# Patient Record
Sex: MALE
[De-identification: names, ages, dates, MRNs, and addresses within clinical notes are randomized per-mention and may not be internally consistent; named-entity substitution may affect disease eponyms.]

---

## 2016-09-19 LAB — EXTERNAL HIV SCREEN: NORMAL

## 2017-01-10 PROBLEM — Z00.00 ENCOUNTER FOR PREVENTIVE HEALTH EXAMINATION: Status: ACTIVE | Noted: 2017-01-10

## 2017-02-09 ENCOUNTER — APPOINTMENT (OUTPATIENT)
Dept: OPHTHALMOLOGY | Facility: CLINIC | Age: 52
End: 2017-02-09

## 2017-02-09 DIAGNOSIS — H10.89 OTHER CONJUNCTIVITIS: ICD-10-CM

## 2017-02-09 DIAGNOSIS — H16.9 UNSPECIFIED KERATITIS: ICD-10-CM

## 2017-02-09 RX ORDER — ERYTHROMYCIN 5 MG/G
5 OINTMENT OPHTHALMIC
Qty: 1 | Refills: 3 | Status: ACTIVE | COMMUNITY
Start: 2017-02-09 | End: 1900-01-01

## 2017-02-09 RX ORDER — CYCLOSPORINE 0.5 MG/ML
0.05 EMULSION OPHTHALMIC
Qty: 1 | Refills: 2 | Status: ACTIVE | COMMUNITY
Start: 2017-02-09 | End: 1900-01-01

## 2017-02-09 RX ORDER — FLUOROMETHOLONE 1 MG/ML
0.1 SOLUTION/ DROPS OPHTHALMIC TWICE DAILY
Qty: 1 | Refills: 2 | Status: ACTIVE | COMMUNITY
Start: 2017-02-09 | End: 1900-01-01

## 2017-03-31 ENCOUNTER — APPOINTMENT (OUTPATIENT)
Dept: OPHTHALMOLOGY | Facility: CLINIC | Age: 52
End: 2017-03-31
Payer: COMMERCIAL

## 2017-03-31 DIAGNOSIS — H10.503 UNSPECIFIED BLEPHAROCONJUNCTIVITIS, BILATERAL: ICD-10-CM

## 2017-03-31 PROCEDURE — 92012 INTRM OPH EXAM EST PATIENT: CPT

## 2017-03-31 RX ORDER — CYCLOSPORINE 0.5 MG/ML
0.05 EMULSION OPHTHALMIC
Qty: 1 | Refills: 6 | Status: ACTIVE | COMMUNITY
Start: 2017-03-31 | End: 1900-01-01

## 2017-03-31 RX ORDER — ERYTHROMYCIN 5 MG/G
5 OINTMENT OPHTHALMIC
Qty: 5 | Refills: 4 | Status: ACTIVE | COMMUNITY
Start: 2017-03-31 | End: 1900-01-01

## 2017-03-31 RX ORDER — FLUOROMETHOLONE 1 MG/ML
0.1 SOLUTION/ DROPS OPHTHALMIC
Qty: 10 | Refills: 1 | Status: ACTIVE | COMMUNITY
Start: 2017-03-31 | End: 1900-01-01

## 2017-09-22 ENCOUNTER — APPOINTMENT (OUTPATIENT)
Dept: OPHTHALMOLOGY | Facility: CLINIC | Age: 52
End: 2017-09-22

## 2017-10-12 ENCOUNTER — GENERIC CONVERSION - ENCOUNTER (OUTPATIENT)
Dept: OTHER | Facility: OTHER | Age: 52
End: 2017-10-12

## 2017-10-31 ENCOUNTER — GENERIC CONVERSION - ENCOUNTER (OUTPATIENT)
Dept: OTHER | Facility: OTHER | Age: 52
End: 2017-10-31

## 2017-10-31 ENCOUNTER — ALLSCRIPTS OFFICE VISIT (OUTPATIENT)
Dept: OTHER | Facility: OTHER | Age: 52
End: 2017-10-31

## 2017-10-31 DIAGNOSIS — N39.0 URINARY TRACT INFECTION: ICD-10-CM

## 2017-10-31 DIAGNOSIS — Z13.1 ENCOUNTER FOR SCREENING FOR DIABETES MELLITUS: ICD-10-CM

## 2017-10-31 DIAGNOSIS — Z13.220 ENCOUNTER FOR SCREENING FOR LIPOID DISORDERS: ICD-10-CM

## 2017-10-31 LAB
BILIRUB UR QL STRIP: ABNORMAL
COLOR UR: ABNORMAL
GLUCOSE (HISTORICAL): ABNORMAL
HBA1C MFR BLD HPLC: 4.7 %
HGB UR QL STRIP.AUTO: ABNORMAL
KETONES UR STRIP-MCNC: ABNORMAL MG/DL
LEUKOCYTE ESTERASE UR QL STRIP: ABNORMAL
NITRITE UR QL STRIP: POSITIVE
PH UR STRIP.AUTO: 6 [PH]
PROT UR STRIP-MCNC: ABNORMAL MG/DL
SP GR UR STRIP.AUTO: 1.01
UROBILINOGEN UR QL STRIP.AUTO: 4

## 2017-11-01 ENCOUNTER — APPOINTMENT (OUTPATIENT)
Dept: LAB | Facility: HOSPITAL | Age: 52
End: 2017-11-01
Payer: COMMERCIAL

## 2017-11-01 DIAGNOSIS — N39.0 URINARY TRACT INFECTION: ICD-10-CM

## 2017-11-01 PROCEDURE — 87077 CULTURE AEROBIC IDENTIFY: CPT

## 2017-11-01 PROCEDURE — 87186 SC STD MICRODIL/AGAR DIL: CPT

## 2017-11-01 PROCEDURE — 87086 URINE CULTURE/COLONY COUNT: CPT

## 2017-11-03 LAB — BACTERIA UR CULT: ABNORMAL

## 2017-11-20 ENCOUNTER — ALLSCRIPTS OFFICE VISIT (OUTPATIENT)
Dept: OTHER | Facility: OTHER | Age: 52
End: 2017-11-20

## 2017-12-11 ENCOUNTER — HOSPITAL ENCOUNTER (EMERGENCY)
Facility: HOSPITAL | Age: 52
Discharge: HOME | End: 2017-12-11
Attending: EMERGENCY MEDICINE | Admitting: EMERGENCY MEDICINE
Payer: COMMERCIAL

## 2017-12-11 VITALS
DIASTOLIC BLOOD PRESSURE: 63 MMHG | TEMPERATURE: 98 F | BODY MASS INDEX: 49.98 KG/M2 | RESPIRATION RATE: 16 BRPM | OXYGEN SATURATION: 98 % | HEIGHT: 66 IN | HEART RATE: 80 BPM | SYSTOLIC BLOOD PRESSURE: 133 MMHG | WEIGHT: 311 LBS

## 2017-12-11 DIAGNOSIS — K74.60 CIRRHOSIS OF LIVER DUE TO HEPATITIS B (HCC): ICD-10-CM

## 2017-12-11 DIAGNOSIS — N30.91 HEMORRHAGIC CYSTITIS: ICD-10-CM

## 2017-12-11 DIAGNOSIS — B19.10 CIRRHOSIS OF LIVER DUE TO HEPATITIS B (HCC): ICD-10-CM

## 2017-12-11 DIAGNOSIS — N39.0 URINARY TRACT INFECTION: Primary | ICD-10-CM

## 2017-12-11 LAB
ALBUMIN SERPL BCP-MCNC: 3.1 G/DL (ref 3.5–5)
ALP SERPL-CCNC: 128 U/L (ref 46–116)
ALT SERPL W P-5'-P-CCNC: 42 U/L (ref 12–78)
ANION GAP SERPL CALCULATED.3IONS-SCNC: 8 MMOL/L (ref 4–13)
APTT PPP: 34 SECONDS (ref 23–35)
AST SERPL W P-5'-P-CCNC: 52 U/L (ref 5–45)
BACTERIA UR QL AUTO: ABNORMAL /HPF
BASOPHILS # BLD AUTO: 0.05 THOUSANDS/ΜL (ref 0–0.1)
BASOPHILS NFR BLD AUTO: 1 % (ref 0–1)
BILIRUB DIRECT SERPL-MCNC: 0.8 MG/DL (ref 0–0.2)
BILIRUB SERPL-MCNC: 2.1 MG/DL (ref 0.2–1)
BILIRUB UR QL STRIP: ABNORMAL
BUN SERPL-MCNC: 7 MG/DL (ref 5–25)
CALCIUM SERPL-MCNC: 8.7 MG/DL (ref 8.3–10.1)
CHLORIDE SERPL-SCNC: 107 MMOL/L (ref 100–108)
CLARITY UR: ABNORMAL
CO2 SERPL-SCNC: 28 MMOL/L (ref 21–32)
COLOR UR: ABNORMAL
CREAT SERPL-MCNC: 0.66 MG/DL (ref 0.6–1.3)
EOSINOPHIL # BLD AUTO: 0.21 THOUSAND/ΜL (ref 0–0.61)
EOSINOPHIL NFR BLD AUTO: 3 % (ref 0–6)
ERYTHROCYTE [DISTWIDTH] IN BLOOD BY AUTOMATED COUNT: 14.1 % (ref 11.6–15.1)
GFR SERPL CREATININE-BSD FRML MDRD: 110 ML/MIN/1.73SQ M
GLUCOSE SERPL-MCNC: 107 MG/DL (ref 65–140)
GLUCOSE UR STRIP-MCNC: NEGATIVE MG/DL
HCT VFR BLD AUTO: 38.1 % (ref 36.5–49.3)
HGB BLD-MCNC: 13.3 G/DL (ref 12–17)
HGB UR QL STRIP.AUTO: ABNORMAL
INR PPP: 1.25 (ref 0.86–1.16)
KETONES UR STRIP-MCNC: NEGATIVE MG/DL
LEUKOCYTE ESTERASE UR QL STRIP: ABNORMAL
LYMPHOCYTES # BLD AUTO: 1.02 THOUSANDS/ΜL (ref 0.6–4.47)
LYMPHOCYTES NFR BLD AUTO: 16 % (ref 14–44)
MCH RBC QN AUTO: 33.8 PG (ref 26.8–34.3)
MCHC RBC AUTO-ENTMCNC: 34.9 G/DL (ref 31.4–37.4)
MCV RBC AUTO: 97 FL (ref 82–98)
MONOCYTES # BLD AUTO: 0.58 THOUSAND/ΜL (ref 0.17–1.22)
MONOCYTES NFR BLD AUTO: 9 % (ref 4–12)
NEUTROPHILS # BLD AUTO: 4.32 THOUSANDS/ΜL (ref 1.85–7.62)
NEUTS SEG NFR BLD AUTO: 70 % (ref 43–75)
NITRITE UR QL STRIP: POSITIVE
NON-SQ EPI CELLS URNS QL MICRO: ABNORMAL /HPF
NRBC BLD AUTO-RTO: 0 /100 WBCS
PH UR STRIP.AUTO: 6 [PH] (ref 4.5–8)
PLATELET # BLD AUTO: 88 THOUSANDS/UL (ref 149–390)
PMV BLD AUTO: 11.4 FL (ref 8.9–12.7)
POTASSIUM SERPL-SCNC: 3.6 MMOL/L (ref 3.5–5.3)
PROT SERPL-MCNC: 6 G/DL (ref 6.4–8.2)
PROT UR STRIP-MCNC: ABNORMAL MG/DL
PROTHROMBIN TIME: 16 SECONDS (ref 12.1–14.4)
RBC # BLD AUTO: 3.93 MILLION/UL (ref 3.88–5.62)
RBC #/AREA URNS AUTO: ABNORMAL /HPF
SODIUM SERPL-SCNC: 143 MMOL/L (ref 136–145)
SP GR UR STRIP.AUTO: 1.02 (ref 1–1.03)
UROBILINOGEN UR QL STRIP.AUTO: 1 E.U./DL
WBC # BLD AUTO: 6.21 THOUSAND/UL (ref 4.31–10.16)
WBC #/AREA URNS AUTO: ABNORMAL /HPF

## 2017-12-11 PROCEDURE — 36415 COLL VENOUS BLD VENIPUNCTURE: CPT | Performed by: EMERGENCY MEDICINE

## 2017-12-11 PROCEDURE — 87186 SC STD MICRODIL/AGAR DIL: CPT | Performed by: EMERGENCY MEDICINE

## 2017-12-11 PROCEDURE — 87077 CULTURE AEROBIC IDENTIFY: CPT | Performed by: EMERGENCY MEDICINE

## 2017-12-11 PROCEDURE — 99283 EMERGENCY DEPT VISIT LOW MDM: CPT

## 2017-12-11 PROCEDURE — 81001 URINALYSIS AUTO W/SCOPE: CPT | Performed by: EMERGENCY MEDICINE

## 2017-12-11 PROCEDURE — 51798 US URINE CAPACITY MEASURE: CPT

## 2017-12-11 PROCEDURE — 80076 HEPATIC FUNCTION PANEL: CPT | Performed by: EMERGENCY MEDICINE

## 2017-12-11 PROCEDURE — 85025 COMPLETE CBC W/AUTO DIFF WBC: CPT | Performed by: EMERGENCY MEDICINE

## 2017-12-11 PROCEDURE — 85730 THROMBOPLASTIN TIME PARTIAL: CPT | Performed by: EMERGENCY MEDICINE

## 2017-12-11 PROCEDURE — 80048 BASIC METABOLIC PNL TOTAL CA: CPT | Performed by: EMERGENCY MEDICINE

## 2017-12-11 PROCEDURE — 85610 PROTHROMBIN TIME: CPT | Performed by: EMERGENCY MEDICINE

## 2017-12-11 PROCEDURE — 87086 URINE CULTURE/COLONY COUNT: CPT | Performed by: EMERGENCY MEDICINE

## 2017-12-11 RX ORDER — CIPROFLOXACIN 500 MG/1
500 TABLET, FILM COATED ORAL ONCE
Status: COMPLETED | OUTPATIENT
Start: 2017-12-11 | End: 2017-12-11

## 2017-12-11 RX ORDER — CIPROFLOXACIN 500 MG/1
500 TABLET, FILM COATED ORAL 2 TIMES DAILY
Qty: 27 TABLET | Refills: 0 | Status: SHIPPED | OUTPATIENT
Start: 2017-12-11 | End: 2017-12-25

## 2017-12-11 RX ADMIN — CIPROFLOXACIN HYDROCHLORIDE 500 MG: 500 TABLET, FILM COATED ORAL at 17:08

## 2017-12-11 NOTE — ED PROVIDER NOTES
History  Chief Complaint   Patient presents with    Blood in Urine     Pt with blood in his urine and some pain after urination      HPI    None       Past Medical History:   Diagnosis Date    Cirrhosis ([facility] [address] )     Obesity        History reviewed  No pertinent surgical history  History reviewed  No pertinent family history  I have reviewed and agree with the history as documented  Social History   Substance Use Topics    Smoking status: Current Every Day Smoker     Types: Cigarettes    Smokeless tobacco: Never Used    Alcohol use No        Review of Systems    Physical Exam  ED Triage Vitals [12/11/17 1538]   Temperature Pulse Respirations Blood Pressure SpO2   98 °F (36 7 °C) 80 16 133/63 98 %      Temp Source Heart Rate Source Patient Position - Orthostatic VS BP Location FiO2 (%)   Oral Monitor Sitting Right arm --      Pain Score       8           Orthostatic Vital Signs  Vitals:    12/11/17 1538   BP: 133/63   Pulse: 80   Patient Position - Orthostatic VS: Sitting       Physical Exam   Constitutional: He is oriented to person, place, and time  He appears well-developed and well-nourished  No distress  Morbid obesity   HENT:   Head: Normocephalic and atraumatic  Mouth/Throat: Oropharynx is clear and moist    Eyes: Conjunctivae are normal  Pupils are equal, round, and reactive to light  Neck: Normal range of motion  No tracheal deviation present  Cardiovascular: Normal rate, regular rhythm, normal heart sounds and intact distal pulses  Pulmonary/Chest: Effort normal and breath sounds normal  No respiratory distress  Abdominal: Soft  Bowel sounds are normal  He exhibits no distension  There is no tenderness  Neurological: He is alert and oriented to person, place, and time  GCS eye subscore is 4  GCS verbal subscore is 5  GCS motor subscore is 6  Skin: Skin is warm and dry  Psychiatric: He has a normal mood and affect   His behavior is normal    Nursing note and vitals reviewed  ED Medications  Medications   ciprofloxacin (CIPRO) tablet 500 mg (not administered)       Diagnostic Studies  Results Reviewed     Procedure Component Value Units Date/Time    Urine Microscopic [[id number]]  (Abnormal) Collected:  12/11/17 1608    Lab Status:  Final result Specimen:  Urine from Urine, Clean Catch Updated:  12/11/17 1644     RBC, UA Innumerable (A) /hpf      WBC, UA 20-30 (A) /hpf      Epithelial Cells Occasional /hpf      Bacteria, UA Moderate (A) /hpf     Urine culture [[id number]] Collected:  12/11/17 1608    Lab Status:   In process Specimen:  Urine from Urine, Clean Catch Updated:  12/11/17 1644    UA w Reflex to Microscopic w Reflex to Culture [[id number]]  (Abnormal) Collected:  12/11/17 1608    Lab Status:  Final result Specimen:  Urine from Urine, Clean Catch Updated:  12/11/17 1642     Color, UA Brown     Clarity, UA Cloudy     Specific Gravity, UA 1 025     pH, UA 6 0     Leukocytes, UA Moderate (A)     Nitrite, UA Positive (A)     Protein,  (2+) (A) mg/dl      Glucose, UA Negative mg/dl      Ketones, UA Negative mg/dl      Urobilinogen, UA 1 0 E U /dl      Bilirubin, UA Small (A)     Blood, UA Large (A)    CBC and differential [[id number]]  (Abnormal) Collected:  12/11/17 1605    Lab Status:  Final result Specimen:  Blood from Arm, Right Updated:  12/11/17 1642     WBC 6 21 Thousand/uL      RBC 3 93 Million/uL      Hemoglobin 13 3 g/dL      Hematocrit 38 1 %      MCV 97 fL      MCH 33 8 pg      MCHC 34 9 g/dL      RDW 14 1 %      MPV 11 4 fL      Platelets 88 (L) Thousands/uL      nRBC 0 /100 WBCs      Neutrophils Relative 70 %      Lymphocytes Relative 16 %      Monocytes Relative 9 %      Eosinophils Relative 3 %      Basophils Relative 1 %      Neutrophils Absolute 4 32 Thousands/µL      Lymphocytes Absolute 1 02 Thousands/µL      Monocytes Absolute 0 58 Thousand/µL      Eosinophils Absolute 0 21 Thousand/µL      Basophils Absolute 0 05 Thousands/µL     Protime-INR [[id number]]  (Abnormal) Collected:  12/11/17 1605    Lab Status:  Final result Specimen:  Blood from Arm, Right Updated:  12/11/17 1640     Protime 16 0 (H) seconds      INR 1 25 (H)    APTT [[id number]]  (Normal) Collected:  12/11/17 1605    Lab Status:  Final result Specimen:  Blood from Arm, Right Updated:  12/11/17 1640     PTT 34 seconds     Narrative: Therapeutic Heparin Range = 60-90 seconds    Basic metabolic panel [[id number]] Collected:  12/11/17 1605    Lab Status:  Final result Specimen:  Blood from Arm, Right Updated:  12/11/17 1633     Sodium 143 mmol/L      Potassium 3 6 mmol/L      Chloride 107 mmol/L      CO2 28 mmol/L      Anion Gap 8 mmol/L      BUN 7 mg/dL      Creatinine 0 66 mg/dL      Glucose 107 mg/dL      Calcium 8 7 mg/dL      eGFR 110 ml/min/1 73sq m     Narrative:         National Kidney Disease Education Program recommendations are as follows:  GFR calculation is accurate only with a steady state creatinine  Chronic Kidney disease less than 60 ml/min/1 73 sq  meters  Kidney failure less than 15 ml/min/1 73 sq  meters  Hepatic function panel [[id number]]  (Abnormal) Collected:  12/11/17 1605    Lab Status:  Final result Specimen:  Blood from Arm, Right Updated:  12/11/17 1633     Total Bilirubin 2 10 (H) mg/dL      Bilirubin, Direct 0 80 (H) mg/dL      Alkaline Phosphatase 128 (H) U/L      AST 52 (H) U/L      ALT 42 U/L      Total Protein 6 0 (L) g/dL      Albumin 3 1 (L) g/dL                  No orders to display              Procedures  Procedures       Phone Contacts  ED Phone Contact    ED Course  ED Course                                MDM  Number of Diagnoses or Management Options  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 [facility]): new and requires workup  Hemorrhagic cystitis: new and requires workup  Urinary tract infection: new and requires workup  Diagnosis management comments: This is a 50-year-old male who presents here today with hematuria    He states it started two days ago, he had a mild blood tenderness she at the end of his stream, and a sharp pain in the suprapubic area that lasted for a couple of seconds before resolving  He does not have pain in between urination  He called his primary care doctor today, and was initially scheduled for an appointment tomorrow to follow-up on his symptoms  However, he had several additional episodes of urination, but had a greater amount of blood  He states it is liquid" blood, looking like bloody urine, and denies any blood clots  He occasionally feels like there is something in his bladder when he is done, and that he is not fully emptied it  He does not have any suprapubic pain other than immediately after urination  He has no back or flank pain  He has no difficulties urinating  He has no known problems with his prostate  He has no fevers or other infectious symptoms  He does have a history of cirrhosis from hepatitis B, and denies any problems with easy bleeding or bruising  He states he has had two urinary tract infections over the past couple of months, most recently about one month ago, and denies any prior problems with UTIs before this  He denies any prior blood in his urine  He denies any prior problems with kidney stones  He has no other bleeding  He had previously been on a blood thinner for presumed clot in his abdomen, but states he has been off of this for about 8-9 months  ROS: Otherwise negative, unless stated as above  He is well-appearing, in no acute distress  He has no abdominal tenderness  The remainder of his exam is unremarkable  This is most likely hemorrhagic cystitis  I am not concerned about underlying kidney stone  His bleeding could be due to the infection verses secondary to developing bleeding dyscrasias from his cirrhosis  We will check his urine here to evaluate for signs of infection, as well as lab work to evaluate his current LFTs and for signs of bleeding dyscrasia    We will get a bladder scan to evaluate for signs of retention, due to what would be presumed to be obstructing blood clots, given his complaint of occasionally feeling like he cannot completely empty his bladder  His bladder scan only showed 44 milliliters  His urine does show signs of UTI with blood  His CBC and BMP are normal  His LFTs are elevated, but based on review of Care everywhere records, most recently on 9/19/16, are similar to prior values  He has a minimally elevated INR, but normal PTT  I do not feel that he has any significant bleeding dyscrasias contributing to his current bleeding  I discussed with the patient findings, treatment at home, recommended urology follow-up given his recent recurrent UTIs to evaluate for possible structural abnormalities contributing to this, and indications for return, and he expresses understanding with this plan  Amount and/or Complexity of Data Reviewed  Clinical lab tests: reviewed and ordered  Decide to obtain previous medical records or to obtain history from someone other than the patient: yes  Review and summarize past medical records: yes      CritCare Time    Disposition  Final diagnoses:   Urinary tract infection   Hemorrhagic cystitis   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 ([facility] [address] )     Time reflects when diagnosis was documented in both MDM as applicable and the Disposition within this note     Time User Action Codes Description Comment    12/11/2017  5:01 PM Daniel Paul Add [N39 0] Urinary tract infection     12/11/2017  5:02 PM Daniel Paul Add [N30 91] Hemorrhagic cystitis     12/11/2017  5:02 PM Daniel Paul Add [K74 60,  B19 10] 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 [facility])       ED Disposition     ED Disposition Condition Comment    Discharge  Tirso Foil Provosty discharge to home/self care      Condition at discharge: Good        Follow-up Information     Follow up With Specialties Details Why Contact Info Edvin Garcias MD Family Medicine Schedule an appointment as soon as possible for a visit in 2 days As needed to follow up on your symptoms [address] [place] [address]   Blanquita Faria [place] [phone]      Manoj Miner MD Urology Schedule an appointment as soon as possible for a visit To follow-up on your recurrent urinary tract infections [address]  [address] [facility] [address]  [phone]          Patient's Medications   Discharge Prescriptions    CIPROFLOXACIN (CIPRO) 500 MG TABLET    Take 1 tablet by mouth 2 (two) times a day for 14 days       Start Date: 12/11/2017End Date: 12/25/2017       Order Dose: 500 mg       Quantity: 27 tablet    Refills: 0     No discharge procedures on file      ED Provider  Electronically Signed by           Marychuy Ramesh MD  12/11/17 2561

## 2017-12-11 NOTE — ED NOTES
D/c reviewed with pt prior to discharge  Medications discussed  Ambulatory off unit with steady gait       Jenny Arceo RN  38/36/45 0901

## 2017-12-11 NOTE — DISCHARGE INSTRUCTIONS
Take the antibiotic as prescribed  Drink plenty of fluids  Follow up with your primary care doctor and the urologist for further evaluation of your symptoms, and your recurrent urinary tract infections  Return if you are unable to urinate, or for any other concerns  Urinary Tract Infection in Men, Ambulatory Care   GENERAL INFORMATION:   A urinary tract infection (UTI)  is caused by bacteria that get inside your urinary tract  Most bacteria that enter your urinary tract are expelled when you urinate  If the bacteria stay in your urinary tract, you may get an infection  Your urinary tract includes your kidneys, ureters, bladder, and urethra  Urine is made in your kidneys, and it flows from the ureters to the bladder  Urine leaves the bladder through the urethra  A UTI is more common in your lower urinary tract, which includes your bladder and urethra  Common symptoms include the following:   · Urinating more often or waking from sleep to urinate    · Pain or burning when you urinate    · Pain or pressure in your lower abdomen    · Urine that smells bad    · Leaking urine  Seek immediate care for the following symptoms:   · Urinating very little or not at all    · Vomiting    · Shaking chills with a fever    · Side or back pain that gets worse  Treatment for a UTI  may include medicines to treat a bacterial infection  You may also need medicines to decrease pain and burning, or decrease the urge to urinate often  Prevent a UTI:   · Urinate when you feel the urge  Do not hold your urine  Urinate as soon as you feel you have to  · Drink liquids as directed  Ask how much liquid to drink each day and which liquids are best for you  You may need to drink more fluids than usual to help flush out the bacteria  Do not drink alcohol, caffeine, and citrus juices  These can irritate your bladder and increase your symptoms      · Apply heat  on your abdomen for 20 to 30 minutes every 2 hours for as many days as directed  Heat helps decrease discomfort and pressure in your bladder  Follow up with your healthcare provider as directed:  Write down your questions so you remember to ask them during your visits  CARE AGREEMENT:   You have the right to help plan your care  Learn about your health condition and how it may be treated  Discuss treatment options with your caregivers to decide what care you want to receive  You always have the right to refuse treatment  The above information is an  only  It is not intended as medical advice for individual conditions or treatments  Talk to your doctor, nurse or pharmacist before following any medical regimen to see if it is safe and effective for you  © 2014 [address] is for End User's use only and may not be sold, redistributed or otherwise used for commercial purposes  All illustrations and images included in CareNotes® are the copyrighted property of [employer]  or Mika Hernandez

## 2017-12-13 LAB — BACTERIA UR CULT: ABNORMAL

## 2017-12-22 ENCOUNTER — ALLSCRIPTS OFFICE VISIT (OUTPATIENT)
Dept: OTHER | Facility: OTHER | Age: 52
End: 2017-12-22

## 2017-12-22 DIAGNOSIS — Z13.1 ENCOUNTER FOR SCREENING FOR DIABETES MELLITUS: ICD-10-CM

## 2017-12-22 DIAGNOSIS — R39.9 UNSPECIFIED SYMPTOMS AND SIGNS INVOLVING THE GENITOURINARY SYSTEM: ICD-10-CM

## 2017-12-22 DIAGNOSIS — N39.0 URINARY TRACT INFECTION: ICD-10-CM

## 2017-12-22 DIAGNOSIS — R31.0 GROSS HEMATURIA: ICD-10-CM

## 2017-12-22 DIAGNOSIS — Z13.220 ENCOUNTER FOR SCREENING FOR LIPOID DISORDERS: ICD-10-CM

## 2017-12-23 NOTE — PROGRESS NOTES
Assessment   1  Acute UTI (599 0) (N39 0)   2  Urinary frequency (788 41) (R35 0)   3  Erectile dysfunction (607 84) (N52 9)    Plan   Acute UTI    · Ciprofloxacin HCl - 500 MG Oral Tablet; TAKE 1 TABLET EVERY 12 HOURS DAILY   · (1) URINE CULTURE; Source:Urine, Clean Catch; Status:Active; Requested    for:[id number];   Diabetes mellitus screening    · (1) BASIC METABOLIC PROFILE; Status:Active; Requested for:[id number];   Erectile dysfunction    · Staxyn 10 MG Oral Tablet Disintegrating   · Levitra 20 MG Oral Tablet; TAKE 1 TABLET DAILY 1 HOUR BEFORE NEEDED  Lipid screening    · (1) LIPID PANEL, FASTING; Status:Active; Requested for:[id number];     Discussion/Summary      Acute UTI - he was given an order for urine culture  Also given a script for ciprofloxacin 500 mg PO X 5 days  Advised to follow up with urologist  dysjunction- after discussing risks and benefits with medications along with major and common side effects, given samples of Levitra  up in 3 months or PRN  Possible side effects of new medications were reviewed with the patient/guardian today  The treatment plan was reviewed with the patient/guardian  The patient/guardian understands and agrees with the treatment plan      Chief Complaint   Patient seen in office today for a follow up appointment from recent ER visit - UTI  Patient stated that he is currently taking Cipro for a few more days and also is scheduled with Thuan Chambers 1/5/17  History of Present Illness   Patient is here to follow up due to an ER visit at [facility] due dysuria and blood in the urine  He was given ciprofloxacin 500 mg PO twice daily and he says that he is currently finishing ans has a few pills left  He has an appt with urologist on January 5th  is Here to follow up for erectile syndication, he says that he tried Staxyn however the medication is not helping him  He would like to try something else        Review of Systems        Constitutional: No fever or chills, feels well, no tiredness, no recent weight gain or weight loss  Eyes: No complaints of eye pain, no red eyes, no discharge from eyes, no itchy eyes  Cardiovascular: No complaints of slow heart rate, no fast heart rate, no chest pain, no palpitations, no leg claudication, no lower extremity  Respiratory: No complaints of shortness of breath, no wheezing, no cough, no SOB on exertion, no orthopnea or PND  Gastrointestinal: No complaints of abdominal pain, no constipation, no nausea or vomiting, no diarrhea or bloody stools  Genitourinary: dysuria, but-- No complaints of dysuria, no incontinence, no hesitancy, no nocturia, no genital lesion, no testicular pain-- and-- as noted in HPI  Musculoskeletal: No complaints of arthralgia, no myalgias, no joint swelling or stiffness, no limb pain or swelling  Integumentary: No complaints of skin rash or skin lesions, no itching, no skin wound, no dry skin  Neurological: No compliants of headache, no confusion, no convulsions, no numbness or tingling, no dizziness or fainting, no limb weakness, no difficulty walking  Endocrine: No complaints of proptosis, no hot flashes, no muscle weakness, no erectile dysfunction, no deepening of the voice, no feelings of weakness  ROS reviewed  Active Problems   1  Acute UTI (599 0) (N39 0)   2  Cirrhosis of liver (571 5) (K74 60)   3  Diabetes mellitus screening (V77 1) (Z13 1)   4  Erectile dysfunction (607 84) (N52 9)   5  Glucose found in urine on examination (791 5) (R81)   6  History of hepatitis C (V12 09) (Z86 19)   7  Lipid screening (V77 91) (Z13 220)   8  Urinary frequency (788 41) (R35 0)    Past Medical History      The active problems and past medical history were reviewed and updated today  Family History   Mother    1  Family history of   Father    2   Family history of     Social History    · Current every day smoker (305 1) (F17 200)    Current Meds    1  AMILoride HCl - 5 MG Oral Tablet; Take 1 tablet daily Recorded   2  Constulose 10 GM/15ML SYRP;     Therapy: (Recorded:31Oct2017) to Recorded   3  Furosemide 40 MG Oral Tablet Recorded   4  Nadolol 20 MG Oral Tablet; Take 1 tablet daily Recorded   5  Staxyn 10 MG Oral Tablet Disintegrating; TAKE 10 MG Daily PRN; Therapy: [id number] to (Last Rx:31Oct2017) Ordered   6  Xifaxan 550 MG Oral Tablet; Therapy: (Recorded:31Oct2017) to Recorded    Allergies   1  No Known Drug Allergies    Vitals   Vital Signs    Recorded: [id number] 02:10PM   Temperature 97 6 F   Heart Rate 66   Systolic 489   Diastolic 78   Height 5 ft 5 in   Weight 307 lb    BMI Calculated 51 09   BSA Calculated 2 37   O2 Saturation 98     Physical Exam        Constitutional      General appearance: No acute distress, well appearing and well nourished  Eyes      Conjunctiva and lids: No swelling, erythema, or discharge  -- EOMI  Pulmonary      Respiratory effort: No increased work of breathing or signs of respiratory distress  Auscultation of lungs: Clear to auscultation, equal breath sounds bilaterally, no wheezes, no rales, no rhonci  Cardiovascular      Auscultation of heart: Normal rate and rhythm, normal S1 and S2, without murmurs  Musculoskeletal      Gait and station: Normal        Skin      Skin and subcutaneous tissue: Normal without rashes or lesions  Psychiatric      Orientation to person, place and time: Normal        Mood and affect: Normal           Future Appointments      Date/Time Provider Specialty Site   01/05/2018 08:30 AM EDUARD Wright   Urology [facility]     Signatures    Electronically signed by : Yohannes Clark MD; Dec 22 2017  2:49PM EST                       (Author)

## 2018-01-05 ENCOUNTER — ALLSCRIPTS OFFICE VISIT (OUTPATIENT)
Dept: OTHER | Facility: OTHER | Age: 53
End: 2018-01-05

## 2018-01-06 NOTE — CONSULTS
Assessment   1  Erectile dysfunction (607 84) (N52 9)   2  Urinary frequency (788 41) (R35 0)   3  Acute UTI (599 0) (N39 0)   4  Gross hematuria (599 71) (R31 0)   5  Lower urinary tract symptoms (LUTS) (788 99) (R39 9)    Plan   Gross hematuria, Lower urinary tract symptoms (LUTS)    · CT ABDOMEN PELVIS W WO CONTRAST; Status:Need Information - Financial    Authorization; Requested [id number]; Perform:[facility] Radiology; Order Comments:Please perform CT scan abd/pelvis with and without contrast with delayed images (CT urogram), thank you; PJF:[id number];KIFOGTR; For:Gross hematuria, Lower urinary tract symptoms (LUTS); Ordered By:Ken Bean;   · Urology Follow Up Evaluation and Treatment  Please schedule cystoscopy, Dr Lisbet Colmenares,    [address] office  Status: Hold For - Scheduling  Requested for: [id number]   Ordered; For: Gross hematuria, Lower urinary tract symptoms (LUTS); Ordered By: Oscar Lundberg Performed:  Due: 94HZJ1235  Lower urinary tract symptoms (LUTS)    · Tamsulosin HCl - 0 4 MG Oral Capsule; take 1 capsule daily by mouth at bedtime   Rx By: Oscar Lundberg; Dispense: 90 Days ; #:90 Capsule; Refill: 3;For: Lower urinary tract symptoms (LUTS); ALESHIA = N; Faxed To: [facility]-/213   · (1) BASIC METABOLIC PROFILE; Status:Active; Requested VGY:[id number]; Perform:[facility]; WFK:[id number];LNKMIFM; For:Lower urinary tract symptoms (LUTS); Ordered By:Ken Bean; Morbid or severe obesity due to excess calories    · Marya - Louis JUAN, [facility]  (General Surgery) Co-Management  * thank you for seeing    this morbidly obese man with history of ups and downs in his weight who wishes to    consult with the surgical team with regard to possible gastric bypass surgery  Status:    Active  Requested for: [id number]   Ordered; For: Morbid or severe obesity due to excess calories;  Ordered By: Oscar Jinster Performed:  Due: 24MMC7200  Care Summary provided  : Yes    Discussion/Summary   Discussion Summary:    Marimar Singleton was seen today for a myriad of urological issues including the following:   dysfunction:  erectile dysfunction is likely a combination of his morbid obesity, his history of tobacco abuse, and his history of hepatitis Celsius and cirrhosis  He is unable to afford therapies for erectile dysfunction at this time but the next therapies would be injection therapy of the penis as he is not an operative candidate for a penile prosthesis given his anatomic configuration of a large escucheon and morbid obesity  frequency:  differential diagnosis of this includes being due to urinary tract infection, due to his enlarged prostate, or due to irritative voiding symptoms due to a bladder mass or defect  He does have risk factors for urothelial carcinoma in the form of heavy smoking history  urinary tract infection:  was treated appropriately with Cipro for his Citrobacter urinary tract infection  This is likely contributed to by his benign prostatic enlargement and poor bladder emptying  It could also be due to a bladder stone or bladder mass providing a surface for colonization and infection  hematuria:   Today we discussed gross hematuria and its possible meanings  I explained to the patient that this can be from a nephrologic or from a urologic cause or from a pseudo cause meaning arising from outside of the urinary system, such as in the case of menstruation, inflammation, phimosis, or balanitis or posthitis)  Additionally, gross hematuria may be caused by relatively benign causes such as infection, nephrolithiasis, renal vein thrombosis, menstruation, vigorous exercise or trauma, medical renal disease, viral illness, or recent urologic procedures or surgery involving an organ intimately related to the kidneys ureters or bladder  also discussed that intrinsic renal disease may cause microscopic hematuria and we discussed the patient's creatinine an estimated GFR   We also discussed that in the event of a normal urologic workup a nephrologic workup may also be indicated in certain cases  We discussed as well, risk factors for urothelial carcinoma including current and past tobacco use, occupational exposures, and chemical and dye exposures  workup of gross hematuria including cystoscopy and CT urogram and urinalysis, urine culture, and urine cytology were discussed with the patient  They are made aware that the finding of urologic malignancy occurs around 20-25% in the workup of gross hematuria versus roughly only 5% of microscopic hematuria workups  The benefits of increased diagnostic information were shared with the patient as were the potential risks of infection, bleeding, pain, possible contrast allergy, possible acute kidney injury from intravenous contrast, and risk of diagnosis of urothelial or bladder carcinoma  Alternatives including cystoscopy with retrograde pyelography and renal ultrasound were also mentioned to the patient although these do not represent the gold standard for the workup of microscopic hematuria  I shared the fact that in roughly 10% of patients, gross hematuria workup initially fails to identify any etiology for the gross hematuria  Of these patients with a negative workup, roughly 18% will develop a urologic malignancy or malady in the future  The importance of close follow-up was also stressed to the patient  urinary tract symptoms and prostatic enlargement:   I discussed with Paul Mccann the mechanism of action of alpha blockers in terms of relaxing smooth muscle within the prostate and within the bladder neck  Potential side effects of dizziness, interactions with other medications, and retrograde ejaculation were discussed with him and the benefit and increasing his urinary flow in decreasing his lower urinary tract symptoms was also discussed  He wished to try this and this was sent to his pharmacy     obesity: Paul Mccann has a history of multiple changes in his weight, gaining and losing weight  He previously talk to an outside gastric surgeon who said he was not an operative candidate however he would like to explore this further and has requested referral to our gastric surgery team here at [address]  I have placed this consultation and they will assess whether not he has an appropriate operative candidate for gastric bypass surgery  Basic metabolic panel, start tamsulosin, schedule CT urogram, schedule cystoscopy  Counseling Documentation With Imm: The patient was counseled regarding diagnostic results,-- instructions for management,-- risk factor reductions,-- prognosis,-- impressions,-- risks and benefits of treatment options,-- importance of compliance with treatment  Patient's Capacity to Self-Care: Patient is able to Self-Care  Goals and Barriers: The patient has the current Goals: To urinate better, to have less infections, have better sexual function, and undergo gross hematuria workup  Also wishes to lose weight  The patent has the current Barriers: His multiple medical problems and his size are barriers to the above  Patient Education: Educational resources provided: Information on gross hematuria, erectile dysfunction, lower urinary tract symptoms, gastric bypass surgery, and the workup for gross hematuria was given to the patient today  Medication SE Review and Pt Understands Tx: The treatment plan was reviewed with the patient/guardian  The patient/guardian understands and agrees with the treatment plan      Chief Complaint   Chief Complaint Free Text Note Form: Patient presents for Erectile dysfunction, benign prostatic enlargement with lower urinary tract symptoms, and gross hematuria      History of Present Illness   HPI: Colletta Able is a 74-year-old gentleman who was referred for urinary tract infection consultation by Ford Duggan  Additionally, he complains of erectile dysfunction was given samples of Levitra  He previously tried Staxyn and this did not work for him  He does have a history of tobacco abuse disorder and is a current every day smoker  Additionally, his BMI is 51 09    urine culture from December 11, 2017 showed Citrobacter koseri greater than 100,000 colony-forming units per milliliter susceptible to all antibiotics except for ampicillin and nitrofurantoin  He was treated with ciprofloxacin  This represents adequate and appropriate treatment  also mentions a history of gross hematuria previously which was mostly painless however he did have gross hematuria when he had urinary tract infection as well  He has been a smoker since he was age 15 and he has smoked off and on his entire life since that time smoking around 1/3 of a pack of cigarettes per day  With regard to his gross hematuria he identifies no aggravating or alleviating factors  has difficulty passing his water under urologic review of systems today he endorses dysuria, occasional urge incontinence, no hesitancy of urination, he last had gross hematuria 1 day ago, he does have urinary urgency, he awakens 5-6 times at night to urinate, he feels empty after voiding and stream quality is fair  He has not taken medications for his LUTS and he has not yet seen a urologist for this complaint    regard to his erectile dysfunction he states that he has been told that this is due to his being morbidly obese, due to his cirrhosis, PIN due to his smoking  Unfortunately, he is unable to afford PD 5 inhibitors as these are expensive for him and he does not have appropriate coverage  Review of Systems   Complete-Male Urology:      Constitutional: No fever or chills, feels well, no tiredness, no recent weight gain or weight loss  Respiratory: No complaints of shortness of breath, no wheezing, no cough, no SOB on exertion, no orthopnea or PND  Cardiovascular: No complaints of slow heart rate, no fast heart rate, no chest pain, no palpitations, no leg claudication, no lower extremity  Gastrointestinal: No complaints of abdominal pain, no constipation, no nausea or vomiting, no diarrhea or bloody stools  Genitourinary: Empty sensation,-- incontinence-- (occ),-- feelings of urinary urgency-- (all the time)-- and-- stream quality fair, but-- no urinary hesitancy--       The patient presents with complaints of dysuria (3 days ago)  The patient presents with complaints of hematuria (yes days ago)      The patient presents with complaints of nocturia (5-6 times)      Musculoskeletal: No complaints of arthralgia, no myalgias, no joint swelling or stiffness, no limb pain or swelling  Integumentary: No complaints of skin rash or skin lesions, no itching, no skin wound, no dry skin  Hematologic/Lymphatic: No complaints of swollen glands, no swollen glands in the neck, does not bleed easily, no easy bruising  Neurological: No compliants of headache, no confusion, no convulsions, no numbness or tingling, no dizziness or fainting, no limb weakness, no difficulty walking  ROS Reviewed:    ROS reviewed  Active Problems   1  Acute UTI (599 0) (N39 0)   2  Cirrhosis of liver (571 5) (K74 60)   3  Diabetes mellitus screening (V77 1) (Z13 1)   4  Erectile dysfunction (607 84) (N52 9)   5  Glucose found in urine on examination (791 5) (R81)   6  History of hepatitis C (V12 09) (Z86 19)   7  Lipid screening (V77 91) (Z13 220)   8  Urinary frequency (788 41) (R35 0)    Past Medical History   Active Problems And Past Medical History Reviewed: The active problems and past medical history were reviewed and updated today  Surgical History   Surgical History Reviewed: The surgical history was reviewed and updated today  Family History   Mother    1  Family history of   Father    2  Family history of   Family History Reviewed: The family history was reviewed and updated today         Social History    · Current every day smoker (305 1) (F17 200)  Social History Reviewed: The social history was reviewed and updated today  The social history was reviewed and is unchanged  Current Meds    1  AMILoride HCl - 5 MG Oral Tablet; Take 1 tablet daily Recorded   2  Ciprofloxacin HCl - 500 MG Oral Tablet; TAKE 1 TABLET EVERY 12 HOURS DAILY; Therapy: 22Dec2017 to (Evaluate:66Cst4645)  Requested for: 22Dec2017; Last     Rx:[id number] Ordered   3  Constulose 10 GM/15ML SYRP;     Therapy: (Recorded:31Oct2017) to Recorded   4  Furosemide 40 MG Oral Tablet Recorded   5  Levitra 20 MG Oral Tablet; TAKE 1 TABLET DAILY 1 HOUR BEFORE NEEDED; Therapy: 22Dec2017 to (Evaluate:30Dec2017); Last Rx:[id number] Ordered   6  Nadolol 20 MG Oral Tablet; Take 1 tablet daily Recorded   7  Xifaxan 550 MG Oral Tablet; Therapy: (Recorded:31Oct2017) to Recorded  Medication List Reviewed: The medication list was reviewed and updated today  Allergies   1  No Known Drug Allergies    Vitals   Vital Signs    Recorded: 01NVN4204 09:00AM   Heart Rate 76   Systolic 201   Diastolic 82   Height 5 ft 6 in   Weight 312 lb    BMI Calculated 50 36   BSA Calculated 2 42     Physical Exam        Constitutional      General appearance: Abnormal  -- Morbidly obese, alert and oriented, has a bluish tinge to his skin consistent with tobacco abuse disorder, dressed in sweat pants and sweat shirt  Head and Face      Head and face: Normal        Palpation of the face and sinuses: No sinus tenderness  Eyes      Conjunctiva and lids: No erythema, swelling or discharge  Pupils and irises: Equal, round, reactive to light  Ears, Nose, Mouth, and Throat      External inspection of ears and nose: Normal        Hearing: Normal        Neck      Neck: Supple, symmetric, trachea midline, no masses  Pulmonary      Respiratory effort: No increased work of breathing or signs of respiratory distress         Palpation of chest: Normal        Cardiovascular      Peripheral vascular exam: Normal        Examination of extremities for edema and/or varicosities: Abnormal  -- Patient's extremities do have some edema and his legs are obese  Chest      Chest: Abnormal  -- Large chest due to obesity  Abdomen      Abdomen: Abnormal  -- Abdomen is soft and nontender without masses, there is panniculitis present  Liver and spleen: Abnormal  -- I am unable to feel the patient's liver and spleen due to his large body size  Examination for hernias: Abnormal  -- Umbilical hernia is palpable  Genitourinary      Anus and perineum: Abnormal  -- There is superficial inflammation of the skin with excoriations and signs of inflammation of the subcutaneous tissue and fatty tissue of the buttocks  Scrotum contents: Normal size, no masses  Epididymis: Normal, no masses  Testes: Normal testes, no masses  Urethral meatus: Normal, no lesions  Penis: Normal, no lesions  -- Uncircumcised  Digital rectal exam of prostate: Abnormal  -- Enlarged, 40 grams, smooth, without nodules normal consistency no sign of infection  Digital rectal exam of seminal vesicles: Normal size, no masses  Anus, perineum, and rectum: Normal        Lymphatic      Palpation of lymph nodes in axillae: No lymphadenopathy  Palpation of lymph nodes in groin: No lymphadenopathy  Palpation of lymph nodes in other areas: No lymphadenopathy  Musculoskeletal      Gait and station: Abnormal  -- Patient with altered gait due to morbid obesity  Inspection/palpation of digits and nails: Abnormal  -- Skin changes associated with smoking are noted  Inspection/palpation of joints, bones, and muscles: Abnormal  -- Patient's joints are enlarged due to wear from being overweight  Range of motion: Normal        Stability: Normal        Muscle strength/tone: Normal        Skin      Skin and subcutaneous tissue: Abnormal  -- Multiple excoriations noted        Palpation of skin and subcutaneous tissue: Normal turgor  Neurologic      Cranial nerves: Cranial nerves 2-12 intact  Cortical function: Normal mental status  Sensation: No sensory loss  Coordination: Normal finger to nose and heel to shin  Psychiatric      Judgment and insight: Normal        Orientation to person, place and time: Normal        Recent and remote memory: Intact         Mood and affect: Normal        Results/Data   (1) URINE CULTURE [id number] 04:05PM Maida Thakkar    Order Number: [id number]      Test Name Result Flag Reference   CLINICAL REPORT (Report) A    Test:        Urine culture     Specimen Type:   Urine     Specimen Date:   11/1/2017 4:05 PM     Result Date:    11/3/2017 10:18 AM     Result Status:   Final result     Abnormal:      Yes     Resulting Lab:   [facility] [address] [place], [place] [address]               Tel: [phone]               CULTURE                                          ------------------                                         >100,000 cfu/ml Citrobacter koseri (Abnormal)               SUSCEPTIBILITY                                      ------------------                                                             Citrobacter koseri     METHOD                 KATHY     -------------------------------------  --------------------------     AMOXICILLIN + CLAVULANATE        <=8/4 ug/ml  Susceptible     AMPICILLIN ($$)             >16 00 ug/ml Resistant     AMPICILLIN + SULBACTAM ($)       <=8/4 ug/ml  Susceptible     AZTREONAM ($$$)             <=8 ug/ml   Susceptible     CEFAZOLIN ($)              <=8 00 ug/ml Susceptible     CIPROFLOXACIN ($)            <=1 00 ug/ml Susceptible     ERTAPENEM ($$$)             <=2 0 ug/ml  Susceptible     GENTAMICIN ($$)             <=4 ug/ml   Susceptible     IMIPENEM                <=4 ug/ml   Susceptible     LEVOFLOXACIN ($)            <=2 00 ug/ml Susceptible MEROPENEM ($$)             <=4 00 ug/ml Susceptible     NITROFURANTOIN             64 ug/ml   Intermediate     PIPERACILLIN + TAZOBACTAM ($$$)     <=16 ug/ml  Susceptible     TETRACYCLINE              <=4 ug/ml   Susceptible     TOBRAMYCIN ($)             <=4 ug/ml   Susceptible     TRIMETHOPRIM + SULFAMETHOXAZOLE ($$$)  <=2/38 ug/ml Susceptible      Signatures    Electronically signed by : EDUARD Mcmanus ; Jan 5 2018 12:52PM EST                       (Author)

## 2018-01-09 ENCOUNTER — TRANSCRIBE ORDERS (OUTPATIENT)
Dept: ADMINISTRATIVE | Facility: HOSPITAL | Age: 53
End: 2018-01-09

## 2018-01-09 DIAGNOSIS — R31.0 GROSS HEMATURIA: Primary | ICD-10-CM

## 2018-01-09 DIAGNOSIS — R39.9 LOWER URINARY TRACT SYMPTOMS (LUTS): ICD-10-CM

## 2018-01-11 ENCOUNTER — HOSPITAL ENCOUNTER (OUTPATIENT)
Dept: CT IMAGING | Facility: HOSPITAL | Age: 53
Discharge: HOME | End: 2018-01-14
Attending: UROLOGY
Payer: COMMERCIAL

## 2018-01-11 DIAGNOSIS — R31.0 GROSS HEMATURIA: ICD-10-CM

## 2018-01-11 DIAGNOSIS — R39.9 UNSPECIFIED SYMPTOMS AND SIGNS INVOLVING THE GENITOURINARY SYSTEM: ICD-10-CM

## 2018-01-11 PROCEDURE — 74178 CT ABD&PLV WO CNTR FLWD CNTR: CPT

## 2018-01-11 RX ADMIN — IOHEXOL 100 ML: 350 INJECTION, SOLUTION INTRAVENOUS at 08:09

## 2018-01-12 VITALS
TEMPERATURE: 96.6 F | BODY MASS INDEX: 51.73 KG/M2 | SYSTOLIC BLOOD PRESSURE: 124 MMHG | HEART RATE: 72 BPM | OXYGEN SATURATION: 95 % | HEIGHT: 65 IN | WEIGHT: 310.5 LBS | DIASTOLIC BLOOD PRESSURE: 82 MMHG

## 2018-01-15 NOTE — MISCELLANEOUS
Provider Comments  Provider Comments:   PT NO SHOW      Signatures   Electronically signed by : Mark Ko MD; Nov 20 2017 12:58PM EST                       (Author)

## 2018-01-23 VITALS
WEIGHT: 312 LBS | HEART RATE: 76 BPM | HEIGHT: 66 IN | BODY MASS INDEX: 50.14 KG/M2 | SYSTOLIC BLOOD PRESSURE: 130 MMHG | DIASTOLIC BLOOD PRESSURE: 82 MMHG

## 2018-01-23 VITALS
TEMPERATURE: 97.6 F | HEIGHT: 65 IN | HEART RATE: 66 BPM | BODY MASS INDEX: 51.15 KG/M2 | OXYGEN SATURATION: 98 % | WEIGHT: 307 LBS | SYSTOLIC BLOOD PRESSURE: 116 MMHG | DIASTOLIC BLOOD PRESSURE: 78 MMHG

## 2018-02-06 ENCOUNTER — PREP FOR PROCEDURE (OUTPATIENT)
Dept: BARIATRICS | Facility: CLINIC | Age: 53
End: 2018-02-06

## 2018-02-06 ENCOUNTER — TRANSCRIBE ORDERS (OUTPATIENT)
Dept: BARIATRICS | Facility: CLINIC | Age: 53
End: 2018-02-06

## 2018-02-06 ENCOUNTER — OFFICE VISIT (OUTPATIENT)
Dept: BARIATRICS | Facility: CLINIC | Age: 53
End: 2018-02-06

## 2018-02-06 VITALS
RESPIRATION RATE: 22 BRPM | DIASTOLIC BLOOD PRESSURE: 64 MMHG | WEIGHT: 313 LBS | BODY MASS INDEX: 52.15 KG/M2 | TEMPERATURE: 98.2 F | HEIGHT: 65 IN | HEART RATE: 68 BPM | SYSTOLIC BLOOD PRESSURE: 110 MMHG

## 2018-02-06 DIAGNOSIS — E66.01 MORBID OBESITY (HCC): Primary | ICD-10-CM

## 2018-02-06 DIAGNOSIS — E66.01 MORBID (SEVERE) OBESITY DUE TO EXCESS CALORIES (HCC): Primary | ICD-10-CM

## 2018-02-06 PROCEDURE — 99999 PR OFFICE/OUTPT VISIT,PROCEDURE ONLY: CPT

## 2018-02-06 RX ORDER — CIPROFLOXACIN 500 MG/1
1 TABLET, FILM COATED ORAL EVERY 12 HOURS
COMMUNITY
Start: 2017-12-22 | End: 2018-03-23 | Stop reason: ALTCHOICE

## 2018-02-06 RX ORDER — RIFAXIMIN 550 MG/1
TABLET ORAL
Refills: 1 | Status: ON HOLD | COMMUNITY
Start: 2017-12-20 | End: 2018-04-12

## 2018-02-06 RX ORDER — NADOLOL 20 MG/1
20 TABLET ORAL 2 TIMES DAILY
Refills: 1 | COMMUNITY
Start: 2017-12-16 | End: 2019-02-07 | Stop reason: SDUPTHER

## 2018-02-06 RX ORDER — TAMSULOSIN HYDROCHLORIDE 0.4 MG/1
CAPSULE ORAL
Refills: 0 | Status: ON HOLD | COMMUNITY
Start: 2018-01-05 | End: 2018-04-12

## 2018-02-06 RX ORDER — FUROSEMIDE 40 MG/1
40 TABLET ORAL 2 TIMES DAILY
Status: ON HOLD | COMMUNITY
End: 2018-06-06

## 2018-02-06 RX ORDER — LACTULOSE 10 G/15ML
10 SOLUTION ORAL DAILY PRN
COMMUNITY
End: 2019-03-13 | Stop reason: SDUPTHER

## 2018-02-06 RX ORDER — SULFAMETHOXAZOLE AND TRIMETHOPRIM 800; 160 MG/1; MG/1
1 TABLET ORAL DAILY
Refills: 0 | Status: ON HOLD | COMMUNITY
Start: 2017-11-17 | End: 2018-04-12

## 2018-02-06 RX ORDER — AMILORIDE HYDROCHLORIDE 5 MG/1
10 TABLET ORAL 2 TIMES DAILY
Refills: 0 | COMMUNITY
Start: 2017-12-16 | End: 2019-02-07 | Stop reason: SDUPTHER

## 2018-02-06 RX ORDER — INFLUENZA A VIRUS A/SINGAPORE/GP1908/2015 IVR-180A (H1N1) ANTIGEN (PROPIOLACTONE INACTIVATED), INFLUENZA A VIRUS A/HONG KONG/4801/2014 X-263B (H3N2) ANTIGEN (PROPIOLACTONE INACTIVATED), AND INFLUENZA B VIRUS B/BRISBANE/46/2015 ANTIGEN (PROPIOLACTONE INACTIVATED) 15; 15; 15 UG/.5ML; UG/.5ML; UG/.5ML
INJECTION, SUSPENSION INTRAMUSCULAR
Refills: 0 | Status: ON HOLD | COMMUNITY
Start: 2017-11-04 | End: 2018-04-12

## 2018-02-06 RX ORDER — VARDENAFIL 11.85 MG/1
TABLET ORAL DAILY
Status: ON HOLD | COMMUNITY
Start: 2017-10-31 | End: 2018-04-12

## 2018-02-06 RX ORDER — VARDENAFIL HYDROCHLORIDE 20 MG/1
1 TABLET ORAL
Status: ON HOLD | COMMUNITY
Start: 2017-12-22 | End: 2018-04-12

## 2018-02-06 NOTE — PATIENT INSTRUCTIONS
1  food journal at least 3 days per week  2  Eliminate sugar sweetened beverages and carbonated beverages (seltzer water, sweetened teas, etc)  3   Consume 3 meals per day, measure portions

## 2018-02-06 NOTE — PROGRESS NOTES
Bariatric Behavioral Health Evaluation    Presenting Problem:  Matt A  Micheline    -1965   Patient wants to increase health, increase mobility and reduce chronic pain  Patient also seeking a liver transplant and must lose weight to qualify  Is the patient seeking Bariatric Surgery Eval? Yes  If yes how long have you researched this surgery option  Patient states he has been researching bariatric surgery for 2 years  Realizes Post- Op Requirements? 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 No    Domestic Violence No    Additional comments/stressors related to family/relationships/peer support : patient's health, weight, pat    Physical/Psychological Assessment:     Appearance: appropriate  Sociability: average  Affect: appropriate  Mood: calm  Thought Process: coherent  Speech: normal  Content: no impairment  Orientation: person  Yes   Insight: emotional  good    Risk Assessment:     none    Recommendations: Recommended for surgery  yes    Risk of Harm to Self or Others:     Observation:     Interviews only this interview    Access to weapons no     Based on the previous information, the client presents the following risk of harm to self or others: low    BARIATRIC SURGERY EDUCATION CHECKLIST    I have received education related to my bariatric surgery process and understand:    Patients may be required to complete a psychiatric evaluation and receive clearance for surgery from their psychiatrist     Patients who undergo weight loss surgery are at higher risk of increased mental health concerns and suicide attempts      Patients may be required to complete a full substance abuse evaluation and then complete all treatment recommendations prior to surgery  If diagnosis of abuse/dependence results, patient may be required to remain sober for one (1) year before having bariatric surgery  Patients on psychiatric medications should check with their provider to discuss psychiatric medications and the changes in absorption  Patient should discuss all time release medications with provider and take all medications as prescribed  The recommendation is that there is no use of  any tobacco products, Hookah or  vapes for the bariatric post-operation patient  Bariatric surgery patients should not consume alcohol as a post-operative patient as it may increase risk of numerous health conditions including but not limited to alcohol abuse and ulcers  There is a possibility of weight regain if patient does not follow all program guidelines and recommendations  Bariatric surgery patients should exercise thirty (30) to sixty (60) minutes per day to maintain post-surgical weight loss  Research indicates that bariatric patients are more successful when they see a therapist for up to two (2) years post-op  Patients will follow all medical and dietary recommendations provided  Patient will keep all scheduled appointments and follow up with their physician for a minimum of five (5) years  Patient will take all vitamins as recommended  Post-operative vitamins are life-long  Patient reviewed Bariatric Surgery Education Checklist and agrees they have received education on these issues   Note:  Patient denies Axis I diagnosis or treatment  Patient educated regarding the impact of nicotine and alcohol on the post bariatric surgery patient  Patient meets criteria for surgery at this program and is referred physician          Bariatric Nutrition Assessment Note    Type of surgery    Wants to discuss surgery type with surgeon  Surgeon: Dr Stuart Stewart Sr   47 y o   male     Wt with BMI of 25: 150lbs = IBW  Pre-Op Excess Wt: 163 lbs  BMI 52 1  Vitals:    02/06/18 0845   BP: 110/64   Pulse: 68   Resp: 22   Temp: 98 2 °F (36 8 °C)       Weight History   Onset of Obesity: Adult, 2009  Family history of obesity: not that patient is aware  Wt Loss Attempts: Commercial Programs ([email or web address], [employer], etc )  Self Created Diets (Portion Control, Healthy Food Choices, etc )  Vinegar diet, diet pills  Maximum Wt Lost: 30-40 lbs (current- eliminating CHOs, eating 3 meals per day with smaller portions)    Review of History and Medications   Past Medical History:   Diagnosis Date    [facility] )     [facility]rhosis [facility] )     Hepatitis     Obesity      History reviewed  No pertinent surgical history    Social History     Social History    Marital status: /Civil Union     Spouse name: N/A    Number of children: N/A    Years of education: N/A     Social History Main Topics    Smoking status: Current Every Day Smoker     Types: Cigarettes    Smokeless tobacco: Never Used    Alcohol use No    Drug use: No    Sexual activity: Not Asked     Other Topics Concern    None     Social History Narrative    None       Current Outpatient Prescriptions:     AFLURIA PRESERVATIVE FREE 0 5 ML DEEDEE, inject 0 5 milliliter intramuscularly, Disp: , Rfl: 0    AMILoride 5 mg tablet, , Disp: , Rfl: 0    furosemide (LASIX) 40 mg tablet, Take by mouth, Disp: , Rfl:     lactulose (CONSTULOSE) 10 g/15 mL solution, Take by mouth, Disp: , Rfl:     nadolol (CORGARD) 20 mg tablet, , Disp: , Rfl: 1    rifaximin (XIFAXAN) 550 mg tablet, Take by mouth, Disp: , Rfl:     sulfamethoxazole-trimethoprim (BACTRIM DS) 800-160 mg per tablet, Take 1 tablet by mouth daily, Disp: , Rfl: 0    vardenafil (LEVITRA) 20 MG tablet, Take 1 tablet by mouth, Disp: , Rfl:     Vardenafil HCl (STAXYN) 10 MG TBDP, Take by mouth Daily, Disp: , Rfl:     ciprofloxacin (CIPRO) 500 mg tablet, Take 1 tablet by mouth every 12 (twelve) hours, Disp: , Rfl:     tamsulosin (FLOMAX) 0 4 mg, take 1 capsule by mouth daily at bedtime, Disp: , Rfl: 0    XIFAXAN 550 MG tablet, , Disp: , Rfl: 1  Food Intake and Lifestyle Assessment   Food Intake Assessment completed via 24 hour recall  Breakfast: 3 eggs over easy with 1 italian sausage link, water- more like a brunch (ate a 11am)  Snack: none  Lunch: none  Snack: none  Dinner: 3 fish filets (seasoned and fried- homemade), 3/4 cup brown rice, water   Snack: none  Beverage intake: water, diet soda and 3 days ago had coke zero, josse half and half tea or green tea  Protein supplement: n/a  Estimated protein intake per day: 80-90 grams  Estimated fluid intake per day: 60-72 oz water daily  Meals eaten away from home: once every 2 weeks (red lobster, diners etc)  Typical meal pattern: 2-3 meals per day and 0 snacks per day  Eating Behaviors: Consumption of high calorie beverages and Large portion sizes  Food allergies or intolerances: No Known Allergies  Cultural or Yazidism considerations: [place], Pentecostalism  No vitamin or mineral supplements currently    Physical Assessment  Physical Activity  Types of exercise: None, enjoys water aerobics  Current physical limitations: back pain, weight    Psychosocial Assessment   Support systems: spouse  Socioeconomic factors: per patient none    Nutrition Diagnosis  Diagnosis: Overweight / Obesity (NC-3 3)  Related to: Food and nutrition-related knowledge deficit, Limited adherence to nutrition-related recommendations and Physical inactivity  As Evidenced by: BMI >25     Nutrition Prescription: Recommend the following diet  Low sugar and High protein    Interventions and Teaching   Discussed pre-op and post-op nutrition guidelines  Patient educated and handouts provided    Surgical changes to stomach / GI  Capacity of post-surgery stomach  Diet progression  Adequate hydration  Exercise  Suggestions for pre-op diet  Nutrition considerations after surgery  Meal planning and preparation  Dietary and lifestyle changes  Techniques for self monitoring and keeping daily food journal  Vitamin / Mineral supplementation of Multivitamin with minerals and Vitamin D    Education provided to: patient    Barriers to learning: Large portion sizes and Craves salty foods    Readiness to change: action    Prior research on procedure: internet    Comprehension: verbalizes understanding     Expected Compliance: good  Recommendations  Pt is an appropriate candidate for surgery   Yes    Evaluation / Monitoring  Dietitian to Monitor: Eating pattern as discussed Tolerance of nutrition prescription    Goals  Eliminate sugar sweetened beverages, Food journal, Exercise 30 minutes 5 times per week, Complete lession plans 1-6 and Eat 3 meals per day    Time Spent:   1 Hour

## 2018-02-12 ENCOUNTER — OFFICE VISIT (OUTPATIENT)
Dept: FAMILY MEDICINE CLINIC | Facility: CLINIC | Age: 53
End: 2018-02-12
Payer: COMMERCIAL

## 2018-02-12 VITALS
HEART RATE: 76 BPM | HEIGHT: 65 IN | OXYGEN SATURATION: 97 % | SYSTOLIC BLOOD PRESSURE: 130 MMHG | TEMPERATURE: 98.6 F | RESPIRATION RATE: 16 BRPM | BODY MASS INDEX: 51.58 KG/M2 | DIASTOLIC BLOOD PRESSURE: 86 MMHG | WEIGHT: 309.6 LBS

## 2018-02-12 DIAGNOSIS — N52.9 ERECTILE DYSFUNCTION, UNSPECIFIED ERECTILE DYSFUNCTION TYPE: ICD-10-CM

## 2018-02-12 DIAGNOSIS — F17.200 SMOKER: ICD-10-CM

## 2018-02-12 DIAGNOSIS — IMO0001 CLASS 3 OBESITY DUE TO EXCESS CALORIES WITHOUT SERIOUS COMORBIDITY WITH BODY MASS INDEX (BMI) OF 50.0 TO 59.9 IN ADULT: Primary | ICD-10-CM

## 2018-02-12 PROCEDURE — 99214 OFFICE O/P EST MOD 30 MIN: CPT | Performed by: FAMILY MEDICINE

## 2018-02-12 RX ORDER — SILDENAFIL 50 MG/1
100 TABLET, FILM COATED ORAL DAILY PRN
Qty: 2 TABLET | Refills: 0 | Status: ON HOLD | COMMUNITY
Start: 2018-02-12 | End: 2018-04-12

## 2018-02-12 NOTE — PROGRESS NOTES
Assessment/Plan:    No problem-specific Assessment & Plan notes found for this encounter  Diagnoses and all orders for this visit:    Erectile Dysfunction-  After explaining risks and benefits along with side effects including serious side effects given Viagra samples  Class 3 obesity due to excess calories without serious comorbidity with body mass index (BMI) of 50 0 to 59 9 in adult [facility])  Letter written on patient behalf in regards to bariatric surgery  Smoker  I counseled him in regards to smoking cessation and medications such as Zyban and chianti  He prefers to quit on his own however  Follow up in 1-2 months         Subjective:      Patient ID: Gian Laser Sr  is a 47 y o  male  Obesity  Matt WITT Provosty Sr  is a 47 y o  male who presents for evaluation of obesity  He has noted a weight loss of approximately 4 pounds over the last 1 month  History of eating disorders: none  Previous treatments for obesity include: none  Associated medical conditions: none  Associated medications: none  Cardiovascular risk factors besides obesity: male gender, obesity (BMI >= 30 kg/m2), sedentary lifestyle and smoking/ tobacco exposure  He has an upcoming appt with Bariatric surgeon in regards to having his surgery  Smoking Cessation  He presents for discussion regarding smoking cessation  He began smoking 30 years ago  He currently smokes 1 packs per day  He has attempted to quit smoking in the past, most recently unknown months ago  Best success quitting using willpower  He denies constant cough, cough after eating, difficulty breathing, drainage from nose and dry cough  Erectile Dysfunction  Patient complains of erectile dysfunction  Onset of dysfunction was unknown years ago and was unknown in onset  Patient states the nature of difficulty is maintaining erection  Full erections occur never  Partial erections occur on awakening  Libido is not affected   Previous treatment of ED includes levitra Viagra in the past                   The following portions of the patient's history were reviewed and updated as appropriate:   He  has a past medical history of Cirrhosis ([facility] ); Cirrhosis ([facility] ); Hepatitis; and Obesity  He  does not have any pertinent problems on file  He  has no past surgical history on file  His family history includes Diabetes in his father; Heart disease in his mother; Lupus in his mother; Stroke in his father  He  reports that he has been smoking Cigarettes  He has never used smokeless tobacco  He reports that he does not drink alcohol or use drugs  Current Outpatient Prescriptions   Medication Sig Dispense Refill    AMILoride 5 mg tablet   0    furosemide (LASIX) 40 mg tablet Take by mouth      lactulose (CONSTULOSE) 10 g/15 mL solution Take by mouth      nadolol (CORGARD) 20 mg tablet   1    rifaximin (XIFAXAN) 550 mg tablet Take by mouth      AFLURIA PRESERVATIVE FREE 0 5 ML DEEDEE inject 0 5 milliliter intramuscularly  0    ciprofloxacin (CIPRO) 500 mg tablet Take 1 tablet by mouth every 12 (twelve) hours      sulfamethoxazole-trimethoprim (BACTRIM DS) 800-160 mg per tablet Take 1 tablet by mouth daily  0    tamsulosin (FLOMAX) 0 4 mg take 1 capsule by mouth daily at bedtime  0    vardenafil (LEVITRA) 20 MG tablet Take 1 tablet by mouth      Vardenafil HCl (STAXYN) 10 MG TBDP Take by mouth Daily      XIFAXAN 550 MG tablet   1     No current facility-administered medications for this visit        Current Outpatient Prescriptions on File Prior to Visit   Medication Sig    AMILoride 5 mg tablet     furosemide (LASIX) 40 mg tablet Take by mouth    lactulose (CONSTULOSE) 10 g/15 mL solution Take by mouth    nadolol (CORGARD) 20 mg tablet     rifaximin (XIFAXAN) 550 mg tablet Take by mouth    AFLURIA PRESERVATIVE FREE 0 5 ML DEEDEE inject 0 5 milliliter intramuscularly    ciprofloxacin (CIPRO) 500 mg tablet Take 1 tablet by mouth every 12 (twelve) hours    sulfamethoxazole-trimethoprim (BACTRIM DS) 800-160 mg per tablet Take 1 tablet by mouth daily    tamsulosin (FLOMAX) 0 4 mg take 1 capsule by mouth daily at bedtime    vardenafil (LEVITRA) 20 MG tablet Take 1 tablet by mouth    Vardenafil HCl (STAXYN) 10 MG TBDP Take by mouth Daily    XIFAXAN 550 MG tablet      No current facility-administered medications on file prior to visit  He has No Known Allergies       Review of Systems   Constitutional: Negative for activity change, appetite change, fatigue and fever  HENT: Negative for congestion and ear discharge  Respiratory: Negative for cough and shortness of breath  Cardiovascular: Negative for chest pain and palpitations  Gastrointestinal: Negative for diarrhea and nausea  Musculoskeletal: Negative for arthralgias and back pain  Skin: Negative for color change and rash  Neurological: Negative for dizziness and headaches  Psychiatric/Behavioral: Negative for agitation and behavioral problems  Objective:    Vitals:    02/12/18 1603   BP: 130/86   Pulse: 76   Resp: 16   Temp: 98 6 °F (37 °C)   SpO2: 97%        Physical Exam   Constitutional: He is oriented to person, place, and time  He appears well-developed and well-nourished  No distress  Eyes: Pupils are equal, round, and reactive to light  No scleral icterus  Cardiovascular: Normal rate, regular rhythm and normal heart sounds  No murmur heard  Pulmonary/Chest: Effort normal and breath sounds normal  No respiratory distress  He has no wheezes  Neurological: He is alert and oriented to person, place, and time  Skin: Skin is warm and dry  No rash noted  He is not diaphoretic  Psychiatric: He has a normal mood and affect

## 2018-02-12 NOTE — LETTER
to: Evelina Way:  Bariatric care    Mr Dolores Aguilera is a patient at [facility], he has a history of morbid obesity and has tried unsuccessfully to lose weight  His morbid obesity posses a threat to his overall health and Bariatric surgery is medically appropriate and necessary for him to achieve his weight goals  If you have any questions in regards to his care feel free to contact our office      Sincerely,        Lambert Lynn MD

## 2018-02-21 ENCOUNTER — TELEPHONE (OUTPATIENT)
Dept: BARIATRICS | Facility: CLINIC | Age: 53
End: 2018-02-21

## 2018-02-21 ENCOUNTER — APPOINTMENT (OUTPATIENT)
Dept: LAB | Facility: MEDICAL CENTER | Age: 53
End: 2018-02-21
Payer: COMMERCIAL

## 2018-02-21 ENCOUNTER — OFFICE VISIT (OUTPATIENT)
Dept: SLEEP CENTER | Facility: CLINIC | Age: 53
End: 2018-02-21
Payer: COMMERCIAL

## 2018-02-21 ENCOUNTER — OFFICE VISIT (OUTPATIENT)
Dept: BARIATRICS | Facility: CLINIC | Age: 53
End: 2018-02-21
Payer: COMMERCIAL

## 2018-02-21 VITALS
WEIGHT: 313.2 LBS | HEART RATE: 80 BPM | BODY MASS INDEX: 52.18 KG/M2 | HEIGHT: 65 IN | SYSTOLIC BLOOD PRESSURE: 128 MMHG | DIASTOLIC BLOOD PRESSURE: 76 MMHG

## 2018-02-21 VITALS
RESPIRATION RATE: 18 BRPM | WEIGHT: 313.5 LBS | BODY MASS INDEX: 52.23 KG/M2 | HEIGHT: 65 IN | TEMPERATURE: 97.5 F | DIASTOLIC BLOOD PRESSURE: 66 MMHG | HEART RATE: 82 BPM | SYSTOLIC BLOOD PRESSURE: 128 MMHG

## 2018-02-21 DIAGNOSIS — G47.33 OSA (OBSTRUCTIVE SLEEP APNEA): Primary | ICD-10-CM

## 2018-02-21 DIAGNOSIS — IMO0001 CLASS 3 OBESITY DUE TO EXCESS CALORIES WITHOUT SERIOUS COMORBIDITY WITH BODY MASS INDEX (BMI) OF 50.0 TO 59.9 IN ADULT: ICD-10-CM

## 2018-02-21 DIAGNOSIS — IMO0001 CLASS 3 OBESITY DUE TO EXCESS CALORIES WITHOUT SERIOUS COMORBIDITY WITH BODY MASS INDEX (BMI) OF 50.0 TO 59.9 IN ADULT: Primary | ICD-10-CM

## 2018-02-21 DIAGNOSIS — E66.01 MORBID OBESITY (HCC): ICD-10-CM

## 2018-02-21 PROBLEM — B18.2 HEPATIC CIRRHOSIS DUE TO CHRONIC HEPATITIS C INFECTION (HCC): Status: ACTIVE | Noted: 2018-02-21

## 2018-02-21 PROBLEM — K74.60 HEPATIC CIRRHOSIS DUE TO CHRONIC HEPATITIS C INFECTION (HCC): Status: ACTIVE | Noted: 2018-02-21

## 2018-02-21 PROBLEM — K21.9 GASTROESOPHAGEAL REFLUX DISEASE WITHOUT ESOPHAGITIS: Status: ACTIVE | Noted: 2018-02-21

## 2018-02-21 LAB
ALBUMIN SERPL BCP-MCNC: 3.5 G/DL (ref 3.5–5)
ALP SERPL-CCNC: 134 U/L (ref 46–116)
ALT SERPL W P-5'-P-CCNC: 38 U/L (ref 12–78)
ANION GAP SERPL CALCULATED.3IONS-SCNC: 6 MMOL/L (ref 4–13)
AST SERPL W P-5'-P-CCNC: 43 U/L (ref 5–45)
BILIRUB SERPL-MCNC: 2.64 MG/DL (ref 0.2–1)
BUN SERPL-MCNC: 11 MG/DL (ref 5–25)
CALCIUM SERPL-MCNC: 8.6 MG/DL (ref 8.3–10.1)
CHLORIDE SERPL-SCNC: 108 MMOL/L (ref 100–108)
CO2 SERPL-SCNC: 27 MMOL/L (ref 21–32)
CREAT SERPL-MCNC: 0.75 MG/DL (ref 0.6–1.3)
ERYTHROCYTE [DISTWIDTH] IN BLOOD BY AUTOMATED COUNT: 15.2 % (ref 11.6–15.1)
EST. AVERAGE GLUCOSE BLD GHB EST-MCNC: 82 MG/DL
GFR SERPL CREATININE-BSD FRML MDRD: 105 ML/MIN/1.73SQ M
GLUCOSE SERPL-MCNC: 111 MG/DL (ref 65–140)
HBA1C MFR BLD: 4.5 % (ref 4.2–6.3)
HCT VFR BLD AUTO: 42 % (ref 36.5–49.3)
HGB BLD-MCNC: 14.2 G/DL (ref 12–17)
MCH RBC QN AUTO: 32.8 PG (ref 26.8–34.3)
MCHC RBC AUTO-ENTMCNC: 33.8 G/DL (ref 31.4–37.4)
MCV RBC AUTO: 97 FL (ref 82–98)
PLATELET # BLD AUTO: 106 THOUSANDS/UL (ref 149–390)
PMV BLD AUTO: 11.7 FL (ref 8.9–12.7)
POTASSIUM SERPL-SCNC: 4.2 MMOL/L (ref 3.5–5.3)
PROT SERPL-MCNC: 6.6 G/DL (ref 6.4–8.2)
RBC # BLD AUTO: 4.33 MILLION/UL (ref 3.88–5.62)
SODIUM SERPL-SCNC: 141 MMOL/L (ref 136–145)
TSH SERPL DL<=0.05 MIU/L-ACNC: 3.1 UIU/ML (ref 0.36–3.74)
WBC # BLD AUTO: 5.94 THOUSAND/UL (ref 4.31–10.16)

## 2018-02-21 PROCEDURE — 85027 COMPLETE CBC AUTOMATED: CPT

## 2018-02-21 PROCEDURE — 84443 ASSAY THYROID STIM HORMONE: CPT

## 2018-02-21 PROCEDURE — 83036 HEMOGLOBIN GLYCOSYLATED A1C: CPT

## 2018-02-21 PROCEDURE — 99204 OFFICE O/P NEW MOD 45 MIN: CPT | Performed by: SURGERY

## 2018-02-21 PROCEDURE — 80053 COMPREHEN METABOLIC PANEL: CPT

## 2018-02-21 PROCEDURE — 36415 COLL VENOUS BLD VENIPUNCTURE: CPT

## 2018-02-21 PROCEDURE — 99204 OFFICE O/P NEW MOD 45 MIN: CPT | Performed by: INTERNAL MEDICINE

## 2018-02-21 NOTE — PROGRESS NOTES
Assessment/Plan:      Diagnoses and all orders for this visit:    Class 3 obesity due to excess calories without serious comorbidity with body mass index (BMI) of 50 0 to 59 9 in [facility])             Subjective:     Patient ID: Aria Casey Sr  is a 47 y o  male  Patient presents for   Chief Complaint   Patient presents with   Camila Ramon Consult     Bariatric Surgery Evaluation:  47 y o  old male with a long-standing history morbid obesity  He was found to be a good candidate to undergo a bariatric operation [facility]  He's here today to discuss details of the surgery  He is a high risk patient that is seeking weight loss surgery for placement back onto the liver transplant list  He has cirrhosis secondary to HCV       Patient Goals: to achieve meaningful weight loss and assistance in comorbidity improvement and obtain liver transplant    Medical History  Active Ambulatory Problems     Diagnosis Date Noted    Class 3 obesity due to excess calories without serious comorbidity with body mass index (BMI) of 50 0 to 59 9 in [facility]) 02/12/2018    Smoker 02/12/2018    Erectile dysfunction 02/12/2018     Resolved Ambulatory Problems     Diagnosis Date Noted    No Resolved Ambulatory Problems     Past Medical History:   Diagnosis Date    [facility]     [facility]osis [facility] )     Hepatitis     Obesity      Current Outpatient Prescriptions on File Prior to Visit   Medication Sig Dispense Refill    AFLURIA PRESERVATIVE FREE 0 5 ML DEEDEE inject 0 5 milliliter intramuscularly  0    AMILoride 5 mg tablet   0    ciprofloxacin (CIPRO) 500 mg tablet Take 1 tablet by mouth every 12 (twelve) hours      furosemide (LASIX) 40 mg tablet Take by mouth      lactulose (CONSTULOSE) 10 g/15 mL solution Take by mouth      nadolol (CORGARD) 20 mg tablet   1    rifaximin (XIFAXAN) 550 mg tablet Take by mouth      sildenafil (VIAGRA) 50 MG tablet Take 2 tablets (100 mg total) by mouth daily as needed for erectile dysfunction 2 tablet 0    sulfamethoxazole-trimethoprim (BACTRIM DS) 800-160 mg per tablet Take 1 tablet by mouth daily  0    tamsulosin (FLOMAX) 0 4 mg take 1 capsule by mouth daily at bedtime  0    vardenafil (LEVITRA) 20 MG tablet Take 1 tablet by mouth      Vardenafil HCl (STAXYN) 10 MG TBDP Take by mouth Daily      XIFAXAN 550 MG tablet   1     No current facility-administered medications on file prior to visit  47 y o  yo male with a long standing h/o of obesity and inability to sustain any meaningful weight loss on his own despite several attempts  He is interested in the Laparoscopic sleeve gastrectomy in order to lose weight to return to the liver transplant list at [facility]  As a part of his pre op evaluation, he will be referred to a cardiologist and for a sleep evaluation and consult  He needs an EGD to evaluate the anatomy of his GI tract  He has a history of esophageal varices and previous banding with GI in the past     I have spent over 45 minutes with him face to face in the office today discussing his options and details of the surgery  We have seen an animation of the surgery on the computer that illustrates how the operation is done and how the anatomy will be altered with the procedure  Over 50% of this was coordinating care  I have discussed and educated the patient with regards to the components of our multidisciplinary program and the importance of compliance and follow up in the post operative period  He was given the opportunity to ask questions and I have answered all of them  The patient was also instructed with regards to the importance of behavior modification, nutritional counseling, support meeting attendance and lifestyle changes that are important to ensure success      Although there is a great statistical chance of improvement or even resolution of most of his associated comorbidities, the results vary from patient to patient and they largely depend on his commitment and compliance  He needs to lose 10-15 lbs prior to the operation  He is a high risk patient given the above and will have his surgery at [address] with Dr James Jc  Review of Systems   All other systems reviewed and are negative  The following portions of the patient's history were reviewed and updated as appropriate:   He  has a past medical history of Cirrhosis ([facility] ); Cirrhosis ([facility] ); Hepatitis; and Obesity  He   Patient Active Problem List    Diagnosis Date Noted    Class 3 obesity due to excess calories without serious comorbidity with body mass index (BMI) of 50 0 to 59 9 in adult [facility]) 02/12/2018    Smoker 02/12/2018    Erectile dysfunction 02/12/2018     He  has no past surgical history on file  His family history includes Diabetes in his father; Heart disease in his mother; Lupus in his mother; Stroke in his father  He  reports that he has been smoking Cigarettes  He has never used smokeless tobacco  He reports that he does not drink alcohol or use drugs    Current Outpatient Prescriptions   Medication Sig Dispense Refill    AFLURIA PRESERVATIVE FREE 0 5 ML DEEDEE inject 0 5 milliliter intramuscularly  0    AMILoride 5 mg tablet   0    ciprofloxacin (CIPRO) 500 mg tablet Take 1 tablet by mouth every 12 (twelve) hours      furosemide (LASIX) 40 mg tablet Take by mouth      lactulose (CONSTULOSE) 10 g/15 mL solution Take by mouth      nadolol (CORGARD) 20 mg tablet   1    rifaximin (XIFAXAN) 550 mg tablet Take by mouth      sildenafil (VIAGRA) 50 MG tablet Take 2 tablets (100 mg total) by mouth daily as needed for erectile dysfunction 2 tablet 0    sulfamethoxazole-trimethoprim (BACTRIM DS) 800-160 mg per tablet Take 1 tablet by mouth daily  0    tamsulosin (FLOMAX) 0 4 mg take 1 capsule by mouth daily at bedtime  0    vardenafil (LEVITRA) 20 MG tablet Take 1 tablet by mouth      Vardenafil HCl (STAXYN) 10 MG TBDP Take by mouth Daily      XIFAXAN 550 MG tablet   1     No current facility-administered medications for this visit  He has No Known Allergies       Objective:  /66   Pulse 82   Temp 97 5 °F (36 4 °C)   Resp 18   Ht 5' 5" (1 651 m)   Wt (!) 142 kg (313 lb 8 oz)   BMI 52 17 kg/m²      Physical Exam   Constitutional: He is oriented to person, place, and time  He appears well-developed and well-nourished  HENT:   Head: Normocephalic and atraumatic  Eyes: EOM are normal    Neck: Normal range of motion  Neck supple  Cardiovascular: Normal rate  Pulmonary/Chest: Effort normal and breath sounds normal  No respiratory distress  Abdominal: Soft  He exhibits no distension  There is no tenderness  There is no rebound and no guarding  Musculoskeletal: Normal range of motion  Neurological: He is alert and oriented to person, place, and time  Psychiatric: He has a normal mood and affect  His behavior is normal  Judgment and thought content normal            Discussion and Summary:   47 y o  old male with a long-standing history morbid obesity  He was found to be a good candidate to undergo a bariatric operation [facility]  He's here today to discuss details of the surgery  He is a high risk patient that is seeking weight loss surgery for placement back onto the liver transplant list  He has cirrhosis secondary to HCV       Patient Goals: to achieve meaningful weight loss and assistance in comorbidity improvement    Patient Barriers: smoking    Will obtain EGD, cardiology consult, sleep medicine consult, labs

## 2018-02-21 NOTE — PROGRESS NOTES
Review of Systems      Genitourinary none   Cardiology none   Gastrointestinal none   Neurology numbness/tingling of an extremity   Constitutional weight change of 10 or more pounds in the past year loss of 38 pounds   Integumentary none   Psychiatry none   Musculoskeletal back pain   Pulmonary shortness of breath   ENT none   Endocrine none   Hematological none

## 2018-02-21 NOTE — PROGRESS NOTES
Consultation - [address] [facility] [address] 80 y o male1965      [id number]      2/21/2018      Physician Requesting Consult:  [address]  Reason for Consult / Principal Problem:  OBSTRUCTIVE SLEEP APNEA  Hx and PE limited by:  NONE  HPI: Anderson Nash  is a 47 y o  male who states currently on disability because of his cirrhosis of the liver, has been referred from [facility] for evaluation for obstructive sleep apnea  He states his daily sleep schedule is a goes to bed at around 8 p m  takes less than 20 minutes to fall asleep, he is out of bed at 5 a m  in the morning  He has 1-2 nocturnal awakenings for nocturia  When he wakes up in the morning he states that he is refreshed, but later during the daytime he feels more sleepy, he does take a nap during the daytime for about an hour or so, does feel refreshed after the nap  He does have a history of loud snoring, no witnessed apneic spells, does not complain of any early morning headaches comma has nocturnal nocturia, no symptoms related to restless legs, no lack of concentration all short-term memory loss, no symptoms related to depression or anxiety  Historical Information  Past Medical History:   Diagnosis Date    Cirrhosis ([facility] )     Cirrhosis ([facility] )     Hepatitis     Obesity      History reviewed  No pertinent surgical history  Social History     Social History    Marital status: /Civil Union     Spouse name: N/A    Number of children: N/A    Years of education: N/A     Occupational History    Not on file  Social History Main Topics    Smoking status: Current Every Day Smoker     Types: Cigarettes    Smokeless tobacco: Never Used    Alcohol use No    Drug use: No    Sexual activity: Not on file     Other Topics Concern    Not on file     Social History Narrative    No narrative on file    He does take 6-12 ounces of tea and diet soda every day, he was a former smoker who quit      He does have excessive daytime sleepiness especially while sitting and reading a book, watching television, and is as a passenger in the car without a break and lying down to rest in the afternoon, and sitting inactive in a public place  Today on exam    Blood pressure 128/76  Heart rate 80 beats per minute  Height 5 feet 5 inches  Weight 142 kg is, 313 pounds  BMI 52 12  [place] sleepiness score 6  Today on exam  Eyes anicteric sclera, conjunctivae is clear  ENT nares is patent, no evidence of any discharge  Crowded oropharyngeal airways, Mallampati score of 4  Neck short and wide supple no lymphadenopathy normal thyroid  Lungs clear to auscultation no rales no rhonchi  Heart first and second heart sounds are heard no murmur or gallop is heard  Extremities no pedal edema  Skin no evidence of any rash  CNS awake alert oriented x3  Assessment  1  Obstructive sleep apnea  2  Morbid obesity  Plan  obstructive sleep apnea Etiology pathogenesis discussed with the patient in detail  Patient has signs and symptoms of obstructive sleep apnea  He will undergo an all night polysomnogram and if there is evidence of abnormal nocturnal breathing he will undergo a CPAP titration study for maximal benefit  Consequences of untreated sleep apnea including excessive daytime sleepiness and increased risk for myocardial infarction stroke atrial fibrillation discussed with the patient  Testing procedure was discussed in detail  Cautioned against driving when sleepy  Recommend weight loss, following up with Bariatric Clinic  Follow-up after the above testing    Thank you very much for allowing me to participate in the care of this patient

## 2018-02-23 ENCOUNTER — HOSPITAL ENCOUNTER (OUTPATIENT)
Dept: SLEEP CENTER | Facility: CLINIC | Age: 53
Discharge: HOME | End: 2018-02-23
Payer: COMMERCIAL

## 2018-02-23 DIAGNOSIS — G47.33 OSA (OBSTRUCTIVE SLEEP APNEA): ICD-10-CM

## 2018-02-23 PROCEDURE — 95810 POLYSOM 6/> YRS 4/> PARAM: CPT

## 2018-02-23 PROCEDURE — 95810 POLYSOM 6/> YRS 4/> PARAM: CPT | Performed by: INTERNAL MEDICINE

## 2018-02-24 NOTE — PROGRESS NOTES
Sleep Study Documentation    Pre-Sleep Study       Sleep testing procedure explained to patient:YES    Patient napped prior to study:YES- more than 30 minutes  Napped after 2PM: yes    Caffeine:Dayshift worker after 12PM   Caffeine use:YES- soda  12 ounces    Alcohol:Dayshift workers after 5PM: Alcohol use:NO    Typical day for patient:YES       Study Documentation  Diagnostic   Snore:Severe  Supplemental O2: no    O2 flow rate (L/min) range N/A  O2 flow rate (L/min) final N/A  Minimum SaO2 97%  Baseline SaO2 84%        Mode of Therapy:N/A  EKG abnormalities: no If yes, EPOCH example and comments:     EEG abnormalities: no If yes, EPOCH example and comments    Study Terminated:no      Post-Sleep Study    Medication used at bedtime or during sleep study:NO    Patient reports time it took to fall asleep:20 to 30 minutes    Patient reports waking up during study:1 to 2 times  Patient reports returning to sleep in 10 to 30 minutes  Patient reports sleeping 4 to 6 hours with dreaming  Patient reports sleep during study:typical    Patient rated sleepiness: Somewhat sleepy or tired    PAP treatment:no

## 2018-02-26 ENCOUNTER — ANESTHESIA EVENT (OUTPATIENT)
Dept: PERIOP | Facility: HOSPITAL | Age: 53
End: 2018-02-26

## 2018-02-27 ENCOUNTER — TRANSCRIBE ORDERS (OUTPATIENT)
Dept: ADMINISTRATIVE | Facility: HOSPITAL | Age: 53
End: 2018-02-27

## 2018-02-27 DIAGNOSIS — R06.81 APNEA: Primary | ICD-10-CM

## 2018-02-28 ENCOUNTER — APPOINTMENT (OUTPATIENT)
Dept: LAB | Facility: HOSPITAL | Age: 53
End: 2018-02-28
Attending: SURGERY
Payer: COMMERCIAL

## 2018-02-28 DIAGNOSIS — IMO0001 CLASS 3 OBESITY DUE TO EXCESS CALORIES WITHOUT SERIOUS COMORBIDITY WITH BODY MASS INDEX (BMI) OF 50.0 TO 59.9 IN ADULT: ICD-10-CM

## 2018-02-28 LAB
CHOLEST SERPL-MCNC: 163 MG/DL (ref 50–200)
HDLC SERPL-MCNC: 89 MG/DL (ref 40–60)
LDLC SERPL CALC-MCNC: 65 MG/DL (ref 0–100)
TRIGL SERPL-MCNC: 46 MG/DL

## 2018-02-28 PROCEDURE — 80061 LIPID PANEL: CPT

## 2018-02-28 PROCEDURE — 36415 COLL VENOUS BLD VENIPUNCTURE: CPT

## 2018-03-01 ENCOUNTER — OFFICE VISIT (OUTPATIENT)
Dept: CARDIOLOGY CLINIC | Facility: CLINIC | Age: 53
End: 2018-03-01
Payer: COMMERCIAL

## 2018-03-01 VITALS
DIASTOLIC BLOOD PRESSURE: 76 MMHG | HEIGHT: 65 IN | SYSTOLIC BLOOD PRESSURE: 118 MMHG | WEIGHT: 309.8 LBS | BODY MASS INDEX: 51.61 KG/M2 | OXYGEN SATURATION: 97 % | HEART RATE: 56 BPM

## 2018-03-01 DIAGNOSIS — R06.02 SOB (SHORTNESS OF BREATH): ICD-10-CM

## 2018-03-01 DIAGNOSIS — Z01.818 PRE-OP TESTING: Primary | ICD-10-CM

## 2018-03-01 PROCEDURE — 99203 OFFICE O/P NEW LOW 30 MIN: CPT | Performed by: INTERNAL MEDICINE

## 2018-03-01 PROCEDURE — 93000 ELECTROCARDIOGRAM COMPLETE: CPT | Performed by: INTERNAL MEDICINE

## 2018-03-01 NOTE — PROGRESS NOTES
Cardiology Consultation     Lindy Mcclellan    [id number]  1965  [address] [address]    1  Pre-op testing  POCT ECG   2  SOB (shortness of breath)  NM myocardial perfusion spect (rx stress and/or rest)    Echo complete with contrast if indicated     Chief Complaint   Patient presents with    Follow-up     Cardiac Clearance       Patient with history of cirrhosis, portal hypertension presents for preoperative cardiovascular evaluation pending gastric bypass surgery  He denies prior history of CAD/mi, arrhythmia, valvular disorder, cardiomyopathy, hypertension, hyperlipidemia, diabetes mellitus, PVD, ELLEN  He admits that his functional capacity is somewhat limited due to his weight  He does report dyspnea on exertion with just 2 flights of stairs  He otherwise denies chest pain, palpitations, dizziness/lightheadedness  He does admit that he takes furosemide for lower extremity swelling but denies prior history is CHF  He does admit to being a former smoker, quit just last month  He denies significant family history of CAD  He denies recent cardiac testing  Patient Active Problem List   Diagnosis    Class 3 obesity due to excess calories without serious comorbidity with body mass index (BMI) of 50 0 to 59 9 in adult [facility])    Smoker    Erectile dysfunction    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 ([facility] )    Gastroesophageal reflux disease without esophagitis     Past Medical History:   Diagnosis Date    Cirrhosis ([facility] )     Cirrhosis ([place] [address] )     Hepatitis     Obesity      Social History     Social History    Marital status: /Civil Union     Spouse name: N/A    Number of children: N/A    Years of education: N/A     Occupational History    Not on file       Social History Main Topics    Smoking status: Current Every Day Smoker     Types: Cigarettes    Smokeless tobacco: Never Used    Alcohol use No    Drug use: No    Sexual activity: Not on file     Other Topics Concern    Not on file     Social History Narrative    No narrative on file      Family History   Problem Relation Age of Onset    Heart disease Mother     Lupus Mother     Diabetes Father     Stroke Father      History reviewed  No pertinent surgical history  Current Outpatient Prescriptions:     AMILoride 5 mg tablet, , Disp: , Rfl: 0    furosemide (LASIX) 40 mg tablet, Take by mouth, Disp: , Rfl:     lactulose (CONSTULOSE) 10 g/15 mL solution, Take by mouth, Disp: , Rfl:     nadolol (CORGARD) 20 mg tablet, , Disp: , Rfl: 1    rifaximin (XIFAXAN) 550 mg tablet, Take by mouth, Disp: , Rfl:     AFLURIA PRESERVATIVE FREE 0 5 ML DEEDEE, inject 0 5 milliliter intramuscularly, Disp: , Rfl: 0    ciprofloxacin (CIPRO) 500 mg tablet, Take 1 tablet by mouth every 12 (twelve) hours, Disp: , Rfl:     sildenafil (VIAGRA) 50 MG tablet, Take 2 tablets (100 mg total) by mouth daily as needed for erectile dysfunction, Disp: 2 tablet, Rfl: 0    sulfamethoxazole-trimethoprim (BACTRIM DS) 800-160 mg per tablet, Take 1 tablet by mouth daily, Disp: , Rfl: 0    tamsulosin (FLOMAX) 0 4 mg, take 1 capsule by mouth daily at bedtime, Disp: , Rfl: 0    vardenafil (LEVITRA) 20 MG tablet, Take 1 tablet by mouth, Disp: , Rfl:     Vardenafil HCl (STAXYN) 10 MG TBDP, Take by mouth Daily, Disp: , Rfl:     XIFAXAN 550 MG tablet, , Disp: , Rfl: 1  No Known Allergies  Vitals:    03/01/18 1343   BP: 118/76   Pulse: 56   SpO2: 97%   Weight: (!) 141 kg (309 lb 12 8 oz)   Height: 5' 5" (1 651 m)           Review of Systems:  Review of Systems   Constitutional: Positive for fatigue  Negative for activity change, diaphoresis, fever and unexpected weight change  HENT: Negative for nosebleeds and trouble swallowing  Eyes: Negative for visual disturbance  Respiratory: Positive for shortness of breath  Negative for cough, chest tightness and wheezing  Cardiovascular: Negative for chest pain, palpitations and leg swelling  Gastrointestinal: Negative for abdominal pain, anal bleeding, blood in stool and nausea  Endocrine: Negative for cold intolerance and heat intolerance  Genitourinary: Negative for decreased urine volume, frequency and hematuria  Musculoskeletal: Negative for myalgias  Skin: Negative for color change and wound  Neurological: Negative for dizziness, syncope, weakness, light-headedness and headaches  Psychiatric/Behavioral: Negative for sleep disturbance  The patient is not nervous/anxious  Physical Exam:  Physical Exam   Constitutional: He is oriented to person, place, and time  He appears well-developed and well-nourished  No distress  HENT:   Head: Normocephalic and atraumatic  Eyes: Conjunctivae are normal  No scleral icterus  Neck: Neck supple  No JVD present  Cardiovascular: Normal rate and regular rhythm  No murmur heard  Pulmonary/Chest: Effort normal and breath sounds normal  No respiratory distress  He has no wheezes  He has no rales  Abdominal: He exhibits no distension  There is no tenderness  Musculoskeletal: He exhibits no edema  Neurological: He is alert and oriented to person, place, and time  Skin: Skin is warm and dry  No rash noted  He is not diaphoretic  Psychiatric: He has a normal mood and affect  Discussion/Summary:    EKG performed in our office shows sinus sinus rhythm, low voltage QRS in anterior leads    Preoperative cardiovascular evaluation/ORR-will obtain pharmacological nuclear stress test to evaluate for ischemic etiology dyspnea on exertion especially given his history of tobacco abuse  Concern the patient would not be able to walk on the treadmill due to his weight as he reports exertional symptoms with minimal activity  Will also obtain echocardiogram to evaluate LVEF as well as status of the heart valves  Further recommendations to follow testing regarding patient's cardiovascular risk for surgery

## 2018-03-02 ENCOUNTER — ANESTHESIA (OUTPATIENT)
Dept: PERIOP | Facility: HOSPITAL | Age: 53
End: 2018-03-02

## 2018-03-02 PROBLEM — E66.01 MORBID OBESITY (HCC): Status: ACTIVE | Noted: 2018-03-02

## 2018-03-05 ENCOUNTER — TELEPHONE (OUTPATIENT)
Dept: SLEEP CENTER | Facility: CLINIC | Age: 53
End: 2018-03-05

## 2018-03-05 ENCOUNTER — TELEPHONE (OUTPATIENT)
Dept: BARIATRICS | Facility: CLINIC | Age: 53
End: 2018-03-05

## 2018-03-05 NOTE — TELEPHONE ENCOUNTER
Spoke with pt regarding sleep study, [facility] but will be following up 3/8 in pulmonology for sleep study

## 2018-03-06 ENCOUNTER — HOSPITAL ENCOUNTER (OUTPATIENT)
Dept: NON INVASIVE DIAGNOSTICS | Facility: CLINIC | Age: 53
Discharge: HOME | End: 2018-03-06
Payer: COMMERCIAL

## 2018-03-06 DIAGNOSIS — R06.02 SOB (SHORTNESS OF BREATH): ICD-10-CM

## 2018-03-06 PROCEDURE — 93017 CV STRESS TEST TRACING ONLY: CPT

## 2018-03-06 PROCEDURE — A9502 TC99M TETROFOSMIN: HCPCS

## 2018-03-06 PROCEDURE — 78452 HT MUSCLE IMAGE SPECT MULT: CPT

## 2018-03-06 RX ORDER — AMINOPHYLLINE DIHYDRATE 25 MG/ML
50 INJECTION, SOLUTION INTRAVENOUS ONCE
Status: CANCELLED | OUTPATIENT
Start: 2018-03-06 | End: 2018-03-06

## 2018-03-06 RX ADMIN — REGADENOSON 0.4 MG: 0.08 INJECTION, SOLUTION INTRAVENOUS at 09:39

## 2018-03-08 ENCOUNTER — OFFICE VISIT (OUTPATIENT)
Dept: PULMONOLOGY | Facility: CLINIC | Age: 53
End: 2018-03-08
Payer: COMMERCIAL

## 2018-03-08 ENCOUNTER — OFFICE VISIT (OUTPATIENT)
Dept: BARIATRICS | Facility: CLINIC | Age: 53
End: 2018-03-08
Payer: COMMERCIAL

## 2018-03-08 VITALS
WEIGHT: 315 LBS | HEIGHT: 65 IN | DIASTOLIC BLOOD PRESSURE: 70 MMHG | TEMPERATURE: 98.2 F | RESPIRATION RATE: 20 BRPM | SYSTOLIC BLOOD PRESSURE: 118 MMHG | BODY MASS INDEX: 52.48 KG/M2 | HEART RATE: 64 BPM

## 2018-03-08 VITALS
WEIGHT: 315 LBS | DIASTOLIC BLOOD PRESSURE: 84 MMHG | HEIGHT: 65 IN | HEART RATE: 62 BPM | OXYGEN SATURATION: 93 % | BODY MASS INDEX: 52.48 KG/M2 | SYSTOLIC BLOOD PRESSURE: 120 MMHG

## 2018-03-08 DIAGNOSIS — E66.01 MORBID (SEVERE) OBESITY DUE TO EXCESS CALORIES (HCC): Primary | ICD-10-CM

## 2018-03-08 DIAGNOSIS — IMO0001 CLASS 3 OBESITY DUE TO EXCESS CALORIES WITHOUT SERIOUS COMORBIDITY WITH BODY MASS INDEX (BMI) OF 50.0 TO 59.9 IN ADULT: ICD-10-CM

## 2018-03-08 DIAGNOSIS — I10 ESSENTIAL HYPERTENSION: ICD-10-CM

## 2018-03-08 DIAGNOSIS — G47.33 OBSTRUCTIVE SLEEP APNEA: Primary | ICD-10-CM

## 2018-03-08 LAB
ARRHY DURING EX: NORMAL
CHEST PAIN STATEMENT: NORMAL
MAX DIASTOLIC BP: 76 MMHG
MAX HEART RATE: 87 BPM
MAX PREDICTED HEART RATE: 167 BPM
MAX. SYSTOLIC BP: 136 MMHG
PROTOCOL NAME: NORMAL
REASON FOR TERMINATION: NORMAL
TARGET HR FORMULA: NORMAL
TEST INDICATION: NORMAL
TIME IN EXERCISE PHASE: NORMAL

## 2018-03-08 PROCEDURE — 99203 OFFICE O/P NEW LOW 30 MIN: CPT | Performed by: SURGERY

## 2018-03-08 PROCEDURE — 99213 OFFICE O/P EST LOW 20 MIN: CPT | Performed by: PHYSICIAN ASSISTANT

## 2018-03-08 NOTE — PROGRESS NOTES
Assessment:    1  Obstructive sleep apnea  Cpap DME   2  Class 3 obesity due to excess calories without serious comorbidity with body mass index (BMI) of 50 0 to 59 9 in adult [facility])         Plan: 47 y o  male former smoker with significant PMH of HTN, hepatic cirrhosis due to chronic hep C infection, morbid obesity being evaluated for gastric sleeve who presents for follow-up of sleep study  1  ELLEN - reviewed sleep study with patient with evidence of poor sleep efficiency and mild ELLEN  Patient states poor sleep efficiency 2/2 going to sleep too early during the testing and not being tired  Will set patient up with CPAP machine  Discussed need for 70% compliance  [place] Sleepiness Score of 3  F/u in 2 months or sooner if needed  2  Morbid Obesity - recommend weight loss; patient has plans to undergo gastric sleeve  Discussed diet and exercise with patient  Subjective:     Patient ID: Mohan Henry Sr  is a 47 y o  male  Chief Complaint: F/u sleep study     HPI  47 y o  male former smoker with significant PMH of HTN, hepatic cirrhosis due to chronic hep C infection, morbid obesity being evaluated for gastric sleeve who presents for follow-up of sleep study  Patient admits to daytime sleepiness but states that he has an unusual sleep schedule  His wife works nights so he adjust his sleep schedule to be able to see/help her out in the home  Does admit to daytime naps  Patient is hopeful to get gastric sleeve procedure done in the next couple months  He is being seen by bariatric surgery and is in the process of getting medical clearance  Review of Systems  Review of Systems   Constitution: Negative for chills, decreased appetite, fever, malaise/fatigue and weight gain  HENT: Negative for congestion  Eyes: Negative for visual disturbance  Cardiovascular: Negative for dyspnea on exertion, leg swelling and orthopnea  Respiratory: Positive for sleep disturbances due to breathing   Negative for cough, shortness of breath and wheezing  Hematologic/Lymphatic: Negative for adenopathy  Skin: Negative for rash  Musculoskeletal: Negative for muscle weakness  Gastrointestinal: Negative for abdominal pain, diarrhea, nausea and vomiting  Neurological: Positive for excessive daytime sleepiness  Psychiatric/Behavioral: Negative for altered mental status and hallucinations  Allergic/Immunologic: Negative for environmental allergies  Objective:  /84 (BP Location: Left arm, Patient Position: Sitting)   Pulse 62   Ht 5' 5" (1 651 m)   Wt (!) 146 kg (322 lb)   SpO2 93%   BMI 53 58 kg/m²     Physical Exam   Constitutional: He is oriented to person, place, and time  He appears well-developed  No distress  Morbidly obese   HENT:   Head: Normocephalic and atraumatic  Neck: Normal range of motion  Cardiovascular: Normal rate and regular rhythm  Pulmonary/Chest: Effort normal and breath sounds normal    Abdominal: Soft  There is no tenderness  Musculoskeletal: Normal range of motion  He exhibits no edema or tenderness  Lymphadenopathy:     He has no cervical adenopathy  Neurological: He is alert and oriented to person, place, and time  Skin: Skin is warm and dry  He is not diaphoretic  Psychiatric: He has a normal mood and affect  His behavior is normal    Nursing note and vitals reviewed  Lab/Image Review: Reviewed all pertinent labs/radiology results       Past Medical History:   Diagnosis Date    Cirrhosis ([facility] )     Cirrhosis ([facility] )     Hepatitis     Obesity        Social History   Substance Use Topics    Smoking status: Former Smoker     Types: Cigarettes    Smokeless tobacco: Never Used    Alcohol use No       Family History   Problem Relation Age of Onset    Heart disease Mother     Lupus Mother     Diabetes Father     Stroke Father        Patient Active Problem List   Diagnosis    Class 3 obesity due to excess calories without serious comorbidity with body mass index (BMI) of 50 0 to 59 9 in adult [facility])    Smoker    Erectile dysfunction    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 ([facility] )    Gastroesophageal reflux disease without esophagitis    Morbid obesity (HCC)    Obstructive sleep apnea

## 2018-03-08 NOTE — LETTER
March 8, 2018     Lambert Lynn, [address]    Patient: Isai Akers Sr  YOB: 1965   Date of Visit: 3/8/2018       Dear Dr Tracey Becerra:    Thank you for referring Dolores Grandchild to me for evaluation  Below are my notes for this consultation  If you have questions, please do not hesitate to call me  I look forward to following your patient along with you           Sincerely,        Fidencio Zaragoza MD        CC: Arnold Richard MD

## 2018-03-08 NOTE — PROGRESS NOTES
BARIATRIC CONSULT-INITIAL - BARIATRIC SURGERY  Lindy Mcclellan Sr  47 y o  male MRN: [id number]  Unit/Bed#:  Encounter: [id number]      HPI:  Kisha Olmedo  is a 47 y o  male who presents with morbid obesity to discuss weight loss options  Review of Systems   Constitutional: Negative  HENT: Negative  Eyes: Negative  Respiratory: Negative  Cardiovascular: Negative  Gastrointestinal: Negative  Endocrine: Negative  Genitourinary: Negative  Musculoskeletal: Negative  Skin: Negative  Neurological: Negative  Hematological: Negative  Psychiatric/Behavioral: Negative  Historical Information   Past Medical History:   Diagnosis Date    [email or web address]     [email or web address] )     Hepatitis     Obesity      History reviewed  No pertinent surgical history  Social History   History   Alcohol Use No     History   Drug Use No     History   Smoking Status    Former Smoker    Types: Cigarettes   Smokeless Tobacco    Never Used     Family History: non-contributory    Meds/Allergies   all medications and allergies reviewed  No Known Allergies    Objective       Current Vitals:   Blood Pressure: 118/70 (03/08/18 1318)  Pulse: 64 (03/08/18 1318)  Temperature: 98 2 °F (36 8 °C) (03/08/18 1318)  Respirations: 20 (03/08/18 1318)  Height: 5' 5" (165 1 cm) (03/08/18 1318)  Weight - Scale: (!) 145 kg (320 lb) (03/08/18 1318)  Body mass index is 53 25 kg/m²  Invasive Devices          No matching active lines, drains, or airways          Physical Exam   Constitutional: He appears well-developed and well-nourished  Lab Results: I have personally reviewed pertinent lab results  Imaging: I have personally reviewed pertinent reports  EKG, Pathology, and Other Studies: I have personally reviewed pertinent reports        Code Status: [unfilled]  Advance Directive and Living Will:      Power of :    POLST:      Assessment/PLAN:            Patient has a long history of morbid obesity and is presenting to discuss the surgical weight loss options  Despite the patient best efforts patient was unable to lose any meaningful or sustainable weight using nonsurgical means  We had a long discussion regarding all the surgical weight-loss options at our disposal at this point and reviewed the risks and benefits of each procedure in details as it relates to her age, BMI and medical conditions  Patient elected to undergo a sleeve gastrectomy, history of cirrhosis secondary to Hepatitis C  Patient is currently being followed by gastroenterology at an outside institution  Patient was taken off the liver transplant list because of his weight  History of esophageal variceal banding  No history of ascites or encephalopathy  PATIENT WILL BE REFERRED TO DR KURTZ FOR AN EVALUATION, I WILL ALSO ORDER A CT SCAN TO EVALUATE SIZE OF LIVER AND SPLEEN AND ALSO RULE OUT PRESENCE OF ASCITES  Risks and benefits were explained to the patient  We also discussed the importance and need of a preoperative workup to make sure that the patient can undergo the procedure safely  Preoperative workup includes sleep apnea screening, cardiac evaluation, nutrition/psych and preoperative EGD  Risks and benefits of all the preoperative diagnostic tests were discussed with the patient including but not limited to the upper endoscopy  Alternatives to surgery and alternative forms of surgery were also explained  Postsurgical commitment and aftercare programs were discussed and explained to the patient in details   In terms of comorbidities patient suffers mostly of   Past Medical History:   Diagnosis Date    [facility] )     [facility] )     Hepatitis     Obesity        I informed the patient that the rate of resolution of comorbid conditions following weight loss surgery is between 60 and 90% depending on the severity of the specific medical condition  I discussed and educated the patient regarding the different components of our multidisciplinary program and the importance of compliance and follow-up in the postoperative period  All questions answered  Patient understands risks and benefits  A videotape of the procedure was also shown to the patient  After showing the video we discussed all the technical aspects of the procedure and also the potential complications including but not limited to gastrointestinal perforation, leak, obstruction, stricture and hemorrhage  I spent 30 min with the patient more than 50% of the time was spent educating the patient and coordinating care

## 2018-03-09 ENCOUNTER — TELEPHONE (OUTPATIENT)
Dept: CARDIOLOGY CLINIC | Facility: CLINIC | Age: 53
End: 2018-03-09

## 2018-03-09 NOTE — TELEPHONE ENCOUNTER
Pt returned Pennys calls, she needed to know if medicare was pts primary insurance and stated that he did not know, we asked that he call his insurance company to find out so that testing could get billed correctly and pt started being very arrogant stating that he is not calling and sitting on the phone with the insurance company for hours that we need to call them and find out

## 2018-03-13 ENCOUNTER — TELEPHONE (OUTPATIENT)
Dept: PULMONOLOGY | Facility: CLINIC | Age: 53
End: 2018-03-13

## 2018-03-13 NOTE — TELEPHONE ENCOUNTER
The order is in the chart for the CPAP  Can we check if it was ever sent to [facility]?

## 2018-03-13 NOTE — TELEPHONE ENCOUNTER
Pt left a voicemail regarding his sleep apnea machine, he says nobody has called him about the machine nor has he received his sleep apnea machine      Thank you

## 2018-03-15 ENCOUNTER — TELEPHONE (OUTPATIENT)
Dept: GASTROENTEROLOGY | Facility: CLINIC | Age: 53
End: 2018-03-15

## 2018-03-15 ENCOUNTER — HOSPITAL ENCOUNTER (OUTPATIENT)
Dept: CT IMAGING | Facility: HOSPITAL | Age: 53
Discharge: HOME | End: 2018-03-15
Attending: SURGERY
Payer: COMMERCIAL

## 2018-03-15 ENCOUNTER — HOSPITAL ENCOUNTER (OUTPATIENT)
Dept: NON INVASIVE DIAGNOSTICS | Facility: CLINIC | Age: 53
Discharge: HOME | End: 2018-03-15
Payer: COMMERCIAL

## 2018-03-15 DIAGNOSIS — E66.01 MORBID (SEVERE) OBESITY DUE TO EXCESS CALORIES (HCC): ICD-10-CM

## 2018-03-15 DIAGNOSIS — R06.02 SOB (SHORTNESS OF BREATH): ICD-10-CM

## 2018-03-15 PROCEDURE — 74177 CT ABD & PELVIS W/CONTRAST: CPT

## 2018-03-15 PROCEDURE — 93306 TTE W/DOPPLER COMPLETE: CPT | Performed by: INTERNAL MEDICINE

## 2018-03-15 PROCEDURE — C8929 TTE W OR WO FOL WCON,DOPPLER: HCPCS

## 2018-03-15 RX ADMIN — IOHEXOL 100 ML: 350 INJECTION, SOLUTION INTRAVENOUS at 09:09

## 2018-03-15 RX ADMIN — PERFLUTREN 2 ML/MIN: 6.52 INJECTION, SUSPENSION INTRAVENOUS at 14:07

## 2018-03-15 NOTE — TELEPHONE ENCOUNTER
Dr Kizzy Vasquez future pt    Pt called in stating that his appt is per dr Viri Carpio he feels is suppose to be having a sooner appt  He is currently schedule on 4/25 in [place], is it possible to get a sooner appy for this pt with dr Kizzy Vasquez  Please advise   His call back # is [phone]

## 2018-03-15 NOTE — TELEPHONE ENCOUNTER
Left message for patient to call back to schedule with Dr Amador Hill on 4/9 in one of the same day slots   -oked by me - put my initials in the apt note

## 2018-03-20 ENCOUNTER — ANESTHESIA EVENT (OUTPATIENT)
Dept: GASTROENTEROLOGY | Facility: HOSPITAL | Age: 53
End: 2018-03-20

## 2018-03-20 NOTE — TELEPHONE ENCOUNTER
Pt called back in to r/s his appt for a sooner one  I offered him 4/9 with Dr Escobar Azevedo and he denied that because he cant come in on mondays  Pt was under the assumption this appt was for an EGD - he got upset when I told him is for a consult with Dr Escobar Azevedo  I then offered him 4/19 with Dr Robbie Traylor - pt was ok with that date   He stated he will call Dr Riddle Gut office because he doesn't feel he needs an ov first

## 2018-03-21 ENCOUNTER — ANESTHESIA (OUTPATIENT)
Dept: GASTROENTEROLOGY | Facility: HOSPITAL | Age: 53
End: 2018-03-21

## 2018-03-23 ENCOUNTER — OFFICE VISIT (OUTPATIENT)
Dept: GASTROENTEROLOGY | Facility: MEDICAL CENTER | Age: 53
End: 2018-03-23
Payer: COMMERCIAL

## 2018-03-23 ENCOUNTER — APPOINTMENT (OUTPATIENT)
Dept: LAB | Facility: MEDICAL CENTER | Age: 53
End: 2018-03-23
Attending: INTERNAL MEDICINE
Payer: COMMERCIAL

## 2018-03-23 ENCOUNTER — TRANSCRIBE ORDERS (OUTPATIENT)
Dept: ADMINISTRATIVE | Facility: HOSPITAL | Age: 53
End: 2018-03-23

## 2018-03-23 VITALS
DIASTOLIC BLOOD PRESSURE: 70 MMHG | WEIGHT: 315 LBS | TEMPERATURE: 97.6 F | SYSTOLIC BLOOD PRESSURE: 130 MMHG | HEART RATE: 73 BPM | BODY MASS INDEX: 52.48 KG/M2 | HEIGHT: 65 IN

## 2018-03-23 DIAGNOSIS — K74.60 HEPATIC CIRRHOSIS DUE TO CHRONIC HEPATITIS C INFECTION (HCC): Primary | ICD-10-CM

## 2018-03-23 DIAGNOSIS — I85.10 SECONDARY ESOPHAGEAL VARICES WITHOUT BLEEDING (HCC): ICD-10-CM

## 2018-03-23 DIAGNOSIS — E66.01 MORBID OBESITY (HCC): ICD-10-CM

## 2018-03-23 DIAGNOSIS — B18.2 HEPATIC CIRRHOSIS DUE TO CHRONIC HEPATITIS C INFECTION (HCC): Primary | ICD-10-CM

## 2018-03-23 DIAGNOSIS — B18.2 HEPATIC CIRRHOSIS DUE TO CHRONIC HEPATITIS C INFECTION (HCC): ICD-10-CM

## 2018-03-23 DIAGNOSIS — K74.60 HEPATIC CIRRHOSIS DUE TO CHRONIC HEPATITIS C INFECTION (HCC): ICD-10-CM

## 2018-03-23 DIAGNOSIS — K72.90 HEPATIC ENCEPHALOPATHY (HCC): ICD-10-CM

## 2018-03-23 PROBLEM — K76.82 HEPATIC ENCEPHALOPATHY: Status: ACTIVE | Noted: 2018-03-23

## 2018-03-23 LAB
ALBUMIN SERPL BCP-MCNC: 3.4 G/DL (ref 3.5–5)
ALP SERPL-CCNC: 132 U/L (ref 46–116)
ALT SERPL W P-5'-P-CCNC: 28 U/L (ref 12–78)
ANION GAP SERPL CALCULATED.3IONS-SCNC: 5 MMOL/L (ref 4–13)
AST SERPL W P-5'-P-CCNC: 34 U/L (ref 5–45)
BILIRUB DIRECT SERPL-MCNC: 0.93 MG/DL (ref 0–0.2)
BILIRUB SERPL-MCNC: 4.22 MG/DL (ref 0.2–1)
BUN SERPL-MCNC: 10 MG/DL (ref 5–25)
CALCIUM SERPL-MCNC: 9 MG/DL (ref 8.3–10.1)
CHLORIDE SERPL-SCNC: 107 MMOL/L (ref 100–108)
CO2 SERPL-SCNC: 27 MMOL/L (ref 21–32)
CREAT SERPL-MCNC: 0.81 MG/DL (ref 0.6–1.3)
GFR SERPL CREATININE-BSD FRML MDRD: 101 ML/MIN/1.73SQ M
GLUCOSE P FAST SERPL-MCNC: 116 MG/DL (ref 65–99)
INR PPP: 1.33 (ref 0.86–1.16)
POTASSIUM SERPL-SCNC: 4.1 MMOL/L (ref 3.5–5.3)
PROT SERPL-MCNC: 6.6 G/DL (ref 6.4–8.2)
PROTHROMBIN TIME: 16.6 SECONDS (ref 12.1–14.4)
SODIUM SERPL-SCNC: 139 MMOL/L (ref 136–145)

## 2018-03-23 PROCEDURE — 85610 PROTHROMBIN TIME: CPT

## 2018-03-23 PROCEDURE — 80053 COMPREHEN METABOLIC PANEL: CPT

## 2018-03-23 PROCEDURE — 82248 BILIRUBIN DIRECT: CPT

## 2018-03-23 PROCEDURE — 99204 OFFICE O/P NEW MOD 45 MIN: CPT | Performed by: INTERNAL MEDICINE

## 2018-03-23 PROCEDURE — 36415 COLL VENOUS BLD VENIPUNCTURE: CPT

## 2018-03-23 NOTE — LETTER
March 24, 2018     Mike Mcgraw, [address] [employer]  [address] [place] [address]  [address]    Patient: Latricia May Sr  YOB: 1965   Date of Visit: 3/23/2018       Dear Dr Bucky Strong:    Thank you for referring Helga Iqbal to me for evaluation  Below are my notes for this consultation  If you have questions, please do not hesitate to call me  I look forward to following your patient along with you  Sincerely,        Maria Guadalupe Gar MD        CC: MD Maria Guadalupe Washburn MD  3/24/2018 10:26 PM  Sign at close encounter  [facility] - Outpatient Consultation  Latricia May Sr  47 y o  male MRN: [id number]  Encounter: [id number]          ASSESSMENT AND PLAN:      1  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 ([facility] )- MELD of 15 he has indirect hyperbilirubinemia which may be secondary Gilbert's syndrome so due to elevation in bilirubin his MELD score maybe elevated due to this  His true MELD maybe lower  Postop mortality for major surgeries based on the above MELD would be   7 days- 3 1%  30 days- 12%  90 days- 19%  1 year- 31%    Currently well compensated  -discussed low-sodium diet less than 2 g  -avoiding NSAIDs and Tylenol  -avoiding raw foods  -need follow up with his gastroenterologist every 6 months for evaluation of [facility]    -CT scan ordered for further evaluation ascites by Dr Abilio Foy  -discuss high risk of surgery due to history of cirrhosis      - Bilirubin, direct; Future  - Comprehensive metabolic panel; Future  - Protime-INR; Future  - Case request operating room: ESOPHAGOGASTRODUODENOSCOPY (EGD); Standing  - Case request operating room: ESOPHAGOGASTRODUODENOSCOPY (EGD)    2  Secondary esophageal varices without bleeding (HCC)-EGD plan for further evaluation  Continue Nadolol  3  Morbid obesity (HCC)-will discuss plan with his bariatric surgeon regarding proceeding with surgery as he is having interested        4  Hepatic encephalopathy (HCC)-currently alert oriented x3  Discuss to the continuing lactulose and rifaximin     ______________________________________________________________________    HPI:      He is a 71-year-old male referred to us by Dr Lee Edward due to his history of cirrhosis with previous history esophageal varices  His last EGD was in November of 2017 which did not require any varices banding but he has had varices banding in the past   His cirrhosis is secondary to history of hep C status post treatment and secondary to likely fatty liver disease  Currently significantly overweight and has gained even more weight since he quit smoking  Current BMI is 52  He is being evaluated for weight loss surgery possible sleeve  He is in need of preop evaluation  Currently we do not have an updated MELD score so it is very difficult to assess his risk at this time  He does not have history of jaundice or active jaundice, any decompensation such as ascites, hepatic encephalopathy  He does have history of esophageal varices with last EGD which did not require any banding  He is taking 40 mg of furosemide, 5 mg of amiloride as a diuretic for previous history of lower extremity edema  He is also now well due to history of esophageal varices, currently taking rifaximin and lactulose  He is also taking Bactrim 1 tablet daily unclear what he is taking this but possibly due to previous SBP  He is interested in weight loss surgery as he knows this will significantly improve his quality of life  He is also aware that he is at high risk due to cirrhosis fortunately seems to be compensated at this time  REVIEW OF SYSTEMS:    CONSTITUTIONAL: Denies any fever, chills, rigors, and weight loss  HEENT: No earache or tinnitus  Denies hearing loss or visual disturbances  CARDIOVASCULAR: No chest pain or palpitations  RESPIRATORY: Denies any cough, hemoptysis, shortness of breath or dyspnea on exertion    GASTROINTESTINAL: As noted in the History of Present Illness  GENITOURINARY: No problems with urination  Denies any hematuria or dysuria  NEUROLOGIC: No confusion  MUSCULOSKELETAL: Denies any muscle or joint pain  SKIN: Denies skin rashes or itching  ENDOCRINE: Denies excessive thirst  Denies intolerance to heat or cold  PSYCHOSOCIAL: Denies depression or anxiety  Denies any recent memory loss  Historical Information   Past Medical History:   Diagnosis Date    Cirrhosis ([facility] )     Cirrhosis ([facility] )     Hepatitis     Obesity      No past surgical history on file  Social History   History   Alcohol Use No     History   Drug Use No     History   Smoking Status    Former Smoker    Types: Cigarettes   Smokeless Tobacco    Never Used     Family History   Problem Relation Age of Onset    Heart disease Mother     Lupus Mother     Diabetes Father     Stroke Father        Meds/Allergies       Current Outpatient Prescriptions:     AFLURIA PRESERVATIVE FREE 0 5 ML DEEDEE    AMILoride 5 mg tablet    furosemide (LASIX) 40 mg tablet    lactulose (CONSTULOSE) 10 g/15 mL solution    nadolol (CORGARD) 20 mg tablet    rifaximin (XIFAXAN) 550 mg tablet    sildenafil (VIAGRA) 50 MG tablet    sulfamethoxazole-trimethoprim (BACTRIM DS) 800-160 mg per tablet    tamsulosin (FLOMAX) 0 4 mg    vardenafil (LEVITRA) 20 MG tablet    Vardenafil HCl (STAXYN) 10 MG TBDP    XIFAXAN 550 MG tablet    No Known Allergies        Objective     Blood pressure 130/70, pulse 73, temperature 97 6 °F (36 4 °C), temperature source Tympanic, height 5' 5" (1 651 m), weight (!) 144 kg (317 lb)  PHYSICAL EXAM:      General Appearance:   Alert, cooperative, no distress   HEENT:   Normocephalic, atraumatic, anicteric      Neck:  Supple, symmetrical, trachea midline   Lungs:   Clear to auscultation bilaterally; no rales, rhonchi or wheezing; respirations unlabored    Heart[de-identified]   Regular rate and rhythm; no murmur, rub, or gallop     Abdomen:   Soft, non-tender, non-distended; normal bowel sounds; no masses, no organomegaly    Genitalia:   Deferred    Rectal:   Deferred    Extremities:  No cyanosis, clubbing or edema    Pulses:  2+ and symmetric    Skin:  No jaundice  No confusion, negative for  asterixis          Lab Results:   Appointment on 03/23/2018   Component Date Value    Bilirubin, Direct 03/23/2018 0 93*    Sodium 03/23/2018 139     Potassium 03/23/2018 4 1     Chloride 03/23/2018 107     CO2 03/23/2018 27     Anion Gap 03/23/2018 5     BUN 03/23/2018 10     Creatinine 03/23/2018 0 81     Glucose, Fasting 03/23/2018 116*    Calcium 03/23/2018 9 0     AST 03/23/2018 34     ALT 03/23/2018 28     Alkaline Phosphatase 03/23/2018 132*    Total Protein 03/23/2018 6 6     Albumin 03/23/2018 3 4*    Total Bilirubin 03/23/2018 4 22*    eGFR 03/23/2018 101     Protime 03/23/2018 16 6*    INR 03/23/2018 1 33*         Radiology Results:   Radiology Results    Result Date: 3/15/2018  Narrative: Ordered by an unspecified provider  Ct Abdomen Pelvis W Contrast    Result Date: 3/21/2018  Narrative: CT ABDOMEN AND PELVIS WITH IV CONTRAST INDICATION:   E66 01: Morbid (severe) obesity due to excess calories  Preoperative assessment prior to gastric bypass surgery  Known history of cirrhosis  Evaluate for ascites COMPARISON: 1/11/2018 TECHNIQUE:  CT examination of the abdomen and pelvis was performed  Axial, sagittal, and coronal 2D reformatted images were created from the source data and submitted for interpretation  Radiation dose length product (DLP) for this visit:  1142 mGy-cm   This examination, like all CT scans performed in the [facility], was performed utilizing techniques to minimize radiation dose exposure, including the use of iterative reconstruction and automated exposure control  IV Contrast:  100 mL of iohexol (OMNIPAQUE) Enteric Contrast:  Enteric contrast was administered   FINDINGS: ABDOMEN LOWER CHEST:  No clinically significant abnormality identified in the visualized lower chest  LIVER/BILIARY TREE:  Changes of hepatic cirrhosis with irregular nodular liver margins and preferential enlargement of the left hepatic lobe compared to the right  Recanalization of the paraumbilical vein  Portal vein appears patent  Prominent intra-abdominal varices are identified including perisplenic, intra-abdominal, pararenal dilated veins  GALLBLADDER:  No calcified gallstones  No pericholecystic inflammatory change  SPLEEN:  The spleen is enlarged, measuring 13 7 x 15 6 x 9 5 cm PANCREAS:  Unremarkable  ADRENAL GLANDS:  Unremarkable  KIDNEYS/URETERS:  Unremarkable  No hydronephrosis  STOMACH AND BOWEL:  Unremarkable  APPENDIX:  No findings to suggest appendicitis  ABDOMINOPELVIC CAVITY:  There is no evidence of ascites  No adenopathy or free air  VESSELS:  As above, diffuse varices  No AAA  Portal vein patent PELVIS REPRODUCTIVE ORGANS:  Unremarkable for patient's age  URINARY BLADDER:  Unremarkable  ABDOMINAL WALL/INGUINAL REGIONS:  Note is again made of recanalization of the paraumbilical vein  Note is made of varices which are located directly underneath the umbilicus  OSSEOUS STRUCTURES:  No acute fracture or destructive osseous lesion  Impression: 1  Hepatic cirrhosis with evidence of portal hypertension 2  There is no ascites  Portal vein patent 3  Splenomegaly  Diffuse intra-abdominal varices    Note is made this includes recanalization of the paraumbilical vein and dilated anterior abdominal varices which are located just deep to the umbilicus Workstation performed: [id number]

## 2018-03-25 NOTE — PROGRESS NOTES
[facility] - Outpatient Consultation  Neva Cuevas   47 y o  male MRN: [id number]  Encounter: [id number]          ASSESSMENT AND PLAN:      1  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 ([facility] )- MELD of 15 he has indirect hyperbilirubinemia which may be secondary Gilbert's syndrome so due to elevation in bilirubin his MELD score maybe elevated due to this  His true MELD maybe lower  Postop mortality for major surgeries based on the above MELD would be   7 days- 3 1%  30 days- 12%  90 days- 19%  1 year- 31%    Currently well compensated  -discussed low-sodium diet less than 2 g  -avoiding NSAIDs and Tylenol  -avoiding raw foods  -need follow up with his gastroenterologist every 6 months for evaluation of [facility]    -CT scan ordered for further evaluation ascites by Dr Wilda Barrera  -discuss high risk of surgery due to history of cirrhosis      - Bilirubin, direct; Future  - Comprehensive metabolic panel; Future  - Protime-INR; Future  - Case request operating room: ESOPHAGOGASTRODUODENOSCOPY (EGD); Standing  - Case request operating room: ESOPHAGOGASTRODUODENOSCOPY (EGD)    2  Secondary esophageal varices without bleeding (HCC)-EGD plan for further evaluation  Continue Nadolol  3  Morbid obesity (HCC)-will discuss plan with his bariatric surgeon regarding proceeding with surgery as he is having interested  4  Hepatic encephalopathy (HCC)-currently alert oriented x3  Discuss to the continuing lactulose and rifaximin     ______________________________________________________________________    HPI:      He is a 58-year-old male referred to us by Dr Wilda Barrera due to his history of cirrhosis with previous history esophageal varices  His last EGD was in November of 2017 which did not require any varices banding but he has had varices banding in the past   His cirrhosis is secondary to history of hep C status post treatment and secondary to likely fatty liver disease   Currently significantly overweight and has gained even more weight since he quit smoking  Current BMI is 52  He is being evaluated for weight loss surgery possible sleeve  He is in need of preop evaluation  Currently we do not have an updated MELD score so it is very difficult to assess his risk at this time  He does not have history of jaundice or active jaundice, any decompensation such as ascites, hepatic encephalopathy  He does have history of esophageal varices with last EGD which did not require any banding  He is taking 40 mg of furosemide, 5 mg of amiloride as a diuretic for previous history of lower extremity edema  He is also now well due to history of esophageal varices, currently taking rifaximin and lactulose  He is also taking Bactrim 1 tablet daily unclear what he is taking this but possibly due to previous SBP  He is interested in weight loss surgery as he knows this will significantly improve his quality of life  He is also aware that he is at high risk due to cirrhosis fortunately seems to be compensated at this time  REVIEW OF SYSTEMS:    CONSTITUTIONAL: Denies any fever, chills, rigors, and weight loss  HEENT: No earache or tinnitus  Denies hearing loss or visual disturbances  CARDIOVASCULAR: No chest pain or palpitations  RESPIRATORY: Denies any cough, hemoptysis, shortness of breath or dyspnea on exertion  GASTROINTESTINAL: As noted in the History of Present Illness  GENITOURINARY: No problems with urination  Denies any hematuria or dysuria  NEUROLOGIC: No confusion  MUSCULOSKELETAL: Denies any muscle or joint pain  SKIN: Denies skin rashes or itching  ENDOCRINE: Denies excessive thirst  Denies intolerance to heat or cold  PSYCHOSOCIAL: Denies depression or anxiety  Denies any recent memory loss         Historical Information   Past Medical History:   Diagnosis Date    [facility]osis [facility] )     [facility]osis [facility] )     Hepatitis     Obesity      No past surgical history on file   Social History   History   Alcohol Use No     History   Drug Use No     History   Smoking Status    Former Smoker    Types: Cigarettes   Smokeless Tobacco    Never Used     Family History   Problem Relation Age of Onset    Heart disease Mother     Lupus Mother     Diabetes Father     Stroke Father        Meds/Allergies       Current Outpatient Prescriptions:     AFLURIA PRESERVATIVE FREE 0 5 ML DEEDEE    AMILoride 5 mg tablet    furosemide (LASIX) 40 mg tablet    lactulose (CONSTULOSE) 10 g/15 mL solution    nadolol (CORGARD) 20 mg tablet    rifaximin (XIFAXAN) 550 mg tablet    sildenafil (VIAGRA) 50 MG tablet    sulfamethoxazole-trimethoprim (BACTRIM DS) 800-160 mg per tablet    tamsulosin (FLOMAX) 0 4 mg    vardenafil (LEVITRA) 20 MG tablet    Vardenafil HCl (STAXYN) 10 MG TBDP    XIFAXAN 550 MG tablet    No Known Allergies        Objective     Blood pressure 130/70, pulse 73, temperature 97 6 °F (36 4 °C), temperature source Tympanic, height 5' 5" (1 651 m), weight (!) 144 kg (317 lb)  PHYSICAL EXAM:      General Appearance:   Alert, cooperative, no distress   HEENT:   Normocephalic, atraumatic, anicteric      Neck:  Supple, symmetrical, trachea midline   Lungs:   Clear to auscultation bilaterally; no rales, rhonchi or wheezing; respirations unlabored    Heart[de-identified]   Regular rate and rhythm; no murmur, rub, or gallop     Abdomen:   Soft, non-tender, non-distended; normal bowel sounds; no masses, no organomegaly    Genitalia:   Deferred    Rectal:   Deferred    Extremities:  No cyanosis, clubbing or edema    Pulses:  2+ and symmetric    Skin:  No jaundice  No confusion, negative for  asterixis          Lab Results:   Appointment on 03/23/2018   Component Date Value    Bilirubin, Direct 03/23/2018 0 93*    Sodium 03/23/2018 139     Potassium 03/23/2018 4 1     Chloride 03/23/2018 107     CO2 03/23/2018 27     Anion Gap 03/23/2018 5     BUN 03/23/2018 10     Creatinine 03/23/2018 0 81     Glucose, Fasting 03/23/2018 116*    Calcium 03/23/2018 9 0     AST 03/23/2018 34     ALT 03/23/2018 28     Alkaline Phosphatase 03/23/2018 132*    Total Protein 03/23/2018 6 6     Albumin 03/23/2018 3 4*    Total Bilirubin 03/23/2018 4 22*    eGFR 03/23/2018 101     Protime 03/23/2018 16 6*    INR 03/23/2018 1 33*         Radiology Results:   Radiology Results    Result Date: 3/15/2018  Narrative: Ordered by an unspecified provider  Ct Abdomen Pelvis W Contrast    Result Date: 3/21/2018  Narrative: CT ABDOMEN AND PELVIS WITH IV CONTRAST INDICATION:   E66 01: Morbid (severe) obesity due to excess calories  Preoperative assessment prior to gastric bypass surgery  Known history of cirrhosis  Evaluate for ascites COMPARISON: 1/11/2018 TECHNIQUE:  CT examination of the abdomen and pelvis was performed  Axial, sagittal, and coronal 2D reformatted images were created from the source data and submitted for interpretation  Radiation dose length product (DLP) for this visit:  1142 mGy-cm   This examination, like all CT scans performed in the [facility], was performed utilizing techniques to minimize radiation dose exposure, including the use of iterative reconstruction and automated exposure control  IV Contrast:  100 mL of iohexol (OMNIPAQUE) Enteric Contrast:  Enteric contrast was administered  FINDINGS: ABDOMEN LOWER CHEST:  No clinically significant abnormality identified in the visualized lower chest  LIVER/BILIARY TREE:  Changes of hepatic cirrhosis with irregular nodular liver margins and preferential enlargement of the left hepatic lobe compared to the right  Recanalization of the paraumbilical vein  Portal vein appears patent  Prominent intra-abdominal varices are identified including perisplenic, intra-abdominal, pararenal dilated veins  GALLBLADDER:  No calcified gallstones  No pericholecystic inflammatory change   SPLEEN:  The spleen is enlarged, measuring 13 7 x 15 6 x 9 5 cm PANCREAS:  Unremarkable  ADRENAL GLANDS:  Unremarkable  KIDNEYS/URETERS:  Unremarkable  No hydronephrosis  STOMACH AND BOWEL:  Unremarkable  APPENDIX:  No findings to suggest appendicitis  ABDOMINOPELVIC CAVITY:  There is no evidence of ascites  No adenopathy or free air  VESSELS:  As above, diffuse varices  No AAA  Portal vein patent PELVIS REPRODUCTIVE ORGANS:  Unremarkable for patient's age  URINARY BLADDER:  Unremarkable  ABDOMINAL WALL/INGUINAL REGIONS:  Note is again made of recanalization of the paraumbilical vein  Note is made of varices which are located directly underneath the umbilicus  OSSEOUS STRUCTURES:  No acute fracture or destructive osseous lesion  Impression: 1  Hepatic cirrhosis with evidence of portal hypertension 2  There is no ascites  Portal vein patent 3  Splenomegaly  Diffuse intra-abdominal varices    Note is made this includes recanalization of the paraumbilical vein and dilated anterior abdominal varices which are located just deep to the umbilicus Workstation performed: [id number]

## 2018-03-28 ENCOUNTER — TELEPHONE (OUTPATIENT)
Dept: PULMONOLOGY | Facility: CLINIC | Age: 53
End: 2018-03-28

## 2018-03-28 ENCOUNTER — TELEPHONE (OUTPATIENT)
Dept: PULMONOLOGY | Facility: MEDICAL CENTER | Age: 53
End: 2018-03-28

## 2018-03-28 ENCOUNTER — TELEPHONE (OUTPATIENT)
Dept: CARDIOLOGY CLINIC | Facility: CLINIC | Age: 53
End: 2018-03-28

## 2018-03-29 ENCOUNTER — DOCUMENTATION (OUTPATIENT)
Dept: CARDIOLOGY CLINIC | Facility: CLINIC | Age: 53
End: 2018-03-29

## 2018-03-29 ENCOUNTER — TELEPHONE (OUTPATIENT)
Dept: BARIATRICS | Facility: CLINIC | Age: 53
End: 2018-03-29

## 2018-03-29 ENCOUNTER — TELEPHONE (OUTPATIENT)
Dept: CARDIOLOGY CLINIC | Facility: CLINIC | Age: 53
End: 2018-03-29

## 2018-03-29 NOTE — PROGRESS NOTES
PREOP CARDIOVASCULAR  EVALUATION PRIOR TO GASTRIC BYPASS   PATIENT HAD NORMAL NUCLEAR STRESS TEST AND ECHOCARDIOGRAM   HE SHOULD BE AT LOW RISK FOR CARDIAC EVENTS DURING GASTRIC BYPASS SURGERY  CALL ME IF YOU HAVE ANY QUESTIONS

## 2018-03-29 NOTE — TELEPHONE ENCOUNTER
MANNY DIDN'T FIND ANYTHING IN EPIC STATING THAT PT WAS CLEARED FOR SX & NEEDS A LETTER WRITTEN & SCANNED INTO [facility] UNDER "LETTER"

## 2018-03-30 ENCOUNTER — TELEPHONE (OUTPATIENT)
Dept: PULMONOLOGY | Facility: CLINIC | Age: 53
End: 2018-03-30

## 2018-04-04 ENCOUNTER — TELEPHONE (OUTPATIENT)
Dept: GASTROENTEROLOGY | Facility: MEDICAL CENTER | Age: 53
End: 2018-04-04

## 2018-04-04 NOTE — TELEPHONE ENCOUNTER
----- Message from Sujey Mcelroy MD sent at 4/3/2018  5:40 PM EDT -----  Liver enzymes were about the same  Bilirubin slightly more elevated

## 2018-04-05 ENCOUNTER — TELEPHONE (OUTPATIENT)
Dept: BARIATRICS | Facility: CLINIC | Age: 53
End: 2018-04-05

## 2018-04-12 ENCOUNTER — ANESTHESIA EVENT (OUTPATIENT)
Dept: GASTROENTEROLOGY | Facility: HOSPITAL | Age: 53
End: 2018-04-12
Payer: MEDICARE

## 2018-04-12 ENCOUNTER — HOSPITAL ENCOUNTER (OUTPATIENT)
Facility: HOSPITAL | Age: 53
Setting detail: OUTPATIENT SURGERY
Discharge: HOME | End: 2018-04-12
Attending: INTERNAL MEDICINE | Admitting: INTERNAL MEDICINE
Payer: MEDICARE

## 2018-04-12 ENCOUNTER — ANESTHESIA (OUTPATIENT)
Dept: GASTROENTEROLOGY | Facility: HOSPITAL | Age: 53
End: 2018-04-12
Payer: MEDICARE

## 2018-04-12 VITALS
BODY MASS INDEX: 52.48 KG/M2 | WEIGHT: 315 LBS | HEART RATE: 63 BPM | TEMPERATURE: 98.4 F | HEIGHT: 65 IN | RESPIRATION RATE: 63 BRPM | OXYGEN SATURATION: 99 % | DIASTOLIC BLOOD PRESSURE: 75 MMHG | SYSTOLIC BLOOD PRESSURE: 145 MMHG

## 2018-04-12 PROCEDURE — 43235 EGD DIAGNOSTIC BRUSH WASH: CPT | Performed by: INTERNAL MEDICINE

## 2018-04-12 RX ORDER — GLYCOPYRROLATE 0.2 MG/ML
INJECTION INTRAMUSCULAR; INTRAVENOUS AS NEEDED
Status: DISCONTINUED | OUTPATIENT
Start: 2018-04-12 | End: 2018-04-12 | Stop reason: SURG

## 2018-04-12 RX ORDER — LIDOCAINE HYDROCHLORIDE 10 MG/ML
INJECTION, SOLUTION INFILTRATION; PERINEURAL AS NEEDED
Status: DISCONTINUED | OUTPATIENT
Start: 2018-04-12 | End: 2018-04-12 | Stop reason: SURG

## 2018-04-12 RX ORDER — PROPOFOL 10 MG/ML
INJECTION, EMULSION INTRAVENOUS AS NEEDED
Status: DISCONTINUED | OUTPATIENT
Start: 2018-04-12 | End: 2018-04-12 | Stop reason: SURG

## 2018-04-12 RX ORDER — SODIUM CHLORIDE 9 MG/ML
125 INJECTION, SOLUTION INTRAVENOUS CONTINUOUS
Status: DISCONTINUED | OUTPATIENT
Start: 2018-04-12 | End: 2018-04-12 | Stop reason: HOSPADM

## 2018-04-12 RX ORDER — PROPOFOL 10 MG/ML
INJECTION, EMULSION INTRAVENOUS CONTINUOUS PRN
Status: DISCONTINUED | OUTPATIENT
Start: 2018-04-12 | End: 2018-04-12 | Stop reason: SURG

## 2018-04-12 RX ADMIN — SODIUM CHLORIDE 125 ML/HR: 0.9 INJECTION, SOLUTION INTRAVENOUS at 10:04

## 2018-04-12 RX ADMIN — LIDOCAINE HYDROCHLORIDE 100 MG: 10 INJECTION, SOLUTION INFILTRATION; PERINEURAL at 11:30

## 2018-04-12 RX ADMIN — PROPOFOL 100 MCG/KG/MIN: 10 INJECTION, EMULSION INTRAVENOUS at 11:31

## 2018-04-12 RX ADMIN — TOPICAL ANESTHETIC 1 SPRAY: 200 SPRAY DENTAL; PERIODONTAL at 11:29

## 2018-04-12 RX ADMIN — PROPOFOL 80 MG: 10 INJECTION, EMULSION INTRAVENOUS at 11:31

## 2018-04-12 RX ADMIN — GLYCOPYRROLATE 0.2 MG: 0.2 INJECTION, SOLUTION INTRAMUSCULAR; INTRAVENOUS at 11:28

## 2018-04-12 RX ADMIN — PROPOFOL 20 MG: 10 INJECTION, EMULSION INTRAVENOUS at 11:33

## 2018-04-12 NOTE — ANESTHESIA PREPROCEDURE EVALUATION
Review of Systems/Medical History  Patient summary reviewed  Chart reviewed      Cardiovascular  Hypertension controlled,    Pulmonary       GI/Hepatic    GERD well controlled, Liver disease, cirrhosis, Hepatitis C,             Endo/Other     GYN       Hematology   Musculoskeletal       Neurology   Psychology           Physical Exam    Airway    Mallampati score: II  TM Distance: >3 FB  Neck ROM: full     Dental   No notable dental hx     Cardiovascular  Rhythm: regular, Rate: normal, Cardiovascular exam normal    Pulmonary  Pulmonary exam normal Breath sounds clear to auscultation,     Other Findings        Anesthesia Plan  ASA Score- 3     Anesthesia Type- IV sedation with anesthesia with ASA Monitors  Additional Monitors:   Airway Plan:         Plan Factors-    Induction- intravenous  Postoperative Plan- Plan for postoperative opioid use  Informed Consent- Anesthetic plan and risks discussed with patient  I personally reviewed this patient with the CRNA  Discussed and agreed on the Anesthesia Plan with the CRNA  Etta Cobos

## 2018-04-12 NOTE — ANESTHESIA POSTPROCEDURE EVALUATION
Post-Op Assessment Note      CV Status:  Stable    Mental Status:  Lethargic    Hydration Status:  Euvolemic    PONV Controlled:  Controlled    Airway Patency:  Patent    Post Op Vitals Reviewed: Yes          Staff: CRNA           BP      Temp      Pulse     Resp      SpO2   97%

## 2018-04-12 NOTE — OP NOTE
**** GI/ENDOSCOPY REPORT ****     PATIENT NAME: BETH HYDE - VISIT ID:  Patient ID: [id number]   YOB: 1965     INTRODUCTION: Esophagogastroduodenoscopy - A 47 male patient presents for   an outpatient Esophagogastroduodenoscopy at [address] [place] [address]  INDICATIONS: History of cirrhosis  CONSENT: The benefits, risks, and alternatives to the procedure were   discussed and informed consent was obtained from the patient  PREPARATION:  EKG, pulse, pulse oximetry and blood pressure were monitored   throughout the procedure  MEDICATIONS:     PROCEDURE:  The endoscope was passed without difficulty through the mouth   under direct visualization and advanced to the 2nd portion of the   duodenum  The scope was withdrawn and the mucosa was carefully examined  FINDINGS:   Esophagus: The esophagus appeared to be normal  There was no   evidence of varices in the esophagus  GE junction: The GE junction   appeared to be normal   Stomach: Hypertensive portal gastropathy was found   in the body of the stomach, cardia, and fundus  There was no evidence of   varices in the stomach  Pylorus: The pylorus appeared to be normal,   symmetrical, and patent  Duodenum: The duodenum appeared to be normal      COMPLICATIONS: There were no complications  IMPRESSIONS: Normal esophagus  No evidence of varices in the esophagus  Normal GE junction  Portal gastropathy found in the body of the stomach,   cardia, and fundus  No evidence of varices in the stomach  Normal,   symmetrical, and patent pylorus  Normal duodenum  RECOMMENDATIONS:     ESTIMATED BLOOD LOSS:     PATHOLOGY SPECIMENS:     PROCEDURE CODES: 64850 - EGD flexible; incl brushing or washing     ICD-9 Codes: 572 8 Other sequelae of chronic liver disease     ICD-10 Codes: K31 89 Other diseases of stomach and duodenum     PERFORMED BY: EDUARD Perez  on 04/12/2018    Version 1, electronically signed by EDUARD Azul  on 04/12/2018 at   11:42

## 2018-04-16 ENCOUNTER — TELEPHONE (OUTPATIENT)
Dept: BARIATRICS | Facility: CLINIC | Age: 53
End: 2018-04-16

## 2018-04-16 ENCOUNTER — APPOINTMENT (OUTPATIENT)
Dept: LAB | Facility: CLINIC | Age: 53
End: 2018-04-16
Payer: MEDICARE

## 2018-04-16 DIAGNOSIS — E66.01 MORBID (SEVERE) OBESITY DUE TO EXCESS CALORIES (HCC): ICD-10-CM

## 2018-04-16 PROCEDURE — 80323 ALKALOIDS NOS: CPT

## 2018-04-18 ENCOUNTER — TELEPHONE (OUTPATIENT)
Dept: BARIATRICS | Facility: CLINIC | Age: 53
End: 2018-04-18

## 2018-04-18 LAB
COTININE SERPL-MCNC: 1.9 NG/ML
NICOTINE SERPL-MCNC: NORMAL NG/ML

## 2018-04-20 ENCOUNTER — TELEPHONE (OUTPATIENT)
Dept: BARIATRICS | Facility: CLINIC | Age: 53
End: 2018-04-20

## 2018-04-23 ENCOUNTER — TELEPHONE (OUTPATIENT)
Dept: BARIATRICS | Facility: CLINIC | Age: 53
End: 2018-04-23

## 2018-04-27 ENCOUNTER — OFFICE VISIT (OUTPATIENT)
Dept: BARIATRICS | Facility: CLINIC | Age: 53
End: 2018-04-27

## 2018-04-27 VITALS — HEIGHT: 67 IN | BODY MASS INDEX: 49.44 KG/M2 | WEIGHT: 315 LBS

## 2018-04-27 VITALS — WEIGHT: 315 LBS | HEIGHT: 65 IN | BODY MASS INDEX: 52.48 KG/M2

## 2018-04-27 DIAGNOSIS — E66.01 MORBID (SEVERE) OBESITY DUE TO EXCESS CALORIES (HCC): Primary | ICD-10-CM

## 2018-04-27 DIAGNOSIS — Z98.84 BARIATRIC SURGERY STATUS: ICD-10-CM

## 2018-04-27 PROCEDURE — RECHECK

## 2018-04-27 NOTE — PROGRESS NOTES
Bariatric Follow Up Nutrition Note    Preop  Preop Program    Type of surgery  Preop  Surgeon: Dr Yelitza Kapoor    This appointment was with the RD and the     Nutrition Assessment   Matt Tobias Sr   47 y o   male  Height 5' 5" (1 651 m), weight (!) 145 kg (320 lb 8 oz)  Body mass index is 53 33 kg/m²  Weight in (lb) to have BMI = 25: 150 1 lbs  Pre-Op Excess Wt: 170 4 lbs  Pt's goal pre-op weight is 287 3 lbs, which is a 5% loss from his initial weight of 313 0 lbs  Pt is aware that he must be at his initial weight of 313 0 before he can be scheduled for surgery  Pt arrived 15 minutes late to appointment  Pt brought his manual  Pt claims he was never told that he had to lose weight before surgery  Reviewed surgeon note, which states that he had to lose 10-15 lbs before surgery  Reviewed pt's manual, which stated pt's specific pre-op goal weight that was hand written in by the RD for his initial appointment  Pt states he does not remember any of these happening  Pt states he brought his wife with him to this appointment because he usually doesn't remember anything that he is told during appointments  Pt states he has not completed his lesson plans, he does not log his food, and does not portion control  Pt states he is not exercising at this time  Review of History and Medications   Past Medical History:   Diagnosis Date    [address] [address]     [address] [address]     CPAP (continuous positive airway pressure) dependence     Esophageal varices (HCC)     Hepatitis     c rx'ed 1 year ago    Liver disease     Obesity     Sleep apnea      Past Surgical History:   Procedure Laterality Date    COLONOSCOPY      ESOPHAGOGASTRODUODENOSCOPY      ESOPHAGOGASTRODUODENOSCOPY N/A 4/12/2018    Procedure: ESOPHAGOGASTRODUODENOSCOPY (EGD); Surgeon: Ian Sethi MD;  [facility]: [facility];   Service: Gastroenterology     Social History     Social History    Marital status: /Civil Union     Spouse name: N/A    Number of children: 2    Years of education: N/A     Occupational History    disabled      Social History Main Topics    Smoking status: Former Smoker     Types: Cigarettes    Smokeless tobacco: Never Used    Alcohol use No    Drug use: No    Sexual activity: Not on file     Other Topics Concern    Not on file     Social History Narrative    No narrative on file       Current Outpatient Prescriptions:     AMILoride 5 mg tablet, 10 mg daily  , Disp: , Rfl: 0    furosemide (LASIX) 40 mg tablet, Take 40 mg by mouth 2 (two) times a day  , Disp: , Rfl:     lactulose (CONSTULOSE) 10 g/15 mL solution, Take by mouth daily as needed  , Disp: , Rfl:     nadolol (CORGARD) 20 mg tablet, 20 mg 2 (two) times a day  , Disp: , Rfl: 1    rifaximin (XIFAXAN) 550 mg tablet, Take 550 mg by mouth every 12 (twelve) hours  , Disp: , Rfl:     Food Intake and Lifestyle Assessment   Food Intake Assessment completed via usual diet recall  Breakfast: sometimes skips; 3 eggs with turkey sausage; sometimes a Slim Fast shake  Snack: n/a   Lunch: typically skips  Snack: n/a  Dinner: sometimes a can of soup; sometimes chicken with spinach and at least 1 cup of brown rice  Snack: sometimes 1/2 of a large bag of chips; pt states he drinks at least 40 ounces of sweetened tea per day  Beverage intake: water and sweetened beverages  Diet texture/stage: regular  Protein supplement: none; sometimes a Slim Fast shake  Estimated protein intake per day: 60 grams  Estimated fluid intake per day: at least 64 ounces  Meals eaten away from home: n/a  Typical meal pattern: 1-2 meals per day and 1-2 snacks per day  Eating Behaviors: Consumption of high calorie/ high fat foods, Consumption of high calorie beverages, Large portion sizes and Binge eating    Food allergies or intolerances: n/a  Cultural or Latter-day considerations: n/a    Physical Assessment  Physical Activity  Types of exercise: None  Current physical limitations: n/a    Psychosocial Assessment   Support systems: spouse  Socioeconomic factors: n/a    Nutrition Diagnosis  Diagnosis: Overweight / Obesity (NC-3 3)  Related to: Food and nutrition-related knowledge deficit, Not ready for diet / lifestyle change, Limited adherence to nutrition-related recommendations, Physical inactivity, Excessive energy intake and Inability or lack of desire to manage self-care  As Evidenced by: BMI >25, Expected anthropometric outcomes are not achieved, Excessive energy intake, Excessive fat / cholesterol intake and Unintentional weight gain     Interventions and Teaching   Patient educated on post-op nutrition guidelines  Patient educated and handouts provided    Surgical changes to stomach / GI  Capacity of post-surgery stomach  Diet progression  Adequate hydration  Sugar and fat restriction to decrease "dumping syndrome"  Exercise  Suggestions for pre-op diet  Nutrition considerations after surgery  Protein supplements  Meal planning and preparation  Appropriate carbohydrate, protein, and fat intake, and food/fluid choices to maximize safe weight loss, nutrient intake, and tolerance   Dietary and lifestyle changes  Possible problems with poor eating habits  Intuitive eating  Techniques for self monitoring and keeping daily food journal  Potential for food intolerance after surgery, and ways to deal with them including: lactose intolerance, nausea, reflux, vomiting, diarrhea, food intolerance, appetite changes, gas  Vitamin / Mineral supplementation of Multivitamin with minerals and Vitamin D    Education provided to: patient and family    Barriers to learning: No barriers identified    Readiness to change: preparation    Comprehension: demonstrated understanding     Expected Compliance: good    Goals  Eliminate sugar sweetened beverages, Food journal, Exercise 30 minutes 5 times per week, Complete lession plans 1-6, Eat 3 meals per day and Eliminate mindless snacking     Pt is to have a protein shake for breakfast, with lunch and dinner being 1 serving of protein, 1 serving of carbohydrate, and 1 serving of non-starchy vegetables  All snacks are to be protein shakes  Provided pt with education and information on measuring foods and portion sizing, as well as how to pick a protein shake  Pt is to return in 1 week for a weight check and to meet with the RD and SW      Time Spent:   1 Hour

## 2018-04-27 NOTE — PROGRESS NOTES
met with patient and dietitian to address patient's need for 7lb weight loss to get to surgery  Patient angry and states he believes he was never told to lose weight  after a review of information provided to patient or read from surgeon note, it was determined that patient was provided the information  he admitted that he does experience memory difficulties  Patient did agree to stay with program and will follow the directions of dietitian exactly as stated and return in one week for a weight check   dietitian will schedule appointment

## 2018-05-04 ENCOUNTER — OFFICE VISIT (OUTPATIENT)
Dept: BARIATRICS | Facility: CLINIC | Age: 53
End: 2018-05-04

## 2018-05-04 VITALS — WEIGHT: 314.4 LBS | HEIGHT: 66 IN | BODY MASS INDEX: 50.53 KG/M2

## 2018-05-04 VITALS — HEIGHT: 65 IN | BODY MASS INDEX: 52.38 KG/M2 | WEIGHT: 314.4 LBS

## 2018-05-04 DIAGNOSIS — E66.01 MORBID (SEVERE) OBESITY DUE TO EXCESS CALORIES (HCC): Primary | ICD-10-CM

## 2018-05-04 DIAGNOSIS — Z98.84 BARIATRIC SURGERY STATUS: Primary | ICD-10-CM

## 2018-05-04 DIAGNOSIS — E66.01 MORBID (SEVERE) OBESITY DUE TO EXCESS CALORIES (HCC): ICD-10-CM

## 2018-05-04 PROCEDURE — RECHECK

## 2018-05-04 NOTE — PROGRESS NOTES
Met with patient at request of [facility]  Patient pleased with weight loss  He states that he really tried to follow all the directions he was provided by the dietitian  as there is still a weight loss of 1 5 pounds expected it was decided that we involve [facility] to see if patient can be scheduled for surgery  [facility] was in agreement and patient to see her upon completion of this session to get on surgery schedule

## 2018-05-04 NOTE — PROGRESS NOTES
Bariatric Follow Up Nutrition Note    Preop  Preop Program    Type of surgery  Preop  Surgeon: Dr Yuki Cervantes    This appointment was with the RD and the SW    Nutrition Assessment   Matt Tobias Sr   47 y o   male    Body mass index is 52 32 kg/m²  Weight (last 2 days)     Date/Time   Weight    05/04/18 1141  (!)  143 (314 4)            Weight in (lb) to have BMI = 25: 150 1 lbs    Pt's previous weight = 320 5 lbs  (6 1 lb loss)    Pt states he started walking everyday for exercise  Pt states he has protein shakes for breakfast and snacks  Pt states he had 2 fried eggs with 2 turkey sausage links for breakfast one day  Pt states he is cutting back on portions and is drinking water to help with cravings  Pt states he is drinking Vitamin Water Zero as well for fluids  Review of History and Medications   Past Medical History:   Diagnosis Date    [facility] [address]     [facility]osis ([facility] [address] [facility]     CPAP (continuous positive airway pressure) dependence     Esophageal varices (HCC)     Hepatitis     c rx'ed 1 year ago    Liver disease     Obesity     Sleep apnea      Past Surgical History:   Procedure Laterality Date    COLONOSCOPY      ESOPHAGOGASTRODUODENOSCOPY      ESOPHAGOGASTRODUODENOSCOPY N/A 4/12/2018    Procedure: ESOPHAGOGASTRODUODENOSCOPY (EGD); Surgeon: Ronnie Li MD;  [facility];   Service: Gastroenterology     Social History     Social History    Marital status: /Civil Union     Spouse name: N/A    Number of children: 2    Years of education: N/A     Occupational History    disabled      Social History Main Topics    Smoking status: Former Smoker     Types: Cigarettes    Smokeless tobacco: Never Used    Alcohol use No    Drug use: No    Sexual activity: Not on file     Other Topics Concern    Not on file     Social History Narrative    No narrative on file       Current Outpatient Prescriptions:     AMILoride 5 mg tablet, 10 mg daily  , Disp: , Rfl: 0    furosemide (LASIX) 40 mg tablet, Take 40 mg by mouth 2 (two) times a day  , Disp: , Rfl:     lactulose (CONSTULOSE) 10 g/15 mL solution, Take by mouth daily as needed  , Disp: , Rfl:     nadolol (CORGARD) 20 mg tablet, 20 mg 2 (two) times a day  , Disp: , Rfl: 1    rifaximin (XIFAXAN) 550 mg tablet, Take 550 mg by mouth every 12 (twelve) hours  , Disp: , Rfl:       Physical Assessment  Physical Activity  Types of exercise: walking daily  Current physical limitations: n/a    Psychosocial Assessment   Support systems: spouse  Socioeconomic factors: n/a    Nutrition Diagnosis  Diagnosis: Overweight / Obesity (NC-3 3)  Related to: Food and nutrition-related knowledge deficit, Not ready for diet / lifestyle change, Limited adherence to nutrition-related recommendations, Physical inactivity, Excessive energy intake and Inability or lack of desire to manage self-care  As Evidenced by: BMI >25, Expected anthropometric outcomes are not achieved, Excessive energy intake, Excessive fat / cholesterol intake and Unintentional weight gain     Interventions and Teaching   Patient educated on post-op nutrition guidelines  Patient educated and handouts provided    Surgical changes to stomach / GI  Capacity of post-surgery stomach  Diet progression  Adequate hydration  Sugar and fat restriction to decrease "dumping syndrome"  Exercise  Suggestions for pre-op diet  Nutrition considerations after surgery  Protein supplements  Meal planning and preparation  Appropriate carbohydrate, protein, and fat intake, and food/fluid choices to maximize safe weight loss, nutrient intake, and tolerance   Dietary and lifestyle changes  Possible problems with poor eating habits  Intuitive eating  Techniques for self monitoring and keeping daily food journal  Potential for food intolerance after surgery, and ways to deal with them including: lactose intolerance, nausea, reflux, vomiting, diarrhea, food intolerance, appetite changes, gas  Vitamin / Mineral supplementation of Multivitamin with minerals and Vitamin D    Education provided to: patient and family    Barriers to learning: No barriers identified    Readiness to change: preparation    Comprehension: demonstrated understanding     Expected Compliance: good    Goals  Eliminate sugar sweetened beverages, Food journal, Exercise 30 minutes 5 times per week, Complete lession plans 1-6, Eat 3 meals per day and Eliminate mindless snacking     Pt is to have a protein shake for breakfast, with lunch and dinner being 1 serving of protein, 1 serving of carbohydrate, and 1 serving of non-starchy vegetables  All snacks are to be protein shakes  Provided pt with education and information on measuring foods and portion sizing, as well as how to pick a protein shake  Pt is to return in 1 week for a weight check and to meet with the RD and SW      Time Spent:   1 Hour

## 2018-05-17 ENCOUNTER — OFFICE VISIT (OUTPATIENT)
Dept: BARIATRICS | Facility: CLINIC | Age: 53
End: 2018-05-17
Payer: MEDICARE

## 2018-05-17 ENCOUNTER — ANESTHESIA EVENT (OUTPATIENT)
Dept: PERIOP | Facility: HOSPITAL | Age: 53
DRG: 620 | End: 2018-05-17
Payer: MEDICARE

## 2018-05-17 VITALS
HEIGHT: 65 IN | BODY MASS INDEX: 51.98 KG/M2 | SYSTOLIC BLOOD PRESSURE: 132 MMHG | HEART RATE: 86 BPM | WEIGHT: 312 LBS | DIASTOLIC BLOOD PRESSURE: 78 MMHG | TEMPERATURE: 97.1 F

## 2018-05-17 DIAGNOSIS — E66.01 MORBID (SEVERE) OBESITY DUE TO EXCESS CALORIES (HCC): Primary | ICD-10-CM

## 2018-05-17 PROCEDURE — 99214 OFFICE O/P EST MOD 30 MIN: CPT | Performed by: SURGERY

## 2018-05-17 NOTE — PROGRESS NOTES
BARIATRIC HISTORY AND PHYSICAL - BARIATRIC SURGERY  Lisa Rogers Sr  46 y o  male MRN: [id number]  Unit/Bed#:  Encounter: [id number]      HPI:  Emily Dale  is a 46 y o  male who presents to review her preoperative workup and see if she is a good candidate to undergo a bariatric procedure    Review of Systems   Constitutional: Negative  HENT: Negative  Eyes: Negative  Respiratory: Negative  Cardiovascular: Negative  Gastrointestinal: Negative  Endocrine: Negative  Genitourinary: Negative  Musculoskeletal: Negative  Skin: Negative  Neurological: Negative  Hematological: Negative  Psychiatric/Behavioral: Negative  Historical Information   Past Medical History:   Diagnosis Date    CPAP (continuous positive airway pressure) dependence     Esophageal varices (HCC)     Hepatic cirrhosis (HCC)     treated with harvoni    Hepatic encephalopathy (HCC)     Obesity     Pneumonia     in past    Sleep apnea     Wears glasses      Past Surgical History:   Procedure Laterality Date    COLONOSCOPY      ESOPHAGOGASTRODUODENOSCOPY N/A 4/12/2018    Procedure: ESOPHAGOGASTRODUODENOSCOPY (EGD); Surgeon: Henok Jensen MD;  [facility];   Service: Gastroenterology    FRACTURE SURGERY Left     ankle    OTHER SURGICAL HISTORY      banding esophageal varices     Social History   History   Alcohol Use No     History   Drug Use No     History   Smoking Status    Former Smoker    Types: Cigarettes    Quit date: 2/15/2018   Smokeless Tobacco    Never Used     Family History: non-contributory    Meds/Allergies   all medications and allergies reviewed  No Known Allergies    Objective     Current Vitals:   Blood Pressure: 132/78 (05/17/18 1359)  Pulse: 86 (05/17/18 1359)  Temperature: (!) 97 1 °F (36 2 °C) (05/17/18 1359)  Height: 5' 5" (165 1 cm) (05/17/18 1359)  Weight - Scale: (!) 142 kg (312 lb) (05/17/18 1359)  bowel movement  [unfilled]    Invasive Devices No matching active lines, drains, or airways          Physical Exam   Constitutional: He is oriented to person, place, and time  He appears well-developed  HENT:   Head: Normocephalic and atraumatic  Eyes: Conjunctivae and EOM are normal    Neck: Normal range of motion  Neck supple  Cardiovascular: Normal rate, regular rhythm, normal heart sounds and intact distal pulses  Pulmonary/Chest: Effort normal and breath sounds normal    Abdominal: Soft  Bowel sounds are normal    Musculoskeletal: Normal range of motion  Neurological: He is alert and oriented to person, place, and time  He has normal reflexes  Skin: Skin is warm and dry  Psychiatric: He has a normal mood and affect  Lab Results: I have personally reviewed pertinent lab results  Imaging: I have personally reviewed pertinent reports  EKG, Pathology, and Other Studies: I have personally reviewed pertinent reports  Code Status: [unfilled]  Advance Directive and Living Will:      Power of :    POLST:      Assessment/Plan:      The patient presented to review the preoperative workup and see if bariatric surgery is appropriate and indicated following the extensive preoperative workup and the enrollment in our weight loss program    Preoperative workup was complete  Results were reviewed with the patient including the blood work results and the endoscopy findings and the biopsy results  We also reviewed the cardiology evaluation  I believe that the patient will be a good candidate for laparoscopic sleeve gastrectomy  HOWEVER BECAUSE OF HIS HISTORY IS CONSIDERED HIGH RISK, PATIENT UNDERSTANDS RISKS AND BENEFITS INCLUDING BUT NOT LIMITED TO RISK OF POSTOPERATIVE ENCEPHALOPATHY AND BLEEDING  HISTORY OF HEPATITIS C AND CIRRHOSIS HISTORY OF ESOPHAGEAL VARICEAL BANDING MELD SCORE IS 15, RECENT CT SCAN SHOWED NO EVIDENCE OF ASCITES BUT THERE IS EVIDENCE OF CIRRHOSIS AND SPLENOMEGALY    PATIENT IS TRYING TO GET ENROLLED ON A TRANSPLANT LIST AT [facility]  Patient will need to start the 2 week liquid diet prior to surgery  Risks and benefits explained one more time to the patient  Alternatives to surgery and alternative forms of surgery were also explained  Post-surgical commitment and after care programs were explained  We also discussed that the [employer] robot may be available to us to use on the day of the patient procedure  and that the procedure may be performed robotically  I discussed in details the advantages and disadvantages of this approach including the potential decrease in postoperative pain because of the remote center technology  I also mentioned the lack of strong evidence for  the use of robot in bariatric patients and the potential disadvantage to patients because of the prolonged operative time  Consent was signed  Questions were answered and concerns were addressed  Patient wishes to proceed  As per  [facility] guidelines, I had a discussion with the patient regarding his CODE STATUS in the perioperative period and the patient is level 1 or FULL CODE STATUS

## 2018-05-17 NOTE — ANESTHESIA PREPROCEDURE EVALUATION
Review of Systems/Medical History          Cardiovascular  Exercise tolerance: good,     Pulmonary  Smoker Keisha Andersen 2/2018) ex-smoker  Cumulative Pack Years: 25, Sleep apnea CPAP,        GI/Hepatic    GERD , Liver disease (as per GI- currently well compensated  MELDD score also increased due to Gilbert's syndrome) , cirrhosis, Hepatitis (cleared after Harvoni treatment) C,   Comment: History of esophageal varices- banded  Pt states he is under going bariatric surgery to be placed on liver transplant list     Negative  ROS        Endo/Other    Obesity  super morbid obesity   GYN  Negative gynecology ROS          Hematology  Negative hematology ROS      Musculoskeletal  Negative musculoskeletal ROS        Neurology  Negative neurology ROS     Comment: History of hepatic encephalopathy Psychology   Negative psychology ROS              Physical Exam    Airway    Mallampati score: III  TM Distance: >3 FB  Neck ROM: full     Dental       Cardiovascular  Rhythm: regular, Rate: normal,     Pulmonary  Breath sounds clear to auscultation,     Other Findings        Anesthesia Plan  ASA Score- 4     Anesthesia Type- general with ASA Monitors  Additional Monitors:   Airway Plan: ETT  Comment: T/C      2 units PRBC  2 units FFP    2 nd PIV after induction   Plan Factors-    Induction- intravenous  Postoperative Plan-     Informed Consent- Anesthetic plan and risks discussed with patient

## 2018-05-18 RX ORDER — OMEPRAZOLE 20 MG/1
20 CAPSULE, DELAYED RELEASE ORAL DAILY
Qty: 90 CAPSULE | Refills: 3 | Status: SHIPPED | OUTPATIENT
Start: 2018-05-18 | End: 2019-10-24 | Stop reason: SDUPTHER

## 2018-05-18 RX ORDER — OXYCODONE HYDROCHLORIDE AND ACETAMINOPHEN 5; 325 MG/1; MG/1
1 TABLET ORAL EVERY 4 HOURS PRN
Qty: 30 TABLET | Refills: 0 | Status: SHIPPED | OUTPATIENT
Start: 2018-05-18 | End: 2018-05-23

## 2018-05-31 ENCOUNTER — TELEPHONE (OUTPATIENT)
Dept: BARIATRICS | Facility: CLINIC | Age: 53
End: 2018-05-31

## 2018-06-05 ENCOUNTER — HOSPITAL ENCOUNTER (INPATIENT)
Facility: HOSPITAL | Age: 53
LOS: 1 days | Discharge: HOME | DRG: 620 | End: 2018-06-06
Attending: SURGERY | Admitting: SURGERY
Payer: MEDICARE

## 2018-06-05 ENCOUNTER — ANESTHESIA (OUTPATIENT)
Dept: PERIOP | Facility: HOSPITAL | Age: 53
DRG: 620 | End: 2018-06-05
Payer: MEDICARE

## 2018-06-05 DIAGNOSIS — I85.10 SECONDARY ESOPHAGEAL VARICES WITHOUT BLEEDING (HCC): ICD-10-CM

## 2018-06-05 DIAGNOSIS — E66.01 MORBID OBESITY (HCC): ICD-10-CM

## 2018-06-05 DIAGNOSIS — G47.33 OBSTRUCTIVE SLEEP APNEA: ICD-10-CM

## 2018-06-05 DIAGNOSIS — K74.60 HEPATIC CIRRHOSIS DUE TO CHRONIC HEPATITIS C INFECTION (HCC): Primary | ICD-10-CM

## 2018-06-05 DIAGNOSIS — Z98.84 S/P LAPAROSCOPIC SLEEVE GASTRECTOMY: ICD-10-CM

## 2018-06-05 DIAGNOSIS — B18.2 HEPATIC CIRRHOSIS DUE TO CHRONIC HEPATITIS C INFECTION (HCC): Primary | ICD-10-CM

## 2018-06-05 DIAGNOSIS — K72.90 HEPATIC ENCEPHALOPATHY (HCC): ICD-10-CM

## 2018-06-05 LAB
ABO GROUP BLD: NORMAL
ALBUMIN SERPL BCP-MCNC: 3.1 G/DL (ref 3.5–5)
ALBUMIN SERPL BCP-MCNC: 3.1 G/DL (ref 3.5–5)
ALP SERPL-CCNC: 103 U/L (ref 46–116)
ALP SERPL-CCNC: 96 U/L (ref 46–116)
ALT SERPL W P-5'-P-CCNC: 33 U/L (ref 12–78)
ALT SERPL W P-5'-P-CCNC: 34 U/L (ref 12–78)
ANION GAP SERPL CALCULATED.3IONS-SCNC: 11 MMOL/L (ref 4–13)
ANION GAP SERPL CALCULATED.3IONS-SCNC: 7 MMOL/L (ref 4–13)
APTT PPP: 37 SECONDS (ref 24–36)
AST SERPL W P-5'-P-CCNC: 44 U/L (ref 5–45)
AST SERPL W P-5'-P-CCNC: 44 U/L (ref 5–45)
BASOPHILS # BLD AUTO: 0.01 THOUSANDS/ΜL (ref 0–0.1)
BASOPHILS NFR BLD AUTO: 0 % (ref 0–1)
BILIRUB SERPL-MCNC: 3.58 MG/DL (ref 0.2–1)
BILIRUB SERPL-MCNC: 4.2 MG/DL (ref 0.2–1)
BLD GP AB SCN SERPL QL: NEGATIVE
BUN SERPL-MCNC: 7 MG/DL (ref 5–25)
BUN SERPL-MCNC: 8 MG/DL (ref 5–25)
CALCIUM SERPL-MCNC: 8.2 MG/DL (ref 8.3–10.1)
CALCIUM SERPL-MCNC: 8.9 MG/DL (ref 8.3–10.1)
CHLORIDE SERPL-SCNC: 106 MMOL/L (ref 100–108)
CHLORIDE SERPL-SCNC: 106 MMOL/L (ref 100–108)
CO2 SERPL-SCNC: 24 MMOL/L (ref 21–32)
CO2 SERPL-SCNC: 28 MMOL/L (ref 21–32)
CREAT SERPL-MCNC: 0.71 MG/DL (ref 0.6–1.3)
CREAT SERPL-MCNC: 0.72 MG/DL (ref 0.6–1.3)
EOSINOPHIL # BLD AUTO: 0.05 THOUSAND/ΜL (ref 0–0.61)
EOSINOPHIL NFR BLD AUTO: 1 % (ref 0–6)
ERYTHROCYTE [DISTWIDTH] IN BLOOD BY AUTOMATED COUNT: 15.5 % (ref 11.6–15.1)
ERYTHROCYTE [DISTWIDTH] IN BLOOD BY AUTOMATED COUNT: 15.7 % (ref 11.6–15.1)
GFR SERPL CREATININE-BSD FRML MDRD: 106 ML/MIN/1.73SQ M
GFR SERPL CREATININE-BSD FRML MDRD: 106 ML/MIN/1.73SQ M
GLUCOSE P FAST SERPL-MCNC: 100 MG/DL (ref 65–99)
GLUCOSE SERPL-MCNC: 100 MG/DL (ref 65–140)
GLUCOSE SERPL-MCNC: 122 MG/DL (ref 65–140)
HCT VFR BLD AUTO: 34.5 % (ref 36.5–49.3)
HCT VFR BLD AUTO: 36.5 % (ref 36.5–49.3)
HGB BLD-MCNC: 12 G/DL (ref 12–17)
HGB BLD-MCNC: 12.7 G/DL (ref 12–17)
INR PPP: 1.51 (ref 0.86–1.17)
LYMPHOCYTES # BLD AUTO: 0.61 THOUSANDS/ΜL (ref 0.6–4.47)
LYMPHOCYTES NFR BLD AUTO: 13 % (ref 14–44)
MCH RBC QN AUTO: 33.4 PG (ref 26.8–34.3)
MCH RBC QN AUTO: 33.8 PG (ref 26.8–34.3)
MCHC RBC AUTO-ENTMCNC: 34.8 G/DL (ref 31.4–37.4)
MCHC RBC AUTO-ENTMCNC: 34.8 G/DL (ref 31.4–37.4)
MCV RBC AUTO: 96 FL (ref 82–98)
MCV RBC AUTO: 97 FL (ref 82–98)
MONOCYTES # BLD AUTO: 0.52 THOUSAND/ΜL (ref 0.17–1.22)
MONOCYTES NFR BLD AUTO: 11 % (ref 4–12)
NEUTROPHILS # BLD AUTO: 3.37 THOUSANDS/ΜL (ref 1.85–7.62)
NEUTS SEG NFR BLD AUTO: 74 % (ref 43–75)
PLATELET # BLD AUTO: 84 THOUSANDS/UL (ref 149–390)
PLATELET # BLD AUTO: 90 THOUSANDS/UL (ref 149–390)
PMV BLD AUTO: 10.8 FL (ref 8.9–12.7)
PMV BLD AUTO: 11.3 FL (ref 8.9–12.7)
POTASSIUM SERPL-SCNC: 3.2 MMOL/L (ref 3.5–5.3)
POTASSIUM SERPL-SCNC: 3.6 MMOL/L (ref 3.5–5.3)
PROT SERPL-MCNC: 5.8 G/DL (ref 6.4–8.2)
PROT SERPL-MCNC: 6 G/DL (ref 6.4–8.2)
PROTHROMBIN TIME: 18.3 SECONDS (ref 11.8–14.2)
RBC # BLD AUTO: 3.59 MILLION/UL (ref 3.88–5.62)
RBC # BLD AUTO: 3.76 MILLION/UL (ref 3.88–5.62)
RH BLD: POSITIVE
SODIUM SERPL-SCNC: 141 MMOL/L (ref 136–145)
SODIUM SERPL-SCNC: 141 MMOL/L (ref 136–145)
SPECIMEN EXPIRATION DATE: NORMAL
WBC # BLD AUTO: 4.33 THOUSAND/UL (ref 4.31–10.16)
WBC # BLD AUTO: 4.56 THOUSAND/UL (ref 4.31–10.16)

## 2018-06-05 PROCEDURE — 80053 COMPREHEN METABOLIC PANEL: CPT | Performed by: SURGERY

## 2018-06-05 PROCEDURE — 85025 COMPLETE CBC W/AUTO DIFF WBC: CPT | Performed by: SURGERY

## 2018-06-05 PROCEDURE — 86900 BLOOD TYPING SEROLOGIC ABO: CPT | Performed by: ANESTHESIOLOGY

## 2018-06-05 PROCEDURE — PBAPASSIST PB AP ASSIST PLACEHOLDER: Performed by: PHYSICIAN ASSISTANT

## 2018-06-05 PROCEDURE — 86901 BLOOD TYPING SEROLOGIC RH(D): CPT | Performed by: ANESTHESIOLOGY

## 2018-06-05 PROCEDURE — 85610 PROTHROMBIN TIME: CPT | Performed by: SURGERY

## 2018-06-05 PROCEDURE — 85730 THROMBOPLASTIN TIME PARTIAL: CPT | Performed by: SURGERY

## 2018-06-05 PROCEDURE — 88307 TISSUE EXAM BY PATHOLOGIST: CPT | Performed by: PATHOLOGY

## 2018-06-05 PROCEDURE — 86927 PLASMA FRESH FROZEN: CPT

## 2018-06-05 PROCEDURE — 85027 COMPLETE CBC AUTOMATED: CPT | Performed by: SURGERY

## 2018-06-05 PROCEDURE — 86920 COMPATIBILITY TEST SPIN: CPT

## 2018-06-05 PROCEDURE — C9113 INJ PANTOPRAZOLE SODIUM, VIA: HCPCS | Performed by: SURGERY

## 2018-06-05 PROCEDURE — 0DB64Z3 EXCISION OF STOMACH, PERCUTANEOUS ENDOSCOPIC APPROACH, VERTICAL: ICD-10-PCS | Performed by: SURGERY

## 2018-06-05 PROCEDURE — 99221 1ST HOSP IP/OBS SF/LOW 40: CPT | Performed by: NURSE PRACTITIONER

## 2018-06-05 PROCEDURE — 88342 IMHCHEM/IMCYTCHM 1ST ANTB: CPT | Performed by: PATHOLOGY

## 2018-06-05 PROCEDURE — P9017 PLASMA 1 DONOR FRZ W/IN 8 HR: HCPCS

## 2018-06-05 PROCEDURE — 86850 RBC ANTIBODY SCREEN: CPT | Performed by: ANESTHESIOLOGY

## 2018-06-05 PROCEDURE — 43775 LAP SLEEVE GASTRECTOMY: CPT | Performed by: SURGERY

## 2018-06-05 RX ORDER — OXYCODONE HCL 5 MG/5 ML
10 SOLUTION, ORAL ORAL EVERY 4 HOURS PRN
Status: DISCONTINUED | OUTPATIENT
Start: 2018-06-05 | End: 2018-06-06 | Stop reason: HOSPADM

## 2018-06-05 RX ORDER — ACETAMINOPHEN 160 MG/5ML
325 SUSPENSION, ORAL (FINAL DOSE FORM) ORAL EVERY 4 HOURS PRN
Status: DISCONTINUED | OUTPATIENT
Start: 2018-06-05 | End: 2018-06-06 | Stop reason: HOSPADM

## 2018-06-05 RX ORDER — METOPROLOL TARTRATE 5 MG/5ML
5 INJECTION INTRAVENOUS EVERY 6 HOURS
Status: DISCONTINUED | OUTPATIENT
Start: 2018-06-05 | End: 2018-06-06 | Stop reason: HOSPADM

## 2018-06-05 RX ORDER — ONDANSETRON 2 MG/ML
4 INJECTION INTRAMUSCULAR; INTRAVENOUS EVERY 6 HOURS PRN
Status: DISCONTINUED | OUTPATIENT
Start: 2018-06-05 | End: 2018-06-06 | Stop reason: HOSPADM

## 2018-06-05 RX ORDER — FUROSEMIDE 10 MG/ML
10 INJECTION INTRAMUSCULAR; INTRAVENOUS
Status: DISCONTINUED | OUTPATIENT
Start: 2018-06-05 | End: 2018-06-06 | Stop reason: HOSPADM

## 2018-06-05 RX ORDER — FENTANYL CITRATE/PF 50 MCG/ML
50 SYRINGE (ML) INJECTION
Status: DISCONTINUED | OUTPATIENT
Start: 2018-06-05 | End: 2018-06-05 | Stop reason: HOSPADM

## 2018-06-05 RX ORDER — MORPHINE SULFATE 2 MG/ML
2 INJECTION, SOLUTION INTRAMUSCULAR; INTRAVENOUS EVERY 2 HOUR PRN
Status: DISCONTINUED | OUTPATIENT
Start: 2018-06-05 | End: 2018-06-06 | Stop reason: HOSPADM

## 2018-06-05 RX ORDER — EPHEDRINE SULFATE 50 MG/ML
INJECTION, SOLUTION INTRAVENOUS AS NEEDED
Status: DISCONTINUED | OUTPATIENT
Start: 2018-06-05 | End: 2018-06-05 | Stop reason: SURG

## 2018-06-05 RX ORDER — OXYCODONE HCL 5 MG/5 ML
5 SOLUTION, ORAL ORAL EVERY 4 HOURS PRN
Status: DISCONTINUED | OUTPATIENT
Start: 2018-06-05 | End: 2018-06-06 | Stop reason: HOSPADM

## 2018-06-05 RX ORDER — LACTULOSE 20 G/30ML
10 SOLUTION ORAL DAILY PRN
Status: DISCONTINUED | OUTPATIENT
Start: 2018-06-05 | End: 2018-06-06 | Stop reason: HOSPADM

## 2018-06-05 RX ORDER — BUPIVACAINE HYDROCHLORIDE AND EPINEPHRINE 5; 5 MG/ML; UG/ML
INJECTION, SOLUTION PERINEURAL AS NEEDED
Status: DISCONTINUED | OUTPATIENT
Start: 2018-06-05 | End: 2018-06-05 | Stop reason: HOSPADM

## 2018-06-05 RX ORDER — ONDANSETRON 2 MG/ML
4 INJECTION INTRAMUSCULAR; INTRAVENOUS ONCE AS NEEDED
Status: DISCONTINUED | OUTPATIENT
Start: 2018-06-05 | End: 2018-06-05 | Stop reason: HOSPADM

## 2018-06-05 RX ORDER — GLYCOPYRROLATE 0.2 MG/ML
INJECTION INTRAMUSCULAR; INTRAVENOUS AS NEEDED
Status: DISCONTINUED | OUTPATIENT
Start: 2018-06-05 | End: 2018-06-05 | Stop reason: SURG

## 2018-06-05 RX ORDER — METOCLOPRAMIDE HYDROCHLORIDE 5 MG/ML
5 INJECTION INTRAMUSCULAR; INTRAVENOUS EVERY 6 HOURS SCHEDULED
Status: DISCONTINUED | OUTPATIENT
Start: 2018-06-05 | End: 2018-06-06 | Stop reason: HOSPADM

## 2018-06-05 RX ORDER — SODIUM CHLORIDE 9 MG/ML
INJECTION, SOLUTION INTRAVENOUS CONTINUOUS PRN
Status: DISCONTINUED | OUTPATIENT
Start: 2018-06-05 | End: 2018-06-05 | Stop reason: SURG

## 2018-06-05 RX ORDER — AMILORIDE HYDROCHLORIDE 5 MG/1
10 TABLET ORAL 2 TIMES DAILY
Status: DISCONTINUED | OUTPATIENT
Start: 2018-06-05 | End: 2018-06-06 | Stop reason: HOSPADM

## 2018-06-05 RX ORDER — FENTANYL CITRATE 50 UG/ML
INJECTION, SOLUTION INTRAMUSCULAR; INTRAVENOUS AS NEEDED
Status: DISCONTINUED | OUTPATIENT
Start: 2018-06-05 | End: 2018-06-05 | Stop reason: SURG

## 2018-06-05 RX ORDER — MORPHINE SULFATE 4 MG/ML
4 INJECTION, SOLUTION INTRAMUSCULAR; INTRAVENOUS EVERY 2 HOUR PRN
Status: DISCONTINUED | OUTPATIENT
Start: 2018-06-05 | End: 2018-06-06 | Stop reason: HOSPADM

## 2018-06-05 RX ORDER — PANTOPRAZOLE SODIUM 40 MG/1
40 INJECTION, POWDER, FOR SOLUTION INTRAVENOUS EVERY 12 HOURS SCHEDULED
Status: DISCONTINUED | OUTPATIENT
Start: 2018-06-05 | End: 2018-06-06 | Stop reason: HOSPADM

## 2018-06-05 RX ORDER — ONDANSETRON 2 MG/ML
INJECTION INTRAMUSCULAR; INTRAVENOUS AS NEEDED
Status: DISCONTINUED | OUTPATIENT
Start: 2018-06-05 | End: 2018-06-05 | Stop reason: SURG

## 2018-06-05 RX ORDER — ROCURONIUM BROMIDE 10 MG/ML
INJECTION, SOLUTION INTRAVENOUS AS NEEDED
Status: DISCONTINUED | OUTPATIENT
Start: 2018-06-05 | End: 2018-06-05 | Stop reason: SURG

## 2018-06-05 RX ORDER — SODIUM CHLORIDE, SODIUM LACTATE, POTASSIUM CHLORIDE, CALCIUM CHLORIDE 600; 310; 30; 20 MG/100ML; MG/100ML; MG/100ML; MG/100ML
50 INJECTION, SOLUTION INTRAVENOUS CONTINUOUS
Status: DISCONTINUED | OUTPATIENT
Start: 2018-06-05 | End: 2018-06-06 | Stop reason: HOSPADM

## 2018-06-05 RX ORDER — SODIUM CHLORIDE 9 MG/ML
125 INJECTION, SOLUTION INTRAVENOUS CONTINUOUS
Status: DISCONTINUED | OUTPATIENT
Start: 2018-06-05 | End: 2018-06-05 | Stop reason: HOSPADM

## 2018-06-05 RX ORDER — PROPOFOL 10 MG/ML
INJECTION, EMULSION INTRAVENOUS AS NEEDED
Status: DISCONTINUED | OUTPATIENT
Start: 2018-06-05 | End: 2018-06-05 | Stop reason: SURG

## 2018-06-05 RX ORDER — HYDRALAZINE HYDROCHLORIDE 20 MG/ML
10 INJECTION INTRAMUSCULAR; INTRAVENOUS EVERY 6 HOURS PRN
Status: DISCONTINUED | OUTPATIENT
Start: 2018-06-05 | End: 2018-06-06 | Stop reason: HOSPADM

## 2018-06-05 RX ORDER — MIDAZOLAM HYDROCHLORIDE 1 MG/ML
INJECTION INTRAMUSCULAR; INTRAVENOUS AS NEEDED
Status: DISCONTINUED | OUTPATIENT
Start: 2018-06-05 | End: 2018-06-05 | Stop reason: SURG

## 2018-06-05 RX ADMIN — MORPHINE SULFATE 4 MG: 4 INJECTION INTRAVENOUS at 18:46

## 2018-06-05 RX ADMIN — FENTANYL CITRATE 50 MCG: 50 INJECTION INTRAMUSCULAR; INTRAVENOUS at 16:43

## 2018-06-05 RX ADMIN — PANTOPRAZOLE SODIUM 40 MG: 40 INJECTION, POWDER, FOR SOLUTION INTRAVENOUS at 20:40

## 2018-06-05 RX ADMIN — HYDROMORPHONE HYDROCHLORIDE 0.5 MG: 1 INJECTION, SOLUTION INTRAMUSCULAR; INTRAVENOUS; SUBCUTANEOUS at 15:35

## 2018-06-05 RX ADMIN — METOPROLOL TARTRATE 5 MG: 5 INJECTION, SOLUTION INTRAVENOUS at 19:22

## 2018-06-05 RX ADMIN — FENTANYL CITRATE 50 MCG: 50 INJECTION INTRAMUSCULAR; INTRAVENOUS at 17:05

## 2018-06-05 RX ADMIN — CEFAZOLIN SODIUM 2000 MG: 10 INJECTION, POWDER, FOR SOLUTION INTRAVENOUS at 22:10

## 2018-06-05 RX ADMIN — FENTANYL CITRATE 50 MCG: 50 INJECTION, SOLUTION INTRAMUSCULAR; INTRAVENOUS at 15:32

## 2018-06-05 RX ADMIN — EPHEDRINE SULFATE 10 MG: 50 INJECTION, SOLUTION INTRAMUSCULAR; INTRAVENOUS; SUBCUTANEOUS at 14:29

## 2018-06-05 RX ADMIN — ROCURONIUM BROMIDE 10 MG: 10 INJECTION INTRAVENOUS at 14:41

## 2018-06-05 RX ADMIN — SODIUM CHLORIDE, SODIUM LACTATE, POTASSIUM CHLORIDE, AND CALCIUM CHLORIDE 125 ML/HR: .6; .31; .03; .02 INJECTION, SOLUTION INTRAVENOUS at 17:21

## 2018-06-05 RX ADMIN — SODIUM CHLORIDE: 0.9 INJECTION, SOLUTION INTRAVENOUS at 13:57

## 2018-06-05 RX ADMIN — FENTANYL CITRATE 25 MCG: 50 INJECTION, SOLUTION INTRAMUSCULAR; INTRAVENOUS at 14:31

## 2018-06-05 RX ADMIN — FENTANYL CITRATE 100 MCG: 50 INJECTION, SOLUTION INTRAMUSCULAR; INTRAVENOUS at 13:52

## 2018-06-05 RX ADMIN — FENTANYL CITRATE 50 MCG: 50 INJECTION INTRAMUSCULAR; INTRAVENOUS at 16:57

## 2018-06-05 RX ADMIN — SODIUM CHLORIDE 125 ML/HR: 0.9 INJECTION, SOLUTION INTRAVENOUS at 12:19

## 2018-06-05 RX ADMIN — PROPOFOL 250 MG: 10 INJECTION, EMULSION INTRAVENOUS at 13:52

## 2018-06-05 RX ADMIN — FENTANYL CITRATE 50 MCG: 50 INJECTION, SOLUTION INTRAMUSCULAR; INTRAVENOUS at 14:55

## 2018-06-05 RX ADMIN — ONDANSETRON HYDROCHLORIDE 4 MG: 2 INJECTION, SOLUTION INTRAVENOUS at 14:56

## 2018-06-05 RX ADMIN — FUROSEMIDE 10 MG: 10 INJECTION, SOLUTION INTRAMUSCULAR; INTRAVENOUS at 18:50

## 2018-06-05 RX ADMIN — FENTANYL CITRATE 50 MCG: 50 INJECTION INTRAMUSCULAR; INTRAVENOUS at 16:30

## 2018-06-05 RX ADMIN — CEFAZOLIN SODIUM 2000 MG: 1 SOLUTION INTRAVENOUS at 13:51

## 2018-06-05 RX ADMIN — NEOSTIGMINE METHYLSULFATE 3 MG: 1 INJECTION, SOLUTION INTRAMUSCULAR; INTRAVENOUS; SUBCUTANEOUS at 15:20

## 2018-06-05 RX ADMIN — MIDAZOLAM 2 MG: 1 INJECTION INTRAMUSCULAR; INTRAVENOUS at 13:42

## 2018-06-05 RX ADMIN — FENTANYL CITRATE 50 MCG: 50 INJECTION, SOLUTION INTRAMUSCULAR; INTRAVENOUS at 15:40

## 2018-06-05 RX ADMIN — FENTANYL CITRATE 25 MCG: 50 INJECTION, SOLUTION INTRAMUSCULAR; INTRAVENOUS at 14:34

## 2018-06-05 RX ADMIN — ACETAMINOPHEN 325 MG: 160 SUSPENSION ORAL at 20:34

## 2018-06-05 RX ADMIN — METOCLOPRAMIDE 5 MG: 5 INJECTION, SOLUTION INTRAMUSCULAR; INTRAVENOUS at 18:49

## 2018-06-05 RX ADMIN — ONDANSETRON 4 MG: 2 INJECTION INTRAMUSCULAR; INTRAVENOUS at 19:20

## 2018-06-05 RX ADMIN — OXYCODONE HYDROCHLORIDE 10 MG: 5 SOLUTION ORAL at 20:35

## 2018-06-05 RX ADMIN — EPHEDRINE SULFATE 5 MG: 50 INJECTION, SOLUTION INTRAMUSCULAR; INTRAVENOUS; SUBCUTANEOUS at 14:40

## 2018-06-05 RX ADMIN — GLYCOPYRROLATE 0.6 MG: 0.2 INJECTION, SOLUTION INTRAMUSCULAR; INTRAVENOUS at 15:20

## 2018-06-05 RX ADMIN — ROCURONIUM BROMIDE 50 MG: 10 INJECTION INTRAVENOUS at 13:52

## 2018-06-05 NOTE — H&P (VIEW-ONLY)
BARIATRIC HISTORY AND PHYSICAL - BARIATRIC SURGERY  Radu Dacosta Sr  46 y o  male MRN: [id number]  Unit/Bed#:  Encounter: [id number]      HPI:  Marcelle Ridley  is a 46 y o  male who presents to review her preoperative workup and see if she is a good candidate to undergo a bariatric procedure    Review of Systems   Constitutional: Negative  HENT: Negative  Eyes: Negative  Respiratory: Negative  Cardiovascular: Negative  Gastrointestinal: Negative  Endocrine: Negative  Genitourinary: Negative  Musculoskeletal: Negative  Skin: Negative  Neurological: Negative  Hematological: Negative  Psychiatric/Behavioral: Negative  Historical Information   Past Medical History:   Diagnosis Date    CPAP (continuous positive airway pressure) dependence     Esophageal varices (HCC)     Hepatic cirrhosis (HCC)     treated with harvoni    Hepatic encephalopathy (HCC)     Obesity     Pneumonia     in past    Sleep apnea     Wears glasses      Past Surgical History:   Procedure Laterality Date    COLONOSCOPY      ESOPHAGOGASTRODUODENOSCOPY N/A 4/12/2018    Procedure: ESOPHAGOGASTRODUODENOSCOPY (EGD); Surgeon: Yogi Larose MD;  [facility];   Service: Gastroenterology    FRACTURE SURGERY Left     ankle    OTHER SURGICAL HISTORY      banding esophageal varices     Social History   History   Alcohol Use No     History   Drug Use No     History   Smoking Status    Former Smoker    Types: Cigarettes    Quit date: 2/15/2018   Smokeless Tobacco    Never Used     Family History: non-contributory    Meds/Allergies   all medications and allergies reviewed  No Known Allergies    Objective     Current Vitals:   Blood Pressure: 132/78 (05/17/18 1359)  Pulse: 86 (05/17/18 1359)  Temperature: (!) 97 1 °F (36 2 °C) (05/17/18 1359)  Height: 5' 5" (165 1 cm) (05/17/18 1359)  Weight - Scale: (!) 142 kg (312 lb) (05/17/18 1359)  bowel movement  [unfilled]    Invasive Devices No matching active lines, drains, or airways          Physical Exam   Constitutional: He is oriented to person, place, and time  He appears well-developed  HENT:   Head: Normocephalic and atraumatic  Eyes: Conjunctivae and EOM are normal    Neck: Normal range of motion  Neck supple  Cardiovascular: Normal rate, regular rhythm, normal heart sounds and intact distal pulses  Pulmonary/Chest: Effort normal and breath sounds normal    Abdominal: Soft  Bowel sounds are normal    Musculoskeletal: Normal range of motion  Neurological: He is alert and oriented to person, place, and time  He has normal reflexes  Skin: Skin is warm and dry  Psychiatric: He has a normal mood and affect  Lab Results: I have personally reviewed pertinent lab results  Imaging: I have personally reviewed pertinent reports  EKG, Pathology, and Other Studies: I have personally reviewed pertinent reports  Code Status: [unfilled]  Advance Directive and Living Will:      Power of :    POLST:      Assessment/Plan:      The patient presented to review the preoperative workup and see if bariatric surgery is appropriate and indicated following the extensive preoperative workup and the enrollment in our weight loss program    Preoperative workup was complete  Results were reviewed with the patient including the blood work results and the endoscopy findings and the biopsy results  We also reviewed the cardiology evaluation  I believe that the patient will be a good candidate for laparoscopic sleeve gastrectomy  HOWEVER BECAUSE OF HIS HISTORY IS CONSIDERED HIGH RISK, PATIENT UNDERSTANDS RISKS AND BENEFITS INCLUDING BUT NOT LIMITED TO RISK OF POSTOPERATIVE ENCEPHALOPATHY AND BLEEDING  HISTORY OF HEPATITIS C AND CIRRHOSIS HISTORY OF ESOPHAGEAL VARICEAL BANDING MELD SCORE IS 15, RECENT CT SCAN SHOWED NO EVIDENCE OF ASCITES BUT THERE IS EVIDENCE OF CIRRHOSIS AND SPLENOMEGALY    PATIENT IS TRYING TO GET ENROLLED ON A TRANSPLANT LIST AT [facility]  Patient will need to start the 2 week liquid diet prior to surgery  Risks and benefits explained one more time to the patient  Alternatives to surgery and alternative forms of surgery were also explained  Post-surgical commitment and after care programs were explained  We also discussed that the [employer] robot may be available to us to use on the day of the patient procedure  and that the procedure may be performed robotically  I discussed in details the advantages and disadvantages of this approach including the potential decrease in postoperative pain because of the remote center technology  I also mentioned the lack of strong evidence for  the use of robot in bariatric patients and the potential disadvantage to patients because of the prolonged operative time  Consent was signed  Questions were answered and concerns were addressed  Patient wishes to proceed  As per  [address] [place] [address] guidelines, I had a discussion with the patient regarding his CODE STATUS in the perioperative period and the patient is level 1 or FULL CODE STATUS

## 2018-06-05 NOTE — ANESTHESIA POSTPROCEDURE EVALUATION
Post-Op Assessment Note      CV Status:  Stable    Mental Status:  Alert and awake    Hydration Status:  Euvolemic    PONV Controlled:  Controlled    Airway Patency:  Patent  Airway: intubated    Post Op Vitals Reviewed: Yes          Staff: Anesthesiologist           BP      Temp      Pulse 74 (06/05/18 1643)   Resp 16 (06/05/18 1643)    SpO2 95 % (06/05/18 1643)

## 2018-06-05 NOTE — OP NOTE
OPERATIVE REPORT  PATIENT NAME: Silverio Castillo Sr     :  1965  MRN: [id number]  Pt Location: [facility] OR ROOM 06    SURGERY DATE: 2018    Surgeon(s) and Role:     * Merry Santos MD - Primary     * Adrien Barragan PA-C - Assisting    Preop Diagnosis:  Morbid obesity ([facility] ) [E66 01]Body mass index is 51 24 kg/m²  Post-Op Diagnosis Codes:     * Morbid obesity ([facility] ) [E66 01]    Procedure(s) (LRB):  GASTRECTOMY LAPAROSCOPIC SLEEVE (N/A)    Specimen(s):  ID Type Source Tests Collected by Time Destination   1 : Portion of stomach Tissue Stomach TISSUE EXAM Merry Santos MD 2018 1510        Estimated Blood Loss:   Minimal    Drains:  [REMOVED] NG/OG/Enteral Tube Orogastric 18 Fr (Removed)   Number of days: 0       Anesthesia Type:   General    Operative Indications: Morbid obesity ([facility] ) [E66 01]      Operative Findings:  Liver cirrhosis  Hepatomegaly  Splenomegaly  Portal Hypertension    Complications:   None    Procedure and Technique:  Laparoscopic sleeve gastrectomy   I was present for the entire procedure    Patient Disposition:  hemodynamically stable     No qualified resident available to assist  The presence of an assistant was necessary for camera holding, traction and counter traction and for help with suturing and stapling in addition to performing the intraop-EGD      Indication:   Patient suffers from morbid obesity and associated co-morbidities  and failed to achieve any meaningful or sustainable weight loss and was therefore consented to undergo a laparoscopic sleeve gastrectomy  Risks and benefits were explained to the patient, patient consented for the procedure  Description of the procedure: The patient was brought to the operating room and placed in a supine position  The patient received a dose of IV antibiotics and a dose of subcutaneous Lovenox prior to the procedure  The patient was induced under general endotracheal anesthesia   The abdominal wall was prepped and draped under sterile conditions in the usual fashion  The procedure was started by obtaining access to the abdominal cavity using a Veress needle to the left side of the midline around 6 inches from the xiphoid the abdominal cavity was insufflated with CO2 to a pressure of 15 mmHg  After that, the abdomen was entered with a 12 mm trocar using an Optiview trocar under direct visualization  At that point, a 5-mm trocar and a 15-mm trocar were placed on the right side of the abdominal wall, under direct visualization, and two 12-mm trocars were placed on the left side of the abdominal wall, also under direct visualization  The patient was then placed in a reverse Trendelenburg position  A Arian retractor was placed through a small stab incision below the xiphoid and was used to retract the left lobe of the liver in a medial fashion  An OG tube was placed to decompress the stomach and then removed immediately  The angle of His was then dissected using the harmonic scalpel    At that point, we turned our attention to the pylorus and using the Harmonic scalpel, the gastrocolic ligament was taken down starting 4 cm proximal to the pylorus, all the way up to the level of the short gastric vessels which were taken down with the harmonic scalpel as well  The angle of His was also dissected and all the posterior attachments of the fundus were taken down with the Harmonic scalpel and with sharp dissection, the spleen was kept out of harms way and the left tami was exposed and the stomach was completely mobilized off the left tami and rotated medially  There were some posterior attachments to the posterior wall of the stomach and those were taken down sharply with laparoscopic scissors  At that point, the anesthesiologist was asked to insert a 36-Kazakh Bougie  The Bougie was secured in place by the anesthesiologist      We started transecting the stomach using a black cartridge reinforced with Seamguard   The first 2 firings consisted of black cartridge loads  The third firing was a purple load with Seamguard, and the rest of the firings consisted of purple loads as well reinforced with Seamguard  The 36-Thai Bougie was kept in place throughout the performance of the sleeve gastrectomy  We made particular attention during the transaction of the greater curvature to retract the stomach laterally at the site of the transected vessels  to prevent corkscrewing of the gastric sleeve  We also avoided getting too close to the incisura to prevent  narrowing at the level of the incisura  At the end, the anesthesiologist was asked to remove the [address]  Upper endoscopy was not performed given the history of esophageal varices  The stomach sleeve was then covered with an omental patch which was secured in place using a 2-0 Vicryl suture  We then removed the Vernadine Kuster liver retractor  The midline camera port was extended, and then it was dilated  After that, the stomach specimen was retrieved and sent to Pathology  Sponge count was completed and was correct  The camera port was then closed using #1 Vicryl in a simple fashion  The 15 mm trocar site was not closed because it appeared that it went thru the falciform ligament and was very close to the umbilical vein which was significantly dilated for fear of injury to the vein and causing bleeding  All the trocars were removed under direct visualization, and the skin edges were approximated using 4-0 Monocryl in an interrupted, inverted fashion  Histoacryl was then applied to the skin incisions  The patient was extubated and transferred to the ICU in stable condition           SIGNATURE: Claudia Meyers MD  DATE: June 5, 2018  TIME: 3:15 PM

## 2018-06-05 NOTE — CONSULTS
1 [facility] Tobias Sr  46 y o  male MRN: [id number]  Unit/Bed#: ICU 07 Encounter: [id number]    Physician Requesting Consult: Dr Deniz Wolfe  Reason for Consult: medical managment    Assessment/Plan:  1  Morbid obesity status post laparoscopic gastrectomy sleeve  1  Bariatric surgery managing patient  2  OOB as tolerated  3  Pain meds per surgery  2  Hepatic cirrhosis secondary to chronic hepatitis C   1  Treated with triple therapy including telaprevir  2  MELD score 15 in March per GI  3  Continue lactulose, xifaxan  4  Restart nadolol tomorrow  3  Secondary esophageal varices without bleeding   1  Continue Nadolol  4  History of hepatic encephalopathy  1  Currently awake and alert x3   2  Continue outpatient lactulose and xifaxin  5  Obstructive sleep apnea  1  CPAP at night      Critical Care Time:   Documented critical care time excludes any procedures documented elsewhere  It also excludes any family updates    _____________________________________________________________________      HPI:    Dot Patel  is a 46 y o  male with a past medical history significant for hepatitis C treated and cleared of the virus  He was admitted today for laparoscopic gastric sleeve due to morbid obesity  The patient was transferred to stepdown post operatively for medical management  Review of Systems:  abdominal pain  No chest pain, nausea, vomiting,   Full review of systems was performed  Aside from what was mentioned in the HPI, it is otherwise negative        Historical Information   Past Medical History:   Diagnosis Date    CPAP (continuous positive airway pressure) dependence     Esophageal varices (HCC)     Hepatic cirrhosis (HCC)     treated with harvoni   Hep C- dx age 17    Hepatic encephalopathy ([facility] )     Obesity     Pneumonia     in past    Sleep apnea     Wears glasses      Past Surgical History:   Procedure Laterality Date    COLONOSCOPY      ESOPHAGOGASTRODUODENOSCOPY N/A 4/12/2018    Procedure: ESOPHAGOGASTRODUODENOSCOPY (EGD); Surgeon: Jacey Flaherty MD;  Location: [facility]; Service: Gastroenterology    FRACTURE SURGERY Left     ankle    OTHER SURGICAL HISTORY      banding esophageal varices     Social History   History   Alcohol Use No     History   Drug Use No     History   Smoking Status    Former Smoker    Packs/day: 0 25    Years: 37 00    Types: Cigarettes    Quit date: 2/15/2018   Smokeless Tobacco    Never Used       Family History:   Family History   Problem Relation Age of Onset    Heart disease Mother     Lupus Mother     Diabetes Father     Stroke Father        Medications:  Pertinent medications were reviewed    Current Facility-Administered Medications:  lactated ringers 125 mL/hr Intravenous Continuous Nathaneil MD Marivel Last Rate: 125 mL/hr (06/05/18 1721)   sodium chloride 125 mL/hr Intravenous Continuous Laboni Marleni, DO Last Rate: Stopped (06/05/18 1720)         No Known Allergies      Vitals:   /64   Pulse 76   Temp 98 1 °F (36 7 °C)   Resp 16   Ht 5' 5" (1 651 m)   Wt (!) 140 kg (307 lb 14 4 oz)   SpO2 98%   BMI 51 24 kg/m²   Body mass index is 51 24 kg/m²    SpO2: 98 %,   SpO2 Activity: At Rest,   O2 Device: Nasal cannula      Intake/Output Summary (Last 24 hours) at 06/05/18 1807  Last data filed at 06/05/18 1720   Gross per 24 hour   Intake             2173 ml   Output               30 ml   Net             2143 ml     Invasive Devices     Peripheral Intravenous Line            Peripheral IV 06/05/18 Left Wrist less than 1 day    Peripheral IV 06/05/18 Right Hand less than 1 day          Arterial Line            Arterial Line 06/05/18 Left Radial less than 1 day                Physical Exam:  Gen: no acute distress  HEENT: NC/At PERRLA EOMI oropharynx moist  Neck: supple, no JVD, trachea midline, no adenopathy  Chest: CTA  Cor: Clear S1 and S2  Abd: Soft, mild tenderness, hypoactive bowel sounds  Ext: no edema  Neuro: A&Ox3, non-focal  Skin: W/D      Diagnostic Data:  Lab: I have personally reviewed pertinent lab results  ,   CBC:    Results from last 7 days  Lab Units 06/05/18  1648   WBC Thousand/uL 4 56   HEMOGLOBIN g/dL 12 0   HEMATOCRIT % 34 5*   PLATELETS Thousands/uL 84*      CMP: Lab Results   Component Value Date     06/05/2018    K 3 2 (L) 06/05/2018     06/05/2018    CO2 24 06/05/2018    ANIONGAP 11 06/05/2018    BUN 8 06/05/2018    CREATININE 0 71 06/05/2018    GLUCOSE 122 06/05/2018    CALCIUM 8 2 (L) 06/05/2018    AST 44 06/05/2018    ALT 33 06/05/2018    ALKPHOS 96 06/05/2018    PROT 6 0 (L) 06/05/2018    BILITOT 3 58 (H) 06/05/2018    EGFR 106 06/05/2018   ,   PT/INR:   Lab Results   Component Value Date    INR 1 51 (H) 06/05/2018   ,   Magnesium: No results found for: MAG,  Phosphorous: No results found for: PHOS    ABG: No results found for: PHART, POR3DPX, PO2ART, BYF9FDZ, L4BXWJHE, BEART, SOURCE,     Microbiology:        Imaging: I have personally reviewed the pertinent imaging studies on the PACS system  No images    Cardiac/EKG/telemetry/Echo:       NSR      VTE Prophylaxis: Sequential compression device Kathia Comber)     Code Status: No Order    KAILA Dixon    Portions of the record may have been created with voice recognition software  Occasional wrong word or "sound a like" substitutions may have occurred due to the inherent limitations of voice recognition software  Read the chart carefully and recognize, using context, where substitutions have occurred

## 2018-06-06 VITALS
TEMPERATURE: 98.2 F | BODY MASS INDEX: 52.48 KG/M2 | OXYGEN SATURATION: 93 % | SYSTOLIC BLOOD PRESSURE: 111 MMHG | RESPIRATION RATE: 22 BRPM | HEIGHT: 65 IN | WEIGHT: 315 LBS | DIASTOLIC BLOOD PRESSURE: 58 MMHG | HEART RATE: 64 BPM

## 2018-06-06 LAB
ABO GROUP BLD BPU: NORMAL
ABO GROUP BLD BPU: NORMAL
ALBUMIN SERPL BCP-MCNC: 3.1 G/DL (ref 3.5–5)
ALP SERPL-CCNC: 97 U/L (ref 46–116)
ALT SERPL W P-5'-P-CCNC: 33 U/L (ref 12–78)
ANION GAP SERPL CALCULATED.3IONS-SCNC: 5 MMOL/L (ref 4–13)
AST SERPL W P-5'-P-CCNC: 40 U/L (ref 5–45)
BILIRUB SERPL-MCNC: 3.19 MG/DL (ref 0.2–1)
BPU ID: NORMAL
BPU ID: NORMAL
BUN SERPL-MCNC: 8 MG/DL (ref 5–25)
CALCIUM SERPL-MCNC: 8.3 MG/DL (ref 8.3–10.1)
CHLORIDE SERPL-SCNC: 104 MMOL/L (ref 100–108)
CO2 SERPL-SCNC: 29 MMOL/L (ref 21–32)
CREAT SERPL-MCNC: 0.68 MG/DL (ref 0.6–1.3)
ERYTHROCYTE [DISTWIDTH] IN BLOOD BY AUTOMATED COUNT: 15.5 % (ref 11.6–15.1)
GFR SERPL CREATININE-BSD FRML MDRD: 108 ML/MIN/1.73SQ M
GLUCOSE SERPL-MCNC: 113 MG/DL (ref 65–140)
HCT VFR BLD AUTO: 35.5 % (ref 36.5–49.3)
HGB BLD-MCNC: 12.3 G/DL (ref 12–17)
INR PPP: 1.42 (ref 0.86–1.17)
MCH RBC QN AUTO: 33.7 PG (ref 26.8–34.3)
MCHC RBC AUTO-ENTMCNC: 34.6 G/DL (ref 31.4–37.4)
MCV RBC AUTO: 97 FL (ref 82–98)
PLATELET # BLD AUTO: 80 THOUSANDS/UL (ref 149–390)
PMV BLD AUTO: 10.8 FL (ref 8.9–12.7)
POTASSIUM SERPL-SCNC: 3.9 MMOL/L (ref 3.5–5.3)
PROT SERPL-MCNC: 5.9 G/DL (ref 6.4–8.2)
PROTHROMBIN TIME: 17.5 SECONDS (ref 11.8–14.2)
RBC # BLD AUTO: 3.65 MILLION/UL (ref 3.88–5.62)
SODIUM SERPL-SCNC: 138 MMOL/L (ref 136–145)
UNIT DISPENSE STATUS: NORMAL
UNIT DISPENSE STATUS: NORMAL
UNIT PRODUCT CODE: NORMAL
UNIT PRODUCT CODE: NORMAL
UNIT RH: NORMAL
UNIT RH: NORMAL
WBC # BLD AUTO: 5.93 THOUSAND/UL (ref 4.31–10.16)

## 2018-06-06 PROCEDURE — 99232 SBSQ HOSP IP/OBS MODERATE 35: CPT | Performed by: NURSE PRACTITIONER

## 2018-06-06 PROCEDURE — 80053 COMPREHEN METABOLIC PANEL: CPT | Performed by: SURGERY

## 2018-06-06 PROCEDURE — 85027 COMPLETE CBC AUTOMATED: CPT | Performed by: SURGERY

## 2018-06-06 PROCEDURE — 99024 POSTOP FOLLOW-UP VISIT: CPT | Performed by: SURGERY

## 2018-06-06 PROCEDURE — 85610 PROTHROMBIN TIME: CPT | Performed by: SURGERY

## 2018-06-06 PROCEDURE — C9113 INJ PANTOPRAZOLE SODIUM, VIA: HCPCS | Performed by: SURGERY

## 2018-06-06 RX ORDER — FUROSEMIDE 40 MG/1
20 TABLET ORAL DAILY
Qty: 30 TABLET | Refills: 0
Start: 2018-06-06 | End: 2019-02-07 | Stop reason: SDUPTHER

## 2018-06-06 RX ORDER — OXYCODONE HYDROCHLORIDE AND ACETAMINOPHEN 5; 325 MG/1; MG/1
1 TABLET ORAL EVERY 4 HOURS PRN
Qty: 20 TABLET | Refills: 0 | Status: SHIPPED | OUTPATIENT
Start: 2018-06-06 | End: 2018-06-13 | Stop reason: ALTCHOICE

## 2018-06-06 RX ADMIN — SODIUM CHLORIDE, SODIUM LACTATE, POTASSIUM CHLORIDE, AND CALCIUM CHLORIDE 125 ML/HR: .6; .31; .03; .02 INJECTION, SOLUTION INTRAVENOUS at 09:09

## 2018-06-06 RX ADMIN — FUROSEMIDE 10 MG: 10 INJECTION, SOLUTION INTRAMUSCULAR; INTRAVENOUS at 08:56

## 2018-06-06 RX ADMIN — ACETAMINOPHEN 325 MG: 160 SUSPENSION ORAL at 13:32

## 2018-06-06 RX ADMIN — ACETAMINOPHEN 325 MG: 160 SUSPENSION ORAL at 05:11

## 2018-06-06 RX ADMIN — METOCLOPRAMIDE 5 MG: 5 INJECTION, SOLUTION INTRAMUSCULAR; INTRAVENOUS at 12:38

## 2018-06-06 RX ADMIN — CEFAZOLIN SODIUM 2000 MG: 10 INJECTION, POWDER, FOR SOLUTION INTRAVENOUS at 05:11

## 2018-06-06 RX ADMIN — OXYCODONE HYDROCHLORIDE 5 MG: 5 SOLUTION ORAL at 05:10

## 2018-06-06 RX ADMIN — PANTOPRAZOLE SODIUM 40 MG: 40 INJECTION, POWDER, FOR SOLUTION INTRAVENOUS at 08:56

## 2018-06-06 RX ADMIN — ACETAMINOPHEN 325 MG: 160 SUSPENSION ORAL at 00:35

## 2018-06-06 RX ADMIN — AMILORIDE HYDROCHLORIDE 10 MG: 5 TABLET ORAL at 09:02

## 2018-06-06 RX ADMIN — OXYCODONE HYDROCHLORIDE 5 MG: 5 SOLUTION ORAL at 13:32

## 2018-06-06 RX ADMIN — ACETAMINOPHEN 325 MG: 160 SUSPENSION ORAL at 08:58

## 2018-06-06 RX ADMIN — METOCLOPRAMIDE 5 MG: 5 INJECTION, SOLUTION INTRAMUSCULAR; INTRAVENOUS at 00:24

## 2018-06-06 RX ADMIN — RIFAXIMIN 550 MG: 550 TABLET ORAL at 09:01

## 2018-06-06 RX ADMIN — OXYCODONE HYDROCHLORIDE 10 MG: 5 SOLUTION ORAL at 00:35

## 2018-06-06 RX ADMIN — OXYCODONE HYDROCHLORIDE 5 MG: 5 SOLUTION ORAL at 08:57

## 2018-06-06 RX ADMIN — METOPROLOL TARTRATE 5 MG: 5 INJECTION, SOLUTION INTRAVENOUS at 00:24

## 2018-06-06 RX ADMIN — SODIUM CHLORIDE, SODIUM LACTATE, POTASSIUM CHLORIDE, AND CALCIUM CHLORIDE 125 ML/HR: .6; .31; .03; .02 INJECTION, SOLUTION INTRAVENOUS at 00:38

## 2018-06-06 NOTE — DISCHARGE INSTRUCTIONS
Cirrhosis   AMBULATORY CARE:   Cirrhosis  is long-term scarring of the liver  The liver makes enzymes and bile that help digest food and gives your body energy  It also removes harmful material from your body, such as alcohol and other chemicals  Cirrhosis is caused by repeated damage to your liver over time  Scar tissue starts to replace healthy liver tissue  The scar tissue prevents the liver from working properly  Common signs and symptoms of cirrhosis:  You may not have any signs or symptoms until your liver damage is severe  You may have any of the following:  · Fatigue    · Bleeding and bruising easily    · Swelling of your feet, legs, or abdomen    · Nausea, loss of appetite, and weight loss    · Itching    · Jaundice (yellowing of your skin or eyes)    · Black bowel movements or dark urine  Seek care immediately if:   · You have pain during a bowel movement and it is black or contains blood  · You have a fast heart rate and fast breathing  · You are dizzy or confused  · You have severe pain in your abdomen  · You have trouble breathing  · Your vomit looks like it has coffee grinds or blood in it  Contact your healthcare provider if:   · You have a fever  · You have red or itchy skin  · You are in pain and feel weak  · You have questions or concerns about your condition or care  Treatment  may include any of the following:  · Medicines  may be used to treat high blood pressure in the portal vein (the vein that goes to your liver)  You may also need medicine to decrease extra fluid that collects in an area such as your legs or abdomen  Medicines may be used to decrease itching, or to treat a bacterial or viral infection  · Surgery  may be used to create a channel inside your liver to increase blood flow  This will help decrease swelling in your abdomen and lower blood pressure in the portal vein  Your risk for bleeding in your esophagus and stomach will also be decreased   You may need a liver transplant if your liver fails  Do not drink alcohol:  Alcohol will cause more damage to your liver  Manage cirrhosis:   · Do not smoke  Nicotine and other chemicals in cigarettes and cigars can cause blood vessel and lung damage  Ask your healthcare provider for information if you currently smoke and need help to quit  E-cigarettes or smokeless tobacco still contain nicotine  Talk to your healthcare provider before you use these products  · Eat a variety of healthy foods  Healthy foods include fruits, vegetables, whole-grain breads, low-fat dairy products, beans, lean meat, and fish  Ask if you need to be on a special diet  · Reach or maintain a healthy weight  You may develop fatty liver disease if you are overweight  Ask your healthcare provider for a healthy weight for you  He can help you create a safe weight loss plan if you are overweight  · Limit sodium (salt)  You may need to decrease the amount of sodium you eat if you have swelling caused by fluid buildup  Sodium is found in table salt and salty foods such as canned foods, frozen foods, and potato chips  · Drink liquids as directed  Ask how much liquid to drink each day and which liquids are best for you  For most people, good liquids to drink are water, juice, and milk  Liquids can help your liver work better  · Ask about vaccines  You may have a hard time fighting infection because of cirrhosis  Vaccines help protect you against viruses that can cause diseases such as the flu or hepatitis  Viral hepatitis is caused by a virus that leads to inflammation of the liver  You may need a hepatitis A or B vaccine  You may also need a pneumonia vaccine  Always get a flu vaccine each year as soon as it becomes available  · Ask about medicines  Some medicines can harm your liver  Acetaminophen is an example  Talk to your healthcare provider about all your medicines   Do not take any over-the-counter medicine or herbal supplements until your healthcare provider says it is okay  Follow up with your healthcare provider as directed:  Write down your questions so you remember to ask them during your visits  © 2017 [employer] [employer] Information is for End User's use only and may not be sold, redistributed or otherwise used for commercial purposes  All illustrations and images included in CareNotes® are the copyrighted property of A [employer]  or Mika Hernandez  The above information is an  only  It is not intended as medical advice for individual conditions or treatments  Talk to your doctor, nurse or pharmacist before following any medical regimen to see if it is safe and effective for you  Bariatric/Weight Loss Surgery  Hospital Discharge Instructions  1  ACTIVITY:  a  Progress as feels comfortable - a good rule is:  if you are doing something and it begins to hurt, stop doing the activity  Walk at least 3 times per day at home  b  Mary Schafer may walk stairs if you do so slowly  c  You may shower 48 hours after surgery  d  Use your incentive spirometer 10 times per hour while awake for 1 week  e  No driving if you are still taking certain prescription pain medication  Examples of such medication are Percocet, Darvocet, Oxycodone, Tylenol #3, and Tylenol with Codeine  Follow your pharmacists orders  2  DIET  a  Stay on a liquid diet for 7 days after your surgery date, sipping slowly  Refer to your manual for examples of choices  Remember to keep your liquids sugar free or low calorie  You may have protein drinks  Make sure to drink 48 to 64 ounces per day of fluids  b  On the 8th day after surgery, start a pureed/blenderized diet  (As an example, if you had surgery on a Monday, you can start your pureed/blenderized diet the following Monday)   Start 3 meals per day, breakfast, lunch and dinner, each meal to consist of 30 minutes without drinking, 20 minutes eating food and then another 60 minutes without drinking  Follow the directions in your manual under Diet Progression, section 3  You will be on a pureed/blenderized diet for 3 to 5 days  c  Once you start the pureed/blenderized diet, remember to keep hydrated by drinking water or low calorie liquids between meal times (48 to 64 ounces per day) following the 30/60 minute rule  d  Na Barry may start a soft diet once you get approval from your surgeon at your first follow up visit  3  MEDICATIONS:  a  Start vitamins and minerals when you get home  b  Pain and Anti-acid Medication as per prescription  c  Other medications as indicated on the Physician Patient Discharge Instructions form given to you at the time of discharge  d  Make sure that you are splitting your pill or tablet medications in halves or fourths or even crushing them before you take them  Capsules should be opened and mixed with water or jello  You need to do this for at least 2 to 4 weeks after surgery  Eventually you will be able to take your medications the regular way as they were prescribed  Ask your nurse prior to discharge about your medications  e  Na Barry will need to consult with your Family Doctor in regards to all your prescribed medication, particularly those for blood pressure and diabetes  As you lose weight, medical conditions may change, requiring an alteration or elimination of the drug dose  4  INCISION CARE  a  You may shower and get incisions wet 2 days after surgery  b  If you have a drain, empty the drain as the nurses instructed  5  FOLLOW-UP APPOINTMENT should be made for one week after discharge  Call surgeons office at [phone] to schedule an appointment      CALL YOUR DOCTOR FOR:  pain not controlled by pain medications, a temperature greater than 101 5° F, any increase or change in drainage or redness from any incision, any vomiting or inability to keep liquids down, shortness of breath, shoulder pain, or bleeding  ********Letter and Information for Patient's Primary Health Care Provider************      Dear Wander Marx Provider,       Your patient had bariatric surgery on this admission to [facility]  Due to the restrictive and/or malabsorptive nature of their procedure our surgeons recommend routine lab studies  Your patients surgeon will provide orders initially and ask that they continue to follow-up with us for life even if they see you  As time moves on some patients prefer to follow-up only with their family doctor  We ask that you discuss this regular work-up with them when they make an appointment to see you  *The lab studies recommended to best identify deficiencies are: CBC, CMP, Lipid Profile, Fe, TIBC, %Sat, Vitamin D, Folate, B12, Whole Blood Thiamine, Vitamin A, PTH, Zinc and Ferritin  We recommend HgbA1C for diabetics  *These studies should be done minimally at 6 months and a year post-operatively and then yearly thereafter  *Recommended Daily Supplements: Please see the attached Vitamin Sheet    If your patient has any dietary or psycho-social concerns, they can follow-up with our Team Dietitian and Licensed Clinical   They can reach these team members by calling the [facility]  We also encourage them to follow up at our regularly scheduled support groups or join our [facility] at [phone] Patient Forum  For your convenience we have also included a list of medicines that should be avoided after weight loss surgery and a list of the vitamin and minerals they should be taking for the rest of their lives  The patients are provided this information as well  The [facility]'s Bariatric Team would like to thank you for the opportunity to assist in the care of your patient    Feel free to contact us with any questions by calling the [facility] office at [phone]    Sincerely,         [facility] Team      ****************Additional Information for Providers and Patients******************                    Vitamins After       Jeffrey en Y Gastric Bypass or Sleeve Gastrectomy Surgery    Due to the decreased absorption of nutrients and the decreased amount of food eaten it is difficult to obtain all the nutrients needed consuming food  We recommend a bariatric formulated vitamin for the rest of your life  If you wish to use an over the counter vitamins please understand you may not get all the recommended daily requirements  Use the following guidelines for over the counter vitamins  Multivitamins   We recommend 2 chewable multivitamins with iron (do not take gummy chewable as they do not contain thiamine)    You can continue with the chewable or take any well formulated, high potency multivitamin containing 22 nutrients including zinc and copper  If you decide to take a bariatric vitamin the number of vitamins that you need to take will vary  Refer to the chart provided at team meeting    Calcium - Calcium is absorbed in the part of the small bowel that is bypassed in gastric bypass patients  In addition, as you lose weight, you are more at risk for loss of bone density leading to osteoporosis  The best form of calcium is Calcium Citrate  This form of calcium is better absorbed after your surgery  Recommended daily dose is 1500 mg  Take 500 mg in (3) divided doses  You can only absorb about 500 mg at a time  We recommend 2000 IU of vitamin D3 per day, in addition to what is in your calcium supplement  If you were instructed to take a higher dose based on a deficiency, then continue to take the higher vitamin D dose  Iron - Iron is absorbed in the part of the small bowel that is bypassed     You will need to take extra iron in addition to what is already in the multivitamin if you are a menstruating woman (25-45 mg of additional elemental iron) or have been diagnosed with an iron deficiency  We recommend iron in the form of Ferrous Fumarate with Vitamin C  Follow the instructions on the package or bottle unless your physician has given other dosage amounts  B12 (Cyanocobalamin) may also be decreased  Additional Vitamin B12 is recommended if you are not taking a bariatric vitamin  Take 350 to 500 micrograms (mcg) per day of B12 (Cyanocobalamin) in a sublingual form (for under the tongue)  Note:  Calcium interferes with the absorption of iron, so it is recommended that you take the calcium at least 2 hours apart from iron  The tannins in tea also interfere with the absorption of iron  Note: Anti-ulcer medications interfere with the absorption of calcium iron and B12  Space your anti-ulcer medication 2 hours apart from your vitamins  * Based on the recommendations of the ASMBS and the National Osteoporosis foundation    Non-steroidal anti-inflammatory drugs or medications containing them  You should take caution or avoid these medications as they could harm your pouch or sleeve  **This is a sample list and is not all inclusive  Please read labels carefully  **      Non Steroidal anti-inflammatory drugs  Advil (ibuprofen)  Aleve (naproxen)  Anaprox (naproxen)  Ansaid (flurbiprofen)  Azolid (phenylbutazone)  Bextra (valdecoxib)  Butazolidin (phenylbutazone)  Celebrex (celecoxib)  Clinoril (sulindac)  Dolobid (diflunisal)  Excedrin IB (ibuprofen)  Feldene (piroxicam)  Ibuprin (ibuprofen)  Indocin (indomethacin)  Lodine (etodolac)  Meclomen (meclofenamate)  Midol IB (ibuprofen)  Motrin IB (ibuprofen)  Nalfon (fenoprofen)  Naprosyn (naproxen)  Nuprin (ibuprofen)  Orudis (ketoprofen)  Oruvail (ketoprofen)  Pamprin - IB (ibuprofen)  Ponstel (mefenamic acid)  Rexolate (sodium thiosalicylate)  Tandearil (oxyphenbutazone)  Tolectin (tolmetin)  Voltaren (diclofenac)      Barbiturate  Fiorinal (butalbital/aspirin/caffeine)    Salicylates  Amigesic (salsalate)  Anacin (aspirin)  Arthropan (choline salicylate)  Ascriptin (buffered aspirin)  Aspirin (aspirin)  Aspirtab (aspirin)  Bufferin (buffered aspirin)  Disalcid (salsalate)  Ecotrin (aspirin)  Uracel (sodium salicylate)    Analgesics  Equagesic (meprobamate/aspirin)  Micrainin (meprobamate/aspirin)  Percodan (oxycodone/aspirin)    OTC  Pepto-Bismol®  [place]®  Excedrin®    For Gastric Bypass Patients  Extended Release Medications  Sustained Release Medications  Time Released Medications    6

## 2018-06-06 NOTE — POST OP PROGRESS NOTES
Bariatric Surgery Progress Note    Subjective  No adverse events  Advancing PO hydration, ambulation, spirometry  Pain/nausea control adequate      Objective  Vitals:    06/06/18 0900   BP: 112/58   Pulse: 62   Resp: 22   Temp:    SpO2: 97%     NAD, alert  Normal inspiratory effort  Abdomen soft, non-distended, appropriately tender  Incisions c/d/i    Labs  K 3 9  H/H 12 3/35 5  Platelets 99S  INR 1 4    Assessment  53M POD 1 s/p lap sleeve gastrectomy    Plan  - decrease IVF  - stop telemetry/pulse ox  - appreciate critical care input  - encourage incentive spirometry, ambulation, clear liquids

## 2018-06-06 NOTE — PROGRESS NOTES
Progress Note - Critical Care   Loraine Palomo Sr  46 y o  male MRN: [id number]  Unit/Bed#: ICU 07 Encounter: [id number]    Assessment/Plan:  1  Morbid obesity status post laparoscopic sleeve gastrectomy, postop day 1  · Management per bariatric surgical team's recommendations  · Continue out of bed, ambulation and increasing activity  · Continue to encourage incentive spirometer, cough and deep breathing  2  Hepatitis C with subsequent hepatic cirrhosis status post eradication therapy  · Will continue with lactulose and Xifaxan for now  · Would had his nadolol back today once he is cleared to take p o  Dose  · His cirrhosis increases his risk of bleeding postoperatively  Would continue to monitor him for signs of bleeding  3  Esophageal varices without evidence of active bleeding  · Would continue nadolol as noted above  4  History of hepatic encephalopathy-currently awake and alert  · Continue outpatient lactulose and Xifaxan  5  Obstructive sleep apnea  · Will continue nocturnal CPAP  6  Disposition:  Can likely be transferred to the medical-surgical floor later today  Critical Care Time:   Documented critical care time excludes any procedures documented elsewhere  It also excludes any family updates    _____________________________________________________________________    HPI/24hr events:   No events overnight    The patient has mild incisional pain    Medications:    Current Facility-Administered Medications:  oxyCODONE 10 mg Oral Q4H PRN Janet Pillai MD    And        acetaminophen 325 mg Oral Q4H PRN Janet Pillai MD    oxyCODONE 5 mg Oral Q4H PRN Janet Pillai MD    And        acetaminophen 325 mg Oral Q4H PRN Janet Pillai MD    acetaminophen 325 mg Oral Q4H PRN Janet Pillai MD    AMILoride 10 mg Oral BID Janet Pillai MD    cefazolin 2,000 mg Intravenous Sarah Segura MD Last Rate: 2,000 mg (06/06/18 0511)   furosemide 10 mg Intravenous BID (diuretic) Janet Pillai MD hydrALAZINE 10 mg Intravenous Q6H PRN Adrián Enrique MD    lactated ringers 125 mL/hr Intravenous Continuous Adrián Enrique MD Last Rate: 125 mL/hr (06/06/18 0038)   lactulose 10 g Oral Daily PRN Adrián Enrique MD    metoclopramide 5 mg Intravenous Q6H Maribell March MD    metoprolol 5 mg Intravenous Q6H Adrián Enrique MD    morphine injection 4 mg Intravenous Q2H PRANGEL Enrique MD    morphine injection 2 mg Intravenous Q2H PRANGEL Enrique MD    ondansetron 4 mg Intravenous Q6H PRANGEL Enrique MD    pantoprazole 40 mg Intravenous Q12H [address] Adrián Enrique MD    phenol 2 spray Mouth/Throat Q2H PRN Adrián Enrique MD    rifaximin 550 mg Oral BID Adrián Enrique MD          lactated ringers 125 mL/hr Last Rate: 125 mL/hr (06/06/18 0038)         Physical exam:  Vitals: Body mass index is 52 54 kg/m²  Blood pressure (!) 123/47, pulse 58, temperature 97 8 °F (36 6 °C), temperature source Temporal, resp  rate (!) 8, height 5' 5" (1 651 m), weight (!) 143 kg (315 lb 11 2 oz), SpO2 98 % ,  Temp  Min: 97 °F (36 1 °C)  Max: 98 3 °F (36 8 °C)  IBW: 61 5 kg    SpO2: 98 %  SpO2 Activity: At Rest  O2 Device: Nasal cannula      Intake/Output Summary (Last 24 hours) at 06/06/18 0527  Last data filed at 06/06/18 0255   Gross per 24 hour   Intake          3418 83 ml   Output              730 ml   Net          2688 83 ml       Invasive/non-invasive ventilation settings:   Respiratory    Lab Data (Last 4 hours)    None         O2/Vent Data (Last 4 hours)    None              Invasive Devices     Peripheral Intravenous Line            Peripheral IV 06/05/18 Left Wrist less than 1 day    Peripheral IV 06/05/18 Right Hand less than 1 day          Arterial Line            Arterial Line 06/05/18 Left Radial less than 1 day                  Physical Exam:  Gen:  Awake and alert  HEENT:  Pupils are equal round reactive to light  His sclerae are slightly muddy did but non icteric    His oropharynx is without erythema  Neck:  Supple negative for lymphadenopathy  Chest:  Essentially clear to auscultation bilaterally  Cor:  Regular rate and rhythm  Abd:  Obese but soft  His incisions are clean dry and intact  Ext:  There is no significant edema clubbing or cyanosis  Neuro:  Awake and alert, able to move all extremities without difficulty  Skin:  Warm and dry      Diagnostic Data:  Lab: I have personally reviewed pertinent lab results  CBC:     Results from last 7 days  Lab Units 06/06/18  0427 06/05/18  1648 06/05/18  1207   WBC Thousand/uL 5 93 4 56 4 33   HEMOGLOBIN g/dL 12 3 12 0 12 7   HEMATOCRIT % 35 5* 34 5* 36 5   PLATELETS Thousands/uL 80* 84* 90*       CMP:     Results from last 7 days  Lab Units 06/06/18  0427 06/05/18  1619 06/05/18  1207   SODIUM mmol/L 138 141 141   POTASSIUM mmol/L 3 9 3 2* 3 6   CHLORIDE mmol/L 104 106 106   CO2 mmol/L 29 24 28   BUN mg/dL 8 8 7   CREATININE mg/dL 0 68 0 71 0 72   CALCIUM mg/dL 8 3 8 2* 8 9   TOTAL PROTEIN g/dL 5 9* 6 0* 5 8*   BILIRUBIN TOTAL mg/dL 3 19* 3 58* 4 20*   ALK PHOS U/L 97 96 103   ALT U/L 33 33 34   AST U/L 40 44 44   GLUCOSE RANDOM mg/dL 113 122 100     PT/INR:   Lab Results   Component Value Date    INR 1 42 (H) 06/06/2018    INR 1 51 (H) 06/05/2018   ,   Magnesium:     Phosphorous:       Microbiology:        Imaging:      Cardiac lab/EKG/telemetry/ECHO:       VTE Prophylaxis:  SCDs and ambulation    Code Status: No Order    KAILA Gates    Portions of the record may have been created with voice recognition software  Occasional wrong word or "sound a like" substitutions may have occurred due to the inherent limitations of voice recognition software  Read the chart carefully and recognize, using context, where substitutions have occurred

## 2018-06-06 NOTE — CASE MANAGEMENT
Initial Clinical Review    Age/Sex: 46 y o  male    Surgery Date:    6/5/2018    Procedure: Preop Diagnosis:  Morbid obesity ([facility] [address] ) [E66 01]Body mass index is 51 24 kg/m²      Post-Op Diagnosis Codes:     * Morbid obesity ([facility] [address] ) [E66 01]     Procedure(s) (LRB):  GASTRECTOMY LAPAROSCOPIC SLEEVE (N/A)    Anesthesia:    general    Admission Orders: Date/Time/Statement: 6/5/18 @ 1547     Orders Placed This Encounter   Procedures    Inpatient Admission     Standing Status:   Standing     Number of Occurrences:   1     Order Specific Question:   Admitting Physician     Answer:   GALLO [place] [[id number]]     Order Specific Question:   Level of Care     Answer:   Critical Care [15]     Order Specific Question:   Estimated length of stay     Answer:   More than 2 Midnights     Order Specific Question:   Certification     Answer:   I certify that inpatient services are medically necessary for this patient for a duration of greater than two midnights  See H&P and MD Progress Notes for additional information about the patient's course of treatment  Vital Signs: /58   Pulse 62   Temp 98 2 °F (36 8 °C) (Temporal)   Resp 22   Ht 5' 5" (1 651 m)   Wt (!) 143 kg (315 lb 11 2 oz)   SpO2 97%   BMI 52 54 kg/m²     Diet:        Diet Orders            Start     Ordered    06/05/18 1813  Diet Bariatric; Bariatric Clear Liquid  Diet effective now     Question Answer Comment   Diet Type Bariatric    Bariatric Bariatric Clear Liquid    RD to adjust diet per protocol? No        06/05/18 1812          Mobility:   As  khushbu    DVT Prophylaxis:   SCD'S    Pain Control:      Vaishali Saucedo Sr  is a 46 y o  male with a past medical history significant for hepatitis C treated and cleared of the virus  He was admitted today for laparoscopic gastric sleeve due to morbid obesity  The patient was transferred to stepdown post operatively for medical management    POST OP PROGRESS  NOTE    6/6 1   Morbid obesity status post laparoscopic sleeve gastrectomy, postop day 1  · Management per bariatric surgical team's recommendations  · Continue out of bed, ambulation and increasing activity  · Continue to encourage incentive spirometer, cough and deep breathing  2  Hepatitis C with subsequent hepatic cirrhosis status post eradication therapy  · Will continue with lactulose and Xifaxan for now  · Would had his nadolol back today once he is cleared to take p o  Dose  · His cirrhosis increases his risk of bleeding postoperatively  Would continue to monitor him for signs of bleeding  3  Esophageal varices without evidence of active bleeding  · Would continue nadolol as noted above  4  History of hepatic encephalopathy-currently awake and alert  · Continue outpatient lactulose and Xifaxan  5  Obstructive sleep apnea  ?  Will continue nocturnal CPAP

## 2018-06-06 NOTE — NURSING NOTE
Patient dc in stable condition  IV sites dc'd  Mild c/o of pain offered, which medications was provided  D/C instructions and medications reviewed with patient  Verbalized understanding  Wife at bedside  Education provided  Accompanied off unit by this nurse

## 2018-06-06 NOTE — DISCHARGE SUMMARY
Discharge Summary - Yudith Contreras Sr  46 y o  male MRN: [id number]    Unit/Bed#: ICU 07 Encounter: [id number]    Admission Date: 6/5/2018     Discharge Date: 06/06/18    Admitting Diagnosis: Morbid obesity ([facility] ) [E66 01]    Secondary Diagnosis:   Past Medical History:   Diagnosis Date    CPAP (continuous positive airway pressure) dependence     Esophageal varices (HCC)     Hepatic cirrhosis (HCC)     treated with harvoni   Hep C- dx age 17   Makayla Roles Hepatic encephalopathy ([facility] [address] )     Obesity     Pneumonia     in past    Sleep apnea     Wears glasses        Discharge Diagnosis: Same    Procedures Performed: Procedure(s):  GASTRECTOMY LAPAROSCOPIC SLEEVE    Consults: [address] [facility] Course: Patient with morbid obesity was admitted to the hospital on 6/5/2018 for elective Procedure(s):  [place]  Postoperatively, the patient was stable and transferred to the ICU for further observation given his significant comorbidities  Postop day one, the patient was tolerating a liquid diet and pain was controlled oral pain medications  The patient was ambulating without assistance, vital signs and lab work were stable  The patient was cleared for discharge on 06/06/18  Disposition: The patient should follow up with Max Gamino MD in 2 weeks for a post op check and with Lucina Ford MD as needed for medical management  The patient should refer to the handout "Discharge Instructions" for further information  Call physician for temperature over 101, wound redness or discharge, vomiting, or intolerance to diet  Discharge Medications:  See after visit summary for reconciled discharge medications provided to patient and family

## 2018-06-07 ENCOUNTER — TELEPHONE (OUTPATIENT)
Dept: FAMILY MEDICINE CLINIC | Facility: CLINIC | Age: 53
End: 2018-06-07

## 2018-06-07 ENCOUNTER — TELEPHONE (OUTPATIENT)
Dept: BARIATRICS | Facility: CLINIC | Age: 53
End: 2018-06-07

## 2018-06-07 ENCOUNTER — TRANSITIONAL CARE MANAGEMENT (OUTPATIENT)
Dept: FAMILY MEDICINE CLINIC | Facility: CLINIC | Age: 53
End: 2018-06-07

## 2018-06-07 NOTE — TELEPHONE ENCOUNTER
Post op follow up phone call completed  Pt is sipping liquids, using IS as instructed, reinforced importance of using IS to help prevent pneumonia  Ambulating about home without difficulty  Pain controlled with analgesia  Reaffirmed examples of liquid diet over the next week  Pt stated understanding about discharge instructions and re educated on what medications were adjusted and how to take capsules  Follow up appt with surgeon scheduled for next week  Instructed to call with any additional questions or concerns

## 2018-06-09 LAB
ABO GROUP BLD BPU: NORMAL
ABO GROUP BLD BPU: NORMAL
BPU ID: NORMAL
BPU ID: NORMAL
CROSSMATCH: NORMAL
CROSSMATCH: NORMAL
UNIT DISPENSE STATUS: NORMAL
UNIT DISPENSE STATUS: NORMAL
UNIT PRODUCT CODE: NORMAL
UNIT PRODUCT CODE: NORMAL
UNIT RH: NORMAL
UNIT RH: NORMAL

## 2018-06-13 ENCOUNTER — TRANSITIONAL CARE MANAGEMENT (OUTPATIENT)
Dept: FAMILY MEDICINE CLINIC | Facility: CLINIC | Age: 53
End: 2018-06-13

## 2018-06-13 ENCOUNTER — OFFICE VISIT (OUTPATIENT)
Dept: FAMILY MEDICINE CLINIC | Facility: CLINIC | Age: 53
End: 2018-06-13
Payer: MEDICARE

## 2018-06-13 VITALS
RESPIRATION RATE: 24 BRPM | WEIGHT: 293 LBS | HEART RATE: 64 BPM | BODY MASS INDEX: 48.82 KG/M2 | HEIGHT: 65 IN | SYSTOLIC BLOOD PRESSURE: 130 MMHG | OXYGEN SATURATION: 98 % | TEMPERATURE: 97.7 F | DIASTOLIC BLOOD PRESSURE: 88 MMHG

## 2018-06-13 DIAGNOSIS — R45.86 MOOD SWINGS: ICD-10-CM

## 2018-06-13 DIAGNOSIS — Z98.84 S/P BARIATRIC SURGERY: Primary | ICD-10-CM

## 2018-06-13 PROCEDURE — 99214 OFFICE O/P EST MOD 30 MIN: CPT | Performed by: FAMILY MEDICINE

## 2018-06-13 RX ORDER — ACETAMINOPHEN AND CODEINE PHOSPHATE 300; 30 MG/1; MG/1
1 TABLET ORAL EVERY 4 HOURS PRN
Qty: 30 TABLET | Refills: 0 | Status: SHIPPED | OUTPATIENT
Start: 2018-06-13 | End: 2018-07-03 | Stop reason: SDUPTHER

## 2018-06-13 NOTE — PROGRESS NOTES
Assessment/Plan:    No problem-specific Assessment & Plan notes found for this encounter  Diagnoses and all orders for this visit:    S/P bariatric surgery  after discussing risks and benefits of medication along with side effects he was advised to stop taking Percocet and start Thymeol at this time  advised to follow up with Dr Dave Zepeda as well  -     acetaminophen-codeine (TYLENOL #3) 300-30 mg per tablet; Take 1 tablet by mouth every 4 (four) hours as needed for moderate pain    Mood swings ([address] )  He thinks its related to pain medications  I have recommended if they continue consider mood stabilizer medications  Follow up in 1-2 weeks or as needed        Subjective:      Patient ID: Micki Blank Sr  is a 46 y o  male  Patient is here because he has been having pain on his epigastric area  He had bariatric surgery done June 5th by Dr Jerome Henao and has been having some epigastric pain  He was given percocet for pain however he has been very "paulino" since he has been at home  He think the percocet are affecting his mood and he would like to take something like tylenol with codeine at this time  He denies any nausea, vomiting or any other complaints  The following portions of the patient's history were reviewed and updated as appropriate:   He  has a past medical history of CPAP (continuous positive airway pressure) dependence; Esophageal varices ([address] ); Hepatic cirrhosis ([address] ); Hepatic encephalopathy ([address] ); Obesity; Pneumonia; Sleep apnea; and Wears glasses    He   Patient Active Problem List    Diagnosis Date Noted    S/P bariatric surgery 06/13/2018    Mood swings ([address] ) 06/13/2018    Secondary esophageal varices without bleeding ([address] ) 03/23/2018    Hepatic encephalopathy ([address] ) 03/23/2018    Obstructive sleep apnea 03/08/2018    Morbid obesity ([address] ) 03/02/2018    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 ([address] ) 02/21/2018    Gastroesophageal reflux disease without esophagitis 02/21/2018    Class 3 obesity due to excess calories without serious comorbidity with body mass index (BMI) of 50 0 to 59 9 in adult [facility]) 02/12/2018    Smoker 02/12/2018    Erectile dysfunction 02/12/2018     He  has a past surgical history that includes Colonoscopy; Esophagogastroduodenoscopy (N/A, 4/12/2018); Other surgical history; Fracture surgery (Left); and pr lap, dilip restrict proc, longitudinal gastrectomy (N/A, 6/5/2018)  His family history includes Diabetes in his father; Heart disease in his mother; Lupus in his mother; Stroke in his father  He  reports that he quit smoking about 3 months ago  His smoking use included Cigarettes  He has a 9 25 pack-year smoking history  He has never used smokeless tobacco  He reports that he does not drink alcohol or use drugs  Current Outpatient Prescriptions   Medication Sig Dispense Refill    acetaminophen-codeine (TYLENOL #3) 300-30 mg per tablet Take 1 tablet by mouth every 4 (four) hours as needed for moderate pain 30 tablet 0    AMILoride 5 mg tablet Take 10 mg by mouth 2 (two) times a day    0    furosemide (LASIX) 40 mg tablet Take 0 5 tablets (20 mg total) by mouth daily 30 tablet 0    lactulose (CONSTULOSE) 10 g/15 mL solution Take 10 g by mouth daily as needed        nadolol (CORGARD) 20 mg tablet Take 20 mg by mouth 2 (two) times a day    1    omeprazole (PriLOSEC) 20 mg delayed release capsule Take 1 capsule (20 mg total) by mouth daily for 90 days 90 capsule 3    rifaximin (XIFAXAN) 550 mg tablet Take 550 mg by mouth 2 (two) times a day         No current facility-administered medications for this visit        Current Outpatient Prescriptions on File Prior to Visit   Medication Sig    AMILoride 5 mg tablet Take 10 mg by mouth 2 (two) times a day      furosemide (LASIX) 40 mg tablet Take 0 5 tablets (20 mg total) by mouth daily    lactulose (CONSTULOSE) 10 g/15 mL solution Take 10 g by mouth daily as needed      nadolol (CORGARD) 20 mg tablet Take 20 mg by mouth 2 (two) times a day      omeprazole (PriLOSEC) 20 mg delayed release capsule Take 1 capsule (20 mg total) by mouth daily for 90 days    rifaximin (XIFAXAN) 550 mg tablet Take 550 mg by mouth 2 (two) times a day      [DISCONTINUED] oxyCODONE-acetaminophen (PERCOCET) 5-325 mg per tablet Take 1 tablet by mouth every 4 (four) hours as needed for moderate pain for up to 10 days Earliest Fill Date: 6/6/18 Max Daily Amount: 6 tablets     No current facility-administered medications on file prior to visit  He has No Known Allergies       Review of Systems   Constitutional: Negative for activity change, appetite change, fatigue and fever  HENT: Negative for congestion and ear discharge  Respiratory: Negative for cough and shortness of breath  Cardiovascular: Negative for chest pain and palpitations  Gastrointestinal: Positive for abdominal pain  Negative for diarrhea and nausea  Musculoskeletal: Negative for arthralgias and back pain  Skin: Negative for color change and rash  Neurological: Negative for dizziness and headaches  Psychiatric/Behavioral: Negative for agitation and behavioral problems  Objective:      /88 (BP Location: Left arm)   Pulse 64   Temp 97 7 °F (36 5 °C)   Resp (!) 24   Ht 5' 5" (1 651 m)   Wt 133 kg (293 lb)   SpO2 98%   BMI 48 76 kg/m²          Physical Exam   Constitutional: He is oriented to person, place, and time  He appears well-developed and well-nourished  No distress  Eyes: Pupils are equal, round, and reactive to light  No scleral icterus  Cardiovascular: Normal rate, regular rhythm and normal heart sounds  No murmur heard  Pulmonary/Chest: Effort normal and breath sounds normal  No respiratory distress  He has no wheezes  Abdominal: Soft  Bowel sounds are normal  He exhibits no distension  There is no tenderness  Neurological: He is alert and oriented to person, place, and time  Skin: Skin is warm and dry  No rash noted  He is not diaphoretic    multiple skin incision sites noted, sites are clean dry and intact  Psychiatric: He has a normal mood and affect

## 2018-06-14 ENCOUNTER — OFFICE VISIT (OUTPATIENT)
Dept: PULMONOLOGY | Facility: CLINIC | Age: 53
End: 2018-06-14
Payer: MEDICARE

## 2018-06-14 VITALS
SYSTOLIC BLOOD PRESSURE: 110 MMHG | HEART RATE: 62 BPM | WEIGHT: 294 LBS | HEIGHT: 65 IN | DIASTOLIC BLOOD PRESSURE: 70 MMHG | BODY MASS INDEX: 48.98 KG/M2 | OXYGEN SATURATION: 95 %

## 2018-06-14 DIAGNOSIS — E66.01 MORBID OBESITY (HCC): ICD-10-CM

## 2018-06-14 DIAGNOSIS — G47.33 OBSTRUCTIVE SLEEP APNEA: Primary | ICD-10-CM

## 2018-06-14 PROCEDURE — 99213 OFFICE O/P EST LOW 20 MIN: CPT | Performed by: INTERNAL MEDICINE

## 2018-06-14 NOTE — PROGRESS NOTES
Assessment/Plan:   Diagnoses and all orders for this visit:    Obstructive sleep apnea    Morbid obesity ([facility] [address] )        Obstructive sleep apnea with an AHI of 12 8, not able to tolerate the CPAP, states he wants to give it back and does not want to try it again  Consequences of untreated sleep apnea discussed with the patient  Understands and verbalizes  Recommend weight loss  Cautioned against driving when sleepy  Refusing to do the epworth  sleepiness score  Follow-up when necessary as needed  No Follow-up on file  All questions are answered to the patient's satisfaction and understanding  He verbalizes understanding  He is encouraged to call with any further questions or concerns  Portions of the record may have been created with voice recognition software  Occasional wrong word or "sound a like" substitutions may have occurred due to the inherent limitations of voice recognition software  Read the chart carefully and recognize, using context, where substitutions have occurred  ______________________________________________________________________    Chief Complaint:   Chief Complaint   Patient presents with    Sleep Apnea    Follow-up       Patient ID: Darinel Conteh is a 46 y o  y o  male has a past medical history of CPAP (continuous positive airway pressure) dependence; Esophageal varices ([address] ); Hepatic cirrhosis ([facility] [address] ); Hepatic encephalopathy ([facility] [address] ); Obesity; Pneumonia; Sleep apnea; and Wears glasses  6/14/2018  Patient with history of obstructive sleep apnea, mild obstructive sleep apnea was given CPAP, states he is not able to tolerate it and not using it every night, he is here for 3 months follow-up      Review of Systems   Constitutional: Positive for fatigue and unexpected weight change  Negative for appetite change, chills, diaphoresis and fever  HENT: Positive for congestion and postnasal drip   Negative for ear discharge, ear pain, nosebleeds, rhinorrhea, sinus pain, sore throat and voice change  Eyes: Negative for pain, discharge and visual disturbance  Respiratory: Positive for shortness of breath  Negative for apnea, cough, choking, chest tightness, wheezing and stridor  Cardiovascular: Negative for chest pain, palpitations and leg swelling  Gastrointestinal: Negative for abdominal pain, blood in stool, constipation, diarrhea and vomiting  Endocrine: Negative for cold intolerance, heat intolerance, polydipsia, polyphagia and polyuria  Genitourinary: Negative for difficulty urinating and dysuria  Musculoskeletal: Negative for arthralgias and neck pain  Skin: Negative for pallor and rash  Allergic/Immunologic: Negative for environmental allergies and food allergies  Neurological: Negative for dizziness, speech difficulty, weakness and light-headedness  Hematological: Negative for adenopathy  Does not bruise/bleed easily  Psychiatric/Behavioral: Negative for agitation, confusion and sleep disturbance  The patient is not nervous/anxious  Smoking history: He reports that he quit smoking about 3 months ago  His smoking use included Cigarettes  He has a 9 25 pack-year smoking history   He has never used smokeless tobacco     The following portions of the patient's history were reviewed and updated as appropriate: allergies, current medications, past family history, past medical history, past social history, past surgical history and problem list     Immunization History   Administered Date(s) Administered    Influenza 10/28/2014, 09/15/2016, 11/04/2017    Influenza Split High Dose Preservative Free IM 10/28/2014    Pneumococcal Conjugate 13-Valent 06/17/2015    Tuberculin Skin Test-PPD Intradermal 10/28/2009, 02/07/2011, 07/23/2012, 03/16/2015    Zoster 10/28/2014     Current Outpatient Prescriptions   Medication Sig Dispense Refill    acetaminophen-codeine (TYLENOL #3) 300-30 mg per tablet Take 1 tablet by mouth every 4 (four) hours as needed for moderate pain 30 tablet 0    AMILoride 5 mg tablet Take 10 mg by mouth 2 (two) times a day    0    furosemide (LASIX) 40 mg tablet Take 0 5 tablets (20 mg total) by mouth daily 30 tablet 0    lactulose (CONSTULOSE) 10 g/15 mL solution Take 10 g by mouth daily as needed        nadolol (CORGARD) 20 mg tablet Take 20 mg by mouth 2 (two) times a day    1    omeprazole (PriLOSEC) 20 mg delayed release capsule Take 1 capsule (20 mg total) by mouth daily for 90 days 90 capsule 3    rifaximin (XIFAXAN) 550 mg tablet Take 550 mg by mouth 2 (two) times a day         No current facility-administered medications for this visit  Allergies: Patient has no known allergies  Objective:  Vitals:    06/14/18 1008   BP: 110/70   Pulse: 62   SpO2: 95%   Weight: 133 kg (294 lb)   Height: 5' 5" (1 651 m)   Oxygen Therapy  SpO2: 95 %    Wt Readings from Last 3 Encounters:   06/14/18 133 kg (294 lb)   06/13/18 133 kg (293 lb)   06/06/18 (!) 143 kg (315 lb 11 2 oz)     Body mass index is 48 92 kg/m²  Physical Exam   Constitutional: He is oriented to person, place, and time  He appears well-developed and well-nourished  HENT:   Head: Normocephalic and atraumatic  Crowded oropharyngeal airways, Mallampati score 4   Eyes: EOM are normal  Pupils are equal, round, and reactive to light  Neck: Normal range of motion  Neck supple  Short and wide neck   Cardiovascular: Normal rate, regular rhythm and normal heart sounds  Pulmonary/Chest: Effort normal and breath sounds normal    Abdominal: Soft  Bowel sounds are normal    Musculoskeletal: Normal range of motion  Neurological: He is alert and oriented to person, place, and time  Skin: Skin is warm and dry  Psychiatric: He has a normal mood and affect   His behavior is normal             ESS:

## 2018-06-17 NOTE — PRE-PROCEDURE INSTRUCTIONS
Pre-Surgery Instructions:   Medication Instructions    AMILoride 5 mg tablet Instructed patient per Anesthesia Guidelines   furosemide (LASIX) 40 mg tablet Instructed patient per Anesthesia Guidelines   lactulose (CONSTULOSE) 10 g/15 mL solution Instructed patient per Anesthesia Guidelines   nadolol (CORGARD) 20 mg tablet Instructed patient per Anesthesia Guidelines   rifaximin (XIFAXAN) 550 mg tablet Instructed patient per Anesthesia Guidelines  Instructed ton take nadolol and xifaxan am ofs urgery with sip ofw ater per anesthesia DR Samanta No 
weight-bearing as tolerated

## 2018-06-18 ENCOUNTER — TELEPHONE (OUTPATIENT)
Dept: BARIATRICS | Facility: CLINIC | Age: 53
End: 2018-06-18

## 2018-06-18 NOTE — TELEPHONE ENCOUNTER
Pt called and left messge that he is in pain and wants to follow up in [place]  He said he is scared and things something is wrong  Called pt back to discuss the pain and follow up  Left message on voice mail for pt to call back and if he had severe pain to go ER in [facility] to be evaluated

## 2018-06-22 ENCOUNTER — OFFICE VISIT (OUTPATIENT)
Dept: BARIATRICS | Facility: CLINIC | Age: 53
End: 2018-06-22

## 2018-06-22 ENCOUNTER — TRANSCRIBE ORDERS (OUTPATIENT)
Dept: ADMINISTRATIVE | Facility: HOSPITAL | Age: 53
End: 2018-06-22

## 2018-06-22 VITALS
HEIGHT: 65 IN | SYSTOLIC BLOOD PRESSURE: 110 MMHG | TEMPERATURE: 98.1 F | HEART RATE: 62 BPM | BODY MASS INDEX: 48.15 KG/M2 | DIASTOLIC BLOOD PRESSURE: 80 MMHG | WEIGHT: 289 LBS

## 2018-06-22 VITALS — WEIGHT: 289 LBS | HEIGHT: 65 IN | BODY MASS INDEX: 48.15 KG/M2

## 2018-06-22 DIAGNOSIS — E66.01 MORBID (SEVERE) OBESITY DUE TO EXCESS CALORIES (HCC): Primary | ICD-10-CM

## 2018-06-22 DIAGNOSIS — I82.401 ACUTE DEEP VEIN THROMBOSIS (DVT) OF RIGHT LOWER EXTREMITY, UNSPECIFIED VEIN (HCC): Primary | ICD-10-CM

## 2018-06-22 PROCEDURE — RECHECK

## 2018-06-22 PROCEDURE — 99024 POSTOP FOLLOW-UP VISIT: CPT | Performed by: SURGERY

## 2018-06-22 RX ORDER — SUCRALFATE 1 G/1
1 TABLET ORAL 4 TIMES DAILY
Qty: 30 TABLET | Refills: 3 | Status: SHIPPED | OUTPATIENT
Start: 2018-06-22 | End: 2019-03-13 | Stop reason: ALTCHOICE

## 2018-06-22 NOTE — PROGRESS NOTES
Weight Management Nutrition Class     Diagnosis: Morbid Obesity    Bariatric Surgeon: Dr Max Gamino    Surgery: Vertical Sleeve Gastrectomy    Class: first post op note    Topics discussed today include:     fluid goals post op, protein goals post op, constipation, chew food well, exercise, diet progression, protein supplems, vitamin/mineral supplements and calcium supplements    Patient was able to verbalize basic diet (protein, fluid, vitamin and mineral) recommendations and possible nutrition-related complications  Yes     Pt is tolerating pureed foods such as baby food, eggs, broth, but he has also tried a little rice mixed with his eggs and some cheese  He is not measuring his foods, but eating until full  Advised to limit to 1/4 cup, eat slow, chew well and adhere to the diet progression  He is meeting fluid and protein needs  Drinking one protein shake per day, thinks it is Isopure where 2 scoops is 50gm of protein  Mixes with 8oz milk for a total of 58gm of protein  He reports he is taking Opurity or Veoh health mutli once a day and 3 calcium per day  Discussed they need to be spaced from each other by 2 hrs

## 2018-06-22 NOTE — PROGRESS NOTES
FIRST POST-OPERATIVE VISIT - BARIATRIC SURGERY  Lenin Meehan Sr  46 y o  male MRN: [id number]  Unit/Bed#:  Encounter: [id number]      HPI:  Cleve Man  is a 46 y o  male who presents for follow up s/p laparoscopic sleeve gastrectomy    Review of Systems   Constitutional: Negative  HENT: Negative  Eyes: Negative  Respiratory: Negative  Cardiovascular: Negative  Gastrointestinal: Negative  Endocrine: Negative  Genitourinary: Negative  Musculoskeletal: Negative  Skin: Negative  Neurological: Negative  Hematological: Negative  Psychiatric/Behavioral: Negative  Historical Information   Past Medical History:   Diagnosis Date    CPAP (continuous positive airway pressure) dependence     Esophageal varices (HCC)     Hepatic cirrhosis (HCC)     treated with harvoni   Hep C- dx age 17    Hepatic encephalopathy ([address] )     Obesity     Pneumonia     in past    Sleep apnea     Wears glasses      Past Surgical History:   Procedure Laterality Date    COLONOSCOPY      ESOPHAGOGASTRODUODENOSCOPY N/A 4/12/2018    Procedure: ESOPHAGOGASTRODUODENOSCOPY (EGD); Surgeon: Kyaw Jeong MD;  Location: [facility];   Service: Gastroenterology    FRACTURE SURGERY Left     ankle    OTHER SURGICAL HISTORY      banding esophageal varices    CA LAP, TERESA RESTRICT PROC, LONGITUDINAL GASTRECTOMY N/A 6/5/2018    Procedure: GASTRECTOMY LAPAROSCOPIC SLEEVE;  Surgeon: Keon Argueta MD;  Location: [facility] OR;  Service: Bariatrics     Social History   History   Alcohol Use No     History   Drug Use No     History   Smoking Status    Former Smoker    Packs/day: 0 25    Years: 37 00    Types: Cigarettes    Quit date: 2/15/2018   Smokeless Tobacco    Never Used     Comment: current every day smoker ( as per allscripts)     Family History: non-contributory    Meds/Allergies   all medications and allergies reviewed  No Known Allergies    Objective   First Vitals: @VSFIRST2(5,8,6,7,9,11,14,10:FIRST)@    Current Vitals:   Blood Pressure: 110/80 (06/22/18 0844)  Pulse: 62 (06/22/18 0844)  Temperature: 98 1 °F (36 7 °C) (06/22/18 0844)  Height: 5' 5" (165 1 cm) (06/22/18 0844)  Weight - Scale: 131 kg (289 lb) (06/22/18 0844)    [unfilled]    Invasive Devices          No matching active lines, drains, or airways          Physical Exam   Constitutional: He appears well-developed and well-nourished  Abdominal: Soft  Bowel sounds are normal        Lab Results: I have personally reviewed pertinent lab results  Imaging: I have personally reviewed pertinent reports  EKG, Pathology, and Other Studies: I have personally reviewed pertinent reports  Code Status: [unfilled]  Advance Directive and Living Will:      Power of :    POLST:      Assessment/Plan:      Patient is presenting for the first postoperative visit, patient hospital stay was uneventful without any complications, patient is doing well, has no complaints except for some occasional foaming and right-sided thigh pain, he is taking vitamins as instructed, currently tolerating the blenderized diet, will advance to soft diet  Patient will also be meeting with our dietician today to review her vitamin and mineral supplements and also go over her diet and emphasize postoperative commitment and compliance  The patient was also instructed to start exercising on a regular basis  However, I recommended no heavy lifting, or weight exercises for another 2 weeks  F/U in 4 weeks  Patient was instructed to call if develops nausea, vomiting, fever or chills  Pathology was also reviewed and was negative  Because of the foaming I will start the patient on Carafate by mouth and will also order an ultrasound to rule out DVT of his right lower extremity

## 2018-06-22 NOTE — LETTER
June 22, 2018     Jewell Najjar, [address] [address] [address]    Patient: Nathan Conway Sr  YOB: 1965   Date of Visit: 6/22/2018       Dear Dr Mercy Mccoy:    Thank you for referring Ericka Zavaleta to me for evaluation  Below are my notes for this consultation  If you have questions, please do not hesitate to call me  I look forward to following your patient along with you           Sincerely,        Jerald Walker MD        CC: No Recipients

## 2018-06-26 ENCOUNTER — HOSPITAL ENCOUNTER (OUTPATIENT)
Dept: ULTRASOUND IMAGING | Facility: HOSPITAL | Age: 53
Discharge: HOME | End: 2018-06-26
Payer: MEDICARE

## 2018-06-26 DIAGNOSIS — Z98.84 BARIATRIC SURGERY STATUS: Primary | ICD-10-CM

## 2018-06-26 DIAGNOSIS — I82.401 ACUTE DEEP VEIN THROMBOSIS (DVT) OF RIGHT LOWER EXTREMITY, UNSPECIFIED VEIN (HCC): ICD-10-CM

## 2018-06-26 PROCEDURE — 93971 EXTREMITY STUDY: CPT | Performed by: SURGERY

## 2018-06-26 PROCEDURE — 93971 EXTREMITY STUDY: CPT

## 2018-07-03 ENCOUNTER — APPOINTMENT (OUTPATIENT)
Dept: RADIOLOGY | Facility: CLINIC | Age: 53
End: 2018-07-03
Payer: MEDICARE

## 2018-07-03 ENCOUNTER — OFFICE VISIT (OUTPATIENT)
Dept: FAMILY MEDICINE CLINIC | Facility: CLINIC | Age: 53
End: 2018-07-03
Payer: MEDICARE

## 2018-07-03 VITALS
DIASTOLIC BLOOD PRESSURE: 68 MMHG | SYSTOLIC BLOOD PRESSURE: 112 MMHG | WEIGHT: 284 LBS | OXYGEN SATURATION: 97 % | BODY MASS INDEX: 47.32 KG/M2 | RESPIRATION RATE: 18 BRPM | HEART RATE: 64 BPM | HEIGHT: 65 IN

## 2018-07-03 DIAGNOSIS — M25.461 EFFUSION OF RIGHT KNEE: ICD-10-CM

## 2018-07-03 DIAGNOSIS — Z98.84 S/P BARIATRIC SURGERY: ICD-10-CM

## 2018-07-03 DIAGNOSIS — M25.561 ACUTE PAIN OF RIGHT KNEE: Primary | ICD-10-CM

## 2018-07-03 DIAGNOSIS — M25.561 ACUTE PAIN OF RIGHT KNEE: ICD-10-CM

## 2018-07-03 PROCEDURE — 73564 X-RAY EXAM KNEE 4 OR MORE: CPT

## 2018-07-03 PROCEDURE — 99214 OFFICE O/P EST MOD 30 MIN: CPT | Performed by: NURSE PRACTITIONER

## 2018-07-03 RX ORDER — ACETAMINOPHEN AND CODEINE PHOSPHATE 300; 30 MG/1; MG/1
1 TABLET ORAL EVERY 6 HOURS PRN
Qty: 30 TABLET | Refills: 0 | Status: SHIPPED | OUTPATIENT
Start: 2018-07-03 | End: 2018-07-13

## 2018-07-03 NOTE — PROGRESS NOTES
Assessment/Plan:    No problem-specific Assessment & Plan notes found for this encounter  Diagnoses and all orders for this visit:    Acute pain of right knee  -     XR knee 4+ vw right injury; Future  -     Ambulatory referral to Orthopedic Surgery; Future    Effusion of right knee  -     XR knee 4+ vw right injury; Future  -     Ambulatory referral to Orthopedic Surgery; Future  -     Compression sleeve    S/P bariatric surgery  -     acetaminophen-codeine (TYLENOL #3) 300-30 mg per tablet; Take 1 tablet by mouth every 6 (six) hours as needed for moderate pain for up to 10 days          Subjective:      Patient ID: Vaishali Saucedo Sr  is a 46 y o  male  Pt is here with acute right knee pain  Pt tells me that 5 days ago, while walking, he may have turned the wrong way  He felt a "give" in his right knee and heard a "pop"  He noticed pain when turning either direction in a sideways motion  He feels like the knee is unstable  Most of the pain is located on each side of the patella  No swelling  This am , pt took a tylenol #3, which he had leftover from his recent gastric bypass surgery  This provided mild to mod pain relief  About one week ago, he had an US of his right upper thigh to eval for poss blood clot due to sharp pains in the thigh  I have reviewed US and it is wnl  The following portions of the patient's history were reviewed and updated as appropriate:   He  has a past medical history of CPAP (continuous positive airway pressure) dependence; Esophageal varices ([address] ); Hepatic cirrhosis ([address] ); Hepatic encephalopathy ([address] ); Obesity; Pneumonia; Sleep apnea; and Wears glasses    He   Patient Active Problem List    Diagnosis Date Noted    Acute pain of right knee 07/03/2018    Effusion of right knee 07/03/2018    S/P bariatric surgery 06/13/2018    Mood swings ([address] ) 06/13/2018    Secondary esophageal varices without bleeding ([address] ) 03/23/2018    Hepatic encephalopathy ([facility] ) 03/23/2018    Obstructive sleep apnea 03/08/2018    Morbid obesity ([facility] [address] ) 03/02/2018    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 ([facility] ) 02/21/2018    Gastroesophageal reflux disease without esophagitis 02/21/2018    Class 3 obesity due to excess calories without serious comorbidity with body mass index (BMI) of 50 0 to 59 9 in adult [facility]) 02/12/2018    Smoker 02/12/2018    Erectile dysfunction 02/12/2018     He  has a past surgical history that includes Colonoscopy; Esophagogastroduodenoscopy (N/A, 4/12/2018); Other surgical history; Fracture surgery (Left); and pr lap, dilip restrict proc, longitudinal gastrectomy (N/A, 6/5/2018)  His family history includes Diabetes in his father; Heart disease in his mother; Lupus in his mother; Stroke in his father  He  reports that he quit smoking about 4 months ago  His smoking use included Cigarettes  He has a 9 25 pack-year smoking history  He has never used smokeless tobacco  He reports that he does not drink alcohol or use drugs  Current Outpatient Prescriptions   Medication Sig Dispense Refill    acetaminophen-codeine (TYLENOL #3) 300-30 mg per tablet Take 1 tablet by mouth every 6 (six) hours as needed for moderate pain for up to 10 days 30 tablet 0    AMILoride 5 mg tablet Take 10 mg by mouth 2 (two) times a day    0    furosemide (LASIX) 40 mg tablet Take 0 5 tablets (20 mg total) by mouth daily 30 tablet 0    lactulose (CONSTULOSE) 10 g/15 mL solution Take 10 g by mouth daily as needed        nadolol (CORGARD) 20 mg tablet Take 20 mg by mouth 2 (two) times a day    1    omeprazole (PriLOSEC) 20 mg delayed release capsule Take 1 capsule (20 mg total) by mouth daily for 90 days 90 capsule 3    rifaximin (XIFAXAN) 550 mg tablet Take 550 mg by mouth 2 (two) times a day        sucralfate (CARAFATE) 1 g tablet Take 1 tablet (1 g total) by mouth 4 (four) times a day 30 tablet 3     No current facility-administered medications for this visit        Current Outpatient Prescriptions on File Prior to Visit   Medication Sig    AMILoride 5 mg tablet Take 10 mg by mouth 2 (two) times a day      furosemide (LASIX) 40 mg tablet Take 0 5 tablets (20 mg total) by mouth daily    lactulose (CONSTULOSE) 10 g/15 mL solution Take 10 g by mouth daily as needed      nadolol (CORGARD) 20 mg tablet Take 20 mg by mouth 2 (two) times a day      omeprazole (PriLOSEC) 20 mg delayed release capsule Take 1 capsule (20 mg total) by mouth daily for 90 days    rifaximin (XIFAXAN) 550 mg tablet Take 550 mg by mouth 2 (two) times a day      sucralfate (CARAFATE) 1 g tablet Take 1 tablet (1 g total) by mouth 4 (four) times a day    [DISCONTINUED] acetaminophen-codeine (TYLENOL #3) 300-30 mg per tablet Take 1 tablet by mouth every 4 (four) hours as needed for moderate pain     No current facility-administered medications on file prior to visit  He has No Known Allergies       Review of Systems   Constitutional: Negative for fever  Cardiovascular: Negative for leg swelling  Musculoskeletal: Positive for gait problem  Knee pain   Allergic/Immunologic: Negative for immunocompromised state  Neurological: Negative for tremors, weakness and numbness  Hematological: Negative for adenopathy  All other systems reviewed and are negative  Objective:      /68 (BP Location: Left arm, Patient Position: Sitting)   Pulse 64   Resp 18   Ht 5' 5" (1 651 m)   Wt 129 kg (284 lb)   SpO2 97%   BMI 47 26 kg/m²          Physical Exam   Constitutional: He is oriented to person, place, and time  He appears well-developed and well-nourished  No distress  HENT:   Head: Normocephalic and atraumatic  Mouth/Throat: No oropharyngeal exudate  Eyes: Conjunctivae and EOM are normal  Pupils are equal, round, and reactive to light  Right eye exhibits no discharge  Left eye exhibits no discharge  Neck: Normal range of motion     Cardiovascular: Normal rate, regular rhythm and normal heart sounds  Exam reveals no gallop and no friction rub  No murmur heard  Pulmonary/Chest: Effort normal and breath sounds normal  No respiratory distress  He has no wheezes  Abdominal: Soft  obese   Musculoskeletal: Normal range of motion  He exhibits edema and tenderness  Obese body habitus limits exam  Right knee effusion is palpable  Tenderness upon palpation all around the joint  No erythema, distal pulse is normal   Neurological: He is alert and oriented to person, place, and time  Skin: Skin is warm and dry  No rash noted  He is not diaphoretic  No erythema  Psychiatric: He has a normal mood and affect  His behavior is normal  Judgment and thought content normal    Nursing note and vitals reviewed

## 2018-07-03 NOTE — PATIENT INSTRUCTIONS
Acute right knee pain and effusion- Refer to Sports Medicine  Obtain xray of knee  Compression--ordered by script , please obtain from Medical supply store such as [employer]--for appropriate sizing   Refilled Tyl #3

## 2018-07-06 ENCOUNTER — OFFICE VISIT (OUTPATIENT)
Dept: OBGYN CLINIC | Facility: CLINIC | Age: 53
End: 2018-07-06
Payer: MEDICARE

## 2018-07-06 VITALS
HEART RATE: 86 BPM | BODY MASS INDEX: 47.07 KG/M2 | SYSTOLIC BLOOD PRESSURE: 118 MMHG | WEIGHT: 282.5 LBS | DIASTOLIC BLOOD PRESSURE: 60 MMHG | RESPIRATION RATE: 18 BRPM | HEIGHT: 65 IN

## 2018-07-06 DIAGNOSIS — M25.361 KNEE INSTABILITY, RIGHT: ICD-10-CM

## 2018-07-06 DIAGNOSIS — M22.2X1 PATELLOFEMORAL PAIN SYNDROME OF RIGHT KNEE: ICD-10-CM

## 2018-07-06 DIAGNOSIS — M25.561 ACUTE PAIN OF RIGHT KNEE: Primary | ICD-10-CM

## 2018-07-06 PROCEDURE — 99203 OFFICE O/P NEW LOW 30 MIN: CPT | Performed by: FAMILY MEDICINE

## 2018-07-06 PROCEDURE — 20610 DRAIN/INJ JOINT/BURSA W/O US: CPT | Performed by: FAMILY MEDICINE

## 2018-07-06 RX ORDER — LIDOCAINE HYDROCHLORIDE 10 MG/ML
4 INJECTION, SOLUTION INFILTRATION; PERINEURAL
Status: COMPLETED | OUTPATIENT
Start: 2018-07-06 | End: 2018-07-06

## 2018-07-06 RX ORDER — TRIAMCINOLONE ACETONIDE 40 MG/ML
40 INJECTION, SUSPENSION INTRA-ARTICULAR; INTRAMUSCULAR
Status: COMPLETED | OUTPATIENT
Start: 2018-07-06 | End: 2018-07-06

## 2018-07-06 RX ADMIN — LIDOCAINE HYDROCHLORIDE 4 ML: 10 INJECTION, SOLUTION INFILTRATION; PERINEURAL at 14:38

## 2018-07-06 RX ADMIN — TRIAMCINOLONE ACETONIDE 40 MG: 40 INJECTION, SUSPENSION INTRA-ARTICULAR; INTRAMUSCULAR at 14:38

## 2018-07-06 NOTE — PROGRESS NOTES
Assessment/Plan:  Assessment/Plan   Diagnoses and all orders for this visit:    Acute pain of right knee  -     Ambulatory referral to Orthopedic Surgery  -     Large joint arthrocentesis    Patellofemoral pain syndrome of right knee  -     Large joint arthrocentesis  -     Ambulatory referral to Physical Therapy; Future    Knee instability, right  -     Ambulatory referral to Physical Therapy; Future       27-year-old male with right knee pain  Discussed with patient physical exam, radiographs, impression, and plan  X-rays are unremarkable for acute osseous abnormality of the right knee  Physical exam is noted for medial and lateral joint line tenderness and significant pain with patellar inhibition and grind  Clinical impression is patellofemoral syndrome he may also have meniscal injury  I discussed treatment regimen of corticosteroid injection and physical therapy to which he agreed  I administered mixture of 4 cc 1% lidocaine and 1 cc Kenalog to the right knee without complication  He is to start physical therapy as soon as possible and do home exercises directed  He may take over-the-counter Aleve as needed for pain  He will follow up with me in 6 weeks at which point he will be re-evaluated  Subjective:   Patient ID: Mariah Montero Sr  is a 46 y o  male  Chief Complaint   Patient presents with    Right Knee - Pain, Swelling, Clicking         [id number] male presents for evaluation of right knee pain  Of 3 weeks duration  He reports that while stepping out of his car he twisted his knee and felt a pop  He had sudden onset of pain that is described as localized to the anterior medial aspect of the knee, sharp, nonradiating, worse with twisting and bearing weight, and improved at rest   He denies any associated swelling  He does report feeling of instability    He presented to his primary care provider and was sent for x-ray and x-rays noted for mild effusion of the knee, and was advised to follow up with orthopedic care  Prior to this episode he denies any previous issue with the knees  Knee Pain   This is a new problem  The current episode started 1 to 4 weeks ago  The problem occurs constantly  The problem has been unchanged  Associated symptoms include arthralgias  Pertinent negatives include no abdominal pain, chest pain, chills, fever, joint swelling, numbness, rash, sore throat or weakness  The symptoms are aggravated by walking and twisting  He has tried rest and oral narcotics for the symptoms  The treatment provided mild relief  The following portions of the patient's history were reviewed and updated as appropriate: He  has a past medical history of CPAP (continuous positive airway pressure) dependence; Esophageal varices ([facility] [address] ); Hepatic cirrhosis ([facility] [address] ); Hepatic encephalopathy ([facility] [address] ); Obesity; Pneumonia; Sleep apnea; and Wears glasses  He  has a past surgical history that includes Colonoscopy; Esophagogastroduodenoscopy (N/A, 4/12/2018); Other surgical history; Fracture surgery (Left); and pr lap, dilip restrict proc, longitudinal gastrectomy (N/A, 6/5/2018)  His family history includes Diabetes in his father; Heart disease in his mother; Lupus in his mother; Stroke in his father  He  reports that he quit smoking about 4 months ago  His smoking use included Cigarettes  He has a 9 25 pack-year smoking history  He has never used smokeless tobacco  He reports that he does not drink alcohol or use drugs  He has No Known Allergies       Review of Systems   Constitutional: Negative for chills and fever  HENT: Negative for sore throat  Eyes: Negative for visual disturbance  Respiratory: Negative for shortness of breath  Cardiovascular: Negative for chest pain  Gastrointestinal: Negative for abdominal pain  Genitourinary: Negative for flank pain  Musculoskeletal: Positive for arthralgias  Negative for joint swelling  Skin: Negative for rash and wound  Neurological: Negative for weakness and numbness  Hematological: Does not bruise/bleed easily  Psychiatric/Behavioral: Negative for self-injury  Objective:  Vitals:    07/06/18 1402   BP: 118/60   BP Location: Right arm   Patient Position: Sitting   Cuff Size: Large   Pulse: 86   Resp: 18   Weight: 128 kg (282 lb 8 oz)   Height: 5' 4 5" (1 638 m)     Right Knee Exam     Tenderness   The patient is experiencing tenderness in the lateral joint line and medial joint line  Range of Motion   Extension: normal   Flexion: 130     Muscle Strength     The patient has normal right knee strength  Tests   Timothy:  Right knee positive medial Timothy test:  equivocal    Varus: negative  Valgus: negative    Other   Swelling: none    Comments:     significant pain with patellar inhibition and grind            Physical Exam   Constitutional: He is oriented to person, place, and time  He appears well-developed  No distress  HENT:   Head: Normocephalic and atraumatic  Eyes: Conjunctivae are normal    Neck: No tracheal deviation present  Cardiovascular: Normal rate  Pulmonary/Chest: Effort normal  No respiratory distress  Abdominal: He exhibits no distension  Neurological: He is alert and oriented to person, place, and time  Skin: Skin is warm and dry  Psychiatric: He has a normal mood and affect  His behavior is normal    Nursing note and vitals reviewed  I have personally reviewed pertinent films in PACS and my interpretation is No acute osseous abnormality of the right knee         Large joint arthrocentesis  Date/Time: 7/6/2018 2:38 PM  Consent given by: patient  Site marked: site marked  Timeout: Immediately prior to procedure a time out was called to verify the correct patient, procedure, equipment, support staff and site/side marked as required   Supporting Documentation  Indications: pain   Procedure Details  Location: knee - R knee  Preparation: Patient was prepped and draped in the usual sterile fashion  Needle size: 22 G  Approach: anterolateral  Medications administered: 4 mL lidocaine 1 %; 40 mg triamcinolone acetonide 40 mg/mL    Patient tolerance: patient tolerated the procedure well with no immediate complications  Dressing:  Sterile dressing applied

## 2018-07-18 ENCOUNTER — TELEPHONE (OUTPATIENT)
Dept: NEUROLOGY | Facility: CLINIC | Age: 53
End: 2018-07-18

## 2018-08-01 ENCOUNTER — CLINICAL SUPPORT (OUTPATIENT)
Dept: BARIATRICS | Facility: CLINIC | Age: 53
End: 2018-08-01

## 2018-08-01 VITALS — WEIGHT: 266.6 LBS | BODY MASS INDEX: 44.42 KG/M2 | HEIGHT: 65 IN

## 2018-08-01 DIAGNOSIS — E66.01 MORBID OBESITY (HCC): Primary | ICD-10-CM

## 2018-08-01 DIAGNOSIS — Z98.84 S/P BARIATRIC SURGERY: ICD-10-CM

## 2018-08-01 PROCEDURE — RECHECK: Performed by: DIETITIAN, REGISTERED

## 2018-08-01 NOTE — PROGRESS NOTES
Weight Management Nutrition Class     Diagnosis: Morbid Obesity    Bariatric Surgeon: Dr Filomena Sheriff    Surgery: Vertical Sleeve Gastrectomy    Class: 5 week post op     Topics discussed today include:     fluid goals post op, protein goals post op, constipation, chew food well, exercise, avoidance of alcohol, PPI use, diet progression, hypoglycemia, dumping syndrome, protein supplems, vitamin/mineral supplements, calcium supplements, additional vitamin B12 and iron supplements    Patient was able to verbalize basic diet (protein, fluid, vitamin and mineral) recommendations and possible nutrition-related complications  Yes     Patient attended 5 week post op class  Reviewed diet progression, vitamin and mineral recommendations, 30/60 rules, volume of foods, protein supplements, hydration needs, antacid  guidelines, recommendation to f/u with pcp concerning med adjustments, exercise guidelines, hair shedding, contraception guidelines, constipation prevention and treatment, alcohol avoidance and recommendations of when to call the office  Patient was also weighed and filled out form for program   Time was left for discussion and to answer questions

## 2018-08-06 ENCOUNTER — TELEPHONE (OUTPATIENT)
Dept: GASTROENTEROLOGY | Facility: AMBULARY SURGERY CENTER | Age: 53
End: 2018-08-06

## 2018-08-06 NOTE — TELEPHONE ENCOUNTER
Dr Traylor's pt called wanting to know if there are any doctors that the pt can be referred to for a liver transplant   Please call pt to advise [phone]

## 2018-08-07 ENCOUNTER — APPOINTMENT (OUTPATIENT)
Dept: LAB | Facility: CLINIC | Age: 53
End: 2018-08-07
Payer: MEDICARE

## 2018-08-07 ENCOUNTER — OFFICE VISIT (OUTPATIENT)
Dept: FAMILY MEDICINE CLINIC | Facility: CLINIC | Age: 53
End: 2018-08-07
Payer: MEDICARE

## 2018-08-07 VITALS
OXYGEN SATURATION: 97 % | HEART RATE: 66 BPM | HEIGHT: 65 IN | BODY MASS INDEX: 43.45 KG/M2 | SYSTOLIC BLOOD PRESSURE: 128 MMHG | RESPIRATION RATE: 16 BRPM | WEIGHT: 260.8 LBS | DIASTOLIC BLOOD PRESSURE: 82 MMHG | TEMPERATURE: 97.9 F

## 2018-08-07 DIAGNOSIS — Z13.1 DIABETES MELLITUS SCREENING: ICD-10-CM

## 2018-08-07 DIAGNOSIS — B18.2 HEPATIC CIRRHOSIS DUE TO CHRONIC HEPATITIS C INFECTION (HCC): ICD-10-CM

## 2018-08-07 DIAGNOSIS — K74.60 HEPATIC CIRRHOSIS DUE TO CHRONIC HEPATITIS C INFECTION (HCC): ICD-10-CM

## 2018-08-07 DIAGNOSIS — D64.9 ANEMIA, UNSPECIFIED TYPE: ICD-10-CM

## 2018-08-07 DIAGNOSIS — K72.90 HEPATIC ENCEPHALOPATHY (HCC): ICD-10-CM

## 2018-08-07 DIAGNOSIS — K74.60 HEPATIC CIRRHOSIS DUE TO CHRONIC HEPATITIS C INFECTION (HCC): Primary | ICD-10-CM

## 2018-08-07 DIAGNOSIS — R53.83 FATIGUE, UNSPECIFIED TYPE: ICD-10-CM

## 2018-08-07 DIAGNOSIS — R42 DIZZY: ICD-10-CM

## 2018-08-07 DIAGNOSIS — B18.2 HEPATIC CIRRHOSIS DUE TO CHRONIC HEPATITIS C INFECTION (HCC): Primary | ICD-10-CM

## 2018-08-07 LAB
ALBUMIN SERPL BCP-MCNC: 3.6 G/DL (ref 3.5–5)
ALP SERPL-CCNC: 102 U/L (ref 46–116)
ALT SERPL W P-5'-P-CCNC: 39 U/L (ref 12–78)
ANION GAP SERPL CALCULATED.3IONS-SCNC: 8 MMOL/L (ref 4–13)
AST SERPL W P-5'-P-CCNC: 42 U/L (ref 5–45)
BASOPHILS # BLD AUTO: 0.04 THOUSANDS/ΜL (ref 0–0.1)
BASOPHILS NFR BLD AUTO: 1 % (ref 0–1)
BILIRUB SERPL-MCNC: 3.63 MG/DL (ref 0.2–1)
BUN SERPL-MCNC: 16 MG/DL (ref 5–25)
CALCIUM SERPL-MCNC: 9.7 MG/DL (ref 8.3–10.1)
CHLORIDE SERPL-SCNC: 103 MMOL/L (ref 100–108)
CO2 SERPL-SCNC: 27 MMOL/L (ref 21–32)
CREAT SERPL-MCNC: 0.9 MG/DL (ref 0.6–1.3)
EOSINOPHIL # BLD AUTO: 0.21 THOUSAND/ΜL (ref 0–0.61)
EOSINOPHIL NFR BLD AUTO: 3 % (ref 0–6)
ERYTHROCYTE [DISTWIDTH] IN BLOOD BY AUTOMATED COUNT: 14.9 % (ref 11.6–15.1)
FOLATE SERPL-MCNC: 14.7 NG/ML (ref 3.1–17.5)
GFR SERPL CREATININE-BSD FRML MDRD: 96 ML/MIN/1.73SQ M
GLUCOSE P FAST SERPL-MCNC: 101 MG/DL (ref 65–99)
HCT VFR BLD AUTO: 47 % (ref 36.5–49.3)
HGB BLD-MCNC: 15.8 G/DL (ref 12–17)
IMM GRANULOCYTES # BLD AUTO: 0.02 THOUSAND/UL (ref 0–0.2)
IMM GRANULOCYTES NFR BLD AUTO: 0 % (ref 0–2)
LYMPHOCYTES # BLD AUTO: 1.87 THOUSANDS/ΜL (ref 0.6–4.47)
LYMPHOCYTES NFR BLD AUTO: 24 % (ref 14–44)
MCH RBC QN AUTO: 32.9 PG (ref 26.8–34.3)
MCHC RBC AUTO-ENTMCNC: 33.6 G/DL (ref 31.4–37.4)
MCV RBC AUTO: 98 FL (ref 82–98)
MONOCYTES # BLD AUTO: 0.7 THOUSAND/ΜL (ref 0.17–1.22)
MONOCYTES NFR BLD AUTO: 9 % (ref 4–12)
NEUTROPHILS # BLD AUTO: 4.87 THOUSANDS/ΜL (ref 1.85–7.62)
NEUTS SEG NFR BLD AUTO: 63 % (ref 43–75)
NRBC BLD AUTO-RTO: 0 /100 WBCS
PLATELET # BLD AUTO: 156 THOUSANDS/UL (ref 149–390)
PMV BLD AUTO: 13.1 FL (ref 8.9–12.7)
POTASSIUM SERPL-SCNC: 3.9 MMOL/L (ref 3.5–5.3)
PROT SERPL-MCNC: 6.9 G/DL (ref 6.4–8.2)
RBC # BLD AUTO: 4.8 MILLION/UL (ref 3.88–5.62)
SODIUM SERPL-SCNC: 138 MMOL/L (ref 136–145)
TSH SERPL DL<=0.05 MIU/L-ACNC: 1.34 UIU/ML
VIT B12 SERPL-MCNC: 941 PG/ML (ref 100–900)
WBC # BLD AUTO: 7.71 THOUSAND/UL (ref 4.31–10.16)

## 2018-08-07 PROCEDURE — 84443 ASSAY THYROID STIM HORMONE: CPT

## 2018-08-07 PROCEDURE — 99214 OFFICE O/P EST MOD 30 MIN: CPT | Performed by: FAMILY MEDICINE

## 2018-08-07 PROCEDURE — 82746 ASSAY OF FOLIC ACID SERUM: CPT

## 2018-08-07 PROCEDURE — 36415 COLL VENOUS BLD VENIPUNCTURE: CPT

## 2018-08-07 PROCEDURE — 85025 COMPLETE CBC W/AUTO DIFF WBC: CPT

## 2018-08-07 PROCEDURE — 82607 VITAMIN B-12: CPT

## 2018-08-07 PROCEDURE — 80053 COMPREHEN METABOLIC PANEL: CPT

## 2018-08-07 NOTE — PROGRESS NOTES
Assessment/Plan:    No problem-specific Assessment & Plan notes found for this encounter  Diagnoses and all orders for this visit:    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 ([address] )  -     Ammonia; Future  -     Vitamin B12; Future  -     Folate; Future  -     TSH baseline; Future    Hepatic encephalopathy (HCC)  -     Ammonia; Future  -     Vitamin B12; Future  -     Folate; Future  -     TSH baseline; Future    Diabetes mellitus screening  -     Comprehensive metabolic panel; Future    Fatigue, unspecified type  Unclear etiology, dehydration? Will order blood work to further investigate at this time  -     CBC and differential; Future  -     Vitamin B12; Future  -     Folate; Future  -     TSH baseline; Future    Follow up in 1-2 weeks or as needed        Subjective:      Patient ID: Silverio Castillo Sr  is a 46 y o  male  Patient is here due to an episode he had 2-3 days ago when he went to a football game  He says that he had to walk to the stadium and felt ok however going back to his car he felt dizzy  He has been feeling fatigued as well associated with his dizzy episode  He had bariatric surgery done 2 months ago by Dr Kortney Hoffman, he says that eating meats is difficult as they are heavy foods for him  He is back to "baby food"  He was able to tolerate fish  There was no relationship with food in regards to his dizzy episode  He also has history of hepatic encephalatrophy secondary to liver cirrhosis and he is looking to be on a transplant list         The following portions of the patient's history were reviewed and updated as appropriate:   He  has a past medical history of CPAP (continuous positive airway pressure) dependence; Esophageal varices ([address] ); Hepatic cirrhosis ([address] ); Hepatic encephalopathy ([address] ); Obesity; Pneumonia; Sleep apnea; and Wears glasses    He   Patient Active Problem List    Diagnosis Date Noted    Diabetes mellitus screening 08/07/2018    Fatigue 08/07/2018    Anemia 08/07/2018    Acute pain of right knee 07/03/2018    Effusion of right knee 07/03/2018    S/P bariatric surgery 06/13/2018    Mood swings ([place] [address] ) 06/13/2018    Secondary esophageal varices without bleeding ([place] [address] ) 03/23/2018    Hepatic encephalopathy ([place] [address] ) 03/23/2018    Obstructive sleep apnea 03/08/2018    Morbid obesity ([place] [address] ) 03/02/2018    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 ([place] [address] ) 02/21/2018    Gastroesophageal reflux disease without esophagitis 02/21/2018    Class 3 obesity due to excess calories without serious comorbidity with body mass index (BMI) of 50 0 to 59 9 in adult [facility]) 02/12/2018    Smoker 02/12/2018    Erectile dysfunction 02/12/2018     He  has a past surgical history that includes Colonoscopy; Esophagogastroduodenoscopy (N/A, 4/12/2018); Other surgical history; Fracture surgery (Left); and pr lap, dilip restrict proc, longitudinal gastrectomy (N/A, 6/5/2018)  His family history includes Diabetes in his father; Heart disease in his mother; Lupus in his mother; Stroke in his father  He  reports that he quit smoking about 5 months ago  His smoking use included Cigarettes  He has a 9 25 pack-year smoking history  He has never used smokeless tobacco  He reports that he does not drink alcohol or use drugs    Current Outpatient Prescriptions   Medication Sig Dispense Refill    AMILoride 5 mg tablet Take 10 mg by mouth 2 (two) times a day    0    furosemide (LASIX) 40 mg tablet Take 0 5 tablets (20 mg total) by mouth daily 30 tablet 0    lactulose (CONSTULOSE) 10 g/15 mL solution Take 10 g by mouth daily as needed        nadolol (CORGARD) 20 mg tablet Take 20 mg by mouth 2 (two) times a day    1    omeprazole (PriLOSEC) 20 mg delayed release capsule Take 1 capsule (20 mg total) by mouth daily for 90 days 90 capsule 3    rifaximin (XIFAXAN) 550 mg tablet Take 550 mg by mouth 2 (two) times a day        sucralfate (CARAFATE) 1 g tablet Take 1 tablet (1 g total) by mouth 4 (four) times a day 30 tablet 3     No current facility-administered medications for this visit  Current Outpatient Prescriptions on File Prior to Visit   Medication Sig    AMILoride 5 mg tablet Take 10 mg by mouth 2 (two) times a day      furosemide (LASIX) 40 mg tablet Take 0 5 tablets (20 mg total) by mouth daily    lactulose (CONSTULOSE) 10 g/15 mL solution Take 10 g by mouth daily as needed      nadolol (CORGARD) 20 mg tablet Take 20 mg by mouth 2 (two) times a day      omeprazole (PriLOSEC) 20 mg delayed release capsule Take 1 capsule (20 mg total) by mouth daily for 90 days    rifaximin (XIFAXAN) 550 mg tablet Take 550 mg by mouth 2 (two) times a day      sucralfate (CARAFATE) 1 g tablet Take 1 tablet (1 g total) by mouth 4 (four) times a day     No current facility-administered medications on file prior to visit  He has No Known Allergies       Review of Systems   Constitutional: Positive for fatigue  Negative for activity change, appetite change and fever  HENT: Negative for congestion and ear discharge  Respiratory: Negative for cough and shortness of breath  Cardiovascular: Negative for chest pain and palpitations  Gastrointestinal: Negative for diarrhea and nausea  Musculoskeletal: Negative for arthralgias and back pain  Skin: Negative for color change and rash  Neurological: Positive for dizziness and light-headedness  Negative for headaches  Psychiatric/Behavioral: Negative for agitation and behavioral problems  Objective:      /82 (BP Location: Left arm, Patient Position: Sitting, Cuff Size: Adult)   Pulse 66   Temp 97 9 °F (36 6 °C) (Tympanic)   Resp 16   Ht 5' 5" (1 651 m)   Wt 118 kg (260 lb 12 8 oz)   SpO2 97%   BMI 43 40 kg/m²          Physical Exam   Constitutional: He is oriented to person, place, and time  He appears well-developed and well-nourished  No distress  Eyes: Pupils are equal, round, and reactive to light   No scleral icterus  Cardiovascular: Normal rate, regular rhythm and normal heart sounds  No murmur heard  Pulmonary/Chest: Effort normal and breath sounds normal  No respiratory distress  He has no wheezes  Abdominal: Soft  Bowel sounds are normal  He exhibits no distension  There is no tenderness  Neurological: He is alert and oriented to person, place, and time  Skin: Skin is warm and dry  No rash noted  He is not diaphoretic  Psychiatric: He has a normal mood and affect

## 2018-08-07 NOTE — TELEPHONE ENCOUNTER
I will make the [facility] referral today  Nirav Ramon can you please call the patient and tell him that the referral is being made and they will call him to arrange an appointment once their financial department reviews his insurance coverage  Thank You!

## 2018-08-08 ENCOUNTER — APPOINTMENT (OUTPATIENT)
Dept: LAB | Facility: HOSPITAL | Age: 53
End: 2018-08-08
Attending: FAMILY MEDICINE
Payer: MEDICARE

## 2018-08-08 DIAGNOSIS — E72.20 HYPERAMMONEMIA (HCC): Primary | ICD-10-CM

## 2018-08-08 DIAGNOSIS — K74.60 HEPATIC CIRRHOSIS DUE TO CHRONIC HEPATITIS C INFECTION (HCC): ICD-10-CM

## 2018-08-08 DIAGNOSIS — B18.2 HEPATIC CIRRHOSIS DUE TO CHRONIC HEPATITIS C INFECTION (HCC): ICD-10-CM

## 2018-08-08 DIAGNOSIS — K72.90 HEPATIC ENCEPHALOPATHY (HCC): ICD-10-CM

## 2018-08-08 LAB — AMMONIA PLAS-SCNC: 70 UMOL/L (ref 11–35)

## 2018-08-08 PROCEDURE — 36415 COLL VENOUS BLD VENIPUNCTURE: CPT

## 2018-08-08 PROCEDURE — 82140 ASSAY OF AMMONIA: CPT

## 2018-09-06 ENCOUNTER — OFFICE VISIT (OUTPATIENT)
Dept: NEUROLOGY | Facility: CLINIC | Age: 53
End: 2018-09-06
Payer: MEDICARE

## 2018-09-06 VITALS
BODY MASS INDEX: 43.15 KG/M2 | HEART RATE: 64 BPM | WEIGHT: 259 LBS | HEIGHT: 65 IN | SYSTOLIC BLOOD PRESSURE: 114 MMHG | DIASTOLIC BLOOD PRESSURE: 71 MMHG

## 2018-09-06 DIAGNOSIS — G47.33 OBSTRUCTIVE SLEEP APNEA: Primary | ICD-10-CM

## 2018-09-06 PROCEDURE — 99203 OFFICE O/P NEW LOW 30 MIN: CPT | Performed by: PSYCHIATRY & NEUROLOGY

## 2018-09-06 NOTE — PROGRESS NOTES
Charmayne Neighbors  is a 46 y o  male  Chief Complaint   Patient presents with    Neurologic Problem     Evaluation of sleep apnea - hx of is now s/p bariatric surgery 6/6/18       Assessment:  1  Obstructive sleep apnea        Plan:    Discussion:  Reviewed and discussed with patient his sleep study results, his RDI is 12 8 and he was initially put on auto CPAP and he is not able to tolerate the CPAP and has returned his machine and is not interested to try it again, I discussed with patient consequences of untreated sleep apnea, he understands and verbalizes, he was advised to lose weight and maintain ideal body weight, avoid alcohol muscle relaxers pain killers and sleeping aids, preferably sleep in lateral recumbent position, avoid driving when feeling drowsy or sleep, follow up with his other physicians and to see me on as-needed basis  Subjective:    HPI   Patient is here for his history of obstructive sleep apnea, he is status post bariatric surgery in June, he usually goes to sleep between 10 and 11 p m  and gets up around 6 a m  he denies any difficulty in going to sleep or staying asleep, he does wake up once a night to go to the bathroom, he had a sleep study that shows he has mild obstructive sleep apnea with an RDI of 12 8 and was given auto CPAP, he tried it for a couple of weeks and did not tolerated and hence he returned the machine, his ESS is 9, he denies snoring or witnessed apnea, no drowsiness while driving, he usually sleeps on his side, no difficulty in breathing, denies any other complaints      Vitals:    09/06/18 1022   BP: 114/71   BP Location: Left arm   Patient Position: Sitting   Cuff Size: Large   Pulse: 64   Weight: 117 kg (259 lb)   Height: 5' 5" (1 651 m)       Current Medications    Current Outpatient Prescriptions:     AMILoride 5 mg tablet, Take 10 mg by mouth 2 (two) times a day  , Disp: , Rfl: 0    furosemide (LASIX) 40 mg tablet, Take 0 5 tablets (20 mg total) by mouth daily, Disp: 30 tablet, Rfl: 0    lactulose (CONSTULOSE) 10 g/15 mL solution, Take 10 g by mouth daily as needed  , Disp: , Rfl:     nadolol (CORGARD) 20 mg tablet, Take 20 mg by mouth 2 (two) times a day  , Disp: , Rfl: 1    rifaximin (XIFAXAN) 550 mg tablet, Take 550 mg by mouth 2 (two) times a day  , Disp: , Rfl:     sucralfate (CARAFATE) 1 g tablet, Take 1 tablet (1 g total) by mouth 4 (four) times a day, Disp: 30 tablet, Rfl: 3    omeprazole (PriLOSEC) 20 mg delayed release capsule, Take 1 capsule (20 mg total) by mouth daily for 90 days, Disp: 90 capsule, Rfl: 3      Allergies  Patient has no known allergies  Past Medical History  Past Medical History:   Diagnosis Date    CPAP (continuous positive airway pressure) dependence     Esophageal varices (HCC)     Hepatic cirrhosis (HCC)     treated with harvoni   Hep C- dx age 17   Syeda Day Hepatic encephalopathy ([address] )     Obesity     Pneumonia     in past    Sleep apnea     Wears glasses          Past Surgical History:  Past Surgical History:   Procedure Laterality Date    COLONOSCOPY      ESOPHAGOGASTRODUODENOSCOPY N/A 4/12/2018    Procedure: ESOPHAGOGASTRODUODENOSCOPY (EGD); Surgeon: Trell Wiseman MD;  Location: [facility]; Service: Gastroenterology    FRACTURE SURGERY Left     ankle    OTHER SURGICAL HISTORY      banding esophageal varices    TN LAP, TERESA RESTRICT PROC, LONGITUDINAL GASTRECTOMY N/A 6/5/2018    Procedure: GASTRECTOMY LAPAROSCOPIC SLEEVE;  Surgeon: Crys Joe MD;  Location: [facility] OR;  Service: Bariatrics         Family History:  Family History   Problem Relation Age of Onset    Heart disease Mother     Lupus Mother     Diabetes Father     Stroke Father        Social History:   reports that he quit smoking about 6 months ago  His smoking use included Cigarettes  He has a 9 25 pack-year smoking history  He has never used smokeless tobacco  He reports that he does not drink alcohol or use drugs      I have reviewed the past medical history, surgical history, social and family history, current medications, allergies vitals, review of systems, and updated this information as appropriate today  Objective:    Physical Exam    Neurological Exam    GENERAL:  Cooperative in no acute distress  Well-developed and well-nourished    HEAD and NECK   Head is atraumatic normocephalic with no lesions or masses  Neck is supple with full range of motion    CARDIOVASCULAR  Carotid Arteries-no carotid bruits  NEUROLOGIC:  Mental Status-the patient is awake alert and oriented without aphasia or apraxia  Cranial Nerves: Visual fields are full to confrontation  Extraocular movements are full without nystagmus  Pupils are 2-1/2 mm and reactive  Face is symmetrical to light touch  Movements of facial expression move symmetrically  Hearing is normal to finger rub bilaterally  Soft palate lifts symmetrically  Shoulder shrug is symmetrical  Tongue is midline without atrophy  Motor: No drift is noted on arm extension  Strength is full in the upper and lower extremities with normal bulk and tone  Gait is unremarkable  Oropharynx classification is 4/4  Short and wide neck  Cardiovascular examination is normal rate regular rhythm  Chest is clear to auscultate bilaterally  Skin is warm and dry  Normal mood and affect and behavior is normal           ROS:  Review of Systems   Constitutional: Negative for appetite change and fever  HENT: Negative  Negative for hearing loss, tinnitus, trouble swallowing and voice change  Eyes: Negative  Negative for photophobia and pain  Respiratory: Negative  Negative for shortness of breath  Cardiovascular: Negative  Negative for palpitations  Gastrointestinal: Negative  Negative for nausea and vomiting  Endocrine: Negative  Negative for cold intolerance and heat intolerance  Genitourinary: Negative  Negative for dysuria, frequency and urgency  Musculoskeletal: Negative    Negative for myalgias and neck pain  Skin: Negative  Negative for rash  Neurological: Negative  Negative for dizziness, tremors, seizures, syncope, facial asymmetry, speech difficulty, weakness, light-headedness, numbness and headaches  Hematological: Negative  Does not bruise/bleed easily  Psychiatric/Behavioral: Negative  Negative for confusion, hallucinations and sleep disturbance       ESS = 9

## 2018-09-26 PROBLEM — K91.2 POSTSURGICAL MALABSORPTION: Status: ACTIVE | Noted: 2018-09-26

## 2018-10-04 ENCOUNTER — OFFICE VISIT (OUTPATIENT)
Dept: BARIATRICS | Facility: CLINIC | Age: 53
End: 2018-10-04

## 2018-10-04 ENCOUNTER — APPOINTMENT (OUTPATIENT)
Dept: LAB | Facility: HOSPITAL | Age: 53
End: 2018-10-04
Payer: MEDICARE

## 2018-10-04 VITALS
HEART RATE: 74 BPM | TEMPERATURE: 96.5 F | DIASTOLIC BLOOD PRESSURE: 68 MMHG | HEIGHT: 65 IN | SYSTOLIC BLOOD PRESSURE: 110 MMHG | WEIGHT: 265 LBS | BODY MASS INDEX: 44.15 KG/M2

## 2018-10-04 DIAGNOSIS — G47.33 OBSTRUCTIVE SLEEP APNEA: ICD-10-CM

## 2018-10-04 DIAGNOSIS — K21.9 GASTROESOPHAGEAL REFLUX DISEASE WITHOUT ESOPHAGITIS: ICD-10-CM

## 2018-10-04 DIAGNOSIS — Z98.84 S/P LAPAROSCOPIC SLEEVE GASTRECTOMY: ICD-10-CM

## 2018-10-04 DIAGNOSIS — K91.2 POSTSURGICAL MALABSORPTION: Primary | ICD-10-CM

## 2018-10-04 DIAGNOSIS — Z87.891 FORMER SMOKER: ICD-10-CM

## 2018-10-04 DIAGNOSIS — E66.01 MORBID OBESITY WITH BMI OF 40.0-44.9, ADULT (HCC): ICD-10-CM

## 2018-10-04 DIAGNOSIS — B18.2 HEPATIC CIRRHOSIS DUE TO CHRONIC HEPATITIS C INFECTION (HCC): ICD-10-CM

## 2018-10-04 DIAGNOSIS — K91.2 POSTSURGICAL MALABSORPTION: ICD-10-CM

## 2018-10-04 DIAGNOSIS — K74.60 HEPATIC CIRRHOSIS DUE TO CHRONIC HEPATITIS C INFECTION (HCC): ICD-10-CM

## 2018-10-04 PROBLEM — IMO0001 CLASS 3 OBESITY DUE TO EXCESS CALORIES WITHOUT SERIOUS COMORBIDITY WITH BODY MASS INDEX (BMI) OF 50.0 TO 59.9 IN ADULT: Status: RESOLVED | Noted: 2018-02-12 | Resolved: 2018-10-04

## 2018-10-04 LAB
25(OH)D3 SERPL-MCNC: 24.1 NG/ML (ref 30–100)
BASOPHILS # BLD AUTO: 0.04 THOUSANDS/ΜL (ref 0–0.1)
BASOPHILS NFR BLD AUTO: 1 % (ref 0–1)
EOSINOPHIL # BLD AUTO: 0.28 THOUSAND/ΜL (ref 0–0.61)
EOSINOPHIL NFR BLD AUTO: 4 % (ref 0–6)
ERYTHROCYTE [DISTWIDTH] IN BLOOD BY AUTOMATED COUNT: 15.2 % (ref 11.6–15.1)
FERRITIN SERPL-MCNC: 150 NG/ML (ref 8–388)
FOLATE SERPL-MCNC: 6.7 NG/ML (ref 3.1–17.5)
HCT VFR BLD AUTO: 42 % (ref 36.5–49.3)
HGB BLD-MCNC: 14.7 G/DL (ref 12–17)
IMM GRANULOCYTES # BLD AUTO: 0.03 THOUSAND/UL (ref 0–0.2)
IMM GRANULOCYTES NFR BLD AUTO: 0 % (ref 0–2)
IRON SATN MFR SERPL: 53 %
IRON SERPL-MCNC: 150 UG/DL (ref 65–175)
LYMPHOCYTES # BLD AUTO: 1.37 THOUSANDS/ΜL (ref 0.6–4.47)
LYMPHOCYTES NFR BLD AUTO: 21 % (ref 14–44)
MCH RBC QN AUTO: 35.3 PG (ref 26.8–34.3)
MCHC RBC AUTO-ENTMCNC: 35 G/DL (ref 31.4–37.4)
MCV RBC AUTO: 101 FL (ref 82–98)
MONOCYTES # BLD AUTO: 0.56 THOUSAND/ΜL (ref 0.17–1.22)
MONOCYTES NFR BLD AUTO: 8 % (ref 4–12)
NEUTROPHILS # BLD AUTO: 4.39 THOUSANDS/ΜL (ref 1.85–7.62)
NEUTS SEG NFR BLD AUTO: 66 % (ref 43–75)
NRBC BLD AUTO-RTO: 0 /100 WBCS
PLATELET # BLD AUTO: 127 THOUSANDS/UL (ref 149–390)
PMV BLD AUTO: 10.9 FL (ref 8.9–12.7)
PTH-INTACT SERPL-MCNC: 33.8 PG/ML (ref 18.4–80.1)
RBC # BLD AUTO: 4.17 MILLION/UL (ref 3.88–5.62)
TIBC SERPL-MCNC: 284 UG/DL (ref 250–450)
VIT B12 SERPL-MCNC: 317 PG/ML (ref 100–900)
WBC # BLD AUTO: 6.67 THOUSAND/UL (ref 4.31–10.16)

## 2018-10-04 PROCEDURE — 84425 ASSAY OF VITAMIN B-1: CPT

## 2018-10-04 PROCEDURE — 99214 OFFICE O/P EST MOD 30 MIN: CPT | Performed by: PHYSICIAN ASSISTANT

## 2018-10-04 PROCEDURE — 83970 ASSAY OF PARATHORMONE: CPT

## 2018-10-04 PROCEDURE — 84630 ASSAY OF ZINC: CPT

## 2018-10-04 PROCEDURE — 85025 COMPLETE CBC W/AUTO DIFF WBC: CPT

## 2018-10-04 PROCEDURE — 82306 VITAMIN D 25 HYDROXY: CPT

## 2018-10-04 PROCEDURE — 82746 ASSAY OF FOLIC ACID SERUM: CPT

## 2018-10-04 PROCEDURE — 82607 VITAMIN B-12: CPT

## 2018-10-04 PROCEDURE — 83540 ASSAY OF IRON: CPT

## 2018-10-04 PROCEDURE — 82728 ASSAY OF FERRITIN: CPT

## 2018-10-04 PROCEDURE — 36415 COLL VENOUS BLD VENIPUNCTURE: CPT

## 2018-10-04 PROCEDURE — 83550 IRON BINDING TEST: CPT

## 2018-10-04 PROCEDURE — 84590 ASSAY OF VITAMIN A: CPT

## 2018-10-04 NOTE — PATIENT INSTRUCTIONS
GOALS:   · Continued/Maintain healthy weight loss with good nutrition intakes  · Adequate hydration with at least 64oz  fluid intake  · Normal vitamin and mineral levels  · Exercise as tolerated  · Avoid sweet tea, get unsweetened and add jyoti Hernandez)  · Slowly wean off the omeprazole and carafate  · Follow up with RD for additional support (may qualify for assistance for supplements)    · Follow-up in 3 months  We kindly ask that your arrive 15 minutes before your scheduled appointment time with your provider to allow our staff to room you, get your vital signs and update your chart  · Follow diet as discussed  · Get lab work done in the next 2 weeks  You have been given a lab slip today  Please call the office if you need a replacement  It is recommended to check with your insurance BEFORE getting labs done to make sure they are covered by your policy  Also, please check with your PCP and other providers before getting labs to avoid duplicate labs  Make sure to HOLD any multivitamins that may contain biotin and any biotin supplements FOR 5 DAYS before any labs since it can affect the results  · Follow vitamin and mineral recommendations as reviewed with you  · Call our office if you have any problems with abdominal pain especially associated with fever, chills, nausea, vomiting or any other concerns  · All  Post-bariatric surgery patients should be aware that very small quantities of any alcohol can cause impairment and it is very possible not to feel the effect  The effect can be in the system for several hours  It is also a stomach irritant  · It is advised to AVOID alcohol, Nonsteroidal antiinflammatory drugs (NSAIDS) and nicotine of all forms   Any of these can cause stomach irritation/pain

## 2018-10-04 NOTE — ASSESSMENT & PLAN NOTE
-At risk for malabsorption of vitamins/minerals secondary to malabsorption and restriction of intake from bariatric surgery  -NOT Currently taking adequate postop bariatric surgery vitamin supplementation: not taking any supplements (has procare chewables at home but doesn't like the taste and unable to afford others at this time)  -Advised him that he can start capsules and gave samples and coupons for procare MVI   F/U with RD for possible financial assistance for bariatric supplements  -He is willing to f/u with RD about 2 weeks after he gets his lab work done   -Limited labs done 08/07/18 showed folate and CBC WNL and mildly elevated B12 (limited set of labs completed - needs full bariatric panel)  -Next set of bariatric labs ordered for approximately 2 weeks  -Patient received education about the importance of adhering to a lifelong supplementation regimen to avoid vitamin/mineral deficiencies

## 2018-10-04 NOTE — ASSESSMENT & PLAN NOTE
-Was seen by neurology for ELLEN on 09/06/18 and told inconclusive study and given CPAP  -Not currently using CPAP (advised patient to f/u with sleep medicine/neurology)

## 2018-10-04 NOTE — ASSESSMENT & PLAN NOTE
-s/p Sleeve Gastrectomy with Dr Krystal Pappas on 06/05/18  · EWL is 48 1%, which places the patient ahead of schedule for expected post surgical weight loss at this time  · They are tolerating a regular diet without issues  · The patient attempts to eat protein first, but is not always getting 60g of protein daily  Advised him to increase protein to at least 80g daily  · Per diet recall he is snacking on chips, nuts, and often eats fried foods, pasta, limited vegetables that are cooked in gravy or heavy sauces  Lengthy dietary education today and patient is agreeable to f/u with RD  · Reports usually following the 30/60 minute rule with liquids  Limited fluid intake - drinking about 32 ounces of fluid per day, drinks sweet tea and water  Avoids carbonated beverages  Advised increasing fluid intake to at least 64oz  Daily and avoid sweet tea  · Ulcer Risk assessment:  · The patient is avoiding NSAIDS, denies smoking or alcohol  Discussion about strict AVOIDANCE of alcohol, NSAIDS, and nicotine of all forms   · Limited structured exercise  Discussion about increasing exercise and physical activity as tolerated

## 2018-10-04 NOTE — PROGRESS NOTES
Assessment/Plan:    S/P laparoscopic sleeve gastrectomy  -s/p Sleeve Gastrectomy with Dr Alanna Rush on 06/05/18  · EWL is 48 1%, which places the patient ahead of schedule for expected post surgical weight loss at this time  · They are tolerating a regular diet without issues  · The patient attempts to eat protein first, but is not always getting 60g of protein daily  Advised him to increase protein to at least 80g daily  · Per diet recall he is snacking on chips, nuts, and often eats fried foods, pasta, limited vegetables that are cooked in gravy or heavy sauces  Lengthy dietary education today and patient is agreeable to f/u with RD  · Reports usually following the 30/60 minute rule with liquids  Limited fluid intake - drinking about 32 ounces of fluid per day, drinks sweet tea and water  Avoids carbonated beverages  Advised increasing fluid intake to at least 64oz  Daily and avoid sweet tea  · Ulcer Risk assessment:  · The patient is avoiding NSAIDS, denies smoking or alcohol  Discussion about strict AVOIDANCE of alcohol, NSAIDS, and nicotine of all forms   · Limited structured exercise  Discussion about increasing exercise and physical activity as tolerated  Postsurgical malabsorption  -At risk for malabsorption of vitamins/minerals secondary to malabsorption and restriction of intake from bariatric surgery  -NOT Currently taking adequate postop bariatric surgery vitamin supplementation: not taking any supplements (has procare chewables at home but doesn't like the taste and unable to afford others at this time)  -Advised him that he can start capsules and gave samples and coupons for procare MVI   F/U with RD for possible financial assistance for bariatric supplements  -He is willing to f/u with RD about 2 weeks after he gets his lab work done   -Limited labs done 08/07/18 showed folate and CBC WNL and mildly elevated B12 (limited set of labs completed - needs full bariatric panel)  -Next set of bariatric labs ordered for approximately 2 weeks  -Patient received education about the importance of adhering to a lifelong supplementation regimen to avoid vitamin/mineral deficiencies     Gastroesophageal reflux disease without esophagitis  -Takes carafate usually BID and takes PPI once daily  -Denies reflux or abdominal pain  -May slowly taper off the PPI and stop taking carafate  -Contact the office if any issues, pain, N/V, reflux    Obstructive sleep apnea  -Was seen by neurology for ELLEN on 09/06/18 and told inconclusive study and given CPAP  -Not currently using CPAP (advised patient to f/u with sleep medicine/neurology)    Former smoker  -No longer smoking  -Hx  Of quitting many times in the past  -Encouraged patient to continue to avoid cigarettes and seek support if he feels like smoking again  -Lengthy discussion about risks of smoking    Morbid obesity with BMI of 40 0-44 9, adult ([facility] )  -BMI was 53; has lost 9 BMI points  -Should continue to improve     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 ([facility] )  -On lactulose, lasix, and now xifaxan per GI  -On 08/08/18 ammonia levels were 70 and on 08/07/18 total bilirubin 3 63  -Continue to f/u for monitoring and management with GI       Diagnoses and all orders for this visit:    Postsurgical malabsorption  -     CBC and differential; Future  -     Ferritin; Future  -     Folate; Future  -     Iron; Future  -     Iron Saturation %; Future  -     PTH, intact; Future  -     Vitamin A; Future  -     Vitamin B1, whole blood; Future  -     Vitamin B12; Future  -     Vitamin D 25 hydroxy; Future  -     Zinc; Future    S/P laparoscopic sleeve gastrectomy    Morbid obesity with BMI of 40 0-44 9, adult (HCC)    Obstructive sleep apnea    Former smoker    Gastroesophageal reflux disease without esophagitis    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 ([facility] )          Subjective:      Patient ID: Miguel Lin Sr  is a 46 y  o  male     -s/p Vertical Sleeve Gastrectomy with Dr Jackie Lewis on 06/05/18  Presents to the office today for routine follow up  Tolerating diet without issues; denies N/V, dysphagia, reflux  Initial: 313lbs  Current: 265lbs  EWL: 48 1% (Weight loss is slightly ahead of schedule at this post surgical period )  Can: current  Current BMI is Body mass index is 44 1 kg/m²  B - protein water or glass of water or iced tea (sweet tea)  L - Fried chicken or seafood salad or sandwich  D - chicken, pork chops, steak, ribs, broccoli or green beans fried with teriyaki sauce  Snacks - chips, nuts     Fluids: sweet tea, 32oz  Water    The following portions of the patient's history were reviewed and updated as appropriate: allergies, current medications, past family history, past medical history, past social history, past surgical history and problem list     Review of Systems   Constitutional: Negative for chills and fever  Unexpected weight change: planned weight loss  HENT: Negative for trouble swallowing  Respiratory: Negative for cough and shortness of breath  Cardiovascular: Negative for chest pain and palpitations  Gastrointestinal: Negative for abdominal pain, constipation, diarrhea, nausea and vomiting  Neurological: Negative for dizziness  Psychiatric/Behavioral:        Denies anxiety and depression         Objective:      /68 (BP Location: Right arm, Patient Position: Sitting, Cuff Size: Large)   Pulse 74   Temp (!) 96 5 °F (35 8 °C) (Tympanic)   Ht 5' 5" (1 651 m)   Wt 120 kg (265 lb)   BMI 44 10 kg/m²          Physical Exam   Constitutional: He is oriented to person, place, and time  He appears well-developed and well-nourished  No distress  HENT:   Head: Normocephalic and atraumatic  Eyes: Pupils are equal, round, and reactive to light  No scleral icterus  Cardiovascular: Normal rate, regular rhythm and normal heart sounds      Pulmonary/Chest: Effort normal and breath sounds normal  No respiratory distress  Abdominal: Soft  Bowel sounds are normal  He exhibits no distension  There is no tenderness  No incisional hernias appreciated   Musculoskeletal: He exhibits no edema  Neurological: He is alert and oriented to person, place, and time  Skin: Skin is warm and dry  Appears mildly jaundiced/greyish    Psychiatric: He has a normal mood and affect  Nursing note and vitals reviewed  BARRIERS: none identified    GOALS:   · Continued/Maintain healthy weight loss with good nutrition intakes  · Adequate hydration with at least 64oz  fluid intake  · Normal vitamin and mineral levels  · Exercise as tolerated  · Avoid sweet tea, get unsweetened and add stevia Yohan Meo)  · Slowly wean off the omeprazole and carafate  · Follow up with RD for additional support (may qualify for assistance for supplements)    · Follow-up in 3 months  We kindly ask that your arrive 15 minutes before your scheduled appointment time with your provider to allow our staff to room you, get your vital signs and update your chart  · Follow diet as discussed  · Get lab work done in the next 2 weeks  You have been given a lab slip today  Please call the office if you need a replacement  It is recommended to check with your insurance BEFORE getting labs done to make sure they are covered by your policy  Also, please check with your PCP and other providers before getting labs to avoid duplicate labs  Make sure to HOLD any multivitamins that may contain biotin and any biotin supplements FOR 5 DAYS before any labs since it can affect the results  · Follow vitamin and mineral recommendations as reviewed with you  · Call our office if you have any problems with abdominal pain especially associated with fever, chills, nausea, vomiting or any other concerns      · All  Post-bariatric surgery patients should be aware that very small quantities of any alcohol can cause impairment and it is very possible not to feel the effect  The effect can be in the system for several hours  It is also a stomach irritant  · It is advised to AVOID alcohol, Nonsteroidal antiinflammatory drugs (NSAIDS) and nicotine of all forms   Any of these can cause stomach irritation/pain

## 2018-10-04 NOTE — ASSESSMENT & PLAN NOTE
-On lactulose, lasix, and now xifaxan per GI  -On 08/08/18 ammonia levels were 70 and on 08/07/18 total bilirubin 3 63  -Continue to f/u for monitoring and management with GI

## 2018-10-04 NOTE — ASSESSMENT & PLAN NOTE
-Takes carafate usually BID and takes PPI once daily  -Denies reflux or abdominal pain  -May slowly taper off the PPI and stop taking carafate  -Contact the office if any issues, pain, N/V, reflux

## 2018-10-04 NOTE — ASSESSMENT & PLAN NOTE
-No longer smoking  -Hx   Of quitting many times in the past  -Encouraged patient to continue to avoid cigarettes and seek support if he feels like smoking again  -Lengthy discussion about risks of smoking

## 2018-10-06 LAB — ZINC SERPL-MCNC: 59 UG/DL (ref 56–134)

## 2018-10-07 LAB
VIT A SERPL-MCNC: 8.2 UG/DL (ref 33.1–100)
VIT B1 BLD-SCNC: 134.5 NMOL/L (ref 66.5–200)

## 2018-10-10 ENCOUNTER — TELEPHONE (OUTPATIENT)
Dept: BARIATRICS | Facility: CLINIC | Age: 53
End: 2018-10-10

## 2018-10-10 NOTE — TELEPHONE ENCOUNTER
Left message notifying patient of recent lab results  Recommend he f/u with liver specialist/GI to ensure he is cleared to take higher levels of vitamin A given his deficiency is likely r/t cirrhosis  See letter for additional details  F/U as scheduled in 3 months and will retest labs at that time

## 2018-10-11 ENCOUNTER — TELEPHONE (OUTPATIENT)
Dept: BARIATRICS | Facility: CLINIC | Age: 53
End: 2018-10-11

## 2018-10-23 ENCOUNTER — TELEPHONE (OUTPATIENT)
Dept: BARIATRICS | Facility: CLINIC | Age: 53
End: 2018-10-23

## 2018-10-23 NOTE — TELEPHONE ENCOUNTER
Patient reports taking Procare MVI daily and calcium citrate 1500mg daily in divided doses  Reminded him to f/u with GI doctor regarding low vitamin A and safety of repletion  He would like the phone number of Dr Santi Alejandre - will have front office staff provide him with this number  Also recommend he f/u with KURT Varner for additional support - he is looking forward to this

## 2018-11-08 ENCOUNTER — TELEPHONE (OUTPATIENT)
Dept: BARIATRICS | Facility: CLINIC | Age: 53
End: 2018-11-08

## 2018-11-08 ENCOUNTER — HOSPITAL ENCOUNTER (EMERGENCY)
Facility: HOSPITAL | Age: 53
Discharge: HOME | End: 2018-11-08
Attending: EMERGENCY MEDICINE | Admitting: EMERGENCY MEDICINE
Payer: MEDICARE

## 2018-11-08 ENCOUNTER — APPOINTMENT (EMERGENCY)
Dept: RADIOLOGY | Facility: HOSPITAL | Age: 53
End: 2018-11-08
Payer: MEDICARE

## 2018-11-08 VITALS
OXYGEN SATURATION: 99 % | DIASTOLIC BLOOD PRESSURE: 53 MMHG | RESPIRATION RATE: 16 BRPM | BODY MASS INDEX: 44.02 KG/M2 | TEMPERATURE: 97.8 F | HEART RATE: 51 BPM | SYSTOLIC BLOOD PRESSURE: 107 MMHG | WEIGHT: 264.55 LBS

## 2018-11-08 DIAGNOSIS — R07.9 CHEST PAIN: Primary | ICD-10-CM

## 2018-11-08 LAB
ALBUMIN SERPL BCP-MCNC: 3.1 G/DL (ref 3.5–5)
ALP SERPL-CCNC: 143 U/L (ref 46–116)
ALT SERPL W P-5'-P-CCNC: 49 U/L (ref 12–78)
AMPHETAMINES SERPL QL SCN: NEGATIVE
ANION GAP SERPL CALCULATED.3IONS-SCNC: 8 MMOL/L (ref 4–13)
AST SERPL W P-5'-P-CCNC: 72 U/L (ref 5–45)
ATRIAL RATE: 52 BPM
ATRIAL RATE: 54 BPM
BARBITURATES UR QL: NEGATIVE
BASOPHILS # BLD AUTO: 0.04 THOUSANDS/ΜL (ref 0–0.1)
BASOPHILS NFR BLD AUTO: 1 % (ref 0–1)
BENZODIAZ UR QL: NEGATIVE
BILIRUB SERPL-MCNC: 4.1 MG/DL (ref 0.2–1)
BUN SERPL-MCNC: 11 MG/DL (ref 5–25)
CALCIUM SERPL-MCNC: 9.3 MG/DL (ref 8.3–10.1)
CHLORIDE SERPL-SCNC: 108 MMOL/L (ref 100–108)
CO2 SERPL-SCNC: 26 MMOL/L (ref 21–32)
COCAINE UR QL: NEGATIVE
CREAT SERPL-MCNC: 0.74 MG/DL (ref 0.6–1.3)
EOSINOPHIL # BLD AUTO: 0.29 THOUSAND/ΜL (ref 0–0.61)
EOSINOPHIL NFR BLD AUTO: 4 % (ref 0–6)
ERYTHROCYTE [DISTWIDTH] IN BLOOD BY AUTOMATED COUNT: 14.6 % (ref 11.6–15.1)
GFR SERPL CREATININE-BSD FRML MDRD: 105 ML/MIN/1.73SQ M
GLUCOSE SERPL-MCNC: 101 MG/DL (ref 65–140)
HCT VFR BLD AUTO: 40.6 % (ref 36.5–49.3)
HGB BLD-MCNC: 14.3 G/DL (ref 12–17)
IMM GRANULOCYTES # BLD AUTO: 0.02 THOUSAND/UL (ref 0–0.2)
IMM GRANULOCYTES NFR BLD AUTO: 0 % (ref 0–2)
LYMPHOCYTES # BLD AUTO: 1.32 THOUSANDS/ΜL (ref 0.6–4.47)
LYMPHOCYTES NFR BLD AUTO: 16 % (ref 14–44)
MCH RBC QN AUTO: 35.1 PG (ref 26.8–34.3)
MCHC RBC AUTO-ENTMCNC: 35.2 G/DL (ref 31.4–37.4)
MCV RBC AUTO: 100 FL (ref 82–98)
METHADONE UR QL: NEGATIVE
MONOCYTES # BLD AUTO: 0.65 THOUSAND/ΜL (ref 0.17–1.22)
MONOCYTES NFR BLD AUTO: 8 % (ref 4–12)
NEUTROPHILS # BLD AUTO: 6.07 THOUSANDS/ΜL (ref 1.85–7.62)
NEUTS SEG NFR BLD AUTO: 71 % (ref 43–75)
NRBC BLD AUTO-RTO: 0 /100 WBCS
OPIATES UR QL SCN: NEGATIVE
P AXIS: 57 DEGREES
P AXIS: 58 DEGREES
PCP UR QL: NEGATIVE
PLATELET # BLD AUTO: 112 THOUSANDS/UL (ref 149–390)
PMV BLD AUTO: 11.3 FL (ref 8.9–12.7)
POTASSIUM SERPL-SCNC: 4.1 MMOL/L (ref 3.5–5.3)
PR INTERVAL: 156 MS
PR INTERVAL: 156 MS
PROT SERPL-MCNC: 6 G/DL (ref 6.4–8.2)
QRS AXIS: 52 DEGREES
QRS AXIS: 62 DEGREES
QRSD INTERVAL: 106 MS
QRSD INTERVAL: 110 MS
QT INTERVAL: 472 MS
QT INTERVAL: 490 MS
QTC INTERVAL: 438 MS
QTC INTERVAL: 464 MS
RBC # BLD AUTO: 4.07 MILLION/UL (ref 3.88–5.62)
SODIUM SERPL-SCNC: 142 MMOL/L (ref 136–145)
T WAVE AXIS: 46 DEGREES
T WAVE AXIS: 49 DEGREES
THC UR QL: NEGATIVE
TROPONIN I SERPL-MCNC: <0.02 NG/ML
TROPONIN I SERPL-MCNC: <0.02 NG/ML
VENTRICULAR RATE: 52 BPM
VENTRICULAR RATE: 54 BPM
WBC # BLD AUTO: 8.39 THOUSAND/UL (ref 4.31–10.16)

## 2018-11-08 PROCEDURE — 99285 EMERGENCY DEPT VISIT HI MDM: CPT

## 2018-11-08 PROCEDURE — 93010 ELECTROCARDIOGRAM REPORT: CPT | Performed by: INTERNAL MEDICINE

## 2018-11-08 PROCEDURE — 84484 ASSAY OF TROPONIN QUANT: CPT | Performed by: EMERGENCY MEDICINE

## 2018-11-08 PROCEDURE — 36415 COLL VENOUS BLD VENIPUNCTURE: CPT

## 2018-11-08 PROCEDURE — 84484 ASSAY OF TROPONIN QUANT: CPT

## 2018-11-08 PROCEDURE — 85025 COMPLETE CBC W/AUTO DIFF WBC: CPT

## 2018-11-08 PROCEDURE — 71046 X-RAY EXAM CHEST 2 VIEWS: CPT

## 2018-11-08 PROCEDURE — 93005 ELECTROCARDIOGRAM TRACING: CPT

## 2018-11-08 PROCEDURE — 80307 DRUG TEST PRSMV CHEM ANLYZR: CPT | Performed by: EMERGENCY MEDICINE

## 2018-11-08 PROCEDURE — 80053 COMPREHEN METABOLIC PANEL: CPT

## 2018-11-08 NOTE — DISCHARGE INSTRUCTIONS

## 2018-11-08 NOTE — ED NOTES
Pt reports he is upset about care, stating "whats the matter with me then? The doctor was never in the room" This RN reviewed discharge instructions and reviewed possible causes of chest/epigastric discomfort  Pt would not look at me, pt playing game on his phone  I told the patient I would have the doctor come in and speak with him but he declined and wanted to leave         Marciano Cloud RN  11/08/18 [id number]

## 2018-11-08 NOTE — TELEPHONE ENCOUNTER
Patient called and reports severe abdominal pain, nausea, SOB, CP that began this morning after taking his vitamins with Ensure  He has been taking this vitamins and Ensure combination for the few weeks  He denies vomiting, fevers, chills, diarrhea, bloody stools, black tarry stools  Advised him to go to the ED immediately, especially given his hx  of severe cirrhosis

## 2018-11-10 NOTE — ED PROVIDER NOTES
History  Chief Complaint   Patient presents with    Chest Pain     mid chest pain since 1000 pt describes as burning, pressure  50-year-old gentleman presents to the emergency department for evaluation of chest pains  Patient states the pain started approximately 30 minutes ago  Patient states pain is mid substernal, nonradiating, is more of a pressure, and has no exertional component  Nothing makes pain better, nothing makes pain worse  Patient is not nauseated, is not diaphoretic, he has no dyspnea  Patient is not working to breathe, is speaking in full and interrupted sentences without pausing very comfortable resting on the bed  The patient will be evaluated with a differential diagnosis to include but not be limited to acute coronary syndrome, pneumonia, pneumothorax  History provided by:  Patient   used: No    Chest Pain   Pain location:  Substernal area  Pain quality: aching    Pain radiates to the back: no    Pain severity:  Mild  Onset quality:  Gradual  Timing:  Constant  Progression:  Worsening  Context: no drug use, no intercourse, no movement, not raising an arm and not at rest    Associated symptoms: no AICD problem, no anxiety, no claudication and no fever        Prior to Admission Medications   Prescriptions Last Dose Informant Patient Reported? Taking?    AMILoride 5 mg tablet  Self Yes No   Sig: Take 10 mg by mouth 2 (two) times a day     furosemide (LASIX) 40 mg tablet   No No   Sig: Take 0 5 tablets (20 mg total) by mouth daily   lactulose (CONSTULOSE) 10 g/15 mL solution  Self Yes No   Sig: Take 10 g by mouth daily as needed     nadolol (CORGARD) 20 mg tablet  Self Yes No   Sig: Take 20 mg by mouth 2 (two) times a day     omeprazole (PriLOSEC) 20 mg delayed release capsule   No No   Sig: Take 1 capsule (20 mg total) by mouth daily for 90 days   rifaximin (XIFAXAN) 550 mg tablet  Self Yes No   Sig: Take 550 mg by mouth 2 (two) times a day     sucralfate (CARAFATE) 1 g tablet   No No   Sig: Take 1 tablet (1 g total) by mouth 4 (four) times a day      Facility-Administered Medications: None       Past Medical History:   Diagnosis Date    CPAP (continuous positive airway pressure) dependence     Esophageal varices (HCC)     Hepatic cirrhosis (HCC)     treated with harvoni   Hep C- dx age 17   Aaron Alejo Hepatic encephalopathy ([address] )     Obesity     Pneumonia     in past    Sleep apnea     Wears glasses        Past Surgical History:   Procedure Laterality Date    COLONOSCOPY      ESOPHAGOGASTRODUODENOSCOPY N/A 4/12/2018    Procedure: ESOPHAGOGASTRODUODENOSCOPY (EGD); Surgeon: Haris Solis MD;  Location: [facility]; Service: Gastroenterology    FRACTURE SURGERY Left     ankle    OTHER SURGICAL HISTORY      banding esophageal varices    SD LAP, TERESA RESTRICT PROC, LONGITUDINAL GASTRECTOMY N/A 6/5/2018    Procedure: GASTRECTOMY LAPAROSCOPIC SLEEVE;  Surgeon: Eddy Blevins MD;  Location: [facility];  Service: Bariatrics       Family History   Problem Relation Age of Onset    Heart disease Mother     Lupus Mother     Diabetes Father     Stroke Father      I have reviewed and agree with the history as documented  Social History   Substance Use Topics    Smoking status: Former Smoker     Packs/day: 0 25     Years: 37 00     Types: Cigarettes     Quit date: 2/15/2018    Smokeless tobacco: Never Used      Comment: current every day smoker ( as per allscripts)    Alcohol use No        Review of Systems   Constitutional: Negative for fever  Cardiovascular: Positive for chest pain  Negative for claudication  All other systems reviewed and are negative  Physical Exam  Physical Exam   Constitutional: He is oriented to person, place, and time  He appears well-developed and well-nourished  No distress  HENT:   Head: Normocephalic and atraumatic     Right Ear: External ear normal    Left Ear: External ear normal    Eyes: Conjunctivae and EOM are normal  Right eye exhibits no discharge  Left eye exhibits no discharge  No scleral icterus  Neck: Normal range of motion  Neck supple  No JVD present  No tracheal deviation present  No thyromegaly present  Cardiovascular: Normal rate and regular rhythm  Pulmonary/Chest: Effort normal and breath sounds normal  No stridor  No respiratory distress  He has no wheezes  He has no rales  Abdominal: Soft  Bowel sounds are normal  He exhibits no distension  There is no tenderness  Musculoskeletal: Normal range of motion  He exhibits no edema, tenderness or deformity  Neurological: He is alert and oriented to person, place, and time  No cranial nerve deficit  Coordination normal    Skin: Skin is warm and dry  He is not diaphoretic  Psychiatric: He has a normal mood and affect  His behavior is normal    Nursing note and vitals reviewed        Vital Signs  ED Triage Vitals   Temperature Pulse Respirations Blood Pressure SpO2   11/08/18 1138 11/08/18 1138 11/08/18 1138 11/08/18 1138 11/08/18 1138   97 8 °F (36 6 °C) 57 16 112/56 98 %      Temp Source Heart Rate Source Patient Position - Orthostatic VS BP Location FiO2 (%)   11/08/18 1138 11/08/18 1138 11/08/18 1138 11/08/18 1138 --   Oral Monitor Lying Right arm       Pain Score       11/08/18 1325       No Pain           Vitals:    11/08/18 1138 11/08/18 1325 11/08/18 1529 11/08/18 1638   BP: 112/56 93/50 111/56 107/53   Pulse: 57 (!) 50 (!) 52 (!) 51   Patient Position - Orthostatic VS: Lying Lying Lying Lying       Visual Acuity      ED Medications  Medications - No data to display    Diagnostic Studies  Results Reviewed     Procedure Component Value Units Date/Time    Troponin I [[id number]]  (Normal) Collected:  11/08/18 1529    Lab Status:  Final result Specimen:  Blood from Arm, Right Updated:  11/08/18 1555     Troponin I <0 02 ng/mL     Rapid drug screen, urine [[id number]]  (Normal) Collected:  11/08/18 1536    Lab Status:  Final result Specimen:  Urine from Urine, Other Updated:  11/08/18 1548     Amph/Meth UR Negative     Barbiturate Ur Negative     Benzodiazepine Urine Negative     Cocaine Urine Negative     Methadone Urine Negative     Opiate Urine Negative     PCP Ur Negative     THC Urine Negative    Narrative:         FOR MEDICAL PURPOSES ONLY  IF CONFIRMATION NEEDED PLEASE CONTACT THE LAB WITHIN 5 DAYS  Drug Screen Cutoff Levels:  AMPHETAMINE/METHAMPHETAMINES  1000 ng/mL  BARBITURATES     200 ng/mL  BENZODIAZEPINES     200 ng/mL  COCAINE      300 ng/mL  METHADONE      300 ng/mL  OPIATES      300 ng/mL  PHENCYCLIDINE     25 ng/mL  THC       50 ng/mL    Troponin I [[id number]]  (Normal) Collected:  11/08/18 1146    Lab Status:  Final result Specimen:  Blood from Arm, Left Updated:  11/08/18 1210     Troponin I <0 02 ng/mL     Comprehensive metabolic panel [[id number]]  (Abnormal) Collected:  11/08/18 1146    Lab Status:  Final result Specimen:  Blood from Arm, Left Updated:  11/08/18 1208     Sodium 142 mmol/L      Potassium 4 1 mmol/L      Chloride 108 mmol/L      CO2 26 mmol/L      ANION GAP 8 mmol/L      BUN 11 mg/dL      Creatinine 0 74 mg/dL      Glucose 101 mg/dL      Calcium 9 3 mg/dL      AST 72 (H) U/L      ALT 49 U/L      Alkaline Phosphatase 143 (H) U/L      Total Protein 6 0 (L) g/dL      Albumin 3 1 (L) g/dL      Total Bilirubin 4 10 (H) mg/dL      eGFR 105 ml/min/1 73sq m     Narrative:         National Kidney Disease Education Program recommendations are as follows:  GFR calculation is accurate only with a steady state creatinine  Chronic Kidney disease less than 60 ml/min/1 73 sq  meters  Kidney failure less than 15 ml/min/1 73 sq  meters      CBC and differential [[id number]]  (Abnormal) Collected:  11/08/18 1146    Lab Status:  Final result Specimen:  Blood from Arm, Left Updated:  11/08/18 1153     WBC 8 39 Thousand/uL      RBC 4 07 Million/uL      Hemoglobin 14 3 g/dL      Hematocrit 40 6 %       (H) fL      MCH 35 1 (H) pg      MCHC 35 2 g/dL      RDW 14 6 %      MPV 11 3 fL      Platelets 857 (L) Thousands/uL      nRBC 0 /100 WBCs      Neutrophils Relative 71 %      Immat GRANS % 0 %      Lymphocytes Relative 16 %      Monocytes Relative 8 %      Eosinophils Relative 4 %      Basophils Relative 1 %      Neutrophils Absolute 6 07 Thousands/µL      Immature Grans Absolute 0 02 Thousand/uL      Lymphocytes Absolute 1 32 Thousands/µL      Monocytes Absolute 0 65 Thousand/µL      Eosinophils Absolute 0 29 Thousand/µL      Basophils Absolute 0 04 Thousands/µL                  X-ray chest 2 views   Final Result by Shayna Schwarz MD (11/08 1339)      No acute cardiopulmonary disease  Workstation performed: [id number]                    Procedures  Procedures       Phone Contacts  ED Phone Contact    ED Course                               MDM  Number of Diagnoses or Management Options  Chest pain: new and requires workup     Amount and/or Complexity of Data Reviewed  Clinical lab tests: ordered and reviewed  Tests in the radiology section of CPT®: ordered and reviewed  Decide to obtain previous medical records or to obtain history from someone other than the patient: yes  Review and summarize past medical records: yes    Patient Progress  Patient progress: stable    CritCare Time    Disposition  Final diagnoses:   Chest pain     Time reflects when diagnosis was documented in both MDM as applicable and the Disposition within this note     Time User Action Codes Description Comment    11/8/2018  4:15 PM Manish Johnson Add [R07 9] Chest pain       ED Disposition     ED Disposition Condition Comment    Discharge  Matt Tobias Sr  discharge to home/self care       Condition at discharge: Good        Follow-up Information     Follow up With Specialties Details Why [address], [address] [place] [address]  [address] [facility]  [phone]            Discharge Medication List as of 11/8/2018  4:16 PM CONTINUE these medications which have NOT CHANGED    Details   AMILoride 5 mg tablet Take 10 mg by mouth 2 (two) times a day  , Starting Sat 12/16/2017, Historical Med      furosemide (LASIX) 40 mg tablet Take 0 5 tablets (20 mg total) by mouth daily, Starting Wed 6/6/2018, No Print      lactulose (CONSTULOSE) 10 g/15 mL solution Take 10 g by mouth daily as needed  , Historical Med      nadolol (CORGARD) 20 mg tablet Take 20 mg by mouth 2 (two) times a day  , Starting Sat 12/16/2017, Historical Med      omeprazole (PriLOSEC) 20 mg delayed release capsule Take 1 capsule (20 mg total) by mouth daily for 90 days, Starting Fri 5/18/2018, Until Thu 8/16/2018, Normal      rifaximin (XIFAXAN) 550 mg tablet Take 550 mg by mouth 2 (two) times a day  , Historical Med      sucralfate (CARAFATE) 1 g tablet Take 1 tablet (1 g total) by mouth 4 (four) times a day, Starting Fri 6/22/2018, Normal           No discharge procedures on file      ED Provider  Electronically Signed by           Ryan Fernandes DO  11/09/18 2004

## 2018-11-15 ENCOUNTER — TELEPHONE (OUTPATIENT)
Dept: GASTROENTEROLOGY | Facility: CLINIC | Age: 53
End: 2018-11-15

## 2018-11-16 ENCOUNTER — TELEPHONE (OUTPATIENT)
Dept: BARIATRICS | Facility: CLINIC | Age: 53
End: 2018-11-16

## 2019-01-03 ENCOUNTER — APPOINTMENT (EMERGENCY)
Dept: CT IMAGING | Facility: HOSPITAL | Age: 54
End: 2019-01-03
Payer: MEDICARE

## 2019-01-03 ENCOUNTER — HOSPITAL ENCOUNTER (EMERGENCY)
Facility: HOSPITAL | Age: 54
Discharge: HOME | End: 2019-01-03
Attending: EMERGENCY MEDICINE
Payer: MEDICARE

## 2019-01-03 VITALS
HEART RATE: 80 BPM | OXYGEN SATURATION: 96 % | WEIGHT: 264.55 LBS | BODY MASS INDEX: 44.02 KG/M2 | TEMPERATURE: 98.9 F | DIASTOLIC BLOOD PRESSURE: 67 MMHG | SYSTOLIC BLOOD PRESSURE: 113 MMHG | RESPIRATION RATE: 16 BRPM

## 2019-01-03 DIAGNOSIS — S01.91XA LACERATION OF HEAD: ICD-10-CM

## 2019-01-03 DIAGNOSIS — T50.905A MEDICATION SIDE EFFECT, INITIAL ENCOUNTER: ICD-10-CM

## 2019-01-03 DIAGNOSIS — R55 NEAR SYNCOPE: Primary | ICD-10-CM

## 2019-01-03 LAB
ANION GAP SERPL CALCULATED.3IONS-SCNC: 9 MMOL/L (ref 4–13)
APTT PPP: 29 SECONDS (ref 26–38)
ATRIAL RATE: 69 BPM
BASOPHILS # BLD AUTO: 0.03 THOUSANDS/ΜL (ref 0–0.1)
BASOPHILS NFR BLD AUTO: 0 % (ref 0–1)
BUN SERPL-MCNC: 11 MG/DL (ref 5–25)
CALCIUM SERPL-MCNC: 9.1 MG/DL (ref 8.3–10.1)
CHLORIDE SERPL-SCNC: 105 MMOL/L (ref 100–108)
CK SERPL-CCNC: 67 U/L (ref 39–308)
CO2 SERPL-SCNC: 28 MMOL/L (ref 21–32)
CREAT SERPL-MCNC: 0.82 MG/DL (ref 0.6–1.3)
EOSINOPHIL # BLD AUTO: 0.05 THOUSAND/ΜL (ref 0–0.61)
EOSINOPHIL NFR BLD AUTO: 1 % (ref 0–6)
ERYTHROCYTE [DISTWIDTH] IN BLOOD BY AUTOMATED COUNT: 14.4 % (ref 11.6–15.1)
GFR SERPL CREATININE-BSD FRML MDRD: 100 ML/MIN/1.73SQ M
GLUCOSE SERPL-MCNC: 127 MG/DL (ref 65–140)
HCT VFR BLD AUTO: 42 % (ref 36.5–49.3)
HGB BLD-MCNC: 14.6 G/DL (ref 12–17)
IMM GRANULOCYTES # BLD AUTO: 0.06 THOUSAND/UL (ref 0–0.2)
IMM GRANULOCYTES NFR BLD AUTO: 1 % (ref 0–2)
INR PPP: 1.37 (ref 0.86–1.17)
LYMPHOCYTES # BLD AUTO: 0.92 THOUSANDS/ΜL (ref 0.6–4.47)
LYMPHOCYTES NFR BLD AUTO: 12 % (ref 14–44)
MAGNESIUM SERPL-MCNC: 1.7 MG/DL (ref 1.6–2.6)
MCH RBC QN AUTO: 34.7 PG (ref 26.8–34.3)
MCHC RBC AUTO-ENTMCNC: 34.8 G/DL (ref 31.4–37.4)
MCV RBC AUTO: 100 FL (ref 82–98)
MONOCYTES # BLD AUTO: 0.79 THOUSAND/ΜL (ref 0.17–1.22)
MONOCYTES NFR BLD AUTO: 10 % (ref 4–12)
NEUTROPHILS # BLD AUTO: 5.97 THOUSANDS/ΜL (ref 1.85–7.62)
NEUTS SEG NFR BLD AUTO: 76 % (ref 43–75)
NRBC BLD AUTO-RTO: 0 /100 WBCS
P AXIS: 48 DEGREES
PLATELET # BLD AUTO: 99 THOUSANDS/UL (ref 149–390)
PMV BLD AUTO: 11.6 FL (ref 8.9–12.7)
POTASSIUM SERPL-SCNC: 3.7 MMOL/L (ref 3.5–5.3)
PR INTERVAL: 142 MS
PROTHROMBIN TIME: 16.8 SECONDS (ref 11.8–14.2)
QRS AXIS: 25 DEGREES
QRSD INTERVAL: 102 MS
QT INTERVAL: 452 MS
QTC INTERVAL: 484 MS
RBC # BLD AUTO: 4.21 MILLION/UL (ref 3.88–5.62)
SODIUM SERPL-SCNC: 142 MMOL/L (ref 136–145)
T WAVE AXIS: 44 DEGREES
TROPONIN I SERPL-MCNC: <0.02 NG/ML
TSH SERPL DL<=0.05 MIU/L-ACNC: 2.66 UIU/ML (ref 0.36–3.74)
VENTRICULAR RATE: 69 BPM
WBC # BLD AUTO: 7.82 THOUSAND/UL (ref 4.31–10.16)

## 2019-01-03 PROCEDURE — 80048 BASIC METABOLIC PNL TOTAL CA: CPT | Performed by: EMERGENCY MEDICINE

## 2019-01-03 PROCEDURE — 93010 ELECTROCARDIOGRAM REPORT: CPT | Performed by: INTERNAL MEDICINE

## 2019-01-03 PROCEDURE — 85610 PROTHROMBIN TIME: CPT | Performed by: EMERGENCY MEDICINE

## 2019-01-03 PROCEDURE — 36415 COLL VENOUS BLD VENIPUNCTURE: CPT | Performed by: EMERGENCY MEDICINE

## 2019-01-03 PROCEDURE — 83735 ASSAY OF MAGNESIUM: CPT | Performed by: EMERGENCY MEDICINE

## 2019-01-03 PROCEDURE — 85025 COMPLETE CBC W/AUTO DIFF WBC: CPT | Performed by: EMERGENCY MEDICINE

## 2019-01-03 PROCEDURE — 84484 ASSAY OF TROPONIN QUANT: CPT | Performed by: EMERGENCY MEDICINE

## 2019-01-03 PROCEDURE — 85730 THROMBOPLASTIN TIME PARTIAL: CPT | Performed by: EMERGENCY MEDICINE

## 2019-01-03 PROCEDURE — 93005 ELECTROCARDIOGRAM TRACING: CPT

## 2019-01-03 PROCEDURE — 84443 ASSAY THYROID STIM HORMONE: CPT | Performed by: EMERGENCY MEDICINE

## 2019-01-03 PROCEDURE — 70450 CT HEAD/BRAIN W/O DYE: CPT

## 2019-01-03 PROCEDURE — 99284 EMERGENCY DEPT VISIT MOD MDM: CPT

## 2019-01-03 PROCEDURE — 82550 ASSAY OF CK (CPK): CPT | Performed by: EMERGENCY MEDICINE

## 2019-01-03 RX ORDER — LIDOCAINE HYDROCHLORIDE AND EPINEPHRINE 10; 10 MG/ML; UG/ML
10 INJECTION, SOLUTION INFILTRATION; PERINEURAL ONCE
Status: COMPLETED | OUTPATIENT
Start: 2019-01-03 | End: 2019-01-03

## 2019-01-03 RX ADMIN — Medication 1 APPLICATION: at 05:38

## 2019-01-03 RX ADMIN — LIDOCAINE HYDROCHLORIDE,EPINEPHRINE BITARTRATE 10 ML: 10; .01 INJECTION, SOLUTION INFILTRATION; PERINEURAL at 06:40

## 2019-01-03 NOTE — DISCHARGE INSTRUCTIONS
Stop taking medication  Follow up with the primary care doctor to make sure that you are doing better  Your staples need to be removed in five days  If your primary care doctor or urgent care will not do so, you can return to the emergency department to have this done  Keep the wound clean with soap and water  Do not rub at it, so that you not need staples out before they should be removed  Laceration   WHAT YOU NEED TO KNOW:   A laceration is an injury to the skin and the soft tissue underneath it  Lacerations happen when you are cut or hit by something  They can happen anywhere on the body  DISCHARGE INSTRUCTIONS:   Return to the emergency department if:   · You have heavy bleeding or bleeding that does not stop after 10 minutes of holding firm, direct pressure over the wound  · Your wound opens up  Contact your healthcare provider if:   · You have a fever or chills  · Your laceration is red, warm, or swollen  · You have red streaks on your skin coming from your wound  · You have white or yellow drainage from the wound that smells bad  · You have pain that gets worse, even after treatment  · You have questions or concerns about your condition or care  Medicines:   · Prescription pain medicine  may be given  Ask how to take this medicine safely  · Antibiotics  help treat or prevent a bacterial infection  · Take your medicine as directed  Contact your healthcare provider if you think your medicine is not helping or if you have side effects  Tell him or her if you are allergic to any medicine  Keep a list of the medicines, vitamins, and herbs you take  Include the amounts, and when and why you take them  Bring the list or the pill bottles to follow-up visits  Carry your medicine list with you in case of an emergency  Care for your wound as directed:   · Do not get your wound wet  until your healthcare provider says it is okay  Do not soak your wound in water   Do not go swimming until your healthcare provider says it is okay  Carefully wash the wound with soap and water  Gently pat the area dry or allow it to air dry  · Change your bandages  when they get wet, dirty, or after washing  Apply new, clean bandages as directed  Do not apply elastic bandages or tape too tight  Do not put powders or lotions over your incision  · Apply antibiotic ointment as directed  Your healthcare provider may give you antibiotic ointment to put over your wound if you have stitches  If you have strips of tape over your incision, let them dry up and fall off on their own  If they do not fall off within 14 days, gently remove them  If you have glue over your wound, do not remove or pick at it  If your glue comes off, do not replace it with glue that you have at home  · Check your wound every day for signs of infection such as swelling, redness, or pus  Self-care:   · Apply ice  on your wound for 15 to 20 minutes every hour or as directed  Use an ice pack, or put crushed ice in a plastic bag  Cover it with a towel  Ice helps prevent tissue damage and decreases swelling and pain  · Use a splint as directed  A splint will decrease movement and stress on your wound  It may help it heal faster  A splint may be used for lacerations over joints or areas of your body that bend  Ask your healthcare provider how to apply and remove a splint  · Decrease scarring of your wound  by applying ointments as directed  Do not apply ointments until your healthcare provider says it is okay  You may need to wait until your wound is healed  Ask which ointment to buy and how often to use it  After your wound is healed, use sunscreen over the area when you are out in the sun  You should do this for at least 6 months to 1 year after your injury  Follow up with your healthcare provider as directed: You may need to follow up in 24 to 48 hours to have your wound checked for infection   You will need to return in 3 to 14 days if you have stitches or staples so they can be removed  Care for your wound as directed to prevent infection and help it heal  Write down your questions so you remember to ask them during your visits  © 2017 [employer] Information is for End User's use only and may not be sold, redistributed or otherwise used for commercial purposes  All illustrations and images included in CareNotes® are the copyrighted property of [employer]  or Mika [employer]  The above information is an  only  It is not intended as medical advice for individual conditions or treatments  Talk to your doctor, nurse or pharmacist before following any medical regimen to see if it is safe and effective for you  Syncope   WHAT YOU NEED TO KNOW:   Syncope is also called fainting or passing out  Syncope is a sudden, temporary loss of consciousness, followed by a fall from a standing or sitting position  Syncope ranges from not serious to a sign of a more serious condition that needs to be treated  You can control some health conditions that cause syncope  Your healthcare providers can help you create a plan to manage syncope and prevent episodes  DISCHARGE INSTRUCTIONS:   Seek care immediately if:   · You are bleeding because you hit your head when you fainted  · You suddenly have double vision, difficulty speaking, numbness, and cannot move your arms or legs  · You have chest pain and trouble breathing  · You vomit blood or material that looks like coffee grounds  · You see blood in your bowel movement  Contact your healthcare provider if:   · You have new or worsening symptoms  · You have another syncope episode  · You have a headache, fast heartbeat, or feel too dizzy to stand up  · You have questions or concerns about your condition or care    Follow up with your healthcare provider as directed:  Write down your questions so you remember to ask them during your visits  Manage syncope:   · Keep a record of your syncope episodes  Include your symptoms and your activity before and after the episode  The record can help your healthcare provider find the cause of your syncope and help you manage episodes  · Sit or lie down when needed  This includes when you feel dizzy, your throat is getting tight, and your vision changes  Raise your legs above the level of your heart  · Take slow, deep breaths if you start to breathe faster with anxiety or fear  This can help decrease dizziness and the feeling that you might faint  · Check your blood pressure often  This is important if you take medicine to lower your blood pressure  Check your blood pressure when you are lying down and when you are standing  Ask how often to check during the day  Keep a record of your blood pressure numbers  Your healthcare provider may use the record to help plan your treatment  Prevent a syncope episode:   · Move slowly and let yourself get used to one position before you move to another position  This is very important when you change from a lying or sitting position to a standing position  Take some deep breaths before you stand up from a lying position  Stand up slowly  Sudden movements may cause a fainting spell  Sit on the side of the bed or couch for a few minutes before you stand up  If you are on bedrest, try to be upright for about 2 hours each day, or as directed  Do not lock your legs if you are standing for a long period of time  Move your legs and bend your knees to keep blood flowing  · Follow your healthcare provider's recommendations  Your provider may  recommend that you drink more liquids to prevent dehydration  You may also need to have more salt to keep your blood pressure from dropping too low and causing syncope  Your provider will tell you how much liquid and sodium to have each day  · Watch for signs of low blood sugar    These include hunger, nervousness, sweating, and fast or fluttery heartbeats  Talk with your healthcare provider about ways to keep your blood sugar level steady  · Do not strain if you are constipated  You may faint if you strain to have a bowel movement  Walking is the best way to get your bowels moving  Eat foods high in fiber to make it easier to have a bowel movement  Good examples are high-fiber cereals, beans, vegetables, and whole-grain breads  Prune juice may help make bowel movements softer  · Be careful in hot weather  Heat can cause a syncope episode  Limit activity done outside on hot days  Physical activity in hot weather can lead to dehydration  This can cause an episode  © 2017 [employer]  Information is for End User's use only and may not be sold, redistributed or otherwise used for commercial purposes  All illustrations and images included in CareNotes® are the copyrighted property of [employer]  or Mika Hernandez  The above information is an  only  It is not intended as medical advice for individual conditions or treatments  Talk to your doctor, nurse or pharmacist before following any medical regimen to see if it is safe and effective for you

## 2019-01-03 NOTE — ED PROVIDER NOTES
History  Chief Complaint   Patient presents with    Head Laceration     states "took a pill to work out and felt very naueas and heart was racing   bend down to vomit and hit head on tub"     HPI    Prior to Admission Medications   Prescriptions Last Dose Informant Patient Reported? Taking? AMILoride 5 mg tablet  Self Yes No   Sig: Take 10 mg by mouth 2 (two) times a day     furosemide (LASIX) 40 mg tablet   No No   Sig: Take 0 5 tablets (20 mg total) by mouth daily   lactulose (CONSTULOSE) 10 g/15 mL solution  Self Yes No   Sig: Take 10 g by mouth daily as needed     nadolol (CORGARD) 20 mg tablet  Self Yes No   Sig: Take 20 mg by mouth 2 (two) times a day     omeprazole (PriLOSEC) 20 mg delayed release capsule   No No   Sig: Take 1 capsule (20 mg total) by mouth daily for 90 days   rifaximin (XIFAXAN) 550 mg tablet  Self Yes No   Sig: Take 550 mg by mouth 2 (two) times a day     sucralfate (CARAFATE) 1 g tablet   No No   Sig: Take 1 tablet (1 g total) by mouth 4 (four) times a day      Facility-Administered Medications: None       Past Medical History:   Diagnosis Date    CPAP (continuous positive airway pressure) dependence     Esophageal varices (HCC)     Hepatic cirrhosis (HCC)     treated with harvoni   Hep C- dx age 17   [facility] Hepatic encephalopathy ([address] )     Obesity     Pneumonia     in past    Sleep apnea     Wears glasses        Past Surgical History:   Procedure Laterality Date    COLONOSCOPY      ESOPHAGOGASTRODUODENOSCOPY N/A 4/12/2018    Procedure: ESOPHAGOGASTRODUODENOSCOPY (EGD); Surgeon: Cedric Bolaños MD;  [facility];   Service: Gastroenterology    FRACTURE SURGERY Left     ankle    OTHER SURGICAL HISTORY      banding esophageal varices    [place] LAP, TERESA RESTRICT PROC, LONGITUDINAL GASTRECTOMY N/A 6/5/2018    Procedure: GASTRECTOMY LAPAROSCOPIC SLEEVE;  Surgeon: Ibeth Elliott MD;  [facility];  Service: Bariatrics       Family History   Problem Relation Age of Onset  Heart disease Mother     Lupus Mother     Diabetes Father     Stroke Father      I have reviewed and agree with the history as documented  Social History   Substance Use Topics    Smoking status: Former Smoker     Packs/day: 0 25     Years: 37 00     Types: Cigarettes     Quit date: 2/15/2018    Smokeless tobacco: Never Used      Comment: current every day smoker ( as per allscripts)    Alcohol use No        Review of Systems    Physical Exam  Physical Exam    Vital Signs  ED Triage Vitals [01/03/19 0443]   Temperature Pulse Respirations Blood Pressure SpO2   98 9 °F (37 2 °C) 74 18 117/56 100 %      Temp Source Heart Rate Source Patient Position - Orthostatic VS BP Location FiO2 (%)   Oral Monitor Lying Right arm --      Pain Score       5           Vitals:    01/03/19 0443 01/03/19 0625 01/03/19 0715   BP: 117/56 113/57 113/67   Pulse: 74 74 80   Patient Position - Orthostatic VS: Lying Lying Sitting       Visual Acuity  Visual Acuity      Most Recent Value   L Pupil Size (mm)  2   R Pupil Size (mm)  2          ED Medications  Medications   LET gel 1 application (1 application Topical Given 1/3/19 0538)   lidocaine-epinephrine (XYLOCAINE/EPINEPHRINE) 1 %-1:100,000 injection 10 mL (10 mL Infiltration Given 1/3/19 0640)       Diagnostic Studies  Results Reviewed     Procedure Component Value Units Date/Time    Basic metabolic panel [[id number]] Collected:  01/03/19 0554    Lab Status:  Final result Specimen:  Blood from Arm, Left Updated:  01/03/19 3907     Sodium 142 mmol/L      Potassium 3 7 mmol/L      Chloride 105 mmol/L      CO2 28 mmol/L      ANION GAP 9 mmol/L      BUN 11 mg/dL      Creatinine 0 82 mg/dL      Glucose 127 mg/dL      Calcium 9 1 mg/dL      eGFR 100 ml/min/1 73sq m     Narrative:         National Kidney Disease Education Program recommendations are as follows:  GFR calculation is accurate only with a steady state creatinine  Chronic Kidney disease less than 60 ml/min/1 73 sq  meters  Kidney failure less than 15 ml/min/1 73 sq  meters  TSH [[id number]]  (Normal) Collected:  01/03/19 0554    Lab Status:  Final result Specimen:  Blood from Arm, Left Updated:  01/03/19 0633     TSH 3RD GENERATON 2 656 uIU/mL     Narrative:         Patients undergoing fluorescein dye angiography may retain small amounts of fluorescein in the body for 48-72 hours post procedure  Samples containing fluorescein can produce falsely depressed TSH values  If the patient had this procedure,a specimen should be resubmitted post fluorescein clearance      Magnesium [[id number]]  (Normal) Collected:  01/03/19 0554    Lab Status:  Final result Specimen:  Blood from Arm, Left Updated:  01/03/19 3305     Magnesium 1 7 mg/dL     CK Total with Reflex CKMB [[id number]]  (Normal) Collected:  01/03/19 0554    Lab Status:  Final result Specimen:  Blood from Arm, Left Updated:  01/03/19 0630     Total CK 67 U/L     Troponin I [[id number]]  (Normal) Collected:  01/03/19 0554    Lab Status:  Final result Specimen:  Blood from Arm, Left Updated:  01/03/19 0626     Troponin I <0 02 ng/mL     CBC and differential [[id number]]  (Abnormal) Collected:  01/03/19 0554    Lab Status:  Final result Specimen:  Blood from Arm, Left Updated:  01/03/19 0625     WBC 7 82 Thousand/uL      RBC 4 21 Million/uL      Hemoglobin 14 6 g/dL      Hematocrit 42 0 %       (H) fL      MCH 34 7 (H) pg      MCHC 34 8 g/dL      RDW 14 4 %      MPV 11 6 fL      Platelets 99 (L) Thousands/uL      nRBC 0 /100 WBCs      Neutrophils Relative 76 (H) %      Immat GRANS % 1 %      Lymphocytes Relative 12 (L) %      Monocytes Relative 10 %      Eosinophils Relative 1 %      Basophils Relative 0 %      Neutrophils Absolute 5 97 Thousands/µL      Immature Grans Absolute 0 06 Thousand/uL      Lymphocytes Absolute 0 92 Thousands/µL      Monocytes Absolute 0 79 Thousand/µL      Eosinophils Absolute 0 05 Thousand/µL      Basophils Absolute 0 03 Thousands/µL Protime-INR [[id number]]  (Abnormal) Collected:  01/03/19 0554    Lab Status:  Final result Specimen:  Blood from Arm, Left Updated:  01/03/19 0620     Protime 16 8 (H) seconds      INR 1 37 (H)    APTT [[id number]]  (Normal) Collected:  01/03/19 0554    Lab Status:  Final result Specimen:  Blood from Arm, Left Updated:  01/03/19 0620     PTT 29 seconds                  CT head without contrast   Final Result by Angelica Pinzon DO (01/03 1422)   1  Cerebral atrophy with chronic small vessel ischemic white matter disease  No acute intracranial abnormality  2   Laceration in the soft tissues overlying the right parietal convexity  Underlying bony calvarium intact  Workstation performed: [id number]                    Procedures  ECG 12 Lead Documentation  Date/Time: 1/3/2019 6:33 AM  Performed by: Javier Baez  Authorized by: Javier Baez     Indications / Diagnosis:  Near syncope  ECG reviewed by me, the ED Provider: yes    Patient location:  ED  Previous ECG:     Previous ECG:  Compared to current    Comparison ECG info:  11/08/18    Similarity:  No change  Interpretation:     Interpretation: non-specific    Rate:     ECG rate:  69    ECG rate assessment: normal    Rhythm:     Rhythm: sinus rhythm    Ectopy:     Ectopy: none    QRS:     QRS axis:  Normal    QRS intervals:  Normal  Conduction:     Conduction: normal    ST segments:     ST segments:  Normal  T waves:     T waves: normal      Lac Repair  Date/Time: 1/3/2019 6:40 AM  Performed by: Javier Baez  Authorized by: Javier WITT   Consent: Verbal consent obtained    Risks and benefits: risks, benefits and alternatives were discussed  Consent given by: patient  Body area: head/neck  Location details: scalp  Laceration length: 6 cm  Foreign bodies: no foreign bodies  Tendon involvement: none  Nerve involvement: none  Anesthesia: local infiltration    Anesthesia:  Local Anesthetic: lidocaine 1% with epinephrine    Sedation:  Patient sedated: no      Procedure Details:  Preparation: Patient was prepped and draped in the usual sterile fashion  Irrigation solution: saline  Irrigation method: syringe  Amount of cleaning: standard  Debridement: none  Degree of undermining: none  Skin closure: staples  Number of sutures: 10  Approximation: close  Approximation difficulty: simple  Patient tolerance: Patient tolerated the procedure well with no immediate complications             Phone Contacts  ED Phone Contact    ED Course                               MDM  Number of Diagnoses or Management Options  Laceration of head: new and requires workup  Medication side effect, initial encounter: new and requires workup  Near syncope: new and requires workup  Diagnosis management comments: This is a 74-year-old male who presents here today with a head laceration  At around 0200 hours this morning he took a medication that is used for people who work out and to lose weight, because he is trying to lose weight  Shortly after taking it he began feeling like his heart was racing, shaky, jittery, anxious, and nauseous  He says he went to the bathroom to vomit, and while bending over started feeling particularly lightheaded, and missed his target and hit his head on the side of the tub  He denies any actual loss of consciousness  He denies any actual vomiting  He denies preceding infectious symptoms, fevers, vomiting, diarrhea  He denies any chest pain, trouble breathing  He denies any blood thinning medications  He says he has taken this previously and did have similar side effects from the medication, and thought he got rid of it that this was something else  He has been taking his other medications as prescribed  He has no other complaints  ROS: Otherwise negative, unless stated as above  He is well-appearing, in no acute distress    He has a large laceration to the right side of his head which is currently hemostatic  This is most likely side effects of the weight loss, stimulant medication  Concern for side effects from this include cardiac ischemia, dysrhythmia, electrolyte abnormality, rhabdomyolysis, and anemia, electrolyte abnormality, underlying dysrhythmia could have contributed to his episode of near syncope  We will get a CT scan of his head to evaluate for underlying injuries and to evaluate for contributing factors and possible complications  His CT scan shows no acute abnormalities  He has a a thrombocytopenia and mildly elevated INR similar to prior  The remainder of his labs are unremarkable  He has not had recurrence of symptoms  His laceration was repaired as above without complications  I discussed the patient findings, treatment at home, follow-up, and indications for return, and he expresses understanding with this plan         Amount and/or Complexity of Data Reviewed  Clinical lab tests: reviewed and ordered  Tests in the radiology section of CPT®: reviewed and ordered  Independent visualization of images, tracings, or specimens: yes      CritCare Time    Disposition  Final diagnoses:   Near syncope   Medication side effect, initial encounter - weight loss/work out medication   Laceration of head     Time reflects when diagnosis was documented in both MDM as applicable and the Disposition within this note     Time User Action Codes Description Comment    1/3/2019  7:12 AM Roxana Paul Add [R55] Near syncope     1/3/2019  7:12 AM Roxana Paul Add [T50 905A] Medication side effect, initial encounter     1/3/2019  7:12 AM Roxana Paul Modify [T50 905A] Medication side effect, initial encounter weight loss/work out medication    1/3/2019  7:13 AM Roxana Paul Add [S01 91XA] Laceration of head       ED Disposition     ED Disposition Condition Comment    Discharge  Matt Tobias Sr  discharge to home/self care     Condition at discharge: Good        Follow-up Information     Follow up With Specialties Details Why [address] MD Family Medicine Schedule an appointment as soon as possible for a visit in 5 days For suture removal [address] [facility]  [address]  [address] Now [facility] Urgent Care Schedule an appointment as soon as possible for a visit in 5 days For suture removal [address]  [address] [facility] [address]          Discharge Medication List as of 1/3/2019  7:15 AM      CONTINUE these medications which have NOT CHANGED    Details   AMILoride 5 mg tablet Take 10 mg by mouth 2 (two) times a day  , Starting Sat 12/16/2017, Historical Med      furosemide (LASIX) 40 mg tablet Take 0 5 tablets (20 mg total) by mouth daily, Starting Wed 6/6/2018, No Print      lactulose (CONSTULOSE) 10 g/15 mL solution Take 10 g by mouth daily as needed  , Historical Med      nadolol (CORGARD) 20 mg tablet Take 20 mg by mouth 2 (two) times a day  , Starting Sat 12/16/2017, Historical Med      omeprazole (PriLOSEC) 20 mg delayed release capsule Take 1 capsule (20 mg total) by mouth daily for 90 days, Starting Fri 5/18/2018, Until Thu 8/16/2018, Normal      rifaximin (XIFAXAN) 550 mg tablet Take 550 mg by mouth 2 (two) times a day  , Historical Med      sucralfate (CARAFATE) 1 g tablet Take 1 tablet (1 g total) by mouth 4 (four) times a day, Starting Fri 6/22/2018, Normal           No discharge procedures on file      ED Provider  Electronically Signed by           Otilio Garcia MD  01/03/19 7792

## 2019-01-10 ENCOUNTER — TELEPHONE (OUTPATIENT)
Dept: BARIATRICS | Facility: CLINIC | Age: 54
End: 2019-01-10

## 2019-01-10 NOTE — TELEPHONE ENCOUNTER
Called to check on the patient, he missed his appointment today  He can come tomorrow morning at 9am  Reports he is no longer taking OTC weight loss medication (unsure of type) that was making him dizzy  Advised him to avoid weight loss medications  He reports no f/u with GI Dr Misty Maharaj yet - missed his appointment on 12/20/18  Will f/u with me tomorrow

## 2019-01-11 ENCOUNTER — OFFICE VISIT (OUTPATIENT)
Dept: URGENT CARE | Facility: CLINIC | Age: 54
End: 2019-01-11
Payer: MEDICARE

## 2019-01-11 ENCOUNTER — OFFICE VISIT (OUTPATIENT)
Dept: BARIATRICS | Facility: CLINIC | Age: 54
End: 2019-01-11
Payer: MEDICARE

## 2019-01-11 VITALS
SYSTOLIC BLOOD PRESSURE: 132 MMHG | RESPIRATION RATE: 18 BRPM | BODY MASS INDEX: 41.99 KG/M2 | OXYGEN SATURATION: 97 % | HEIGHT: 65 IN | TEMPERATURE: 98.7 F | WEIGHT: 252 LBS | HEART RATE: 58 BPM | DIASTOLIC BLOOD PRESSURE: 58 MMHG

## 2019-01-11 VITALS
WEIGHT: 252.5 LBS | DIASTOLIC BLOOD PRESSURE: 80 MMHG | SYSTOLIC BLOOD PRESSURE: 110 MMHG | HEART RATE: 60 BPM | HEIGHT: 65 IN | TEMPERATURE: 98 F | BODY MASS INDEX: 42.07 KG/M2

## 2019-01-11 DIAGNOSIS — G47.33 OBSTRUCTIVE SLEEP APNEA: ICD-10-CM

## 2019-01-11 DIAGNOSIS — Z98.84 S/P LAPAROSCOPIC SLEEVE GASTRECTOMY: Primary | ICD-10-CM

## 2019-01-11 DIAGNOSIS — K91.2 POSTSURGICAL MALABSORPTION: ICD-10-CM

## 2019-01-11 DIAGNOSIS — E66.01 MORBID OBESITY WITH BMI OF 40.0-44.9, ADULT (HCC): ICD-10-CM

## 2019-01-11 DIAGNOSIS — B18.2 HEPATIC CIRRHOSIS DUE TO CHRONIC HEPATITIS C INFECTION (HCC): ICD-10-CM

## 2019-01-11 DIAGNOSIS — Z48.02 ENCOUNTER FOR STAPLE REMOVAL: Primary | ICD-10-CM

## 2019-01-11 DIAGNOSIS — K74.60 HEPATIC CIRRHOSIS DUE TO CHRONIC HEPATITIS C INFECTION (HCC): ICD-10-CM

## 2019-01-11 PROCEDURE — 99213 OFFICE O/P EST LOW 20 MIN: CPT | Performed by: FAMILY MEDICINE

## 2019-01-11 PROCEDURE — G0463 HOSPITAL OUTPT CLINIC VISIT: HCPCS | Performed by: FAMILY MEDICINE

## 2019-01-11 PROCEDURE — 99214 OFFICE O/P EST MOD 30 MIN: CPT | Performed by: PHYSICIAN ASSISTANT

## 2019-01-11 RX ORDER — LANOLIN ALCOHOL/MO/W.PET/CERES
1 CREAM (GRAM) TOPICAL 2 TIMES DAILY
COMMUNITY
End: 2020-03-16 | Stop reason: ALTCHOICE

## 2019-01-11 RX ORDER — MULTIVIT-MIN/IRON FUM/FOLIC AC 7.5 MG-4
1 TABLET ORAL DAILY
COMMUNITY
End: 2021-08-06

## 2019-01-11 NOTE — PATIENT INSTRUCTIONS
GOALS:   · Continued/Maintain healthy weight loss with good nutrition intakes  · Adequate hydration with at least 64oz  fluid intake  · Normal vitamin and mineral levels  · Exercise as tolerated  · Take 1,000mcg Vitamin B12 daily  · Take 3,000IU of vitamin D with food daily  · Keep food records and follow up with RD  · Stop smoking! · Avoid juice, soda      · Follow-up in 3 months  We kindly ask that your arrive 15 minutes before your scheduled appointment time with your provider to allow our staff to room you, get your vital signs and update your chart  · Follow diet as discussed  · Get lab work done in the next 2 weeks  You have been given a lab slip today  Please call the office if you need a replacement  It is recommended to check with your insurance BEFORE getting labs done to make sure they are covered by your policy  Also, please check with your PCP and other providers before getting labs to avoid duplicate labs  Make sure to HOLD any multivitamins that may contain biotin and any biotin supplements FOR 5 DAYS before any labs since it can affect the results  · Follow vitamin and mineral recommendations as reviewed with you  · Call our office if you have any problems with abdominal pain especially associated with fever, chills, nausea, vomiting or any other concerns  · All  Post-bariatric surgery patients should be aware that very small quantities of any alcohol can cause impairment and it is very possible not to feel the effect  The effect can be in the system for several hours  It is also a stomach irritant  · It is advised to AVOID alcohol, Nonsteroidal antiinflammatory drugs (NSAIDS) and nicotine of all forms   Any of these can cause stomach irritation/pain

## 2019-01-11 NOTE — PROGRESS NOTES
[facility]        NAME: Johnnie Juaerz Sr  is a 46 y o  male  : 1965    MRN: [id number]  DATE: 2019  TIME: 11:06 AM    Assessment and Plan   Encounter for staple removal [Z48 02]  1  Encounter for staple removal           Patient Instructions     Cleaned gently with shampoo and water until fully healed     Do not pick at the scab  Follow up with PCP in 3-5 days  Proceed to  ER if symptoms worsen  Chief Complaint     Chief Complaint   Patient presents with    Suture / Staple Removal     PT had laceration of the head on 2019 sutures were put in at the [facility] ER         History of Present Illness       Suture / Staple Removal (PT had laceration of the head on 2019 sutures were put in at the [facility] ER)      Suture / Staple Removal   The sutures were placed 11 to 14 days ago  He tried antibiotic ointment use since the wound repair  The treatment provided significant relief  His temperature was unmeasured prior to arrival  There has been no drainage from the wound  There is no redness present  There is no swelling present  There is no pain present  Review of Systems   Review of Systems   Constitutional: Negative  Respiratory: Negative  Cardiovascular: Negative  Skin: Positive for wound           Current Medications       Current Outpatient Prescriptions:     AMILoride 5 mg tablet, Take 10 mg by mouth 2 (two) times a day  , Disp: , Rfl: 0    calcium citrate-vitamin D (CITRACAL+D) 315-200 MG-UNIT per tablet, Take 1 tablet by mouth 2 (two) times a day, Disp: , Rfl:     furosemide (LASIX) 40 mg tablet, Take 0 5 tablets (20 mg total) by mouth daily, Disp: 30 tablet, Rfl: 0    lactulose (CONSTULOSE) 10 g/15 mL solution, Take 10 g by mouth daily as needed  , Disp: , Rfl:     Multiple Vitamins-Minerals (MULTIVITAMIN WITH MINERALS) tablet, Take 1 tablet by mouth daily, Disp: , Rfl:     nadolol (CORGARD) 20 mg tablet, Take 20 mg by mouth 2 (two) times a day  , Disp: , Rfl: 1    rifaximin (XIFAXAN) 550 mg tablet, Take 550 mg by mouth 2 (two) times a day  , Disp: , Rfl:     sucralfate (CARAFATE) 1 g tablet, Take 1 tablet (1 g total) by mouth 4 (four) times a day, Disp: 30 tablet, Rfl: 3    omeprazole (PriLOSEC) 20 mg delayed release capsule, Take 1 capsule (20 mg total) by mouth daily for 90 days, Disp: 90 capsule, Rfl: 3    Current Allergies     Allergies as of 01/11/2019    (No Known Allergies)            The following portions of the patient's history were reviewed and updated as appropriate: allergies, current medications, past family history, past medical history, past social history, past surgical history and problem list      Past Medical History:   Diagnosis Date    CPAP (continuous positive airway pressure) dependence     Esophageal varices (HCC)     Hepatic [address] ([facility] [address] )     treated with harvoni   Hep C- dx age 17   [facility] Hepatic encephalopathy ([facility] [address] )     Obesity     Pneumonia     in past    Sleep apnea     Wears glasses        Past Surgical History:   Procedure Laterality Date    COLONOSCOPY      ESOPHAGOGASTRODUODENOSCOPY N/A 4/12/2018    Procedure: ESOPHAGOGASTRODUODENOSCOPY (EGD); Surgeon: Randi Kang MD;  [facility]; Service: Gastroenterology    FRACTURE SURGERY Left     ankle    OTHER SURGICAL HISTORY      banding esophageal varices    [place] LAP, TERESA RESTRICT PROC, LONGITUDINAL GASTRECTOMY N/A 6/5/2018    Procedure: GASTRECTOMY LAPAROSCOPIC SLEEVE;  Surgeon: Norm Hawkins MD;  [facility];  Service: Bariatrics       Family History   Problem Relation Age of Onset    Heart disease Mother     Lupus Mother     Diabetes Father     Stroke Father          Medications have been verified          Objective   /58 (BP Location: Left arm, Patient Position: Sitting, Cuff Size: Standard)   Pulse 58   Temp 98 7 °F (37 1 °C) (Tympanic)   Resp 18   Ht 5' 5" (1 651 m)   Wt 114 kg (252 lb)   SpO2 97%   BMI 41 93 kg/m²          Physical Exam     Physical Exam   Constitutional: He appears well-developed and well-nourished  Cardiovascular: Normal rate and regular rhythm  Pulmonary/Chest: Effort normal and breath sounds normal      Suture removal  Date/Time: 1/11/2019 11:08 AM  Performed by: Michael Chavez by: Vanita Mattson     Patient location:  Clinic  Other Assisting Provider: No    Consent:     Consent obtained:  Verbal    Consent given by:  Patient    Risks discussed:  Bleeding, wound separation and pain    Alternatives discussed:  Delayed treatment  Universal protocol:     Procedure explained and questions answered to patient or proxy's satisfaction: yes      Patient identity confirmed:  Verbally with patient  Location:     Laterality:  Right    Location:  [address] location:  Scalp  Procedure details: Tools used: Other (comment) (Staple removal tool)    Wound appearance:  No sign(s) of infection    Number of staples removed:  10  Post-procedure details:     Post-removal:  No dressing applied    Patient tolerance of procedure:   Tolerated well, no immediate complications

## 2019-01-11 NOTE — PROGRESS NOTES
Assessment/Plan:    S/P laparoscopic sleeve gastrectomy  -s/p Vertical Sleeve Gastrectomy with Dr Ellen Geiger on 06/05/18  · EWL is 37%, which places the patient behind schedule for expected post surgical weight loss at this time  Lengthy dietary education today and patient agrees to eliminate sugary beverages (juice, soda, iced tea) and unhealthy snacking on potato chips  He agrees to keep food records and f/u with RD  · He fell and cut his head requiring staples last week r/t taking unknown OTC weight loss medication - patient believes maybe X2  We discussed the risks of weight loss medications and supplements and patient agrees to avoid any weight loss medications  · He is tolerating a regular diet without issues  · The patient attempts to eat protein first, but is not always getting 60g of protein daily  Advised him to increase protein to at least 80g daily  · Reports usually following the 30/60 minute rule with liquids  Limited fluid intake - drinking about 32 ounces or less of hydrating fluids  Advised him to increase hydrating fluids to 64oz  Daily, unsweetened  · Ulcer Risk assessment:  · The patient is avoiding NSAIDS or alcohol  Has been using vaporizer - unsure if it contains nicotine  Discussion about strict AVOIDANCE of alcohol, NSAIDS, and nicotine of all forms - agrees to quit vaporizer! · Limited structured exercise  Discussion about increasing exercise and physical activity as tolerated  Postsurgical malabsorption  -At risk for malabsorption of vitamins/minerals secondary to malabsorption and restriction of intake from bariatric surgery  -NOT Currently taking adequate postop bariatric surgery vitamin supplementation: Bariatric Advantage MVI, Vitamin D 5,000IU daily   -Last set of bariatric labs completed on 10/04/18 and showed very low vitamin A, low B12 (317), and low vitamin D (24 1)  -Awaiting response from Dr Vinny Jacinto regarding vitamin A repletion given hx   Of severe cirrhosis  -Was advised to take 1,000mcg B12 and 3,000IU vitamin D  -Next set of bariatric labs ordered for approximately 2 weeks  -Patient received education about the importance of adhering to a lifelong supplementation regimen to avoid vitamin/mineral deficiencies     Obstructive sleep apnea  -Was seen by neurology for ELLEN on 09/06/18 and told inconclusive study and given CPAP  -Not currently using CPAP (advised patient to f/u with sleep medicine/neurology)    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 ([facility] )  -On lactulose, lasix, and xifaxan per GI  -On 08/08/18 ammonia levels were 70 and on 08/07/18 total bilirubin 3 63  -Continue to f/u for monitoring and management with GI  -He missed his appointment with Dr Santi Alejandre on 12/20/18  -Placed call to Dr Santi Alejandre regarding Vitamin A repletion and awaiting response  Morbid obesity with BMI of 40 0-44 9, adult ([place] [address] )  -Improved, down 11 BMI points       Diagnoses and all orders for this visit:    S/P laparoscopic sleeve gastrectomy  -     CBC and differential; Future  -     Comprehensive metabolic panel; Future  -     Copper Level; Future  -     Ferritin; Future  -     Folate; Future  -     Iron; Future  -     Iron Saturation %; Future  -     PTH, intact; Future  -     Vitamin A; Future  -     Vitamin B1, whole blood; Future  -     Vitamin B12; Future  -     Vitamin D 25 hydroxy; Future  -     Zinc; Future    Postsurgical malabsorption  -     CBC and differential; Future  -     Comprehensive metabolic panel; Future  -     Copper Level; Future  -     Ferritin; Future  -     Folate; Future  -     Iron; Future  -     Iron Saturation %; Future  -     PTH, intact; Future  -     Vitamin A; Future  -     Vitamin B1, whole blood; Future  -     Vitamin B12; Future  -     Vitamin D 25 hydroxy;  Future  -     Zinc; Future    Obstructive sleep apnea    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 ([facility] )    Morbid obesity with BMI of 40 0-44 9, adult ([facility] )    Other orders  -     calcium citrate-vitamin D (CITRACAL+D) 315-200 MG-UNIT per tablet; Take 1 tablet by mouth 2 (two) times a day  -     Multiple Vitamins-Minerals (MULTIVITAMIN WITH MINERALS) tablet; Take 1 tablet by mouth daily          Subjective:      Patient ID: Debbie Basurto Sr  is a 46 y o  male  -s/p Vertical Sleeve Gastrectomy with Dr Jacki Hoffman on 06/05/18  Presents to the office today for routine follow up  Tolerating diet without issues; denies N/V, dysphagia, reflux  Weight loss is behind schedule likely r/t unhealthy snacking, food choices, and sugary drinks  Initial: 313lbs  Current: 252 5lbs  EWL: 37% (Weight loss is behind schedule at this post surgical period )  Can: current  Current BMI is Body mass index is 42 02 kg/m²  Diet Recall :  6am - usually skips   L - 3 hard boiled eggs  D - chicken cutlets, green beans, rice  Snacks - potato chips    Fluids: water - limited, cranberry juice, chocolate milk, soda, no ETOH    No exercise  The following portions of the patient's history were reviewed and updated as appropriate: allergies, current medications, past family history, past medical history, past social history, past surgical history and problem list     Review of Systems   Constitutional: Negative for chills and fever  Unexpected weight change: planned weight loss  HENT: Negative for trouble swallowing  Respiratory: Negative for cough and shortness of breath  Cardiovascular: Negative for chest pain and palpitations  Gastrointestinal: Negative for abdominal pain, constipation, diarrhea, nausea and vomiting  Neurological: Negative for dizziness     Psychiatric/Behavioral:        Denies anxiety and depression         Objective:      /80 (BP Location: Right arm, Patient Position: Sitting, Cuff Size: Large)   Pulse 60   Temp 98 °F (36 7 °C) (Tympanic)   Ht 5' 5" (1 651 m)   Wt 115 kg (252 lb 8 oz)   BMI 42 02 kg/m²          Physical Exam   Constitutional: He is oriented to person, place, and time  He appears well-developed and well-nourished  No distress  HENT:   Head: Normocephalic and atraumatic  Eyes: Pupils are equal, round, and reactive to light  No scleral icterus  Cardiovascular: Normal rate, regular rhythm and normal heart sounds  Pulmonary/Chest: Effort normal and breath sounds normal  No respiratory distress  Abdominal: Soft  Bowel sounds are normal  He exhibits no distension  There is no tenderness  No incisional hernias appreciated  Overhanging abdominal pannus   Musculoskeletal: He exhibits no edema  Neurological: He is alert and oriented to person, place, and time  Skin: Skin is warm and dry  Greyish skin tone  Well healed skin laceration with intact staples on R-side top of scalp  No signs of infection  Psychiatric: He has a normal mood and affect  Nursing note and vitals reviewed  BARRIERS: none identified    GOALS:   · Continued/Maintain healthy weight loss with good nutrition intakes  · Adequate hydration with at least 64oz  fluid intake  · Normal vitamin and mineral levels  · Exercise as tolerated  · Take 1,000mcg Vitamin B12 daily  · Take 3,000IU of vitamin D with food daily  · Keep food records and follow up with RD  · Stop smoking! · Follow-up in 3 months  We kindly ask that your arrive 15 minutes before your scheduled appointment time with your provider to allow our staff to room you, get your vital signs and update your chart  · Follow diet as discussed  · Get lab work done in the next 2 weeks  You have been given a lab slip today  Please call the office if you need a replacement  It is recommended to check with your insurance BEFORE getting labs done to make sure they are covered by your policy  Also, please check with your PCP and other providers before getting labs to avoid duplicate labs   Make sure to HOLD any multivitamins that may contain biotin and any biotin supplements FOR 5 DAYS before any labs since it can affect the results  · Follow vitamin and mineral recommendations as reviewed with you  · Call our office if you have any problems with abdominal pain especially associated with fever, chills, nausea, vomiting or any other concerns  · All  Post-bariatric surgery patients should be aware that very small quantities of any alcohol can cause impairment and it is very possible not to feel the effect  The effect can be in the system for several hours  It is also a stomach irritant  · It is advised to AVOID alcohol, Nonsteroidal antiinflammatory drugs (NSAIDS) and nicotine of all forms   Any of these can cause stomach irritation/pain

## 2019-01-11 NOTE — ASSESSMENT & PLAN NOTE
-On lactulose, lasix, and xifaxan per GI  -On 08/08/18 ammonia levels were 70 and on 08/07/18 total bilirubin 3 63  -Continue to f/u for monitoring and management with GI  -He missed his appointment with Dr Jone Lawrence on 12/20/18  -Placed call to Dr Jone Lawrence regarding Vitamin A repletion and awaiting response

## 2019-01-11 NOTE — PATIENT INSTRUCTIONS
Cleaned gently with shampoo and water until fully healed     Do not pick at the scab  Follow up with PCP in 3-5 days  Proceed to  ER if symptoms worsen

## 2019-01-11 NOTE — ASSESSMENT & PLAN NOTE
-s/p Vertical Sleeve Gastrectomy with Dr Aki Millard on 06/05/18  · EWL is 37%, which places the patient behind schedule for expected post surgical weight loss at this time  Lengthy dietary education today and patient agrees to eliminate sugary beverages (juice, soda, iced tea) and unhealthy snacking on potato chips  He agrees to keep food records and f/u with RD  · He fell and cut his head requiring staples last week r/t taking unknown OTC weight loss medication - patient believes maybe X2  We discussed the risks of weight loss medications and supplements and patient agrees to avoid any weight loss medications  · He is tolerating a regular diet without issues  · The patient attempts to eat protein first, but is not always getting 60g of protein daily  Advised him to increase protein to at least 80g daily  · Reports usually following the 30/60 minute rule with liquids  Limited fluid intake - drinking about 32 ounces or less of hydrating fluids  Advised him to increase hydrating fluids to 64oz  Daily, unsweetened  · Ulcer Risk assessment:  · The patient is avoiding NSAIDS or alcohol  Has been using vaporizer - unsure if it contains nicotine  Discussion about strict AVOIDANCE of alcohol, NSAIDS, and nicotine of all forms - agrees to quit vaporizer! · Limited structured exercise  Discussion about increasing exercise and physical activity as tolerated

## 2019-01-14 ENCOUNTER — APPOINTMENT (OUTPATIENT)
Dept: LAB | Facility: CLINIC | Age: 54
End: 2019-01-14
Payer: MEDICARE

## 2019-01-14 DIAGNOSIS — K91.2 POSTSURGICAL MALABSORPTION: ICD-10-CM

## 2019-01-14 DIAGNOSIS — Z98.84 S/P LAPAROSCOPIC SLEEVE GASTRECTOMY: ICD-10-CM

## 2019-01-14 LAB
25(OH)D3 SERPL-MCNC: 32.8 NG/ML (ref 30–100)
ALBUMIN SERPL BCP-MCNC: 3.6 G/DL (ref 3.5–5)
ALP SERPL-CCNC: 127 U/L (ref 46–116)
ALT SERPL W P-5'-P-CCNC: 34 U/L (ref 12–78)
ANION GAP SERPL CALCULATED.3IONS-SCNC: 8 MMOL/L (ref 4–13)
AST SERPL W P-5'-P-CCNC: 51 U/L (ref 5–45)
BASOPHILS # BLD AUTO: 0.05 THOUSANDS/ΜL (ref 0–0.1)
BASOPHILS NFR BLD AUTO: 1 % (ref 0–1)
BILIRUB SERPL-MCNC: 3.64 MG/DL (ref 0.2–1)
BUN SERPL-MCNC: 10 MG/DL (ref 5–25)
CALCIUM SERPL-MCNC: 9.1 MG/DL (ref 8.3–10.1)
CHLORIDE SERPL-SCNC: 108 MMOL/L (ref 100–108)
CO2 SERPL-SCNC: 24 MMOL/L (ref 21–32)
CREAT SERPL-MCNC: 0.69 MG/DL (ref 0.6–1.3)
EOSINOPHIL # BLD AUTO: 0.27 THOUSAND/ΜL (ref 0–0.61)
EOSINOPHIL NFR BLD AUTO: 5 % (ref 0–6)
ERYTHROCYTE [DISTWIDTH] IN BLOOD BY AUTOMATED COUNT: 14.6 % (ref 11.6–15.1)
FERRITIN SERPL-MCNC: 223 NG/ML (ref 8–388)
FOLATE SERPL-MCNC: 15.2 NG/ML (ref 3.1–17.5)
GFR SERPL CREATININE-BSD FRML MDRD: 108 ML/MIN/1.73SQ M
GLUCOSE P FAST SERPL-MCNC: 101 MG/DL (ref 65–99)
HCT VFR BLD AUTO: 42.3 % (ref 36.5–49.3)
HGB BLD-MCNC: 14.3 G/DL (ref 12–17)
IMM GRANULOCYTES # BLD AUTO: 0 THOUSAND/UL (ref 0–0.2)
IMM GRANULOCYTES NFR BLD AUTO: 0 % (ref 0–2)
IRON SATN MFR SERPL: 67 %
IRON SERPL-MCNC: 190 UG/DL (ref 65–175)
LYMPHOCYTES # BLD AUTO: 1.4 THOUSANDS/ΜL (ref 0.6–4.47)
LYMPHOCYTES NFR BLD AUTO: 27 % (ref 14–44)
MCH RBC QN AUTO: 34.3 PG (ref 26.8–34.3)
MCHC RBC AUTO-ENTMCNC: 33.8 G/DL (ref 31.4–37.4)
MCV RBC AUTO: 101 FL (ref 82–98)
MONOCYTES # BLD AUTO: 0.45 THOUSAND/ΜL (ref 0.17–1.22)
MONOCYTES NFR BLD AUTO: 9 % (ref 4–12)
NEUTROPHILS # BLD AUTO: 3.02 THOUSANDS/ΜL (ref 1.85–7.62)
NEUTS SEG NFR BLD AUTO: 58 % (ref 43–75)
NRBC BLD AUTO-RTO: 0 /100 WBCS
PLATELET # BLD AUTO: 129 THOUSANDS/UL (ref 149–390)
PMV BLD AUTO: 12.3 FL (ref 8.9–12.7)
POTASSIUM SERPL-SCNC: 3.8 MMOL/L (ref 3.5–5.3)
PROT SERPL-MCNC: 6.5 G/DL (ref 6.4–8.2)
PTH-INTACT SERPL-MCNC: 30.4 PG/ML (ref 18.4–80.1)
RBC # BLD AUTO: 4.17 MILLION/UL (ref 3.88–5.62)
SODIUM SERPL-SCNC: 140 MMOL/L (ref 136–145)
TIBC SERPL-MCNC: 282 UG/DL (ref 250–450)
VIT B12 SERPL-MCNC: 1421 PG/ML (ref 100–900)
WBC # BLD AUTO: 5.19 THOUSAND/UL (ref 4.31–10.16)

## 2019-01-14 PROCEDURE — 36415 COLL VENOUS BLD VENIPUNCTURE: CPT

## 2019-01-14 PROCEDURE — 84425 ASSAY OF VITAMIN B-1: CPT

## 2019-01-14 PROCEDURE — 83540 ASSAY OF IRON: CPT

## 2019-01-14 PROCEDURE — 84590 ASSAY OF VITAMIN A: CPT

## 2019-01-14 PROCEDURE — 85025 COMPLETE CBC W/AUTO DIFF WBC: CPT

## 2019-01-14 PROCEDURE — 82306 VITAMIN D 25 HYDROXY: CPT

## 2019-01-14 PROCEDURE — 82525 ASSAY OF COPPER: CPT

## 2019-01-14 PROCEDURE — 80053 COMPREHEN METABOLIC PANEL: CPT

## 2019-01-14 PROCEDURE — 83970 ASSAY OF PARATHORMONE: CPT

## 2019-01-14 PROCEDURE — 84630 ASSAY OF ZINC: CPT

## 2019-01-14 PROCEDURE — 82607 VITAMIN B-12: CPT

## 2019-01-14 PROCEDURE — 83550 IRON BINDING TEST: CPT

## 2019-01-14 PROCEDURE — 82728 ASSAY OF FERRITIN: CPT

## 2019-01-14 PROCEDURE — 82746 ASSAY OF FOLIC ACID SERUM: CPT

## 2019-01-16 LAB
COPPER SERPL-MCNC: 104 UG/DL (ref 72–166)
ZINC SERPL-MCNC: 53 UG/DL (ref 56–134)

## 2019-01-17 LAB — VIT B1 BLD-SCNC: 150.3 NMOL/L (ref 66.5–200)

## 2019-01-18 LAB — VIT A SERPL-MCNC: 14 UG/DL (ref 20.1–62)

## 2019-01-24 ENCOUNTER — TELEPHONE (OUTPATIENT)
Dept: BARIATRICS | Facility: CLINIC | Age: 54
End: 2019-01-24

## 2019-01-25 ENCOUNTER — TELEPHONE (OUTPATIENT)
Dept: BARIATRICS | Facility: CLINIC | Age: 54
End: 2019-01-25

## 2019-01-25 NOTE — TELEPHONE ENCOUNTER
Spoke to the patient to review most recent lab results  See lab letter for details  He verbalizes understanding

## 2019-01-29 ENCOUNTER — TELEPHONE (OUTPATIENT)
Dept: BARIATRICS | Facility: CLINIC | Age: 54
End: 2019-01-29

## 2019-01-29 DIAGNOSIS — E50.9 VITAMIN A DEFICIENCY: Primary | ICD-10-CM

## 2019-01-29 NOTE — TELEPHONE ENCOUNTER
Pt called and would like to speak to Maday Apodaca  He states that he cannot find what she told him to get  Please Advise Pt

## 2019-01-30 ENCOUNTER — TELEPHONE (OUTPATIENT)
Dept: BARIATRICS | Facility: CLINIC | Age: 54
End: 2019-01-30

## 2019-01-30 NOTE — TELEPHONE ENCOUNTER
Left patient message regarding vitamin A supplements - reminded him to take 10,000 IU of retinyl acetate or retinyl palmitate (Vitamin A) per day  He should go to his pharmacy and ask the pharmacist for help or the vitamin shop  He can also order this supplement on [address] [address]

## 2019-02-04 ENCOUNTER — TELEPHONE (OUTPATIENT)
Dept: GASTROENTEROLOGY | Facility: CLINIC | Age: 54
End: 2019-02-04

## 2019-02-05 ENCOUNTER — TELEPHONE (OUTPATIENT)
Dept: GASTROENTEROLOGY | Facility: CLINIC | Age: 54
End: 2019-02-05

## 2019-02-06 ENCOUNTER — TELEPHONE (OUTPATIENT)
Dept: BARIATRICS | Facility: CLINIC | Age: 54
End: 2019-02-06

## 2019-02-06 NOTE — TELEPHONE ENCOUNTER
Returned patient's call and left a message  I assured him that I have returned all of his calls and I am happy to prescribe him Vitamin A to a different pharmacy if he would like  I also recommended he try and call Deidre Mart to deliver his medication to [facility] for no charge  Call office back with any concerns

## 2019-02-06 NOTE — TELEPHONE ENCOUNTER
Patient telephones looking to speak with TRAN Neumann  He says he left a message previously asking Lily Neumann to call but she never did  (I reviewed his previous call and Caroline's response in his chart, but he said that he got that message, but has called since that time )  He says that he did go to the local pharmacy and was told by pharmacist that they did not have the Vitamin A containing retinyl acetate or retinyl palmitate  He got Dr Nancy Cruz to give him a prescription for the same, but when he went to pick it up, it was not the Retinyl Acetate or Retinyl Palmitate, so he did not purchase the Rx  He called the [facility] but they would charge him $10 00 to deliver it to his house  He can't afford this  He would like Lily Neumann to help him straighten this out

## 2019-02-06 NOTE — TELEPHONE ENCOUNTER
Returned patient's call and left a message  I assured him that I have returned all of his calls and I am happy to prescribe him Vitamin A to a different pharmacy if he would like  I also recommended he try and call Fina Jefferson to deliver his medication to [facility] location for no charge  Call office back with any concerns

## 2019-02-07 DIAGNOSIS — B18.2 HEPATIC CIRRHOSIS DUE TO CHRONIC HEPATITIS C INFECTION (HCC): ICD-10-CM

## 2019-02-07 DIAGNOSIS — K74.60 HEPATIC CIRRHOSIS DUE TO CHRONIC HEPATITIS C INFECTION (HCC): ICD-10-CM

## 2019-02-07 NOTE — TELEPHONE ENCOUNTER
We can send nadolol 20 mg daily, amiloride 5 mg daily, and lasix 20 mg daily  Lasix does not come in 10 mg tablets so if he was taking this low of a dose he can cut the tablets in half  We have not seen him recently so he needs an office visit in the next few months to follow up  Give him at least a 3 month supply of the medications and just confirm the lasix dose

## 2019-02-07 NOTE — TELEPHONE ENCOUNTER
This pt called and was not pleasant  He is requesting refills on Nadolo 20 MG, Amilotride 5 MG, Lasix 10 MG sent to [facility]    Please advise if this is ok, I do not see that we ever saw this pt but he is claiming he saw Mariluz Avila at his old office      Pt is also having sent over a Xifaxan form because the states he has been on it daily and gets it for free from a program

## 2019-02-08 ENCOUNTER — TELEPHONE (OUTPATIENT)
Dept: GASTROENTEROLOGY | Facility: CLINIC | Age: 54
End: 2019-02-08

## 2019-02-08 RX ORDER — NADOLOL 20 MG/1
20 TABLET ORAL DAILY
Qty: 90 TABLET | Refills: 0 | Status: SHIPPED | OUTPATIENT
Start: 2019-02-08 | End: 2019-03-20 | Stop reason: SDUPTHER

## 2019-02-08 RX ORDER — AMILORIDE HYDROCHLORIDE 5 MG/1
5 TABLET ORAL DAILY
Qty: 90 TABLET | Refills: 0 | Status: SHIPPED | OUTPATIENT
Start: 2019-02-08 | End: 2019-03-20 | Stop reason: SDUPTHER

## 2019-02-08 RX ORDER — FUROSEMIDE 20 MG/1
20 TABLET ORAL DAILY
Qty: 90 TABLET | Refills: 0 | Status: SHIPPED | OUTPATIENT
Start: 2019-02-08 | End: 2019-03-20 | Stop reason: SDUPTHER

## 2019-02-08 NOTE — TELEPHONE ENCOUNTER
Alban pt-L/M  Ganga Chandra He would like to know the status of his medications that were to be sent to the pharmacy     The pharmacy has not received any orders     [facility] [phone]  Please advise [phone]

## 2019-02-13 DIAGNOSIS — E50.9 VITAMIN A DEFICIENCY: ICD-10-CM

## 2019-03-13 ENCOUNTER — TELEPHONE (OUTPATIENT)
Dept: GASTROENTEROLOGY | Facility: CLINIC | Age: 54
End: 2019-03-13

## 2019-03-13 ENCOUNTER — OFFICE VISIT (OUTPATIENT)
Dept: GASTROENTEROLOGY | Facility: CLINIC | Age: 54
End: 2019-03-13
Payer: MEDICARE

## 2019-03-13 VITALS
BODY MASS INDEX: 40.38 KG/M2 | DIASTOLIC BLOOD PRESSURE: 56 MMHG | SYSTOLIC BLOOD PRESSURE: 112 MMHG | WEIGHT: 242.38 LBS | HEART RATE: 75 BPM | HEIGHT: 65 IN

## 2019-03-13 DIAGNOSIS — B18.2 CHRONIC HEPATITIS C WITH CIRRHOSIS (HCC): Primary | ICD-10-CM

## 2019-03-13 DIAGNOSIS — K72.90 HEPATIC ENCEPHALOPATHY (HCC): ICD-10-CM

## 2019-03-13 DIAGNOSIS — K74.60 CHRONIC HEPATITIS C WITH CIRRHOSIS (HCC): Primary | ICD-10-CM

## 2019-03-13 PROBLEM — I85.00 ESOPHAGEAL VARICES WITHOUT BLEEDING (HCC): Status: ACTIVE | Noted: 2019-03-13

## 2019-03-13 PROCEDURE — 99213 OFFICE O/P EST LOW 20 MIN: CPT | Performed by: PHYSICIAN ASSISTANT

## 2019-03-13 RX ORDER — LACTULOSE 10 G/15ML
10 SOLUTION ORAL DAILY PRN
Qty: 946 ML | Refills: 1 | Status: SHIPPED | OUTPATIENT
Start: 2019-03-13 | End: 2022-01-07 | Stop reason: SDUPTHER

## 2019-03-13 NOTE — TELEPHONE ENCOUNTER
Called ptTOPHER advising pt that we filled out our part of the pt assistance program for his Xifaxan, but there are 2 pages he needs to fill out  Asked him if hes going to print it at home, fill it out and bring it in or if he is going to come by the office to fill out the 2 pages  Told him to cb and let us know

## 2019-03-13 NOTE — PROGRESS NOTES
[facility] - Outpatient Follow-up Note  Sandie Beach Sr  46 y o  male MRN: [id number]  Encounter: [id number]          ASSESSMENT AND PLAN:      1  Compensated Cirrhosis 2/2 HCV  - SVR s/p Harvoni treatment  - MELD 15 which is stable from last year and likely skewed due to elevated bilirubin which is largely indirect, Child Class B again which is due to his indirect hyperbilirubinemia  - He is not drinking any alcohol  - Grade 0 hepatic encephalopathy, will fill out paperwork for him to get the rifaximin and continue lactulose PRN  - No ascites or LE edema, continue low Na diet and lasix 20 mg daily  - Last EGD in April 2018 without varices, PHG was noted, will scheduled for repeat EGD in May 2018 and continue nadolol  - [address]  screen is not up to date, will have him get a RUQ US, his last imaging was a contrasted CT in March 2018 without any suspicious findings    ______________________________________________________________________    SUBJECTIVE:  Darryl Castillo is a 47 yo M with a PMH of Jeffrey-en-Y gastric bypass in June 2018 with Dr Florentino Favre, compensated cirrhosis 2/2 chronic HCV s/p SVR with [facility],  Presenting for routine follow-up of his cirrhosis and for medication review  He denies any abdominal pain, nausea, vomiting  He is having 2-3 bowel movements a day without lactulose and denies any melena or hematochezia  He will occasionally take lactulose if he has any possible confusion this may be noted by his wife but this happens very rarely  He denies any lower extremity swelling or ascites  He is on Lasix 20 mg daily  His last endoscopy was in April 2018 with Dr Zoltan Roberts  And there were no varices in the esophagus or stomach but there was portal hypertensive gastropathy  He is currently on nadolol  He does have a history of varices that required banding years ago  He has not had imaging of his liver since about a year ago when he had a CT scan     He is having trouble affording the rifaximin and he needs a paper filled out  REVIEW OF SYSTEMS IS OTHERWISE NEGATIVE  Historical Information   Past Medical History:   Diagnosis Date    CPAP (continuous positive airway pressure) dependence     Esophageal varices (HCC)     Hepatic cirrhosis (HCC)     treated with harvoni   Hep C- dx age 17    [facility]atic encephalopathy ([facility] )     Obesity     Pneumonia     in past    Sleep apnea     Wears glasses      Past Surgical History:   Procedure Laterality Date    COLONOSCOPY      ESOPHAGOGASTRODUODENOSCOPY N/A 2018    Procedure: ESOPHAGOGASTRODUODENOSCOPY (EGD); Surgeon: Tommie Holstein, MD;  Location: [facility];   Service: Gastroenterology    FRACTURE SURGERY Left     ankle    OTHER SURGICAL HISTORY      banding esophageal varices    ME LAP, TERESA RESTRICT PROC, LONGITUDINAL GASTRECTOMY N/A 2018    Procedure: GASTRECTOMY LAPAROSCOPIC SLEEVE;  Surgeon: ePter Ram MD;  Location: [facility];  Service: Bariatrics     Social History   Social History     Substance and Sexual Activity   Alcohol Use No    Alcohol/week: 0 0 oz     Social History     Substance and Sexual Activity   Drug Use No     Social History     Tobacco Use   Smoking Status Current Some Day Smoker    Packs/day: 0 25    Years: 37 00    Pack years: 9 25    Last attempt to quit: 2/15/2018    Years since quittin 0   Smokeless Tobacco Never Used   Tobacco Comment    currently vapes occasionally     Family History   Problem Relation Age of Onset    Heart disease Mother     Lupus Mother     Diabetes Father     Stroke Father        Meds/Allergies       Current Outpatient Medications:     AMILoride 5 mg tablet    calcium citrate-vitamin D (CITRACAL+D) 315-200 MG-UNIT per tablet    furosemide (LASIX) 20 mg tablet    lactulose (CONSTULOSE) 10 g/15 mL solution    Multiple Vitamins-Minerals (MULTIVITAMIN WITH MINERALS) tablet    nadolol (CORGARD) 20 mg tablet    rifaximin (XIFAXAN) 550 mg tablet    vitamin A 10,000 units capsule    omeprazole (PriLOSEC) 20 mg delayed release capsule    No Known Allergies        Objective     Blood pressure 112/56, pulse 75, height 5' 5" (1 651 m), weight 110 kg (242 lb 6 oz)  Body mass index is 40 33 kg/m²  PHYSICAL EXAM:      General Appearance:   Alert, cooperative, no distress   HEENT:   Normocephalic, atraumatic, anicteric      Neck:  Supple, symmetrical, trachea midline   Lungs:   Clear to auscultation bilaterally; no rales, rhonchi or wheezing; respirations unlabored    Heart[de-identified]   Regular rate and rhythm; no murmur, rub, or gallop  Abdomen:   Soft, non-tender, non-distended; normal bowel sounds; no masses, no organomegaly    Genitalia:   Deferred    Rectal:   Deferred    Extremities:  No cyanosis, clubbing or edema    Pulses:  2+ and symmetric    Skin:  No jaundice, rashes, or lesions    Lymph nodes:  No palpable cervical lymphadenopathy        Lab Results:   No visits with results within 1 Day(s) from this visit     Latest known visit with results is:   Appointment on 01/14/2019   Component Date Value    WBC 01/14/2019 5 19     RBC 01/14/2019 4 17     Hemoglobin 01/14/2019 14 3     Hematocrit 01/14/2019 42 3     MCV 01/14/2019 101*    MCH 01/14/2019 34 3     MCHC 01/14/2019 33 8     RDW 01/14/2019 14 6     MPV 01/14/2019 12 3     Platelets 28/26/4430 129*    nRBC 01/14/2019 0     Neutrophils Relative 01/14/2019 58     Immat GRANS % 01/14/2019 0     Lymphocytes Relative 01/14/2019 27     Monocytes Relative 01/14/2019 9     Eosinophils Relative 01/14/2019 5     Basophils Relative 01/14/2019 1     Neutrophils Absolute 01/14/2019 3 02     Immature Grans Absolute 01/14/2019 0 00     Lymphocytes Absolute 01/14/2019 1 40     Monocytes Absolute 01/14/2019 0 45     Eosinophils Absolute 01/14/2019 0 27     Basophils Absolute 01/14/2019 0 05     Sodium 01/14/2019 140     Potassium 01/14/2019 3 8     Chloride 01/14/2019 108     CO2 01/14/2019 24     ANION GAP 01/14/2019 8     BUN 01/14/2019 10     Creatinine 01/14/2019 0 69     Glucose, Fasting 01/14/2019 101*    Calcium 01/14/2019 9 1     AST 01/14/2019 51*    ALT 01/14/2019 34     Alkaline Phosphatase 01/14/2019 127*    Total Protein 01/14/2019 6 5     Albumin 01/14/2019 3 6     Total Bilirubin 01/14/2019 3 64*    eGFR 01/14/2019 108     Copper 01/14/2019 104     Ferritin 01/14/2019 223     Folate 01/14/2019 15 2     Iron Saturation 01/14/2019 67     TIBC 01/14/2019 282     Iron 01/14/2019 190*    Vitamin A 01/14/2019 14 0*    Vitamin B1, Whole Blood 01/14/2019 150 3     Vitamin B-12 01/14/2019 1,421*    Vit D, 25-Hydroxy 01/14/2019 32 8     Zinc 01/14/2019 53*    PTH 01/14/2019 30 4          Radiology Results:   No results found

## 2019-03-19 DIAGNOSIS — E66.01 MORBID (SEVERE) OBESITY DUE TO EXCESS CALORIES (HCC): ICD-10-CM

## 2019-03-19 RX ORDER — OMEPRAZOLE 20 MG/1
20 CAPSULE, DELAYED RELEASE ORAL DAILY
Qty: 90 CAPSULE | Refills: 3 | OUTPATIENT
Start: 2019-03-19 | End: 2019-06-17

## 2019-03-20 DIAGNOSIS — B18.2 HEPATIC CIRRHOSIS DUE TO CHRONIC HEPATITIS C INFECTION (HCC): ICD-10-CM

## 2019-03-20 DIAGNOSIS — K74.60 HEPATIC CIRRHOSIS DUE TO CHRONIC HEPATITIS C INFECTION (HCC): ICD-10-CM

## 2019-03-20 RX ORDER — NADOLOL 20 MG/1
20 TABLET ORAL DAILY
Qty: 90 TABLET | Refills: 2 | Status: SHIPPED | OUTPATIENT
Start: 2019-03-20 | End: 2019-11-20 | Stop reason: SDUPTHER

## 2019-03-20 RX ORDER — FUROSEMIDE 20 MG/1
20 TABLET ORAL DAILY
Qty: 90 TABLET | Refills: 2 | Status: SHIPPED | OUTPATIENT
Start: 2019-03-20 | End: 2019-11-20 | Stop reason: ALTCHOICE

## 2019-03-20 RX ORDER — AMILORIDE HYDROCHLORIDE 5 MG/1
5 TABLET ORAL DAILY
Qty: 90 TABLET | Refills: 2 | Status: SHIPPED | OUTPATIENT
Start: 2019-03-20 | End: 2019-10-24 | Stop reason: SDUPTHER

## 2019-03-20 NOTE — TELEPHONE ENCOUNTER
Received paper from [address] [place] [address] that pt is requesting his meds be sent to [address] [place] [address]  RX sent for Nadolol, Amiloride, Furosemide to [facility], with refills

## 2019-03-22 ENCOUNTER — HOSPITAL ENCOUNTER (OUTPATIENT)
Dept: ULTRASOUND IMAGING | Facility: HOSPITAL | Age: 54
Discharge: HOME | End: 2019-03-22
Payer: MEDICARE

## 2019-03-22 DIAGNOSIS — K74.60 CHRONIC HEPATITIS C WITH CIRRHOSIS (HCC): ICD-10-CM

## 2019-03-22 DIAGNOSIS — B18.2 CHRONIC HEPATITIS C WITH CIRRHOSIS (HCC): ICD-10-CM

## 2019-03-22 PROCEDURE — 76705 ECHO EXAM OF ABDOMEN: CPT

## 2019-03-27 ENCOUNTER — TELEPHONE (OUTPATIENT)
Dept: GASTROENTEROLOGY | Facility: CLINIC | Age: 54
End: 2019-03-27

## 2019-03-27 NOTE — TELEPHONE ENCOUNTER
----- Message from Padilla Craft PA-C sent at 3/27/2019 12:06 PM EDT -----  Please let the patient know that his US didn't show any liver masses  He will need another ultrasound in 6 months from now (September 2019) to continue screening

## 2019-04-12 ENCOUNTER — TELEPHONE (OUTPATIENT)
Dept: BARIATRICS | Facility: CLINIC | Age: 54
End: 2019-04-12

## 2019-05-01 ENCOUNTER — OFFICE VISIT (OUTPATIENT)
Dept: FAMILY MEDICINE CLINIC | Facility: CLINIC | Age: 54
End: 2019-05-01
Payer: MEDICARE

## 2019-05-01 VITALS
DIASTOLIC BLOOD PRESSURE: 72 MMHG | RESPIRATION RATE: 16 BRPM | HEART RATE: 86 BPM | BODY MASS INDEX: 41.92 KG/M2 | OXYGEN SATURATION: 94 % | WEIGHT: 251.6 LBS | TEMPERATURE: 99.1 F | SYSTOLIC BLOOD PRESSURE: 120 MMHG | HEIGHT: 65 IN

## 2019-05-01 DIAGNOSIS — N52.31 ERECTILE DYSFUNCTION AFTER RADICAL PROSTATECTOMY: ICD-10-CM

## 2019-05-01 DIAGNOSIS — N52.9 ERECTILE DYSFUNCTION, UNSPECIFIED ERECTILE DYSFUNCTION TYPE: ICD-10-CM

## 2019-05-01 DIAGNOSIS — B18.2 HEPATIC CIRRHOSIS DUE TO CHRONIC HEPATITIS C INFECTION (HCC): ICD-10-CM

## 2019-05-01 DIAGNOSIS — Z13.220 NEED FOR LIPID SCREENING: ICD-10-CM

## 2019-05-01 DIAGNOSIS — K74.60 HEPATIC CIRRHOSIS DUE TO CHRONIC HEPATITIS C INFECTION (HCC): ICD-10-CM

## 2019-05-01 DIAGNOSIS — Z23 NEED FOR TDAP VACCINATION: ICD-10-CM

## 2019-05-01 DIAGNOSIS — S01.01XA LACERATION OF SCALP WITHOUT FOREIGN BODY, INITIAL ENCOUNTER: Primary | ICD-10-CM

## 2019-05-01 DIAGNOSIS — Z00.00 MEDICARE ANNUAL WELLNESS VISIT, INITIAL: ICD-10-CM

## 2019-05-01 PROCEDURE — G0438 PPPS, INITIAL VISIT: HCPCS | Performed by: FAMILY MEDICINE

## 2019-05-01 PROCEDURE — 99213 OFFICE O/P EST LOW 20 MIN: CPT | Performed by: FAMILY MEDICINE

## 2019-05-01 PROCEDURE — 90471 IMMUNIZATION ADMIN: CPT | Performed by: FAMILY MEDICINE

## 2019-05-01 PROCEDURE — 90715 TDAP VACCINE 7 YRS/> IM: CPT | Performed by: FAMILY MEDICINE

## 2019-05-01 RX ORDER — SILDENAFIL 100 MG/1
100 TABLET, FILM COATED ORAL DAILY PRN
Qty: 4 TABLET | Refills: 3 | Status: SHIPPED | OUTPATIENT
Start: 2019-05-01 | End: 2019-10-24 | Stop reason: SDUPTHER

## 2019-05-02 ENCOUNTER — TELEPHONE (OUTPATIENT)
Dept: GASTROENTEROLOGY | Facility: CLINIC | Age: 54
End: 2019-05-02

## 2019-05-16 ENCOUNTER — TELEPHONE (OUTPATIENT)
Dept: GASTROENTEROLOGY | Facility: CLINIC | Age: 54
End: 2019-05-16

## 2019-05-24 ENCOUNTER — TELEPHONE (OUTPATIENT)
Dept: GASTROENTEROLOGY | Facility: CLINIC | Age: 54
End: 2019-05-24

## 2019-05-28 ENCOUNTER — TELEPHONE (OUTPATIENT)
Dept: GASTROENTEROLOGY | Facility: CLINIC | Age: 54
End: 2019-05-28

## 2019-06-06 ENCOUNTER — TELEPHONE (OUTPATIENT)
Dept: UROLOGY | Facility: CLINIC | Age: 54
End: 2019-06-06

## 2019-06-06 ENCOUNTER — OFFICE VISIT (OUTPATIENT)
Dept: UROLOGY | Facility: CLINIC | Age: 54
End: 2019-06-06
Payer: MEDICARE

## 2019-06-06 VITALS
BODY MASS INDEX: 40.98 KG/M2 | HEART RATE: 56 BPM | SYSTOLIC BLOOD PRESSURE: 122 MMHG | WEIGHT: 246 LBS | HEIGHT: 65 IN | DIASTOLIC BLOOD PRESSURE: 72 MMHG

## 2019-06-06 DIAGNOSIS — N40.0 BENIGN PROSTATIC HYPERPLASIA WITHOUT LOWER URINARY TRACT SYMPTOMS: Primary | ICD-10-CM

## 2019-06-06 DIAGNOSIS — N52.9 ERECTILE DYSFUNCTION, UNSPECIFIED ERECTILE DYSFUNCTION TYPE: ICD-10-CM

## 2019-06-06 PROCEDURE — 99213 OFFICE O/P EST LOW 20 MIN: CPT | Performed by: PHYSICIAN ASSISTANT

## 2019-06-07 ENCOUNTER — APPOINTMENT (OUTPATIENT)
Dept: LAB | Facility: CLINIC | Age: 54
End: 2019-06-07
Payer: MEDICARE

## 2019-06-07 DIAGNOSIS — N52.9 ERECTILE DYSFUNCTION, UNSPECIFIED ERECTILE DYSFUNCTION TYPE: ICD-10-CM

## 2019-06-07 DIAGNOSIS — N40.0 BENIGN PROSTATIC HYPERPLASIA WITHOUT LOWER URINARY TRACT SYMPTOMS: ICD-10-CM

## 2019-06-07 LAB
FSH SERPL-ACNC: 1.9 MIU/ML (ref 0.7–10.8)
LH SERPL-ACNC: 1.3 MIU/ML (ref 1.2–10.6)
PROLACTIN SERPL-MCNC: 7.9 NG/ML (ref 2.5–17.4)
PSA SERPL-MCNC: 0.2 NG/ML (ref 0–4)

## 2019-06-07 PROCEDURE — 84402 ASSAY OF FREE TESTOSTERONE: CPT

## 2019-06-07 PROCEDURE — 84403 ASSAY OF TOTAL TESTOSTERONE: CPT

## 2019-06-07 PROCEDURE — 36415 COLL VENOUS BLD VENIPUNCTURE: CPT

## 2019-06-07 PROCEDURE — 84146 ASSAY OF PROLACTIN: CPT

## 2019-06-07 PROCEDURE — 83002 ASSAY OF GONADOTROPIN (LH): CPT

## 2019-06-07 PROCEDURE — 84153 ASSAY OF PSA TOTAL: CPT

## 2019-06-07 PROCEDURE — 83001 ASSAY OF GONADOTROPIN (FSH): CPT

## 2019-06-08 LAB
TESTOST FREE SERPL-MCNC: 8.8 PG/ML (ref 7.2–24)
TESTOST SERPL-MCNC: 855 NG/DL (ref 264–916)

## 2019-06-14 ENCOUNTER — OFFICE VISIT (OUTPATIENT)
Dept: FAMILY MEDICINE CLINIC | Facility: CLINIC | Age: 54
End: 2019-06-14
Payer: MEDICARE

## 2019-06-14 VITALS
HEART RATE: 69 BPM | DIASTOLIC BLOOD PRESSURE: 70 MMHG | WEIGHT: 243 LBS | SYSTOLIC BLOOD PRESSURE: 114 MMHG | OXYGEN SATURATION: 96 % | BODY MASS INDEX: 40.48 KG/M2 | HEIGHT: 65 IN | TEMPERATURE: 98.3 F

## 2019-06-14 DIAGNOSIS — R73.01 IMPAIRED FASTING GLUCOSE: ICD-10-CM

## 2019-06-14 DIAGNOSIS — N52.9 ERECTILE DYSFUNCTION, UNSPECIFIED ERECTILE DYSFUNCTION TYPE: Primary | ICD-10-CM

## 2019-06-14 PROBLEM — S01.01XA LACERATION OF SCALP WITHOUT FOREIGN BODY: Status: ACTIVE | Noted: 2019-06-14

## 2019-06-14 LAB — SL AMB POCT HEMOGLOBIN AIC: 4.7 (ref ?–6.5)

## 2019-06-14 PROCEDURE — 83036 HEMOGLOBIN GLYCOSYLATED A1C: CPT | Performed by: FAMILY MEDICINE

## 2019-06-14 PROCEDURE — 99213 OFFICE O/P EST LOW 20 MIN: CPT | Performed by: FAMILY MEDICINE

## 2019-06-20 ENCOUNTER — OFFICE VISIT (OUTPATIENT)
Dept: UROLOGY | Facility: CLINIC | Age: 54
End: 2019-06-20
Payer: MEDICARE

## 2019-06-20 VITALS
DIASTOLIC BLOOD PRESSURE: 70 MMHG | HEART RATE: 68 BPM | HEIGHT: 65 IN | BODY MASS INDEX: 40.48 KG/M2 | WEIGHT: 243 LBS | SYSTOLIC BLOOD PRESSURE: 120 MMHG

## 2019-06-20 DIAGNOSIS — N52.9 ERECTILE DYSFUNCTION, UNSPECIFIED ERECTILE DYSFUNCTION TYPE: Primary | ICD-10-CM

## 2019-06-20 PROCEDURE — 54235 NJX CORPORA CAVERNOSA RX AGT: CPT | Performed by: PHYSICIAN ASSISTANT

## 2019-06-27 ENCOUNTER — TELEPHONE (OUTPATIENT)
Dept: FAMILY MEDICINE CLINIC | Facility: CLINIC | Age: 54
End: 2019-06-27

## 2019-07-30 ENCOUNTER — TELEPHONE (OUTPATIENT)
Dept: GASTROENTEROLOGY | Facility: CLINIC | Age: 54
End: 2019-07-30

## 2019-07-30 NOTE — TELEPHONE ENCOUNTER
WEI: Spoke with [facility] Nurse Mary Cortes  She states patients account and been closed for transplant  Patient has had multiple cancellations,no shows, and not return phone calls

## 2019-10-04 ENCOUNTER — APPOINTMENT (OUTPATIENT)
Dept: RADIOLOGY | Facility: CLINIC | Age: 54
End: 2019-10-04
Payer: MEDICARE

## 2019-10-04 ENCOUNTER — OFFICE VISIT (OUTPATIENT)
Dept: FAMILY MEDICINE CLINIC | Facility: CLINIC | Age: 54
End: 2019-10-04
Payer: MEDICARE

## 2019-10-04 ENCOUNTER — TELEPHONE (OUTPATIENT)
Dept: UROLOGY | Facility: MEDICAL CENTER | Age: 54
End: 2019-10-04

## 2019-10-04 VITALS
DIASTOLIC BLOOD PRESSURE: 64 MMHG | TEMPERATURE: 98.4 F | BODY MASS INDEX: 39.79 KG/M2 | WEIGHT: 238.8 LBS | HEART RATE: 84 BPM | OXYGEN SATURATION: 98 % | SYSTOLIC BLOOD PRESSURE: 118 MMHG | HEIGHT: 65 IN | RESPIRATION RATE: 18 BRPM

## 2019-10-04 DIAGNOSIS — M54.50 MIDLINE LOW BACK PAIN WITHOUT SCIATICA, UNSPECIFIED CHRONICITY: ICD-10-CM

## 2019-10-04 DIAGNOSIS — L98.7 EXCESS SKIN OF ABDOMINAL WALL: ICD-10-CM

## 2019-10-04 DIAGNOSIS — M54.50 MIDLINE LOW BACK PAIN WITHOUT SCIATICA, UNSPECIFIED CHRONICITY: Primary | ICD-10-CM

## 2019-10-04 PROCEDURE — 72110 X-RAY EXAM L-2 SPINE 4/>VWS: CPT

## 2019-10-04 PROCEDURE — 96372 THER/PROPH/DIAG INJ SC/IM: CPT | Performed by: FAMILY MEDICINE

## 2019-10-04 PROCEDURE — 99214 OFFICE O/P EST MOD 30 MIN: CPT | Performed by: FAMILY MEDICINE

## 2019-10-04 RX ORDER — KETOROLAC TROMETHAMINE 30 MG/ML
60 INJECTION, SOLUTION INTRAMUSCULAR; INTRAVENOUS ONCE
Status: COMPLETED | OUTPATIENT
Start: 2019-10-04 | End: 2019-10-04

## 2019-10-04 RX ADMIN — KETOROLAC TROMETHAMINE 60 MG: 30 INJECTION, SOLUTION INTRAMUSCULAR; INTRAVENOUS at 10:43

## 2019-10-04 NOTE — TELEPHONE ENCOUNTER
Patient of Valarie Askew seen in the [place] office  Patient called in regarding his Trimax is not working for him  Patient would like to know if he can get a higher dose  Please advise

## 2019-10-04 NOTE — PROGRESS NOTES
Assessment/Plan:    No problem-specific Assessment & Plan notes found for this encounter  Diagnoses and all orders for this visit:    Midline low back pain without sciatica, unspecified chronicity  Advised to take tylenol or ibuprofen occasionally only  Also recommended topical lidocaine patches  He is not interested in PT  -     XR spine lumbar minimum 4 views non injury; Future  -     ketorolac (TORADOL) 60 mg/2 mL IM injection 60 mg    Excess skin of abdominal wall  -     Ambulatory referral to Plastic Surgery; Future      Follow up in 1 month or as needed    Subjective:      Patient ID: Anibal Felix Sr  is a 53 y o  male  Patient is here because he has been developing low back pain for the past several weeks  He said that the pain is very sharp in nature wakes him up for sleep  He just recently started working for [employer] and says that his job is not very physically demanding however when he gets home at night he does have low back pain  He has been losing 30 lb over the past 6 months and he says that the extra skin in his abdomen from all this weight loss is given him of more weight on the front and putting more strain on his lower back  He attributes this as a cause of the low back pain  He denies any recent injuries to his low back pain at this time  He tried a muscle relaxer recently which helped his symptoms some  He does have a history of a bariatric surgery done several years ago and due to this he is unable to take NSAIDs on a regular basis  Also has a history of liver cirrhosis  The following portions of the patient's history were reviewed and updated as appropriate: He  has a past medical history of CPAP (continuous positive airway pressure) dependence, Esophageal varices ([address] ), Hepatic cirrhosis ([address] ), Hepatic encephalopathy ([address] ), Obesity, Pneumonia, Sleep apnea, and Wears glasses    He   Patient Active Problem List    Diagnosis Date Noted    Midline low back pain without sciatica 10/04/2019    Excess skin of abdominal wall 10/04/2019    Laceration of scalp without foreign body 06/14/2019    Need for Tdap vaccination 05/01/2019    Medicare annual wellness visit, initial 05/01/2019    Need for lipid screening 05/01/2019    Esophageal varices without bleeding ([facility] ) 03/13/2019    Postsurgical malabsorption 09/26/2018    Diabetes mellitus screening 08/07/2018    Fatigue 08/07/2018    Anemia 08/07/2018    Dizzy 08/07/2018    Acute pain of right knee 07/03/2018    Effusion of right knee 07/03/2018    S/P laparoscopic sleeve gastrectomy 06/13/2018    Mood swings 06/13/2018    Secondary esophageal varices without bleeding ([place] [address] ) 03/23/2018    Hepatic encephalopathy ([place] [address] ) 03/23/2018    Obstructive sleep apnea 03/08/2018    Morbid obesity with BMI of 40 0-44 9, adult ([place] [address] ) 03/02/2018    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 ([place] [address] ) 02/21/2018    Gastroesophageal reflux disease without esophagitis 02/21/2018    Former smoker 02/12/2018    Erectile dysfunction 02/12/2018     He  has a past surgical history that includes Colonoscopy; Esophagogastroduodenoscopy (N/A, 4/12/2018); Other surgical history; Fracture surgery (Left); and pr lap, dilip restrict proc, longitudinal gastrectomy (N/A, 6/5/2018)  His family history includes Diabetes in his father; Heart disease in his mother; Lupus in his mother; Stroke in his father  He  reports that he has been smoking  He has a 9 25 pack-year smoking history  He has never used smokeless tobacco  He reports that he does not drink alcohol or use drugs    Current Outpatient Medications   Medication Sig Dispense Refill    AMILoride 5 mg tablet Take 1 tablet (5 mg total) by mouth daily 90 tablet 2    calcium citrate-vitamin D (CITRACAL+D) 315-200 MG-UNIT per tablet Take 1 tablet by mouth 2 (two) times a day      furosemide (LASIX) 20 mg tablet Take 1 tablet (20 mg total) by mouth daily 90 tablet 2    lactulose (CONSTULOSE) 10 g/15 mL solution Take 15 mL (10 g total) by mouth daily as needed (constipation or confusion) 946 mL 1    Multiple Vitamins-Minerals (MULTIVITAMIN WITH MINERALS) tablet Take 1 tablet by mouth daily      nadolol (CORGARD) 20 mg tablet Take 1 tablet (20 mg total) by mouth daily 90 tablet 2    omeprazole (PriLOSEC) 20 mg delayed release capsule Take 1 capsule (20 mg total) by mouth daily for 90 days 90 capsule 3    rifaximin (XIFAXAN) 550 mg tablet Take 550 mg by mouth 2 (two) times a day        vitamin A 10,000 units capsule Take 1 capsule (10,000 Units total) by mouth daily 90 capsule 0    sildenafil (VIAGRA) 100 mg tablet Take 1 tablet (100 mg total) by mouth daily as needed for erectile dysfunction (Patient not taking: Reported on 6/6/2019) 4 tablet 3     Current Facility-Administered Medications   Medication Dose Route Frequency Provider Last Rate Last Dose    ketorolac (TORADOL) 60 mg/2 mL IM injection 60 mg  60 mg Intramuscular Once Kenny Deluca MD         Current Outpatient Medications on File Prior to Visit   Medication Sig    AMILoride 5 mg tablet Take 1 tablet (5 mg total) by mouth daily    calcium citrate-vitamin D (CITRACAL+D) 315-200 MG-UNIT per tablet Take 1 tablet by mouth 2 (two) times a day    furosemide (LASIX) 20 mg tablet Take 1 tablet (20 mg total) by mouth daily    lactulose (CONSTULOSE) 10 g/15 mL solution Take 15 mL (10 g total) by mouth daily as needed (constipation or confusion)    Multiple Vitamins-Minerals (MULTIVITAMIN WITH MINERALS) tablet Take 1 tablet by mouth daily    nadolol (CORGARD) 20 mg tablet Take 1 tablet (20 mg total) by mouth daily    omeprazole (PriLOSEC) 20 mg delayed release capsule Take 1 capsule (20 mg total) by mouth daily for 90 days    rifaximin (XIFAXAN) 550 mg tablet Take 550 mg by mouth 2 (two) times a day      vitamin A 10,000 units capsule Take 1 capsule (10,000 Units total) by mouth daily    sildenafil (VIAGRA) 100 mg tablet Take 1 tablet (100 mg total) by mouth daily as needed for erectile dysfunction (Patient not taking: Reported on 6/6/2019)     No current facility-administered medications on file prior to visit  He has No Known Allergies       Review of Systems   Constitutional: Positive for unexpected weight change  Negative for activity change, appetite change, fatigue and fever  HENT: Negative for congestion and ear discharge  Respiratory: Negative for cough and shortness of breath  Cardiovascular: Negative for chest pain and palpitations  Gastrointestinal: Negative for diarrhea and nausea  Musculoskeletal: Positive for back pain  Negative for arthralgias  Skin: Negative for color change and rash  Neurological: Negative for dizziness and headaches  Psychiatric/Behavioral: Negative for agitation and behavioral problems  Objective:      /64 (BP Location: Left arm, Patient Position: Sitting, Cuff Size: Adult)   Pulse 84   Temp 98 4 °F (36 9 °C) (Tympanic)   Resp 18   Ht 5' 5" (1 651 m)   Wt 108 kg (238 lb 12 8 oz)   SpO2 98%   BMI 39 74 kg/m²          Physical Exam   Constitutional: He is oriented to person, place, and time  He appears well-developed and well-nourished  No distress  Eyes: Pupils are equal, round, and reactive to light  No scleral icterus  Cardiovascular: Normal rate, regular rhythm and normal heart sounds  No murmur heard  Pulmonary/Chest: Effort normal and breath sounds normal  No respiratory distress  He has no wheezes  Abdominal: Soft  Bowel sounds are normal  He exhibits no distension  There is no tenderness  Musculoskeletal: Normal range of motion  He exhibits tenderness  He exhibits no edema or deformity  Neurological: He is alert and oriented to person, place, and time  Skin: Skin is warm and dry  No rash noted  He is not diaphoretic  Psychiatric: He has a normal mood and affect

## 2019-10-10 DIAGNOSIS — L98.7 EXCESS SKIN OF ABDOMINAL WALL: Primary | ICD-10-CM

## 2019-10-10 DIAGNOSIS — M54.50 MIDLINE LOW BACK PAIN WITHOUT SCIATICA, UNSPECIFIED CHRONICITY: ICD-10-CM

## 2019-10-10 RX ORDER — METHOCARBAMOL 750 MG/1
750 TABLET, FILM COATED ORAL EVERY 8 HOURS SCHEDULED
Qty: 30 TABLET | Refills: 2 | Status: SHIPPED | OUTPATIENT
Start: 2019-10-10 | End: 2019-10-24 | Stop reason: SDUPTHER

## 2019-10-18 ENCOUNTER — OFFICE VISIT (OUTPATIENT)
Dept: FAMILY MEDICINE CLINIC | Facility: CLINIC | Age: 54
End: 2019-10-18
Payer: MEDICARE

## 2019-10-18 VITALS
HEART RATE: 76 BPM | HEIGHT: 65 IN | BODY MASS INDEX: 40.25 KG/M2 | DIASTOLIC BLOOD PRESSURE: 62 MMHG | SYSTOLIC BLOOD PRESSURE: 116 MMHG | OXYGEN SATURATION: 93 % | TEMPERATURE: 97.9 F | WEIGHT: 241.6 LBS

## 2019-10-18 DIAGNOSIS — L98.7 EXCESS SKIN OF ABDOMINAL WALL: Primary | ICD-10-CM

## 2019-10-18 DIAGNOSIS — M79.18 MYOFASCIAL MUSCLE PAIN: ICD-10-CM

## 2019-10-18 DIAGNOSIS — M54.50 MIDLINE LOW BACK PAIN WITHOUT SCIATICA, UNSPECIFIED CHRONICITY: ICD-10-CM

## 2019-10-18 PROCEDURE — 99213 OFFICE O/P EST LOW 20 MIN: CPT | Performed by: FAMILY MEDICINE

## 2019-10-18 RX ORDER — IBUPROFEN 600 MG/1
600 TABLET ORAL EVERY 6 HOURS PRN
Qty: 30 TABLET | Refills: 1 | Status: SHIPPED | OUTPATIENT
Start: 2019-10-18 | End: 2019-10-24 | Stop reason: SDUPTHER

## 2019-10-18 NOTE — PROGRESS NOTES
Assessment/Plan:    No problem-specific Assessment & Plan notes found for this encounter  Diagnoses and all orders for this visit:    Excess skin of abdominal wall  -     Ambulatory referral to Plastic Surgery; Future    Midline low back pain without sciatica, unspecified chronicity  Myofascial muscle pain  After discussing risks and benefits of medication along with side effects will start ibuprofen 600 mg p o  As needed  Continue muscle relaxer as well  He is not interested in physical therapy or referral to pain management  Follow up in 1-2 months or as needed        Subjective:      Patient ID: Lonny Hendricks Sr  is a 53 y o  male  Patient is here to follow-up for low back pain  He has been taking muscle relaxer Robaxin which has been helping his symptoms some  He is also complaining of left upper scapular myofascial pain as well  He does work lifting boxes and he says that the motion makes his pain worse  He is also here follow-up excessive skin in his abdomen  He has lost significant weight over the years and now has excessive skin  The following portions of the patient's history were reviewed and updated as appropriate: He  has a past medical history of CPAP (continuous positive airway pressure) dependence, Esophageal varices ([address] ), Hepatic cirrhosis ([address] ), Hepatic encephalopathy ([address] ), Obesity, Pneumonia, Sleep apnea, and Wears glasses    He   Patient Active Problem List    Diagnosis Date Noted    Myofascial muscle pain 10/18/2019    Midline low back pain without sciatica 10/04/2019    Excess skin of abdominal wall 10/04/2019    Laceration of scalp without foreign body 06/14/2019    Need for Tdap vaccination 05/01/2019    Medicare annual wellness visit, initial 05/01/2019    Need for lipid screening 05/01/2019    Esophageal varices without bleeding ([address] ) 03/13/2019    Postsurgical malabsorption 09/26/2018    Diabetes mellitus screening 08/07/2018    Fatigue 08/07/2018    Anemia 08/07/2018    Dizzy 08/07/2018    Acute pain of right knee 07/03/2018    Effusion of right knee 07/03/2018    S/P laparoscopic sleeve gastrectomy 06/13/2018    Mood swings 06/13/2018    Secondary esophageal varices without bleeding ([facility] ) 03/23/2018    Hepatic encephalopathy ([place] [address] ) 03/23/2018    Obstructive sleep apnea 03/08/2018    Morbid obesity with BMI of 40 0-44 9, adult ([place] [address] ) 03/02/2018    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 ([place] [address] ) 02/21/2018    Gastroesophageal reflux disease without esophagitis 02/21/2018    Former smoker 02/12/2018    Erectile dysfunction 02/12/2018     He  has a past surgical history that includes Colonoscopy; Esophagogastroduodenoscopy (N/A, 4/12/2018); Other surgical history; Fracture surgery (Left); and pr lap, dilip restrict proc, longitudinal gastrectomy (N/A, 6/5/2018)  His family history includes Diabetes in his father; Heart disease in his mother; Lupus in his mother; Stroke in his father  He  reports that he has been smoking  He has a 9 25 pack-year smoking history  He has never used smokeless tobacco  He reports that he does not drink alcohol or use drugs    Current Outpatient Medications   Medication Sig Dispense Refill    AMILoride 5 mg tablet Take 1 tablet (5 mg total) by mouth daily 90 tablet 2    calcium citrate-vitamin D (CITRACAL+D) 315-200 MG-UNIT per tablet Take 1 tablet by mouth 2 (two) times a day      furosemide (LASIX) 20 mg tablet Take 1 tablet (20 mg total) by mouth daily 90 tablet 2    lactulose (CONSTULOSE) 10 g/15 mL solution Take 15 mL (10 g total) by mouth daily as needed (constipation or confusion) 946 mL 1    methocarbamol (ROBAXIN) 750 mg tablet Take 1 tablet (750 mg total) by mouth every 8 (eight) hours 30 tablet 2    Multiple Vitamins-Minerals (MULTIVITAMIN WITH MINERALS) tablet Take 1 tablet by mouth daily      nadolol (CORGARD) 20 mg tablet Take 1 tablet (20 mg total) by mouth daily 90 tablet 2    omeprazole (PriLOSEC) 20 mg delayed release capsule Take 1 capsule (20 mg total) by mouth daily for 90 days 90 capsule 3    rifaximin (XIFAXAN) 550 mg tablet Take 550 mg by mouth 2 (two) times a day        sildenafil (VIAGRA) 100 mg tablet Take 1 tablet (100 mg total) by mouth daily as needed for erectile dysfunction (Patient not taking: Reported on 6/6/2019) 4 tablet 3    vitamin A 10,000 units capsule Take 1 capsule (10,000 Units total) by mouth daily 90 capsule 0     No current facility-administered medications for this visit  Current Outpatient Medications on File Prior to Visit   Medication Sig    AMILoride 5 mg tablet Take 1 tablet (5 mg total) by mouth daily    calcium citrate-vitamin D (CITRACAL+D) 315-200 MG-UNIT per tablet Take 1 tablet by mouth 2 (two) times a day    furosemide (LASIX) 20 mg tablet Take 1 tablet (20 mg total) by mouth daily    lactulose (CONSTULOSE) 10 g/15 mL solution Take 15 mL (10 g total) by mouth daily as needed (constipation or confusion)    methocarbamol (ROBAXIN) 750 mg tablet Take 1 tablet (750 mg total) by mouth every 8 (eight) hours    Multiple Vitamins-Minerals (MULTIVITAMIN WITH MINERALS) tablet Take 1 tablet by mouth daily    nadolol (CORGARD) 20 mg tablet Take 1 tablet (20 mg total) by mouth daily    omeprazole (PriLOSEC) 20 mg delayed release capsule Take 1 capsule (20 mg total) by mouth daily for 90 days    rifaximin (XIFAXAN) 550 mg tablet Take 550 mg by mouth 2 (two) times a day      sildenafil (VIAGRA) 100 mg tablet Take 1 tablet (100 mg total) by mouth daily as needed for erectile dysfunction (Patient not taking: Reported on 6/6/2019)    vitamin A 10,000 units capsule Take 1 capsule (10,000 Units total) by mouth daily     No current facility-administered medications on file prior to visit  He has No Known Allergies       Review of Systems   Constitutional: Negative for activity change, appetite change, fatigue and fever     HENT: Negative for congestion and ear discharge  Respiratory: Negative for cough and shortness of breath  Cardiovascular: Negative for chest pain and palpitations  Gastrointestinal: Negative for diarrhea and nausea  Musculoskeletal: Positive for back pain and myalgias  Negative for arthralgias  Skin: Negative for color change and rash  Neurological: Negative for dizziness and headaches  Psychiatric/Behavioral: Negative for agitation and behavioral problems  Objective:      /62   Pulse 76   Temp 97 9 °F (36 6 °C)   Ht 5' 5" (1 651 m)   Wt 110 kg (241 lb 9 6 oz)   SpO2 93%   BMI 40 20 kg/m²          Physical Exam   Constitutional: He is oriented to person, place, and time  He appears well-developed and well-nourished  No distress  Eyes: Pupils are equal, round, and reactive to light  No scleral icterus  Cardiovascular: Normal rate, regular rhythm and normal heart sounds  No murmur heard  Pulmonary/Chest: Effort normal and breath sounds normal  No respiratory distress  He has no wheezes  Abdominal: Soft  Bowel sounds are normal  He exhibits no distension  There is no tenderness  Musculoskeletal: Normal range of motion  He exhibits no edema, tenderness or deformity  Neurological: He is alert and oriented to person, place, and time  Skin: Skin is warm and dry  No rash noted  He is not diaphoretic  Excessive abdominal skin on his abodmen   Psychiatric: He has a normal mood and affect

## 2019-10-24 DIAGNOSIS — K74.60 HEPATIC CIRRHOSIS DUE TO CHRONIC HEPATITIS C INFECTION (HCC): ICD-10-CM

## 2019-10-24 DIAGNOSIS — B18.2 HEPATIC CIRRHOSIS DUE TO CHRONIC HEPATITIS C INFECTION (HCC): ICD-10-CM

## 2019-10-24 DIAGNOSIS — N52.31 ERECTILE DYSFUNCTION AFTER RADICAL PROSTATECTOMY: ICD-10-CM

## 2019-10-24 DIAGNOSIS — M79.18 MYOFASCIAL MUSCLE PAIN: ICD-10-CM

## 2019-10-24 DIAGNOSIS — E66.01 MORBID (SEVERE) OBESITY DUE TO EXCESS CALORIES (HCC): ICD-10-CM

## 2019-10-24 DIAGNOSIS — M54.50 MIDLINE LOW BACK PAIN WITHOUT SCIATICA, UNSPECIFIED CHRONICITY: ICD-10-CM

## 2019-10-24 RX ORDER — IBUPROFEN 600 MG/1
600 TABLET ORAL EVERY 6 HOURS PRN
Qty: 30 TABLET | Refills: 1 | Status: SHIPPED | OUTPATIENT
Start: 2019-10-24 | End: 2019-11-20 | Stop reason: SDUPTHER

## 2019-10-24 RX ORDER — AMILORIDE HYDROCHLORIDE 5 MG/1
5 TABLET ORAL DAILY
Qty: 90 TABLET | Refills: 2 | Status: SHIPPED | OUTPATIENT
Start: 2019-10-24 | End: 2019-11-20 | Stop reason: SDUPTHER

## 2019-10-24 RX ORDER — SILDENAFIL 100 MG/1
100 TABLET, FILM COATED ORAL DAILY PRN
Qty: 4 TABLET | Refills: 3 | Status: SHIPPED | OUTPATIENT
Start: 2019-10-24 | End: 2019-11-20 | Stop reason: SDUPTHER

## 2019-10-24 RX ORDER — METHOCARBAMOL 750 MG/1
750 TABLET, FILM COATED ORAL EVERY 8 HOURS SCHEDULED
Qty: 30 TABLET | Refills: 2 | Status: SHIPPED | OUTPATIENT
Start: 2019-10-24 | End: 2020-01-16 | Stop reason: SDUPTHER

## 2019-10-24 RX ORDER — OMEPRAZOLE 20 MG/1
20 CAPSULE, DELAYED RELEASE ORAL DAILY
Qty: 90 CAPSULE | Refills: 3 | Status: SHIPPED | OUTPATIENT
Start: 2019-10-24 | End: 2020-04-16 | Stop reason: SDUPTHER

## 2019-10-29 ENCOUNTER — APPOINTMENT (EMERGENCY)
Dept: RADIOLOGY | Facility: HOSPITAL | Age: 54
End: 2019-10-29
Payer: MEDICARE

## 2019-10-29 ENCOUNTER — HOSPITAL ENCOUNTER (EMERGENCY)
Facility: HOSPITAL | Age: 54
Discharge: HOME | End: 2019-10-30
Attending: EMERGENCY MEDICINE | Admitting: EMERGENCY MEDICINE
Payer: MEDICARE

## 2019-10-29 DIAGNOSIS — E72.20 HYPERAMMONEMIA (HCC): Primary | ICD-10-CM

## 2019-10-29 PROCEDURE — 99284 EMERGENCY DEPT VISIT MOD MDM: CPT

## 2019-10-29 PROCEDURE — 71046 X-RAY EXAM CHEST 2 VIEWS: CPT

## 2019-10-30 VITALS
DIASTOLIC BLOOD PRESSURE: 62 MMHG | HEART RATE: 65 BPM | SYSTOLIC BLOOD PRESSURE: 103 MMHG | OXYGEN SATURATION: 98 % | HEIGHT: 65 IN | BODY MASS INDEX: 39.23 KG/M2 | WEIGHT: 235.45 LBS | RESPIRATION RATE: 16 BRPM | TEMPERATURE: 97.5 F

## 2019-10-30 PROBLEM — E72.20 HYPERAMMONEMIA (HCC): Status: ACTIVE | Noted: 2019-10-30

## 2019-10-30 LAB
ALBUMIN SERPL BCP-MCNC: 3.1 G/DL (ref 3.5–5)
ALP SERPL-CCNC: 138 U/L (ref 46–116)
ALT SERPL W P-5'-P-CCNC: 35 U/L (ref 12–78)
AMMONIA PLAS-SCNC: 65 UMOL/L (ref 11–35)
ANION GAP SERPL CALCULATED.3IONS-SCNC: 7 MMOL/L (ref 4–13)
AST SERPL W P-5'-P-CCNC: 49 U/L (ref 5–45)
ATRIAL RATE: 65 BPM
BASOPHILS # BLD AUTO: 0.05 THOUSANDS/ΜL (ref 0–0.1)
BASOPHILS NFR BLD AUTO: 1 % (ref 0–1)
BILIRUB SERPL-MCNC: 2.5 MG/DL (ref 0.2–1)
BUN SERPL-MCNC: 17 MG/DL (ref 5–25)
CALCIUM SERPL-MCNC: 8.8 MG/DL (ref 8.3–10.1)
CHLORIDE SERPL-SCNC: 105 MMOL/L (ref 100–108)
CO2 SERPL-SCNC: 26 MMOL/L (ref 21–32)
CREAT SERPL-MCNC: 0.89 MG/DL (ref 0.6–1.3)
EOSINOPHIL # BLD AUTO: 0.43 THOUSAND/ΜL (ref 0–0.61)
EOSINOPHIL NFR BLD AUTO: 8 % (ref 0–6)
ERYTHROCYTE [DISTWIDTH] IN BLOOD BY AUTOMATED COUNT: 14.3 % (ref 11.6–15.1)
GFR SERPL CREATININE-BSD FRML MDRD: 96 ML/MIN/1.73SQ M
GLUCOSE SERPL-MCNC: 90 MG/DL (ref 65–140)
HCT VFR BLD AUTO: 39 % (ref 36.5–49.3)
HGB BLD-MCNC: 13.4 G/DL (ref 12–17)
IMM GRANULOCYTES # BLD AUTO: 0.01 THOUSAND/UL (ref 0–0.2)
IMM GRANULOCYTES NFR BLD AUTO: 0 % (ref 0–2)
LYMPHOCYTES # BLD AUTO: 1.53 THOUSANDS/ΜL (ref 0.6–4.47)
LYMPHOCYTES NFR BLD AUTO: 27 % (ref 14–44)
MCH RBC QN AUTO: 33.8 PG (ref 26.8–34.3)
MCHC RBC AUTO-ENTMCNC: 34.4 G/DL (ref 31.4–37.4)
MCV RBC AUTO: 99 FL (ref 82–98)
MONOCYTES # BLD AUTO: 0.46 THOUSAND/ΜL (ref 0.17–1.22)
MONOCYTES NFR BLD AUTO: 8 % (ref 4–12)
NEUTROPHILS # BLD AUTO: 3.13 THOUSANDS/ΜL (ref 1.85–7.62)
NEUTS SEG NFR BLD AUTO: 56 % (ref 43–75)
NRBC BLD AUTO-RTO: 0 /100 WBCS
P AXIS: 56 DEGREES
PLATELET # BLD AUTO: 110 THOUSANDS/UL (ref 149–390)
PMV BLD AUTO: 11.7 FL (ref 8.9–12.7)
POTASSIUM SERPL-SCNC: 4.2 MMOL/L (ref 3.5–5.3)
PR INTERVAL: 166 MS
PROT SERPL-MCNC: 5.9 G/DL (ref 6.4–8.2)
QRS AXIS: 50 DEGREES
QRSD INTERVAL: 116 MS
QT INTERVAL: 488 MS
QTC INTERVAL: 507 MS
RBC # BLD AUTO: 3.96 MILLION/UL (ref 3.88–5.62)
SODIUM SERPL-SCNC: 138 MMOL/L (ref 136–145)
T WAVE AXIS: 45 DEGREES
TROPONIN I SERPL-MCNC: <0.02 NG/ML
VENTRICULAR RATE: 65 BPM
WBC # BLD AUTO: 5.61 THOUSAND/UL (ref 4.31–10.16)

## 2019-10-30 PROCEDURE — 93010 ELECTROCARDIOGRAM REPORT: CPT | Performed by: INTERNAL MEDICINE

## 2019-10-30 PROCEDURE — 84484 ASSAY OF TROPONIN QUANT: CPT | Performed by: PHYSICIAN ASSISTANT

## 2019-10-30 PROCEDURE — 80053 COMPREHEN METABOLIC PANEL: CPT | Performed by: PHYSICIAN ASSISTANT

## 2019-10-30 PROCEDURE — 82140 ASSAY OF AMMONIA: CPT | Performed by: PHYSICIAN ASSISTANT

## 2019-10-30 PROCEDURE — 36415 COLL VENOUS BLD VENIPUNCTURE: CPT | Performed by: PHYSICIAN ASSISTANT

## 2019-10-30 PROCEDURE — 96360 HYDRATION IV INFUSION INIT: CPT

## 2019-10-30 PROCEDURE — 93005 ELECTROCARDIOGRAM TRACING: CPT

## 2019-10-30 PROCEDURE — 99285 EMERGENCY DEPT VISIT HI MDM: CPT | Performed by: PHYSICIAN ASSISTANT

## 2019-10-30 PROCEDURE — 85025 COMPLETE CBC W/AUTO DIFF WBC: CPT | Performed by: PHYSICIAN ASSISTANT

## 2019-10-30 RX ORDER — LACTULOSE 20 G/30ML
30 SOLUTION ORAL ONCE
Status: COMPLETED | OUTPATIENT
Start: 2019-10-30 | End: 2019-10-30

## 2019-10-30 RX ADMIN — LACTULOSE 30 G: 10 SOLUTION ORAL at 01:33

## 2019-10-30 RX ADMIN — SODIUM CHLORIDE 1000 ML: 0.9 INJECTION, SOLUTION INTRAVENOUS at 00:04

## 2019-10-30 NOTE — ED PROVIDER NOTES
History  Chief Complaint   Patient presents with    Dizziness     Patient states he was at work today and became very dizzy and diaphoretic  Patient had similar episode x 2 weeks and it went away on its own  neg chest pain, neg SOB     Patient is a 19-year-old male with history of hepatic cirrhosis that presents emergency department complaints of episode of dizziness and diaphoresis  Patient states all his symptoms went away  He denies any chest pain or shortness of breath  Patient states he is concerned his ammonia levels elevating because he feels dizzy when this happens  Patient states he has not been taking his lactulose because of his inability to work while taking medication due to diarrhea  Prior to Admission Medications   Prescriptions Last Dose Informant Patient Reported? Taking?    AMILoride 5 mg tablet   No No   Sig: Take 1 tablet (5 mg total) by mouth daily   Multiple Vitamins-Minerals (MULTIVITAMIN WITH MINERALS) tablet  Self Yes No   Sig: Take 1 tablet by mouth daily   calcium citrate-vitamin D (CITRACAL+D) 315-200 MG-UNIT per tablet  Self Yes No   Sig: Take 1 tablet by mouth 2 (two) times a day   furosemide (LASIX) 20 mg tablet   No No   Sig: Take 1 tablet (20 mg total) by mouth daily   ibuprofen (MOTRIN) 600 mg tablet   No No   Sig: Take 1 tablet (600 mg total) by mouth every 6 (six) hours as needed for mild pain or moderate pain   lactulose (CONSTULOSE) 10 g/15 mL solution   No No   Sig: Take 15 mL (10 g total) by mouth daily as needed (constipation or confusion)   methocarbamol (ROBAXIN) 750 mg tablet   No No   Sig: Take 1 tablet (750 mg total) by mouth every 8 (eight) hours   nadolol (CORGARD) 20 mg tablet   No No   Sig: Take 1 tablet (20 mg total) by mouth daily   omeprazole (PriLOSEC) 20 mg delayed release capsule   No No   Sig: Take 1 capsule (20 mg total) by mouth daily   rifaximin (XIFAXAN) 550 mg tablet  Self Yes No   Sig: Take 550 mg by mouth 2 (two) times a day     sildenafil (VIAGRA) 100 mg tablet   No No   Sig: Take 1 tablet (100 mg total) by mouth daily as needed for erectile dysfunction   vitamin A 10,000 units capsule  Self No No   Sig: Take 1 capsule (10,000 Units total) by mouth daily      Facility-Administered Medications: None       Past Medical History:   Diagnosis Date    CPAP (continuous positive airway pressure) dependence     Esophageal varices (HCC)     Hepatic cirrhosis (HCC)     treated with harvoni   Hep C- dx age 17   Charlyne Jaksch Hepatic encephalopathy ([address] )     Obesity     Pneumonia     in past    Sleep apnea     Wears glasses        Past Surgical History:   Procedure Laterality Date    COLONOSCOPY      ESOPHAGOGASTRODUODENOSCOPY N/A 2018    Procedure: ESOPHAGOGASTRODUODENOSCOPY (EGD); Surgeon: Cholo Pierce MD;  Location: [facility]; Service: Gastroenterology    FRACTURE SURGERY Left     ankle    OTHER SURGICAL HISTORY      banding esophageal varices    MS LAP, TERESA RESTRICT PROC, LONGITUDINAL GASTRECTOMY N/A 2018    Procedure: GASTRECTOMY LAPAROSCOPIC SLEEVE;  Surgeon: Francine Monroy MD;  Location: [facility];  Service: Bariatrics       Family History   Problem Relation Age of Onset    Heart disease Mother     Lupus Mother     Diabetes Father     Stroke Father      I have reviewed and agree with the history as documented  Social History     Tobacco Use    Smoking status: Current Some Day Smoker     Packs/day: 0 25     Years: 37 00     Pack years: 9 25     Last attempt to quit: 2/15/2018     Years since quittin 7    Smokeless tobacco: Never Used    Tobacco comment: currently vapes occasionally   Substance Use Topics    Alcohol use: No     Alcohol/week: 0 0 standard drinks    Drug use: No        Review of Systems   Constitutional: Negative for fever  Respiratory: Negative for shortness of breath  Cardiovascular: Negative for chest pain  Neurological: Positive for dizziness     All other systems reviewed and are negative  Physical Exam  Physical Exam   Constitutional: He is oriented to person, place, and time  He appears well-developed and well-nourished  HENT:   Head: Normocephalic and atraumatic  Right Ear: External ear normal    Left Ear: External ear normal    Nose: Nose normal    Mouth/Throat: Oropharynx is clear and moist    Eyes: Pupils are equal, round, and reactive to light  Conjunctivae and EOM are normal    Neck: Normal range of motion  Cardiovascular: Normal rate, regular rhythm and normal heart sounds  Pulmonary/Chest: Effort normal and breath sounds normal    Abdominal: Soft  Bowel sounds are normal    Musculoskeletal: Normal range of motion  Neurological: He is alert and oriented to person, place, and time  He displays normal reflexes  No cranial nerve deficit or sensory deficit  He exhibits normal muscle tone  Coordination normal    Skin: Skin is warm  Capillary refill takes less than 2 seconds  Psychiatric: He has a normal mood and affect  His behavior is normal  Judgment and thought content normal    Vitals reviewed        Vital Signs  ED Triage Vitals   Temperature Pulse Respirations Blood Pressure SpO2   10/29/19 2215 10/29/19 2215 10/29/19 2215 10/29/19 2215 10/29/19 2215   97 5 °F (36 4 °C) 59 18 119/56 99 %      Temp Source Heart Rate Source Patient Position - Orthostatic VS BP Location FiO2 (%)   10/29/19 2215 10/29/19 2215 10/29/19 2215 10/29/19 2215 --   Oral Monitor Sitting Left arm       Pain Score       10/30/19 0015       No Pain           Vitals:    10/29/19 2215 10/30/19 0015   BP: 119/56 103/62   Pulse: 59 65   Patient Position - Orthostatic VS: Sitting Lying         Visual Acuity  Visual Acuity      Most Recent Value   L Pupil Size (mm)  2   R Pupil Size (mm)  2          ED Medications  Medications   sodium chloride 0 9 % bolus 1,000 mL (0 mL Intravenous Stopped 10/30/19 0134)   lactulose 20 g/30 mL oral solution 30 g (30 g Oral Given 10/30/19 0133)       Diagnostic Studies  Results Reviewed     Procedure Component Value Units Date/Time    Troponin I [[id number]]  (Normal) Collected:  10/30/19 0004    Lab Status:  Final result Specimen:  Blood from Arm, Right Updated:  10/30/19 0031     Troponin I <0 02 ng/mL     Comprehensive metabolic panel [[id number]]  (Abnormal) Collected:  10/30/19 0004    Lab Status:  Final result Specimen:  Blood from Arm, Right Updated:  10/30/19 0029     Sodium 138 mmol/L      Potassium 4 2 mmol/L      Chloride 105 mmol/L      CO2 26 mmol/L      ANION GAP 7 mmol/L      BUN 17 mg/dL      Creatinine 0 89 mg/dL      Glucose 90 mg/dL      Calcium 8 8 mg/dL      AST 49 U/L      ALT 35 U/L      Alkaline Phosphatase 138 U/L      Total Protein 5 9 g/dL      Albumin 3 1 g/dL      Total Bilirubin 2 50 mg/dL      eGFR 96 ml/min/1 73sq m     Narrative:       [place] guidelines for Chronic Kidney Disease (CKD):     Stage 1 with normal or high GFR (GFR > 90 mL/min/1 73 square meters)    Stage 2 Mild CKD (GFR = 60-89 mL/min/1 73 square meters)    Stage 3A Moderate CKD (GFR = 45-59 mL/min/1 73 square meters)    Stage 3B Moderate CKD (GFR = 30-44 mL/min/1 73 square meters)    Stage 4 Severe CKD (GFR = 15-29 mL/min/1 73 square meters)    Stage 5 End Stage CKD (GFR <15 mL/min/1 73 square meters)  Note: GFR calculation is accurate only with a steady state creatinine    Ammonia [[id number]]  (Abnormal) Collected:  10/30/19 0004    Lab Status:  Final result Specimen:  Blood from Arm, Right Updated:  10/30/19 0024     Ammonia 65 umol/L     CBC and differential [[id number]]  (Abnormal) Collected:  10/30/19 0004    Lab Status:  Final result Specimen:  Blood from Arm, Right Updated:  10/30/19 0012     WBC 5 61 Thousand/uL      RBC 3 96 Million/uL      Hemoglobin 13 4 g/dL      Hematocrit 39 0 %      MCV 99 fL      MCH 33 8 pg      MCHC 34 4 g/dL      RDW 14 3 %      MPV 11 7 fL      Platelets 860 Thousands/uL      nRBC 0 /100 WBCs      Neutrophils Relative 56 %      Immat GRANS % 0 %      Lymphocytes Relative 27 %      Monocytes Relative 8 %      Eosinophils Relative 8 %      Basophils Relative 1 %      Neutrophils Absolute 3 13 Thousands/µL      Immature Grans Absolute 0 01 Thousand/uL      Lymphocytes Absolute 1 53 Thousands/µL      Monocytes Absolute 0 46 Thousand/µL      Eosinophils Absolute 0 43 Thousand/µL      Basophils Absolute 0 05 Thousands/µL                  XR chest pa & lateral    (Results Pending)              Procedures  ECG 12 Lead Documentation Only  Date/Time: 10/30/2019 3:34 AM  Performed by: Kennedy Penaloza PA-C  Authorized by: Kennedy Penaloza PA-C     Indications / Diagnosis:  Dizziness  ECG reviewed by me, the ED Provider: yes    Patient location:  ED  Previous ECG:     Previous ECG:  Compared to current    Similarity:  No change  Interpretation:     Interpretation: abnormal    Rate:     ECG rate:  65    ECG rate assessment: normal    Rhythm:     Rhythm: sinus rhythm    Ectopy:     Ectopy: none    QRS:     QRS axis:  Normal    QRS intervals:  Normal  Conduction:     Conduction: normal    ST segments:     ST segments:  Normal  T waves:     T waves: normal    Other findings:     Other findings: prolonged qTc interval             ED Course                               MDM  Number of Diagnoses or Management Options  Hyperammonemia [facility]):   Diagnosis management comments: Patient is a 19-year-old male with history of hepatic cirrhosis that presents emergency department complaints of dizziness  Patient states this is how he feels when his numbers were off  Lab evaluation was performed and does demonstrate elevated bilirubin that is close to his baseline  He also has elevated ammonia to 65  This is above his baseline  Patient received IV fluids as well as lactulose  Patient has not been taking his lactulose  I encouraged him to continue to take it to help manage his ammonia levels    Patient like to be discharged home where he can take his lactulose  He was also requesting a work note so he would have diarrhea well at work  This was given  Return parameters were discussed  Patient stable for discharge  Amount and/or Complexity of Data Reviewed  Clinical lab tests: ordered and reviewed    Risk of Complications, Morbidity, and/or Mortality  Presenting problems: moderate  Diagnostic procedures: moderate  Management options: moderate    Patient Progress  Patient progress: stable      Disposition  Final diagnoses:   Hyperammonemia ([address] )     Time reflects when diagnosis was documented in both MDM as applicable and the Disposition within this note     Time User Action Codes Description Comment    10/30/2019  1:10 AM Fouzia Shows Add [E72 20] Hyperammonemia [facility])       ED Disposition     ED Disposition Condition Date/Time Comment    Discharge Good Wed Oct 30, 2019  1:10 AM Samson Rollins Sr  discharge to home/self care              Follow-up Information     Follow up With Specialties Details Why Adriana Martin MD Family Medicine Schedule an appointment as soon as possible for a visit   21   [address]  [phone]            Discharge Medication List as of 10/30/2019  1:11 AM      CONTINUE these medications which have NOT CHANGED    Details   AMILoride 5 mg tablet Take 1 tablet (5 mg total) by mouth daily, Starting Thu 10/24/2019, Normal      calcium citrate-vitamin D (CITRACAL+D) 315-200 MG-UNIT per tablet Take 1 tablet by mouth 2 (two) times a day, Historical Med      furosemide (LASIX) 20 mg tablet Take 1 tablet (20 mg total) by mouth daily, Starting Wed 3/20/2019, Normal      ibuprofen (MOTRIN) 600 mg tablet Take 1 tablet (600 mg total) by mouth every 6 (six) hours as needed for mild pain or moderate pain, Starting Thu 10/24/2019, Normal      lactulose (CONSTULOSE) 10 g/15 mL solution Take 15 mL (10 g total) by mouth daily as needed (constipation or confusion), Starting Wed 3/13/2019, Normal      methocarbamol (ROBAXIN) 750 mg tablet Take 1 tablet (750 mg total) by mouth every 8 (eight) hours, Starting Thu 10/24/2019, Normal      Multiple Vitamins-Minerals (MULTIVITAMIN WITH MINERALS) tablet Take 1 tablet by mouth daily, Historical Med      nadolol (CORGARD) 20 mg tablet Take 1 tablet (20 mg total) by mouth daily, Starting Wed 3/20/2019, Normal      omeprazole (PriLOSEC) 20 mg delayed release capsule Take 1 capsule (20 mg total) by mouth daily, Starting Th 10/24/2019, Until Wed 1/22/2020, Normal      rifaximin (XIFAXAN) 550 mg tablet Take 550 mg by mouth 2 (two) times a day  , Historical Med      sildenafil (VIAGRA) 100 mg tablet Take 1 tablet (100 mg total) by mouth daily as needed for erectile dysfunction, Starting Thu 10/24/2019, Normal      vitamin A 10,000 units capsule Take 1 capsule (10,000 Units total) by mouth daily, Starting Wed 2/13/2019, Normal           No discharge procedures on file      ED Provider  Electronically Signed by           Indigo Leahy PA-C  10/30/19 2489

## 2019-11-07 ENCOUNTER — TELEPHONE (OUTPATIENT)
Dept: GASTROENTEROLOGY | Facility: CLINIC | Age: 54
End: 2019-11-07

## 2019-11-07 NOTE — TELEPHONE ENCOUNTER
Madhu Rogers pt - Pt states that he never received the Xifaxan paper work and he is running out of medication  Please call [phone]   Ty

## 2019-11-20 ENCOUNTER — OFFICE VISIT (OUTPATIENT)
Dept: FAMILY MEDICINE CLINIC | Facility: CLINIC | Age: 54
End: 2019-11-20
Payer: MEDICARE

## 2019-11-20 VITALS
HEIGHT: 65 IN | DIASTOLIC BLOOD PRESSURE: 52 MMHG | TEMPERATURE: 96.4 F | OXYGEN SATURATION: 98 % | HEART RATE: 64 BPM | WEIGHT: 230 LBS | RESPIRATION RATE: 18 BRPM | SYSTOLIC BLOOD PRESSURE: 98 MMHG | BODY MASS INDEX: 38.32 KG/M2

## 2019-11-20 DIAGNOSIS — K72.90 HEPATIC ENCEPHALOPATHY (HCC): Primary | ICD-10-CM

## 2019-11-20 DIAGNOSIS — N52.9 ERECTILE DYSFUNCTION, UNSPECIFIED ERECTILE DYSFUNCTION TYPE: ICD-10-CM

## 2019-11-20 DIAGNOSIS — B18.2 HEPATIC CIRRHOSIS DUE TO CHRONIC HEPATITIS C INFECTION (HCC): ICD-10-CM

## 2019-11-20 DIAGNOSIS — K74.60 HEPATIC CIRRHOSIS DUE TO CHRONIC HEPATITIS C INFECTION (HCC): ICD-10-CM

## 2019-11-20 DIAGNOSIS — I95.9 HYPOTENSION, UNSPECIFIED HYPOTENSION TYPE: ICD-10-CM

## 2019-11-20 DIAGNOSIS — M54.50 MIDLINE LOW BACK PAIN WITHOUT SCIATICA, UNSPECIFIED CHRONICITY: ICD-10-CM

## 2019-11-20 DIAGNOSIS — E50.9 VITAMIN A DEFICIENCY: ICD-10-CM

## 2019-11-20 DIAGNOSIS — Z23 NEED FOR INFLUENZA VACCINATION: ICD-10-CM

## 2019-11-20 DIAGNOSIS — M79.18 MYOFASCIAL MUSCLE PAIN: ICD-10-CM

## 2019-11-20 PROCEDURE — G0008 ADMIN INFLUENZA VIRUS VAC: HCPCS | Performed by: FAMILY MEDICINE

## 2019-11-20 PROCEDURE — 90682 RIV4 VACC RECOMBINANT DNA IM: CPT | Performed by: FAMILY MEDICINE

## 2019-11-20 PROCEDURE — 99214 OFFICE O/P EST MOD 30 MIN: CPT | Performed by: FAMILY MEDICINE

## 2019-11-20 RX ORDER — AMILORIDE HYDROCHLORIDE 5 MG/1
5 TABLET ORAL DAILY
Qty: 90 TABLET | Refills: 2 | Status: SHIPPED | OUTPATIENT
Start: 2019-11-20 | End: 2020-01-16 | Stop reason: SDUPTHER

## 2019-11-20 RX ORDER — IBUPROFEN 800 MG/1
800 TABLET ORAL EVERY 8 HOURS PRN
Qty: 30 TABLET | Refills: 1 | Status: SHIPPED | OUTPATIENT
Start: 2019-11-20 | End: 2020-01-16 | Stop reason: SDUPTHER

## 2019-11-20 RX ORDER — SILDENAFIL 100 MG/1
100 TABLET, FILM COATED ORAL DAILY PRN
Qty: 10 TABLET | Refills: 0 | Status: SHIPPED | OUTPATIENT
Start: 2019-11-20 | End: 2019-12-21 | Stop reason: SDUPTHER

## 2019-11-20 RX ORDER — NADOLOL 20 MG/1
20 TABLET ORAL DAILY
Qty: 90 TABLET | Refills: 2 | Status: SHIPPED | OUTPATIENT
Start: 2019-11-20 | End: 2020-01-16 | Stop reason: SDUPTHER

## 2019-11-20 NOTE — PROGRESS NOTES
Assessment/Plan:    No problem-specific Assessment & Plan notes found for this encounter  Diagnoses and all orders for this visit:    Hepatic encephalopathy ([address] )  No symptoms at this time  advised to take lactulose when symptoms develop  -     Ammonia; Future    Vitamin A deficiency  -     vitamin A 10,000 units capsule; Take 1 capsule (10,000 Units total) by mouth daily    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)  -     AMILoride 5 mg tablet; Take 1 tablet (5 mg total) by mouth daily  -     nadolol (CORGARD) 20 mg tablet; Take 1 tablet (20 mg total) by mouth daily    Erectile dysfunction, unspecified erectile dysfunction type  -     sildenafil (VIAGRA) 100 mg tablet; Take 1 tablet (100 mg total) by mouth daily as needed for erectile dysfunction    Midline low back pain without sciatica, unspecified chronicity  -     ibuprofen (MOTRIN) 800 mg tablet; Take 1 tablet (800 mg total) by mouth every 8 (eight) hours as needed for moderate pain    Myofascial muscle pain  -     ibuprofen (MOTRIN) 800 mg tablet; Take 1 tablet (800 mg total) by mouth every 8 (eight) hours as needed for moderate pain    Need for influenza vaccination  -     influenza vaccine, [id number], quadrivalent, recombinant, PF, 0 5 mL, for patients 18 yr+ (FLUBLOK)    Hypotension, unspecified hypotension type  advised to measure his BP daily if it continues to be below 90/60 mmHg consider decreasing amiloride or nadolol  Follow up in 1-2 months or as needed        Subjective:      Patient ID: Caitlin Amor Sr  is a 53 y o  male  Patient is here to follow-up for an episode about a month ago where he went to the emergency room at [address] [place] [address] due dizziness symptoms and was found to have elevated ammonia levels  He does have a history of hepatic encephalopathy takes lactulose for elevated ammonia levels as needed  Denies any dizziness symptoms since his ER visit    He denies any other complaints related to this   He also has a history of low-back pain he has been taking ibuprofen 600 mg as needed as well as muscle relaxer or pain he said that the medication is helping some some however still having some symptoms he is requesting a higher dose of medication this time  He also has a history of erectile dysfunction he does take Viagra as needed and he would like a refill of the medication  Also his blood pressure was bordelrine low today he does take amiloride and nadolol daily  The following portions of the patient's history were reviewed and updated as appropriate: He  has a past medical history of CPAP (continuous positive airway pressure) dependence, Esophageal varices ([address] ), Hepatic cirrhosis ([facility] ), Hepatic encephalopathy ([facility] ), Obesity, Pneumonia, Sleep apnea, and Wears glasses    He   Patient Active Problem List    Diagnosis Date Noted    Vitamin A deficiency 11/20/2019    Need for influenza vaccination 11/20/2019    Hypotension 11/20/2019    Hyperammonemia ([facility] ) 10/30/2019    Myofascial muscle pain 10/18/2019    Midline low back pain without sciatica 10/04/2019    Excess skin of abdominal wall 10/04/2019    Laceration of scalp without foreign body 06/14/2019    Need for Tdap vaccination 05/01/2019    Medicare annual wellness visit, initial 05/01/2019    Need for lipid screening 05/01/2019    Esophageal varices without bleeding ([facility] ) 03/13/2019    Postsurgical malabsorption 09/26/2018    Diabetes mellitus screening 08/07/2018    Fatigue 08/07/2018    Anemia 08/07/2018    Dizzy 08/07/2018    Acute pain of right knee 07/03/2018    Effusion of right knee 07/03/2018    S/P laparoscopic sleeve gastrectomy 06/13/2018    Mood swings 06/13/2018    Secondary esophageal varices without bleeding ([facility] ) 03/23/2018    [facility] ) 03/23/2018    Obstructive sleep apnea 03/08/2018    Morbid obesity with BMI of 40 0-44 9, adult ([facility] ) 03/02/2018    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 ([facility] ) 02/21/2018    Gastroesophageal reflux disease without esophagitis 02/21/2018    Former smoker 02/12/2018    Erectile dysfunction 02/12/2018     He  has a past surgical history that includes Colonoscopy; Esophagogastroduodenoscopy (N/A, 4/12/2018); Other surgical history; Fracture surgery (Left); and pr lap, dilip restrict proc, longitudinal gastrectomy (N/A, 6/5/2018)  His family history includes Diabetes in his father; Heart disease in his mother; Lupus in his mother; Stroke in his father  He  reports that he has been smoking  He has a 9 25 pack-year smoking history  He has never used smokeless tobacco  He reports that he does not drink alcohol or use drugs    Current Outpatient Medications   Medication Sig Dispense Refill    AMILoride 5 mg tablet Take 1 tablet (5 mg total) by mouth daily 90 tablet 2    calcium citrate-vitamin D (CITRACAL+D) 315-200 MG-UNIT per tablet Take 1 tablet by mouth 2 (two) times a day      ibuprofen (MOTRIN) 800 mg tablet Take 1 tablet (800 mg total) by mouth every 8 (eight) hours as needed for moderate pain 30 tablet 1    lactulose (CONSTULOSE) 10 g/15 mL solution Take 15 mL (10 g total) by mouth daily as needed (constipation or confusion) 946 mL 1    methocarbamol (ROBAXIN) 750 mg tablet Take 1 tablet (750 mg total) by mouth every 8 (eight) hours 30 tablet 2    Multiple Vitamins-Minerals (MULTIVITAMIN WITH MINERALS) tablet Take 1 tablet by mouth daily      omeprazole (PriLOSEC) 20 mg delayed release capsule Take 1 capsule (20 mg total) by mouth daily 90 capsule 3    rifaximin (XIFAXAN) 550 mg tablet Take 550 mg by mouth 2 (two) times a day        sildenafil (VIAGRA) 100 mg tablet Take 1 tablet (100 mg total) by mouth daily as needed for erectile dysfunction 10 tablet 0    vitamin A 10,000 units capsule Take 1 capsule (10,000 Units total) by mouth daily 90 capsule 0    nadolol (CORGARD) 20 mg tablet Take 1 tablet (20 mg total) by mouth daily 90 tablet 2     No current facility-administered medications for this visit  Current Outpatient Medications on File Prior to Visit   Medication Sig    calcium citrate-vitamin D (CITRACAL+D) 315-200 MG-UNIT per tablet Take 1 tablet by mouth 2 (two) times a day    lactulose (CONSTULOSE) 10 g/15 mL solution Take 15 mL (10 g total) by mouth daily as needed (constipation or confusion)    methocarbamol (ROBAXIN) 750 mg tablet Take 1 tablet (750 mg total) by mouth every 8 (eight) hours    Multiple Vitamins-Minerals (MULTIVITAMIN WITH MINERALS) tablet Take 1 tablet by mouth daily    omeprazole (PriLOSEC) 20 mg delayed release capsule Take 1 capsule (20 mg total) by mouth daily    rifaximin (XIFAXAN) 550 mg tablet Take 550 mg by mouth 2 (two) times a day      [DISCONTINUED] AMILoride 5 mg tablet Take 1 tablet (5 mg total) by mouth daily    [DISCONTINUED] furosemide (LASIX) 20 mg tablet Take 1 tablet (20 mg total) by mouth daily    [DISCONTINUED] ibuprofen (MOTRIN) 600 mg tablet Take 1 tablet (600 mg total) by mouth every 6 (six) hours as needed for mild pain or moderate pain    [DISCONTINUED] sildenafil (VIAGRA) 100 mg tablet Take 1 tablet (100 mg total) by mouth daily as needed for erectile dysfunction    [DISCONTINUED] vitamin A 10,000 units capsule Take 1 capsule (10,000 Units total) by mouth daily    [DISCONTINUED] nadolol (CORGARD) 20 mg tablet Take 1 tablet (20 mg total) by mouth daily     No current facility-administered medications on file prior to visit  He has No Known Allergies       Review of Systems   Constitutional: Negative for activity change, appetite change, fatigue and fever  HENT: Negative for congestion and ear discharge  Respiratory: Negative for cough and shortness of breath  Cardiovascular: Negative for chest pain and palpitations  Gastrointestinal: Negative for diarrhea and nausea  Musculoskeletal: Positive for back pain  Negative for arthralgias     Skin: Negative for color change and rash  Neurological: Negative for dizziness and headaches  Psychiatric/Behavioral: Negative for agitation and behavioral problems  Objective:      BP 98/52   Pulse 64   Temp (!) 96 4 °F (35 8 °C)   Resp 18   Ht 5' 5" (1 651 m)   Wt 104 kg (230 lb)   SpO2 98%   BMI 38 27 kg/m²          Physical Exam   Constitutional: He is oriented to person, place, and time  He appears well-developed and well-nourished  No distress  Eyes: Pupils are equal, round, and reactive to light  No scleral icterus  Cardiovascular: Normal rate, regular rhythm and normal heart sounds  No murmur heard  Pulmonary/Chest: Effort normal and breath sounds normal  No respiratory distress  He has no wheezes  Abdominal: Soft  Bowel sounds are normal  He exhibits no distension  There is no tenderness  Musculoskeletal: Normal range of motion  He exhibits no edema, tenderness or deformity  Neurological: He is alert and oriented to person, place, and time  Skin: Skin is warm and dry  No rash noted  He is not diaphoretic  Psychiatric: He has a normal mood and affect

## 2019-12-21 DIAGNOSIS — N52.9 ERECTILE DYSFUNCTION, UNSPECIFIED ERECTILE DYSFUNCTION TYPE: ICD-10-CM

## 2019-12-21 RX ORDER — SILDENAFIL 100 MG/1
TABLET, FILM COATED ORAL
Qty: 10 TABLET | Refills: 0 | Status: SHIPPED | OUTPATIENT
Start: 2019-12-21 | End: 2020-01-16 | Stop reason: SDUPTHER

## 2020-01-16 DIAGNOSIS — M54.50 MIDLINE LOW BACK PAIN WITHOUT SCIATICA, UNSPECIFIED CHRONICITY: ICD-10-CM

## 2020-01-16 DIAGNOSIS — M79.18 MYOFASCIAL MUSCLE PAIN: ICD-10-CM

## 2020-01-16 DIAGNOSIS — K74.60 HEPATIC CIRRHOSIS DUE TO CHRONIC HEPATITIS C INFECTION (HCC): ICD-10-CM

## 2020-01-16 DIAGNOSIS — N52.9 ERECTILE DYSFUNCTION, UNSPECIFIED ERECTILE DYSFUNCTION TYPE: ICD-10-CM

## 2020-01-16 DIAGNOSIS — B18.2 HEPATIC CIRRHOSIS DUE TO CHRONIC HEPATITIS C INFECTION (HCC): ICD-10-CM

## 2020-01-16 RX ORDER — SILDENAFIL 100 MG/1
100 TABLET, FILM COATED ORAL AS NEEDED
Qty: 10 TABLET | Refills: 0 | Status: SHIPPED | OUTPATIENT
Start: 2020-01-16 | End: 2020-02-14

## 2020-01-16 RX ORDER — METHOCARBAMOL 750 MG/1
750 TABLET, FILM COATED ORAL EVERY 8 HOURS SCHEDULED
Qty: 30 TABLET | Refills: 2 | Status: SHIPPED | OUTPATIENT
Start: 2020-01-16 | End: 2020-04-16

## 2020-01-16 RX ORDER — IBUPROFEN 800 MG/1
800 TABLET ORAL EVERY 8 HOURS PRN
Qty: 30 TABLET | Refills: 1 | Status: SHIPPED | OUTPATIENT
Start: 2020-01-16 | End: 2020-03-27

## 2020-01-16 RX ORDER — NADOLOL 20 MG/1
20 TABLET ORAL DAILY
Qty: 90 TABLET | Refills: 2 | Status: SHIPPED | OUTPATIENT
Start: 2020-01-16 | End: 2020-12-22 | Stop reason: SDUPTHER

## 2020-01-16 RX ORDER — AMILORIDE HYDROCHLORIDE 5 MG/1
5 TABLET ORAL DAILY
Qty: 90 TABLET | Refills: 2 | Status: SHIPPED | OUTPATIENT
Start: 2020-01-16 | End: 2020-02-13 | Stop reason: SDUPTHER

## 2020-01-17 ENCOUNTER — TELEPHONE (OUTPATIENT)
Dept: GASTROENTEROLOGY | Facility: CLINIC | Age: 55
End: 2020-01-17

## 2020-01-17 DIAGNOSIS — K74.60 HEPATIC CIRRHOSIS DUE TO CHRONIC HEPATITIS C INFECTION (HCC): Primary | ICD-10-CM

## 2020-01-17 DIAGNOSIS — B18.2 HEPATIC CIRRHOSIS DUE TO CHRONIC HEPATITIS C INFECTION (HCC): Primary | ICD-10-CM

## 2020-01-17 NOTE — TELEPHONE ENCOUNTER
Left a voicemail at the 610 number listed in chart could not leave a message on cell because mail box was full  Requested patient to return a call to the office

## 2020-01-17 NOTE — TELEPHONE ENCOUNTER
Ordered BW and US and advised pt  Miladys scheduled pt for OV  [facility] please advise further EGD  Thank you

## 2020-01-17 NOTE — TELEPHONE ENCOUNTER
Madhu Rogers pt - Pt wants to know if he needs to have any yearly testing done, please call [phone]   Ty

## 2020-01-17 NOTE — TELEPHONE ENCOUNTER
He needs to have bloodwork including a CBC, CMP, INR and AFP tumor marker  He also needs to have a RUQ US to check his liver  He also needs to have a yearly check in appt with us so make sure he has something scheduled, I think the last time I saw him was in March last year  He will need a EGD in May this year to see if his varices have become bigger

## 2020-01-24 ENCOUNTER — APPOINTMENT (OUTPATIENT)
Dept: LAB | Facility: CLINIC | Age: 55
End: 2020-01-24
Payer: MEDICARE

## 2020-01-24 DIAGNOSIS — B18.2 HEPATIC CIRRHOSIS DUE TO CHRONIC HEPATITIS C INFECTION (HCC): ICD-10-CM

## 2020-01-24 DIAGNOSIS — K74.60 HEPATIC CIRRHOSIS DUE TO CHRONIC HEPATITIS C INFECTION (HCC): ICD-10-CM

## 2020-01-24 LAB
AFP-TM SERPL-MCNC: 1.4 NG/ML (ref 0.5–8)
ALBUMIN SERPL BCP-MCNC: 3.1 G/DL (ref 3.5–5)
ALP SERPL-CCNC: 106 U/L (ref 46–116)
ALT SERPL W P-5'-P-CCNC: 25 U/L (ref 12–78)
ANION GAP SERPL CALCULATED.3IONS-SCNC: 5 MMOL/L (ref 4–13)
AST SERPL W P-5'-P-CCNC: 35 U/L (ref 5–45)
BASOPHILS # BLD AUTO: 0.04 THOUSANDS/ΜL (ref 0–0.1)
BASOPHILS NFR BLD AUTO: 1 % (ref 0–1)
BILIRUB SERPL-MCNC: 2.77 MG/DL (ref 0.2–1)
BUN SERPL-MCNC: 7 MG/DL (ref 5–25)
CALCIUM SERPL-MCNC: 8.9 MG/DL (ref 8.3–10.1)
CHLORIDE SERPL-SCNC: 114 MMOL/L (ref 100–108)
CO2 SERPL-SCNC: 25 MMOL/L (ref 21–32)
CREAT SERPL-MCNC: 0.64 MG/DL (ref 0.6–1.3)
EOSINOPHIL # BLD AUTO: 0.26 THOUSAND/ΜL (ref 0–0.61)
EOSINOPHIL NFR BLD AUTO: 6 % (ref 0–6)
ERYTHROCYTE [DISTWIDTH] IN BLOOD BY AUTOMATED COUNT: 14.2 % (ref 11.6–15.1)
GFR SERPL CREATININE-BSD FRML MDRD: 110 ML/MIN/1.73SQ M
GLUCOSE SERPL-MCNC: 86 MG/DL (ref 65–140)
HCT VFR BLD AUTO: 39.6 % (ref 36.5–49.3)
HGB BLD-MCNC: 13.4 G/DL (ref 12–17)
IMM GRANULOCYTES # BLD AUTO: 0 THOUSAND/UL (ref 0–0.2)
IMM GRANULOCYTES NFR BLD AUTO: 0 % (ref 0–2)
INR PPP: 1.35 (ref 0.84–1.19)
LYMPHOCYTES # BLD AUTO: 1.05 THOUSANDS/ΜL (ref 0.6–4.47)
LYMPHOCYTES NFR BLD AUTO: 23 % (ref 14–44)
MCH RBC QN AUTO: 33.3 PG (ref 26.8–34.3)
MCHC RBC AUTO-ENTMCNC: 33.8 G/DL (ref 31.4–37.4)
MCV RBC AUTO: 99 FL (ref 82–98)
MONOCYTES # BLD AUTO: 0.27 THOUSAND/ΜL (ref 0.17–1.22)
MONOCYTES NFR BLD AUTO: 6 % (ref 4–12)
NEUTROPHILS # BLD AUTO: 3.04 THOUSANDS/ΜL (ref 1.85–7.62)
NEUTS SEG NFR BLD AUTO: 64 % (ref 43–75)
NRBC BLD AUTO-RTO: 0 /100 WBCS
PLATELET # BLD AUTO: 86 THOUSANDS/UL (ref 149–390)
PMV BLD AUTO: 11.6 FL (ref 8.9–12.7)
POTASSIUM SERPL-SCNC: 3.8 MMOL/L (ref 3.5–5.3)
PROT SERPL-MCNC: 5.6 G/DL (ref 6.4–8.2)
PROTHROMBIN TIME: 16.2 SECONDS (ref 11.6–14.5)
RBC # BLD AUTO: 4.02 MILLION/UL (ref 3.88–5.62)
SODIUM SERPL-SCNC: 144 MMOL/L (ref 136–145)
WBC # BLD AUTO: 4.66 THOUSAND/UL (ref 4.31–10.16)

## 2020-01-24 PROCEDURE — 80053 COMPREHEN METABOLIC PANEL: CPT

## 2020-01-24 PROCEDURE — 85610 PROTHROMBIN TIME: CPT

## 2020-01-24 PROCEDURE — 85025 COMPLETE CBC W/AUTO DIFF WBC: CPT

## 2020-01-24 PROCEDURE — 36415 COLL VENOUS BLD VENIPUNCTURE: CPT

## 2020-01-24 PROCEDURE — 82105 ALPHA-FETOPROTEIN SERUM: CPT

## 2020-01-27 ENCOUNTER — TELEPHONE (OUTPATIENT)
Dept: GASTROENTEROLOGY | Facility: CLINIC | Age: 55
End: 2020-01-27

## 2020-01-27 NOTE — TELEPHONE ENCOUNTER
Left a voicemail on home number listed for patient to return a call to the office  Unable to leave message on cell phone mailbox full

## 2020-01-27 NOTE — TELEPHONE ENCOUNTER
----- Message from Abel Guzmán MD sent at 1/25/2020  6:53 AM EST -----  Please tell him that his labs are stable

## 2020-01-28 ENCOUNTER — TELEPHONE (OUTPATIENT)
Dept: GASTROENTEROLOGY | Facility: CLINIC | Age: 55
End: 2020-01-28

## 2020-01-28 NOTE — TELEPHONE ENCOUNTER
----- Message from Lindy Alegre MD sent at 1/25/2020  6:53 AM EST -----  Please tell him that his labs are stable

## 2020-02-04 ENCOUNTER — TRANSCRIBE ORDERS (OUTPATIENT)
Dept: ADMINISTRATIVE | Facility: HOSPITAL | Age: 55
End: 2020-02-04

## 2020-02-04 DIAGNOSIS — K74.60 CIRRHOSIS OF LIVER WITHOUT ASCITES, UNSPECIFIED HEPATIC CIRRHOSIS TYPE (HCC): ICD-10-CM

## 2020-02-04 DIAGNOSIS — B18.2 CHRONIC HEPATITIS C WITH HEPATIC COMA (HCC): Primary | ICD-10-CM

## 2020-02-13 DIAGNOSIS — K74.60 HEPATIC CIRRHOSIS DUE TO CHRONIC HEPATITIS C INFECTION (HCC): ICD-10-CM

## 2020-02-13 DIAGNOSIS — N52.9 ERECTILE DYSFUNCTION, UNSPECIFIED ERECTILE DYSFUNCTION TYPE: ICD-10-CM

## 2020-02-13 DIAGNOSIS — B18.2 HEPATIC CIRRHOSIS DUE TO CHRONIC HEPATITIS C INFECTION (HCC): ICD-10-CM

## 2020-02-14 RX ORDER — AMILORIDE HYDROCHLORIDE 5 MG/1
5 TABLET ORAL DAILY
Qty: 30 TABLET | Refills: 2 | Status: SHIPPED | OUTPATIENT
Start: 2020-02-14 | End: 2020-09-16 | Stop reason: HOSPADM

## 2020-02-14 RX ORDER — SILDENAFIL 100 MG/1
TABLET, FILM COATED ORAL
Qty: 10 TABLET | Refills: 0 | Status: SHIPPED | OUTPATIENT
Start: 2020-02-14 | End: 2020-03-09

## 2020-02-21 ENCOUNTER — CONSULT (OUTPATIENT)
Dept: PLASTIC SURGERY | Facility: CLINIC | Age: 55
End: 2020-02-21
Payer: MEDICARE

## 2020-02-21 VITALS — HEIGHT: 66 IN | BODY MASS INDEX: 34.55 KG/M2 | WEIGHT: 215 LBS

## 2020-02-21 DIAGNOSIS — E65 ABDOMINAL PANNUS: ICD-10-CM

## 2020-02-21 DIAGNOSIS — Z87.891 FORMER SMOKER: ICD-10-CM

## 2020-02-21 DIAGNOSIS — Z98.84 S/P LAPAROSCOPIC SLEEVE GASTRECTOMY: Primary | ICD-10-CM

## 2020-02-21 PROCEDURE — 99214 OFFICE O/P EST MOD 30 MIN: CPT | Performed by: PLASTIC SURGERY

## 2020-02-21 RX ORDER — IBUPROFEN 600 MG/1
TABLET ORAL
Status: ON HOLD | COMMUNITY
Start: 2019-12-21 | End: 2020-09-16 | Stop reason: DRUGHIGH

## 2020-02-25 NOTE — PROGRESS NOTES
Assessment/Plan   Diagnoses and all orders for this visit:    S/P laparoscopic sleeve gastrectomy    Abdominal pannus    Former smoker    Other orders  -     ibuprofen (MOTRIN) 600 mg tablet    Post bariatric excess of skin in the following regions: Trunk     The excess skin of the abdomen interferes with daily activities and causes this patient problems that are outlined in the above note  I believe this patient could benefit from excision of the abdominal pannus  I explained the procedure for panniculectomy to this patient  I explained that the purpose of this procedure is to reduce the overhanging skin on the lower abdomen  Further reduction of abdominal skin or excess fat would require a more extensive procedure such as an abdominoplasty  I also explained that this procedure does not correct  abdominal wall fascial laxity, which would also require an abdominoplasty procedure for correction  We reviewed the major and minor complications associated with panniculectomy, including possible loss of the umbilicus, wound dehiscence, skin necrosis, wound infection, excessive widening or scarring of the wounds, seroma, hematoma, need for further surgery, and unacceptable cosmetic result  Photos were taken today  We will plan to see this patient back for a preoperative visit once surgery is scheduled  Reyes Quill MD [address] [address]   [address], [address]   Office: [phone]        Subjective   Patient ID: Talia Boland Sr  is a 53 y o  male  Vitals:     Mr Gee Gan is a 53 y o  male who presents for evaluation of body contouring after massive weight loss  The patient underwent laparoscopic sleeve and has lost approximately 113 pounds  he has been at a stable weight for 10 months  The patient now has complaints of excess skin in the following areas: trunk        Pertaining to the excess skin:  The patient does not have an associated ventral hernia  The patient does have difficulty getting in and out of bed  The patient does have difficulty standing and walking straight   The patient does have difficulty tying his shoes  The patient does not have difficulty crossing his legs  The patient does have difficulty finding clothes that fit appropriately  The patient does have recurrent skin rashes  The patient does not have skin infections  The patient does have foul odors  The patient does have non-healing skin ulcers and gets some excoriations as well  The patient does have back pain        The following portions of the patient's history were reviewed and updated as appropriate: allergies, current medications, past family history, past medical history, past social history, past surgical history and problem list     Review of Systems   Constitutional: Negative  HENT: Negative  Eyes: Negative  Respiratory: Negative  Cardiovascular: Negative  Gastrointestinal: Negative  Endocrine: Negative  Genitourinary: Negative  Musculoskeletal: Positive for back pain  Skin: Positive for rash  Allergic/Immunologic: Negative  Neurological: Negative  Hematological: Negative  Psychiatric/Behavioral: Negative  Objective   Physical Exam   Constitutional  He appears well-developed and well-nourished  Cardiovascular: Normal rate  Pulmonary/Chest  Effort normal      Psychiatric  He has a normal mood and affect  His behavior is normal      Abdomen and Hips  Soft  Hernia confirmed negative in the ventral area  Abdominal shape is square-waisted and panniculus  A surgical scar is present  He has vertical, abdominal skin redundancy  Patient has excess abdominal fat  Excess fat located in the: lower abdomen, brown-umbilical and android

## 2020-03-05 ENCOUNTER — TELEPHONE (OUTPATIENT)
Dept: PLASTIC SURGERY | Facility: CLINIC | Age: 55
End: 2020-03-05

## 2020-03-05 NOTE — TELEPHONE ENCOUNTER
Tried to call patient on 2/25, 2/26 and 3/5 to schedule patient for surgery  The mailbox is full  L/M on home number on 2/25

## 2020-03-08 DIAGNOSIS — N52.9 ERECTILE DYSFUNCTION, UNSPECIFIED ERECTILE DYSFUNCTION TYPE: ICD-10-CM

## 2020-03-09 DIAGNOSIS — N52.9 ERECTILE DYSFUNCTION, UNSPECIFIED ERECTILE DYSFUNCTION TYPE: ICD-10-CM

## 2020-03-09 RX ORDER — SILDENAFIL 100 MG/1
100 TABLET, FILM COATED ORAL AS NEEDED
Qty: 10 TABLET | Refills: 0 | Status: SHIPPED | OUTPATIENT
Start: 2020-03-09 | End: 2020-04-06

## 2020-03-09 RX ORDER — SILDENAFIL 100 MG/1
TABLET, FILM COATED ORAL
Qty: 10 TABLET | Refills: 0 | Status: SHIPPED | OUTPATIENT
Start: 2020-03-09 | End: 2020-04-30 | Stop reason: SDUPTHER

## 2020-03-16 ENCOUNTER — OFFICE VISIT (OUTPATIENT)
Dept: FAMILY MEDICINE CLINIC | Facility: CLINIC | Age: 55
End: 2020-03-16
Payer: MEDICARE

## 2020-03-16 VITALS
TEMPERATURE: 99.9 F | BODY MASS INDEX: 33.72 KG/M2 | HEIGHT: 66 IN | WEIGHT: 209.8 LBS | DIASTOLIC BLOOD PRESSURE: 60 MMHG | HEART RATE: 58 BPM | OXYGEN SATURATION: 99 % | SYSTOLIC BLOOD PRESSURE: 108 MMHG

## 2020-03-16 DIAGNOSIS — R12 HEARTBURN: ICD-10-CM

## 2020-03-16 DIAGNOSIS — J00 HEAD COLD: Primary | ICD-10-CM

## 2020-03-16 LAB
FLUAV RNA NPH QL NAA+PROBE: NORMAL
FLUBV RNA NPH QL NAA+PROBE: NORMAL
RSV RNA NPH QL NAA+PROBE: NORMAL

## 2020-03-16 PROCEDURE — 3074F SYST BP LT 130 MM HG: CPT | Performed by: FAMILY MEDICINE

## 2020-03-16 PROCEDURE — 99214 OFFICE O/P EST MOD 30 MIN: CPT | Performed by: FAMILY MEDICINE

## 2020-03-16 PROCEDURE — 3008F BODY MASS INDEX DOCD: CPT | Performed by: FAMILY MEDICINE

## 2020-03-16 PROCEDURE — 3078F DIAST BP <80 MM HG: CPT | Performed by: FAMILY MEDICINE

## 2020-03-16 PROCEDURE — 87631 RESP VIRUS 3-5 TARGETS: CPT | Performed by: FAMILY MEDICINE

## 2020-03-16 NOTE — LETTER
March 16, 2020     Patient: Samul Osgood   YOB: 1965   Date of Visit: 3/16/2020       To Whom it May Concern:    Maegan Chavez is under my professional care  He was seen in my office on 3/16/2020  He may return to work on 03/22/2020  If you have any questions or concerns, please don't hesitate to call           Sincerely,          Gurvinder Eastman MD        CC: No Recipients

## 2020-03-16 NOTE — PROGRESS NOTES
Assessment/Plan:    No problem-specific Assessment & Plan notes found for this encounter  Diagnoses and all orders for this visit:    Head cold  recommended OTC medication such as nasal spray and decongestant medications  Heartburn  recommended taking omeprazole 2 capsules daily  If symptoms such as fever or shortness of breath develops recommend going to the ER        Subjective:      Patient ID: Jordan Lara Sr  is a 53 y o  male  Patient is here because he has been feeling tired and fatigued for the past several days  He denies any recent travels or sick contacts  Denies any shortness of breath although has a low grade temp  Also feels acid reflux heartburn symptoms  No other complaints  The following portions of the patient's history were reviewed and updated as appropriate: He  has a past medical history of CPAP (continuous positive airway pressure) dependence, Esophageal varices ([address] ), Hepatic cirrhosis ([address] ), Hepatic encephalopathy ([address] ), Obesity, Pneumonia, Sleep apnea, and Wears glasses    He   Patient Active Problem List    Diagnosis Date Noted    Head cold 03/16/2020    Heartburn 03/16/2020    Vitamin A deficiency 11/20/2019    Need for influenza vaccination 11/20/2019    Hypotension 11/20/2019    Hyperammonemia ([address] ) 10/30/2019    Myofascial muscle pain 10/18/2019    Midline low back pain without sciatica 10/04/2019    Excess skin of abdominal wall 10/04/2019    Laceration of scalp without foreign body 06/14/2019    Need for Tdap vaccination 05/01/2019    Medicare annual wellness visit, initial 05/01/2019    Need for lipid screening 05/01/2019    Esophageal varices without bleeding ([address] ) 03/13/2019    Postsurgical malabsorption 09/26/2018    Diabetes mellitus screening 08/07/2018    Fatigue 08/07/2018    Anemia 08/07/2018    Dizzy 08/07/2018    Acute pain of right knee 07/03/2018    Effusion of right knee 07/03/2018    S/P laparoscopic sleeve gastrectomy 06/13/2018    Mood swings 06/13/2018    Secondary esophageal varices without bleeding ([place] [address] ) 03/23/2018    Hepatic encephalopathy ([place] [address] ) 03/23/2018    Obstructive sleep apnea 03/08/2018    Morbid obesity with BMI of 40 0-44 9, adult ([place] [address] ) 03/02/2018    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 ([place] [address] ) 02/21/2018    Gastroesophageal reflux disease without esophagitis 02/21/2018    Former smoker 02/12/2018    Erectile dysfunction 02/12/2018     He  has a past surgical history that includes Colonoscopy; Esophagogastroduodenoscopy (N/A, 4/12/2018); Other surgical history; Fracture surgery (Left); and pr lap, dilip restrict proc, longitudinal gastrectomy (N/A, 6/5/2018)  His family history includes Diabetes in his father; Heart disease in his mother; Lupus in his mother; Stroke in his father  He  reports that he has been smoking  He has a 9 25 pack-year smoking history  He has never used smokeless tobacco  He reports that he does not drink alcohol or use drugs    Current Outpatient Medications   Medication Sig Dispense Refill    AMILoride 5 mg tablet Take 1 tablet (5 mg total) by mouth daily 30 tablet 2    ibuprofen (MOTRIN) 600 mg tablet       ibuprofen (MOTRIN) 800 mg tablet Take 1 tablet (800 mg total) by mouth every 8 (eight) hours as needed for moderate pain 30 tablet 1    lactulose (CONSTULOSE) 10 g/15 mL solution Take 15 mL (10 g total) by mouth daily as needed (constipation or confusion) 946 mL 1    methocarbamol (ROBAXIN) 750 mg tablet Take 1 tablet (750 mg total) by mouth every 8 (eight) hours 30 tablet 2    Multiple Vitamins-Minerals (MULTIVITAMIN WITH MINERALS) tablet Take 1 tablet by mouth daily      nadolol (CORGARD) 20 mg tablet Take 1 tablet (20 mg total) by mouth daily 90 tablet 2    omeprazole (PriLOSEC) 20 mg delayed release capsule Take 1 capsule (20 mg total) by mouth daily 90 capsule 3    rifaximin (XIFAXAN) 550 mg tablet Take 550 mg by mouth 2 (two) times a day        vitamin A 10,000 units capsule Take 1 capsule (10,000 Units total) by mouth daily 90 capsule 0    sildenafil (VIAGRA) 100 mg tablet TAKE ONE TABLET BY MOUTH DAILY AS NEEDED for erectile dysfunction 10 tablet 0    sildenafil (VIAGRA) 100 mg tablet Take 1 tablet (100 mg total) by mouth as needed for erectile dysfunction 10 tablet 0     No current facility-administered medications for this visit  Current Outpatient Medications on File Prior to Visit   Medication Sig    AMILoride 5 mg tablet Take 1 tablet (5 mg total) by mouth daily    ibuprofen (MOTRIN) 600 mg tablet     ibuprofen (MOTRIN) 800 mg tablet Take 1 tablet (800 mg total) by mouth every 8 (eight) hours as needed for moderate pain    lactulose (CONSTULOSE) 10 g/15 mL solution Take 15 mL (10 g total) by mouth daily as needed (constipation or confusion)    methocarbamol (ROBAXIN) 750 mg tablet Take 1 tablet (750 mg total) by mouth every 8 (eight) hours    Multiple Vitamins-Minerals (MULTIVITAMIN WITH MINERALS) tablet Take 1 tablet by mouth daily    nadolol (CORGARD) 20 mg tablet Take 1 tablet (20 mg total) by mouth daily    omeprazole (PriLOSEC) 20 mg delayed release capsule Take 1 capsule (20 mg total) by mouth daily    rifaximin (XIFAXAN) 550 mg tablet Take 550 mg by mouth 2 (two) times a day      vitamin A 10,000 units capsule Take 1 capsule (10,000 Units total) by mouth daily    [DISCONTINUED] calcium citrate-vitamin D (CITRACAL+D) 315-200 MG-UNIT per tablet Take 1 tablet by mouth 2 (two) times a day    sildenafil (VIAGRA) 100 mg tablet TAKE ONE TABLET BY MOUTH DAILY AS NEEDED for erectile dysfunction    sildenafil (VIAGRA) 100 mg tablet Take 1 tablet (100 mg total) by mouth as needed for erectile dysfunction     No current facility-administered medications on file prior to visit  He has No Known Allergies       Review of Systems   Constitutional: Positive for fatigue and fever  Negative for activity change and appetite change  HENT: Negative for congestion and ear discharge  Respiratory: Negative for cough and shortness of breath  Cardiovascular: Negative for chest pain and palpitations  Gastrointestinal: Negative for diarrhea and nausea  Musculoskeletal: Negative for arthralgias and back pain  Skin: Negative for color change and rash  Neurological: Negative for dizziness and headaches  Psychiatric/Behavioral: Negative for agitation and behavioral problems  Objective:      /60   Pulse 58   Temp 99 9 °F (37 7 °C)   Ht 5' 5 5" (1 664 m)   Wt 95 2 kg (209 lb 12 8 oz)   SpO2 99%   BMI 34 38 kg/m²          Physical Exam   Constitutional: He is oriented to person, place, and time  He appears well-developed and well-nourished  No distress  Eyes: Pupils are equal, round, and reactive to light  No scleral icterus  Cardiovascular: Normal rate, regular rhythm and normal heart sounds  No murmur heard  Pulmonary/Chest: Effort normal and breath sounds normal  No respiratory distress  He has no wheezes  Abdominal: Soft  Bowel sounds are normal  He exhibits no distension  There is no tenderness  Neurological: He is alert and oriented to person, place, and time  Skin: Skin is warm and dry  No rash noted  He is not diaphoretic  Psychiatric: He has a normal mood and affect

## 2020-03-19 ENCOUNTER — APPOINTMENT (OUTPATIENT)
Dept: LAB | Facility: HOSPITAL | Age: 55
End: 2020-03-19
Attending: FAMILY MEDICINE
Payer: MEDICARE

## 2020-03-19 DIAGNOSIS — K72.90 HEPATIC ENCEPHALOPATHY (HCC): ICD-10-CM

## 2020-03-19 LAB — AMMONIA PLAS-SCNC: 75 UMOL/L (ref 11–35)

## 2020-03-19 PROCEDURE — 82140 ASSAY OF AMMONIA: CPT

## 2020-03-19 PROCEDURE — 36415 COLL VENOUS BLD VENIPUNCTURE: CPT

## 2020-03-27 DIAGNOSIS — M54.50 MIDLINE LOW BACK PAIN WITHOUT SCIATICA, UNSPECIFIED CHRONICITY: ICD-10-CM

## 2020-03-27 DIAGNOSIS — M79.18 MYOFASCIAL MUSCLE PAIN: ICD-10-CM

## 2020-03-27 RX ORDER — IBUPROFEN 800 MG/1
TABLET ORAL
Qty: 30 TABLET | Refills: 0 | Status: SHIPPED | OUTPATIENT
Start: 2020-03-27 | End: 2020-03-27 | Stop reason: SDUPTHER

## 2020-03-30 RX ORDER — IBUPROFEN 800 MG/1
800 TABLET ORAL EVERY 8 HOURS PRN
Qty: 30 TABLET | Refills: 3 | Status: SHIPPED | OUTPATIENT
Start: 2020-03-30 | End: 2020-07-01

## 2020-04-03 ENCOUNTER — DOCUMENTATION (OUTPATIENT)
Dept: GASTROENTEROLOGY | Facility: CLINIC | Age: 55
End: 2020-04-03

## 2020-04-04 DIAGNOSIS — N52.9 ERECTILE DYSFUNCTION, UNSPECIFIED ERECTILE DYSFUNCTION TYPE: ICD-10-CM

## 2020-04-06 DIAGNOSIS — N52.9 ERECTILE DYSFUNCTION, UNSPECIFIED ERECTILE DYSFUNCTION TYPE: ICD-10-CM

## 2020-04-06 RX ORDER — SILDENAFIL 100 MG/1
100 TABLET, FILM COATED ORAL AS NEEDED
Qty: 10 TABLET | Refills: 0 | OUTPATIENT
Start: 2020-04-06

## 2020-04-06 RX ORDER — SILDENAFIL 100 MG/1
TABLET, FILM COATED ORAL
Qty: 10 TABLET | Refills: 0 | Status: SHIPPED | OUTPATIENT
Start: 2020-04-06 | End: 2020-04-16

## 2020-04-15 DIAGNOSIS — M54.50 MIDLINE LOW BACK PAIN WITHOUT SCIATICA, UNSPECIFIED CHRONICITY: ICD-10-CM

## 2020-04-15 DIAGNOSIS — N52.9 ERECTILE DYSFUNCTION, UNSPECIFIED ERECTILE DYSFUNCTION TYPE: ICD-10-CM

## 2020-04-16 DIAGNOSIS — E66.01 MORBID (SEVERE) OBESITY DUE TO EXCESS CALORIES (HCC): ICD-10-CM

## 2020-04-16 RX ORDER — OMEPRAZOLE 20 MG/1
20 CAPSULE, DELAYED RELEASE ORAL DAILY
Qty: 90 CAPSULE | Refills: 3 | Status: SHIPPED | OUTPATIENT
Start: 2020-04-16 | End: 2021-04-28

## 2020-04-16 RX ORDER — METHOCARBAMOL 750 MG/1
TABLET, FILM COATED ORAL
Qty: 30 TABLET | Refills: 3 | Status: SHIPPED | OUTPATIENT
Start: 2020-04-16 | End: 2020-09-14 | Stop reason: SDUPTHER

## 2020-04-16 RX ORDER — SILDENAFIL 100 MG/1
TABLET, FILM COATED ORAL
Qty: 10 TABLET | Refills: 3 | Status: SHIPPED | OUTPATIENT
Start: 2020-04-16 | End: 2020-12-03

## 2020-04-30 DIAGNOSIS — N52.9 ERECTILE DYSFUNCTION, UNSPECIFIED ERECTILE DYSFUNCTION TYPE: ICD-10-CM

## 2020-04-30 RX ORDER — SILDENAFIL 100 MG/1
100 TABLET, FILM COATED ORAL DAILY PRN
Qty: 10 TABLET | Refills: 0 | Status: SHIPPED | OUTPATIENT
Start: 2020-04-30 | End: 2020-07-20

## 2020-05-21 ENCOUNTER — TELEPHONE (OUTPATIENT)
Dept: ADMINISTRATIVE | Facility: OTHER | Age: 55
End: 2020-05-21

## 2020-06-11 ENCOUNTER — TELEPHONE (OUTPATIENT)
Dept: OTHER | Facility: OTHER | Age: 55
End: 2020-06-11

## 2020-07-01 DIAGNOSIS — M54.50 MIDLINE LOW BACK PAIN WITHOUT SCIATICA, UNSPECIFIED CHRONICITY: ICD-10-CM

## 2020-07-01 DIAGNOSIS — M79.18 MYOFASCIAL MUSCLE PAIN: ICD-10-CM

## 2020-07-01 RX ORDER — IBUPROFEN 800 MG/1
TABLET ORAL
Qty: 30 TABLET | Refills: 0 | Status: SHIPPED | OUTPATIENT
Start: 2020-07-01 | End: 2020-07-08

## 2020-07-08 DIAGNOSIS — M54.50 MIDLINE LOW BACK PAIN WITHOUT SCIATICA, UNSPECIFIED CHRONICITY: ICD-10-CM

## 2020-07-08 DIAGNOSIS — M79.18 MYOFASCIAL MUSCLE PAIN: ICD-10-CM

## 2020-07-08 RX ORDER — IBUPROFEN 800 MG/1
TABLET ORAL
Qty: 30 TABLET | Refills: 0 | Status: SHIPPED | OUTPATIENT
Start: 2020-07-08 | End: 2020-09-14 | Stop reason: SDUPTHER

## 2020-07-12 ENCOUNTER — TELEPHONE (OUTPATIENT)
Dept: OTHER | Facility: OTHER | Age: 55
End: 2020-07-12

## 2020-07-12 NOTE — TELEPHONE ENCOUNTER
Patient called with questions to confirm the time frame to do the blood work for pre-op  Patient also was informed the Covid test is approximately  turn around time is now 7-10 days for the results  Patient questioned if ok to do 7/23/20 prior to surgery date 8/4/20?

## 2020-07-13 ENCOUNTER — TELEPHONE (OUTPATIENT)
Dept: FAMILY MEDICINE CLINIC | Facility: CLINIC | Age: 55
End: 2020-07-13

## 2020-07-13 NOTE — TELEPHONE ENCOUNTER
Please contact lab and ask them how long to get back results then advise pt to allow time for the test thanks

## 2020-07-20 DIAGNOSIS — N52.9 ERECTILE DYSFUNCTION, UNSPECIFIED ERECTILE DYSFUNCTION TYPE: ICD-10-CM

## 2020-07-20 RX ORDER — SILDENAFIL 100 MG/1
TABLET, FILM COATED ORAL
Qty: 10 TABLET | Refills: 0 | Status: ON HOLD | OUTPATIENT
Start: 2020-07-20 | End: 2020-08-10

## 2020-07-21 ENCOUNTER — OFFICE VISIT (OUTPATIENT)
Dept: PLASTIC SURGERY | Facility: CLINIC | Age: 55
End: 2020-07-21

## 2020-07-21 VITALS
DIASTOLIC BLOOD PRESSURE: 82 MMHG | BODY MASS INDEX: 32.78 KG/M2 | SYSTOLIC BLOOD PRESSURE: 126 MMHG | HEART RATE: 68 BPM | HEIGHT: 66 IN | TEMPERATURE: 99.3 F | WEIGHT: 204 LBS

## 2020-07-21 DIAGNOSIS — L98.7 EXCESS SKIN OF ABDOMINAL WALL: Primary | ICD-10-CM

## 2020-07-21 PROCEDURE — 3074F SYST BP LT 130 MM HG: CPT | Performed by: PLASTIC SURGERY

## 2020-07-21 PROCEDURE — 3008F BODY MASS INDEX DOCD: CPT | Performed by: PLASTIC SURGERY

## 2020-07-21 PROCEDURE — 3079F DIAST BP 80-89 MM HG: CPT | Performed by: PLASTIC SURGERY

## 2020-07-21 PROCEDURE — PREOP: Performed by: PLASTIC SURGERY

## 2020-07-21 NOTE — PROGRESS NOTES
Assessment/Plan   Diagnoses and all orders for this visit:    Excess skin of abdominal wall      During today's  30 minute visit, all of which was dedicated to counseling, I reviewed the anticipated preoperative, intraoperative, and postoperative courses  I informed him that the nurses will contact the patient the day prior to surgery in order to discuss orientation and logistical information  I will then see him the day of surgery where I will answer any last minute questions and perform my preoperative markings  We will then proceed to the operating room for an anticipated 2 5 hour procedure under general anesthesia, specifically a  panniculectomy, transposition of umbilicus and rectus plication  For each procedure, I reviewed the incisions/scars, duration, recovery time, postoperative restrictions, and follow-up  When applicable, I discussed hospitalization, dressing changes, drains, and donor site morbidity  All risks, benefits, and options, including but not limited to, pain, scarring, bleeding, infection, asymmetry, contour deformity, damage to adjacent nerves, vessels, and organs, hematoma, seroma, paresthesias, anesthesia (temporary versus permanent), skin necrosis, fat necrosis, flap necrosis, wound dehiscence with need for healing by secondary intention and postoperative dressing changes, hypertrophic and/or keloid scar formation, deep venous thrombosis, pulmonary embolism, recurrence, and need for revision and/or reoperation were fully explained to the patient  All questions were answered  Once full understanding of the anticipated procedure was verified, informed consent was obtained  The patient will have an expected six-week postoperative recovery period during which time he will have activity restrictions   Specifically, this includes, but is not limited to, avoidance of heavy lifting (nothing heavier than a gallon of milk), avoidance of strenuous activity, avoidance of any activity which makes her hurt or perspire, avoidance of anything that places tension across the incisions, avoidance of submerging the incisions or operative area in water (including bathing in a bathtub, swimming, etc ), and avoidance of dirty, wiliam, or contaminated environments  An abdominal binder would be recommended to be worn 24/7 for 6 weeks, except when bathing or when washing  It was emphasized to the patient that postoperatively, it is mandated to employ a high-protein, low salt diet, take deep breaths in order to avoid atelectasis, and ambulate at least 5 times a day in order to avoid deep venous thrombosis and pulmonary embolism  At the conclusion of our conversation, the patient wished to proceed with surgery  Blossom [facility] [address] [place] [address] [address]    Office: [phone]      Subjective   Patient ID: Erik Nguyen Sr  is a 63 y o  male  Matt EDUAR Tobias Sr  is being seen today for preoperative evaluation  Since last visit, there has not been any changes in the patient's overall health status and/or surgical plan to report  Patient has a history of weight loss  They present preoperatively for planned: panniculectomy, transposition of umbilicus and rectus plication  Scheduled for: 8/4/2020    In conjunction with: none    The following history of N/V post operative: none    Nicotine status: still vaping  I explained the importance  Vitals:    07/21/20 1130   BP: 126/82   Pulse: 68   Temp: 99 3 °F (37 4 °C)     HPI    The following portions of the patient's history were reviewed and updated as appropriate: allergies, current medications, past family history, past medical history, past social history, past surgical history and problem list     Review of Systems    Objective   Physical Exam   Constitutional  He appears well-developed and well-nourished  Cardiovascular: Normal rate  Pulmonary/Chest  Effort normal      Psychiatric  He has a normal mood and affect  His behavior is normal      Abdomen and Hips  Soft  Hernia confirmed negative in the ventral area  Abdominal shape is square-waisted and panniculus  A surgical scar is present  He has vertical, abdominal skin redundancy  Patient has excess abdominal fat  Excess fat located in the: lower abdomen, brown-umbilical and android  Body mass index is 33 43 kg/m²

## 2020-07-21 NOTE — LETTER
July 21, 2020     Patient: Michael Valdivia   YOB: 1965   Date of Visit: 7/21/2020       To Whom it May Concern:    Odalys Chelsie is under my professional care  He was seen in my office on 7/21/2020  He will be having surgery on 8/4/2020 and require 6 weeks recovery  If you have any questions or concerns, please don't hesitate to call           Sincerely,          Nilton Soto MD        CC: No Recipients

## 2020-07-23 ENCOUNTER — APPOINTMENT (OUTPATIENT)
Dept: LAB | Facility: CLINIC | Age: 55
End: 2020-07-23
Payer: COMMERCIAL

## 2020-07-23 DIAGNOSIS — E65 LOCALIZED ADIPOSITY: ICD-10-CM

## 2020-07-23 LAB
ANION GAP SERPL CALCULATED.3IONS-SCNC: 7 MMOL/L (ref 4–13)
BUN SERPL-MCNC: 15 MG/DL (ref 5–25)
CALCIUM SERPL-MCNC: 8.8 MG/DL (ref 8.3–10.1)
CHLORIDE SERPL-SCNC: 112 MMOL/L (ref 100–108)
CO2 SERPL-SCNC: 24 MMOL/L (ref 21–32)
CREAT SERPL-MCNC: 0.82 MG/DL (ref 0.6–1.3)
ERYTHROCYTE [DISTWIDTH] IN BLOOD BY AUTOMATED COUNT: 14.5 % (ref 11.6–15.1)
GFR SERPL CREATININE-BSD FRML MDRD: 99 ML/MIN/1.73SQ M
GLUCOSE SERPL-MCNC: 92 MG/DL (ref 65–140)
HCT VFR BLD AUTO: 38.7 % (ref 36.5–49.3)
HGB BLD-MCNC: 13.1 G/DL (ref 12–17)
MCH RBC QN AUTO: 33.2 PG (ref 26.8–34.3)
MCHC RBC AUTO-ENTMCNC: 33.9 G/DL (ref 31.4–37.4)
MCV RBC AUTO: 98 FL (ref 82–98)
POTASSIUM SERPL-SCNC: 3.7 MMOL/L (ref 3.5–5.3)
RBC # BLD AUTO: 3.95 MILLION/UL (ref 3.88–5.62)
SODIUM SERPL-SCNC: 143 MMOL/L (ref 136–145)
WBC # BLD AUTO: 4.11 THOUSAND/UL (ref 4.31–10.16)

## 2020-07-23 PROCEDURE — U0003 INFECTIOUS AGENT DETECTION BY NUCLEIC ACID (DNA OR RNA); SEVERE ACUTE RESPIRATORY SYNDROME CORONAVIRUS 2 (SARS-COV-2) (CORONAVIRUS DISEASE [COVID-19]), AMPLIFIED PROBE TECHNIQUE, MAKING USE OF HIGH THROUGHPUT TECHNOLOGIES AS DESCRIBED BY CMS-2020-01-R: HCPCS

## 2020-07-23 PROCEDURE — 36415 COLL VENOUS BLD VENIPUNCTURE: CPT

## 2020-07-23 PROCEDURE — 80048 BASIC METABOLIC PNL TOTAL CA: CPT

## 2020-07-23 PROCEDURE — 85025 COMPLETE CBC W/AUTO DIFF WBC: CPT

## 2020-07-24 ENCOUNTER — APPOINTMENT (OUTPATIENT)
Dept: PREADMISSION TESTING | Facility: HOSPITAL | Age: 55
End: 2020-07-24
Payer: COMMERCIAL

## 2020-07-25 DIAGNOSIS — Z01.818 PRE-OP TESTING: ICD-10-CM

## 2020-07-25 LAB — SARS-COV-2 RNA SPEC QL NAA+PROBE: NOT DETECTED

## 2020-07-25 PROCEDURE — U0003 INFECTIOUS AGENT DETECTION BY NUCLEIC ACID (DNA OR RNA); SEVERE ACUTE RESPIRATORY SYNDROME CORONAVIRUS 2 (SARS-COV-2) (CORONAVIRUS DISEASE [COVID-19]), AMPLIFIED PROBE TECHNIQUE, MAKING USE OF HIGH THROUGHPUT TECHNOLOGIES AS DESCRIBED BY CMS-2020-01-R: HCPCS

## 2020-07-27 LAB — SARS-COV-2 RNA SPEC QL NAA+PROBE: NOT DETECTED

## 2020-07-30 NOTE — PRE-PROCEDURE INSTRUCTIONS
Pre-Surgery Instructions:   Medication Instructions    AMILoride 5 mg tablet Instructed patient per Anesthesia Guidelines   ibuprofen (MOTRIN) 600 mg tablet Instructed patient per Anesthesia Guidelines   ibuprofen (MOTRIN) 800 mg tablet Instructed patient per Anesthesia Guidelines   lactulose (CONSTULOSE) 10 g/15 mL solution Instructed patient per Anesthesia Guidelines   methocarbamol (ROBAXIN) 750 mg tablet Instructed patient per Anesthesia Guidelines   Multiple Vitamins-Minerals (MULTIVITAMIN WITH MINERALS) tablet Instructed patient per Anesthesia Guidelines   nadolol (CORGARD) 20 mg tablet Instructed patient per Anesthesia Guidelines   omeprazole (PriLOSEC) 20 mg delayed release capsule Instructed patient per Anesthesia Guidelines   rifaximin (XIFAXAN) 550 mg tablet Instructed patient per Anesthesia Guidelines   sildenafil (VIAGRA) 100 mg tablet Instructed patient per Anesthesia Guidelines   vitamin A 10,000 units capsule Instructed patient per Anesthesia Guidelines

## 2020-08-03 RX ORDER — CEFAZOLIN SODIUM 2 G/50ML
2000 SOLUTION INTRAVENOUS ONCE
Status: CANCELLED | OUTPATIENT
Start: 2020-08-04

## 2020-08-03 RX ORDER — ACETAMINOPHEN 325 MG/1
975 TABLET ORAL ONCE
Status: CANCELLED | OUTPATIENT
Start: 2020-08-04

## 2020-08-03 RX ORDER — GABAPENTIN 300 MG/1
300 CAPSULE ORAL ONCE
Status: CANCELLED | OUTPATIENT
Start: 2020-08-04

## 2020-08-03 RX ORDER — SODIUM CHLORIDE, SODIUM LACTATE, POTASSIUM CHLORIDE, CALCIUM CHLORIDE 600; 310; 30; 20 MG/100ML; MG/100ML; MG/100ML; MG/100ML
50 INJECTION, SOLUTION INTRAVENOUS CONTINUOUS
Status: CANCELLED | OUTPATIENT
Start: 2020-08-04

## 2020-08-04 ENCOUNTER — ANESTHESIA EVENT (OUTPATIENT)
Dept: PERIOP | Facility: HOSPITAL | Age: 55
End: 2020-08-04
Payer: COMMERCIAL

## 2020-08-04 ENCOUNTER — HOSPITAL ENCOUNTER (OUTPATIENT)
Facility: HOSPITAL | Age: 55
Setting detail: OUTPATIENT SURGERY
Discharge: HOME | End: 2020-08-04
Attending: PLASTIC SURGERY | Admitting: PLASTIC SURGERY
Payer: COMMERCIAL

## 2020-08-04 ENCOUNTER — ANESTHESIA (OUTPATIENT)
Dept: PERIOP | Facility: HOSPITAL | Age: 55
End: 2020-08-04
Payer: COMMERCIAL

## 2020-08-04 DIAGNOSIS — Z01.818 PRE-OP TESTING: Primary | ICD-10-CM

## 2020-08-04 RX ORDER — LIDOCAINE HYDROCHLORIDE 10 MG/ML
0.5 INJECTION, SOLUTION EPIDURAL; INFILTRATION; INTRACAUDAL; PERINEURAL ONCE AS NEEDED
Status: CANCELLED | OUTPATIENT
Start: 2020-08-04

## 2020-08-04 RX ORDER — SODIUM CHLORIDE, SODIUM LACTATE, POTASSIUM CHLORIDE, CALCIUM CHLORIDE 600; 310; 30; 20 MG/100ML; MG/100ML; MG/100ML; MG/100ML
125 INJECTION, SOLUTION INTRAVENOUS CONTINUOUS
Status: CANCELLED | OUTPATIENT
Start: 2020-08-04

## 2020-08-04 NOTE — PERIOPERATIVE NURSING NOTE
Pt arrived 1 5 hours late for procedure  Dr Lavena Rubinstein spoke to patient and cancelled case  He told pt he will be rescheduled  Pt upset, but understanding that he will be rexheduled in [place] in the near future  I told pt I would verify we had correct numbers in chart as we had left two message on machine last pm   Pt said he did receive call this am and so did his wife, but none yesterday    In reviewing chart, calls were made to his and his wife's numbers last pm

## 2020-08-04 NOTE — ANESTHESIA PREPROCEDURE EVALUATION
Procedure:  PANNICULECTOMY (N/A Abdomen)  ABDOMINOPLASTY (N/A Abdomen)  APPLICATION VAC DRESSING (N/A Abdomen)    Relevant Problems   CARDIO   (+) Secondary esophageal varices without bleeding (HCC)      GI/HEPATIC   (+) Gastroesophageal reflux disease without esophagitis   (+) 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)      HEMATOLOGY   (+) Anemia      MUSCULOSKELETAL   (+) Midline low back pain without sciatica      PULMONARY   (+) Head cold   (+) Obstructive sleep apnea      Other   (+) Former smoker   (+) Heartburn   (+) Hepatic encephalopathy (HCC)   (+) Morbid obesity with BMI of 40 0-44 9, adult (HCC)   (+) S/P laparoscopic sleeve gastrectomy             Anesthesia Plan  ASA Score- 3     Anesthesia Type- general with ASA Monitors  Additional Monitors:   Airway Plan: ETT  Plan Factors-    Chart reviewed  Patient summary reviewed  Induction- intravenous  Postoperative Plan- Plan for postoperative opioid use  Planned trial extubation    Informed Consent- Anesthetic plan and risks discussed with patient  I personally reviewed this patient with the CRNA  Discussed and agreed on the Anesthesia Plan with the CRNA  Shekhar Miguel

## 2020-08-08 DIAGNOSIS — N52.9 ERECTILE DYSFUNCTION, UNSPECIFIED ERECTILE DYSFUNCTION TYPE: ICD-10-CM

## 2020-08-10 RX ORDER — SILDENAFIL 100 MG/1
TABLET, FILM COATED ORAL
Qty: 10 TABLET | Refills: 0 | Status: SHIPPED | OUTPATIENT
Start: 2020-08-10 | End: 2020-08-20

## 2020-08-14 NOTE — ASSESSMENT & PLAN NOTE
-At risk for malabsorption of vitamins/minerals secondary to malabsorption and restriction of intake from bariatric surgery  -NOT Currently taking adequate postop bariatric surgery vitamin supplementation: Bariatric Advantage MVI, Vitamin D 5,000IU daily   -Last set of bariatric labs completed on 10/04/18 and showed very low vitamin A, low B12 (317), and low vitamin D (24 1)  -Awaiting response from Dr Misty Maharaj regarding vitamin A repletion given hx   Of severe cirrhosis  -Was advised to take 1,000mcg B12 and 3,000IU vitamin D  -Next set of bariatric labs ordered for approximately 2 weeks  -Patient received education about the importance of adhering to a lifelong supplementation regimen to avoid vitamin/mineral deficiencies PRE-SEDATION ASSESSMENT    CONSENT  Consent for procedure and sedation obtained: Yes    MEDICAL HISTORY  Significant medical/surgical history: Yes  Past Complications with Sedation/Anesthesia: No  Significant Family History: No  Smoking History: Yes  Alcohol/Drug abuse: No  Possible Pregnancy (LMP): Not Applicable  Cardiac History: No  Respiratory History: Yes    PHYSICAL EXAM  History and Physical Reviewed: H&P completed today  Airway Risk History: No previous complications  Airway Anatomy : Class II  Heart : Abnormal  Heart (Comment): bradycardia  Lungs : Normal  LOC/Mental Status : Normal    OTHER FINDINGS  Reviewed current medications and allergies: Yes  Pertinent lab/diagnostic test reviewed: Yes    SEDATION RISK ASSESSMENT  Risk Status ASA: Class III - Severe systemic disease, limits activity, is not incapacitated  Plan for Sedation: Moderate Sedation  Indications for Procedure/Pre-Procedure Diagnosis and Planned Procedure: unstable angina, AVB  EKG Monitoring: Yes    NARRATIVE FINDINGS

## 2020-08-20 DIAGNOSIS — N52.9 ERECTILE DYSFUNCTION, UNSPECIFIED ERECTILE DYSFUNCTION TYPE: ICD-10-CM

## 2020-08-20 RX ORDER — SILDENAFIL 100 MG/1
TABLET, FILM COATED ORAL
Qty: 10 TABLET | Refills: 0 | Status: SHIPPED | OUTPATIENT
Start: 2020-08-20 | End: 2020-09-08

## 2020-09-08 DIAGNOSIS — N52.9 ERECTILE DYSFUNCTION, UNSPECIFIED ERECTILE DYSFUNCTION TYPE: ICD-10-CM

## 2020-09-08 RX ORDER — SILDENAFIL 100 MG/1
TABLET, FILM COATED ORAL
Qty: 10 TABLET | Refills: 3 | Status: SHIPPED | OUTPATIENT
Start: 2020-09-08 | End: 2020-09-15

## 2020-09-13 NOTE — H&P
H&P - Plastic Surgery   Ernestina Matute Sr  63 y o  male MRN: [id number]  Unit/Bed#:  Encounter: [id number]           Assessment:  Abdominal pannus [E65]   Plan:  PANNICULECTOMY (N/A Abdomen)       APPLICATION VAC DRESSING (N/A Abdomen)       ABDOMINOPLASTY (N/A Abdomen)         HPI:   Ernestina Matute Sr  is a 63y o  year old male who presents for evaluation of body contouring after massive weight loss  The patient underwent laparoscopic sleeve and has lost approximately 113 pounds  he has been at a stable weight for 10 months  The patient now has complaints of excess skin in the following areas: trunk        Pertaining to the excess skin:  The patient does not have an associated ventral hernia  The patient does have difficulty getting in and out of bed  The patient does have difficulty standing and walking straight   The patient does have difficulty tying his shoes  The patient does not have difficulty crossing his legs  The patient does have difficulty finding clothes that fit appropriately  The patient does have recurrent skin rashes  The patient does not have skin infections  The patient does have foul odors  The patient does have non-healing skin ulcers and gets some excoriations as well  The patient does have back pain        REVIEW OF SYSTEMS    GENERAL/CONSTITUTIONAL: The patient denies fever, fatigue, weakness, weight gain or weight loss  HEAD, EYES, EARS, NOSE AND THROAT: Eyes - The patient denies pain, redness, loss of vision, double or blurred vision and denies wearing glasses  The patient denies ringing in the ears, nosebleeds sinusitis, post nasal drip  Also denies frequent sore throats, hoarseness, painful swallowing  CARDIOVASCULAR: The patient denies chest pain, irregular heartbeats, palpitations, shortness of breath, heart murmurs, high blood pressure, cramps in his legs with walking, pain in his feet or toes at night or varicose veins    RESPIRATORY: The patient denies chronic cough, wheezing or night sweats  GASTROINTESTINAL: The patient denies decreased appetite, nausea, vomiting, diarrhea, constipation, blood in the stools  GENITOURINARY: The patient denies difficult urination, pain or burning with urination, blood in the urine  MUSCULOSKELETAL:+back pain The patient denies arm, thigh or calf cramps  No joint or muscle pain  No muscle weakness or tenderness  No joint swelling, neck pain,  or major orthopedic injuries  SKIN AND BREASTS: +rash The patient denies easy bruising, skin redness, skin rash, hives  NEUROLOGIC: The patient denies headache, dizziness, fainting, memory loss  PSYCHIATRIC: The patient denies depression anxiety  ENDOCRINE: The patient denies intolerance to hot or cold temperature, flushing, fingernail changes, increased thirst, increased salt intake or decreased sexual desire  HEMATOLOGIC/LYMPHATIC: The patient denies anemia, bleeding tendency or clotting tendency  ALLERGIC/IMMUNOLOGIC: The patient denies rhinitis, asthma, skin sensitivity, latex allergies or sensitivity  Historical Information   Past Medical History:   Diagnosis Date    CPAP (continuous positive airway pressure) dependence     Esophageal varices (HCC)     Hepatic cirrhosis (HCC)     treated with harvoni   Hep C- dx age 17    [facility] )     Liver disease     Obesity     Pneumonia     in past    Sleep apnea     Wears glasses      Past Surgical History:   Procedure Laterality Date    COLONOSCOPY      ESOPHAGOGASTRODUODENOSCOPY N/A 4/12/2018    Procedure: ESOPHAGOGASTRODUODENOSCOPY (EGD); Surgeon: Leopold Ewings, MD;  [facility];   Service: Gastroenterology    FRACTURE SURGERY Left     ankle    OTHER SURGICAL HISTORY      banding esophageal varices    CO LAP, TERESA RESTRICT PROC, LONGITUDINAL GASTRECTOMY N/A 6/5/2018    Procedure: GASTRECTOMY LAPAROSCOPIC SLEEVE;  Surgeon: Kalpesh Linder MD;  Location: [facility];  Service: [address] [place] [address] History     Substance and Sexual Activity   Alcohol Use No    Alcohol/week: 0 0 standard drinks     Social History     Substance and Sexual Activity   Drug Use No     Social History     Tobacco Use   Smoking Status Current Every Day Smoker    Packs/day: 0 25    Years: 37 00    Pack years: 9 25    Last attempt to quit: 2/15/2018    Years since quittin 5   Smokeless Tobacco Never Used   Tobacco Comment    currently vapes occasionally     Family History:   Family History   Problem Relation Age of Onset    Heart disease Mother     Lupus Mother     Diabetes Father     Stroke Father        Meds/Allergies   No current facility-administered medications for this encounter  Current Outpatient Medications:     AMILoride 5 mg tablet, Take 1 tablet (5 mg total) by mouth daily, Disp: 30 tablet, Rfl: 2    ibuprofen (MOTRIN) 600 mg tablet, , Disp: , Rfl:     ibuprofen (MOTRIN) 800 mg tablet, TAKE ONE TABLET BY MOUTH EVERY EIGHT HOURS AS NEEDED for mild pain, Disp: 30 tablet, Rfl: 0    lactulose (CONSTULOSE) 10 g/15 mL solution, Take 15 mL (10 g total) by mouth daily as needed (constipation or confusion), Disp: 946 mL, Rfl: 1    methocarbamol (ROBAXIN) 750 mg tablet, TAKE ONE TABLET BY MOUTH EVERY EIGHT HOURS , Disp: 30 tablet, Rfl: 3    Multiple Vitamins-Minerals (MULTIVITAMIN WITH MINERALS) tablet, Take 1 tablet by mouth daily, Disp: , Rfl:     nadolol (CORGARD) 20 mg tablet, Take 1 tablet (20 mg total) by mouth daily, Disp: 90 tablet, Rfl: 2    omeprazole (PriLOSEC) 20 mg delayed release capsule, Take 1 capsule (20 mg total) by mouth daily, Disp: 90 capsule, Rfl: 3    rifaximin (XIFAXAN) 550 mg tablet, Take 550 mg by mouth 2 (two) times a day  , Disp: , Rfl:     sildenafil (VIAGRA) 100 mg tablet, Take 1 tablet by mouth as needed for erectile dysfunction    , Disp: 10 tablet, Rfl: 3    sildenafil (VIAGRA) 100 mg tablet, TAKE 1 TABLET BY MOUTH DAILY AS NEEDED FOR ERECTILE DYSFUNCTION, Disp: 10 tablet, Rfl: 3    vitamin A 10,000 units capsule, Take 1 capsule (10,000 Units total) by mouth daily, Disp: 90 capsule, Rfl: 0      Objective     Objective   Physical Exam   Constitutional  He appears well-developed and well-nourished  Cardiovascular: Normal rate       Pulmonary/Chest  Effort normal       Psychiatric  He has a normal mood and affect  His behavior is normal       Abdomen and Hips  Soft  Hernia confirmed negative in the ventral area  Abdominal shape is square-waisted and panniculus  A surgical scar is present  He has vertical, abdominal skin redundancy  Patient has excess abdominal fat  Excess fat located in the: lower abdomen, brown-umbilical and android         Lab Results:   Lab Results   Component Value Date    WBC 4 11 (L) 07/23/2020    HGB 13 1 07/23/2020    HCT 38 7 07/23/2020    MCV 98 07/23/2020    PLT  07/23/2020      Comment:      Unable to perform due to clumped platelets          No results found for: TISSUECULT, WOUNDCULT      Imaging Studies:   No results found  EKG, Pathology, and Other Studies:   Lab Results   Component Value Date/Time    FINALDX  06/05/2018 03:10 PM     A  Stomach, Portion of stomach, partial resection:  -Portion of viable gastric wall with mild chronic inactive gastritis and few intramucosal lymphoid follicles formation   -No evidence of malignancy                  No intake or output data in the 24 hours ending 09/13/20 0950    Invasive Devices     None                 VTE Prophylaxis: Sequential compression device Romelia Garces     Code Status: No Order  Advance Directive and Living Will:      Power of :

## 2020-09-14 DIAGNOSIS — M54.50 MIDLINE LOW BACK PAIN WITHOUT SCIATICA, UNSPECIFIED CHRONICITY: ICD-10-CM

## 2020-09-14 DIAGNOSIS — M79.18 MYOFASCIAL MUSCLE PAIN: ICD-10-CM

## 2020-09-14 RX ORDER — IBUPROFEN 800 MG/1
800 TABLET ORAL EVERY 8 HOURS PRN
Qty: 30 TABLET | Refills: 2 | Status: SHIPPED | OUTPATIENT
Start: 2020-09-14 | End: 2021-07-29

## 2020-09-14 RX ORDER — IBUPROFEN 800 MG/1
TABLET ORAL
Qty: 30 TABLET | Refills: 0 | OUTPATIENT
Start: 2020-09-14

## 2020-09-14 RX ORDER — METHOCARBAMOL 750 MG/1
750 TABLET, FILM COATED ORAL EVERY 8 HOURS
Qty: 30 TABLET | Refills: 3 | Status: SHIPPED | OUTPATIENT
Start: 2020-09-14 | End: 2021-12-28

## 2020-09-14 RX ORDER — METHOCARBAMOL 750 MG/1
TABLET, FILM COATED ORAL
Qty: 30 TABLET | Refills: 0 | OUTPATIENT
Start: 2020-09-14

## 2020-09-15 ENCOUNTER — ANESTHESIA EVENT (OUTPATIENT)
Dept: PERIOP | Facility: HOSPITAL | Age: 55
End: 2020-09-15
Payer: MEDICARE

## 2020-09-15 RX ORDER — FUROSEMIDE 20 MG/1
20 TABLET ORAL 2 TIMES DAILY
COMMUNITY
End: 2022-07-07 | Stop reason: SDUPTHER

## 2020-09-15 NOTE — PRE-PROCEDURE INSTRUCTIONS
Pre-Surgery Instructions:   Medication Instructions    AMILoride 5 mg tablet Instructed patient per Anesthesia Guidelines  hold 9/16    furosemide (LASIX) 20 mg tablet Instructed patient per Anesthesia Guidelines  hold 9/16    ibuprofen (MOTRIN) 600 mg tablet Instructed patient per Anesthesia Guidelines   ibuprofen (MOTRIN) 800 mg tablet Instructed patient per Anesthesia Guidelines   lactulose (CONSTULOSE) 10 g/15 mL solution Instructed patient per Anesthesia Guidelines   methocarbamol (ROBAXIN) 750 mg tablet Instructed patient per Anesthesia Guidelines   Multiple Vitamins-Minerals (MULTIVITAMIN WITH MINERALS) tablet Instructed patient per Anesthesia Guidelines   nadolol (CORGARD) 20 mg tablet Instructed patient per Anesthesia Guidelines   omeprazole (PriLOSEC) 20 mg delayed release capsule Instructed patient per Anesthesia Guidelines   rifaximin (XIFAXAN) 550 mg tablet Instructed patient per Anesthesia Guidelines   sildenafil (VIAGRA) 100 mg tablet Instructed patient per Anesthesia Guidelines   vitamin A 10,000 units capsule Instructed patient per Anesthesia Guidelines  Education Index     Med Instructions  Troubleshoot    Diuretic Med Class     Continue this medication up to the evening before surgery/procedure, but do not take the morning of the day of surgery  Beta blocker Med Class     Continue to take this heart medication on your normal schedule  If this is an oral medication and you take it in the morning, then you may take this medicine with a sip of water  NSAID Med Class     Stop taking this medication at least 3 days prior to surgery/procedure  Vitamin Med Class     You may continue to take any vitamin that your surgeon has prescribed to you up to the day before surgery  If your surgeon has not specifically prescribed this vitamin or instructed you to continue then stop taking 7 days prior to surgery    Pre procedure instructions reviewed verbalizes understanding

## 2020-09-16 ENCOUNTER — ANESTHESIA (OUTPATIENT)
Dept: PERIOP | Facility: HOSPITAL | Age: 55
End: 2020-09-16
Payer: MEDICARE

## 2020-09-16 ENCOUNTER — HOSPITAL ENCOUNTER (OUTPATIENT)
Facility: HOSPITAL | Age: 55
Setting detail: OUTPATIENT SURGERY
Discharge: HOME | End: 2020-09-16
Attending: PLASTIC SURGERY | Admitting: PLASTIC SURGERY
Payer: MEDICARE

## 2020-09-16 VITALS
RESPIRATION RATE: 18 BRPM | DIASTOLIC BLOOD PRESSURE: 70 MMHG | TEMPERATURE: 97.4 F | SYSTOLIC BLOOD PRESSURE: 121 MMHG | HEART RATE: 58 BPM | WEIGHT: 198 LBS | BODY MASS INDEX: 32.45 KG/M2 | OXYGEN SATURATION: 98 %

## 2020-09-16 VITALS — HEART RATE: 70 BPM

## 2020-09-16 DIAGNOSIS — Z98.890 S/P ABDOMINOPLASTY: Primary | ICD-10-CM

## 2020-09-16 LAB
ERYTHROCYTE [DISTWIDTH] IN BLOOD BY AUTOMATED COUNT: 14.6 % (ref 11.6–15.1)
HCT VFR BLD AUTO: 39.7 % (ref 36.5–49.3)
HGB BLD-MCNC: 13.7 G/DL (ref 12–17)
MCH RBC QN AUTO: 33.5 PG (ref 26.8–34.3)
MCHC RBC AUTO-ENTMCNC: 34.5 G/DL (ref 31.4–37.4)
MCV RBC AUTO: 97 FL (ref 82–98)
PMV BLD AUTO: 10.7 FL (ref 8.9–12.7)
RBC # BLD AUTO: 4.09 MILLION/UL (ref 3.88–5.62)
WBC # BLD AUTO: 5.41 THOUSAND/UL (ref 4.31–10.16)

## 2020-09-16 PROCEDURE — 85027 COMPLETE CBC AUTOMATED: CPT | Performed by: STUDENT IN AN ORGANIZED HEALTH CARE EDUCATION/TRAINING PROGRAM

## 2020-09-16 PROCEDURE — 15830 EXC EXCESSIVE SKIN ABDOMEN: CPT | Performed by: PLASTIC SURGERY

## 2020-09-16 RX ORDER — LIDOCAINE HYDROCHLORIDE 10 MG/ML
INJECTION, SOLUTION EPIDURAL; INFILTRATION; INTRACAUDAL; PERINEURAL AS NEEDED
Status: DISCONTINUED | OUTPATIENT
Start: 2020-09-16 | End: 2020-09-16

## 2020-09-16 RX ORDER — SODIUM CHLORIDE, SODIUM LACTATE, POTASSIUM CHLORIDE, CALCIUM CHLORIDE 600; 310; 30; 20 MG/100ML; MG/100ML; MG/100ML; MG/100ML
INJECTION, SOLUTION INTRAVENOUS CONTINUOUS PRN
Status: DISCONTINUED | OUTPATIENT
Start: 2020-09-16 | End: 2020-09-16

## 2020-09-16 RX ORDER — FENTANYL CITRATE/PF 50 MCG/ML
25 SYRINGE (ML) INJECTION
Status: DISCONTINUED | OUTPATIENT
Start: 2020-09-16 | End: 2020-09-16 | Stop reason: HOSPADM

## 2020-09-16 RX ORDER — ACETAMINOPHEN 325 MG/1
975 TABLET ORAL ONCE
Status: COMPLETED | OUTPATIENT
Start: 2020-09-16 | End: 2020-09-16

## 2020-09-16 RX ORDER — GABAPENTIN 100 MG/1
100 CAPSULE ORAL 3 TIMES DAILY
Qty: 30 CAPSULE | Refills: 0 | Status: SHIPPED | OUTPATIENT
Start: 2020-09-16 | End: 2022-02-25 | Stop reason: ALTCHOICE

## 2020-09-16 RX ORDER — FENTANYL CITRATE 50 UG/ML
INJECTION, SOLUTION INTRAMUSCULAR; INTRAVENOUS AS NEEDED
Status: DISCONTINUED | OUTPATIENT
Start: 2020-09-16 | End: 2020-09-16

## 2020-09-16 RX ORDER — SODIUM CHLORIDE, SODIUM LACTATE, POTASSIUM CHLORIDE, CALCIUM CHLORIDE 600; 310; 30; 20 MG/100ML; MG/100ML; MG/100ML; MG/100ML
50 INJECTION, SOLUTION INTRAVENOUS CONTINUOUS
Status: DISCONTINUED | OUTPATIENT
Start: 2020-09-16 | End: 2020-09-17 | Stop reason: HOSPADM

## 2020-09-16 RX ORDER — ONDANSETRON 2 MG/ML
INJECTION INTRAMUSCULAR; INTRAVENOUS AS NEEDED
Status: DISCONTINUED | OUTPATIENT
Start: 2020-09-16 | End: 2020-09-16

## 2020-09-16 RX ORDER — HYDROMORPHONE HCL/PF 1 MG/ML
SYRINGE (ML) INJECTION AS NEEDED
Status: DISCONTINUED | OUTPATIENT
Start: 2020-09-16 | End: 2020-09-16

## 2020-09-16 RX ORDER — GABAPENTIN 300 MG/1
300 CAPSULE ORAL ONCE
Status: COMPLETED | OUTPATIENT
Start: 2020-09-16 | End: 2020-09-16

## 2020-09-16 RX ORDER — GLYCOPYRROLATE 0.2 MG/ML
INJECTION INTRAMUSCULAR; INTRAVENOUS AS NEEDED
Status: DISCONTINUED | OUTPATIENT
Start: 2020-09-16 | End: 2020-09-16

## 2020-09-16 RX ORDER — NEOSTIGMINE METHYLSULFATE 1 MG/ML
INJECTION INTRAVENOUS AS NEEDED
Status: DISCONTINUED | OUTPATIENT
Start: 2020-09-16 | End: 2020-09-16

## 2020-09-16 RX ORDER — OXYCODONE HYDROCHLORIDE 5 MG/1
5 TABLET ORAL EVERY 6 HOURS PRN
Qty: 27 TABLET | Refills: 0 | Status: SHIPPED | OUTPATIENT
Start: 2020-09-16 | End: 2020-09-26

## 2020-09-16 RX ORDER — ALBUMIN, HUMAN INJ 5% 5 %
SOLUTION INTRAVENOUS CONTINUOUS PRN
Status: DISCONTINUED | OUTPATIENT
Start: 2020-09-16 | End: 2020-09-16

## 2020-09-16 RX ORDER — MIDAZOLAM HYDROCHLORIDE 2 MG/2ML
INJECTION, SOLUTION INTRAMUSCULAR; INTRAVENOUS AS NEEDED
Status: DISCONTINUED | OUTPATIENT
Start: 2020-09-16 | End: 2020-09-16

## 2020-09-16 RX ORDER — HYDROMORPHONE HCL/PF 1 MG/ML
0.5 SYRINGE (ML) INJECTION
Status: DISCONTINUED | OUTPATIENT
Start: 2020-09-16 | End: 2020-09-16 | Stop reason: HOSPADM

## 2020-09-16 RX ORDER — BUPIVACAINE HYDROCHLORIDE 5 MG/ML
INJECTION, SOLUTION PERINEURAL AS NEEDED
Status: DISCONTINUED | OUTPATIENT
Start: 2020-09-16 | End: 2020-09-16 | Stop reason: HOSPADM

## 2020-09-16 RX ORDER — DEXAMETHASONE SODIUM PHOSPHATE 4 MG/ML
4 INJECTION, SOLUTION INTRA-ARTICULAR; INTRALESIONAL; INTRAMUSCULAR; INTRAVENOUS; SOFT TISSUE ONCE AS NEEDED
Status: DISCONTINUED | OUTPATIENT
Start: 2020-09-16 | End: 2020-09-16 | Stop reason: HOSPADM

## 2020-09-16 RX ORDER — SODIUM CHLORIDE, SODIUM LACTATE, POTASSIUM CHLORIDE, CALCIUM CHLORIDE 600; 310; 30; 20 MG/100ML; MG/100ML; MG/100ML; MG/100ML
75 INJECTION, SOLUTION INTRAVENOUS CONTINUOUS
Status: DISPENSED | OUTPATIENT
Start: 2020-09-16 | End: 2020-09-16

## 2020-09-16 RX ORDER — EPHEDRINE SULFATE 50 MG/ML
INJECTION INTRAVENOUS AS NEEDED
Status: DISCONTINUED | OUTPATIENT
Start: 2020-09-16 | End: 2020-09-16

## 2020-09-16 RX ORDER — DEXAMETHASONE SODIUM PHOSPHATE 10 MG/ML
INJECTION, SOLUTION INTRAMUSCULAR; INTRAVENOUS AS NEEDED
Status: DISCONTINUED | OUTPATIENT
Start: 2020-09-16 | End: 2020-09-16

## 2020-09-16 RX ORDER — CEFAZOLIN SODIUM 2 G/50ML
2000 SOLUTION INTRAVENOUS ONCE
Status: COMPLETED | OUTPATIENT
Start: 2020-09-16 | End: 2020-09-16

## 2020-09-16 RX ORDER — OXYCODONE HYDROCHLORIDE 5 MG/1
5 TABLET ORAL EVERY 4 HOURS PRN
Status: CANCELLED | OUTPATIENT
Start: 2020-09-16

## 2020-09-16 RX ORDER — PROPOFOL 10 MG/ML
INJECTION, EMULSION INTRAVENOUS AS NEEDED
Status: DISCONTINUED | OUTPATIENT
Start: 2020-09-16 | End: 2020-09-16

## 2020-09-16 RX ORDER — SODIUM CHLORIDE 9 MG/ML
INJECTION, SOLUTION INTRAVENOUS CONTINUOUS PRN
Status: DISCONTINUED | OUTPATIENT
Start: 2020-09-16 | End: 2020-09-16

## 2020-09-16 RX ORDER — LIDOCAINE HYDROCHLORIDE 10 MG/ML
0.5 INJECTION, SOLUTION EPIDURAL; INFILTRATION; INTRACAUDAL; PERINEURAL ONCE AS NEEDED
Status: COMPLETED | OUTPATIENT
Start: 2020-09-16 | End: 2020-09-16

## 2020-09-16 RX ORDER — ONDANSETRON 2 MG/ML
4 INJECTION INTRAMUSCULAR; INTRAVENOUS ONCE AS NEEDED
Status: DISCONTINUED | OUTPATIENT
Start: 2020-09-16 | End: 2020-09-16 | Stop reason: HOSPADM

## 2020-09-16 RX ORDER — ROCURONIUM BROMIDE 10 MG/ML
INJECTION, SOLUTION INTRAVENOUS AS NEEDED
Status: DISCONTINUED | OUTPATIENT
Start: 2020-09-16 | End: 2020-09-16

## 2020-09-16 RX ADMIN — LIDOCAINE HYDROCHLORIDE 0.5 ML: 10 INJECTION, SOLUTION EPIDURAL; INFILTRATION; INTRACAUDAL; PERINEURAL at 10:44

## 2020-09-16 RX ADMIN — SODIUM CHLORIDE, SODIUM LACTATE, POTASSIUM CHLORIDE, AND CALCIUM CHLORIDE 50 ML/HR: .6; .31; .03; .02 INJECTION, SOLUTION INTRAVENOUS at 18:35

## 2020-09-16 RX ADMIN — SODIUM CHLORIDE: 0.9 INJECTION, SOLUTION INTRAVENOUS at 15:05

## 2020-09-16 RX ADMIN — ROCURONIUM BROMIDE 50 MG: 10 INJECTION, SOLUTION INTRAVENOUS at 15:06

## 2020-09-16 RX ADMIN — SODIUM CHLORIDE, SODIUM LACTATE, POTASSIUM CHLORIDE, AND CALCIUM CHLORIDE 50 ML/HR: .6; .31; .03; .02 INJECTION, SOLUTION INTRAVENOUS at 10:44

## 2020-09-16 RX ADMIN — GLYCOPYRROLATE 0.4 MG: 0.2 INJECTION, SOLUTION INTRAMUSCULAR; INTRAVENOUS at 18:00

## 2020-09-16 RX ADMIN — EPHEDRINE SULFATE 5 MG: 50 INJECTION, SOLUTION INTRAVENOUS at 17:35

## 2020-09-16 RX ADMIN — EPHEDRINE SULFATE 10 MG: 50 INJECTION, SOLUTION INTRAVENOUS at 15:18

## 2020-09-16 RX ADMIN — SODIUM CHLORIDE, SODIUM LACTATE, POTASSIUM CHLORIDE, AND CALCIUM CHLORIDE 50 ML/HR: .6; .31; .03; .02 INJECTION, SOLUTION INTRAVENOUS at 19:44

## 2020-09-16 RX ADMIN — EPHEDRINE SULFATE 10 MG: 50 INJECTION, SOLUTION INTRAVENOUS at 17:39

## 2020-09-16 RX ADMIN — GABAPENTIN 300 MG: 300 CAPSULE ORAL at 10:46

## 2020-09-16 RX ADMIN — ONDANSETRON 4 MG: 2 INJECTION INTRAMUSCULAR; INTRAVENOUS at 17:11

## 2020-09-16 RX ADMIN — MIDAZOLAM 2 MG: 1 INJECTION INTRAMUSCULAR; INTRAVENOUS at 14:58

## 2020-09-16 RX ADMIN — ROCURONIUM BROMIDE 10 MG: 10 INJECTION, SOLUTION INTRAVENOUS at 16:41

## 2020-09-16 RX ADMIN — LIDOCAINE HYDROCHLORIDE 50 MG: 10 INJECTION, SOLUTION EPIDURAL; INFILTRATION; INTRACAUDAL; PERINEURAL at 15:05

## 2020-09-16 RX ADMIN — FENTANYL CITRATE 50 MCG: 50 INJECTION, SOLUTION INTRAMUSCULAR; INTRAVENOUS at 15:05

## 2020-09-16 RX ADMIN — ALBUMIN (HUMAN): 12.5 SOLUTION INTRAVENOUS at 17:00

## 2020-09-16 RX ADMIN — HYDROMORPHONE HYDROCHLORIDE 0.5 MG: 1 INJECTION, SOLUTION INTRAMUSCULAR; INTRAVENOUS; SUBCUTANEOUS at 17:29

## 2020-09-16 RX ADMIN — PROPOFOL 150 MG: 10 INJECTION, EMULSION INTRAVENOUS at 15:05

## 2020-09-16 RX ADMIN — FENTANYL CITRATE 50 MCG: 50 INJECTION, SOLUTION INTRAMUSCULAR; INTRAVENOUS at 15:41

## 2020-09-16 RX ADMIN — Medication 25 MCG: at 19:00

## 2020-09-16 RX ADMIN — CEFAZOLIN SODIUM 2000 MG: 2 SOLUTION INTRAVENOUS at 15:10

## 2020-09-16 RX ADMIN — ALBUMIN (HUMAN): 12.5 SOLUTION INTRAVENOUS at 16:40

## 2020-09-16 RX ADMIN — Medication 25 MCG: at 18:33

## 2020-09-16 RX ADMIN — NEOSTIGMINE METHYLSULFATE 3 MG: 1 INJECTION, SOLUTION INTRAVENOUS at 18:00

## 2020-09-16 RX ADMIN — ACETAMINOPHEN 975 MG: 325 TABLET, FILM COATED ORAL at 10:46

## 2020-09-16 RX ADMIN — GLYCOPYRROLATE 0.2 MG: 0.2 INJECTION, SOLUTION INTRAMUSCULAR; INTRAVENOUS at 15:47

## 2020-09-16 RX ADMIN — SODIUM CHLORIDE, SODIUM LACTATE, POTASSIUM CHLORIDE, AND CALCIUM CHLORIDE: .6; .31; .03; .02 INJECTION, SOLUTION INTRAVENOUS at 15:02

## 2020-09-16 RX ADMIN — FENTANYL CITRATE 50 MCG: 50 INJECTION, SOLUTION INTRAMUSCULAR; INTRAVENOUS at 18:05

## 2020-09-16 RX ADMIN — ROCURONIUM BROMIDE 10 MG: 10 INJECTION, SOLUTION INTRAVENOUS at 16:08

## 2020-09-16 RX ADMIN — DEXAMETHASONE SODIUM PHOSPHATE 10 MG: 10 INJECTION, SOLUTION INTRAMUSCULAR; INTRAVENOUS at 17:11

## 2020-09-16 RX ADMIN — FENTANYL CITRATE 50 MCG: 50 INJECTION, SOLUTION INTRAMUSCULAR; INTRAVENOUS at 16:07

## 2020-09-16 RX ADMIN — PHENYLEPHRINE HYDROCHLORIDE 10 MCG/MIN: 10 INJECTION INTRAVENOUS at 15:13

## 2020-09-16 NOTE — DISCHARGE INSTRUCTIONS
DISCHARGE INSTRUCTIONS SPECIFIC TO YOUR SURGERY:        [facility]  [address] Al[address], [address]  Phone: [phone]     Postoperative Instructions     These instructions are being provided by your doctor to give you basic guidelines during your post-op recovery  Please let our office know if your contact information has changed  Please call the office today for an appointment in a week  Dressings: -Wear abdominal binder at all times  Place ABD dressings over incisions followed by abdominal binder  You may also wear a T-shirt, and place abdominal binder over the T-shirt if this is more comfortable  Activity Restrictions: No heavy lifting     Bathing:  -May sponge bathe starting  tomorrow  No tub baths, hot tubs or pools until drains are removed and drain sites are healed  Medications:    Resume pre-op medications  You may take aleve or ibuprofen for pain control              Take pain as needed (as per prescription instructions)    Other:     1) Drain, record, and re-charge JAMES bulb drain daily and bring output record with you to the office  Please keep the drains in place   -Strip and empty drain 3 times daily and as needed  Record drain outputs daily  I will remove the drains when the output is less than 20 cc for 2 straight days  Please bring the log of drain outputs to your postop visit  2) Please call my office at any time if you have drainage from incisions, increased redness or swelling, or a fever greater than 100 5  Drainage around the drains is typically due to a blockage in the drain, that can be cleared by stripping the drain  If drainage persists around the drain, please call my office  Follow up in our office in 1 week  CALL for appointment  [phone]

## 2020-09-16 NOTE — ANESTHESIA PREPROCEDURE EVALUATION
Medical History     History  Comments    Obesity     Esophageal varices ([facility] )  stable 9/2020 gets checked yearly    Sleep apnea     Pneumonia  in past    Hepatic encephalopathy ([facility] )     Wears glasses     Hepatic cirrhosis (HCC)  treated with harvoni   Hep C- dx age 17    Liver disease     CPAP (continuous positive airway pressure) dependence  does not use machine      Procedure:  PANNICULECTOMY (N/A Abdomen)  APPLICATION VAC DRESSING (N/A Abdomen)  ABDOMINOPLASTY (N/A Abdomen)    Relevant Problems   ANESTHESIA (within normal limits)      GI/HEPATIC   (+) Gastroesophageal reflux disease without esophagitis   (+) 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)      HEMATOLOGY   (+) Anemia      MUSCULOSKELETAL   (+) Midline low back pain without sciatica      PULMONARY   (+) Head cold   (+) Obstructive sleep apnea        Physical Exam    Airway    Mallampati score: III  TM Distance: >3 FB  Neck ROM: full     Dental   No notable dental hx     Cardiovascular  Rate: normal,     Pulmonary  Pulmonary exam normal     Other Findings        Anesthesia Plan  ASA Score- 3     Anesthesia Type- general with ASA Monitors  Additional Monitors:   Airway Plan: ETT  Plan Factors-Exercise tolerance (METS): >4 METS  Chart reviewed  EKG reviewed  Existing labs reviewed  Patient summary reviewed  Patient is a current smoker  Patient instructed to abstain from smoking on day of procedure  Patient did not smoke on day of surgery  Induction- intravenous  Postoperative Plan- Plan for postoperative opioid use  Informed Consent- Anesthetic plan and risks discussed with patient  I personally reviewed this patient with the CRNA  Discussed and agreed on the Anesthesia Plan with the GERMAN Rodriguez

## 2020-09-17 ENCOUNTER — TELEPHONE (OUTPATIENT)
Dept: PLASTIC SURGERY | Facility: CLINIC | Age: 55
End: 2020-09-17

## 2020-09-17 ENCOUNTER — TELEPHONE (OUTPATIENT)
Dept: OTHER | Facility: OTHER | Age: 55
End: 2020-09-17

## 2020-09-17 NOTE — TELEPHONE ENCOUNTER
Patient called and asked if he was supposed to use the sponges provided by Hospital  Informed patient that those were incase any area with leak but patient does not have to change any dressing until seen in 1 week

## 2020-09-17 NOTE — ANESTHESIA POSTPROCEDURE EVALUATION
Post-Op Assessment Note    CV Status:  Stable  Pain Score: 0    Pain management: adequate     Mental Status:  Alert and awake   Hydration Status:  Euvolemic   PONV Controlled:  Controlled   Airway Patency:  Patent  Airway: intubated      Post Op Vitals Reviewed: Yes      Staff: Anesthesiologist, CRNA         No complications documented      BP      Temp     Pulse     Resp      SpO2

## 2020-09-20 NOTE — OP NOTE
OPERATIVE REPORT  PATIENT NAME: Miguel Lin Sr     :  1965  MRN: [id number]  Pt Location: [facility]    SURGERY DATE: 2020    Surgeon(s) and Role:     * Eliud Herrmann MD - Primary     * Joselyn Joe PA-C - Assisting     * Basia Morillo MD - Assisting    Preop Diagnosis:  Abdominal pannus [E65]    Post-Op Diagnosis Codes:     * Abdominal pannus [E65]    Procedure(s) (LRB):  PANNICULECTOMY (N/A)  ABDOMINOPLASTY (N/A)  SUCTION ASSISTED LIPOPLASTY (TRUNK)     Specimen(s):  * No specimens in log *    Estimated Blood Loss:   200 mL    Drains:  Closed/Suction Drain Right Abdomen Bulb 19 Fr  (Active)   Site Description Unable to view 20   Dressing Status Clean;Dry; Intact 20   Drainage Appearance Bloody 20   Status To bulb suction 20   Intake (mL) 0 mL 20   Output (mL) 30 mL 20   Number of days: 4       [REMOVED] Urethral Catheter Latex 16 Fr  (Removed)   Number of days: 0       Anesthesia Type:   General    Operative Indications:  Abdominal pannus [E65]      Operative Findings:  Abdominal pannus 1661g  lipoaspirate 770 ml    Complications:   None    Procedure and Technique:  Mr Stephanie Gardiner is a 50-year-old male who presents for body contouring of weight loss and symptomatic abdominal pannus  Patient was recommended to undergo panniculectomy or abdominoplasty combination with liposuction to better improve his symptoms  Risks and benefits of the procedure were explained in detail during our preoperative encounter he manifest understanding of this before signing informed consent  The patient was met in preoperative holding area and all the last minute questions were properly answered and addressed  Preoperative markings were then made in the standing position he was then taken to the operative theater placed in supine position    After smooth anesthesia was then induced and all the necessary perioperative measures were taken the trunk was then prepped and draped in sterile fashion a time-out was then performed  Procedure started by 1st infiltrating 1 L of tumescent solution along bilateral flanks and central portion of the abdomen (50 mL lidocaine 1%, 1 amp of epinephrine diluted 1 L of LR)  Suction assisted Lipo plasty was then performed using the micro air power assisted devised along bilateral flanks and central portions of the abdomen until satisfactory contour was then obtained  The excisional portion of the case was then started by 1st infiltrating along our proposed lower abdominal incision with a scalpel and carried down with electrocautery onto the sub Amber plane was then encounter  The umbilicus was then circumscribed a Tribal with a scalpel and carried down sharply down to abdominal wall  Cephalad undermining of the skin flap was then performed a sub Amber plane up to the subxiphoid subcostal region with careful attention to using hemoclips to encounter perforators and superficial veins of significant size  Once was completed then rectus plication was then performed for a cm and half diastasis using a running double layer of 1-0 strata fix suture  Great contour improvement of the abdominal wall was then completed  The patient was then placed into the flexed position and the proposed superior skin incision was then tailor as needed this was then incised with a scalpel and carried down with electrocautery onto the sub Amber plane was then encounter  Sub Amber fat was then dissected from the cephalad skin flap approximately 7 cm cephalad and the panel was then passed of the table for weight and discarded  Hemostasis irrigation was then performed  Progressive tension sutures then placed with a running of 2-0 Vicryl along the linea alba supraumbilical region to advance the skin flap    The location of the umbilicus was then marked as an inverted U and incised with a 15 blade and the umbilicus was then transposed and temporarily help with haja in place  Interrupted progressive tension sutures were then performed along bilateral flanks and similar lines inferiorly for dead space obliteration and contour improvement  One 19  [place] drain was then inserted secured with 3-0 nylon  Tailor tacking with skin with staples then performed closure was then performed with interrupted 2-0 PDS on the Amber's followed by Insorb stapler in the deep dermal layer and a running 3-0 strata fix suture  The umbilicus was then inset with deep dermal 3-0 Monocryl and 4-0 Monocryl running subcuticular stitch  Surgical glue, Xeroform dry dressing was then applied to the umbilicus  Dry dressing was then applied to the abdominal incision as well       I was present for the entire procedure and I was present for all critical portions of the procedure    Patient Disposition:  PACU  and hemodynamically stable    SIGNATURE: Walker Reveles MD  DATE: September 20, 2020  TIME: 11:14 AM

## 2020-09-23 ENCOUNTER — TELEPHONE (OUTPATIENT)
Dept: OTHER | Facility: OTHER | Age: 55
End: 2020-09-23

## 2020-09-24 ENCOUNTER — OFFICE VISIT (OUTPATIENT)
Dept: PLASTIC SURGERY | Facility: CLINIC | Age: 55
End: 2020-09-24

## 2020-09-24 VITALS — HEIGHT: 66 IN | BODY MASS INDEX: 31.82 KG/M2 | WEIGHT: 198 LBS | TEMPERATURE: 97.8 F

## 2020-09-24 DIAGNOSIS — Z98.890 S/P ABDOMINOPLASTY: Primary | ICD-10-CM

## 2020-09-24 PROCEDURE — 99024 POSTOP FOLLOW-UP VISIT: CPT | Performed by: PHYSICIAN ASSISTANT

## 2020-09-24 NOTE — PROGRESS NOTES
POST-OP EVALUATION  9/24/2020    Subjective   Matt Tobias Sr  is a 63 y o  male is here today for routine post-operative follow up       09/16/20 1502          Procedures:        PANNICULECTOMY (N/A Abdomen) - Tissue removed weight: 1661g       ABDOMINOPLASTY (N/A Abdomen)      No complaints, concerns today  Past Medical History:   Diagnosis Date    CPAP (continuous positive airway pressure) dependence     does not use machine    Esophageal varices ([facility] )     stable 9/2020 gets checked yearly    Hepatic cirrhosis ([facility] [address] )     treated with harvoni   Hep C- dx age 17    [facility]     Liver disease     Obesity     Pneumonia     in past    Sleep apnea     Wears glasses      Past Surgical History:   Procedure Laterality Date    COLONOSCOPY      ESOPHAGOGASTRODUODENOSCOPY N/A 4/12/2018    Procedure: ESOPHAGOGASTRODUODENOSCOPY (EGD); Surgeon: Ana Muse MD;  [facility];   Service: Gastroenterology    FRACTURE SURGERY Left     ankle    OTHER SURGICAL HISTORY      banding esophageal varices    ND EXCISE EXCESS SKIN TISSUE,ABDOMEN N/A 9/16/2020    Procedure: PANNICULECTOMY;  Surgeon: Mattie Patel MD;  [facility];  Service: Plastics    ND EXCISE EXCESS SKIN TISSUE,ABDOMEN, ADD-ON N/A 9/16/2020    Procedure: ABDOMINOPLASTY;  Surgeon: Mattie Patel MD;  [facility];  Service: Plastics    ND LAP, TERESA RESTRICT [address], LONGITUDINAL GASTRECTOMY N/A 6/5/2018    Procedure: GASTRECTOMY LAPAROSCOPIC SLEEVE;  Surgeon: Juwan Lenz MD;  [facility];  Service: Bariatrics        Current Outpatient Medications:     furosemide (LASIX) 20 mg tablet, Take 20 mg by mouth 2 (two) times a day, Disp: , Rfl:     gabapentin (NEURONTIN) 100 mg capsule, Take 1 capsule (100 mg total) by mouth 3 (three) times a day for 10 days, Disp: 30 capsule, Rfl: 0    ibuprofen (MOTRIN) 800 mg tablet, Take 1 tablet (800 mg total) by mouth every 8 (eight) hours as needed for mild pain, Disp: 30 tablet, Rfl: 2    lactulose (CONSTULOSE) 10 g/15 mL solution, Take 15 mL (10 g total) by mouth daily as needed (constipation or confusion), Disp: 946 mL, Rfl: 1    methocarbamol (ROBAXIN) 750 mg tablet, Take 1 tablet (750 mg total) by mouth every 8 (eight) hours, Disp: 30 tablet, Rfl: 3    Multiple Vitamins-Minerals (MULTIVITAMIN WITH MINERALS) tablet, Take 1 tablet by mouth daily, Disp: , Rfl:     nadolol (CORGARD) 20 mg tablet, Take 1 tablet (20 mg total) by mouth daily, Disp: 90 tablet, Rfl: 2    omeprazole (PriLOSEC) 20 mg delayed release capsule, Take 1 capsule (20 mg total) by mouth daily, Disp: 90 capsule, Rfl: 3    oxyCODONE (ROXICODONE) 5 mg immediate release tablet, Take 1 tablet (5 mg total) by mouth every 6 (six) hours as needed for moderate pain for up to 10 daysMax Daily Amount: 20 mg, Disp: 27 tablet, Rfl: 0    rifaximin (XIFAXAN) 550 mg tablet, Take 550 mg by mouth 2 (two) times a day  , Disp: , Rfl:     sildenafil (VIAGRA) 100 mg tablet, Take 1 tablet by mouth as needed for erectile dysfunction  , Disp: 10 tablet, Rfl: 3    vitamin A 10,000 units capsule, Take 1 capsule (10,000 Units total) by mouth daily, Disp: 90 capsule, Rfl: 0  Allergies: Patient has no known allergies  Objective    Physical Exam     Abdomen and Hips            Assessment/Plan   Diagnoses and all orders for this visit:    S/P abdominoplasty    Doing very well  Drainage too much for drain removal   Bacitracin to umbilicus  Followup in 1 week      Delia Boogie PA-C

## 2020-10-01 ENCOUNTER — OFFICE VISIT (OUTPATIENT)
Dept: PLASTIC SURGERY | Facility: CLINIC | Age: 55
End: 2020-10-01

## 2020-10-01 VITALS — HEIGHT: 66 IN | TEMPERATURE: 98.7 F | BODY MASS INDEX: 31.82 KG/M2 | WEIGHT: 198 LBS

## 2020-10-01 DIAGNOSIS — Z98.890 POST-OPERATIVE STATE: Primary | ICD-10-CM

## 2020-10-01 PROCEDURE — 99024 POSTOP FOLLOW-UP VISIT: CPT | Performed by: PLASTIC SURGERY

## 2020-10-20 ENCOUNTER — OFFICE VISIT (OUTPATIENT)
Dept: PLASTIC SURGERY | Facility: CLINIC | Age: 55
End: 2020-10-20

## 2020-10-20 VITALS — HEIGHT: 66 IN | WEIGHT: 198 LBS | TEMPERATURE: 98.3 F | BODY MASS INDEX: 31.82 KG/M2

## 2020-10-20 DIAGNOSIS — Z98.890 S/P ABDOMINOPLASTY: Primary | ICD-10-CM

## 2020-10-20 PROCEDURE — 99024 POSTOP FOLLOW-UP VISIT: CPT | Performed by: PHYSICIAN ASSISTANT

## 2020-11-05 ENCOUNTER — TELEPHONE (OUTPATIENT)
Dept: BARIATRICS | Facility: CLINIC | Age: 55
End: 2020-11-05

## 2020-12-03 DIAGNOSIS — N52.9 ERECTILE DYSFUNCTION, UNSPECIFIED ERECTILE DYSFUNCTION TYPE: ICD-10-CM

## 2020-12-03 RX ORDER — SILDENAFIL 100 MG/1
TABLET, FILM COATED ORAL
Qty: 10 TABLET | Refills: 0 | Status: SHIPPED | OUTPATIENT
Start: 2020-12-03 | End: 2020-12-08

## 2020-12-08 DIAGNOSIS — N52.9 ERECTILE DYSFUNCTION, UNSPECIFIED ERECTILE DYSFUNCTION TYPE: ICD-10-CM

## 2020-12-08 RX ORDER — SILDENAFIL 100 MG/1
TABLET, FILM COATED ORAL
Qty: 10 TABLET | Refills: 0 | Status: SHIPPED | OUTPATIENT
Start: 2020-12-08 | End: 2020-12-22 | Stop reason: SDUPTHER

## 2020-12-22 DIAGNOSIS — K74.60 HEPATIC CIRRHOSIS DUE TO CHRONIC HEPATITIS C INFECTION (HCC): ICD-10-CM

## 2020-12-22 DIAGNOSIS — N52.9 ERECTILE DYSFUNCTION, UNSPECIFIED ERECTILE DYSFUNCTION TYPE: ICD-10-CM

## 2020-12-22 DIAGNOSIS — E50.9 VITAMIN A DEFICIENCY: ICD-10-CM

## 2020-12-22 DIAGNOSIS — B18.2 HEPATIC CIRRHOSIS DUE TO CHRONIC HEPATITIS C INFECTION (HCC): ICD-10-CM

## 2020-12-22 RX ORDER — NADOLOL 20 MG/1
20 TABLET ORAL DAILY
Qty: 90 TABLET | Refills: 2 | Status: SHIPPED | OUTPATIENT
Start: 2020-12-22 | End: 2022-07-07 | Stop reason: SDUPTHER

## 2020-12-22 RX ORDER — SILDENAFIL 100 MG/1
100 TABLET, FILM COATED ORAL AS NEEDED
Qty: 30 TABLET | Refills: 1 | Status: SHIPPED | OUTPATIENT
Start: 2020-12-22 | End: 2021-02-15

## 2021-01-11 PROBLEM — Z48.815 ENCOUNTER FOR SURGICAL AFTERCARE FOLLOWING SURGERY OF DIGESTIVE SYSTEM: Status: ACTIVE | Noted: 2021-01-11

## 2021-02-13 DIAGNOSIS — N52.9 ERECTILE DYSFUNCTION, UNSPECIFIED ERECTILE DYSFUNCTION TYPE: ICD-10-CM

## 2021-02-15 RX ORDER — SILDENAFIL 100 MG/1
TABLET, FILM COATED ORAL
Qty: 30 TABLET | Refills: 0 | Status: SHIPPED | OUTPATIENT
Start: 2021-02-15 | End: 2021-03-01

## 2021-03-01 DIAGNOSIS — N52.9 ERECTILE DYSFUNCTION, UNSPECIFIED ERECTILE DYSFUNCTION TYPE: ICD-10-CM

## 2021-03-01 RX ORDER — SILDENAFIL 100 MG/1
TABLET, FILM COATED ORAL
Qty: 30 TABLET | Refills: 0 | Status: SHIPPED | OUTPATIENT
Start: 2021-03-01 | End: 2021-04-08

## 2021-03-12 ENCOUNTER — TELEPHONE (OUTPATIENT)
Dept: DERMATOLOGY | Facility: CLINIC | Age: 56
End: 2021-03-12

## 2021-03-23 NOTE — TELEPHONE ENCOUNTER
Patient called stating he received a call about scheduling a new patient appointment from the wait list  Attempt to schedule patient for 1st available in June  Patient was not happy with that and said he will look for someone else

## 2021-04-08 DIAGNOSIS — N52.9 ERECTILE DYSFUNCTION, UNSPECIFIED ERECTILE DYSFUNCTION TYPE: ICD-10-CM

## 2021-04-08 RX ORDER — SILDENAFIL 100 MG/1
TABLET, FILM COATED ORAL
Qty: 30 TABLET | Refills: 0 | Status: SHIPPED | OUTPATIENT
Start: 2021-04-08 | End: 2021-04-28

## 2021-04-28 DIAGNOSIS — N52.9 ERECTILE DYSFUNCTION, UNSPECIFIED ERECTILE DYSFUNCTION TYPE: ICD-10-CM

## 2021-04-28 DIAGNOSIS — E66.01 MORBID (SEVERE) OBESITY DUE TO EXCESS CALORIES (HCC): ICD-10-CM

## 2021-04-28 RX ORDER — SILDENAFIL 100 MG/1
TABLET, FILM COATED ORAL
Qty: 30 TABLET | Refills: 0 | Status: SHIPPED | OUTPATIENT
Start: 2021-04-28 | End: 2021-05-17

## 2021-04-28 RX ORDER — OMEPRAZOLE 20 MG/1
CAPSULE, DELAYED RELEASE ORAL
Qty: 30 CAPSULE | Refills: 0 | Status: SHIPPED | OUTPATIENT
Start: 2021-04-28 | End: 2021-07-21

## 2021-05-12 DIAGNOSIS — N52.9 ERECTILE DYSFUNCTION, UNSPECIFIED ERECTILE DYSFUNCTION TYPE: ICD-10-CM

## 2021-05-12 RX ORDER — SILDENAFIL 100 MG/1
TABLET, FILM COATED ORAL
Qty: 30 TABLET | Refills: 0 | OUTPATIENT
Start: 2021-05-12

## 2021-05-16 DIAGNOSIS — N52.9 ERECTILE DYSFUNCTION, UNSPECIFIED ERECTILE DYSFUNCTION TYPE: ICD-10-CM

## 2021-05-17 RX ORDER — SILDENAFIL 100 MG/1
TABLET, FILM COATED ORAL
Qty: 30 TABLET | Refills: 3 | Status: SHIPPED | OUTPATIENT
Start: 2021-05-17 | End: 2021-07-30 | Stop reason: SDUPTHER

## 2021-07-21 DIAGNOSIS — E66.01 MORBID (SEVERE) OBESITY DUE TO EXCESS CALORIES (HCC): ICD-10-CM

## 2021-07-21 RX ORDER — OMEPRAZOLE 20 MG/1
CAPSULE, DELAYED RELEASE ORAL
Qty: 30 CAPSULE | Refills: 0 | Status: SHIPPED | OUTPATIENT
Start: 2021-07-21 | End: 2021-10-18

## 2021-07-28 DIAGNOSIS — M54.50 MIDLINE LOW BACK PAIN WITHOUT SCIATICA, UNSPECIFIED CHRONICITY: ICD-10-CM

## 2021-07-28 DIAGNOSIS — M79.18 MYOFASCIAL MUSCLE PAIN: ICD-10-CM

## 2021-07-29 RX ORDER — IBUPROFEN 800 MG/1
TABLET ORAL
Qty: 30 TABLET | Refills: 0 | Status: SHIPPED | OUTPATIENT
Start: 2021-07-29 | End: 2021-12-28

## 2021-07-30 ENCOUNTER — RA CDI HCC (OUTPATIENT)
Dept: OTHER | Facility: HOSPITAL | Age: 56
End: 2021-07-30

## 2021-07-30 DIAGNOSIS — N52.9 ERECTILE DYSFUNCTION, UNSPECIFIED ERECTILE DYSFUNCTION TYPE: ICD-10-CM

## 2021-07-30 RX ORDER — SILDENAFIL 100 MG/1
100 TABLET, FILM COATED ORAL AS NEEDED
Qty: 30 TABLET | Refills: 3 | Status: SHIPPED | OUTPATIENT
Start: 2021-07-30 | End: 2021-08-06 | Stop reason: SDUPTHER

## 2021-07-30 NOTE — PROGRESS NOTES
[facility]  coding opportunities             Chart reviewed, (number of) suggestions sent to provider: 4            Number of suggestions actually used: 2      Number of suggestions NOT actually used: 2     Patients insurance company: [employer] (Medicare Advantage and Commercial)     Visit status: Patient arrived for their scheduled appointment     Provider never responded to [facility]  coding request     [place] [id number]  coding opportunities       Chart reviewed, (number of) suggestions sent to provider: 3            Patients insurance company: [employer] ([employer] and [employer])        Found in active problem list   1) K72 90: Hepatic [facility]opathy ([facility] )  - last assessed on 6/5/2018  2) I85 10:  Secondary esophageal varices without bleeding ([facility] )  - last assessed on 6/5/2018  3) B18 2, K74 60: 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 ([facility] )    If this is correct, please document and assess at your next visit 8/6/2021

## 2021-08-06 ENCOUNTER — OFFICE VISIT (OUTPATIENT)
Dept: FAMILY MEDICINE CLINIC | Facility: CLINIC | Age: 56
End: 2021-08-06
Payer: COMMERCIAL

## 2021-08-06 VITALS
SYSTOLIC BLOOD PRESSURE: 124 MMHG | WEIGHT: 210 LBS | TEMPERATURE: 98.6 F | OXYGEN SATURATION: 98 % | HEART RATE: 68 BPM | DIASTOLIC BLOOD PRESSURE: 58 MMHG | HEIGHT: 66 IN | BODY MASS INDEX: 33.75 KG/M2

## 2021-08-06 DIAGNOSIS — K74.60 HEPATIC CIRRHOSIS DUE TO CHRONIC HEPATITIS C INFECTION (HCC): ICD-10-CM

## 2021-08-06 DIAGNOSIS — E66.01 MORBID OBESITY WITH BMI OF 40.0-44.9, ADULT (HCC): ICD-10-CM

## 2021-08-06 DIAGNOSIS — Z98.84 S/P BARIATRIC SURGERY: ICD-10-CM

## 2021-08-06 DIAGNOSIS — B18.2 HEPATIC CIRRHOSIS DUE TO CHRONIC HEPATITIS C INFECTION (HCC): ICD-10-CM

## 2021-08-06 DIAGNOSIS — B18.2 CHRONIC HEPATITIS C WITHOUT HEPATIC COMA (HCC): ICD-10-CM

## 2021-08-06 DIAGNOSIS — Z98.84 S/P LAPAROSCOPIC SLEEVE GASTRECTOMY: Primary | ICD-10-CM

## 2021-08-06 DIAGNOSIS — N52.9 ERECTILE DYSFUNCTION, UNSPECIFIED ERECTILE DYSFUNCTION TYPE: ICD-10-CM

## 2021-08-06 PROCEDURE — 99214 OFFICE O/P EST MOD 30 MIN: CPT | Performed by: FAMILY MEDICINE

## 2021-08-06 PROCEDURE — 3725F SCREEN DEPRESSION PERFORMED: CPT | Performed by: FAMILY MEDICINE

## 2021-08-06 PROCEDURE — 3008F BODY MASS INDEX DOCD: CPT | Performed by: FAMILY MEDICINE

## 2021-08-06 RX ORDER — SILDENAFIL 100 MG/1
100 TABLET, FILM COATED ORAL AS NEEDED
Qty: 60 TABLET | Refills: 2 | Status: SHIPPED | OUTPATIENT
Start: 2021-08-06 | End: 2022-02-02

## 2021-08-06 NOTE — PROGRESS NOTES
Assessment/Plan:    No problem-specific Assessment & Plan notes found for this encounter  Diagnoses and all orders for this visit:    S/P laparoscopic sleeve gastrectomy  -     PTH, intact; Future  -     Iron; Future  -     Folate; Future  -     Ferritin; Future  -     Comprehensive metabolic panel; Future  -     CBC and differential; Future  -     Vitamin A; Future  -     Vitamin B12; Future  -     Vitamin B1, whole blood; Future  -     Vitamin D 25 hydroxy; Future  -     Zinc; Future  -     TSH, 3rd generation with Free T4 reflex; Future  -     Lipid panel; Future  -     Comprehensive metabolic panel; Future  -     Hemoglobin A1C; Future    Morbid obesity with BMI of 40 0-44 9, adult (HCC)  -     PTH, intact; Future  -     Iron; Future  -     Folate; Future  -     Ferritin; Future  -     Comprehensive metabolic panel; Future  -     CBC and differential; Future  -     Vitamin A; Future  -     Vitamin B12; Future  -     Vitamin B1, whole blood; Future  -     Vitamin D 25 hydroxy; Future  -     Zinc; Future  -     TSH, 3rd generation with Free T4 reflex; Future  -     Lipid panel; Future  -     Comprehensive metabolic panel; Future  -     Hemoglobin A1C; Future    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)  -     Hepatitis C antibody; Future  -     Hepatitis C RNA, quantitative, PCR; Future  -     Gamma GT; Future  -     AFP tumor marker; Future    S/P bariatric surgery  -     PTH, intact; Future  -     Iron; Future  -     Folate; Future  -     Ferritin; Future  -     Comprehensive metabolic panel; Future  -     CBC and differential; Future  -     Vitamin A; Future  -     Vitamin B12; Future  -     Vitamin B1, whole blood; Future  -     Vitamin D 25 hydroxy; Future  -     Zinc; Future  -     TSH, 3rd generation with Free T4 reflex; Future  -     Lipid panel; Future  -     Comprehensive metabolic panel;  Future  -     Hemoglobin A1C; Future    Chronic hepatitis C without hepatic coma (HCC)   - Iron; Future    Erectile dysfunction, unspecified erectile dysfunction type  I discussed risks and benefits of medication including serious side effects  He understands them and would like to continue taking it daily  -     sildenafil (VIAGRA) 100 mg tablet; Take 1 tablet (100 mg total) by mouth as needed for erectile dysfunction      Follow up in 6 months or as needed       Subjective:      Patient ID: Elsa Pathak Sr  is a 61 y o  male  Patient is here for a check up  Has a history of bariatric sleeve gastrectomy  Also has hepatitis C with cirrhosis and has an upcoming appt with Gastroenterology  He also has ED and takes Viagra daily which works for him  He denies any side effects to the medication  The following portions of the patient's history were reviewed and updated as appropriate:   He  has a past medical history of CPAP (continuous positive airway pressure) dependence, Esophageal varices ([address] ), Hepatic cirrhosis ([address] ), Hepatic encephalopathy ([address] ), Liver disease, Obesity, Pneumonia, Postgastrectomy malabsorption, Sleep apnea, and Wears glasses    He   Patient Active Problem List    Diagnosis Date Noted    Encounter for surgical aftercare following surgery of digestive system 01/11/2021    S/P abdominoplasty 09/24/2020    Head cold 03/16/2020    Heartburn 03/16/2020    Vitamin A deficiency 11/20/2019    Need for influenza vaccination 11/20/2019    Hypotension 11/20/2019    Hyperammonemia ([address] ) 10/30/2019    Myofascial muscle pain 10/18/2019    Midline low back pain without sciatica 10/04/2019    Excess skin of abdominal wall 10/04/2019    Laceration of scalp without foreign body 06/14/2019    Need for Tdap vaccination 05/01/2019    Medicare annual wellness visit, initial 05/01/2019    Need for lipid screening 05/01/2019    Esophageal varices without bleeding ([address] ) 03/13/2019    Postsurgical malabsorption 09/26/2018    Diabetes mellitus screening 08/07/2018    Fatigue 08/07/2018    Anemia 08/07/2018    Dizzy 08/07/2018    Acute pain of right knee 07/03/2018    Effusion of right knee 07/03/2018    S/P laparoscopic sleeve gastrectomy 06/13/2018    Mood swings 06/13/2018    Secondary esophageal varices without bleeding ([place] [address] ) 03/23/2018    Hepatic encephalopathy ([place] [address] ) 03/23/2018    Obstructive sleep apnea 03/08/2018    Morbid obesity with BMI of 40 0-44 9, adult ([place] [address] ) 03/02/2018    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 ([place] [address] ) 02/21/2018    Gastroesophageal reflux disease without esophagitis 02/21/2018    Former smoker 02/12/2018    Erectile dysfunction 02/12/2018     He  has a past surgical history that includes Colonoscopy; Esophagogastroduodenoscopy (N/A, 4/12/2018); Other surgical history; Fracture surgery (Left); pr lap, dilip restrict proc, longitudinal gastrectomy (N/A, 6/5/2018); pr excise excess skin tissue,abdomen (N/A, 9/16/2020); and pr excise excess skin tissue,abdomen, add-on (N/A, 9/16/2020)  His family history includes Diabetes in his father; Heart disease in his mother; Lupus in his mother; Stroke in his father  He  reports that he has been smoking  He has a 9 25 pack-year smoking history  He has never used smokeless tobacco  He reports that he does not drink alcohol and does not use drugs    Current Outpatient Medications   Medication Sig Dispense Refill    furosemide (LASIX) 20 mg tablet Take 20 mg by mouth 2 (two) times a day      gabapentin (NEURONTIN) 100 mg capsule Take 1 capsule (100 mg total) by mouth 3 (three) times a day for 10 days 30 capsule 0    ibuprofen (MOTRIN) 800 mg tablet TAKE ONE TABLET BY MOUTH EVERY EIGHT HOURS AS NEEDED FOR MILD PAIN 30 tablet 0    lactulose (CONSTULOSE) 10 g/15 mL solution Take 15 mL (10 g total) by mouth daily as needed (constipation or confusion) 946 mL 1    methocarbamol (ROBAXIN) 750 mg tablet Take 1 tablet (750 mg total) by mouth every 8 (eight) hours 30 tablet 3    nadolol (CORGARD) 20 mg tablet Take 1 tablet (20 mg total) by mouth daily 90 tablet 2    omeprazole (PriLOSEC) 20 mg delayed release capsule TAKE ONE CAPSULE BY MOUTH ONE TIME DAILY  30 capsule 0    rifaximin (XIFAXAN) 550 mg tablet Take 550 mg by mouth 2 (two) times a day        sildenafil (VIAGRA) 100 mg tablet Take 1 tablet (100 mg total) by mouth as needed for erectile dysfunction 60 tablet 2    vitamin A (A-82172) 3 MG (80796 UT) capsule Take 1 capsule (10,000 Units total) by mouth daily 90 capsule 3     No current facility-administered medications for this visit  Current Outpatient Medications on File Prior to Visit   Medication Sig    furosemide (LASIX) 20 mg tablet Take 20 mg by mouth 2 (two) times a day    gabapentin (NEURONTIN) 100 mg capsule Take 1 capsule (100 mg total) by mouth 3 (three) times a day for 10 days    ibuprofen (MOTRIN) 800 mg tablet TAKE ONE TABLET BY MOUTH EVERY EIGHT HOURS AS NEEDED FOR MILD PAIN    lactulose (CONSTULOSE) 10 g/15 mL solution Take 15 mL (10 g total) by mouth daily as needed (constipation or confusion)    methocarbamol (ROBAXIN) 750 mg tablet Take 1 tablet (750 mg total) by mouth every 8 (eight) hours    nadolol (CORGARD) 20 mg tablet Take 1 tablet (20 mg total) by mouth daily    omeprazole (PriLOSEC) 20 mg delayed release capsule TAKE ONE CAPSULE BY MOUTH ONE TIME DAILY     rifaximin (XIFAXAN) 550 mg tablet Take 550 mg by mouth 2 (two) times a day      vitamin A (A-76048) 3 MG (86202 UT) capsule Take 1 capsule (10,000 Units total) by mouth daily    [DISCONTINUED] Multiple Vitamins-Minerals (MULTIVITAMIN WITH MINERALS) tablet Take 1 tablet by mouth daily    [DISCONTINUED] sildenafil (VIAGRA) 100 mg tablet Take 1 tablet (100 mg total) by mouth as needed for erectile dysfunction     No current facility-administered medications on file prior to visit  He has No Known Allergies       Review of Systems   Constitutional: Negative for activity change, appetite change, fatigue and fever  HENT: Negative for congestion and ear discharge  Respiratory: Negative for cough and shortness of breath  Cardiovascular: Negative for chest pain and palpitations  Gastrointestinal: Negative for diarrhea and nausea  Musculoskeletal: Negative for arthralgias and back pain  Skin: Negative for color change and rash  Neurological: Negative for dizziness and headaches  Psychiatric/Behavioral: Negative for agitation and behavioral problems  Objective:      /58   Pulse 68   Temp 98 6 °F (37 °C)   Ht 5' 5 5" (1 664 m)   Wt 95 3 kg (210 lb)   SpO2 98%   BMI 34 41 kg/m²          Physical Exam  Constitutional:       General: He is not in acute distress  Appearance: He is well-developed  He is not diaphoretic  Eyes:      General: No scleral icterus  Pupils: Pupils are equal, round, and reactive to light  Cardiovascular:      Rate and Rhythm: Normal rate and regular rhythm  Heart sounds: Normal heart sounds  No murmur heard  Pulmonary:      Effort: Pulmonary effort is normal  No respiratory distress  Breath sounds: Normal breath sounds  No wheezing  Abdominal:      General: Bowel sounds are normal  There is no distension  Palpations: Abdomen is soft  Tenderness: There is no abdominal tenderness  Skin:     General: Skin is warm and dry  Findings: No rash  Neurological:      Mental Status: He is alert and oriented to person, place, and time

## 2021-08-06 NOTE — PROGRESS NOTES
BMI Counseling: Body mass index is 34 41 kg/m²  The BMI is above normal  Nutrition recommendations include 3-5 servings of fruits/vegetables daily, consuming healthier snacks and moderation in carbohydrate intake  Exercise recommendations include strength training exercises

## 2021-08-19 ENCOUNTER — APPOINTMENT (OUTPATIENT)
Dept: LAB | Facility: CLINIC | Age: 56
End: 2021-08-19
Payer: COMMERCIAL

## 2021-08-19 DIAGNOSIS — B18.2 HEPATIC CIRRHOSIS DUE TO CHRONIC HEPATITIS C INFECTION (HCC): ICD-10-CM

## 2021-08-19 DIAGNOSIS — Z98.84 S/P LAPAROSCOPIC SLEEVE GASTRECTOMY: ICD-10-CM

## 2021-08-19 DIAGNOSIS — K74.60 HEPATIC CIRRHOSIS DUE TO CHRONIC HEPATITIS C INFECTION (HCC): ICD-10-CM

## 2021-08-19 DIAGNOSIS — E66.01 MORBID OBESITY WITH BMI OF 40.0-44.9, ADULT (HCC): ICD-10-CM

## 2021-08-19 DIAGNOSIS — Z98.84 S/P BARIATRIC SURGERY: ICD-10-CM

## 2021-08-19 LAB
AFP-TM SERPL-MCNC: 1.2 NG/ML (ref 0.5–8)
ALBUMIN SERPL BCP-MCNC: 2.8 G/DL (ref 3.5–5)
ALP SERPL-CCNC: 114 U/L (ref 46–116)
ALT SERPL W P-5'-P-CCNC: 53 U/L (ref 12–78)
ANION GAP SERPL CALCULATED.3IONS-SCNC: 5 MMOL/L (ref 4–13)
AST SERPL W P-5'-P-CCNC: 120 U/L (ref 5–45)
BILIRUB SERPL-MCNC: 2.35 MG/DL (ref 0.2–1)
BUN SERPL-MCNC: 11 MG/DL (ref 5–25)
CALCIUM ALBUM COR SERPL-MCNC: 9.6 MG/DL (ref 8.3–10.1)
CALCIUM SERPL-MCNC: 8.6 MG/DL (ref 8.3–10.1)
CHLORIDE SERPL-SCNC: 113 MMOL/L (ref 100–108)
CHOLEST SERPL-MCNC: 146 MG/DL (ref 50–200)
CO2 SERPL-SCNC: 24 MMOL/L (ref 21–32)
CREAT SERPL-MCNC: 0.62 MG/DL (ref 0.6–1.3)
EST. AVERAGE GLUCOSE BLD GHB EST-MCNC: 82 MG/DL
GFR SERPL CREATININE-BSD FRML MDRD: 110 ML/MIN/1.73SQ M
GGT SERPL-CCNC: 56 U/L (ref 5–85)
GLUCOSE P FAST SERPL-MCNC: 94 MG/DL (ref 65–99)
HBA1C MFR BLD: 4.5 %
HDLC SERPL-MCNC: 74 MG/DL
LDLC SERPL CALC-MCNC: 56 MG/DL (ref 0–100)
NONHDLC SERPL-MCNC: 72 MG/DL
POTASSIUM SERPL-SCNC: 4 MMOL/L (ref 3.5–5.3)
PROT SERPL-MCNC: 5.7 G/DL (ref 6.4–8.2)
SODIUM SERPL-SCNC: 142 MMOL/L (ref 136–145)
TRIGL SERPL-MCNC: 82 MG/DL

## 2021-08-19 PROCEDURE — 82977 ASSAY OF GGT: CPT

## 2021-08-19 PROCEDURE — 86803 HEPATITIS C AB TEST: CPT

## 2021-08-19 PROCEDURE — 80053 COMPREHEN METABOLIC PANEL: CPT

## 2021-08-19 PROCEDURE — 36415 COLL VENOUS BLD VENIPUNCTURE: CPT

## 2021-08-19 PROCEDURE — 80061 LIPID PANEL: CPT

## 2021-08-19 PROCEDURE — 82105 ALPHA-FETOPROTEIN SERUM: CPT

## 2021-08-19 PROCEDURE — 87522 HEPATITIS C REVRS TRNSCRPJ: CPT

## 2021-08-19 PROCEDURE — 83036 HEMOGLOBIN GLYCOSYLATED A1C: CPT

## 2021-08-20 LAB — HCV AB SER QL: ABNORMAL

## 2021-08-21 LAB
HCV RNA SERPL NAA+PROBE-ACNC: NORMAL IU/ML
TEST INFORMATION: NORMAL

## 2021-08-24 ENCOUNTER — HOSPITAL ENCOUNTER (EMERGENCY)
Facility: HOSPITAL | Age: 56
Discharge: HOME | End: 2021-08-24
Attending: EMERGENCY MEDICINE | Admitting: EMERGENCY MEDICINE
Payer: COMMERCIAL

## 2021-08-24 VITALS
BODY MASS INDEX: 34.43 KG/M2 | WEIGHT: 210.1 LBS | RESPIRATION RATE: 16 BRPM | SYSTOLIC BLOOD PRESSURE: 100 MMHG | HEART RATE: 75 BPM | DIASTOLIC BLOOD PRESSURE: 56 MMHG | OXYGEN SATURATION: 94 % | TEMPERATURE: 103 F

## 2021-08-24 DIAGNOSIS — N39.0 URINARY TRACT INFECTION: Primary | ICD-10-CM

## 2021-08-24 LAB
ALBUMIN SERPL BCP-MCNC: 3.3 G/DL (ref 3.5–5)
ALP SERPL-CCNC: 104 U/L (ref 46–116)
ALT SERPL W P-5'-P-CCNC: 31 U/L (ref 12–78)
AMMONIA PLAS-SCNC: 18 UMOL/L (ref 11–35)
ANION GAP SERPL CALCULATED.3IONS-SCNC: 12 MMOL/L (ref 4–13)
AST SERPL W P-5'-P-CCNC: 32 U/L (ref 5–45)
BACTERIA UR QL AUTO: ABNORMAL /HPF
BASOPHILS # BLD AUTO: 0.04 THOUSANDS/ΜL (ref 0–0.1)
BASOPHILS NFR BLD AUTO: 0 % (ref 0–1)
BILIRUB SERPL-MCNC: 7.54 MG/DL (ref 0.2–1)
BILIRUB UR QL STRIP: ABNORMAL
BUN SERPL-MCNC: 16 MG/DL (ref 5–25)
CALCIUM ALBUM COR SERPL-MCNC: 9.4 MG/DL (ref 8.3–10.1)
CALCIUM SERPL-MCNC: 8.8 MG/DL (ref 8.3–10.1)
CHLORIDE SERPL-SCNC: 105 MMOL/L (ref 100–108)
CLARITY UR: ABNORMAL
CO2 SERPL-SCNC: 20 MMOL/L (ref 21–32)
COLOR UR: ABNORMAL
CREAT SERPL-MCNC: 1.18 MG/DL (ref 0.6–1.3)
EOSINOPHIL # BLD AUTO: 0.01 THOUSAND/ΜL (ref 0–0.61)
EOSINOPHIL NFR BLD AUTO: 0 % (ref 0–6)
ERYTHROCYTE [DISTWIDTH] IN BLOOD BY AUTOMATED COUNT: 15.8 % (ref 11.6–15.1)
GFR SERPL CREATININE-BSD FRML MDRD: 68 ML/MIN/1.73SQ M
GLUCOSE SERPL-MCNC: 114 MG/DL (ref 65–140)
GLUCOSE UR STRIP-MCNC: NEGATIVE MG/DL
HCT VFR BLD AUTO: 43.4 % (ref 36.5–49.3)
HGB BLD-MCNC: 14.9 G/DL (ref 12–17)
HGB UR QL STRIP.AUTO: ABNORMAL
IMM GRANULOCYTES # BLD AUTO: 0.23 THOUSAND/UL (ref 0–0.2)
IMM GRANULOCYTES NFR BLD AUTO: 1 % (ref 0–2)
KETONES UR STRIP-MCNC: ABNORMAL MG/DL
LEUKOCYTE ESTERASE UR QL STRIP: ABNORMAL
LYMPHOCYTES # BLD AUTO: 1 THOUSANDS/ΜL (ref 0.6–4.47)
LYMPHOCYTES NFR BLD AUTO: 5 % (ref 14–44)
MCH RBC QN AUTO: 33.5 PG (ref 26.8–34.3)
MCHC RBC AUTO-ENTMCNC: 34.3 G/DL (ref 31.4–37.4)
MCV RBC AUTO: 98 FL (ref 82–98)
MONOCYTES # BLD AUTO: 1.58 THOUSAND/ΜL (ref 0.17–1.22)
MONOCYTES NFR BLD AUTO: 8 % (ref 4–12)
NEUTROPHILS # BLD AUTO: 16.06 THOUSANDS/ΜL (ref 1.85–7.62)
NEUTS SEG NFR BLD AUTO: 86 % (ref 43–75)
NITRITE UR QL STRIP: POSITIVE
NON-SQ EPI CELLS URNS QL MICRO: ABNORMAL /HPF
NRBC BLD AUTO-RTO: 0 /100 WBCS
PH UR STRIP.AUTO: 6 [PH]
PLATELET # BLD AUTO: 84 THOUSANDS/UL (ref 149–390)
PMV BLD AUTO: 11.3 FL (ref 8.9–12.7)
POTASSIUM SERPL-SCNC: 4.2 MMOL/L (ref 3.5–5.3)
PROT SERPL-MCNC: 6.3 G/DL (ref 6.4–8.2)
PROT UR STRIP-MCNC: ABNORMAL MG/DL
RBC # BLD AUTO: 4.45 MILLION/UL (ref 3.88–5.62)
RBC #/AREA URNS AUTO: ABNORMAL /HPF
SODIUM SERPL-SCNC: 137 MMOL/L (ref 136–145)
SP GR UR STRIP.AUTO: 1.01 (ref 1–1.03)
UROBILINOGEN UR QL STRIP.AUTO: 4 E.U./DL
WBC # BLD AUTO: 18.92 THOUSAND/UL (ref 4.31–10.16)
WBC #/AREA URNS AUTO: ABNORMAL /HPF

## 2021-08-24 PROCEDURE — 99283 EMERGENCY DEPT VISIT LOW MDM: CPT

## 2021-08-24 PROCEDURE — 81001 URINALYSIS AUTO W/SCOPE: CPT | Performed by: NURSE PRACTITIONER

## 2021-08-24 PROCEDURE — 99284 EMERGENCY DEPT VISIT MOD MDM: CPT | Performed by: NURSE PRACTITIONER

## 2021-08-24 PROCEDURE — 85025 COMPLETE CBC W/AUTO DIFF WBC: CPT | Performed by: NURSE PRACTITIONER

## 2021-08-24 PROCEDURE — 80053 COMPREHEN METABOLIC PANEL: CPT | Performed by: NURSE PRACTITIONER

## 2021-08-24 PROCEDURE — 96366 THER/PROPH/DIAG IV INF ADDON: CPT

## 2021-08-24 PROCEDURE — 36415 COLL VENOUS BLD VENIPUNCTURE: CPT | Performed by: NURSE PRACTITIONER

## 2021-08-24 PROCEDURE — 87086 URINE CULTURE/COLONY COUNT: CPT | Performed by: NURSE PRACTITIONER

## 2021-08-24 PROCEDURE — 96365 THER/PROPH/DIAG IV INF INIT: CPT

## 2021-08-24 PROCEDURE — 87186 SC STD MICRODIL/AGAR DIL: CPT | Performed by: NURSE PRACTITIONER

## 2021-08-24 PROCEDURE — 87077 CULTURE AEROBIC IDENTIFY: CPT | Performed by: NURSE PRACTITIONER

## 2021-08-24 PROCEDURE — 82140 ASSAY OF AMMONIA: CPT | Performed by: NURSE PRACTITIONER

## 2021-08-24 PROCEDURE — 96368 THER/DIAG CONCURRENT INF: CPT

## 2021-08-24 RX ORDER — CEPHALEXIN 500 MG/1
500 CAPSULE ORAL EVERY 8 HOURS SCHEDULED
Qty: 30 CAPSULE | Refills: 0 | Status: SHIPPED | OUTPATIENT
Start: 2021-08-24 | End: 2021-09-03

## 2021-08-24 RX ORDER — IBUPROFEN 600 MG/1
600 TABLET ORAL EVERY 6 HOURS PRN
Status: DISCONTINUED | OUTPATIENT
Start: 2021-08-24 | End: 2021-08-24 | Stop reason: HOSPADM

## 2021-08-24 RX ORDER — ACETAMINOPHEN 325 MG/1
650 TABLET ORAL ONCE
Status: COMPLETED | OUTPATIENT
Start: 2021-08-24 | End: 2021-08-24

## 2021-08-24 RX ADMIN — ACETAMINOPHEN 650 MG: 325 TABLET, FILM COATED ORAL at 09:15

## 2021-08-24 RX ADMIN — IBUPROFEN 600 MG: 600 TABLET, FILM COATED ORAL at 10:09

## 2021-08-24 RX ADMIN — SODIUM CHLORIDE, SODIUM LACTATE, POTASSIUM CHLORIDE, AND CALCIUM CHLORIDE 1000 ML: .6; .31; .03; .02 INJECTION, SOLUTION INTRAVENOUS at 09:16

## 2021-08-24 RX ADMIN — CEFTRIAXONE SODIUM 1000 MG: 10 INJECTION, POWDER, FOR SOLUTION INTRAVENOUS at 09:48

## 2021-08-26 ENCOUNTER — TELEPHONE (OUTPATIENT)
Dept: GASTROENTEROLOGY | Facility: CLINIC | Age: 56
End: 2021-08-26

## 2021-08-26 LAB — BACTERIA UR CULT: ABNORMAL

## 2021-08-26 NOTE — TELEPHONE ENCOUNTER
Aric Pederson - patient called lmom stated he missed his visit today - no confirmation call  Tried to call patient back received voice mail - Mail box is full   This could be why

## 2021-09-10 ENCOUNTER — APPOINTMENT (OUTPATIENT)
Dept: LAB | Facility: CLINIC | Age: 56
End: 2021-09-10
Payer: COMMERCIAL

## 2021-09-10 ENCOUNTER — OFFICE VISIT (OUTPATIENT)
Dept: FAMILY MEDICINE CLINIC | Facility: CLINIC | Age: 56
End: 2021-09-10
Payer: COMMERCIAL

## 2021-09-10 VITALS
OXYGEN SATURATION: 98 % | WEIGHT: 205 LBS | HEART RATE: 60 BPM | TEMPERATURE: 97.6 F | DIASTOLIC BLOOD PRESSURE: 60 MMHG | BODY MASS INDEX: 32.95 KG/M2 | SYSTOLIC BLOOD PRESSURE: 110 MMHG | HEIGHT: 66 IN

## 2021-09-10 DIAGNOSIS — B18.2 HEPATIC CIRRHOSIS DUE TO CHRONIC HEPATITIS C INFECTION (HCC): ICD-10-CM

## 2021-09-10 DIAGNOSIS — K72.90 HEPATIC ENCEPHALOPATHY (HCC): ICD-10-CM

## 2021-09-10 DIAGNOSIS — Z00.00 MEDICARE ANNUAL WELLNESS VISIT, SUBSEQUENT: ICD-10-CM

## 2021-09-10 DIAGNOSIS — D72.829 LEUKOCYTOSIS, UNSPECIFIED TYPE: ICD-10-CM

## 2021-09-10 DIAGNOSIS — E80.6 HYPERBILIRUBINEMIA: ICD-10-CM

## 2021-09-10 DIAGNOSIS — D69.6 PLATELETS DECREASED (HCC): ICD-10-CM

## 2021-09-10 DIAGNOSIS — R32 BOWEL AND BLADDER INCONTINENCE: Primary | ICD-10-CM

## 2021-09-10 DIAGNOSIS — L71.9 ROSACEA: ICD-10-CM

## 2021-09-10 DIAGNOSIS — Z12.5 SCREENING PSA (PROSTATE SPECIFIC ANTIGEN): ICD-10-CM

## 2021-09-10 DIAGNOSIS — R15.9 BOWEL AND BLADDER INCONTINENCE: Primary | ICD-10-CM

## 2021-09-10 DIAGNOSIS — K74.60 HEPATIC CIRRHOSIS DUE TO CHRONIC HEPATITIS C INFECTION (HCC): ICD-10-CM

## 2021-09-10 LAB
ALBUMIN SERPL BCP-MCNC: 3.1 G/DL (ref 3.5–5)
ALP SERPL-CCNC: 113 U/L (ref 46–116)
ALT SERPL W P-5'-P-CCNC: 25 U/L (ref 12–78)
ANION GAP SERPL CALCULATED.3IONS-SCNC: 5 MMOL/L (ref 4–13)
AST SERPL W P-5'-P-CCNC: 32 U/L (ref 5–45)
BASOPHILS # BLD AUTO: 0.04 THOUSANDS/ΜL (ref 0–0.1)
BASOPHILS NFR BLD AUTO: 1 % (ref 0–1)
BILIRUB DIRECT SERPL-MCNC: 1.3 MG/DL (ref 0–0.2)
BILIRUB SERPL-MCNC: 2.85 MG/DL (ref 0.2–1)
BUN SERPL-MCNC: 11 MG/DL (ref 5–25)
CALCIUM ALBUM COR SERPL-MCNC: 9.5 MG/DL (ref 8.3–10.1)
CALCIUM SERPL-MCNC: 8.8 MG/DL (ref 8.3–10.1)
CHLORIDE SERPL-SCNC: 110 MMOL/L (ref 100–108)
CO2 SERPL-SCNC: 24 MMOL/L (ref 21–32)
CREAT SERPL-MCNC: 0.76 MG/DL (ref 0.6–1.3)
EOSINOPHIL # BLD AUTO: 0.31 THOUSAND/ΜL (ref 0–0.61)
EOSINOPHIL NFR BLD AUTO: 5 % (ref 0–6)
ERYTHROCYTE [DISTWIDTH] IN BLOOD BY AUTOMATED COUNT: 15.7 % (ref 11.6–15.1)
GFR SERPL CREATININE-BSD FRML MDRD: 101 ML/MIN/1.73SQ M
GLUCOSE P FAST SERPL-MCNC: 76 MG/DL (ref 65–99)
HCT VFR BLD AUTO: 40.4 % (ref 36.5–49.3)
HGB BLD-MCNC: 13.6 G/DL (ref 12–17)
IMM GRANULOCYTES # BLD AUTO: 0.02 THOUSAND/UL (ref 0–0.2)
IMM GRANULOCYTES NFR BLD AUTO: 0 % (ref 0–2)
LYMPHOCYTES # BLD AUTO: 1.33 THOUSANDS/ΜL (ref 0.6–4.47)
LYMPHOCYTES NFR BLD AUTO: 20 % (ref 14–44)
MCH RBC QN AUTO: 33.8 PG (ref 26.8–34.3)
MCHC RBC AUTO-ENTMCNC: 33.7 G/DL (ref 31.4–37.4)
MCV RBC AUTO: 101 FL (ref 82–98)
MONOCYTES # BLD AUTO: 0.63 THOUSAND/ΜL (ref 0.17–1.22)
MONOCYTES NFR BLD AUTO: 10 % (ref 4–12)
NEUTROPHILS # BLD AUTO: 4.2 THOUSANDS/ΜL (ref 1.85–7.62)
NEUTS SEG NFR BLD AUTO: 64 % (ref 43–75)
NRBC BLD AUTO-RTO: 0 /100 WBCS
PLATELET # BLD AUTO: 149 THOUSANDS/UL (ref 149–390)
PMV BLD AUTO: 11.7 FL (ref 8.9–12.7)
POTASSIUM SERPL-SCNC: 4.1 MMOL/L (ref 3.5–5.3)
PROT SERPL-MCNC: 6.6 G/DL (ref 6.4–8.2)
PSA SERPL-MCNC: 1.4 NG/ML (ref 0–4)
RBC # BLD AUTO: 4.02 MILLION/UL (ref 3.88–5.62)
SODIUM SERPL-SCNC: 139 MMOL/L (ref 136–145)
WBC # BLD AUTO: 6.53 THOUSAND/UL (ref 4.31–10.16)

## 2021-09-10 PROCEDURE — 85025 COMPLETE CBC W/AUTO DIFF WBC: CPT

## 2021-09-10 PROCEDURE — G0103 PSA SCREENING: HCPCS

## 2021-09-10 PROCEDURE — G0439 PPPS, SUBSEQ VISIT: HCPCS | Performed by: FAMILY MEDICINE

## 2021-09-10 PROCEDURE — 99214 OFFICE O/P EST MOD 30 MIN: CPT | Performed by: FAMILY MEDICINE

## 2021-09-10 PROCEDURE — 80053 COMPREHEN METABOLIC PANEL: CPT

## 2021-09-10 PROCEDURE — 3008F BODY MASS INDEX DOCD: CPT | Performed by: FAMILY MEDICINE

## 2021-09-10 PROCEDURE — 3725F SCREEN DEPRESSION PERFORMED: CPT | Performed by: FAMILY MEDICINE

## 2021-09-10 PROCEDURE — 36415 COLL VENOUS BLD VENIPUNCTURE: CPT

## 2021-09-10 PROCEDURE — 82248 BILIRUBIN DIRECT: CPT

## 2021-09-10 RX ORDER — METRONIDAZOLE 10 MG/G
GEL TOPICAL DAILY
Qty: 45 G | Refills: 1 | Status: SHIPPED | OUTPATIENT
Start: 2021-09-10

## 2021-09-10 RX ORDER — CHOLESTYRAMINE 4 G/9G
1 POWDER, FOR SUSPENSION ORAL 2 TIMES DAILY WITH MEALS
Qty: 60 PACKET | Refills: 1 | Status: SHIPPED | OUTPATIENT
Start: 2021-09-10 | End: 2022-07-14

## 2021-09-10 NOTE — PROGRESS NOTES
Assessment and Plan:     Problem List Items Addressed This Visit     None           Preventive health issues were discussed with patient, and age appropriate screening tests were ordered as noted in patient's After Visit Summary  Personalized health advice and appropriate referrals for health education or preventive services given if needed, as noted in patient's After Visit Summary       History of Present Illness:     Patient presents for Medicare Annual Wellness visit    Patient Care Team:  Marianna Saavedra MD as PCP - General  MD Ga Sparks PA-C (Gastroenterology)  Madina Cuenca PA-C as Physician Assistant (Gastroenterology)  Arlen Yoon MD (Gastroenterology)     Problem List:     Patient Active Problem List   Diagnosis    Former smoker    Erectile dysfunction    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 [facility]    Gastroesophageal reflux disease without esophagitis    Morbid obesity with BMI of 40 0-44 9, adult ([facility] )    Obstructive sleep apnea    Secondary esophageal varices without bleeding ([address] )    Hepatic encephalopathy ([facility] )    S/P laparoscopic sleeve gastrectomy    Mood swings    Acute pain of right knee    Effusion of right knee    Diabetes mellitus screening    Fatigue    Anemia    Dizzy    Postsurgical malabsorption    Esophageal varices without bleeding ([address] )    Need for Tdap vaccination    Medicare annual wellness visit, initial    Need for lipid screening    Laceration of scalp without foreign body    Midline low back pain without sciatica    Excess skin of abdominal wall    Myofascial muscle pain    Hyperammonemia ([address] )    Vitamin A deficiency    Need for influenza vaccination    Hypotension    Head cold    Heartburn    S/P abdominoplasty    Encounter for surgical aftercare following surgery of digestive system      Past Medical and Surgical History:     Past Medical History:   Diagnosis Date    CPAP (continuous positive airway pressure) dependence     does not use machine    Esophageal varices [facility] )     stable 9/2020 gets checked yearly    Hepatic cirrhosis ([facility] )     treated with harvoni   Hep C- dx age 17    Hepatic encephalopathy ([facility] )     Liver disease     Obesity     Pneumonia     in past    Postgastrectomy malabsorption     Sleep apnea     Wears glasses      Past Surgical History:   Procedure Laterality Date    COLONOSCOPY      ESOPHAGOGASTRODUODENOSCOPY N/A 4/12/2018    Procedure: ESOPHAGOGASTRODUODENOSCOPY (EGD); Surgeon: Rick David MD;  [facility]: [facility];   Service: Gastroenterology    FRACTURE SURGERY Left     ankle    OTHER SURGICAL HISTORY      banding esophageal varices    IN EXCISE EXCESS SKIN TISSUE,ABDOMEN N/A 9/16/2020    Procedure: PANNICULECTOMY;  Surgeon: Mily Boogie MD;  [facility];  Service: Plastics    IN EXCISE EXCESS SKIN TISSUE,ABDOMEN, ADD-ON N/A 9/16/2020    Procedure: ABDOMINOPLASTY;  Surgeon: Mily Boogie MD;  [facility];  Service: Plastics    IN LAP, TERESA RESTRICT PROC, LONGITUDINAL GASTRECTOMY N/A 6/5/2018    Procedure: GASTRECTOMY LAPAROSCOPIC SLEEVE;  Surgeon: Apple Rogers MD;  [facility];  Service: Bariatrics      Family History:     Family History   Problem Relation Age of Onset    Heart disease Mother     Lupus Mother     Diabetes Father     Stroke Father       Social History:     Social History     Socioeconomic History    Marital status: /Civil Union     Spouse name: None    Number of children: 2    Years of education: None    Highest education level: None   Occupational History    Occupation: disabled   Tobacco Use    Smoking status: Current Every Day Smoker     Packs/day: 0 25     Years: 37 00     Pack years: 9 25    Smokeless tobacco: Never Used    Tobacco comment: stopped 7/2020   Vaping Use    Vaping Use: Former    Substances: Flavoring   Substance and Sexual Activity    Alcohol use: Never     Alcohol/week: 0 0 standard drinks    Drug use: No    Sexual activity: Yes   Other Topics Concern    None   Social History Narrative    None     Social Determinants of Health     Financial Resource Strain:     Difficulty of Paying Living Expenses:    Food Insecurity:     Worried About Running Out of Food in the Last Year:     Ran Out of Food in the Last Year:    Transportation Needs:     Lack of Transportation (Medical):      Lack of Transportation (Non-Medical):    Physical Activity:     Days of Exercise per Week:     Minutes of Exercise per Session:    Stress:     Feeling of Stress :    Social Connections:     Frequency of Communication with Friends and Family:     Frequency of Social Gatherings with Friends and Family:     Attends Denominational Services:     Active Member of Clubs or Organizations:     Attends Club or Organization Meetings:     Marital Status:    Intimate Partner Violence:     Fear of Current or Ex-Partner:     Emotionally Abused:     Physically Abused:     Sexually Abused:       Medications and Allergies:     Current Outpatient Medications   Medication Sig Dispense Refill    furosemide (LASIX) 20 mg tablet Take 20 mg by mouth 2 (two) times a day      gabapentin (NEURONTIN) 100 mg capsule Take 1 capsule (100 mg total) by mouth 3 (three) times a day for 10 days 30 capsule 0    ibuprofen (MOTRIN) 800 mg tablet TAKE ONE TABLET BY MOUTH EVERY EIGHT HOURS AS NEEDED FOR MILD PAIN 30 tablet 0    lactulose (CONSTULOSE) 10 g/15 mL solution Take 15 mL (10 g total) by mouth daily as needed (constipation or confusion) 946 mL 1    methocarbamol (ROBAXIN) 750 mg tablet Take 1 tablet (750 mg total) by mouth every 8 (eight) hours 30 tablet 3    nadolol (CORGARD) 20 mg tablet Take 1 tablet (20 mg total) by mouth daily 90 tablet 2    omeprazole (PriLOSEC) 20 mg delayed release capsule TAKE ONE CAPSULE BY MOUTH ONE TIME DAILY  30 capsule 0    rifaximin (XIFAXAN) 550 mg tablet Take 550 mg by mouth 2 (two) times a day        sildenafil (VIAGRA) 100 mg tablet Take 1 tablet (100 mg total) by mouth as needed for erectile dysfunction 60 tablet 2    vitamin A (A-88885) 3 MG (36041 UT) capsule Take 1 capsule (10,000 Units total) by mouth daily 90 capsule 3     No current facility-administered medications for this visit  No Known Allergies   Immunizations:     Immunization History   Administered Date(s) Administered    INFLUENZA 10/28/2014, 09/15/2016, 11/04/2017, 08/30/2018    Influenza Split High Dose Preservative Free IM 10/28/2014    Influenza, injectable, quadrivalent, preservative free 0 5 mL 08/30/2018    Influenza, recombinant, quadrivalent,injectable, preservative free 11/20/2019    Pneumococcal Conjugate 13-Valent 06/17/2015    Tdap 05/01/2019    Tuberculin Skin Test-PPD Intradermal 10/28/2009, 02/07/2011, 07/23/2012, 03/16/2015    Zoster 10/28/2014      Health Maintenance:         Topic Date Due    Colorectal Cancer Screening  04/22/2025    HIV Screening  Completed    Hepatitis C Screening  Discontinued         Topic Date Due    Hepatitis A Vaccine (1 of 2 - Risk 2-dose series) Never done    COVID-19 Vaccine (1) Never done    Hepatitis B Vaccine (1 of 3 - Risk 3-dose series) Never done    Pneumococcal Vaccine: Pediatrics (0 to 5 Years) and At-Risk Patients (6 to 59 Years) (1 of 2 - PPSV23) 08/12/2015    Influenza Vaccine (1) 09/01/2021      Medicare Health Risk Assessment:     /60   Pulse 60   Temp 97 6 °F (36 4 °C)   Ht 5' 5 5" (1 664 m)   Wt 93 kg (205 lb)   SpO2 98%   BMI 33 59 kg/m²      Lucile Boas is here for his Subsequent Wellness visit  Health Risk Assessment:   Patient rates overall health as fair  Patient feels that their physical health rating is same  Patient is satisfied with their life  Eyesight was rated as same  Hearing was rated as same  Patient feels that their emotional and mental health rating is same   Patients states they are sometimes angry  Patient states they are sometimes unusually tired/fatigued  Pain experienced in the last 7 days has been none  Patient states that he has experienced no weight loss or gain in last 6 months  Depression Screening:   PHQ-2 Score: 2      Fall Risk Screening: In the past year, patient has experienced: no history of falling in past year      Home Safety:  Patient does not have trouble with stairs inside or outside of their home  Patient has working smoke alarms and has working carbon monoxide detector  Home safety hazards include: none  Nutrition:   Current diet is Regular  BMI Counseling: @BMI@ The BMI is above normal  Nutrition recommendations include 3-5 servings of fruits/vegetables daily, consuming healthier snacks and moderation in carbohydrate intake  Exercise recommendations include exercising 3-5 times per week  Medications:   Patient is currently taking over-the-counter supplements  OTC medications include: see medication list  Patient is able to manage medications  Activities of Daily Living (ADLs)/Instrumental Activities of Daily Living (IADLs):   Walk and transfer into and out of bed and chair?: Yes  Dress and groom yourself?: Yes    Bathe or shower yourself?: Yes    Feed yourself?  Yes  Do your laundry/housekeeping?: Yes  Manage your money, pay your bills and track your expenses?: Yes  Make your own meals?: Yes    Do your own shopping?: Yes    Previous Hospitalizations:   Any hospitalizations or ED visits within the last 12 months?: Yes    How many hospitalizations have you had in the last year?: 1-2    Advance Care Planning:     Five wishes given: No      PREVENTIVE SCREENINGS      Cardiovascular Screening:    General: Screening Current      Diabetes Screening:     General: Screening Current      Colorectal Cancer Screening:     General: Screening Current      Prostate Cancer Screening:      Due for: PSA      Osteoporosis Screening:    General: Screening Not Indicated Abdominal Aortic Aneurysm (AAA) Screening:    Risk factors include: tobacco use        Lung Cancer Screening:     General: Screening Not Indicated      Hepatitis C Screening:    General: Screening Not Indicated and History Hepatitis C    Hep C Screening Accepted: No     Screening, Brief Intervention, and Referral to Treatment (SBIRT)    Screening  Typical number of drinks in a day: 0  Typical number of drinks in a week: 0  Interpretation: Low risk drinking behavior      Single Item Drug Screening:  How often have you used an illegal drug (including marijuana) or a prescription medication for non-medical reasons in the past year? never    Single Item Drug Screen Score: 0  Interpretation: Negative screen for possible drug use disorder      Soheila Redding MD

## 2021-09-10 NOTE — PATIENT INSTRUCTIONS
Medicare Preventive Visit Patient Instructions  Thank you for completing your Welcome to Medicare Visit or Medicare Annual Wellness Visit today  Your next wellness visit will be due in one year (9/11/2022)  The screening/preventive services that you may require over the next 5-10 years are detailed below  Some tests may not apply to you based off risk factors and/or age  Screening tests ordered at today's visit but not completed yet may show as past due  Also, please note that scanned in results may not display below  Preventive Screenings:  Service Recommendations Previous Testing/Comments   Colorectal Cancer Screening  · Colonoscopy    · Fecal Occult Blood Test (FOBT)/Fecal Immunochemical Test (FIT)  · Fecal DNA/Cologuard Test  · Flexible Sigmoidoscopy Age: 54-65 years old   Colonoscopy: every 10 years (May be performed more frequently if at higher risk)  OR  FOBT/FIT: every 1 year  OR  Cologuard: every 3 years  OR  Sigmoidoscopy: every 5 years  Screening may be recommended earlier than age 48 if at higher risk for colorectal cancer  Also, an individualized decision between you and your healthcare provider will decide whether screening between the ages of 74-80 would be appropriate   Colonoscopy: 04/22/2015  FOBT/FIT: Not on file  Cologuard: Not on file  Sigmoidoscopy: Not on file    Screening Current     Prostate Cancer Screening Individualized decision between patient and health care provider in men between ages of 53-78   Medicare will cover every 12 months beginning on the day after your 50th birthday PSA: 0 2 ng/mL           Hepatitis C Screening Once for adults born between 1945 and 1965  More frequently in patients at high risk for Hepatitis C Hep C Antibody: 08/19/2021    Screening Not Indicated  History Hepatitis C   Diabetes Screening 1-2 times per year if you're at risk for diabetes or have pre-diabetes Fasting glucose: 94 mg/dL   A1C: 4 5 %    Screening Current   Cholesterol Screening Once every 5 years if you don't have a lipid disorder  May order more often based on risk factors  Lipid panel: 08/19/2021    Screening Current      Other Preventive Screenings Covered by Medicare:  1  Abdominal Aortic Aneurysm (AAA) Screening: covered once if your at risk  You're considered to be at risk if you have a family history of AAA or a male between the age of 73-68 who smoking at least 100 cigarettes in your lifetime  2  Lung Cancer Screening: covers low dose CT scan once per year if you meet all of the following conditions: (1) Age 50-69; (2) No signs or symptoms of lung cancer; (3) Current smoker or have quit smoking within the last 15 years; (4) You have a tobacco smoking history of at least 30 pack years (packs per day x number of years you smoked); (5) You get a written order from a healthcare provider  3  Glaucoma Screening: covered annually if you're considered high risk: (1) You have diabetes OR (2) Family history of glaucoma OR (3)  aged 48 and older OR (3)  American aged 72 and older  3  Osteoporosis Screening: covered every 2 years if you meet one of the following conditions: (1) Have a vertebral abnormality; (2) On glucocorticoid therapy for more than 3 months; (3) Have primary hyperparathyroidism; (4) On osteoporosis medications and need to assess response to drug therapy  5  HIV Screening: covered annually if you're between the age of 12-76  Also covered annually if you are younger than 13 and older than 72 with risk factors for HIV infection  For pregnant patients, it is covered up to 3 times per pregnancy      Immunizations:  Immunization Recommendations   Influenza Vaccine Annual influenza vaccination during flu season is recommended for all persons aged >= 6 months who do not have contraindications   Pneumococcal Vaccine (Prevnar and Pneumovax)  * Prevnar = PCV13  * Pneumovax = PPSV23 Adults 25-60 years old: 1-3 doses may be recommended based on certain risk factors  Adults 72 years old: Prevnar (PCV13) vaccine recommended followed by Pneumovax (PPSV23) vaccine  If already received PPSV23 since turning 65, then PCV13 recommended at least one year after PPSV23 dose  Hepatitis B Vaccine 3 dose series if at intermediate or high risk (ex: diabetes, end stage renal disease, liver disease)   Tetanus (Td) Vaccine - COST NOT COVERED BY MEDICARE PART B Following completion of primary series, a booster dose should be given every 10 years to maintain immunity against tetanus  Td may also be given as tetanus wound prophylaxis  Tdap Vaccine - COST NOT COVERED BY MEDICARE PART B Recommended at least once for all adults  For pregnant patients, recommended with each pregnancy  Shingles Vaccine (Shingrix) - COST NOT COVERED BY MEDICARE PART B  2 shot series recommended in those aged 48 and above     Health Maintenance Due:      Topic Date Due    Colorectal Cancer Screening  04/22/2025    HIV Screening  Completed    Hepatitis C Screening  Discontinued     Immunizations Due:      Topic Date Due    Hepatitis A Vaccine (1 of 2 - Risk 2-dose series) Never done    COVID-19 Vaccine (1) Never done    Hepatitis B Vaccine (1 of 3 - Risk 3-dose series) Never done    Pneumococcal Vaccine: Pediatrics (0 to 5 Years) and At-Risk Patients (6 to 59 Years) (1 of 2 - PPSV23) 08/12/2015    Influenza Vaccine (1) 09/01/2021     Advance Directives   What are advance directives? Advance directives are legal documents that state your wishes and plans for medical care  These plans are made ahead of time in case you lose your ability to make decisions for yourself  Advance directives can apply to any medical decision, such as the treatments you want, and if you want to donate organs  What are the types of advance directives? There are many types of advance directives, and each state has rules about how to use them  You may choose a combination of any of the following:  · Living will:   This is a written record of the treatment you want  You can also choose which treatments you do not want, which to limit, and which to stop at a certain time  This includes surgery, medicine, IV fluid, and tube feedings  · Durable power of  for healthcare [place]): This is a written record that states who you want to make healthcare choices for you when you are unable to make them for yourself  This person, called a proxy, is usually a family member or a friend  You may choose more than 1 proxy  · Do not resuscitate (DNR) order:  A DNR order is used in case your heart stops beating or you stop breathing  It is a request not to have certain forms of treatment, such as CPR  A DNR order may be included in other types of advance directives  · Medical directive: This covers the care that you want if you are in a coma, near death, or unable to make decisions for yourself  You can list the treatments you want for each condition  Treatment may include pain medicine, surgery, blood transfusions, dialysis, IV or tube feedings, and a ventilator (breathing machine)  · Values history: This document has questions about your views, beliefs, and how you feel and think about life  This information can help others choose the care that you would choose  Why are advance directives important? An advance directive helps you control your care  Although spoken wishes may be used, it is better to have your wishes written down  Spoken wishes can be misunderstood, or not followed  Treatments may be given even if you do not want them  An advance directive may make it easier for your family to make difficult choices about your care  Cigarette Smoking and Your Health   Risks to your health if you smoke:  Nicotine and other chemicals found in tobacco damage every cell in your body  Even if you are a light smoker, you have an increased risk for cancer, heart disease, and lung disease   If you are pregnant or have diabetes, smoking increases your risk for complications  Benefits to your health if you stop smoking:   · You decrease respiratory symptoms such as coughing, wheezing, and shortness of breath  · You reduce your risk for cancers of the lung, mouth, throat, kidney, bladder, pancreas, stomach, and cervix  If you already have cancer, you increase the benefits of chemotherapy  You also reduce your risk for cancer returning or a second cancer from developing  · You reduce your risk for heart disease, blood clots, heart attack, and stroke  · You reduce your risk for lung infections, and diseases such as pneumonia, asthma, chronic bronchitis, and emphysema  · Your circulation improves  More oxygen can be delivered to your body  If you have diabetes, you lower your risk for complications, such as kidney, artery, and eye diseases  You also lower your risk for nerve damage  Nerve damage can lead to amputations, poor vision, and blindness  · You improve your body's ability to heal and to fight infections  For more information and support to stop smoking:   · [employer]  Phone: 5- 764 - 344-4587  Web Address: [employer]  Weight Management   Why it is important to manage your weight:  Being overweight increases your risk of health conditions such as heart disease, high blood pressure, type 2 diabetes, and certain types of cancer  It can also increase your risk for osteoarthritis, sleep apnea, and other respiratory problems  Aim for a slow, steady weight loss  Even a small amount of weight loss can lower your risk of health problems  How to lose weight safely:  A safe and healthy way to lose weight is to eat fewer calories and get regular exercise  You can lose up about 1 pound a week by decreasing the number of calories you eat by 500 calories each day  Healthy meal plan for weight management:  A healthy meal plan includes a variety of foods, contains fewer calories, and helps you stay healthy   A healthy meal plan includes the following:  · Eat whole-grain foods more often  A healthy meal plan should contain fiber  Fiber is the part of grains, fruits, and vegetables that is not broken down by your body  Whole-grain foods are healthy and provide extra fiber in your diet  Some examples of whole-grain foods are whole-wheat breads and pastas, oatmeal, brown rice, and bulgur  · Eat a variety of vegetables every day  Include dark, leafy greens such as spinach, kale, jt greens, and mustard greens  Eat yellow and orange vegetables such as carrots, sweet potatoes, and winter squash  · Eat a variety of fruits every day  Choose fresh or canned fruit (canned in its own juice or light syrup) instead of juice  Fruit juice has very little or no fiber  · Eat low-fat dairy foods  Drink fat-free (skim) milk or 1% milk  Eat fat-free yogurt and low-fat cottage cheese  Try low-fat cheeses such as mozzarella and other reduced-fat cheeses  · Choose meat and other protein foods that are low in fat  Choose beans or other legumes such as split peas or lentils  Choose fish, skinless poultry (chicken or turkey), or lean cuts of red meat (beef or pork)  Before you cook meat or poultry, cut off any visible fat  · Use less fat and oil  Try baking foods instead of frying them  Add less fat, such as margarine, sour cream, regular salad dressing and mayonnaise to foods  Eat fewer high-fat foods  Some examples of high-fat foods include french fries, doughnuts, ice cream, and cakes  · Eat fewer sweets  Limit foods and drinks that are high in sugar  This includes candy, cookies, regular soda, and sweetened drinks  Exercise:  Exercise at least 30 minutes per day on most days of the week  Some examples of exercise include walking, biking, dancing, and swimming  You can also fit in more physical activity by taking the stairs instead of the elevator or parking farther away from stores  Ask your healthcare provider about the best exercise plan for you        © Copyright [employer] 2018 Information is for [employer] use only and may not be sold, redistributed or otherwise used for commercial purposes   All illustrations and images included in CareNotes® are the copyrighted property of [employer]  or [address] [place], [place] [address]damian nataliia

## 2021-09-10 NOTE — PROGRESS NOTES
Assessment/Plan:    No problem-specific Assessment & Plan notes found for this encounter  Diagnoses and all orders for this visit:    Bowel and bladder incontinence  -     cholestyramine (QUESTRAN) 4 g packet; Take 1 packet (4 g total) by mouth 2 (two) times a day with meals    Leukocytosis, unspecified type  -     CBC and differential; Future    Hyperbilirubinemia  -     Comprehensive metabolic panel; Future  -     Bilirubin, direct; Future    Screening PSA (prostate specific antigen)  -     PSA, Total Screen; Future    Platelets decreased (HCC)  Repeat CBC    Hepatic encephalopathy (HCC)  Last ammonia level normal  Not taking lactulose    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 ([facility] [address] )  -     US right upper quadrant; Future    Medicare annual wellness visit, subsequent  -     Ambulatory referral to Dermatology; Future      Follow up in 3 months    Subjective:      Patient ID: Porfirio Rojas Sr  is a 61 y o  male  Patient is here due to bowel and urinary inconvenient for the past several weeks getting worse  He denies any blood in the stool  He has been taking Imodium which is not helping  He has a History of liver cirrhosis not currently taking lactulose recent ammonia level was normal   Also had elevated wbc count and elevated bilirubin levels had a recent UTI treated  Also has been having a facial rash  The following portions of the patient's history were reviewed and updated as appropriate:   He  has a past medical history of CPAP (continuous positive airway pressure) dependence, Esophageal varices ([facility] [address] ), Hepatic cirrhosis ([facility] [address] ), Hepatic encephalopathy ([facility] ), Liver disease, Obesity, Pneumonia, Postgastrectomy malabsorption, Sleep apnea, and Wears glasses    He   Patient Active Problem List    Diagnosis Date Noted    Platelets decreased ([facility] ) 09/10/2021    Leukocytosis 09/10/2021    Bowel and bladder incontinence 09/10/2021    Hyperbilirubinemia 09/10/2021    Medicare annual wellness visit, subsequent 09/10/2021    Encounter for surgical aftercare following surgery of digestive system 01/11/2021    S/P abdominoplasty 09/24/2020    Head cold 03/16/2020    Heartburn 03/16/2020    Vitamin A deficiency 11/20/2019    Need for influenza vaccination 11/20/2019    Hypotension 11/20/2019    Hyperammonemia ([facility] [address] ) 10/30/2019    Myofascial muscle pain 10/18/2019    Midline low back pain without sciatica 10/04/2019    Excess skin of abdominal wall 10/04/2019    Laceration of scalp without foreign body 06/14/2019    Need for Tdap vaccination 05/01/2019    Medicare annual wellness visit, initial 05/01/2019    Need for lipid screening 05/01/2019    Esophageal varices without bleeding ([facility] ) 03/13/2019    Postsurgical malabsorption 09/26/2018    Diabetes mellitus screening 08/07/2018    Fatigue 08/07/2018    Anemia 08/07/2018    Dizzy 08/07/2018    Acute pain of right knee 07/03/2018    Effusion of right knee 07/03/2018    S/P laparoscopic sleeve gastrectomy 06/13/2018    Mood swings 06/13/2018    Secondary esophageal varices without bleeding ([facility] [address] ) 03/23/2018    Hepatic encephalopathy ([facility] ) 03/23/2018    Obstructive sleep apnea 03/08/2018    Morbid obesity with BMI of 40 0-44 9, adult ([facility] [address] ) 03/02/2018    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 ([facility] ) 02/21/2018    Gastroesophageal reflux disease without esophagitis 02/21/2018    Former smoker 02/12/2018    Erectile dysfunction 02/12/2018     He  has a past surgical history that includes Colonoscopy; Esophagogastroduodenoscopy (N/A, 4/12/2018); Other surgical history; Fracture surgery (Left); pr lap, dilip restrict proc, longitudinal gastrectomy (N/A, 6/5/2018); pr excise excess skin tissue,abdomen (N/A, 9/16/2020); and pr excise excess skin tissue,abdomen, add-on (N/A, 9/16/2020)  His family history includes Diabetes in his father; Heart disease in his mother; Lupus in his mother; Stroke in his father    He reports that he has been smoking  He has a 9 25 pack-year smoking history  He has never used smokeless tobacco  He reports that he does not drink alcohol and does not use drugs  Current Outpatient Medications   Medication Sig Dispense Refill    cholestyramine (QUESTRAN) 4 g packet Take 1 packet (4 g total) by mouth 2 (two) times a day with meals 60 packet 1    furosemide (LASIX) 20 mg tablet Take 20 mg by mouth 2 (two) times a day      gabapentin (NEURONTIN) 100 mg capsule Take 1 capsule (100 mg total) by mouth 3 (three) times a day for 10 days 30 capsule 0    ibuprofen (MOTRIN) 800 mg tablet TAKE ONE TABLET BY MOUTH EVERY EIGHT HOURS AS NEEDED FOR MILD PAIN 30 tablet 0    lactulose (CONSTULOSE) 10 g/15 mL solution Take 15 mL (10 g total) by mouth daily as needed (constipation or confusion) 946 mL 1    methocarbamol (ROBAXIN) 750 mg tablet Take 1 tablet (750 mg total) by mouth every 8 (eight) hours 30 tablet 3    nadolol (CORGARD) 20 mg tablet Take 1 tablet (20 mg total) by mouth daily 90 tablet 2    omeprazole (PriLOSEC) 20 mg delayed release capsule TAKE ONE CAPSULE BY MOUTH ONE TIME DAILY  30 capsule 0    rifaximin (XIFAXAN) 550 mg tablet Take 550 mg by mouth 2 (two) times a day        sildenafil (VIAGRA) 100 mg tablet Take 1 tablet (100 mg total) by mouth as needed for erectile dysfunction 60 tablet 2    vitamin A (A-80974) 3 MG (11019 UT) capsule Take 1 capsule (10,000 Units total) by mouth daily 90 capsule 3     No current facility-administered medications for this visit       Current Outpatient Medications on File Prior to Visit   Medication Sig    furosemide (LASIX) 20 mg tablet Take 20 mg by mouth 2 (two) times a day    gabapentin (NEURONTIN) 100 mg capsule Take 1 capsule (100 mg total) by mouth 3 (three) times a day for 10 days    ibuprofen (MOTRIN) 800 mg tablet TAKE ONE TABLET BY MOUTH EVERY EIGHT HOURS AS NEEDED FOR MILD PAIN    lactulose (CONSTULOSE) 10 g/15 mL solution Take 15 mL (10 g total) by mouth daily as needed (constipation or confusion)    methocarbamol (ROBAXIN) 750 mg tablet Take 1 tablet (750 mg total) by mouth every 8 (eight) hours    nadolol (CORGARD) 20 mg tablet Take 1 tablet (20 mg total) by mouth daily    omeprazole (PriLOSEC) 20 mg delayed release capsule TAKE ONE CAPSULE BY MOUTH ONE TIME DAILY     rifaximin (XIFAXAN) 550 mg tablet Take 550 mg by mouth 2 (two) times a day      sildenafil (VIAGRA) 100 mg tablet Take 1 tablet (100 mg total) by mouth as needed for erectile dysfunction    vitamin A (A-32339) 3 MG (48636 UT) capsule Take 1 capsule (10,000 Units total) by mouth daily     No current facility-administered medications on file prior to visit  He has No Known Allergies       Review of Systems   Constitutional: Negative for activity change, appetite change, fatigue and fever  HENT: Negative for congestion and ear discharge  Respiratory: Negative for cough and shortness of breath  Cardiovascular: Negative for chest pain and palpitations  Gastrointestinal: Negative for diarrhea and nausea  Musculoskeletal: Negative for arthralgias and back pain  Skin: Positive for rash  Negative for color change  Neurological: Negative for dizziness and headaches  Psychiatric/Behavioral: Negative for agitation and behavioral problems  Objective:      /60   Pulse 60   Temp 97 6 °F (36 4 °C)   Ht 5' 5 5" (1 664 m)   Wt 93 kg (205 lb)   SpO2 98%   BMI 33 59 kg/m²          Physical Exam  Constitutional:       General: He is not in acute distress  Appearance: He is well-developed  He is not diaphoretic  Eyes:      General: No scleral icterus  Pupils: Pupils are equal, round, and reactive to light  Cardiovascular:      Rate and Rhythm: Normal rate and regular rhythm  Heart sounds: Normal heart sounds  No murmur heard  Pulmonary:      Effort: Pulmonary effort is normal  No respiratory distress        Breath sounds: Normal breath sounds  No wheezing  Abdominal:      General: Bowel sounds are normal  There is no distension  Palpations: Abdomen is soft  Tenderness: There is no abdominal tenderness  Skin:     General: Skin is warm and dry  Findings: Erythema and rash present  Neurological:      Mental Status: He is alert and oriented to person, place, and time

## 2021-09-14 ENCOUNTER — TELEPHONE (OUTPATIENT)
Dept: GASTROENTEROLOGY | Facility: CLINIC | Age: 56
End: 2021-09-14

## 2021-09-14 NOTE — TELEPHONE ENCOUNTER
Dionisio Setter - patient called lmom to schedule OV - Diarrhea - Called patient lmom for patient to call the office

## 2021-10-15 DIAGNOSIS — E66.01 MORBID (SEVERE) OBESITY DUE TO EXCESS CALORIES (HCC): ICD-10-CM

## 2021-10-18 RX ORDER — OMEPRAZOLE 20 MG/1
CAPSULE, DELAYED RELEASE ORAL
Qty: 30 CAPSULE | Refills: 0 | Status: SHIPPED | OUTPATIENT
Start: 2021-10-18 | End: 2021-11-15

## 2021-11-13 DIAGNOSIS — E66.01 MORBID (SEVERE) OBESITY DUE TO EXCESS CALORIES (HCC): ICD-10-CM

## 2021-11-15 RX ORDER — OMEPRAZOLE 20 MG/1
CAPSULE, DELAYED RELEASE ORAL
Qty: 30 CAPSULE | Refills: 0 | Status: SHIPPED | OUTPATIENT
Start: 2021-11-15 | End: 2021-12-28

## 2021-12-28 DIAGNOSIS — M54.50 MIDLINE LOW BACK PAIN WITHOUT SCIATICA, UNSPECIFIED CHRONICITY: ICD-10-CM

## 2021-12-28 DIAGNOSIS — M79.18 MYOFASCIAL MUSCLE PAIN: ICD-10-CM

## 2021-12-28 DIAGNOSIS — E66.01 MORBID (SEVERE) OBESITY DUE TO EXCESS CALORIES (HCC): ICD-10-CM

## 2021-12-28 RX ORDER — OMEPRAZOLE 20 MG/1
CAPSULE, DELAYED RELEASE ORAL
Qty: 30 CAPSULE | Refills: 0 | Status: SHIPPED | OUTPATIENT
Start: 2021-12-28 | End: 2022-02-23

## 2021-12-28 RX ORDER — METHOCARBAMOL 750 MG/1
TABLET, FILM COATED ORAL
Qty: 30 TABLET | Refills: 0 | Status: SHIPPED | OUTPATIENT
Start: 2021-12-28 | End: 2022-07-07 | Stop reason: SDUPTHER

## 2021-12-28 RX ORDER — IBUPROFEN 800 MG/1
TABLET ORAL
Qty: 30 TABLET | Refills: 0 | Status: SHIPPED | OUTPATIENT
Start: 2021-12-28 | End: 2022-02-21

## 2021-12-30 ENCOUNTER — TELEPHONE (OUTPATIENT)
Dept: FAMILY MEDICINE CLINIC | Facility: CLINIC | Age: 56
End: 2021-12-30

## 2021-12-30 DIAGNOSIS — Z20.822 CLOSE EXPOSURE TO COVID-19 VIRUS: Primary | ICD-10-CM

## 2022-01-07 ENCOUNTER — OFFICE VISIT (OUTPATIENT)
Dept: FAMILY MEDICINE CLINIC | Facility: CLINIC | Age: 57
End: 2022-01-07
Payer: COMMERCIAL

## 2022-01-07 VITALS
HEART RATE: 58 BPM | TEMPERATURE: 97.5 F | OXYGEN SATURATION: 99 % | HEIGHT: 66 IN | DIASTOLIC BLOOD PRESSURE: 68 MMHG | SYSTOLIC BLOOD PRESSURE: 112 MMHG | BODY MASS INDEX: 31.82 KG/M2 | WEIGHT: 198 LBS

## 2022-01-07 DIAGNOSIS — Z23 NEED FOR COVID-19 VACCINE: ICD-10-CM

## 2022-01-07 DIAGNOSIS — K72.90 HEPATIC ENCEPHALOPATHY (HCC): Primary | ICD-10-CM

## 2022-01-07 DIAGNOSIS — D69.6 PLATELETS DECREASED (HCC): ICD-10-CM

## 2022-01-07 PROBLEM — J00 HEAD COLD: Status: RESOLVED | Noted: 2020-03-16 | Resolved: 2022-01-07

## 2022-01-07 PROBLEM — R53.83 FATIGUE: Status: RESOLVED | Noted: 2018-08-07 | Resolved: 2022-01-07

## 2022-01-07 PROCEDURE — 99213 OFFICE O/P EST LOW 20 MIN: CPT | Performed by: FAMILY MEDICINE

## 2022-01-07 PROCEDURE — 3008F BODY MASS INDEX DOCD: CPT | Performed by: FAMILY MEDICINE

## 2022-01-07 RX ORDER — LACTULOSE 10 G/15ML
10 SOLUTION ORAL DAILY PRN
Qty: 946 ML | Refills: 1 | Status: SHIPPED | OUTPATIENT
Start: 2022-01-07 | End: 2022-07-07 | Stop reason: SDUPTHER

## 2022-01-07 NOTE — LETTER
Date: 01/07/2022      To whom it may concern:      Mr Ag Wall finished his Covid-19 Primary series on 09/09/2021  He is eligible to a Covid booster 5 months after primary series  He could receive his Covid Booster on or after 02/09/2022  If you have any questions feel free to contact us at [phone]            Sincerely,          Jonas Madden MD

## 2022-01-07 NOTE — PROGRESS NOTES
Assessment/Plan:    No problem-specific Assessment & Plan notes found for this encounter  Diagnoses and all orders for this visit:    Hepatic encephalopathy (HCC)  -     lactulose (Constulose) 10 g/15 mL solution; Take 15 mL (10 g total) by mouth daily as needed (constipation or confusion)    Platelets decreased ([address] )    Need for COVID-19 vaccine  Advise he can received his booster after 02/09/2022 5 months after primary series    Follow up as needed        Subjective:      Patient ID: Areli Bain Sr  is a 61 y o  male  Patient is here to follow up for hepatic encephalopathy  Takes lactulose  Hx of hepatitis C  Also he finished his Primary Covid Vaccine on 09/09/2021  The following portions of the patient's history were reviewed and updated as appropriate:   He  has a past medical history of CPAP (continuous positive airway pressure) dependence, Esophageal varices ([address] ), Hepatic cirrhosis ([address] ), Hepatic encephalopathy ([address] ), Liver disease, Obesity, Pneumonia, Postgastrectomy malabsorption, Sleep apnea, and Wears glasses    He   Patient Active Problem List    Diagnosis Date Noted    Platelets decreased ([address] ) 09/10/2021    Leukocytosis 09/10/2021    Bowel and bladder incontinence 09/10/2021    Hyperbilirubinemia 09/10/2021    Medicare annual wellness visit, subsequent 09/10/2021    Encounter for surgical aftercare following surgery of digestive system 01/11/2021    S/P abdominoplasty 09/24/2020    Heartburn 03/16/2020    Vitamin A deficiency 11/20/2019    Need for influenza vaccination 11/20/2019    Hypotension 11/20/2019    Hyperammonemia ([address] ) 10/30/2019    Myofascial muscle pain 10/18/2019    Midline low back pain without sciatica 10/04/2019    Excess skin of abdominal wall 10/04/2019    Laceration of scalp without foreign body 06/14/2019    Need for Tdap vaccination 05/01/2019    Medicare annual wellness visit, initial 05/01/2019    Need for lipid screening 05/01/2019    Esophageal varices without bleeding (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 ([place] [address] ) 02/21/2018    Gastroesophageal reflux disease without esophagitis 02/21/2018    Former smoker 02/12/2018    Erectile dysfunction 02/12/2018     He  has a past surgical history that includes Colonoscopy; Esophagogastroduodenoscopy (N/A, 4/12/2018); Other surgical history; Fracture surgery (Left); pr lap, dilip restrict proc, longitudinal gastrectomy (N/A, 6/5/2018); pr excise excess skin tissue,abdomen (N/A, 9/16/2020); and pr excise excess skin tissue,abdomen, add-on (N/A, 9/16/2020)  His family history includes Diabetes in his father; Heart disease in his mother; Lupus in his mother; Stroke in his father  He  reports that he has been smoking  He has a 9 25 pack-year smoking history  He has never used smokeless tobacco  He reports that he does not drink alcohol and does not use drugs    Current Outpatient Medications   Medication Sig Dispense Refill    cholestyramine (QUESTRAN) 4 g packet Take 1 packet (4 g total) by mouth 2 (two) times a day with meals 60 packet 1    furosemide (LASIX) 20 mg tablet Take 20 mg by mouth 2 (two) times a day      gabapentin (NEURONTIN) 100 mg capsule Take 1 capsule (100 mg total) by mouth 3 (three) times a day for 10 days 30 capsule 0    ibuprofen (MOTRIN) 800 mg tablet TAKE ONE TABLET BY MOUTH EVERY EIGHT HOURS AS NEEDED FOR MILD PAIN 30 tablet 0    lactulose (Constulose) 10 g/15 mL solution Take 15 mL (10 g total) by mouth daily as needed (constipation or confusion) 946 mL 1    methocarbamol (ROBAXIN) 750 mg tablet TAKE ONE TABLET BY MOUTH EVERY EIGHT HOURS 30 tablet 0    metroNIDAZOLE (METROGEL) 1 % gel Apply topically daily 45 g 1    nadolol (CORGARD) 20 mg tablet Take 1 tablet (20 mg total) by mouth daily 90 tablet 2    omeprazole (PriLOSEC) 20 mg delayed release capsule TAKE ONE CAPSULE BY MOUTH ONE TIME DAILY 30 capsule 0    rifaximin (XIFAXAN) 550 mg tablet Take 550 mg by mouth 2 (two) times a day        sildenafil (VIAGRA) 100 mg tablet Take 1 tablet (100 mg total) by mouth as needed for erectile dysfunction 60 tablet 2    vitamin A (A-45322) 3 MG (64991 UT) capsule Take 1 capsule (10,000 Units total) by mouth daily 90 capsule 3     No current facility-administered medications for this visit       Current Outpatient Medications on File Prior to Visit   Medication Sig    cholestyramine (QUESTRAN) 4 g packet Take 1 packet (4 g total) by mouth 2 (two) times a day with meals    furosemide (LASIX) 20 mg tablet Take 20 mg by mouth 2 (two) times a day    gabapentin (NEURONTIN) 100 mg capsule Take 1 capsule (100 mg total) by mouth 3 (three) times a day for 10 days    ibuprofen (MOTRIN) 800 mg tablet TAKE ONE TABLET BY MOUTH EVERY EIGHT HOURS AS NEEDED FOR MILD PAIN    methocarbamol (ROBAXIN) 750 mg tablet TAKE ONE TABLET BY MOUTH EVERY EIGHT HOURS    metroNIDAZOLE (METROGEL) 1 % gel Apply topically daily    nadolol (CORGARD) 20 mg tablet Take 1 tablet (20 mg total) by mouth daily    omeprazole (PriLOSEC) 20 mg delayed release capsule TAKE ONE CAPSULE BY MOUTH ONE TIME DAILY    rifaximin (XIFAXAN) 550 mg tablet Take 550 mg by mouth 2 (two) times a day      sildenafil (VIAGRA) 100 mg tablet Take 1 tablet (100 mg total) by mouth as needed for erectile dysfunction    [DISCONTINUED] lactulose (CONSTULOSE) 10 g/15 mL solution Take 15 mL (10 g total) by mouth daily as needed (constipation or confusion)    vitamin A (A-60102) 3 MG (46260 UT) capsule Take 1 capsule (10,000 Units total) by mouth daily     No current facility-administered medications on file prior to visit  He has No Known Allergies       Review of Systems   Constitutional: Negative for activity change, appetite change, fatigue and fever  HENT: Negative for congestion and ear discharge  Respiratory: Negative for cough and shortness of breath  Cardiovascular: Negative for chest pain and palpitations  Gastrointestinal: Negative for diarrhea and nausea  Musculoskeletal: Negative for arthralgias and back pain  Skin: Negative for color change and rash  Neurological: Negative for dizziness and headaches  Psychiatric/Behavioral: Negative for agitation and behavioral problems  Objective:      /68   Pulse 58   Temp 97 5 °F (36 4 °C)   Ht 5' 5 5" (1 664 m)   Wt 89 8 kg (198 lb)   SpO2 99%   BMI 32 45 kg/m²          Physical Exam  Constitutional:       General: He is not in acute distress  Appearance: He is well-developed  He is not diaphoretic  Eyes:      General: No scleral icterus  Pupils: Pupils are equal, round, and reactive to light  Cardiovascular:      Rate and Rhythm: Normal rate and regular rhythm  Heart sounds: Normal heart sounds  No murmur heard  Pulmonary:      Effort: Pulmonary effort is normal  No respiratory distress  Breath sounds: Normal breath sounds  No wheezing  Abdominal:      General: Bowel sounds are normal  There is no distension  Palpations: Abdomen is soft  Tenderness: There is no abdominal tenderness  Skin:     General: Skin is warm and dry  Findings: No rash  Neurological:      Mental Status: He is alert and oriented to person, place, and time

## 2022-01-17 NOTE — PROGRESS NOTES
Assessment  1  Family history of  : Mother, Father   2  Glucose found in urine on examination (791 5) (R81)   3  Acute UTI (599 0) (N39 0)   4  Erectile dysfunction (607 84) (N52 9)   5  Diabetes mellitus screening (V77 1) (Z13 1)   6  Lipid screening ( 91) (Z13 220)   7  History of hepatitis C (V12 09) (Z86 19)   8  Cirrhosis of liver (571 5) (K74 60)    Plan  Acute UTI    · Amoxicillin-Pot Clavulanate 875-125 MG Oral Tablet (Augmentin); TAKE 1 TABLET  EVERY 12 HOURS DAILY   · (1) URINE CULTURE; Source:Urine, Clean Catch; Status:Active; Requested  for:2017;   Diabetes mellitus screening    · (1) COMPREHENSIVE METABOLIC PANEL; Status:Active; Requested for:2017;   Erectile dysfunction    · Staxyn 10 MG Oral Tablet Disintegrating; TAKE 10 MG Daily PRN  Glucose found in urine on examination    · Hemoglobin A1c- POC; Status:Complete - Retrospective Authorization;   Done:  [id number] 10:26AM  Lipid screening    · (1) LIPID PANEL, FASTING; Status:Active; Requested for:2017;   SocHx: Current every day smoker    · You need to quit smoking ; Status:Complete - Retrospective Authorization;   Done:  [id number]  Urinary frequency    · Urine Dip Automated- POC; Status:Complete - Retrospective Authorization;   Done:  [id number] 10:00AM    Discussion/Summary  Discussion Summary:   Acute UTI- urine send for culture, send Augmentin to his pharmacy  He was counseled in regards to side effects of mediations and advise to follow up if symptoms continue  after explaining risks and benefits of medications including side effects of headache, flushing, back pain stroke and MI among others  He was given samples of Staxyn he was advised to call if any side effects develop  C - per patient he was treated already by Dr Lilibeth Koehler in 1 month  Medication SE Review and Pt Understands Tx: Possible side effects of new medications were reviewed with the patient/guardian today   The treatment plan was reviewed with the patient/guardian  The patient/guardian understands and agrees with the treatment plan      Chief Complaint  Chief Complaint Free Text Note Form: Patient is here for initial office visit  He said he is having urine frequency  History of Present Illness  HPI: Patient is here to reestablish care-used to see Dr Taiwo Murry years ago  a history of gallbladder stone in the past however he denies any pain, nausea or vomiting at this time  says that he was told by 'a health center nearby that he had gallbladder stones  has a History of cirrhosis of the Liver and ha Hep C and was treated for it  He seeing Dr Rodriguez Omalley in the past for his symptoms  says that when he urinates he has urgency and hesitance  He has noticed an odor as well associated with his symptoms  He has been drinking a lot water  smokes daily of 2 packs per day  says that he has not had an erection for the past 2 years in the mornings  He does have some erections at times however they are nor prolonged      Review of Systems  Complete-Male:   Constitutional: No fever or chills, feels well, no tiredness, no recent weight gain or weight loss  Cardiovascular: No complaints of slow heart rate, no fast heart rate, no chest pain, no palpitations, no leg claudication, no lower extremity  Respiratory: No complaints of shortness of breath, no wheezing, no cough, no SOB on exertion, no orthopnea or PND  Gastrointestinal: No complaints of abdominal pain, no constipation, no nausea or vomiting, no diarrhea or bloody stools  Genitourinary: as noted in HPI  Musculoskeletal: No complaints of arthralgia, no myalgias, no joint swelling or stiffness, no limb pain or swelling  Integumentary: No complaints of skin rash or skin lesions, no itching, no skin wound, no dry skin  Neurological: No compliants of headache, no confusion, no convulsions, no numbness or tingling, no dizziness or fainting, no limb weakness, no difficulty walking     Endocrine: No complaints of proptosis, no hot flashes, no muscle weakness, no erectile dysfunction, no deepening of the voice, no feelings of weakness  Hematologic/Lymphatic: No complaints of swollen glands, no swollen glands in the neck, does not bleed easily, no easy bruising  ROS Reviewed:   ROS reviewed  Active Problems  1  Urinary frequency (788 41) (R35 0)    Past Medical History  Active Problems And Past Medical History Reviewed: The active problems and past medical history were reviewed and updated today  Family History  Mother    1  Family history of   Father    2  Family history of     Social History   · Current every day smoker (305 1) (F17 200)    Current Meds   1  AMILoride HCl - 5 MG Oral Tablet; Take 1 tablet daily Recorded   2  Constulose 10 GM/15ML SYRP;   Therapy: (Recorded:2017) to Recorded   3  Furosemide 40 MG Oral Tablet Recorded   4  Nadolol 20 MG Oral Tablet; Take 1 tablet daily Recorded   5  Xifaxan 550 MG Oral Tablet; Therapy: (Recorded:2017) to Recorded    Allergies  1  No Known Drug Allergies    Physical Exam    Constitutional   General appearance: No acute distress, well appearing and well nourished  Eyes   Conjunctiva and lids: No swelling, erythema, or discharge  Pulmonary   Respiratory effort: No increased work of breathing or signs of respiratory distress  Auscultation of lungs: Clear to auscultation, equal breath sounds bilaterally, no wheezes, no rales, no rhonci  Cardiovascular   Auscultation of heart: Normal rate and rhythm, normal S1 and S2, without murmurs  Musculoskeletal   Gait and station: Normal     Skin   Skin and subcutaneous tissue: Normal without rashes or lesions      Psychiatric   Orientation to person, place and time: Normal          Results/Data  Urine Dip Automated- POC 2017 10:00AM Charmaine Bales     Test Name Result Flag Reference   Color Orange     Leukocytes small A    Nitrite positive A    Blood small A    Bilirubin moderate A Urobilinogen 4 0 A    Protein 30mg A    Ph 6 0     Specific Gravity 1 015     Ketone trace A    Glucose 100mg A        Signatures   Electronically signed by : Néstor Patel MD; Oct 31 2017 10:44AM EST                       (Author) No

## 2022-01-27 ENCOUNTER — TELEPHONE (OUTPATIENT)
Dept: FAMILY MEDICINE CLINIC | Facility: CLINIC | Age: 57
End: 2022-01-27

## 2022-01-27 DIAGNOSIS — R05.9 COUGH: ICD-10-CM

## 2022-01-27 DIAGNOSIS — R50.9 FEVER, UNSPECIFIED FEVER CAUSE: Primary | ICD-10-CM

## 2022-01-27 PROCEDURE — U0003 INFECTIOUS AGENT DETECTION BY NUCLEIC ACID (DNA OR RNA); SEVERE ACUTE RESPIRATORY SYNDROME CORONAVIRUS 2 (SARS-COV-2) (CORONAVIRUS DISEASE [COVID-19]), AMPLIFIED PROBE TECHNIQUE, MAKING USE OF HIGH THROUGHPUT TECHNOLOGIES AS DESCRIBED BY CMS-2020-01-R: HCPCS | Performed by: FAMILY MEDICINE

## 2022-01-27 PROCEDURE — U0005 INFEC AGEN DETEC AMPLI PROBE: HCPCS | Performed by: FAMILY MEDICINE

## 2022-01-27 RX ORDER — FLUTICASONE PROPIONATE 50 MCG
1 SPRAY, SUSPENSION (ML) NASAL DAILY
Qty: 11.1 ML | Refills: 1 | Status: SHIPPED | OUTPATIENT
Start: 2022-01-27 | End: 2022-07-07 | Stop reason: SDUPTHER

## 2022-01-27 RX ORDER — DEXTROMETHORPHAN HYDROBROMIDE AND PROMETHAZINE HYDROCHLORIDE 15; 6.25 MG/5ML; MG/5ML
5 SOLUTION ORAL 4 TIMES DAILY PRN
Qty: 118 ML | Refills: 1 | Status: SHIPPED | OUTPATIENT
Start: 2022-01-27 | End: 2022-02-25

## 2022-02-02 DIAGNOSIS — N52.9 ERECTILE DYSFUNCTION, UNSPECIFIED ERECTILE DYSFUNCTION TYPE: ICD-10-CM

## 2022-02-02 RX ORDER — SILDENAFIL 100 MG/1
TABLET, FILM COATED ORAL
Qty: 30 TABLET | Refills: 0 | Status: SHIPPED | OUTPATIENT
Start: 2022-02-02 | End: 2022-02-21

## 2022-02-19 DIAGNOSIS — M79.18 MYOFASCIAL MUSCLE PAIN: ICD-10-CM

## 2022-02-19 DIAGNOSIS — N52.9 ERECTILE DYSFUNCTION, UNSPECIFIED ERECTILE DYSFUNCTION TYPE: ICD-10-CM

## 2022-02-19 DIAGNOSIS — M54.50 MIDLINE LOW BACK PAIN WITHOUT SCIATICA, UNSPECIFIED CHRONICITY: ICD-10-CM

## 2022-02-21 RX ORDER — SILDENAFIL 100 MG/1
TABLET, FILM COATED ORAL
Qty: 30 TABLET | Refills: 0 | Status: SHIPPED | OUTPATIENT
Start: 2022-02-21 | End: 2022-03-07

## 2022-02-21 RX ORDER — IBUPROFEN 800 MG/1
TABLET ORAL
Qty: 30 TABLET | Refills: 0 | Status: SHIPPED | OUTPATIENT
Start: 2022-02-21 | End: 2022-07-07 | Stop reason: SDUPTHER

## 2022-02-23 DIAGNOSIS — E66.01 MORBID (SEVERE) OBESITY DUE TO EXCESS CALORIES (HCC): ICD-10-CM

## 2022-02-23 RX ORDER — OMEPRAZOLE 20 MG/1
CAPSULE, DELAYED RELEASE ORAL
Qty: 30 CAPSULE | Refills: 0 | Status: SHIPPED | OUTPATIENT
Start: 2022-02-23 | End: 2022-03-08

## 2022-02-25 ENCOUNTER — TELEPHONE (OUTPATIENT)
Dept: FAMILY MEDICINE CLINIC | Facility: CLINIC | Age: 57
End: 2022-02-25

## 2022-02-25 ENCOUNTER — OFFICE VISIT (OUTPATIENT)
Dept: FAMILY MEDICINE CLINIC | Facility: CLINIC | Age: 57
End: 2022-02-25
Payer: COMMERCIAL

## 2022-02-25 VITALS
BODY MASS INDEX: 31.82 KG/M2 | TEMPERATURE: 98.6 F | HEART RATE: 72 BPM | WEIGHT: 198 LBS | SYSTOLIC BLOOD PRESSURE: 146 MMHG | HEIGHT: 66 IN | DIASTOLIC BLOOD PRESSURE: 84 MMHG | OXYGEN SATURATION: 96 %

## 2022-02-25 DIAGNOSIS — R25.1 TREMOR: Primary | ICD-10-CM

## 2022-02-25 DIAGNOSIS — K74.60 HEPATIC CIRRHOSIS DUE TO CHRONIC HEPATITIS C INFECTION (HCC): ICD-10-CM

## 2022-02-25 DIAGNOSIS — B18.2 HEPATIC CIRRHOSIS DUE TO CHRONIC HEPATITIS C INFECTION (HCC): ICD-10-CM

## 2022-02-25 DIAGNOSIS — E72.20 DISORDER OF UREA CYCLE METABOLISM, UNSPECIFIED (HCC): ICD-10-CM

## 2022-02-25 DIAGNOSIS — E66.01 MORBID OBESITY WITH BMI OF 40.0-44.9, ADULT (HCC): ICD-10-CM

## 2022-02-25 PROCEDURE — 3008F BODY MASS INDEX DOCD: CPT | Performed by: FAMILY MEDICINE

## 2022-02-25 PROCEDURE — 99214 OFFICE O/P EST MOD 30 MIN: CPT | Performed by: FAMILY MEDICINE

## 2022-02-25 NOTE — PROGRESS NOTES
Assessment/Plan:    No problem-specific Assessment & Plan notes found for this encounter  Diagnoses and all orders for this visit:    Tremor  -     Comprehensive metabolic panel; Future  -     Calcium, ionized; Future  -     TSH, 3rd generation with Free T4 reflex; Future  -     Ceruloplasmin; Future  -     Copper Level; Future  -     CBC and differential; Future  -     Ammonia; Future    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 ([facility] )  -     Comprehensive metabolic panel; Future  -     Calcium, ionized; Future  -     TSH, 3rd generation with Free T4 reflex; Future  -     Ceruloplasmin; Future  -     Copper Level; Future  -     CBC and differential; Future  -     Ammonia; Future    Follow up in 1 month or as needed          Subjective:      Patient ID: Johnson Brenner Sr  is a 61 y o  male  Patient is here because he has been having a worsening bilateral hand tremor over the past several weeks getting worse  Know history of hepatic cirrhosis does not take his lactulose daily  Denies any confusion  denies any nausea or vomiting symptoms  The following portions of the patient's history were reviewed and updated as appropriate:   He  has a past medical history of CPAP (continuous positive airway pressure) dependence, Esophageal varices ([address] ), Hepatic cirrhosis ([address] ), Hepatic encephalopathy ([address] ), Liver disease, Obesity, Pneumonia, Postgastrectomy malabsorption, Sleep apnea, and Wears glasses    He   Patient Active Problem List    Diagnosis Date Noted    Tremor 02/25/2022    Platelets decreased ([address] ) 09/10/2021    Leukocytosis 09/10/2021    Bowel and bladder incontinence 09/10/2021    Hyperbilirubinemia 09/10/2021    Medicare annual wellness visit, subsequent 09/10/2021    Encounter for surgical aftercare following surgery of digestive system 01/11/2021    S/P abdominoplasty 09/24/2020    Heartburn 03/16/2020    Vitamin A deficiency 11/20/2019    Need for influenza vaccination 11/20/2019    Hypotension 11/20/2019    Hyperammonemia (HCC) 10/30/2019    Myofascial muscle pain 10/18/2019    Midline low back pain without sciatica 10/04/2019    Excess skin of abdominal wall 10/04/2019    Laceration of scalp without foreign body 06/14/2019    Need for Tdap vaccination 05/01/2019    Medicare annual wellness visit, initial 05/01/2019    Need for lipid screening 05/01/2019    Esophageal varices without bleeding ([facility] ) 03/13/2019    Postsurgical malabsorption 09/26/2018    Diabetes mellitus screening 08/07/2018    Anemia 08/07/2018    Dizzy 08/07/2018    Acute pain of right knee 07/03/2018    Effusion of right knee 07/03/2018    S/P laparoscopic sleeve gastrectomy 06/13/2018    Mood swings 06/13/2018    Secondary esophageal varices without bleeding ([facility] ) 03/23/2018    Hepatic encephalopathy ([place] [address] ) 03/23/2018    Obstructive sleep apnea 03/08/2018    Morbid obesity with BMI of 40 0-44 9, adult ([place] [address] ) 03/02/2018    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 ([place] [address] ) 02/21/2018    Gastroesophageal reflux disease without esophagitis 02/21/2018    Former smoker 02/12/2018    Erectile dysfunction 02/12/2018     He  has a past surgical history that includes Colonoscopy; Esophagogastroduodenoscopy (N/A, 4/12/2018); Other surgical history; Fracture surgery (Left); pr lap, dilip restrict proc, longitudinal gastrectomy (N/A, 6/5/2018); pr excise excess skin tissue,abdomen (N/A, 9/16/2020); and pr excise excess skin tissue,abdomen, add-on (N/A, 9/16/2020)  His family history includes Diabetes in his father; Heart disease in his mother; Lupus in his mother; Stroke in his father  He  reports that he has been smoking  He has a 9 25 pack-year smoking history  He has never used smokeless tobacco  He reports that he does not drink alcohol and does not use drugs    Current Outpatient Medications   Medication Sig Dispense Refill    cholestyramine (QUESTRAN) 4 g packet Take 1 packet (4 g total) by mouth 2 (two) times a day with meals 60 packet 1    fluticasone (FLONASE) 50 mcg/act nasal spray 1 spray into each nostril daily 11 1 mL 1    furosemide (LASIX) 20 mg tablet Take 20 mg by mouth 2 (two) times a day      ibuprofen (MOTRIN) 800 mg tablet TAKE ONE TABLET BY MOUTH EVERY EIGHT HOURS AS NEEDED FOR MILD PAIN 30 tablet 0    lactulose (Constulose) 10 g/15 mL solution Take 15 mL (10 g total) by mouth daily as needed (constipation or confusion) 946 mL 1    methocarbamol (ROBAXIN) 750 mg tablet TAKE ONE TABLET BY MOUTH EVERY EIGHT HOURS 30 tablet 0    metroNIDAZOLE (METROGEL) 1 % gel Apply topically daily 45 g 1    nadolol (CORGARD) 20 mg tablet Take 1 tablet (20 mg total) by mouth daily 90 tablet 2    omeprazole (PriLOSEC) 20 mg delayed release capsule TAKE ONE CAPSULE BY MOUTH ONE TIME DAILY 30 capsule 0    rifaximin (XIFAXAN) 550 mg tablet Take 550 mg by mouth 2 (two) times a day   (Patient not taking: Reported on 2/25/2022 )      sildenafil (VIAGRA) 100 mg tablet TAKE 1 TABLET BY MOUTH DAILY AS NEEDED FOR ERECTILE DYSFUNCTION 30 tablet 0     No current facility-administered medications for this visit       Current Outpatient Medications on File Prior to Visit   Medication Sig    cholestyramine (QUESTRAN) 4 g packet Take 1 packet (4 g total) by mouth 2 (two) times a day with meals    fluticasone (FLONASE) 50 mcg/act nasal spray 1 spray into each nostril daily    furosemide (LASIX) 20 mg tablet Take 20 mg by mouth 2 (two) times a day    ibuprofen (MOTRIN) 800 mg tablet TAKE ONE TABLET BY MOUTH EVERY EIGHT HOURS AS NEEDED FOR MILD PAIN    lactulose (Constulose) 10 g/15 mL solution Take 15 mL (10 g total) by mouth daily as needed (constipation or confusion)    methocarbamol (ROBAXIN) 750 mg tablet TAKE ONE TABLET BY MOUTH EVERY EIGHT HOURS    metroNIDAZOLE (METROGEL) 1 % gel Apply topically daily    nadolol (CORGARD) 20 mg tablet Take 1 tablet (20 mg total) by mouth daily    omeprazole (PriLOSEC) 20 mg delayed release capsule TAKE ONE CAPSULE BY MOUTH ONE TIME DAILY    rifaximin (XIFAXAN) 550 mg tablet Take 550 mg by mouth 2 (two) times a day   (Patient not taking: Reported on 2/25/2022 )    sildenafil (VIAGRA) 100 mg tablet TAKE 1 TABLET BY MOUTH DAILY AS NEEDED FOR ERECTILE DYSFUNCTION    [DISCONTINUED] gabapentin (NEURONTIN) 100 mg capsule Take 1 capsule (100 mg total) by mouth 3 (three) times a day for 10 days    [DISCONTINUED] Promethazine-DM (PHENERGAN-DM) 6 25-15 mg/5 mL oral syrup Take 5 mL by mouth 4 (four) times a day as needed for cough    [DISCONTINUED] vitamin A (A-57150) 3 MG (97741 UT) capsule Take 1 capsule (10,000 Units total) by mouth daily     No current facility-administered medications on file prior to visit  He has No Known Allergies       Review of Systems   Constitutional: Negative for activity change, appetite change, fatigue and fever  HENT: Negative for congestion and ear discharge  Respiratory: Negative for cough and shortness of breath  Cardiovascular: Negative for chest pain and palpitations  Gastrointestinal: Negative for diarrhea and nausea  Musculoskeletal: Negative for arthralgias and back pain  Skin: Negative for color change and rash  Neurological: Positive for tremors  Negative for dizziness and headaches  Psychiatric/Behavioral: Negative for agitation and behavioral problems  Objective:      /84   Pulse 72   Temp 98 6 °F (37 °C)   Ht 5' 5 5" (1 664 m)   Wt 89 8 kg (198 lb)   SpO2 96%   BMI 32 45 kg/m²          Physical Exam  Constitutional:       General: He is not in acute distress  Appearance: He is well-developed  He is not diaphoretic  Eyes:      General: No scleral icterus  Pupils: Pupils are equal, round, and reactive to light  Cardiovascular:      Rate and Rhythm: Normal rate and regular rhythm  Heart sounds: Normal heart sounds  No murmur heard        Pulmonary:      Effort: Pulmonary effort is normal  No respiratory distress  Breath sounds: Normal breath sounds  No wheezing  Abdominal:      General: Bowel sounds are normal  There is no distension  Palpations: Abdomen is soft  Tenderness: There is no abdominal tenderness  Skin:     General: Skin is warm and dry  Findings: No rash  Neurological:      Mental Status: He is alert and oriented to person, place, and time        Comments: Resting bilateral hand tremor

## 2022-02-25 NOTE — LETTER
Date: 02/25/2022        To whom it may concern:    Mr Maria Guadalupe Dougherty is my patient at [facility]  He is currently being treated for Liver problems, dizziness among other medical problems  Given these conditions affect his every day life such as standing for prolonged periods or walking recommend for him to return to work 5 hours a day, 3 days per week  If you have any questions feel free to contact us at [phone]              Sincerely,            Renate Torres MD

## 2022-02-25 NOTE — TELEPHONE ENCOUNTER
Georgia Lyons is asking for you to write a note stating he can  Return to work 5 hours a day for 3 days a week  Fax note to [phone], also email to [email or web address]    Also mail pt a copy of letter      Call pt if a problem

## 2022-03-06 DIAGNOSIS — N52.9 ERECTILE DYSFUNCTION, UNSPECIFIED ERECTILE DYSFUNCTION TYPE: ICD-10-CM

## 2022-03-07 ENCOUNTER — APPOINTMENT (OUTPATIENT)
Dept: LAB | Facility: HOSPITAL | Age: 57
End: 2022-03-07
Payer: COMMERCIAL

## 2022-03-07 ENCOUNTER — HOSPITAL ENCOUNTER (OUTPATIENT)
Dept: ULTRASOUND IMAGING | Facility: HOSPITAL | Age: 57
Discharge: HOME | End: 2022-03-07
Attending: FAMILY MEDICINE
Payer: COMMERCIAL

## 2022-03-07 DIAGNOSIS — B18.2 HEPATIC CIRRHOSIS DUE TO CHRONIC HEPATITIS C INFECTION (HCC): ICD-10-CM

## 2022-03-07 DIAGNOSIS — Z98.84 S/P BARIATRIC SURGERY: ICD-10-CM

## 2022-03-07 DIAGNOSIS — K74.60 HEPATIC CIRRHOSIS DUE TO CHRONIC HEPATITIS C INFECTION (HCC): ICD-10-CM

## 2022-03-07 DIAGNOSIS — E66.01 MORBID OBESITY WITH BMI OF 40.0-44.9, ADULT (HCC): ICD-10-CM

## 2022-03-07 DIAGNOSIS — R25.1 TREMOR: ICD-10-CM

## 2022-03-07 DIAGNOSIS — E66.01 MORBID (SEVERE) OBESITY DUE TO EXCESS CALORIES (HCC): ICD-10-CM

## 2022-03-07 DIAGNOSIS — Z98.84 S/P LAPAROSCOPIC SLEEVE GASTRECTOMY: ICD-10-CM

## 2022-03-07 DIAGNOSIS — B18.2 CHRONIC HEPATITIS C WITHOUT HEPATIC COMA (HCC): ICD-10-CM

## 2022-03-07 LAB
25(OH)D3 SERPL-MCNC: 16.6 NG/ML (ref 30–100)
ALBUMIN SERPL BCP-MCNC: 3.1 G/DL (ref 3.5–5)
ALP SERPL-CCNC: 93 U/L (ref 46–116)
ALT SERPL W P-5'-P-CCNC: 25 U/L (ref 12–78)
AMMONIA PLAS-SCNC: 149 UMOL/L (ref 11–35)
ANION GAP SERPL CALCULATED.3IONS-SCNC: 8 MMOL/L (ref 4–13)
AST SERPL W P-5'-P-CCNC: 36 U/L (ref 5–45)
BASOPHILS # BLD AUTO: 0.03 THOUSANDS/ΜL (ref 0–0.1)
BASOPHILS NFR BLD AUTO: 1 % (ref 0–1)
BILIRUB SERPL-MCNC: 4.8 MG/DL (ref 0.2–1)
BUN SERPL-MCNC: 9 MG/DL (ref 5–25)
CA-I BLD-SCNC: 1.14 MMOL/L (ref 1.12–1.32)
CALCIUM ALBUM COR SERPL-MCNC: 9.2 MG/DL (ref 8.3–10.1)
CALCIUM SERPL-MCNC: 8.5 MG/DL (ref 8.3–10.1)
CHLORIDE SERPL-SCNC: 108 MMOL/L (ref 100–108)
CO2 SERPL-SCNC: 24 MMOL/L (ref 21–32)
CREAT SERPL-MCNC: 0.71 MG/DL (ref 0.6–1.3)
EOSINOPHIL # BLD AUTO: 0.35 THOUSAND/ΜL (ref 0–0.61)
EOSINOPHIL NFR BLD AUTO: 6 % (ref 0–6)
ERYTHROCYTE [DISTWIDTH] IN BLOOD BY AUTOMATED COUNT: 14.4 % (ref 11.6–15.1)
FERRITIN SERPL-MCNC: 80 NG/ML (ref 8–388)
FOLATE SERPL-MCNC: 8.1 NG/ML (ref 3.1–17.5)
GFR SERPL CREATININE-BSD FRML MDRD: 104 ML/MIN/1.73SQ M
GLUCOSE P FAST SERPL-MCNC: 113 MG/DL (ref 65–99)
HCT VFR BLD AUTO: 37.2 % (ref 36.5–49.3)
HGB BLD-MCNC: 13.4 G/DL (ref 12–17)
IMM GRANULOCYTES # BLD AUTO: 0.01 THOUSAND/UL (ref 0–0.2)
IMM GRANULOCYTES NFR BLD AUTO: 0 % (ref 0–2)
IRON SERPL-MCNC: 252 UG/DL (ref 65–175)
LYMPHOCYTES # BLD AUTO: 1.15 THOUSANDS/ΜL (ref 0.6–4.47)
LYMPHOCYTES NFR BLD AUTO: 21 % (ref 14–44)
MCH RBC QN AUTO: 33.9 PG (ref 26.8–34.3)
MCHC RBC AUTO-ENTMCNC: 36 G/DL (ref 31.4–37.4)
MCV RBC AUTO: 94 FL (ref 82–98)
MONOCYTES # BLD AUTO: 0.56 THOUSAND/ΜL (ref 0.17–1.22)
MONOCYTES NFR BLD AUTO: 10 % (ref 4–12)
NEUTROPHILS # BLD AUTO: 3.39 THOUSANDS/ΜL (ref 1.85–7.62)
NEUTS SEG NFR BLD AUTO: 62 % (ref 43–75)
NRBC BLD AUTO-RTO: 0 /100 WBCS
PLATELET # BLD AUTO: 112 THOUSANDS/UL (ref 149–390)
PMV BLD AUTO: 10.5 FL (ref 8.9–12.7)
POTASSIUM SERPL-SCNC: 3.6 MMOL/L (ref 3.5–5.3)
PROT SERPL-MCNC: 5.9 G/DL (ref 6.4–8.2)
PTH-INTACT SERPL-MCNC: 38.8 PG/ML (ref 18.4–80.1)
RBC # BLD AUTO: 3.95 MILLION/UL (ref 3.88–5.62)
SODIUM SERPL-SCNC: 140 MMOL/L (ref 136–145)
TSH SERPL DL<=0.05 MIU/L-ACNC: 2.21 UIU/ML (ref 0.36–3.74)
VIT B12 SERPL-MCNC: 308 PG/ML (ref 100–900)
WBC # BLD AUTO: 5.49 THOUSAND/UL (ref 4.31–10.16)

## 2022-03-07 PROCEDURE — 80053 COMPREHEN METABOLIC PANEL: CPT

## 2022-03-07 PROCEDURE — 82306 VITAMIN D 25 HYDROXY: CPT

## 2022-03-07 PROCEDURE — 82390 ASSAY OF CERULOPLASMIN: CPT

## 2022-03-07 PROCEDURE — 85025 COMPLETE CBC W/AUTO DIFF WBC: CPT

## 2022-03-07 PROCEDURE — 76705 ECHO EXAM OF ABDOMEN: CPT

## 2022-03-07 PROCEDURE — 83970 ASSAY OF PARATHORMONE: CPT

## 2022-03-07 PROCEDURE — 84443 ASSAY THYROID STIM HORMONE: CPT

## 2022-03-07 PROCEDURE — 82607 VITAMIN B-12: CPT

## 2022-03-07 PROCEDURE — 82140 ASSAY OF AMMONIA: CPT

## 2022-03-07 PROCEDURE — 82330 ASSAY OF CALCIUM: CPT

## 2022-03-07 PROCEDURE — 83540 ASSAY OF IRON: CPT

## 2022-03-07 PROCEDURE — 84630 ASSAY OF ZINC: CPT

## 2022-03-07 PROCEDURE — 82746 ASSAY OF FOLIC ACID SERUM: CPT

## 2022-03-07 PROCEDURE — 82728 ASSAY OF FERRITIN: CPT

## 2022-03-07 PROCEDURE — 36415 COLL VENOUS BLD VENIPUNCTURE: CPT

## 2022-03-07 PROCEDURE — 84590 ASSAY OF VITAMIN A: CPT

## 2022-03-07 PROCEDURE — 84425 ASSAY OF VITAMIN B-1: CPT

## 2022-03-07 PROCEDURE — 82525 ASSAY OF COPPER: CPT

## 2022-03-07 RX ORDER — AMILORIDE HYDROCHLORIDE 5 MG/1
TABLET ORAL
Qty: 30 TABLET | Refills: 0 | Status: SHIPPED | OUTPATIENT
Start: 2022-03-07 | End: 2022-04-18

## 2022-03-07 RX ORDER — SILDENAFIL 100 MG/1
TABLET, FILM COATED ORAL
Qty: 30 TABLET | Refills: 0 | Status: SHIPPED | OUTPATIENT
Start: 2022-03-07 | End: 2022-03-28

## 2022-03-08 LAB — CERULOPLASMIN SERPL-MCNC: 21.7 MG/DL (ref 16–31)

## 2022-03-08 RX ORDER — OMEPRAZOLE 20 MG/1
CAPSULE, DELAYED RELEASE ORAL
Qty: 30 CAPSULE | Refills: 0 | Status: SHIPPED | OUTPATIENT
Start: 2022-03-08 | End: 2022-07-07 | Stop reason: SDUPTHER

## 2022-03-10 LAB — ZINC SERPL-MCNC: 53 UG/DL (ref 44–115)

## 2022-03-11 LAB
COPPER SERPL-MCNC: 106 UG/DL (ref 69–132)
VIT A SERPL-MCNC: 7.5 UG/DL (ref 20.1–62)

## 2022-03-15 LAB — VIT B1 BLD-SCNC: 114.7 NMOL/L (ref 66.5–200)

## 2022-03-16 ENCOUNTER — TELEPHONE (OUTPATIENT)
Dept: GASTROENTEROLOGY | Facility: CLINIC | Age: 57
End: 2022-03-16

## 2022-03-16 NOTE — TELEPHONE ENCOUNTER
Patient called lmom to schedule OV with Dr Maurice Sotelo  Called patient lmom letting him know that Doctor is scheduling into May and PA are in April   Did he want to wait until May

## 2022-03-30 ENCOUNTER — OFFICE VISIT (OUTPATIENT)
Dept: GASTROENTEROLOGY | Facility: CLINIC | Age: 57
End: 2022-03-30
Payer: COMMERCIAL

## 2022-03-30 VITALS
SYSTOLIC BLOOD PRESSURE: 122 MMHG | HEART RATE: 82 BPM | DIASTOLIC BLOOD PRESSURE: 82 MMHG | WEIGHT: 200.2 LBS | BODY MASS INDEX: 33.36 KG/M2 | HEIGHT: 65 IN

## 2022-03-30 DIAGNOSIS — Z12.11 ENCOUNTER FOR COLONOSCOPY DUE TO HISTORY OF COLONIC POLYP: ICD-10-CM

## 2022-03-30 DIAGNOSIS — B18.2 HEPATIC CIRRHOSIS DUE TO CHRONIC HEPATITIS C INFECTION (HCC): Primary | ICD-10-CM

## 2022-03-30 DIAGNOSIS — Z86.010 ENCOUNTER FOR COLONOSCOPY DUE TO HISTORY OF COLONIC POLYP: ICD-10-CM

## 2022-03-30 DIAGNOSIS — K74.60 HEPATIC CIRRHOSIS DUE TO CHRONIC HEPATITIS C INFECTION (HCC): Primary | ICD-10-CM

## 2022-03-30 DIAGNOSIS — Z86.010 HISTORY OF COLON POLYPS: ICD-10-CM

## 2022-03-30 DIAGNOSIS — K72.90 HEPATIC ENCEPHALOPATHY (HCC): ICD-10-CM

## 2022-03-30 PROCEDURE — 3008F BODY MASS INDEX DOCD: CPT | Performed by: PHYSICIAN ASSISTANT

## 2022-03-30 PROCEDURE — 99214 OFFICE O/P EST MOD 30 MIN: CPT | Performed by: PHYSICIAN ASSISTANT

## 2022-03-30 NOTE — PROGRESS NOTES
[facility]  Hamzah Mendiola   61 y o  male MRN: [id number]       CC: Follow-up for cirrhosis    HPI:  Colletta Able is a 67-year-old male who presents to the office for  follow-up as he has history of hepatitis-C cirrhosis status post treatment complicated by esophageal varices and previous a patent encephalopathy  Patient also has history of obesity status post gastric sleeve surgery in 2018  Patient is here to follow-up and has not been seen since 2019 in the office  Patient has no complaints at this time  He denies signs overt GI bleeding, unintentional weight loss  or lower extremity edema  Patient's last EGD was in 2019, he was due for a 1 year recall secondary to small esophageal varices visualized on exam   His last colonoscopy that I can see was from 2015, he had a single hyperplastic polyp removed  He reports that he is very reluctant to have another colonoscopy as he reports post polypectomy bleeding  Review of Systems:    CONSTITUTIONAL: Denies any fever, chills, or rigors  Good appetite, and no recent weight loss  HEENT: No earache or tinnitus  Denies hearing loss or visual disturbances  CARDIOVASCULAR: No chest pain or palpitations  RESPIRATORY: Denies any cough, hemoptysis, shortness of breath or dyspnea on exertion  GASTROINTESTINAL: As noted in the History of Present Illness  GENITOURINARY: No problems with urination  Denies any hematuria or dysuria  NEUROLOGIC: No dizziness or vertigo, denies headaches  MUSCULOSKELETAL: Denies any muscle or joint pain  SKIN: Denies skin rashes or itching  ENDOCRINE: Denies excessive thirst  Denies intolerance to heat or cold  PSYCHOSOCIAL: Denies depression or anxiety  Denies any recent memory loss         Current Outpatient Medications   Medication Sig Dispense Refill    AMILoride 5 mg tablet TAKE ONE TABLET BY MOUTH ONE TIME DAILY 30 tablet 0    furosemide (LASIX) 20 mg tablet Take 20 mg by mouth 2 (two) times a day      ibuprofen (MOTRIN) 800 mg tablet TAKE ONE TABLET BY MOUTH EVERY EIGHT HOURS AS NEEDED FOR MILD PAIN 30 tablet 0    lactulose (Constulose) 10 g/15 mL solution Take 15 mL (10 g total) by mouth daily as needed (constipation or confusion) 946 mL 1    methocarbamol (ROBAXIN) 750 mg tablet TAKE ONE TABLET BY MOUTH EVERY EIGHT HOURS 30 tablet 0    nadolol (CORGARD) 20 mg tablet Take 1 tablet (20 mg total) by mouth daily 90 tablet 2    omeprazole (PriLOSEC) 20 mg delayed release capsule TAKE ONE CAPSULE BY MOUTH ONE TIME DAILY 30 capsule 0    rifaximin (XIFAXAN) 550 mg tablet Take 550 mg by mouth 2 (two) times a day        sildenafil (VIAGRA) 100 mg tablet TAKE ONE TABLET BY MOUTH DAILY AS NEEDED for erectile dysfunction 30 tablet 3    cholestyramine (QUESTRAN) 4 g packet Take 1 packet (4 g total) by mouth 2 (two) times a day with meals 60 packet 1    fluticasone (FLONASE) 50 mcg/act nasal spray 1 spray into each nostril daily (Patient not taking: Reported on 3/30/2022 ) 11 1 mL 1    metroNIDAZOLE (METROGEL) 1 % gel Apply topically daily (Patient not taking: Reported on 3/30/2022 ) 45 g 1     No current facility-administered medications for this visit  Past Medical History:   Diagnosis Date    CPAP (continuous positive airway pressure) dependence     does not use machine    Esophageal varices ([facility] [address] )     stable 9/2020 gets checked yearly    Hepatic cirrhosis ([facility] [address] )     treated with harvoni   Hep C- dx age 17    Hepatic encephalopathy ([facility] [address] )     Liver disease     Obesity     Pneumonia     in past    Postgastrectomy malabsorption     Sleep apnea     Wears glasses      Past Surgical History:   Procedure Laterality Date    COLONOSCOPY      ESOPHAGOGASTRODUODENOSCOPY N/A 4/12/2018    Procedure: ESOPHAGOGASTRODUODENOSCOPY (EGD); Surgeon: Bianca Perry MD;  [facility];   Service: Gastroenterology    FRACTURE SURGERY Left     ankle    OTHER SURGICAL HISTORY      banding esophageal varices    WI EXCISE EXCESS SKIN TISSUE,ABDOMEN N/A 9/16/2020    Procedure: PANNICULECTOMY;  Surgeon: Jonas Sherman MD;  [facility];  Service: Plastics    ME EXCISE EXCESS SKIN TISSUE,ABDOMEN, ADD-ON N/A 9/16/2020    Procedure: ABDOMINOPLASTY;  Surgeon: Jonas Sherman MD;  [facility];  Service: Plastics    ME LAP, TERESA RESTRICT PROC, LONGITUDINAL GASTRECTOMY N/A 6/5/2018    Procedure: GASTRECTOMY LAPAROSCOPIC SLEEVE;  Surgeon: Kalpesh Linder MD;  [facility];  Service: Bariatrics     Social History     Socioeconomic History    Marital status: /Civil Union     Spouse name: None    Number of children: 2    Years of education: None    Highest education level: None   Occupational History    Occupation: disabled   Tobacco Use    Smoking status: Current Every Day Smoker     Packs/day: 0 25     Years: 37 00     Pack years: 9 25    Smokeless tobacco: Never Used    Tobacco comment: stopped 7/2020   Vaping Use    Vaping Use: Former    Substances: Flavoring   Substance and Sexual Activity    Alcohol use: Never     Alcohol/week: 0 0 standard drinks    Drug use: No    Sexual activity: Yes   Other Topics Concern    None   Social History Narrative    None     Social Determinants of Health     Financial Resource Strain: Not on file   Food Insecurity: Not on file   Transportation Needs: Not on file   Physical Activity: Not on file   Stress: Not on file   Social Connections: Not on file   Intimate Partner Violence: Not on file   Housing Stability: Not on file     Family History   Problem Relation Age of Onset    Heart disease Mother     Lupus Mother     Diabetes Father     Stroke Father             PHYSICAL EXAM:    There were no vitals filed for this visit  General Appearance:   Alert and oriented x 3   Cooperative, and in no respiratory distress   HEENT:   Normocephalic, atraumatic, anicteric      Neck:  Supple, symmetrical, trachea midline   Lungs:   Clear to auscultation bilaterally    Heart[de-identified]   Regular rate and rhythm   Abdomen:   Soft, non-tender, non-distended; normal bowel sounds; no masses, no organomegaly    Genitalia:   Deferred    Rectal:   Deferred    Extremities:  No cyanosis, clubbing or edema    Pulses:  2+ and symmetric all extremities    Skin:  Skin color, texture, turgor normal, no rashes or lesions    Lymph nodes:  No palpable cervical or supraclavicular lymphadenopathy        Lab Results:   Results from last 6 Months   Lab Units 03/07/22  1008   WBC Thousand/uL 5 49   HEMOGLOBIN g/dL 13 4   HEMATOCRIT % 37 2   PLATELETS Thousands/uL 112*   NEUTROS PCT % 62   LYMPHS PCT % 21   MONOS PCT % 10   EOS PCT % 6     Results from last 6 Months   Lab Units 03/07/22  1008   POTASSIUM mmol/L 3 6   CHLORIDE mmol/L 108   CO2 mmol/L 24   BUN mg/dL 9   CREATININE mg/dL 0 71   CALCIUM mg/dL 8 5   ALK PHOS U/L 93   ALT U/L 25   AST U/L 36               Imaging Studies: I have personally reviewed pertinent imaging studies  US right upper quadrant    Result Date: 3/10/2022  Impression: Cirrhotic liver Cholelithiasis  No biliary dilation No focal lesion in the visualized liver parenchyma If screening for right CC MRI can Workstation performed: [id number]       ASSESSMENT and PLAN:      1)  Hepatitis-C cirrhosis status post treatment complicated by previous hepatic encephalopathy and small esophageal varices - Unable to calculate MELD as INR not available  -  Will schedule EGD for varices screening as he is overdue  -  Will order MRI for [facility] [address]  screening, then again in 6 months  -  Continued abstinence from alcohol    2)  Need for colon cancer screening, personal history of colon polyps -  From records available to me, I see that patient had a colonoscopy in 2015, and a single polyp was removed  This was hyperplastic  Patient reports that he had a post polypectomy bleed afterwards  He is very reluctant to have another colonoscopy secondary to this   After discussion, he is agreeable to Cologuacleve  He reports he would consider getting a colonoscopy if this is positive  -   Will order Jose for the patient        Follow up in 3-6 months

## 2022-03-30 NOTE — PATIENT INSTRUCTIONS
Scheduled date of EGD(as of today):4/25/22  Physician performing EGD:Mando  Location of EGD:[place]  Instructions reviewed with patient by:Lorie DAVALOS  Clearances: none

## 2022-03-31 ENCOUNTER — TELEPHONE (OUTPATIENT)
Dept: GASTROENTEROLOGY | Facility: CLINIC | Age: 57
End: 2022-03-31

## 2022-03-31 NOTE — TELEPHONE ENCOUNTER
Received letter from [employer] [facility] stating that patient got a temp supply of xifaxan    Will scan into patient chart

## 2022-04-14 ENCOUNTER — TELEPHONE (OUTPATIENT)
Dept: GASTROENTEROLOGY | Facility: CLINIC | Age: 57
End: 2022-04-14

## 2022-04-14 LAB — COLOGUARD RESULT REPORTABLE: NEGATIVE

## 2022-04-14 NOTE — TELEPHONE ENCOUNTER
----- Message from Katie Ordonez PA-C sent at 4/14/2022 11:20 AM EDT -----    Please let patient know that his Cologuard was negative  He will need another when 3 years, can be done through his PCP  Thank you

## 2022-04-17 DIAGNOSIS — K74.60 HEPATIC CIRRHOSIS DUE TO CHRONIC HEPATITIS C INFECTION (HCC): ICD-10-CM

## 2022-04-17 DIAGNOSIS — B18.2 HEPATIC CIRRHOSIS DUE TO CHRONIC HEPATITIS C INFECTION (HCC): ICD-10-CM

## 2022-04-18 RX ORDER — AMILORIDE HYDROCHLORIDE 5 MG/1
TABLET ORAL
Qty: 30 TABLET | Refills: 0 | Status: SHIPPED | OUTPATIENT
Start: 2022-04-18 | End: 2022-05-24

## 2022-04-25 ENCOUNTER — ANESTHESIA EVENT (OUTPATIENT)
Dept: GASTROENTEROLOGY | Facility: HOSPITAL | Age: 57
End: 2022-04-25

## 2022-04-25 ENCOUNTER — HOSPITAL ENCOUNTER (OUTPATIENT)
Dept: GASTROENTEROLOGY | Facility: HOSPITAL | Age: 57
Setting detail: OUTPATIENT SURGERY
Discharge: HOME | End: 2022-04-25
Admitting: INTERNAL MEDICINE
Payer: COMMERCIAL

## 2022-04-25 ENCOUNTER — ANESTHESIA (OUTPATIENT)
Dept: GASTROENTEROLOGY | Facility: HOSPITAL | Age: 57
End: 2022-04-25

## 2022-04-25 VITALS
TEMPERATURE: 97.6 F | WEIGHT: 195.55 LBS | BODY MASS INDEX: 32.58 KG/M2 | OXYGEN SATURATION: 99 % | SYSTOLIC BLOOD PRESSURE: 112 MMHG | RESPIRATION RATE: 16 BRPM | HEIGHT: 65 IN | HEART RATE: 55 BPM | DIASTOLIC BLOOD PRESSURE: 65 MMHG

## 2022-04-25 DIAGNOSIS — K74.60 HEPATIC CIRRHOSIS DUE TO CHRONIC HEPATITIS C INFECTION (HCC): ICD-10-CM

## 2022-04-25 DIAGNOSIS — B18.2 HEPATIC CIRRHOSIS DUE TO CHRONIC HEPATITIS C INFECTION (HCC): ICD-10-CM

## 2022-04-25 PROCEDURE — 88342 IMHCHEM/IMCYTCHM 1ST ANTB: CPT | Performed by: PATHOLOGY

## 2022-04-25 PROCEDURE — 88305 TISSUE EXAM BY PATHOLOGIST: CPT | Performed by: PATHOLOGY

## 2022-04-25 PROCEDURE — 43239 EGD BIOPSY SINGLE/MULTIPLE: CPT | Performed by: INTERNAL MEDICINE

## 2022-04-25 RX ORDER — SODIUM CHLORIDE, SODIUM LACTATE, POTASSIUM CHLORIDE, CALCIUM CHLORIDE 600; 310; 30; 20 MG/100ML; MG/100ML; MG/100ML; MG/100ML
125 INJECTION, SOLUTION INTRAVENOUS CONTINUOUS
Status: DISCONTINUED | OUTPATIENT
Start: 2022-04-25 | End: 2022-04-29 | Stop reason: HOSPADM

## 2022-04-25 RX ORDER — PROPOFOL 10 MG/ML
INJECTION, EMULSION INTRAVENOUS AS NEEDED
Status: DISCONTINUED | OUTPATIENT
Start: 2022-04-25 | End: 2022-04-25

## 2022-04-25 RX ORDER — SODIUM CHLORIDE, SODIUM LACTATE, POTASSIUM CHLORIDE, CALCIUM CHLORIDE 600; 310; 30; 20 MG/100ML; MG/100ML; MG/100ML; MG/100ML
INJECTION, SOLUTION INTRAVENOUS CONTINUOUS PRN
Status: DISCONTINUED | OUTPATIENT
Start: 2022-04-25 | End: 2022-04-25

## 2022-04-25 RX ORDER — SODIUM CHLORIDE, SODIUM LACTATE, POTASSIUM CHLORIDE, CALCIUM CHLORIDE 600; 310; 30; 20 MG/100ML; MG/100ML; MG/100ML; MG/100ML
20 INJECTION, SOLUTION INTRAVENOUS CONTINUOUS
Status: CANCELLED | OUTPATIENT
Start: 2022-04-25

## 2022-04-25 RX ADMIN — SODIUM CHLORIDE, SODIUM LACTATE, POTASSIUM CHLORIDE, AND CALCIUM CHLORIDE 125 ML/HR: .6; .31; .03; .02 INJECTION, SOLUTION INTRAVENOUS at 12:04

## 2022-04-25 RX ADMIN — PROPOFOL 150 MG: 10 INJECTION, EMULSION INTRAVENOUS at 13:08

## 2022-04-25 RX ADMIN — SODIUM CHLORIDE, SODIUM LACTATE, POTASSIUM CHLORIDE, AND CALCIUM CHLORIDE: .6; .31; .03; .02 INJECTION, SOLUTION INTRAVENOUS at 13:01

## 2022-04-25 NOTE — ANESTHESIA POSTPROCEDURE EVALUATION
Post-Op Assessment Note    CV Status:  Stable  Pain Score: 0    Pain management: adequate     Mental Status:  Alert and awake   Hydration Status:  Euvolemic   PONV Controlled:  Controlled   Airway Patency:  Patent      Post Op Vitals Reviewed: Yes      Staff: CRNA         No complications documented      BP   108/56   Temp   97   Pulse 60   Resp   12   SpO2   97

## 2022-04-25 NOTE — ANESTHESIA PREPROCEDURE EVALUATION
Procedure:  EGD    CC: Follow-up for cirrhosis     HPI:  Jennie Leyva is a 58-year-old male who presents to the office for  follow-up as he has history of hepatitis-C cirrhosis status post treatment complicated by esophageal varices and previous a patent encephalopathy  Patient also has history of obesity status post gastric sleeve surgery in 2018  Patient is here to follow-up and has not been seen since 2019 in the office  Patient has no complaints at this time  He denies signs overt GI bleeding, unintentional weight loss  or lower extremity edema      Patient's last EGD was in 2019, he was due for a 1 year recall secondary to small esophageal varices visualized on exam   His last colonoscopy that I can see was from 2015, he had a single hyperplastic polyp removed    He reports that he is very reluctant to have another colonoscopy as he reports post polypectomy bleeding                Relevant Problems   GI/HEPATIC   (+) Gastroesophageal reflux disease without esophagitis   (+) 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)      HEMATOLOGY   (+) Anemia   (+) Platelets decreased (HCC)      MUSCULOSKELETAL   (+) Midline low back pain without sciatica      PULMONARY   (+) Obstructive sleep apnea      Current as of 04/25/22 1146  History Comments History Comments   Obesity  Esophageal varices ([address] ) stable 9/2020 gets checked yearly   Sleep apnea  Pneumonia in past   Hepatic encephalopathy ([address] )  Wears glasses    Hepatic cirrhosis ([address] ) treated with harvoni   Hep C- dx age 17 Liver disease    CPAP (continuous positive airway pressure) dependence does not use machine Postgastrectomy malabsorption        Surgical History     Current as of 04/25/22 1146  COLONOSCOPY ESOPHAGOGASTRODUODENOSCOPY   OTHER SURGICAL HISTORY FRACTURE SURGERY   AL LAP, TERESA RESTRICT PROC, LONGITUDINAL GASTRECTOMY AL EXCISE EXCESS SKIN TISSUE,ABDOMEN   AL EXCISE EXCESS SKIN TISSUE,ABDOMEN, ADD-ON      Substance History     Current as of 04/25/22 1146  Smoking Status: Current Every Day Smoker - 9 25 pack years   Smokeless Tobacco Status: Never Used   Alcohol use: Never   Drug use: No       Physical Exam    Airway    Mallampati score: III  TM Distance: >3 FB  Neck ROM: full     Dental       Cardiovascular  Cardiovascular exam normal    Pulmonary  Pulmonary exam normal     Other Findings        Anesthesia Plan  ASA Score- 3     Anesthesia Type- IV sedation with anesthesia with ASA Monitors  Additional Monitors:   Airway Plan:           Plan Factors-    Chart reviewed  EKG reviewed  Imaging results reviewed  Existing labs reviewed  Patient summary reviewed  Patient is a current smoker  Induction- intravenous  Postoperative Plan-     Informed Consent- Anesthetic plan and risks discussed with patient  I personally reviewed this patient with the CRNA  Discussed and agreed on the Anesthesia Plan with the CRNA  Raisa Abdi

## 2022-04-25 NOTE — INTERVAL H&P NOTE
H&P reviewed  After examining the patient I find no changes in the patients condition since the H&P had been written      Vitals:    04/25/22 1154   BP: 120/58   Pulse: (!) 48   Resp: 15   Temp: 98 °F (36 7 °C)   SpO2: 98%

## 2022-05-03 ENCOUNTER — TELEPHONE (OUTPATIENT)
Dept: GASTROENTEROLOGY | Facility: CLINIC | Age: 57
End: 2022-05-03

## 2022-05-03 DIAGNOSIS — R05.9 COUGH: ICD-10-CM

## 2022-05-03 RX ORDER — LORATADINE AND PSEUDOEPHEDRINE SULFATE 5; 120 MG/1; MG/1
TABLET, EXTENDED RELEASE ORAL
Qty: 10 TABLET | Refills: 0 | Status: SHIPPED | OUTPATIENT
Start: 2022-05-03

## 2022-05-03 NOTE — TELEPHONE ENCOUNTER
Please let patient know that his Cologuard was negative  This will cover his colon cancer screening for 3 years technically  According to Leydi's, [employer] makes [employer]

## 2022-05-03 NOTE — TELEPHONE ENCOUNTER
Dr Samuel Garcia - patient called to get the name of the pharmaceutical company that makes the Isaac  And would like to know the results of his cologuard - patient sent it beginning of Apri   Please call Mikala Newberry at [phone]

## 2022-05-03 NOTE — TELEPHONE ENCOUNTER
Called patient and got     LMOM with result of his cologuard as per Ulysses Baptise and gave him name of company that [facility]

## 2022-06-02 ENCOUNTER — TELEPHONE (OUTPATIENT)
Dept: GASTROENTEROLOGY | Facility: CLINIC | Age: 57
End: 2022-06-02

## 2022-06-02 DIAGNOSIS — B18.2 HEPATIC CIRRHOSIS DUE TO CHRONIC HEPATITIS C INFECTION (HCC): Primary | ICD-10-CM

## 2022-06-02 DIAGNOSIS — K74.60 HEPATIC CIRRHOSIS DUE TO CHRONIC HEPATITIS C INFECTION (HCC): Primary | ICD-10-CM

## 2022-06-02 NOTE — TELEPHONE ENCOUNTER
Called patient and LMOM to go for Labs that are in his chart at any [address] [place] [address] facility  Prior to his MRI scheduled on 6/6/2022    Thx
Leeann Otto patient - Radiology LM to order BUN & Creatine for scheduled MRI on 6/6  Please let me know when labs are in so I can call patient    Thx
Order is in, thank you Skie!
Elicia

## 2022-06-09 ENCOUNTER — TELEPHONE (OUTPATIENT)
Dept: GASTROENTEROLOGY | Facility: CLINIC | Age: 57
End: 2022-06-09

## 2022-06-09 NOTE — TELEPHONE ENCOUNTER
Meghna Garcia - Radiologist for [facility] called lmom  Please call Dr Wilma Garcia for a Peer to Peer for MRI of Abdomin  Ref Case number [id number]   Phone [phone] ty

## 2022-06-10 NOTE — TELEPHONE ENCOUNTER
Called and spoke to Garrison Herr @ [address]    1 [phone]  MRI was APPROVED    PA# [id number]    Mary Albert approved from 4/19/2022 till 10/16/2022  Also made a note of this in the referral itself

## 2022-06-10 NOTE — TELEPHONE ENCOUNTER
Spoke with physician, he told me that the request was canceled out? He told me that there is a possibility was approved in the interim  I do not see that we received a fax    Can someone call the main line at [facility] to see whether not the MRI was actually approved now

## 2022-06-14 ENCOUNTER — RA CDI HCC (OUTPATIENT)
Dept: OTHER | Facility: HOSPITAL | Age: 57
End: 2022-06-14

## 2022-06-14 DIAGNOSIS — N52.9 ERECTILE DYSFUNCTION, UNSPECIFIED ERECTILE DYSFUNCTION TYPE: ICD-10-CM

## 2022-06-14 RX ORDER — SILDENAFIL 100 MG/1
100 TABLET, FILM COATED ORAL AS NEEDED
Qty: 30 TABLET | Refills: 3 | Status: SHIPPED | OUTPATIENT
Start: 2022-06-14 | End: 2022-06-17

## 2022-06-14 NOTE — PROGRESS NOTES
[facility]  coding opportunities       Chart reviewed, no opportunity found:   [address]        Patients Insurance     Medicare Insurance: [facility] Advantage

## 2022-06-17 DIAGNOSIS — N52.9 ERECTILE DYSFUNCTION, UNSPECIFIED ERECTILE DYSFUNCTION TYPE: ICD-10-CM

## 2022-06-17 RX ORDER — SILDENAFIL 100 MG/1
TABLET, FILM COATED ORAL
Qty: 30 TABLET | Refills: 0 | Status: SHIPPED | OUTPATIENT
Start: 2022-06-17 | End: 2022-07-07 | Stop reason: SDUPTHER

## 2022-07-06 DIAGNOSIS — K74.60 HEPATIC CIRRHOSIS DUE TO CHRONIC HEPATITIS C INFECTION (HCC): ICD-10-CM

## 2022-07-06 DIAGNOSIS — B18.2 HEPATIC CIRRHOSIS DUE TO CHRONIC HEPATITIS C INFECTION (HCC): ICD-10-CM

## 2022-07-07 DIAGNOSIS — L71.9 ROSACEA: Primary | ICD-10-CM

## 2022-07-07 DIAGNOSIS — M54.50 MIDLINE LOW BACK PAIN WITHOUT SCIATICA, UNSPECIFIED CHRONICITY: ICD-10-CM

## 2022-07-07 DIAGNOSIS — K74.60 HEPATIC CIRRHOSIS DUE TO CHRONIC HEPATITIS C INFECTION (HCC): ICD-10-CM

## 2022-07-07 DIAGNOSIS — B18.2 HEPATIC CIRRHOSIS DUE TO CHRONIC HEPATITIS C INFECTION (HCC): ICD-10-CM

## 2022-07-07 DIAGNOSIS — R05.9 COUGH: ICD-10-CM

## 2022-07-07 DIAGNOSIS — K76.82 HEPATIC ENCEPHALOPATHY: ICD-10-CM

## 2022-07-07 DIAGNOSIS — M79.18 MYOFASCIAL MUSCLE PAIN: ICD-10-CM

## 2022-07-07 DIAGNOSIS — N52.9 ERECTILE DYSFUNCTION, UNSPECIFIED ERECTILE DYSFUNCTION TYPE: ICD-10-CM

## 2022-07-07 DIAGNOSIS — E66.01 MORBID (SEVERE) OBESITY DUE TO EXCESS CALORIES (HCC): ICD-10-CM

## 2022-07-07 RX ORDER — AMILORIDE HYDROCHLORIDE 5 MG/1
5 TABLET ORAL DAILY
Qty: 30 TABLET | Refills: 3 | Status: SHIPPED | OUTPATIENT
Start: 2022-07-07

## 2022-07-07 RX ORDER — SILDENAFIL 100 MG/1
100 TABLET, FILM COATED ORAL DAILY PRN
Qty: 30 TABLET | Refills: 3 | Status: SHIPPED | OUTPATIENT
Start: 2022-07-07

## 2022-07-07 RX ORDER — METHOCARBAMOL 750 MG/1
750 TABLET, FILM COATED ORAL EVERY 8 HOURS
Qty: 30 TABLET | Refills: 0 | Status: SHIPPED | OUTPATIENT
Start: 2022-07-07 | End: 2022-07-31

## 2022-07-07 RX ORDER — LACTULOSE 10 G/15ML
10 SOLUTION ORAL DAILY PRN
Qty: 946 ML | Refills: 1 | Status: SHIPPED | OUTPATIENT
Start: 2022-07-07

## 2022-07-07 RX ORDER — FLUTICASONE PROPIONATE 50 MCG
1 SPRAY, SUSPENSION (ML) NASAL DAILY
Qty: 11.1 ML | Refills: 1 | Status: SHIPPED | OUTPATIENT
Start: 2022-07-07 | End: 2022-09-12

## 2022-07-07 RX ORDER — TRIAMCINOLONE ACETONIDE 5 MG/G
CREAM TOPICAL 3 TIMES DAILY
Qty: 30 G | Refills: 2 | Status: SHIPPED | OUTPATIENT
Start: 2022-07-07

## 2022-07-07 RX ORDER — IBUPROFEN 800 MG/1
800 TABLET ORAL EVERY 8 HOURS PRN
Qty: 30 TABLET | Refills: 3 | Status: SHIPPED | OUTPATIENT
Start: 2022-07-07 | End: 2022-10-07 | Stop reason: ALTCHOICE

## 2022-07-07 RX ORDER — OMEPRAZOLE 20 MG/1
20 CAPSULE, DELAYED RELEASE ORAL DAILY
Qty: 30 CAPSULE | Refills: 3 | Status: SHIPPED | OUTPATIENT
Start: 2022-07-07

## 2022-07-07 RX ORDER — FUROSEMIDE 20 MG/1
20 TABLET ORAL 2 TIMES DAILY
Qty: 60 TABLET | Refills: 1 | Status: SHIPPED | OUTPATIENT
Start: 2022-07-07

## 2022-07-07 RX ORDER — NADOLOL 20 MG/1
20 TABLET ORAL DAILY
Qty: 90 TABLET | Refills: 2 | Status: SHIPPED | OUTPATIENT
Start: 2022-07-07

## 2022-07-07 NOTE — TELEPHONE ENCOUNTER
Pt is requesting a refill on Triamcinolone Acetonide cream usp 0 5%  He uses this on his face/ears  once daily in the morning to help with peeling, rash, chaffing     [facility]     Thanks,

## 2022-07-14 ENCOUNTER — OFFICE VISIT (OUTPATIENT)
Dept: GASTROENTEROLOGY | Facility: CLINIC | Age: 57
End: 2022-07-14
Payer: COMMERCIAL

## 2022-07-14 VITALS
HEART RATE: 57 BPM | WEIGHT: 194.2 LBS | SYSTOLIC BLOOD PRESSURE: 120 MMHG | DIASTOLIC BLOOD PRESSURE: 68 MMHG | OXYGEN SATURATION: 98 % | HEIGHT: 65 IN | BODY MASS INDEX: 32.36 KG/M2

## 2022-07-14 DIAGNOSIS — K74.60 HEPATIC CIRRHOSIS DUE TO CHRONIC HEPATITIS C INFECTION (HCC): Primary | ICD-10-CM

## 2022-07-14 DIAGNOSIS — B18.2 HEPATIC CIRRHOSIS DUE TO CHRONIC HEPATITIS C INFECTION (HCC): Primary | ICD-10-CM

## 2022-07-14 PROCEDURE — 99213 OFFICE O/P EST LOW 20 MIN: CPT | Performed by: PHYSICIAN ASSISTANT

## 2022-07-14 NOTE — PROGRESS NOTES
[facility]  Neva Cuevas   61 y o  male MRN: [id number]       CC: Follow-up    HPI:  Naty Aguirre is a 76-year-old male history of hepatitis-C cirrhosis complicated by esophageal varices and hepatic encephalopathy  Patient also has history of obesity status post gastric sleeve surgery in 2018  Patient was last seen by me in March to reestablish with the office as he had not been seen since 2019  Patient underwent EGD for varices screening in April 2022 revealing 3 columns of trace esophageal varices, mild nonerosive gastritis and expected anatomy after gastric sleeve  Patient has no new complaints at this time  He denies abdominal distension, lower extremity edema, or abdominal pain  His last colonoscopy that I can see was from 2015, he had a single hyperplastic polyp removed  He reports that he is very reluctant to have another colonoscopy as he reports post polypectomy bleeding  Therefore, patient opts to have Cologuard this year which was negative  He will be due for another screening in 3 years  Review of Systems:    CONSTITUTIONAL: Denies any fever, chills, or rigors  Good appetite, and no recent weight loss  HEENT: No earache or tinnitus  Denies hearing loss or visual disturbances  CARDIOVASCULAR: No chest pain or palpitations  RESPIRATORY: Denies any cough, hemoptysis, shortness of breath or dyspnea on exertion  GASTROINTESTINAL: As noted in the History of Present Illness  GENITOURINARY: No problems with urination  Denies any hematuria or dysuria  NEUROLOGIC: No dizziness or vertigo, denies headaches  MUSCULOSKELETAL: Denies any muscle or joint pain  SKIN: Denies skin rashes or itching  ENDOCRINE: Denies excessive thirst  Denies intolerance to heat or cold  PSYCHOSOCIAL: Denies depression or anxiety  Denies any recent memory loss         Current Outpatient Medications   Medication Sig Dispense Refill    AMILoride 5 mg tablet Take 1 tablet (5 mg total) by mouth daily 30 tablet 3    Claritin-D 12 Hour 5-120 MG per tablet TAKE ONE TABLET BY MOUTH TWICE DAILY FOR 5 DAYS 10 tablet 0    fluticasone (FLONASE) 50 mcg/act nasal spray 1 spray into each nostril daily 11 1 mL 1    furosemide (LASIX) 20 mg tablet Take 1 tablet (20 mg total) by mouth 2 (two) times a day 60 tablet 1    ibuprofen (MOTRIN) 800 mg tablet Take 1 tablet (800 mg total) by mouth every 8 (eight) hours as needed for mild pain 30 tablet 3    lactulose (Constulose) 10 g/15 mL solution Take 15 mL (10 g total) by mouth daily as needed (constipation or confusion) 946 mL 1    methocarbamol (ROBAXIN) 750 mg tablet Take 1 tablet (750 mg total) by mouth every 8 (eight) hours 30 tablet 0    nadolol (CORGARD) 20 mg tablet Take 1 tablet (20 mg total) by mouth daily 90 tablet 2    omeprazole (PriLOSEC) 20 mg delayed release capsule Take 1 capsule (20 mg total) by mouth daily 30 capsule 3    sildenafil (VIAGRA) 100 mg tablet Take 1 tablet (100 mg total) by mouth daily as needed for erectile dysfunction 30 tablet 3    triamcinolone (KENALOG) 0 5 % cream Apply topically 3 (three) times a day 30 g 2    metroNIDAZOLE (METROGEL) 1 % gel Apply topically daily (Patient not taking: No sig reported) 45 g 1     No current facility-administered medications for this visit  Past Medical History:   Diagnosis Date    CPAP (continuous positive airway pressure) dependence     does not use machine    Esophageal varices ([facility] )     stable 9/2020 gets checked yearly    Hepatic cirrhosis ([facility] [address] )     treated with harvoni   Hep C- dx age 17    Hepatic encephalopathy ([facility] [address] )     Liver disease     Obesity     Pneumonia     in past    Postgastrectomy malabsorption     Sleep apnea     Wears glasses      Past Surgical History:   Procedure Laterality Date    COLONOSCOPY      ESOPHAGOGASTRODUODENOSCOPY N/A 4/12/2018    Procedure: ESOPHAGOGASTRODUODENOSCOPY (EGD); Surgeon: Tianna Pierce MD;  [facility];   Service: Gastroenterology    FRACTURE SURGERY Left     ankle    OTHER SURGICAL HISTORY      banding esophageal varices    PA EXCISE EXCESS SKIN TISSUE,ABDOMEN N/A 9/16/2020    Procedure: PANNICULECTOMY;  Surgeon: Raz Willett MD;  [facility];  Service: Plastics    PA EXCISE EXCESS SKIN TISSUE,ABDOMEN, ADD-ON N/A 9/16/2020    Procedure: ABDOMINOPLASTY;  Surgeon: Raz Willett MD;  [facility];  Service: Plastics    PA LAP, TERESA RESTRICT PROC, LONGITUDINAL GASTRECTOMY N/A 6/5/2018    Procedure: Nelma Samples;  Surgeon: Preston Medrano MD;  [facility];  Service: Bariatrics     Social History     Socioeconomic History    Marital status: /Civil Union     Spouse name: None    Number of children: 2    Years of education: None    Highest education level: None   Occupational History    Occupation: disabled   Tobacco Use    Smoking status: Current Every Day Smoker     Packs/day: 0 25     Years: 37 00     Pack years: 9 25    Smokeless tobacco: Never Used    Tobacco comment: stopped 7/2020   Vaping Use    Vaping Use: Former    Substances: Flavoring   Substance and Sexual Activity    Alcohol use: Never     Alcohol/week: 0 0 standard drinks    Drug use: No    Sexual activity: Yes   Other Topics Concern    None   Social History Narrative    None     Social Determinants of Health     Financial Resource Strain: Not on file   Food Insecurity: Not on file   Transportation Needs: Not on file   Physical Activity: Not on file   Stress: Not on file   Social Connections: Not on file   Intimate Partner Violence: Not on file   Housing Stability: Not on file     Family History   Problem Relation Age of Onset    Heart disease Mother     Lupus Mother     Diabetes Father     Stroke Father             PHYSICAL EXAM:    Vitals:    07/14/22 0820   BP: 120/68   BP Location: Left arm   Patient Position: Sitting   Cuff Size: Standard   Pulse: 57   SpO2: 98% Weight: 88 1 kg (194 lb 3 2 oz)   Height: 5' 5" (1 651 m)     General Appearance:   Alert and oriented x 3  Cooperative, and in no respiratory distress   HEENT:   Normocephalic, atraumatic, anicteric      Neck:  Supple, symmetrical, trachea midline   Lungs:   Clear to auscultation bilaterally    Heart[de-identified]   Regular rate and rhythm   Abdomen:   Soft, non-tender, non-distended; normal bowel sounds; no masses, no organomegaly    Genitalia:   Deferred    Rectal:   Deferred    Extremities:  No cyanosis, clubbing or edema    Pulses:  2+ and symmetric all extremities    Skin:  Skin color, texture, turgor normal, no rashes or lesions    Lymph nodes:  No palpable cervical or supraclavicular lymphadenopathy        Lab Results:   Results from last 6 Months   Lab Units 03/07/22  1008   WBC Thousand/uL 5 49   HEMOGLOBIN g/dL 13 4   HEMATOCRIT % 37 2   PLATELETS Thousands/uL 112*   NEUTROS PCT % 62   LYMPHS PCT % 21   MONOS PCT % 10   EOS PCT % 6     Results from last 6 Months   Lab Units 03/07/22  1008   POTASSIUM mmol/L 3 6   CHLORIDE mmol/L 108   CO2 mmol/L 24   BUN mg/dL 9   CREATININE mg/dL 0 71   CALCIUM mg/dL 8 5   ALK PHOS U/L 93   ALT U/L 25   AST U/L 36               Imaging Studies: I have personally reviewed pertinent imaging studies  EGD    Result Date: 4/25/2022  Impression: Three columns of trace esophageal varices Mild nonerosive gastritis Sleeve gastrectomy anatomy RECOMMENDATION: PPI Await pathology Reflux precautions EGD surveillance in 1-2 years   Hemanth Hernandes MD       ASSESSMENT and PLAN:      1) Compensated HCV cirrhosis with esophageal varices - Likely low MELD  Will need updated labs to calculate   - MRI for [facility] [address]  screening scheduled for August   - CMP, INR and AFP ordered  - Continue nadolol   - Will be due for next [facility] [address]  screening in January/February 2023  - Continued abstinence from alcohol   - Low sodium diet under 2 g         Follow up in 6 months

## 2022-07-28 DIAGNOSIS — M54.50 MIDLINE LOW BACK PAIN WITHOUT SCIATICA, UNSPECIFIED CHRONICITY: ICD-10-CM

## 2022-07-31 RX ORDER — METHOCARBAMOL 750 MG/1
TABLET, FILM COATED ORAL
Qty: 30 TABLET | Refills: 0 | Status: SHIPPED | OUTPATIENT
Start: 2022-07-31 | End: 2022-10-03 | Stop reason: SDUPTHER

## 2022-09-10 DIAGNOSIS — R05.9 COUGH: ICD-10-CM

## 2022-09-12 RX ORDER — FLUTICASONE PROPIONATE 50 MCG
1 SPRAY, SUSPENSION (ML) NASAL DAILY
Qty: 18.2 ML | Refills: 4 | Status: SHIPPED | OUTPATIENT
Start: 2022-09-12

## 2022-10-03 DIAGNOSIS — M54.50 MIDLINE LOW BACK PAIN WITHOUT SCIATICA, UNSPECIFIED CHRONICITY: ICD-10-CM

## 2022-10-03 RX ORDER — METHOCARBAMOL 750 MG/1
TABLET, FILM COATED ORAL
Qty: 30 TABLET | Refills: 2 | Status: SHIPPED | OUTPATIENT
Start: 2022-10-03

## 2022-10-07 ENCOUNTER — OFFICE VISIT (OUTPATIENT)
Dept: FAMILY MEDICINE CLINIC | Facility: CLINIC | Age: 57
End: 2022-10-07
Payer: COMMERCIAL

## 2022-10-07 ENCOUNTER — APPOINTMENT (OUTPATIENT)
Dept: RADIOLOGY | Facility: CLINIC | Age: 57
End: 2022-10-07
Payer: COMMERCIAL

## 2022-10-07 VITALS
HEIGHT: 65 IN | SYSTOLIC BLOOD PRESSURE: 102 MMHG | DIASTOLIC BLOOD PRESSURE: 60 MMHG | TEMPERATURE: 98.6 F | OXYGEN SATURATION: 96 % | BODY MASS INDEX: 33.32 KG/M2 | HEART RATE: 69 BPM | WEIGHT: 200 LBS

## 2022-10-07 DIAGNOSIS — Z00.00 MEDICARE ANNUAL WELLNESS VISIT, SUBSEQUENT: ICD-10-CM

## 2022-10-07 DIAGNOSIS — M54.42 ACUTE BILATERAL LOW BACK PAIN WITH LEFT-SIDED SCIATICA: ICD-10-CM

## 2022-10-07 DIAGNOSIS — D69.6 PLATELETS DECREASED (HCC): ICD-10-CM

## 2022-10-07 DIAGNOSIS — R73.01 IMPAIRED FASTING GLUCOSE: ICD-10-CM

## 2022-10-07 DIAGNOSIS — Z13.220 NEED FOR LIPID SCREENING: ICD-10-CM

## 2022-10-07 DIAGNOSIS — M54.42 ACUTE BILATERAL LOW BACK PAIN WITH LEFT-SIDED SCIATICA: Primary | ICD-10-CM

## 2022-10-07 PROCEDURE — G0439 PPPS, SUBSEQ VISIT: HCPCS | Performed by: FAMILY MEDICINE

## 2022-10-07 PROCEDURE — 96372 THER/PROPH/DIAG INJ SC/IM: CPT | Performed by: FAMILY MEDICINE

## 2022-10-07 PROCEDURE — 99214 OFFICE O/P EST MOD 30 MIN: CPT | Performed by: FAMILY MEDICINE

## 2022-10-07 PROCEDURE — 72100 X-RAY EXAM L-S SPINE 2/3 VWS: CPT

## 2022-10-07 RX ORDER — TRAMADOL HYDROCHLORIDE 50 MG/1
50 TABLET ORAL EVERY 6 HOURS PRN
Qty: 30 TABLET | Refills: 0 | Status: SHIPPED | OUTPATIENT
Start: 2022-10-07 | End: 2022-10-19

## 2022-10-07 RX ORDER — KETOROLAC TROMETHAMINE 30 MG/ML
30 INJECTION, SOLUTION INTRAMUSCULAR; INTRAVENOUS ONCE
Status: COMPLETED | OUTPATIENT
Start: 2022-10-07 | End: 2022-10-07

## 2022-10-07 RX ORDER — DICLOFENAC SODIUM 75 MG/1
75 TABLET, DELAYED RELEASE ORAL 2 TIMES DAILY
Qty: 40 TABLET | Refills: 2 | Status: SHIPPED | OUTPATIENT
Start: 2022-10-07

## 2022-10-07 RX ADMIN — KETOROLAC TROMETHAMINE 30 MG: 30 INJECTION, SOLUTION INTRAMUSCULAR; INTRAVENOUS at 13:36

## 2022-10-07 NOTE — PATIENT INSTRUCTIONS
Medicare Preventive Visit Patient Instructions  Thank you for completing your Welcome to Medicare Visit or Medicare Annual Wellness Visit today  Your next wellness visit will be due in one year (10/8/2023)  The screening/preventive services that you may require over the next 5-10 years are detailed below  Some tests may not apply to you based off risk factors and/or age  Screening tests ordered at today's visit but not completed yet may show as past due  Also, please note that scanned in results may not display below  Preventive Screenings:  Service Recommendations Previous Testing/Comments   Colorectal Cancer Screening  · Colonoscopy    · Fecal Occult Blood Test (FOBT)/Fecal Immunochemical Test (FIT)  · Fecal DNA/Cologuard Test  · Flexible Sigmoidoscopy Age: 39-70 years old   Colonoscopy: every 10 years (May be performed more frequently if at higher risk)  OR  FOBT/FIT: every 1 year  OR  Cologuard: every 3 years  OR  Sigmoidoscopy: every 5 years  Screening may be recommended earlier than age 39 if at higher risk for colorectal cancer  Also, an individualized decision between you and your healthcare provider will decide whether screening between the ages of 74-80 would be appropriate   Colonoscopy: 04/07/2022  FOBT/FIT: Not on file  Cologuard: 04/07/2022  Sigmoidoscopy: Not on file    Screening Current     Prostate Cancer Screening Individualized decision between patient and health care provider in men between ages of 53-78   Medicare will cover every 12 months beginning on the day after your 50th birthday PSA: 1 4 ng/mL     Due for PSA     Hepatitis C Screening Once for adults born between 1945 and 1965  More frequently in patients at high risk for Hepatitis C Hep C Antibody: 08/19/2021    Screening Not Indicated  History Hepatitis C   Diabetes Screening 1-2 times per year if you're at risk for diabetes or have pre-diabetes Fasting glucose: 113 mg/dL (3/7/2022)  A1C: 4 5 % (8/19/2021)  Screening Current Cholesterol Screening Once every 5 years if you don't have a lipid disorder  May order more often based on risk factors  Lipid panel: 08/19/2021  Screening Current      Other Preventive Screenings Covered by Medicare:  1  Abdominal Aortic Aneurysm (AAA) Screening: covered once if your at risk  You're considered to be at risk if you have a family history of AAA or a male between the age of 73-68 who smoking at least 100 cigarettes in your lifetime  2  Lung Cancer Screening: covers low dose CT scan once per year if you meet all of the following conditions: (1) Age 50-69; (2) No signs or symptoms of lung cancer; (3) Current smoker or have quit smoking within the last 15 years; (4) You have a tobacco smoking history of at least 20 pack years (packs per day x number of years you smoked); (5) You get a written order from a healthcare provider  3  Glaucoma Screening: covered annually if you're considered high risk: (1) You have diabetes OR (2) Family history of glaucoma OR (3)  aged 48 and older OR (3)  American aged 72 and older  3  Osteoporosis Screening: covered every 2 years if you meet one of the following conditions: (1) Have a vertebral abnormality; (2) On glucocorticoid therapy for more than 3 months; (3) Have primary hyperparathyroidism; (4) On osteoporosis medications and need to assess response to drug therapy  5  HIV Screening: covered annually if you're between the age of 12-76  Also covered annually if you are younger than 13 and older than 72 with risk factors for HIV infection  For pregnant patients, it is covered up to 3 times per pregnancy      Immunizations:  Immunization Recommendations   Influenza Vaccine Annual influenza vaccination during flu season is recommended for all persons aged >= 6 months who do not have contraindications   Pneumococcal Vaccine   * Pneumococcal conjugate vaccine = PCV13 (Prevnar 13), PCV15 (Vaxneuvance), PCV20 (Prevnar 20)  * Pneumococcal polysaccharide vaccine = PPSV23 (Pneumovax) Adults 2364 years old: 1-3 doses may be recommended based on certain risk factors  Adults 72 years old: 1-2 doses may be recommended based off what pneumonia vaccine you previously received   Hepatitis B Vaccine 3 dose series if at intermediate or high risk (ex: diabetes, end stage renal disease, liver disease)   Tetanus (Td) Vaccine - COST NOT COVERED BY MEDICARE PART B Following completion of primary series, a booster dose should be given every 10 years to maintain immunity against tetanus  Td may also be given as tetanus wound prophylaxis  Tdap Vaccine - COST NOT COVERED BY MEDICARE PART B Recommended at least once for all adults  For pregnant patients, recommended with each pregnancy  Shingles Vaccine (Shingrix) - COST NOT COVERED BY MEDICARE PART B  2 shot series recommended in those aged 48 and above     Health Maintenance Due:      Topic Date Due    Colorectal Cancer Screening  04/22/2025    HIV Screening  Completed    Hepatitis C Screening  Discontinued     Immunizations Due:      Topic Date Due    Hepatitis A Vaccine (1 of 2 - Risk 2-dose series) Never done    Hepatitis B Vaccine (1 of 3 - Risk 3-dose series) Never done    Pneumococcal Vaccine: Pediatrics (0 to 5 Years) and At-Risk Patients (6 to 59 Years) (2 - PPSV23 or PCV20) 06/17/2016    Influenza Vaccine (1) 09/01/2022     Advance Directives   What are advance directives? Advance directives are legal documents that state your wishes and plans for medical care  These plans are made ahead of time in case you lose your ability to make decisions for yourself  Advance directives can apply to any medical decision, such as the treatments you want, and if you want to donate organs  What are the types of advance directives? There are many types of advance directives, and each state has rules about how to use them  You may choose a combination of any of the following:  · Living will:   This is a written record of the treatment you want  You can also choose which treatments you do not want, which to limit, and which to stop at a certain time  This includes surgery, medicine, IV fluid, and tube feedings  · Durable power of  for healthcare [facility]): This is a written record that states who you want to make healthcare choices for you when you are unable to make them for yourself  This person, called a proxy, is usually a family member or a friend  You may choose more than 1 proxy  · Do not resuscitate (DNR) order:  A DNR order is used in case your heart stops beating or you stop breathing  It is a request not to have certain forms of treatment, such as CPR  A DNR order may be included in other types of advance directives  · Medical directive: This covers the care that you want if you are in a coma, near death, or unable to make decisions for yourself  You can list the treatments you want for each condition  Treatment may include pain medicine, surgery, blood transfusions, dialysis, IV or tube feedings, and a ventilator (breathing machine)  · Values history: This document has questions about your views, beliefs, and how you feel and think about life  This information can help others choose the care that you would choose  Why are advance directives important? An advance directive helps you control your care  Although spoken wishes may be used, it is better to have your wishes written down  Spoken wishes can be misunderstood, or not followed  Treatments may be given even if you do not want them  An advance directive may make it easier for your family to make difficult choices about your care  Cigarette Smoking and Your Health   Risks to your health if you smoke:  Nicotine and other chemicals found in tobacco damage every cell in your body  Even if you are a light smoker, you have an increased risk for cancer, heart disease, and lung disease   If you are pregnant or have diabetes, smoking increases your risk for complications  Benefits to your health if you stop smoking:   · You decrease respiratory symptoms such as coughing, wheezing, and shortness of breath  · You reduce your risk for cancers of the lung, mouth, throat, kidney, bladder, pancreas, stomach, and cervix  If you already have cancer, you increase the benefits of chemotherapy  You also reduce your risk for cancer returning or a second cancer from developing  · You reduce your risk for heart disease, blood clots, heart attack, and stroke  · You reduce your risk for lung infections, and diseases such as pneumonia, asthma, chronic bronchitis, and emphysema  · Your circulation improves  More oxygen can be delivered to your body  If you have diabetes, you lower your risk for complications, such as kidney, artery, and eye diseases  You also lower your risk for nerve damage  Nerve damage can lead to amputations, poor vision, and blindness  · You improve your body's ability to heal and to fight infections  For more information and support to stop smoking:   · [employer]  Phone: 5- 589 - [phone]  Web Address: DLC  Weight Management   Why it is important to manage your weight:  Being overweight increases your risk of health conditions such as heart disease, high blood pressure, type 2 diabetes, and certain types of cancer  It can also increase your risk for osteoarthritis, sleep apnea, and other respiratory problems  Aim for a slow, steady weight loss  Even a small amount of weight loss can lower your risk of health problems  How to lose weight safely:  A safe and healthy way to lose weight is to eat fewer calories and get regular exercise  You can lose up about 1 pound a week by decreasing the number of calories you eat by 500 calories each day  Healthy meal plan for weight management:  A healthy meal plan includes a variety of foods, contains fewer calories, and helps you stay healthy   A healthy meal plan includes the following:  · Eat whole-grain foods more often  A healthy meal plan should contain fiber  Fiber is the part of grains, fruits, and vegetables that is not broken down by your body  Whole-grain foods are healthy and provide extra fiber in your diet  Some examples of whole-grain foods are whole-wheat breads and pastas, oatmeal, brown rice, and bulgur  · Eat a variety of vegetables every day  Include dark, leafy greens such as spinach, kale, jt greens, and mustard greens  Eat yellow and orange vegetables such as carrots, sweet potatoes, and winter squash  · Eat a variety of fruits every day  Choose fresh or canned fruit (canned in its own juice or light syrup) instead of juice  Fruit juice has very little or no fiber  · Eat low-fat dairy foods  Drink fat-free (skim) milk or 1% milk  Eat fat-free yogurt and low-fat cottage cheese  Try low-fat cheeses such as mozzarella and other reduced-fat cheeses  · Choose meat and other protein foods that are low in fat  Choose beans or other legumes such as split peas or lentils  Choose fish, skinless poultry (chicken or turkey), or lean cuts of red meat (beef or pork)  Before you cook meat or poultry, cut off any visible fat  · Use less fat and oil  Try baking foods instead of frying them  Add less fat, such as margarine, sour cream, regular salad dressing and mayonnaise to foods  Eat fewer high-fat foods  Some examples of high-fat foods include french fries, doughnuts, ice cream, and cakes  · Eat fewer sweets  Limit foods and drinks that are high in sugar  This includes candy, cookies, regular soda, and sweetened drinks  Exercise:  Exercise at least 30 minutes per day on most days of the week  Some examples of exercise include walking, biking, dancing, and swimming  You can also fit in more physical activity by taking the stairs instead of the elevator or parking farther away from stores  Ask your healthcare provider about the best exercise plan for you        © Copyright [employer] 2018 Information is for [employer] use only and may not be sold, redistributed or otherwise used for commercial purposes   All illustrations and images included in CareNotes® are the copyrighted property of A D A M , Inc  or [facility] Doretha Martin

## 2022-10-07 NOTE — PROGRESS NOTES
Name: Aria Casey        : 1965      MRN: [id number]  Encounter Provider: Mary Granados MD  Encounter Date: 10/7/2022   Encounter department: [address] [place] [address]     1  Acute bilateral low back pain with left-sided sciatica  After discussing risks and benefits of medication along with side effects will start the following:  -     ketorolac (TORADOL) injection 30 mg  -     traMADol (Ultram) 50 mg tablet; Take 1 tablet (50 mg total) by mouth every 6 (six) hours as needed for moderate pain  -     diclofenac (VOLTAREN) 75 mg EC tablet; Take 1 tablet (75 mg total) by mouth 2 (two) times a day  -     XR spine lumbar 2 or 3 views injury; Future; Expected date: 10/07/2022    2  Platelets decreased (HCC)  -     CBC and differential; Future    3  Medicare annual wellness visit, subsequent  See Medicare wellness note  4  Impaired fasting glucose  -     Comprehensive metabolic panel; Future  -     Hemoglobin A1C; Future    5  Need for lipid screening  -     Lipid panel; Future    Follow up as needed if symptoms continue       Subjective     Patient is here because he developed severe sharp pan on his lower back when he got out of his car yesterday when he arrived worked  He collapsed to the floor on the parking lot given that his left thigh muscle felt tight  Ibuprofen 800 mg not helping  Review of Systems   Constitutional: Negative for activity change, appetite change, fatigue and fever  HENT: Negative for congestion and ear discharge  Respiratory: Negative for cough and shortness of breath  Cardiovascular: Negative for chest pain and palpitations  Gastrointestinal: Negative for diarrhea and nausea  Musculoskeletal: Positive for back pain and myalgias  Negative for arthralgias  Skin: Negative for color change and rash  Neurological: Negative for dizziness and headaches  Psychiatric/Behavioral: Negative for agitation and behavioral problems         Past Medical History:   Diagnosis Date    CPAP (continuous positive airway pressure) dependence     does not use machine    Esophageal varices ([facility] [address] )     stable 9/2020 gets checked yearly    Hepatic cirrhosis ([facility] [address] )     treated with harvoni   Hep C- dx age 17    Hepatic encephalopathy     Liver disease     Obesity     Pneumonia     in past    Postgastrectomy malabsorption     Sleep apnea     Wears glasses      Past Surgical History:   Procedure Laterality Date    COLONOSCOPY      ESOPHAGOGASTRODUODENOSCOPY N/A 4/12/2018    Procedure: ESOPHAGOGASTRODUODENOSCOPY (EGD); Surgeon: Milvia Boateng MD;  [facility];   Service: Gastroenterology    FRACTURE SURGERY Left     ankle    OTHER SURGICAL HISTORY      banding esophageal varices    SC EXCISE EXCESS SKIN TISSUE,ABDOMEN N/A 9/16/2020    Procedure: PANNICULECTOMY;  Surgeon: Shirley Montes MD;  [facility];  Service: Plastics    SC EXCISE EXCESS SKIN TISSUE,ABDOMEN, ADD-ON N/A 9/16/2020    Procedure: ABDOMINOPLASTY;  Surgeon: Shirley Montes MD;  [facility];  Service: Plastics    SC LAP, TERESA RESTRICT PROC, LONGITUDINAL GASTRECTOMY N/A 6/5/2018    Procedure: Chrissy Denton;  Surgeon: Danyelle Mello MD;  [facility];  Service: Bariatrics     Family History   Problem Relation Age of Onset    Heart disease Mother     Lupus Mother     Diabetes Father     Stroke Father      Social History     Socioeconomic History    Marital status: /Civil Union     Spouse name: None    Number of children: 2    Years of education: None    Highest education level: None   Occupational History    Occupation: disabled   Tobacco Use    Smoking status: Current Every Day Smoker     Packs/day: 0 25     Years: 37 00     Pack years: 9 25    Smokeless tobacco: Never Used    Tobacco comment: stopped 7/2020   Vaping Use    Vaping Use: Former    Substances: Flavoring   Substance and Sexual Activity    Alcohol use: Never     Alcohol/week: 0 0 standard drinks    Drug use: No    Sexual activity: Yes   Other Topics Concern    None   Social History Narrative    None     Social Determinants of Health     Financial Resource Strain: Low Risk     Difficulty of Paying Living Expenses: Not hard at all   Food Insecurity: Not on file   Transportation Needs: No Transportation Needs    Lack of Transportation (Medical): No    Lack of Transportation (Non-Medical):  No   Physical Activity: Not on file   Stress: Not on file   Social Connections: Not on file   Intimate Partner Violence: Not on file   Housing Stability: Not on file     Current Outpatient Medications on File Prior to Visit   Medication Sig    AMILoride 5 mg tablet Take 1 tablet (5 mg total) by mouth daily    Claritin-D 12 Hour 5-120 MG per tablet TAKE ONE TABLET BY MOUTH TWICE DAILY FOR 5 DAYS    fluticasone (FLONASE) 50 mcg/act nasal spray 1 SPRAY INTO EACH NOSTRIL DAILY    furosemide (LASIX) 20 mg tablet Take 1 tablet (20 mg total) by mouth 2 (two) times a day    lactulose (Constulose) 10 g/15 mL solution Take 15 mL (10 g total) by mouth daily as needed (constipation or confusion)    methocarbamol (ROBAXIN) 750 mg tablet Take 1 tablet by mouth every eight hours    metroNIDAZOLE (METROGEL) 1 % gel Apply topically daily (Patient not taking: No sig reported)    nadolol (CORGARD) 20 mg tablet Take 1 tablet (20 mg total) by mouth daily    omeprazole (PriLOSEC) 20 mg delayed release capsule Take 1 capsule (20 mg total) by mouth daily    sildenafil (VIAGRA) 100 mg tablet Take 1 tablet (100 mg total) by mouth daily as needed for erectile dysfunction    triamcinolone (KENALOG) 0 5 % cream Apply topically 3 (three) times a day    [DISCONTINUED] ibuprofen (MOTRIN) 800 mg tablet Take 1 tablet (800 mg total) by mouth every 8 (eight) hours as needed for mild pain     No Known Allergies  Immunization History   Administered Date(s) Administered    [employer] vac (Armando-sucrose, gray cap) 12 yr+ IM 08/19/2021, 09/09/2021, 02/10/2022, 07/05/2022    INFLUENZA 10/28/2014, 09/15/2016, 11/04/2017, 08/30/2018    Influenza Split High Dose Preservative Free IM 10/28/2014    Influenza, injectable, quadrivalent, preservative free 0 5 mL 08/30/2018    Influenza, recombinant, quadrivalent,injectable, preservative free 11/20/2019    Pneumococcal Conjugate 13-Valent 06/17/2015    Tdap 05/01/2019    Tuberculin Skin Test-PPD Intradermal 10/28/2009, 02/07/2011, 07/23/2012, 03/16/2015    Zoster 10/28/2014       Objective     /60 (BP Location: Left arm, Patient Position: Sitting, Cuff Size: Large)   Pulse 69   Temp 98 6 °F (37 °C)   Ht 5' 5" (1 651 m)   Wt 90 7 kg (200 lb)   SpO2 96%   BMI 33 28 kg/m²     Physical Exam  Constitutional:       General: He is not in acute distress  Appearance: He is well-developed  He is not diaphoretic  Eyes:      General: No scleral icterus  Pupils: Pupils are equal, round, and reactive to light  Cardiovascular:      Rate and Rhythm: Normal rate and regular rhythm  Heart sounds: Normal heart sounds  No murmur heard  Pulmonary:      Effort: Pulmonary effort is normal  No respiratory distress  Breath sounds: Normal breath sounds  No wheezing  Abdominal:      General: Bowel sounds are normal  There is no distension  Palpations: Abdomen is soft  Tenderness: There is no abdominal tenderness  Musculoskeletal:         General: Tenderness present  Comments: B/L straight leg raise produces low back pain  Limited ROM of lumbar spine due to pain   Skin:     General: Skin is warm and dry  Findings: No rash  Neurological:      Mental Status: He is alert and oriented to person, place, and time         Janeth Downs MD

## 2022-10-12 PROBLEM — Z00.00 MEDICARE ANNUAL WELLNESS VISIT, SUBSEQUENT: Status: RESOLVED | Noted: 2021-09-10 | Resolved: 2022-10-12

## 2022-10-19 DIAGNOSIS — M54.42 ACUTE BILATERAL LOW BACK PAIN WITH LEFT-SIDED SCIATICA: ICD-10-CM

## 2022-10-19 RX ORDER — TRAMADOL HYDROCHLORIDE 50 MG/1
50 TABLET ORAL EVERY 6 HOURS PRN
Qty: 30 TABLET | Refills: 0 | Status: SHIPPED | OUTPATIENT
Start: 2022-10-19

## 2022-10-25 ENCOUNTER — TELEPHONE (OUTPATIENT)
Dept: FAMILY MEDICINE CLINIC | Facility: CLINIC | Age: 57
End: 2022-10-25

## 2022-10-25 DIAGNOSIS — M54.42 ACUTE BILATERAL LOW BACK PAIN WITH LEFT-SIDED SCIATICA: Primary | ICD-10-CM

## 2022-10-25 NOTE — TELEPHONE ENCOUNTER
Recommend for him to contact [facility] pain management [phone]    I placed another referral to [facility] Pain managment

## 2022-10-31 ENCOUNTER — TELEPHONE (OUTPATIENT)
Dept: OTHER | Facility: OTHER | Age: 57
End: 2022-10-31

## 2022-10-31 NOTE — TELEPHONE ENCOUNTER
Patient is requesting a NEW PATIENT appointment  Patient is requesting a call back and would prefer to go to the [facility] location

## 2022-11-02 DIAGNOSIS — M54.50 MIDLINE LOW BACK PAIN WITHOUT SCIATICA, UNSPECIFIED CHRONICITY: ICD-10-CM

## 2022-11-02 DIAGNOSIS — M79.18 MYOFASCIAL MUSCLE PAIN: ICD-10-CM

## 2022-11-02 RX ORDER — IBUPROFEN 800 MG/1
TABLET ORAL
Qty: 30 TABLET | Refills: 2 | Status: SHIPPED | OUTPATIENT
Start: 2022-11-02

## 2022-11-02 NOTE — PROGRESS NOTES
Assessment:  1  Lumbar facet arthropathy    2  Acute bilateral low back pain without sciatica    3  Strain of lumbar region, initial encounter        Plan:  Orders Placed This Encounter   Procedures   • Durable Medical Equipment     1 TENS UNIT   • Ambulatory referral to Physical Therapy     Standing Status:   Future     Standing Expiration Date:   11/4/2023     Referral Priority:   Routine     Referral Type:   Physical Therapy     Referral Reason:   Specialty Services Required     Requested Specialty:   Physical Therapy     Number of Visits Requested:   1     Expiration Date:   11/4/2023       No orders of the defined types were placed in this encounter  My impressions and treatment recommendations were discussed in detail with the patient, who verbalized understanding and had no further questions  This is a 26-year-old male with history of HCV complicated by cirrhosis and history of gastric sleeve surgery presents to our office with chief complaint of acute back pain that began almost 1 month ago  Reports spasm and locking of the back when he was trying to exit his vehicle  On exam today, he is neurologically intact  Has full range of motion throughout with no significant escalation of symptoms  Has notable paraspinal tenderness, especially on the right side  Appears he may be suffering from lumbar strain  He does not have significant pain with lumbar extension to suggest aggravated facet pathology  He would certainly benefit from a course of physical therapy and referral was provided today  We will see him back in 6 weeks or sooner if medically necessary  If still has ongoing symptoms, may consider MRI of the lumbar spine, however I reassured the patient that his symptoms should slowly improve  I also ordered a 10s unit for his myofascial symptoms    This can also help in more acute episodes    [place] Prescription Drug Monitoring Program report was reviewed and was appropriate     Complete risks and benefits including bleeding, infection, tissue reaction, nerve injury and allergic reaction were discussed  The approach was demonstrated using models and literature was provided  Verbal and written consent was obtained  Discharge instructions were provided  I personally saw and examined the patient and I agree with the above discussed plan of care  History of Present Illness:    Harpreet Collins Sr  is a 61 y o  male who presents to [facility] for initial evaluation of the above stated pain complaints  The patient has a past medical and chronic pain history as outlined in the assessment section  He was referred by Trudi Brannon MD  [address] [place] [address]  [facility]   He reports back issues for the last 2 years, however this was recently exacerbated  Patient was going to work  Was getting out of his car and felt sudden locking pain in his lower back  Moderate to severe in intensity over the past month  4/10  Intermittent  Worse in the afternoon  Described as locking in his back where he can not stand up  He had a similar issue again the following week while gettitng out of bed  This has never happened before  Is increased with standing, walking  Decreased with exercising  Smokes tobacco, half pack per day  No marijuana  No alcohol  Not on blood thinners  Not allergic to latex or contrast dye  Has notable history of hep C complicated by cirrhosis and varices  Has MELD score 15  Also has history of gastric sleeve surgery  Currently uses Voltaren and tramadol which were given by Dr Zoila Suárez with some relief    Review of Systems:    Review of Systems   Constitutional: Negative for fever and unexpected weight change  HENT: Negative for trouble swallowing  Eyes: Negative for visual disturbance  Respiratory: Negative for shortness of breath and wheezing  Cardiovascular: Negative for chest pain and palpitations  Gastrointestinal: Negative for constipation, diarrhea, nausea and vomiting  Endocrine: Negative for cold intolerance, heat intolerance and polydipsia  Genitourinary: Negative for difficulty urinating and frequency  Musculoskeletal: Negative for arthralgias, gait problem, joint swelling and myalgias  Skin: Negative for rash  Neurological: Negative for dizziness, seizures, syncope, weakness and headaches  Hematological: Does not bruise/bleed easily  Psychiatric/Behavioral: Negative for dysphoric mood  All other systems reviewed and are negative          Patient Active Problem List   Diagnosis   • Former smoker   • Erectile dysfunction   • 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 ([facility] )   • Gastroesophageal reflux disease without esophagitis   • Morbid obesity with BMI of 40 0-44 9, adult ([facility] )   • Obstructive sleep apnea   • Secondary esophageal varices without bleeding ([place] [id number] )   • Hepatic encephalopathy   • S/P laparoscopic sleeve gastrectomy   • Mood swings   • Acute pain of right knee   • Effusion of right knee   • Diabetes mellitus screening   • Anemia   • Dizzy   • Postsurgical malabsorption   • Esophageal varices without bleeding (HCC)   • Need for Tdap vaccination   • Need for lipid screening   • Laceration of scalp without foreign body   • Midline low back pain without sciatica   • Excess skin of abdominal wall   • Myofascial muscle pain   • Hyperammonemia (HCC)   • Vitamin A deficiency   • Need for influenza vaccination   • Hypotension   • Heartburn   • S/P abdominoplasty   • Encounter for surgical aftercare following surgery of digestive system   • Platelets decreased ([facility] )   • Leukocytosis   • Bowel and bladder incontinence   • Hyperbilirubinemia   • Tremor   • Acute bilateral low back pain with left-sided sciatica       Past Medical History:   Diagnosis Date   • CPAP (continuous positive airway pressure) dependence     does not use machine   • Esophageal varices (HCC)     stable 9/2020 gets checked yearly   • Hepatic cirrhosis ([facility] [address] )     treated with harvoni   Hep C- dx age 17   • Hepatic encephalopathy    • Liver disease    • Obesity    • Pneumonia     in past   • Postgastrectomy malabsorption    • Sleep apnea    • Wears glasses        Past Surgical History:   Procedure Laterality Date   • COLONOSCOPY     • ESOPHAGOGASTRODUODENOSCOPY N/A 4/12/2018    Procedure: ESOPHAGOGASTRODUODENOSCOPY (EGD); Surgeon: Karri Waters MD;  Location: [facility];   Service: Gastroenterology   • FRACTURE SURGERY Left     ankle   • OTHER SURGICAL HISTORY      banding esophageal varices   • CA EXCISE EXCESS SKIN TISSUE,ABDOMEN N/A 9/16/2020    Procedure: PANNICULECTOMY;  Surgeon: Author Regino MD;  [facility]: [facility];  Service: Plastics   • CA EXCISE EXCESS SKIN TISSUE,ABDOMEN, ADD-ON N/A 9/16/2020    Procedure: ABDOMINOPLASTY;  Surgeon: Author Regino MD;  Location: [facility];  Service: Plastics   • CA LAP, TERESA RESTRICT PROC, LONGITUDINAL GASTRECTOMY N/A 6/5/2018    Procedure: GASTRECTOMY LAPAROSCOPIC SLEEVE;  Surgeon: Arlen Crigler, MD;  Location: [facility];  Service: Bariatrics       Family History   Problem Relation Age of Onset   • Heart disease Mother    • Lupus Mother    • Diabetes Father    • Stroke Father        Social History     Occupational History   • Occupation: disabled   Tobacco Use   • Smoking status: Current Every Day Smoker     Packs/day: 0 25     Years: 37 00     Pack years: 9 25   • Smokeless tobacco: Never Used   • Tobacco comment: stopped 7/2020   Vaping Use   • Vaping Use: Former   • Substances: Flavoring   Substance and Sexual Activity   • Alcohol use: Never     Alcohol/week: 0 0 standard drinks   • Drug use: No   • Sexual activity: Yes         Current Outpatient Medications:   •  AMILoride 5 mg tablet, Take 1 tablet (5 mg total) by mouth daily, Disp: 30 tablet, Rfl: 3  •  Claritin-D 12 Hour 5-120 MG per tablet, TAKE ONE TABLET BY MOUTH TWICE DAILY FOR 5 DAYS, Disp: 10 tablet, Rfl: 0  •  diclofenac (VOLTAREN) 75 mg EC tablet, Take 1 tablet (75 mg total) by mouth 2 (two) times a day, Disp: 40 tablet, Rfl: 2  •  fluticasone (FLONASE) 50 mcg/act nasal spray, 1 SPRAY INTO EACH NOSTRIL DAILY, Disp: 18 2 mL, Rfl: 4  •  furosemide (LASIX) 20 mg tablet, Take 1 tablet (20 mg total) by mouth 2 (two) times a day, Disp: 60 tablet, Rfl: 1  •  ibuprofen (MOTRIN) 800 mg tablet, TAKE 1 TABLET (800 MG TOTAL) BY MOUTH EVERY EIGHT (EIGHT) HOURS AS NEEDED FOR MILD PAIN, Disp: 30 tablet, Rfl: 2  •  lactulose (Constulose) 10 g/15 mL solution, Take 15 mL (10 g total) by mouth daily as needed (constipation or confusion), Disp: 946 mL, Rfl: 1  •  methocarbamol (ROBAXIN) 750 mg tablet, Take 1 tablet by mouth every eight hours, Disp: 30 tablet, Rfl: 2  •  nadolol (CORGARD) 20 mg tablet, Take 1 tablet (20 mg total) by mouth daily, Disp: 90 tablet, Rfl: 2  •  omeprazole (PriLOSEC) 20 mg delayed release capsule, Take 1 capsule (20 mg total) by mouth daily, Disp: 30 capsule, Rfl: 3  •  sildenafil (VIAGRA) 100 mg tablet, Take 1 tablet (100 mg total) by mouth daily as needed for erectile dysfunction, Disp: 30 tablet, Rfl: 3  •  traMADol (ULTRAM) 50 mg tablet, TAKE 1 TABLET (50 MG TOTAL) BY MOUTH EVERY 6 (SIX) HOURS AS NEEDED FOR MODERATE PAIN, Disp: 30 tablet, Rfl: 0  •  triamcinolone (KENALOG) 0 5 % cream, Apply topically 3 (three) times a day, Disp: 30 g, Rfl: 2  •  metroNIDAZOLE (METROGEL) 1 % gel, Apply topically daily (Patient not taking: No sig reported), Disp: 45 g, Rfl: 1    No Known Allergies    Physical Exam:    /86 (BP Location: Left arm, Patient Position: Sitting, Cuff Size: Standard)   Pulse 55   Ht 5' 5" (1 651 m)   Wt 90 1 kg (198 lb 9 6 oz)   BMI 33 05 kg/m²     Constitutional: normal, well developed, well nourished, alert, in no distress and non-toxic and no overt pain behavior    Eyes: anicteric  HEENT: grossly intact  Neck: supple, symmetric, trachea midline and no masses Pulmonary:even and unlabored  Cardiovascular:No edema or pitting edema present  Skin:Normal without rashes or lesions and well hydrated  Psychiatric:Mood and affect appropriate  Neurologic:Cranial Nerves II-XII grossly intact  Musculoskeletal:normal     Lumbar Spine Exam    Appearance:  Normal lordosis  Palpation/Tenderness:  left lumbar paraspinal tenderness  right lumbar paraspinal tenderness  NOTABLE area of severe tenderness in the righ tlumbar paraspiinal region even with light palpation  Sensory:  no sensory deficits noted  Range of Motion:  Full range of motion with no pain or limitations in flexion, extension, lateral flexion and rotation  Motor Strength:  Left hip flexion:  5/5  Left hip extension:  5/5  Right hip flexion:  5/5  Right hip extension:  5/5  Left knee flexion:  5/5  Left knee extension:  5/5  Right knee flexion:  5/5  Right knee extension:  5/5  Left foot dorsiflexion:  5/5  Left foot plantar flexion:  5/5  Right foot dorsiflexion:  5/5  Right foot plantar flexion:  5/5  Reflexes:  Left Patellar:  3+   Right Patellar:  3+   Left Achilles:  3+   Right Achilles:  3+   Special Tests:  Left Straight Leg Test:  negative  Right Straight Leg Test:  negative      Imaging  LUMBAR SPINE   10/07/2022     INDICATION:   M[id number]: Lumbago with sciatica, left side      COMPARISON:  Compared with 10/4/2019     VIEWS:  XR SPINE LUMBAR 2 OR 3 VIEWS INJURY        FINDINGS:     There are 5 non rib bearing lumbar vertebral bodies       There is no evidence of acute fracture or destructive osseous lesion      Mild levoscoliosis       Facet sclerosis at L5-S1    Mild loss of disc height at L5-S1      The pedicles appear intact      Soft tissues are unremarkable      IMPRESSION:     Mild to moderate L5-S1 degenerative changes with interval worsening     No orders to display       Orders Placed This Encounter   Procedures   • Durable Medical Equipment   • Ambulatory referral to Physical Therapy

## 2022-11-04 ENCOUNTER — CONSULT (OUTPATIENT)
Dept: PAIN MEDICINE | Facility: CLINIC | Age: 57
End: 2022-11-04

## 2022-11-04 VITALS
DIASTOLIC BLOOD PRESSURE: 86 MMHG | HEIGHT: 65 IN | SYSTOLIC BLOOD PRESSURE: 128 MMHG | WEIGHT: 198.6 LBS | BODY MASS INDEX: 33.09 KG/M2 | HEART RATE: 55 BPM

## 2022-11-04 DIAGNOSIS — S39.012A STRAIN OF LUMBAR REGION, INITIAL ENCOUNTER: ICD-10-CM

## 2022-11-04 DIAGNOSIS — M54.50 ACUTE BILATERAL LOW BACK PAIN WITHOUT SCIATICA: ICD-10-CM

## 2022-11-04 DIAGNOSIS — M47.816 LUMBAR FACET ARTHROPATHY: Primary | ICD-10-CM

## 2022-11-04 NOTE — PATIENT INSTRUCTIONS
Lower Back Exercises   WHAT YOU NEED TO KNOW:   Lower back exercises help heal and strengthen your back muscles to prevent another injury  Ask your healthcare provider if you need to see a physical therapist for more advanced exercises  DISCHARGE INSTRUCTIONS:   Return to the emergency department if:   You have severe pain that prevents you from moving  Contact your healthcare provider if:   Your pain becomes worse  You have new pain  You have questions or concerns about your condition or care  Do lower back exercises safely:   Do the exercises on a mat or firm surface  (not on a bed) to support your spine and prevent low back pain  Move slowly and smoothly  Avoid fast or jerky motions  Breathe normally  Do not hold your breath  Stop if you feel pain  It is normal to feel some discomfort at first  Regular exercise will help decrease your discomfort over time  Lower back exercises: Your healthcare provider may recommend that you do back exercises 10 to 30 minutes each day  He may also recommend that you do exercises 1 to 3 times each day  Ask your healthcare provider which exercises are best for you and how often to do them  Ankle pumps:  Lie on your back  Move your foot up (with your toes pointing toward your head)  Then, move your foot down (with your toes pointing away from you)  Repeat this exercise 10 times on each side  Heel slides:  Lie on your back  Slowly bend one leg and then straighten it  Next, bend the other leg and then straighten it  Repeat 10 times on each side  Pelvic tilt:  Lie on your back with your knees bent and feet flat on the floor  Place your arms in a relaxed position beside your body  Tighten the muscles of your abdomen and flatten your back against the floor  Hold for 5 seconds  Repeat 5 times  Back stretch:  Lie on your back with your hands behind your head  Bend your knees and turn the lower half of your body to one side   Hold this position for 10 seconds  Repeat 3 times on each side  Straight leg raises:  Lie on your back with one leg straight  Bend the other knee  Tighten your abdomen and then slowly lift the straight leg up about 6 to 12 inches off the floor  Hold for 1 to 5 seconds  Lower your leg slowly  Repeat 10 times on each leg  Knee-to-chest:  Lie on your back with your knees bent and feet flat on the floor  Pull one of your knees toward your chest and hold it there for 5 seconds  Return your leg to the starting position  Lift the other knee toward your chest and hold for 5 seconds  Do this 5 times on each side  Cat and camel:  Place your hands and knees on the floor  Arch your back upward toward the ceiling and lower your head  Round out your spine as much as you can  Hold for 5 seconds  Lift your head upward and push your chest downward toward the floor  Hold for 5 seconds  Do 3 sets or as directed  Wall squats:  Stand with your back against a wall  Tighten the muscles of your abdomen  Slowly lower your body until your knees are bent at a 45 degree angle  Hold this position for 5 seconds  Slowly move back up to a standing position  Repeat 10 times  Curl up:  Lie on your back with your knees bent and feet flat on the floor  Place your hands, palms down, underneath the curve in your lower back  Next, with your elbows on the floor, lift your shoulders and chest 2 to 3 inches  Keep your head in line with your shoulders  Hold this position for 5 seconds  When you can do this exercise without pain for 10 to 15 seconds, you may add a rotation  While your shoulders and chest are lifted off the ground, turn slightly to the left and hold  Repeat on the other side  Bird dog:  Place your hands and knees on the floor  Keep your wrists directly below your shoulders and your knees directly below your hips  Pull your belly button in toward your spine  Do not flatten or arch your back   Tighten your abdominal muscles  Raise one arm straight out so that it is aligned with your head  Next, raise the leg opposite your arm  Hold this position for 15 seconds  Lower your arm and leg slowly and change sides  Do 5 sets  © Copyright [employer] 2022 Information is for End User's use only and may not be sold, redistributed or otherwise used for commercial purposes  All illustrations and images included in CareNotes® are the copyrighted property of A D A M , Inc  or [facility] Doretha Mcknight   The above information is an  only  It is not intended as medical advice for individual conditions or treatments  Talk to your doctor, nurse or pharmacist before following any medical regimen to see if it is safe and effective for you

## 2022-11-07 ENCOUNTER — TELEPHONE (OUTPATIENT)
Dept: PAIN MEDICINE | Facility: CLINIC | Age: 57
End: 2022-11-07

## 2022-11-07 NOTE — TELEPHONE ENCOUNTER
Caller: Christy Dolan    Doctor: dada    Reason for call: patient called to [facility] and they did not get any info regarding tens unit? patient calling for update       Call back#: [phone]

## 2022-11-07 NOTE — TELEPHONE ENCOUNTER
Lm for pt to cb  SPA typically uses [facility] for TENS unit and rep reaches out to pt  If pt prefers [facility], will fax at this time

## 2022-11-16 NOTE — TELEPHONE ENCOUNTER
Caller: Pt     Doctor: Olga Sewell     Reason for call: pt is calling back in stating that it has been 3 weeks and no one has reached out to him as of yet  Pt also said that [facility] doesn't carry what he needs   Please follow up with pt     Call back#: [phone]

## 2022-11-21 DIAGNOSIS — M54.42 ACUTE BILATERAL LOW BACK PAIN WITH LEFT-SIDED SCIATICA: ICD-10-CM

## 2022-11-22 RX ORDER — DICLOFENAC SODIUM 75 MG/1
75 TABLET, DELAYED RELEASE ORAL 2 TIMES DAILY
Qty: 40 TABLET | Refills: 2 | Status: SHIPPED | OUTPATIENT
Start: 2022-11-22

## 2022-11-22 RX ORDER — TRAMADOL HYDROCHLORIDE 50 MG/1
50 TABLET ORAL EVERY 6 HOURS PRN
Qty: 60 TABLET | Refills: 0 | Status: SHIPPED | OUTPATIENT
Start: 2022-11-22

## 2022-12-03 DIAGNOSIS — M54.50 MIDLINE LOW BACK PAIN WITHOUT SCIATICA, UNSPECIFIED CHRONICITY: ICD-10-CM

## 2022-12-03 DIAGNOSIS — E66.01 MORBID (SEVERE) OBESITY DUE TO EXCESS CALORIES (HCC): ICD-10-CM

## 2022-12-03 DIAGNOSIS — L71.9 ROSACEA: ICD-10-CM

## 2022-12-05 RX ORDER — METHOCARBAMOL 750 MG/1
TABLET, FILM COATED ORAL
Qty: 30 TABLET | Refills: 1 | Status: SHIPPED | OUTPATIENT
Start: 2022-12-05

## 2022-12-05 RX ORDER — OMEPRAZOLE 20 MG/1
20 CAPSULE, DELAYED RELEASE ORAL DAILY
Qty: 30 CAPSULE | Refills: 2 | Status: SHIPPED | OUTPATIENT
Start: 2022-12-05

## 2022-12-05 RX ORDER — TRIAMCINOLONE ACETONIDE 5 MG/G
CREAM TOPICAL
Qty: 30 G | Refills: 1 | Status: SHIPPED | OUTPATIENT
Start: 2022-12-05

## 2022-12-07 ENCOUNTER — EVALUATION (OUTPATIENT)
Dept: PHYSICAL THERAPY | Facility: CLINIC | Age: 57
End: 2022-12-07

## 2022-12-07 DIAGNOSIS — M54.50 ACUTE BILATERAL LOW BACK PAIN WITHOUT SCIATICA: ICD-10-CM

## 2022-12-07 DIAGNOSIS — S39.012D STRAIN OF LUMBAR REGION, SUBSEQUENT ENCOUNTER: ICD-10-CM

## 2022-12-07 NOTE — PROGRESS NOTES
PT Evaluation     Today's date: 2022  Patient name: Ricardo Cordero Sr   : 1965  MRN: [id number]  Referring provider: Lilian Gaston MD  Dx:   Encounter Diagnosis     ICD-10-CM    1  Acute bilateral low back pain without sciatica  M54 50 Ambulatory referral to Physical Therapy      2  Strain of lumbar region, subsequent encounter  S39 012D Ambulatory referral to Physical Therapy          Start Time: 0800  Stop Time: 0850  Total time in clinic (min): 50 minutes    Assessment  Assessment details: Cole Lepe  is a 61 y o  male who presents with pain, decreased ROM, decreased joint mobility, ambulatory dysfunction and postural dysfunction  Due to these impairments, Patient has difficulty performing a/iadls, recreational activities, work-related activities and engaging in social activities  Patient's clinical presentation is consistent with their referring diagnosis of acute on chronic low back pain with mechanical dysfunction and spasm  Patient would benefit from skilled physical therapy to address their aforementioned impairments, improve their level of function and to improve their overall quality of life  Impairments: abnormal gait, abnormal muscle firing, abnormal or restricted ROM, abnormal movement, activity intolerance, impaired physical strength, lacks appropriate home exercise program, pain with function, weight-bearing intolerance, poor posture  and poor body mechanics  Understanding of Dx/Px/POC: excellent   Prognosis: good    Goals  Short Term Goals: to be achieved by 4 weeks  1) Patient to be independent with basic HEP  2) Decrease pain to 6/10 at its worst   3) Increase lumbar spine ROM by 25% in all deficient planes  4) Increase LE strength by 1/2 MMT grade in all deficient planes  5) Patient to report decreased sleep interruption secondary to pain  Long Term Goals: to be achieved by discharge  1) FOTO equal to or greater than TBD    2) Patient to be independent with comprehensive HEP  3) Abolish pain for improved quality of life  4) Lumbar spine ROM WNL all planes to improve a/iadls  5) Increase LE strength to 5/5 MMT grade in all planes to improve a/iadls  6) Patient to report no sleep interruption secondary to pain  7) Increase seated and ambulatory tolerance to 60 min  Plan  Patient would benefit from: skilled PT  Planned modality interventions: biofeedback, cryotherapy, TENS, thermotherapy: hydrocollator packs, unattended electrical stimulation and low level laser therapy  Planned therapy interventions: abdominal trunk stabilization, activity modification, ADL retraining, ADL training, behavior modification, body mechanics training, breathing training, functional ROM exercises, home exercise program, IADL retraining, joint mobilization, manual therapy, massage, motor coordination training, neuromuscular re-education, patient education, postural training, self care, strengthening, stretching, therapeutic activities, therapeutic exercise and transfer training  Frequency: 1-3x week  Duration in weeks: 12  Plan of Care beginning date: 2022  Plan of Care expiration date: 3/1/2023  Treatment plan discussed with: patient        Subjective Evaluation    History of Present Illness  Mechanism of injury: Patient presents with acute on chronic bilateral low back pain  Patient has chronic tightness in his lower back for which he takes muscle relaxants, but last month he got out of the car and his lower back locked in a flexed position  Patient fell onto his knees and required assistance to return to standing  Patient went to his PCP who referred to pain management where he was prescribed Tramadol  Patient denies experiencing tingling/numbness, bowel/bladder dysfunction, drop attacks or perceived weakness  Patient's next f/u appointment with spine and pain is scheduled for 22    Pain  Current pain ratin  At best pain ratin  At worst pain ratin  Location: bilateral lower back  Quality: tightness, locking  Relieving factors: medications (laying down)  Exacerbated by: standing upright, twisting, heavy lifting, walking with weight on shoulder  Social Support    Employment status: working (Medication Review)    Diagnostic Tests  X-ray: abnormal (Lumbar spine: "Mild to moderate L5-S1 degenerative changes with interval worsening")  Patient Goals  Patient goals for therapy: decreased pain and increased strength          Objective     Postural Observations  Seated posture: fair  Standing posture: fair    Additional Postural Observation Details  Increased trunk flexion in sitting    Palpation   Left   No palpable tenderness to the erector spinae and lumbar paraspinals  Right   No palpable tenderness to the erector spinae and lumbar paraspinals  Tenderness     Lumbar Spine  Tenderness in the spinous process (L4-5, S1)  Neurological Testing     Sensation     Lumbar   Left   Intact: light touch    Right   Intact: light touch    Active Range of Motion     Lumbar   Flexion:  WFL  Extension:  Restriction level: moderate  Left lateral flexion:  Restriction level: minimal  Right lateral flexion:  Restriction level: minimal  Left rotation:  Restriction level: maximal  Right rotation:  Restriction level: maximal    Additional Active Range of Motion Details  Hip IR/ER A/PROM ~50% decreased bilaterally    Joint Play     Hypomobile: T8, T9, T10, T11, T12, L1, L2, L3, L4, L5 and S1     Pain: L4, L5 and S1     Strength/Myotome Testing     Lumbar   Left   Normal strength  Heel walk: normal  Toe walk: normal    Right   Normal strength  Heel walk: normal  Toe walk: normal    Tests     Lumbar     Left   Negative passive SLR and slump test      Right   Negative passive SLR and slump test      Left Hip   Positive long sit (L femur longer than R ; possible structural LLD)  Negative FATOUMATA and FADIR  Right Hip   Negative FATOUMATA and FADIR       Ambulation Ambulation: Level Surfaces   Ambulation without assistive device: independent    Observational Gait   Gait: antalgic     Functional Assessment      Squat    Left within functional limits and right within functional limits  Precautions: standard      Manuals 12/7            Gr  II-IV thoracic, lumbar central PA mob             Lumbar rocking                                       Neuro Re-Ed             Webslide: M, L row GTB 2x10 3" ea  Standing multifidus press GTB 10x10" ea  Supine marching: L2             Seated multifidus rotation with TB             Seated overhead MB lift                                       Ther Ex             Patient education: pathophysiology, HEP reviewi GR            Standing lumbar extension Instructed            PPU with self-overpressure Instructed            Bridging with TB             Prone hip ext  Quadruped alt UE ext  Ther Activity                                       Gait Training                                       Modalities                                         Access Code: [email or web address]  URL: [email or web address]  Date: 12/07/2022  Prepared by: Dejuan Trejo    Exercises  • Standing Shoulder Row with Anchored Resistance - 1 x daily - 4 x weekly - 3 sets - 10 reps - 3 seconds hold  • Shoulder extension with resistance - Neutral - 1 x daily - 4 x weekly - 3 sets - 10 reps - 3 seconds hold  • Standing Anti-Rotation Press with Anchored Resistance - 1 x daily - 4 x weekly - 1 sets - 10 reps - 10 seconds hold  • Prone Press Up - 3 x daily - 7 x weekly - 2 sets - 10 reps - 3 seconds hold  • Standing Lumbar Extension with Counter - 3 x daily - 7 x weekly - 2 sets - 10 reps - 3 seconds hold

## 2022-12-21 ENCOUNTER — APPOINTMENT (OUTPATIENT)
Dept: PHYSICAL THERAPY | Facility: CLINIC | Age: 57
End: 2022-12-21

## 2022-12-21 DIAGNOSIS — M54.50 ACUTE BILATERAL LOW BACK PAIN WITHOUT SCIATICA: Primary | ICD-10-CM

## 2022-12-21 DIAGNOSIS — S39.012D STRAIN OF LUMBAR REGION, SUBSEQUENT ENCOUNTER: ICD-10-CM

## 2022-12-21 NOTE — PROGRESS NOTES
Daily Note     Today's date: 2022  Patient name: Neva Cuevas Sr   : 1965  MRN: [id number]  Referring provider: Lin Quinn MD  Dx:   Encounter Diagnosis     ICD-10-CM    1  Acute bilateral low back pain without sciatica  M54 50       2  Strain of lumbar region, subsequent encounter  S39 012D                      Subjective: ***      Objective: See treatment diary below      Assessment: Tolerated treatment fair  Patient demonstrated fatigue post treatment and would benefit from continued PT  Plan: Continue per plan of care  Progress treatment as tolerated  Precautions: standard      Manuals            Gr  II-IV thoracic, lumbar central PA mob             Lumbar rocking                                       Neuro Re-Ed             Webslide: M, L row GTB 2x10 3" ea  Standing multifidus press GTB 10x10" ea  Supine marching: L2             Seated multifidus rotation with TB             Seated overhead MB lift                                       Ther Ex             Patient education: pathophysiology, HEP reviewi GR            Standing lumbar extension Instructed            PPU with self-overpressure Instructed            Bridging with TB             Prone hip ext  Quadruped alt UE ext                                         Ther Activity                                       Gait Training                                       Modalities

## 2022-12-23 DIAGNOSIS — K74.60 HEPATIC CIRRHOSIS DUE TO CHRONIC HEPATITIS C INFECTION (HCC): ICD-10-CM

## 2022-12-23 DIAGNOSIS — B18.2 HEPATIC CIRRHOSIS DUE TO CHRONIC HEPATITIS C INFECTION (HCC): ICD-10-CM

## 2022-12-27 DIAGNOSIS — M54.42 ACUTE BILATERAL LOW BACK PAIN WITH LEFT-SIDED SCIATICA: ICD-10-CM

## 2022-12-27 RX ORDER — NADOLOL 20 MG/1
20 TABLET ORAL DAILY
Qty: 90 TABLET | Refills: 1 | Status: SHIPPED | OUTPATIENT
Start: 2022-12-27

## 2022-12-28 RX ORDER — TRAMADOL HYDROCHLORIDE 50 MG/1
50 TABLET ORAL EVERY 6 HOURS PRN
Qty: 60 TABLET | Refills: 0 | Status: SHIPPED | OUTPATIENT
Start: 2022-12-28

## 2023-01-04 NOTE — PROGRESS NOTES
Pain Medicine Follow-Up Note    Assessment:  1  Lumbar facet arthropathy    2  Strain of lumbar region, subsequent encounter    3  Myofascial pain syndrome        Plan:  Orders Placed This Encounter   Procedures   • [place] spine and pain procedure     Standing Status:   Future     Standing Expiration Date:   1/6/2027     Order Specific Question:   Reason for Exam:     Answer:   TPI b/l lumbar paraspinal musclulature     Order Specific Question:   Anticoagulant hold needed? Answer:   No       No orders of the defined types were placed in this encounter  My impressions and treatment recommendations were discussed in detail with the patient who verbalized understanding and had no further questions  This is a 48-year-old male who returns her office for follow-up regarding lumbar strain  He continues to have notable paraspinal tenderness which appears to be myofascial in nature  Underwent 1 visit with physical therapy with exacerbation of his symptoms  Therefore did not continue  Discussed trigger point injection and with his symptoms  He is agreeable to this  He would also start using TENS unit more frequently as he reports it was helpful  [place] Prescription Drug Monitoring Program report was reviewed and was appropriate       Complete risks and benefits including bleeding, infection, tissue reaction, nerve injury and allergic reaction were discussed  The approach was demonstrated using models and literature was provided  Verbal and written consent was obtained  Discharge instructions were provided  I personally saw and examined the patient and I agree with the above discussed plan of care  History of Present Illness:    Gian Knowles Sr  is a 61 y o  male who presents to [facility] for interval re-evaluation of the above stated pain complaints  The patient has a past medical and chronic pain history as outlined in the assessment section   He was last seen on 11/4/2022 guards to bilateral low back pain secondary to lumbar strain and lumbar facet arthropathy  Was recommended physical therapy  Went to 1 session  Reports it was severely painful and did not return  Returns today with some improvement in his symptoms but still ongoing  6 out of 10  Worse in the morning and night  Pain is occasional   Reports stiffness as well       Had an x-ray since last office visit  Has mild to moderate L5-S1 degenerative changes  There is some evidence of facet disease  Other than as stated above, the patient denies any interval changes in medications, medical condition, mental condition, symptoms, or allergies since the last office visit  Review of Systems:    Review of Systems   Respiratory: Negative for shortness of breath  Cardiovascular: Negative for chest pain  Gastrointestinal: Negative for constipation, diarrhea, nausea and vomiting  Musculoskeletal: Positive for arthralgias and myalgias  Negative for gait problem and joint swelling  Decreased ROM    Skin: Negative for rash  Neurological: Negative for dizziness, seizures and weakness  All other systems reviewed and are negative  Past Medical History:   Diagnosis Date   • CPAP (continuous positive airway pressure) dependence     does not use machine   • Esophageal varices (HCC)     stable 9/2020 gets checked yearly   • Hepatic cirrhosis ([facility] [address] )     treated with harvoni   Hep C- dx age 17   • Hepatic encephalopathy    • Liver disease    • Obesity    • Pneumonia     in past   • Postgastrectomy malabsorption    • Sleep apnea    • Wears glasses        Past Surgical History:   Procedure Laterality Date   • COLONOSCOPY     • ESOPHAGOGASTRODUODENOSCOPY N/A 4/12/2018    Procedure: ESOPHAGOGASTRODUODENOSCOPY (EGD); Surgeon: Ana Muse MD;  Location: [facility];   Service: Gastroenterology   • FRACTURE SURGERY Left     ankle   • OTHER SURGICAL HISTORY      banding esophageal varices   • AL EXCISION EXCESSIVE SKIN & SUBQ TISSUE ABDOMEN N/A 2020    Procedure: ABDOMINOPLASTY;  Surgeon: Abhijeet Valdez MD;  [facility]: [facility];  Service: Plastics   • [place] EXCISION SKIN ABD INFRAUMBILICAL PANNICULECTOMY N/A 2020    Procedure: PANNICULECTOMY;  Surgeon: Abhijeet Valdez MD;  [facility]: [facility];  Service: Plastics   • [place] [address] LONGITUDINAL GASTRECTOMY N/A 2018    Procedure: GASTRECTOMY LAPAROSCOPIC SLEEVE;  Surgeon: Cristine Schafer MD;  Location: [facility];  Service: Bariatrics       Family History   Problem Relation Age of Onset   • Heart disease Mother    • Lupus Mother    • Diabetes Father    • Stroke Father        Social History     Occupational History   • Occupation: disabled   Tobacco Use   • Smoking status: Former     Packs/day: 0 25     Years: 37 00     Pack years: 9 25     Types: Cigarettes     Quit date: 2022     Years since quittin 0   • Smokeless tobacco: Never   • Tobacco comments:     stopped 2020   Vaping Use   • Vaping Use: Every day   • Substances: Flavoring   Substance and Sexual Activity   • Alcohol use: Never     Alcohol/week: 0 0 standard drinks   • Drug use: No   • Sexual activity: Yes         Current Outpatient Medications:   •  AMILoride 5 mg tablet, Take 1 tablet (5 mg total) by mouth daily, Disp: 30 tablet, Rfl: 3  •  Claritin-D 12 Hour 5-120 MG per tablet, TAKE ONE TABLET BY MOUTH TWICE DAILY FOR 5 DAYS, Disp: 10 tablet, Rfl: 0  •  diclofenac (VOLTAREN) 75 mg EC tablet, Take 1 tablet (75 mg total) by mouth 2 (two) times a day, Disp: 40 tablet, Rfl: 2  •  fluticasone (FLONASE) 50 mcg/act nasal spray, 1 SPRAY INTO EACH NOSTRIL DAILY, Disp: 18 2 mL, Rfl: 4  •  furosemide (LASIX) 20 mg tablet, Take 1 tablet (20 mg total) by mouth 2 (two) times a day, Disp: 60 tablet, Rfl: 1  •  ibuprofen (MOTRIN) 800 mg tablet, TAKE 1 TABLET (800 MG TOTAL) BY MOUTH EVERY EIGHT (EIGHT) HOURS AS NEEDED FOR MILD PAIN, Disp: 30 tablet, Rfl: 2  •  lactulose (Constulose) 10 g/15 mL solution, Take 15 mL (10 g total) by mouth daily as needed (constipation or confusion), Disp: 946 mL, Rfl: 1  •  methocarbamol (ROBAXIN) 750 mg tablet, TAKE 1 TABLET BY MOUTH EVERY EIGHT HOURS, Disp: 30 tablet, Rfl: 1  •  nadolol (CORGARD) 20 mg tablet, TAKE 1 TABLET (20 MG TOTAL) BY MOUTH DAILY, Disp: 90 tablet, Rfl: 1  •  omeprazole (PriLOSEC) 20 mg delayed release capsule, TAKE 1 CAPSULE (20 MG TOTAL) BY MOUTH DAILY, Disp: 30 capsule, Rfl: 2  •  sildenafil (VIAGRA) 100 mg tablet, Take 1 tablet (100 mg total) by mouth daily as needed for erectile dysfunction, Disp: 30 tablet, Rfl: 3  •  traMADol (ULTRAM) 50 mg tablet, TAKE 1 TABLET (50 MG TOTAL) BY MOUTH EVERY 6 (SIX) HOURS AS NEEDED FOR MODERATE PAIN, Disp: 60 tablet, Rfl: 0  •  triamcinolone (KENALOG) 0 5 % cream, APPLY TOPICALLY THREE (THREE) TIMES A DAY, Disp: 30 g, Rfl: 1  •  metroNIDAZOLE (METROGEL) 1 % gel, Apply topically daily (Patient not taking: Reported on 3/30/2022), Disp: 45 g, Rfl: 1    No Known Allergies    Physical Exam:    BP 95/61 (BP Location: Left arm, Patient Position: Sitting, Cuff Size: Standard)   Pulse 59   Wt 90 4 kg (199 lb 3 2 oz)   BMI 33 15 kg/m²     Constitutional:normal, well developed, well nourished, alert, in no distress and non-toxic and no overt pain behavior    Eyes:anicteric  HEENT:grossly intact  Neck:supple, symmetric, trachea midline and no masses   Pulmonary:even and unlabored  Cardiovascular:No edema or pitting edema present  Skin:Normal without rashes or lesions and well hydrated  Psychiatric:Mood and affect appropriate  Neurologic:Cranial Nerves II-XII grossly intact  Musculoskeletal:normal      Imaging  FL spine and pain procedure    (Results Pending)         Orders Placed This Encounter   Procedures   • FL spine and pain procedure

## 2023-01-06 ENCOUNTER — OFFICE VISIT (OUTPATIENT)
Dept: PAIN MEDICINE | Facility: CLINIC | Age: 58
End: 2023-01-06

## 2023-01-06 VITALS
WEIGHT: 199.2 LBS | DIASTOLIC BLOOD PRESSURE: 61 MMHG | HEART RATE: 59 BPM | SYSTOLIC BLOOD PRESSURE: 95 MMHG | BODY MASS INDEX: 33.15 KG/M2

## 2023-01-06 DIAGNOSIS — M79.18 MYOFASCIAL PAIN SYNDROME: ICD-10-CM

## 2023-01-06 DIAGNOSIS — M47.816 LUMBAR FACET ARTHROPATHY: Primary | ICD-10-CM

## 2023-01-06 DIAGNOSIS — S39.012D STRAIN OF LUMBAR REGION, SUBSEQUENT ENCOUNTER: ICD-10-CM

## 2023-01-06 NOTE — PATIENT INSTRUCTIONS
Trigger Point Injection   AMBULATORY CARE:   A trigger point injection  is used to relax a muscle knot  This helps relieve pain  You may be able to have more than one trigger point treated during a session  How to prepare for a trigger point injection:   Your healthcare provider will tell you how to prepare  Arrange to have someone drive you home after the injection  Tell your provider about all medicines you take, including pain medicine, blood thinners, and muscle relaxers  He or she will tell you if you need to stop any medicine for the injection, and when to stop  He or she will tell you which medicines to take or not take on the day of the injection  Tell your provider about all your allergies, including to any pain medicine  What will happen during a trigger point injection:   You may be sitting or lying, depending on where the trigger point is located  Your healthcare provider will feel for a knot in the muscle  He or she may niya your skin over the knot  Your provider will put a needle through your skin and into the trigger point  Saline (salt solution), pain relievers, or other medicines may be pushed through the needle into the trigger point  Your provider may use only a dry needle (no medicine)  He or she will pull the needle almost all the way out and then push it in again  He or she will repeat this several times until the muscle stops twitching or feels relaxed  Your provider will remove the needle and stretch the muscle area  He or she may apply pressure to the area for 2 minutes  A bandage will be put over the injection site to prevent bleeding or an infection  What to expect after a trigger point injection:   You may feel pain relief right away  It is normal for some pain to start again 2 hours later  An ice pack or over-the-counter pain medicine can help lower the pain  You may feel sore in the injection site for a few days   If you need another injection in the same area, wait until the area is not sore  Your healthcare provider may give you specific activity instructions to follow at home or recommend physical therapy  In general, you should try to stay active  Avoid strenuous activity for the first 3 or 4 days after the injection  Do not have more injections if you still have trigger point pain after 2 or 3 injections  Risks of a trigger point injection:  You may have a severe allergic reaction to pain medicine injected  The injection may be painful, or you may be sore where you got the injection  You may bleed, bruise, or develop an infection in the injection area  The injection may cause you to feel faint  Rarely, the needle may cause muscle or blood vessel damage or your lung may collapse if you get the injection near your chest   Call your local emergency number (911 in the [address] [place] [address],[address]), or have someone call if:   Your mouth and throat are swollen  You are wheezing or have trouble breathing  You have chest pain or your heart is beating faster than usual     You feel like you are going to faint  When should I seek immediate care? Your face is red or swollen  You have hives that spread over your body  You feel weak or dizzy  Call your doctor or pain specialist if:   You have a fever within 1 week of the injection  You have redness or swelling within 1 week of the injection  You have new or worsening pain near the injection site  You have questions or concerns about your condition or care  Self-care:   Stay active after you have trigger point injections  Gently move your joints through their full range of motion during the first week  Avoid strenuous activity for 3 or 4 days  Do regular stretches of the trigger point muscle  Place gentle pressure on the trigger point, and then stretch the muscle  Ask your healthcare provider for more information about how to stretch and apply pressure  Apply ice to the injection site    Use an ice pack, or put ice in a plastic bag  Cover the bag with a towel before you apply it  Apply ice for 15 to 20 minutes every hour, or as directed  Apply heat to trigger point sites  Heat can help relax muscles and relieve trigger point pain  Use a heat pack or a heating pad set on low  Apply heat for 15 minutes every hour, or as directed  Follow up with your doctor or pain specialist as directed:  Write down your questions so you remember to ask them during your visits  © Copyright [employer] 2022 Information is for End User's use only and may not be sold, redistributed or otherwise used for commercial purposes  All illustrations and images included in CareNotes® are the copyrighted property of A D A M , Inc  or [employer]   The above information is an  only  It is not intended as medical advice for individual conditions or treatments  Talk to your doctor, nurse or pharmacist before following any medical regimen to see if it is safe and effective for you  How to Use a TENS Unit   WHAT YOU NEED TO KNOW:   What do I need to know about a TENS unit? A transcutaneous electrical nerve stimulation (TENS) unit is a treatment for pain  A TENS unit is a small, portable, battery-powered device  The TENS unit uses mild, safe electrical signals to help control pain  Electrodes (sticky patches) are placed on your skin  The TENS unit sends painless electrical signals through the electrodes to the nerves under your skin  Electrode placement depends on the type and location of your pain  Your healthcare provider will show you where to place the electrodes and what settings are best for you  How do I use a TENS unit? Test the battery pack of the TENS unit to make sure it is fully charged  The TENS unit has 2 control knobs  One control knob makes the electrical signals strong or weak  The other control knob makes the electrical signals fast or slow   Turn the control knobs to the off position before you start     Use rubbing alcohol to clean the skin where the electrodes will be placed  Let your skin dry  Put a thin coat of gel on the bottom of each electrode  This gel helps the electrical signal get to the nerves under your skin  Put the electrodes on your skin and use medical tape or a sticky patch to cover the electrode  This keeps the electrode firmly stuck to the skin  Ask for help if you cannot reach the area where the electrodes should go  Hook the pin connectors on the end of the electrode wires to the electrodes  Then plug the electrode wires into the TENS unit  Turn the control knobs slowly to the correct setting  You should feel a tingling feeling  Hook the TENS unit to your belt or place it in a pocket  What should I do after the TENS treatment? Turn the control knobs to the off position  Unplug the electrode wires from the TENS unit  The electrodes may be left on your skin if you have another TENS treatment soon  If not, remove the electrodes  Wash the skin where the electrodes were placed  Clean the electrodes with soap and water to remove the gel  Do not use alcohol because this can damage the rubber on the electrode  Get new electrodes if the electrodes become damaged or will not stay stuck to the skin  Remove the battery from the TENS and replace it with a charged battery  Charge the battery so that it will be ready for another treatment  What else do I need to know about a TENS unit? Tell your healthcare provider if your muscles start to twitch during treatment  The TENS signals may be too strong or too fast  Also tell him if you cannot feel any tingling at all  The signal may be too weak or too slow  The electrodes should be removed at least once a day if the TENS treatment is used around the clock  Check your skin under the electrodes for redness or tenderness  Clean your skin when the electrodes are off and use lotion   Move the electrodes slightly for each treatment  This will help prevent the skin from becoming red or sore  Put new gel on the bottom of the electrodes each time you place them on your skin  Do not sleep or get near water with the electrodes on your skin and the TENS unit turned on  CARE AGREEMENT:   You have the right to help plan your care  Learn about your health condition and how it may be treated  Discuss treatment options with your healthcare providers to decide what care you want to receive  You always have the right to refuse treatment  The above information is an  only  It is not intended as medical advice for individual conditions or treatments  Talk to your doctor, nurse or pharmacist before following any medical regimen to see if it is safe and effective for you  © Copyright [employer] 2022 Information is for End User's use only and may not be sold, redistributed or otherwise used for commercial purposes   All illustrations and images included in CareNotes® are the copyrighted property of [employer] A [employer] , Inc  or [address] [place] [address] goOutMap

## 2023-01-11 ENCOUNTER — TELEPHONE (OUTPATIENT)
Dept: FAMILY MEDICINE CLINIC | Facility: CLINIC | Age: 58
End: 2023-01-11

## 2023-01-11 DIAGNOSIS — M54.42 ACUTE BILATERAL LOW BACK PAIN WITH LEFT-SIDED SCIATICA: ICD-10-CM

## 2023-01-11 DIAGNOSIS — B18.2 HEPATIC CIRRHOSIS DUE TO CHRONIC HEPATITIS C INFECTION (HCC): Primary | ICD-10-CM

## 2023-01-11 DIAGNOSIS — K74.60 HEPATIC CIRRHOSIS DUE TO CHRONIC HEPATITIS C INFECTION (HCC): Primary | ICD-10-CM

## 2023-01-11 DIAGNOSIS — K76.82 HEPATIC ENCEPHALOPATHY: ICD-10-CM

## 2023-01-11 RX ORDER — TRAMADOL HYDROCHLORIDE 50 MG/1
50 TABLET ORAL EVERY 6 HOURS PRN
Qty: 60 TABLET | Refills: 0 | Status: SHIPPED | OUTPATIENT
Start: 2023-01-11

## 2023-01-11 NOTE — TELEPHONE ENCOUNTER
xifaxan  550 mg Pt needs rednewed  He hasnt been on this medication for a year  Pt unable to afford it until now      And    Tramadol 50 mg    Both scripts are mail order [employer] [employer]

## 2023-01-16 DIAGNOSIS — M54.42 ACUTE BILATERAL LOW BACK PAIN WITH LEFT-SIDED SCIATICA: ICD-10-CM

## 2023-01-16 DIAGNOSIS — M79.18 MYOFASCIAL MUSCLE PAIN: ICD-10-CM

## 2023-01-16 DIAGNOSIS — L71.9 ROSACEA: ICD-10-CM

## 2023-01-16 DIAGNOSIS — K76.82 HEPATIC ENCEPHALOPATHY: ICD-10-CM

## 2023-01-16 DIAGNOSIS — B18.2 HEPATIC CIRRHOSIS DUE TO CHRONIC HEPATITIS C INFECTION (HCC): ICD-10-CM

## 2023-01-16 DIAGNOSIS — M54.50 MIDLINE LOW BACK PAIN WITHOUT SCIATICA, UNSPECIFIED CHRONICITY: ICD-10-CM

## 2023-01-16 DIAGNOSIS — K74.60 HEPATIC CIRRHOSIS DUE TO CHRONIC HEPATITIS C INFECTION (HCC): ICD-10-CM

## 2023-01-16 DIAGNOSIS — R05.9 COUGH: ICD-10-CM

## 2023-01-16 RX ORDER — LACTULOSE 10 G/15ML
10 SOLUTION ORAL DAILY PRN
Qty: 946 ML | Refills: 1 | Status: SHIPPED | OUTPATIENT
Start: 2023-01-16

## 2023-01-16 RX ORDER — FLUTICASONE PROPIONATE 50 MCG
1 SPRAY, SUSPENSION (ML) NASAL DAILY
Qty: 18.2 ML | Refills: 4 | Status: SHIPPED | OUTPATIENT
Start: 2023-01-16

## 2023-01-16 RX ORDER — IBUPROFEN 800 MG/1
800 TABLET ORAL EVERY 8 HOURS PRN
Qty: 60 TABLET | Refills: 2 | Status: SHIPPED | OUTPATIENT
Start: 2023-01-16 | End: 2023-01-24 | Stop reason: SDUPTHER

## 2023-01-16 RX ORDER — NADOLOL 20 MG/1
20 TABLET ORAL DAILY
Qty: 90 TABLET | Refills: 1 | Status: SHIPPED | OUTPATIENT
Start: 2023-01-16 | End: 2023-01-24 | Stop reason: SDUPTHER

## 2023-01-16 RX ORDER — DICLOFENAC SODIUM 75 MG/1
75 TABLET, DELAYED RELEASE ORAL 2 TIMES DAILY
Qty: 40 TABLET | Refills: 2 | Status: SHIPPED | OUTPATIENT
Start: 2023-01-16 | End: 2023-01-24 | Stop reason: SDUPTHER

## 2023-01-16 RX ORDER — METHOCARBAMOL 750 MG/1
750 TABLET, FILM COATED ORAL EVERY 8 HOURS SCHEDULED
Qty: 30 TABLET | Refills: 1 | Status: SHIPPED | OUTPATIENT
Start: 2023-01-16 | End: 2023-01-24 | Stop reason: SDUPTHER

## 2023-01-16 RX ORDER — AMILORIDE HYDROCHLORIDE 5 MG/1
5 TABLET ORAL DAILY
Qty: 30 TABLET | Refills: 3 | Status: SHIPPED | OUTPATIENT
Start: 2023-01-16 | End: 2023-01-24 | Stop reason: SDUPTHER

## 2023-01-16 RX ORDER — TRIAMCINOLONE ACETONIDE 5 MG/G
CREAM TOPICAL 2 TIMES DAILY
Qty: 30 G | Refills: 3 | Status: SHIPPED | OUTPATIENT
Start: 2023-01-16

## 2023-01-16 RX ORDER — FUROSEMIDE 20 MG/1
20 TABLET ORAL 2 TIMES DAILY
Qty: 180 TABLET | Refills: 1 | Status: SHIPPED | OUTPATIENT
Start: 2023-01-16 | End: 2023-01-24 | Stop reason: SDUPTHER

## 2023-01-18 ENCOUNTER — TELEPHONE (OUTPATIENT)
Dept: RADIOLOGY | Facility: CLINIC | Age: 58
End: 2023-01-18

## 2023-01-24 DIAGNOSIS — K76.82 HEPATIC ENCEPHALOPATHY: ICD-10-CM

## 2023-01-24 DIAGNOSIS — B18.2 HEPATIC CIRRHOSIS DUE TO CHRONIC HEPATITIS C INFECTION (HCC): ICD-10-CM

## 2023-01-24 DIAGNOSIS — M79.18 MYOFASCIAL MUSCLE PAIN: ICD-10-CM

## 2023-01-24 DIAGNOSIS — E66.01 MORBID (SEVERE) OBESITY DUE TO EXCESS CALORIES (HCC): ICD-10-CM

## 2023-01-24 DIAGNOSIS — N52.9 ERECTILE DYSFUNCTION, UNSPECIFIED ERECTILE DYSFUNCTION TYPE: ICD-10-CM

## 2023-01-24 DIAGNOSIS — M54.50 MIDLINE LOW BACK PAIN WITHOUT SCIATICA, UNSPECIFIED CHRONICITY: ICD-10-CM

## 2023-01-24 DIAGNOSIS — K74.60 HEPATIC CIRRHOSIS DUE TO CHRONIC HEPATITIS C INFECTION (HCC): ICD-10-CM

## 2023-01-24 DIAGNOSIS — R05.9 COUGH: ICD-10-CM

## 2023-01-24 DIAGNOSIS — M54.42 ACUTE BILATERAL LOW BACK PAIN WITH LEFT-SIDED SCIATICA: ICD-10-CM

## 2023-01-24 RX ORDER — OMEPRAZOLE 20 MG/1
20 CAPSULE, DELAYED RELEASE ORAL DAILY
Qty: 90 CAPSULE | Refills: 0 | Status: SHIPPED | OUTPATIENT
Start: 2023-01-24 | End: 2023-04-24

## 2023-01-24 RX ORDER — SILDENAFIL 100 MG/1
100 TABLET, FILM COATED ORAL DAILY PRN
Qty: 90 TABLET | Refills: 0 | Status: SHIPPED | OUTPATIENT
Start: 2023-01-24 | End: 2023-04-24

## 2023-01-24 RX ORDER — LORATADINE AND PSEUDOEPHEDRINE SULFATE 5; 120 MG/1; MG/1
TABLET, EXTENDED RELEASE ORAL
Qty: 10 TABLET | Refills: 0 | Status: CANCELLED | OUTPATIENT
Start: 2023-01-24

## 2023-01-24 RX ORDER — IBUPROFEN 800 MG/1
800 TABLET ORAL EVERY 8 HOURS PRN
Qty: 270 TABLET | Refills: 0 | Status: SHIPPED | OUTPATIENT
Start: 2023-01-24 | End: 2023-04-24

## 2023-01-24 RX ORDER — DICLOFENAC SODIUM 75 MG/1
75 TABLET, DELAYED RELEASE ORAL 2 TIMES DAILY
Qty: 40 TABLET | Refills: 2 | Status: CANCELLED | OUTPATIENT
Start: 2023-01-24

## 2023-01-24 RX ORDER — NADOLOL 20 MG/1
20 TABLET ORAL DAILY
Qty: 90 TABLET | Refills: 0 | Status: SHIPPED | OUTPATIENT
Start: 2023-01-24 | End: 2023-04-24

## 2023-01-24 RX ORDER — AMILORIDE HYDROCHLORIDE 5 MG/1
5 TABLET ORAL DAILY
Qty: 30 TABLET | Refills: 3 | Status: SHIPPED | OUTPATIENT
Start: 2023-01-24 | End: 2023-01-30 | Stop reason: SDUPTHER

## 2023-01-24 RX ORDER — DICLOFENAC SODIUM 75 MG/1
75 TABLET, DELAYED RELEASE ORAL 2 TIMES DAILY
Qty: 180 TABLET | Refills: 0 | Status: SHIPPED | OUTPATIENT
Start: 2023-01-24 | End: 2023-04-24

## 2023-01-24 RX ORDER — METHOCARBAMOL 750 MG/1
750 TABLET, FILM COATED ORAL EVERY 8 HOURS SCHEDULED
Qty: 270 TABLET | Refills: 0 | Status: SHIPPED | OUTPATIENT
Start: 2023-01-24 | End: 2023-04-24

## 2023-01-24 RX ORDER — FUROSEMIDE 20 MG/1
20 TABLET ORAL 2 TIMES DAILY
Qty: 180 TABLET | Refills: 0 | Status: SHIPPED | OUTPATIENT
Start: 2023-01-24 | End: 2023-04-24

## 2023-01-30 DIAGNOSIS — B18.2 HEPATIC CIRRHOSIS DUE TO CHRONIC HEPATITIS C INFECTION (HCC): ICD-10-CM

## 2023-01-30 DIAGNOSIS — K74.60 HEPATIC CIRRHOSIS DUE TO CHRONIC HEPATITIS C INFECTION (HCC): ICD-10-CM

## 2023-01-30 RX ORDER — AMILORIDE HYDROCHLORIDE 5 MG/1
5 TABLET ORAL DAILY
Qty: 90 TABLET | Refills: 1 | Status: SHIPPED | OUTPATIENT
Start: 2023-01-30 | End: 2023-01-31 | Stop reason: SDUPTHER

## 2023-01-31 ENCOUNTER — TELEPHONE (OUTPATIENT)
Dept: FAMILY MEDICINE CLINIC | Facility: CLINIC | Age: 58
End: 2023-01-31

## 2023-01-31 DIAGNOSIS — B18.2 HEPATIC CIRRHOSIS DUE TO CHRONIC HEPATITIS C INFECTION (HCC): ICD-10-CM

## 2023-01-31 DIAGNOSIS — K74.60 HEPATIC CIRRHOSIS DUE TO CHRONIC HEPATITIS C INFECTION (HCC): ICD-10-CM

## 2023-01-31 RX ORDER — AMILORIDE HYDROCHLORIDE 5 MG/1
5 TABLET ORAL DAILY
Qty: 90 TABLET | Refills: 1 | Status: SHIPPED | OUTPATIENT
Start: 2023-01-31

## 2023-01-31 NOTE — TELEPHONE ENCOUNTER
Per mail order pharmacy-Diclofenac and ibuprofen cannot both be ordered/filled  Ibuprofen cx'd-pt made aware

## 2023-03-08 ENCOUNTER — TELEPHONE (OUTPATIENT)
Dept: OTHER | Facility: OTHER | Age: 58
End: 2023-03-08

## 2023-03-08 NOTE — TELEPHONE ENCOUNTER
Patient calling today to let  Office know that he is dropping the paper work off for the doctor to fill out for him to be able to get xifaxan  Patient calling ahead because he said the office lost it the last time     He will be stopping by to drop off paper work on 3/9/23

## 2023-03-09 ENCOUNTER — TELEPHONE (OUTPATIENT)
Dept: GASTROENTEROLOGY | Facility: CLINIC | Age: 58
End: 2023-03-09

## 2023-03-09 NOTE — TELEPHONE ENCOUNTER
Pt came in with [facility] pt assistance program fracisco  Pt had filled out his part and needs provider to fill out his and fax      Completed and faxed to [facility]

## 2023-03-19 ENCOUNTER — TELEPHONE (OUTPATIENT)
Dept: OTHER | Facility: OTHER | Age: 58
End: 2023-03-19

## 2023-03-19 NOTE — TELEPHONE ENCOUNTER
Call from patient requesting to schedule appointment for his annual visit with Dr Julio Brown  Please call patient

## 2023-03-20 ENCOUNTER — TELEPHONE (OUTPATIENT)
Dept: GASTROENTEROLOGY | Facility: CLINIC | Age: 58
End: 2023-03-20

## 2023-03-28 ENCOUNTER — APPOINTMENT (OUTPATIENT)
Dept: LAB | Facility: CLINIC | Age: 58
End: 2023-03-28

## 2023-03-28 ENCOUNTER — OFFICE VISIT (OUTPATIENT)
Dept: GASTROENTEROLOGY | Facility: CLINIC | Age: 58
End: 2023-03-28

## 2023-03-28 VITALS
HEIGHT: 65 IN | HEART RATE: 76 BPM | SYSTOLIC BLOOD PRESSURE: 118 MMHG | BODY MASS INDEX: 33.66 KG/M2 | WEIGHT: 202 LBS | DIASTOLIC BLOOD PRESSURE: 74 MMHG | OXYGEN SATURATION: 97 %

## 2023-03-28 DIAGNOSIS — D69.6 PLATELETS DECREASED (HCC): ICD-10-CM

## 2023-03-28 DIAGNOSIS — K74.60 HEPATIC CIRRHOSIS DUE TO CHRONIC HEPATITIS C INFECTION (HCC): ICD-10-CM

## 2023-03-28 DIAGNOSIS — B18.2 HEPATIC CIRRHOSIS DUE TO CHRONIC HEPATITIS C INFECTION (HCC): Primary | ICD-10-CM

## 2023-03-28 DIAGNOSIS — R73.01 IMPAIRED FASTING GLUCOSE: ICD-10-CM

## 2023-03-28 DIAGNOSIS — K74.60 HEPATIC CIRRHOSIS DUE TO CHRONIC HEPATITIS C INFECTION (HCC): Primary | ICD-10-CM

## 2023-03-28 DIAGNOSIS — B18.2 HEPATIC CIRRHOSIS DUE TO CHRONIC HEPATITIS C INFECTION (HCC): ICD-10-CM

## 2023-03-28 DIAGNOSIS — I85.10 SECONDARY ESOPHAGEAL VARICES WITHOUT BLEEDING (HCC): ICD-10-CM

## 2023-03-28 DIAGNOSIS — Z13.220 NEED FOR LIPID SCREENING: ICD-10-CM

## 2023-03-28 DIAGNOSIS — N52.9 ERECTILE DYSFUNCTION, UNSPECIFIED ERECTILE DYSFUNCTION TYPE: ICD-10-CM

## 2023-03-28 LAB
AFP-TM SERPL-MCNC: 1.9 NG/ML (ref 0.5–8)
ALBUMIN SERPL BCP-MCNC: 3.4 G/DL (ref 3.5–5)
ALP SERPL-CCNC: 98 U/L (ref 46–116)
ALT SERPL W P-5'-P-CCNC: 28 U/L (ref 12–78)
ANION GAP SERPL CALCULATED.3IONS-SCNC: 2 MMOL/L (ref 4–13)
AST SERPL W P-5'-P-CCNC: 45 U/L (ref 5–45)
BASOPHILS # BLD AUTO: 0.04 THOUSANDS/ÂΜL (ref 0–0.1)
BASOPHILS NFR BLD AUTO: 1 % (ref 0–1)
BILIRUB SERPL-MCNC: 3.26 MG/DL (ref 0.2–1)
BUN SERPL-MCNC: 20 MG/DL (ref 5–25)
CALCIUM ALBUM COR SERPL-MCNC: 9.6 MG/DL (ref 8.3–10.1)
CALCIUM SERPL-MCNC: 9.1 MG/DL (ref 8.3–10.1)
CHLORIDE SERPL-SCNC: 115 MMOL/L (ref 96–108)
CHOLEST SERPL-MCNC: 156 MG/DL
CO2 SERPL-SCNC: 24 MMOL/L (ref 21–32)
CREAT SERPL-MCNC: 0.75 MG/DL (ref 0.6–1.3)
EOSINOPHIL # BLD AUTO: 0.31 THOUSAND/ÂΜL (ref 0–0.61)
EOSINOPHIL NFR BLD AUTO: 6 % (ref 0–6)
ERYTHROCYTE [DISTWIDTH] IN BLOOD BY AUTOMATED COUNT: 13.9 % (ref 11.6–15.1)
GFR SERPL CREATININE-BSD FRML MDRD: 101 ML/MIN/1.73SQ M
GLUCOSE P FAST SERPL-MCNC: 98 MG/DL (ref 65–99)
HCT VFR BLD AUTO: 39.4 % (ref 36.5–49.3)
HDLC SERPL-MCNC: 78 MG/DL
HGB BLD-MCNC: 14 G/DL (ref 12–17)
IMM GRANULOCYTES # BLD AUTO: 0.01 THOUSAND/UL (ref 0–0.2)
IMM GRANULOCYTES NFR BLD AUTO: 0 % (ref 0–2)
INR PPP: 1.25 (ref 0.84–1.19)
LDLC SERPL CALC-MCNC: 64 MG/DL (ref 0–100)
LYMPHOCYTES # BLD AUTO: 1.37 THOUSANDS/ÂΜL (ref 0.6–4.47)
LYMPHOCYTES NFR BLD AUTO: 24 % (ref 14–44)
MCH RBC QN AUTO: 35.1 PG (ref 26.8–34.3)
MCHC RBC AUTO-ENTMCNC: 35.5 G/DL (ref 31.4–37.4)
MCV RBC AUTO: 99 FL (ref 82–98)
MONOCYTES # BLD AUTO: 0.43 THOUSAND/ÂΜL (ref 0.17–1.22)
MONOCYTES NFR BLD AUTO: 8 % (ref 4–12)
NEUTROPHILS # BLD AUTO: 3.47 THOUSANDS/ÂΜL (ref 1.85–7.62)
NEUTS SEG NFR BLD AUTO: 61 % (ref 43–75)
NONHDLC SERPL-MCNC: 78 MG/DL
NRBC BLD AUTO-RTO: 0 /100 WBCS
PLATELET # BLD AUTO: 129 THOUSANDS/UL (ref 149–390)
PMV BLD AUTO: 11.8 FL (ref 8.9–12.7)
POTASSIUM SERPL-SCNC: 4.4 MMOL/L (ref 3.5–5.3)
PROT SERPL-MCNC: 6 G/DL (ref 6.4–8.4)
PROTHROMBIN TIME: 15.9 SECONDS (ref 11.6–14.5)
RBC # BLD AUTO: 3.99 MILLION/UL (ref 3.88–5.62)
SODIUM SERPL-SCNC: 141 MMOL/L (ref 135–147)
TRIGL SERPL-MCNC: 72 MG/DL
WBC # BLD AUTO: 5.63 THOUSAND/UL (ref 4.31–10.16)

## 2023-03-28 RX ORDER — SILDENAFIL 100 MG/1
100 TABLET, FILM COATED ORAL DAILY PRN
Qty: 90 TABLET | Refills: 0 | Status: SHIPPED | OUTPATIENT
Start: 2023-03-28 | End: 2023-06-26

## 2023-03-28 NOTE — PROGRESS NOTES
[facility] - Outpatient Follow-up Note  Cathy Jacobo Sr  61 y o  male MRN: [id number]  Encounter: [id number]          ASSESSMENT AND PLAN:      1  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 ([facility] [address] )  2  Secondary esophageal varices without bleeding (HCC)  - Compensated, generally low MELD but overdue for MELD labs; will obtain CMP, INR, AFP  - Grade 0 HE, continue low dose lactulose 10 g daily and rifaximin 550 mg BID, he is waiting for approval from his insurance for this  - No ascites or LE edema, continue lasix 20 mg BID and amiloride 5 mg daily  - EGD in April 2022 showed trace EV; schedule repeat EGD for continued surveillance and continue nadalol 20 mg daily  - HCC Screen overdue; will plan for MRI abdomen w wo contrast      ______________________________________________________________________    SUBJECTIVE:  Alissa Patel is a 60 yo M presenting for routine follow up of cirrhosis  He has no complaints except the lactulose is hard to tolerate but he takes a very small dose and can tolerate this  He is having trouble getting rifaximin without yearly reapproval's which is frustrating  He otherwise denies any issues with ascites or swelling in his legs  Denies any blood in the stool  He did not get the MRI that was ordered last year and is overdue on blood work  REVIEW OF SYSTEMS IS OTHERWISE NEGATIVE        Historical Information   Past Medical History:   Diagnosis Date   • CPAP (continuous positive airway pressure) dependence     does not use machine   • Esophageal varices (HCC)     stable 9/2020 gets checked yearly   • [facility] ([facility] )     treated with harvoni   Hep C- dx age 17   • Hepatic encephalopathy    • Liver disease    • Obesity    • Pneumonia     in past   • Postgastrectomy malabsorption    • Sleep apnea    • Wears glasses      Past Surgical History:   Procedure Laterality Date   • COLONOSCOPY     • ESOPHAGOGASTRODUODENOSCOPY N/A 4/12/2018 Procedure: ESOPHAGOGASTRODUODENOSCOPY (EGD); Surgeon: Miguel Zabala MD;  Location: [facility];   Service: Gastroenterology   • FRACTURE SURGERY Left     ankle   • OTHER SURGICAL HISTORY      banding esophageal varices   • NJ EXCISION EXCESSIVE SKIN & SUBQ TISSUE ABDOMEN N/A 2020    Procedure: ABDOMINOPLASTY;  Surgeon: Silvia Baumann MD;  [facility]: [facility];  Service: Plastics   • NJ EXCISION SKIN ABD INFRAUMBILICAL PANNICULECTOMY N/A 2020    Procedure: PANNICULECTOMY;  Surgeon: Silvia Baumann MD;  [facility];  Service: Plastics   • NJ LAPS [address] [place] [address] [address] LONGITUDINAL GASTRECTOMY N/A 2018    Procedure: GASTRECTOMY LAPAROSCOPIC SLEEVE;  Surgeon: Devonte Apple MD;  Location: [facility];  Service: Bariatrics     Social History   Social History     Substance and Sexual Activity   Alcohol Use Never   • Alcohol/week: 0 0 standard drinks     Social History     Substance and Sexual Activity   Drug Use No     Social History     Tobacco Use   Smoking Status Former   • Packs/day: 0 25   • Years: 37 00   • Pack years: 9 25   • Types: Cigarettes   • Quit date: 2022   • Years since quittin 2   Smokeless Tobacco Never   Tobacco Comments    stopped 2020     Family History   Problem Relation Age of Onset   • Heart disease Mother    • Lupus Mother    • Diabetes Father    • Stroke Father        Meds/Allergies       Current Outpatient Medications:   •  AMILoride 5 mg tablet  •  Claritin-D 12 Hour 5-120 MG per tablet  •  diclofenac (VOLTAREN) 75 mg EC tablet  •  fluticasone (FLONASE) 50 mcg/act nasal spray  •  furosemide (LASIX) 20 mg tablet  •  ibuprofen (MOTRIN) 800 mg tablet  •  lactulose (Constulose) 10 g/15 mL solution  •  methocarbamol (ROBAXIN) 750 mg tablet  •  nadolol (CORGARD) 20 mg tablet  •  omeprazole (PriLOSEC) 20 mg delayed release capsule  •  sildenafil (VIAGRA) 100 mg tablet  •  traMADol (ULTRAM) 50 mg tablet  •  triamcinolone (KENALOG) 0 5 % cream    No "Known Allergies        Objective     Blood pressure 118/74, pulse 76, height 5' 5\" (1 651 m), weight 91 6 kg (202 lb), SpO2 97 %  Body mass index is 33 61 kg/m²  PHYSICAL EXAM:      General Appearance:   Alert, cooperative, no distress   HEENT:   Normocephalic, atraumatic, anicteric      Neck:  Supple, symmetrical, trachea midline   Lungs:   Clear to auscultation bilaterally; no rales, rhonchi or wheezing; respirations unlabored    Heart[de-identified]   Regular rate and rhythm; no murmur, rub, or gallop  Abdomen:   Soft, non-tender, non-distended; normal bowel sounds; no masses, no organomegaly    Genitalia:   Deferred    Rectal:   Deferred    Extremities:  No cyanosis, clubbing or edema    Pulses:  2+ and symmetric    Skin:  No jaundice, rashes, or lesions    Lymph nodes:  No palpable cervical lymphadenopathy        Lab Results:   No visits with results within 1 Day(s) from this visit  Latest known visit with results is:   Hospital Outpatient Visit on 04/25/2022   Component Date Value   • Case Report 04/25/2022                      Value:Surgical Pathology Report                         Case: [id number]                                   Authorizing Provider:  Jesús Martinez MD   Collected:           04/25/2022 1307              Ordering Location:      [facility]       Received:            04/25/2022 [address] [facility]                                                             Pathologist:           Jeremy Monahan MD                                                         Specimen:    Stomach, Cold bx r/o h pylori                                                             • Final Diagnosis 04/25/2022                      Value: This result contains rich text formatting which cannot be displayed here  • Additional Information 04/25/2022                      Value: This result contains rich text formatting which cannot be displayed here     • Gross Description " 04/25/2022                      Value: This result contains rich text formatting which cannot be displayed here  • Clinical Information 04/25/2022                      Value:Cold bx r/o h pylori         Radiology Results:   No results found

## 2023-03-28 NOTE — PATIENT INSTRUCTIONS
Scheduled date of EGD(as of today):5/23/23  Physician performing EGD:Mando  Location of EGD:[place]  Instructions reviewed with patient by:Lorie DAVALOS  Clearances:  none

## 2023-03-29 ENCOUNTER — TELEPHONE (OUTPATIENT)
Dept: GASTROENTEROLOGY | Facility: CLINIC | Age: 58
End: 2023-03-29

## 2023-03-29 NOTE — TELEPHONE ENCOUNTER
----- Message from Kylah Leyva PA-C sent at 3/29/2023 12:24 PM EDT -----  Please let patient know that his liver enzymes are stable, and his tumor marker continues to be negative    Thank you!    (MELD 14)

## 2023-03-30 LAB
EST. AVERAGE GLUCOSE BLD GHB EST-MCNC: 80 MG/DL
HBA1C MFR BLD: 4.4 %

## 2023-04-03 ENCOUNTER — TELEPHONE (OUTPATIENT)
Dept: GASTROENTEROLOGY | Facility: CLINIC | Age: 58
End: 2023-04-03

## 2023-04-03 NOTE — TELEPHONE ENCOUNTER
"Called and spoke to [facility] at [facility]  The PA was done on the PA  Due to medication not being in the \"Tier \" it is a 100$ co pay  Ins will pay for alternative  Will route message to provider     "

## 2023-04-03 NOTE — TELEPHONE ENCOUNTER
Called patient and got   LMOM that due to insurance plan he has Derrill Gowers is not on formulary there for that is why there is 100$ co pay  Let patient know that a message was routed to provider to see if there is an alternative and if not possibly get samples   Left office #

## 2023-04-03 NOTE — TELEPHONE ENCOUNTER
Patients GI provider:  Nhung Arredondo    Number to return call: [phone]    Reason for call: Andrew Linder from [facility] called in stating that there is a $100 copay for the Xifaxan for pt and pt wanted to try an alternate  She suggested either alosetron or loperamide  Please reach out to pt to discuss further       Scheduled procedure/appointment date if applicable:procedure 6/71/82

## 2023-04-04 ENCOUNTER — TELEPHONE (OUTPATIENT)
Dept: GASTROENTEROLOGY | Facility: CLINIC | Age: 58
End: 2023-04-04

## 2023-04-04 NOTE — TELEPHONE ENCOUNTER
Patient spoke with [employer] and they called Marianela Cabezas about the prescription for [employer]  Office just needs to file a appeals letter showing medical necessity  Matthew Yip will cover it  Please contact Leana Bojorquez with any questions

## 2023-04-04 NOTE — TELEPHONE ENCOUNTER
Patient spoke with [employer] and they called Cesar Han about the prescription for [employer]  Office just needs to file a appeals letter showing medical necessity  Sandra Donovan will cover it  Please contact Preston Garza with any questions

## 2023-04-05 DIAGNOSIS — M54.42 ACUTE BILATERAL LOW BACK PAIN WITH LEFT-SIDED SCIATICA: ICD-10-CM

## 2023-04-05 RX ORDER — TRAMADOL HYDROCHLORIDE 50 MG/1
TABLET ORAL
Qty: 60 TABLET | Refills: 0 | Status: SHIPPED | OUTPATIENT
Start: 2023-04-05

## 2023-04-05 RX ORDER — DICLOFENAC SODIUM 75 MG/1
TABLET, DELAYED RELEASE ORAL
Qty: 180 TABLET | Refills: 0 | Status: SHIPPED | OUTPATIENT
Start: 2023-04-05

## 2023-04-05 NOTE — TELEPHONE ENCOUNTER
Attempted to call patient at number listed    then tried phone # listed under patients wife  Phones # do not work

## 2023-04-05 NOTE — TELEPHONE ENCOUNTER
Pt called today and stated that Samuel Murray will cover the medication if an appeal letter is sent to them directly  Please sent appeal letter to Samuel Murray  Fax # is [phone]   Please call pt for details

## 2023-04-05 NOTE — TELEPHONE ENCOUNTER
Called [facility] and spoke to Masood WITT  Will fax appeal letter to her attention @ [phone] for the [facility]

## 2023-04-27 ENCOUNTER — HOSPITAL ENCOUNTER (OUTPATIENT)
Dept: MRI IMAGING | Facility: HOSPITAL | Age: 58
Discharge: HOME | End: 2023-04-27

## 2023-04-27 DIAGNOSIS — B18.2 HEPATIC CIRRHOSIS DUE TO CHRONIC HEPATITIS C INFECTION (HCC): ICD-10-CM

## 2023-04-27 DIAGNOSIS — K74.60 HEPATIC CIRRHOSIS DUE TO CHRONIC HEPATITIS C INFECTION (HCC): ICD-10-CM

## 2023-04-27 RX ADMIN — GADOBUTROL 9 ML: 604.72 INJECTION INTRAVENOUS at 19:38

## 2023-05-01 ENCOUNTER — TELEPHONE (OUTPATIENT)
Dept: GASTROENTEROLOGY | Facility: CLINIC | Age: 58
End: 2023-05-01

## 2023-05-01 NOTE — TELEPHONE ENCOUNTER
----- Message from Wilda FridaySUSAN sent at 5/1/2023 11:35 AM EDT -----  Please let patient know that he has known cirrhosis, but no suspicious areas on the liver which is reassuring  Thank you

## 2023-05-01 NOTE — TELEPHONE ENCOUNTER
----- Message from Masoud Rodgers PA-C sent at 5/1/2023 11:35 AM EDT -----  Please let patient know that he has known cirrhosis, but no suspicious areas on the liver which is reassuring  Thank you

## 2023-05-05 ENCOUNTER — TELEPHONE (OUTPATIENT)
Dept: OTHER | Facility: OTHER | Age: 58
End: 2023-05-05

## 2023-05-05 ENCOUNTER — TELEPHONE (OUTPATIENT)
Dept: GASTROENTEROLOGY | Facility: CLINIC | Age: 58
End: 2023-05-05

## 2023-05-05 NOTE — TELEPHONE ENCOUNTER
Patients GI provider:  TRAN Ramirez    Number to return call: [phone]    Reason for call: Pt returning call and states a message may be left on his phone as he states she has received calls from 2 different number and his phone has a spam blocker  Please leave message for pt if unable to reach      Scheduled procedure/appointment date if applicable: Procedure: 9/24/2844

## 2023-05-05 NOTE — TELEPHONE ENCOUNTER
Patient called in response to the letter that he received  Patient stated that he has a block on his phone but will remove it for today to receive a call back

## 2023-05-05 NOTE — TELEPHONE ENCOUNTER
Pt  Just missed a call from someone about his results  Please return his call  He is by the phone and waiting

## 2023-05-05 NOTE — TELEPHONE ENCOUNTER
Returned patients phone call  This time got VERONICA OBANDO with test results  left office # as well

## 2023-05-05 NOTE — TELEPHONE ENCOUNTER
Brief Postoperative Note    Patient: Holly Lewis  YOB: 1962  MRN: [id number]    Date of Procedure: 7/27/2020     Pre-Op Diagnosis: Osteoarthritis of left knee, unspecified osteoarthritis type [M17.12]    Post-Op Diagnosis: Same as preoperative diagnosis. Procedure(s):  LEFT TOTAL KNEE ARTHROPLASTY    Surgeon(s):  Elif Sahu MD    Surgical Assistant: Physician Assistant: Lyndsay Steiner PA-C    Anesthesia: General     Estimated Blood Loss (mL): less than 50     Complications: None    Specimens: * No specimens in log *     Implants:   Implant Name Type Inv.  Item Serial No.  Lot No. LRB No. Used Action   CEMENT BONE SIMPLEX P 1/PACK - [id number]  CEMENT BONE SIMPLEX P 1/PACK  [employer] HOW [id number] Left 1 Implanted   CEMENT BONE SIMPLEX P 1/PACK - [id number]  CEMENT BONE SIMPLEX P 1/PACK  [employer] HOW [id number] Left 1 Implanted   COMPNT FEM PS BLAIR TRIATHLN 6 L --  - [id number]  COMPNT FEM PS BLAIR TRIATHLN 6 L --   [employer] HOW [id number] Left 1 Implanted   BASEPLT TIB UNIV TRIATHLN 5 --  - [id number]  BASEPLT TIB UNIV TRIATHLN 5 --   [employer] HOW [id number] Left 1 Implanted   PAT ASYM TRIATHLN X3 [id number] -- TRIATHLON ASYMMETRIC X3 - [id number]  PAT ASYM TRIATHLN X3 [id number] -- TRIATHLON ASYMMETRIC X3  [employer] HOW [id number] Left 1 Implanted   TIBIAL BEARING INSERT - PS    [employer] [id number] Left 1 Implanted       Drains: * No LDAs found *    Findings: same    Electronically Signed by Jaci Reis MD on 7/27/2020 at 11:34 AM Called patient and got   LMOM with patients test results   [address] office phone #

## 2023-05-23 ENCOUNTER — HOSPITAL ENCOUNTER (OUTPATIENT)
Dept: GASTROENTEROLOGY | Facility: HOSPITAL | Age: 58
Setting detail: OUTPATIENT SURGERY
Discharge: HOME | End: 2023-05-23

## 2023-05-23 ENCOUNTER — ANESTHESIA (OUTPATIENT)
Dept: GASTROENTEROLOGY | Facility: HOSPITAL | Age: 58
End: 2023-05-23

## 2023-05-23 ENCOUNTER — ANESTHESIA EVENT (OUTPATIENT)
Dept: GASTROENTEROLOGY | Facility: HOSPITAL | Age: 58
End: 2023-05-23

## 2023-05-23 VITALS
TEMPERATURE: 97.6 F | OXYGEN SATURATION: 99 % | WEIGHT: 214.73 LBS | RESPIRATION RATE: 16 BRPM | DIASTOLIC BLOOD PRESSURE: 63 MMHG | SYSTOLIC BLOOD PRESSURE: 128 MMHG | BODY MASS INDEX: 35.78 KG/M2 | HEART RATE: 51 BPM | HEIGHT: 65 IN

## 2023-05-23 DIAGNOSIS — B18.2 HEPATIC CIRRHOSIS DUE TO CHRONIC HEPATITIS C INFECTION (HCC): ICD-10-CM

## 2023-05-23 DIAGNOSIS — K74.60 HEPATIC CIRRHOSIS DUE TO CHRONIC HEPATITIS C INFECTION (HCC): ICD-10-CM

## 2023-05-23 DIAGNOSIS — L71.9 ROSACEA: ICD-10-CM

## 2023-05-23 DIAGNOSIS — I85.10 SECONDARY ESOPHAGEAL VARICES WITHOUT BLEEDING (HCC): ICD-10-CM

## 2023-05-23 RX ORDER — LIDOCAINE HYDROCHLORIDE 20 MG/ML
INJECTION, SOLUTION EPIDURAL; INFILTRATION; INTRACAUDAL; PERINEURAL AS NEEDED
Status: DISCONTINUED | OUTPATIENT
Start: 2023-05-23 | End: 2023-05-23

## 2023-05-23 RX ORDER — ONDANSETRON 2 MG/ML
4 INJECTION INTRAMUSCULAR; INTRAVENOUS ONCE AS NEEDED
Status: CANCELLED | OUTPATIENT
Start: 2023-05-23

## 2023-05-23 RX ORDER — CARVEDILOL 6.25 MG/1
6.25 TABLET ORAL 2 TIMES DAILY WITH MEALS
Qty: 60 TABLET | Refills: 12 | Status: SHIPPED | OUTPATIENT
Start: 2023-05-23

## 2023-05-23 RX ORDER — TRIAMCINOLONE ACETONIDE 5 MG/G
CREAM TOPICAL
Qty: 30 G | Refills: 3 | Status: SHIPPED | OUTPATIENT
Start: 2023-05-23

## 2023-05-23 RX ORDER — PROPOFOL 10 MG/ML
INJECTION, EMULSION INTRAVENOUS AS NEEDED
Status: DISCONTINUED | OUTPATIENT
Start: 2023-05-23 | End: 2023-05-23

## 2023-05-23 RX ADMIN — PROPOFOL 170 MG: 10 INJECTION, EMULSION INTRAVENOUS at 13:01

## 2023-05-23 RX ADMIN — LIDOCAINE HYDROCHLORIDE 100 MG: 20 INJECTION, SOLUTION EPIDURAL; INFILTRATION; INTRACAUDAL; PERINEURAL at 13:01

## 2023-05-23 NOTE — H&P
History and Physical - [facility]  Nella Calderon Sr  60 y o  male MRN: [id number]                  HPI: Nella Calderon Sr  is a 60y o  year old male who presents for endoscopy for evaluation of GERD and varices      REVIEW OF SYSTEMS: Per the HPI, and otherwise unremarkable  Historical Information   Past Medical History:   Diagnosis Date   • CPAP (continuous positive airway pressure) dependence     does not use machine   • Esophageal varices (HCC)     stable 9/2020 gets checked yearly   • Hepatic cirrhosis ([facility] [address] )     treated with harvoni   Hep C- dx age 17   • Hepatic encephalopathy ([facility] [address] )    • Liver disease    • Obesity    • Pneumonia     in past   • Postgastrectomy malabsorption    • Sleep apnea    • Wears glasses      Past Surgical History:   Procedure Laterality Date   • COLONOSCOPY     • ESOPHAGOGASTRODUODENOSCOPY N/A 4/12/2018    Procedure: ESOPHAGOGASTRODUODENOSCOPY (EGD); Surgeon: Sameera Gallardo MD;  Location: [facility];   Service: Gastroenterology   • FRACTURE SURGERY Left     ankle   • OTHER SURGICAL HISTORY      banding esophageal varices   • TN EXCISION EXCESSIVE SKIN & SUBQ TISSUE ABDOMEN N/A 9/16/2020    Procedure: ABDOMINOPLASTY;  Surgeon: Maury Padgett MD;  [facility];  Service: Plastics   • TN EXCISION SKIN ABD INFRAUMBILICAL PANNICULECTOMY N/A 9/16/2020    Procedure: PANNICULECTOMY;  Surgeon: Maury Padgett MD;  [facility];  Service: Plastics   • TN LAPS [address] [place] [address] LONGITUDINAL GASTRECTOMY N/A 6/5/2018    Procedure: Agnieszka Maynard;  Surgeon: Carla Khan MD;  [facility];  Service: Bariatrics     Social History   Social History     Substance and Sexual Activity   Alcohol Use Never   • Alcohol/week: 0 0 standard drinks     Social History     Substance and Sexual Activity   Drug Use No     Social History     Tobacco Use   Smoking Status Former   • Packs/day: 0 25   • Years: 37 00   • Pack years: "9 25   • Types: Cigarettes   • Quit date: 2022   • Years since quittin 4   Smokeless Tobacco Never   Tobacco Comments    stopped 2020     Family History   Problem Relation Age of Onset   • Heart disease Mother    • Lupus Mother    • Diabetes Father    • Stroke Father        Meds/Allergies     (Not in a hospital admission)      No Known Allergies    Objective     Blood pressure 135/71, pulse (!) 54, temperature 97 6 °F (36 4 °C), temperature source Temporal, resp  rate 18, height 5' 5\" (1 651 m), weight 97 4 kg (214 lb 11 7 oz), SpO2 97 %  PHYSICAL EXAM    /71   Pulse (!) 54   Temp 97 6 °F (36 4 °C) (Temporal)   Resp 18   Ht 5' 5\" (1 651 m)   Wt 97 4 kg (214 lb 11 7 oz)   SpO2 97%   BMI 35 73 kg/m²       Gen: NAD  CV: RRR  CHEST: Clear  ABD: soft, NT/ND  EXT: no edema      ASSESSMENT/PLAN:  This is a 60y o  year old male here for endoscopy, and he is stable and optimized for his procedure        "

## 2023-05-23 NOTE — ANESTHESIA POSTPROCEDURE EVALUATION
Post-Op Assessment Note    CV Status:  Stable  Pain Score: 0    Pain management: adequate     Mental Status:  Alert and awake   Hydration Status:  Euvolemic   PONV Controlled:  Controlled   Airway Patency:  Patent      Post Op Vitals Reviewed: Yes      Staff: CRNA         No notable events documented      /65 (05/23/23 1307)    Temp      Pulse 63 (05/23/23 1307)   Resp 16 (05/23/23 1307)    SpO2 96 % (05/23/23 1307)

## 2023-05-23 NOTE — ANESTHESIA PREPROCEDURE EVALUATION
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)      HEMATOLOGY   (+) Anemia      MUSCULOSKELETAL   (+) Acute bilateral low back pain with left-sided sciatica   (+) Midline low back pain without sciatica      PULMONARY   (+) Obstructive sleep apnea        Physical Exam    Airway    Mallampati score: II  TM Distance: >3 FB  Neck ROM: full     Dental       Cardiovascular  Rhythm: regular, Rate: normal, Cardiovascular exam normal    Pulmonary  Pulmonary exam normal Breath sounds clear to auscultation,     Other Findings        Anesthesia Plan  ASA Score- 3     Anesthesia Type- IV sedation with anesthesia with ASA Monitors  Additional Monitors:   Airway Plan: NTT  Plan Factors-Exercise tolerance (METS): >4 METS  Chart reviewed  EKG reviewed  Imaging results reviewed  Existing labs reviewed  Patient summary reviewed  Patient is not a current smoker  Patient did not smoke on day of surgery  Obstructive sleep apnea risk education given perioperatively  Induction- intravenous  Postoperative Plan-     Informed Consent- Anesthetic plan and risks discussed with patient  I personally reviewed this patient with the CRNA  Discussed and agreed on the Anesthesia Plan with the CRNA  Marcial Kulkarni

## 2023-05-24 DIAGNOSIS — R05.9 COUGH: ICD-10-CM

## 2023-05-24 RX ORDER — FLUTICASONE PROPIONATE 50 MCG
1 SPRAY, SUSPENSION (ML) NASAL DAILY
Qty: 15.8 ML | Refills: 3 | Status: SHIPPED | OUTPATIENT
Start: 2023-05-24

## 2023-06-05 DIAGNOSIS — B18.2 HEPATIC CIRRHOSIS DUE TO CHRONIC HEPATITIS C INFECTION (HCC): ICD-10-CM

## 2023-06-05 DIAGNOSIS — K74.60 HEPATIC CIRRHOSIS DUE TO CHRONIC HEPATITIS C INFECTION (HCC): ICD-10-CM

## 2023-06-05 RX ORDER — AMILORIDE HYDROCHLORIDE 5 MG/1
TABLET ORAL
Qty: 90 TABLET | Refills: 1 | Status: SHIPPED | OUTPATIENT
Start: 2023-06-05

## 2023-06-15 DIAGNOSIS — I85.10 SECONDARY ESOPHAGEAL VARICES WITHOUT BLEEDING (HCC): ICD-10-CM

## 2023-06-15 DIAGNOSIS — M54.42 ACUTE BILATERAL LOW BACK PAIN WITH LEFT-SIDED SCIATICA: ICD-10-CM

## 2023-06-15 RX ORDER — TRAMADOL HYDROCHLORIDE 50 MG/1
50 TABLET ORAL EVERY 6 HOURS PRN
Qty: 60 TABLET | Refills: 0 | Status: SHIPPED | OUTPATIENT
Start: 2023-06-15

## 2023-06-15 RX ORDER — CARVEDILOL 6.25 MG/1
6.25 TABLET ORAL 2 TIMES DAILY WITH MEALS
Qty: 180 TABLET | Refills: 3 | Status: SHIPPED | OUTPATIENT
Start: 2023-06-15

## 2023-06-26 DIAGNOSIS — M54.50 MIDLINE LOW BACK PAIN WITHOUT SCIATICA, UNSPECIFIED CHRONICITY: ICD-10-CM

## 2023-06-26 DIAGNOSIS — M54.42 ACUTE BILATERAL LOW BACK PAIN WITH LEFT-SIDED SCIATICA: ICD-10-CM

## 2023-06-26 DIAGNOSIS — E66.01 MORBID (SEVERE) OBESITY DUE TO EXCESS CALORIES (HCC): ICD-10-CM

## 2023-06-26 DIAGNOSIS — M79.18 MYOFASCIAL MUSCLE PAIN: ICD-10-CM

## 2023-06-27 RX ORDER — DICLOFENAC SODIUM 75 MG/1
TABLET, DELAYED RELEASE ORAL
Qty: 180 TABLET | Refills: 0 | Status: SHIPPED | OUTPATIENT
Start: 2023-06-27

## 2023-06-27 RX ORDER — METHOCARBAMOL 750 MG/1
TABLET, FILM COATED ORAL
Qty: 270 TABLET | Refills: 0 | Status: SHIPPED | OUTPATIENT
Start: 2023-06-27

## 2023-06-27 RX ORDER — IBUPROFEN 800 MG/1
TABLET ORAL
Qty: 270 TABLET | Refills: 0 | Status: SHIPPED | OUTPATIENT
Start: 2023-06-27

## 2023-06-27 RX ORDER — OMEPRAZOLE 20 MG/1
CAPSULE, DELAYED RELEASE ORAL
Qty: 90 CAPSULE | Refills: 0 | Status: SHIPPED | OUTPATIENT
Start: 2023-06-27

## 2023-06-28 DIAGNOSIS — R05.9 COUGH: ICD-10-CM

## 2023-06-28 RX ORDER — FLUTICASONE PROPIONATE 50 MCG
1 SPRAY, SUSPENSION (ML) NASAL DAILY
Qty: 48 G | Refills: 3 | Status: SHIPPED | OUTPATIENT
Start: 2023-06-28

## 2023-08-01 DIAGNOSIS — B18.2 HEPATIC CIRRHOSIS DUE TO CHRONIC HEPATITIS C INFECTION (HCC): ICD-10-CM

## 2023-08-01 DIAGNOSIS — K76.82 HEPATIC ENCEPHALOPATHY (HCC): ICD-10-CM

## 2023-08-01 DIAGNOSIS — K74.60 HEPATIC CIRRHOSIS DUE TO CHRONIC HEPATITIS C INFECTION (HCC): ICD-10-CM

## 2023-08-02 RX ORDER — LACTULOSE 10 G/15ML
10 SOLUTION ORAL DAILY PRN
Qty: 946 ML | Refills: 0 | Status: SHIPPED | OUTPATIENT
Start: 2023-08-02

## 2023-08-02 RX ORDER — FUROSEMIDE 20 MG/1
TABLET ORAL
Qty: 30 TABLET | Refills: 2 | Status: SHIPPED | OUTPATIENT
Start: 2023-08-02

## 2023-08-07 DIAGNOSIS — B18.2 HEPATIC CIRRHOSIS DUE TO CHRONIC HEPATITIS C INFECTION (HCC): ICD-10-CM

## 2023-08-07 DIAGNOSIS — K74.60 HEPATIC CIRRHOSIS DUE TO CHRONIC HEPATITIS C INFECTION (HCC): ICD-10-CM

## 2023-08-08 RX ORDER — NADOLOL 20 MG/1
20 TABLET ORAL DAILY
Qty: 90 TABLET | Refills: 0 | Status: SHIPPED | OUTPATIENT
Start: 2023-08-08

## 2023-08-16 DIAGNOSIS — L71.9 ROSACEA: ICD-10-CM

## 2023-08-17 RX ORDER — TRIAMCINOLONE ACETONIDE 5 MG/G
CREAM TOPICAL
Qty: 30 G | Refills: 0 | Status: SHIPPED | OUTPATIENT
Start: 2023-08-17

## 2023-11-24 DIAGNOSIS — M79.18 MYOFASCIAL MUSCLE PAIN: ICD-10-CM

## 2023-11-24 DIAGNOSIS — M54.50 MIDLINE LOW BACK PAIN WITHOUT SCIATICA, UNSPECIFIED CHRONICITY: ICD-10-CM

## 2023-11-24 DIAGNOSIS — M54.42 ACUTE BILATERAL LOW BACK PAIN WITH LEFT-SIDED SCIATICA: ICD-10-CM

## 2023-11-24 RX ORDER — IBUPROFEN 800 MG/1
TABLET ORAL
Qty: 30 TABLET | Refills: 1 | Status: SHIPPED | OUTPATIENT
Start: 2023-11-24 | End: 2023-11-30 | Stop reason: SDUPTHER

## 2023-11-24 RX ORDER — DICLOFENAC SODIUM 75 MG/1
TABLET, DELAYED RELEASE ORAL
Qty: 40 TABLET | Refills: 1 | Status: SHIPPED | OUTPATIENT
Start: 2023-11-24

## 2023-11-30 DIAGNOSIS — M79.18 MYOFASCIAL MUSCLE PAIN: ICD-10-CM

## 2023-11-30 DIAGNOSIS — M54.50 MIDLINE LOW BACK PAIN WITHOUT SCIATICA, UNSPECIFIED CHRONICITY: ICD-10-CM

## 2023-11-30 DIAGNOSIS — E66.01 MORBID (SEVERE) OBESITY DUE TO EXCESS CALORIES (HCC): ICD-10-CM

## 2023-11-30 DIAGNOSIS — M54.42 ACUTE BILATERAL LOW BACK PAIN WITH LEFT-SIDED SCIATICA: ICD-10-CM

## 2023-11-30 NOTE — TELEPHONE ENCOUNTER
[facility] is requesting these refills for the patient. He has not been seen here in the office since 10/07/2022.

## 2023-12-01 RX ORDER — TRAMADOL HYDROCHLORIDE 50 MG/1
50 TABLET ORAL EVERY 6 HOURS PRN
Qty: 60 TABLET | Refills: 0 | Status: SHIPPED | OUTPATIENT
Start: 2023-12-01

## 2023-12-01 RX ORDER — OMEPRAZOLE 20 MG/1
20 CAPSULE, DELAYED RELEASE ORAL DAILY
Qty: 90 CAPSULE | Refills: 0 | Status: SHIPPED | OUTPATIENT
Start: 2023-12-01

## 2023-12-01 RX ORDER — IBUPROFEN 800 MG/1
800 TABLET ORAL EVERY 6 HOURS PRN
Qty: 270 TABLET | Refills: 1 | Status: SHIPPED | OUTPATIENT
Start: 2023-12-01 | End: 2023-12-04 | Stop reason: SDUPTHER

## 2023-12-04 DIAGNOSIS — M79.18 MYOFASCIAL MUSCLE PAIN: ICD-10-CM

## 2023-12-04 DIAGNOSIS — M54.50 MIDLINE LOW BACK PAIN WITHOUT SCIATICA, UNSPECIFIED CHRONICITY: ICD-10-CM

## 2023-12-04 RX ORDER — IBUPROFEN 800 MG/1
800 TABLET ORAL EVERY 6 HOURS PRN
Qty: 360 TABLET | Refills: 1 | Status: SHIPPED | OUTPATIENT
Start: 2023-12-04

## 2023-12-11 DIAGNOSIS — L71.9 ROSACEA: ICD-10-CM

## 2023-12-11 DIAGNOSIS — M54.42 ACUTE BILATERAL LOW BACK PAIN WITH LEFT-SIDED SCIATICA: ICD-10-CM

## 2023-12-11 DIAGNOSIS — M54.50 MIDLINE LOW BACK PAIN WITHOUT SCIATICA, UNSPECIFIED CHRONICITY: ICD-10-CM

## 2023-12-11 RX ORDER — DICLOFENAC SODIUM 75 MG/1
TABLET, DELAYED RELEASE ORAL
Qty: 40 TABLET | Refills: 1 | Status: SHIPPED | OUTPATIENT
Start: 2023-12-11

## 2023-12-11 RX ORDER — METHOCARBAMOL 750 MG/1
750 TABLET, FILM COATED ORAL EVERY 8 HOURS
Qty: 270 TABLET | Refills: 0 | Status: SHIPPED | OUTPATIENT
Start: 2023-12-11 | End: 2023-12-14

## 2023-12-11 RX ORDER — TRIAMCINOLONE ACETONIDE 5 MG/G
CREAM TOPICAL 2 TIMES DAILY
Qty: 45 G | Refills: 3 | Status: SHIPPED | OUTPATIENT
Start: 2023-12-11

## 2023-12-13 DIAGNOSIS — M54.50 MIDLINE LOW BACK PAIN WITHOUT SCIATICA, UNSPECIFIED CHRONICITY: ICD-10-CM

## 2023-12-14 RX ORDER — METHOCARBAMOL 750 MG/1
TABLET, FILM COATED ORAL
Qty: 30 TABLET | Refills: 0 | Status: SHIPPED | OUTPATIENT
Start: 2023-12-14

## 2023-12-18 ENCOUNTER — TELEPHONE (OUTPATIENT)
Dept: UROLOGY | Facility: CLINIC | Age: 58
End: 2023-12-18

## 2023-12-18 NOTE — TELEPHONE ENCOUNTER
Received form from patient for [facility]. Filled out office portion. Faxed back to [phone] . Will then scan into patients chart... and then call patient to  original along with Fax receipt

## 2023-12-22 ENCOUNTER — TELEPHONE (OUTPATIENT)
Dept: GASTROENTEROLOGY | Facility: CLINIC | Age: 58
End: 2023-12-22

## 2023-12-22 NOTE — TELEPHONE ENCOUNTER
Called patient and got . LMOM that the assistance forms were filled out, scanned in chart and are ready to be picked up. Left office # as well.....

## 2023-12-22 NOTE — TELEPHONE ENCOUNTER
Pt returned call and will be picking up paperwork. Pt stated there is a fax number on the paperwork.

## 2024-01-08 ENCOUNTER — APPOINTMENT (OUTPATIENT)
Dept: RADIOLOGY | Facility: CLINIC | Age: 59
End: 2024-01-08
Payer: COMMERCIAL

## 2024-01-08 ENCOUNTER — OFFICE VISIT (OUTPATIENT)
Dept: FAMILY MEDICINE CLINIC | Facility: CLINIC | Age: 59
End: 2024-01-08
Payer: COMMERCIAL

## 2024-01-08 VITALS
BODY MASS INDEX: 36.11 KG/M2 | DIASTOLIC BLOOD PRESSURE: 63 MMHG | SYSTOLIC BLOOD PRESSURE: 135 MMHG | WEIGHT: 217 LBS | HEART RATE: 63 BPM | TEMPERATURE: 97.4 F | OXYGEN SATURATION: 95 %

## 2024-01-08 DIAGNOSIS — R06.2 WHEEZING: ICD-10-CM

## 2024-01-08 DIAGNOSIS — R05.2 SUBACUTE COUGH: ICD-10-CM

## 2024-01-08 DIAGNOSIS — D64.9 ANEMIA, UNSPECIFIED TYPE: ICD-10-CM

## 2024-01-08 DIAGNOSIS — D69.6 PLATELETS DECREASED (HCC): ICD-10-CM

## 2024-01-08 DIAGNOSIS — F11.20 CONTINUOUS OPIOID DEPENDENCE (HCC): ICD-10-CM

## 2024-01-08 DIAGNOSIS — E66.01 MORBID OBESITY WITH BMI OF 40.0-44.9, ADULT (HCC): ICD-10-CM

## 2024-01-08 DIAGNOSIS — I85.10 SECONDARY ESOPHAGEAL VARICES WITHOUT BLEEDING (HCC): ICD-10-CM

## 2024-01-08 DIAGNOSIS — E72.20 DISORDER OF UREA CYCLE METABOLISM, UNSPECIFIED (HCC): ICD-10-CM

## 2024-01-08 DIAGNOSIS — Z00.00 MEDICARE ANNUAL WELLNESS VISIT, SUBSEQUENT: ICD-10-CM

## 2024-01-08 DIAGNOSIS — H66.91 RIGHT OTITIS MEDIA, UNSPECIFIED OTITIS MEDIA TYPE: Primary | ICD-10-CM

## 2024-01-08 LAB
SARS-COV-2 AG UPPER RESP QL IA: NEGATIVE
SL AMB POCT RAPID FLU A: NEGATIVE
SL AMB POCT RAPID FLU B: NEGATIVE
VALID CONTROL: NORMAL

## 2024-01-08 PROCEDURE — 71046 X-RAY EXAM CHEST 2 VIEWS: CPT

## 2024-01-08 PROCEDURE — G0439 PPPS, SUBSEQ VISIT: HCPCS

## 2024-01-08 PROCEDURE — 87804 INFLUENZA ASSAY W/OPTIC: CPT

## 2024-01-08 PROCEDURE — 99213 OFFICE O/P EST LOW 20 MIN: CPT

## 2024-01-08 PROCEDURE — 87811 SARS-COV-2 COVID19 W/OPTIC: CPT

## 2024-01-08 RX ORDER — PREDNISONE 20 MG/1
40 TABLET ORAL DAILY
Qty: 10 TABLET | Refills: 0 | Status: SHIPPED | OUTPATIENT
Start: 2024-01-08 | End: 2024-01-13

## 2024-01-08 RX ORDER — AMOXICILLIN 875 MG/1
875 TABLET, COATED ORAL 2 TIMES DAILY
Qty: 20 TABLET | Refills: 0 | Status: SHIPPED | OUTPATIENT
Start: 2024-01-08 | End: 2024-01-08

## 2024-01-08 RX ORDER — AMOXICILLIN 875 MG/1
875 TABLET, COATED ORAL 2 TIMES DAILY
Qty: 20 TABLET | Refills: 0 | Status: SHIPPED | OUTPATIENT
Start: 2024-01-08 | End: 2024-01-18

## 2024-01-08 RX ORDER — PREDNISONE 20 MG/1
40 TABLET ORAL DAILY
Qty: 10 TABLET | Refills: 0 | Status: SHIPPED | OUTPATIENT
Start: 2024-01-08 | End: 2024-01-08

## 2024-01-08 NOTE — PROGRESS NOTES
Assessment and Plan:     Problem List Items Addressed This Visit       Morbid obesity with BMI of 40.0-44.9, adult (HCC)    Secondary esophageal varices without bleeding (HCC)    Anemia    Relevant Orders    CBC and differential    Platelets decreased (HCC)     Other Visit Diagnoses       Right otitis media, unspecified otitis media type    -  Primary    Relevant Medications    amoxicillin (AMOXIL) 875 mg tablet    Medicare annual wellness visit, subsequent        Relevant Orders    Comprehensive metabolic panel    Lipid panel    Continuous opioid dependence (HCC)        Disorder of urea cycle metabolism, unspecified (HCC)        Wheezing        Relevant Medications    predniSONE 20 mg tablet    Cough, unspecified type        Relevant Orders    POCT Rapid Covid Ag (Completed)    POCT rapid flu A and B (Completed)    Subacute cough        Relevant Orders    XR chest pa & lateral (Completed)        Preventive health issues were discussed with patient, and age appropriate screening tests were ordered as noted in patient's After Visit Summary.  Personalized health advice and appropriate referrals for health education or preventive services given if needed, as noted in patient's After Visit Summary.  Patient presents for sick visit; also due to medicare wellness visit.   Patient UTD on screenings.   Routine lab work ordered as above.     Patient presents for evaluation of cold symptoms x 3 days. Rapid flu and covid both negative. However, on exam patient's lungs revealed scattered crackles and wheezing with an O2 of 95% - therefore ordered stat CXR to rule out pneumonia. Gave 5 day burst of prednisone for wheezing - educated on potential side effects of medication. Patient's right TM also erythematous, severely bulging with fluid behind it. Therefore covering for right OM with amoxicillin - educated on potential side effects of abx. Otherwise advised supportive care and to make sure he stay hydrated. To call if symptoms  worsen or persist; advised ER if symptoms significantly worsen, or if he experience any chest pain, sob, trouble breathing.     Esophageal varices: patient has cirrhosis; follows with GI routinely for management of varices and liver condition.     Continuous opioid dependence: patient notes he is on chronic tramadol for his chronic back pain which is managed by pain management.     Platelets decreased: this has been ongoing chronic illness; likely related to liver condition. Ordered repeat CBC to make sure they are stable.         History of Present Illness:     Patient presents for a Medicare Wellness Visit    CC: congestion, cough, shortness of breath, right ear pain x 3 days     Patient presents for evaluation of congestion, cough, sob with coughing, right ear pain x 3 days. He has not been taking anything at home for his symptoms. Notes he is eating and drinking without issue but does note a decreased appetite.        Patient Care Team:  Rodrigo Jo MD as PCP - General  MD Lluvia Mejia PA-C (Gastroenterology)  Sheridan Darby PA-C as Physician Assistant (Gastroenterology)  Stan Gilmore III, MD (Gastroenterology)     Review of Systems:     Review of Systems   Constitutional:  Positive for appetite change (decreased), chills, diaphoresis and fatigue. Negative for fever.   HENT:  Positive for congestion, ear pain (right sided), rhinorrhea and sinus pressure. Negative for ear discharge, postnasal drip, sinus pain and sore throat.    Respiratory:  Positive for cough, chest tightness and shortness of breath. Negative for wheezing.    Cardiovascular:  Negative for chest pain and palpitations.   Gastrointestinal:  Positive for diarrhea. Negative for abdominal pain, blood in stool, constipation, nausea and vomiting.   Genitourinary:  Negative for decreased urine volume, difficulty urinating and dysuria.   Musculoskeletal:  Positive for back pain (chronic).   Neurological:  Positive for  headaches. Negative for dizziness and light-headedness.        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)    Gastroesophageal reflux disease without esophagitis    Morbid obesity with BMI of 40.0-44.9, adult (HCC)    Obstructive sleep apnea    Secondary esophageal varices without bleeding (HCC)    Hepatic encephalopathy (HCC)    S/P laparoscopic sleeve gastrectomy    Mood swings    Acute pain of right knee    Effusion of right knee    Diabetes mellitus screening    Anemia    Dizzy    Postsurgical malabsorption    Esophageal varices without bleeding (HCC)    Need for Tdap vaccination    Need for lipid screening    Laceration of scalp without foreign body    Midline low back pain without sciatica    Excess skin of abdominal wall    Myofascial muscle pain    Hyperammonemia (HCC)    Vitamin A deficiency    Need for influenza vaccination    Hypotension    Heartburn    S/P abdominoplasty    Encounter for surgical aftercare following surgery of digestive system    Platelets decreased (HCC)    Leukocytosis    Bowel and bladder incontinence    Hyperbilirubinemia    Tremor    Acute bilateral low back pain with left-sided sciatica      Past Medical and Surgical History:     Past Medical History:   Diagnosis Date    CPAP (continuous positive airway pressure) dependence     does not use machine    Esophageal varices (HCC)     stable 9/2020 gets checked yearly    Hepatic cirrhosis (HCC)     treated with harvoni   Hep C- dx age [age]    Hepatic encephalopathy (HCC)     Liver disease     Obesity     Pneumonia     in past    Postgastrectomy malabsorption     Sleep apnea     Wears glasses      Past Surgical History:   Procedure Laterality Date    COLONOSCOPY      ESOPHAGOGASTRODUODENOSCOPY N/A 4/12/2018    Procedure: ESOPHAGOGASTRODUODENOSCOPY (EGD);  Surgeon: Willy Traylor MD;  [facility];  Service:  Gastroenterology    FRACTURE SURGERY Left     ankle    OTHER SURGICAL HISTORY      banding esophageal varices    AR EXCISION EXCESSIVE SKIN & SUBQ TISSUE ABDOMEN N/A 2020    Procedure: ABDOMINOPLASTY;  Surgeon: Enrrique Birmingham MD;  [facility];  Service: Plastics    AR EXCISION SKIN ABD INFRAUMBILICAL PANNICULECTOMY N/A 2020    Procedure: PANNICULECTOMY;  Surgeon: Enrrique Birmingham MD;  [facility];  Service: Plastics    AR LAPS GSTRC RSTRICTIV PX LONGITUDINAL GASTRECTOMY N/A 2018    Procedure: GASTRECTOMY LAPAROSCOPIC SLEEVE;  Surgeon: Diana Mcdonnell MD;  [facility];  Service: Bariatrics      Family History:     Family History   Problem Relation Age of Onset    Heart disease Mother     Lupus Mother     Diabetes Father     Stroke Father       Social History:     Social History     Socioeconomic History    Marital status: /Civil Union     Spouse name: None    Number of children: 2    Years of education: None    Highest education level: None   Occupational History    Occupation: disabled   Tobacco Use    Smoking status: Former     Current packs/day: 0.00     Average packs/day: 0.3 packs/day for 37.0 years (9.3 ttl pk-yrs)     Types: Cigarettes     Start date: 1985     Quit date: 2022     Years since quittin.0    Smokeless tobacco: Never    Tobacco comments:     stopped 2020   Vaping Use    Vaping status: Every Day    Substances: Flavoring   Substance and Sexual Activity    Alcohol use: Never     Alcohol/week: 0.0 standard drinks of alcohol    Drug use: No    Sexual activity: Yes   Other Topics Concern    None   Social History Narrative    None     Social Determinants of Health     Financial Resource Strain: Low Risk  (2024)    Overall Financial Resource Strain (CARDIA)     Difficulty of Paying Living Expenses: Not hard at all   Food Insecurity: Not on file   Transportation Needs: No Transportation Needs (2024)    PRAPARE -  Transportation     Lack of Transportation (Medical): No     Lack of Transportation (Non-Medical): No   Physical Activity: Not on file   Stress: Not on file   Social Connections: Not on file   Intimate Partner Violence: Not on file   Housing Stability: Not on file      Medications and Allergies:     Current Outpatient Medications   Medication Sig Dispense Refill    amoxicillin (AMOXIL) 875 mg tablet Take 1 tablet (875 mg total) by mouth 2 (two) times a day for 10 days 20 tablet 0    predniSONE 20 mg tablet Take 2 tablets (40 mg total) by mouth daily for 5 days 10 tablet 0    AMILoride 5 mg tablet TAKE ONE TABLET BY MOUTH EVERY DAY 90 tablet 1    carvedilol (COREG) 6.25 mg tablet Take 1 tablet (6.25 mg total) by mouth 2 (two) times a day with meals 180 tablet 3    Claritin-D 12 Hour 5-120 MG per tablet TAKE ONE TABLET BY MOUTH TWICE DAILY FOR 5 DAYS 10 tablet 0    diclofenac (VOLTAREN) 75 mg EC tablet Take 1 tablet by mouth two times a day 40 tablet 1    fluticasone (FLONASE) 50 mcg/act nasal spray 1 spray into each nostril daily 48 g 3    furosemide (LASIX) 20 mg tablet TAKE 1 TABLET (20 MG TOTAL) BY MOUTH TWO (TWO) TIMES A DAY 30 tablet 2    ibuprofen (MOTRIN) 800 mg tablet Take 1 tablet (800 mg total) by mouth every 6 (six) hours as needed for mild pain 360 tablet 1    lactulose (CHRONULAC) 10 g/15 mL solution TAKE 15 ML (10 G TOTAL) BY MOUTH DAILY AS NEEDED (CONSTIPATION OR CONFUSION) 946 mL 0    methocarbamol (ROBAXIN) 750 mg tablet TAKE 1 TABLET BY MOUTH EVERY EIGHT HOURS 30 tablet 0    nadolol (CORGARD) 20 mg tablet TAKE 1 TABLET (20 MG TOTAL) BY MOUTH DAILY 90 tablet 0    omeprazole (PriLOSEC) 20 mg delayed release capsule Take 1 capsule (20 mg total) by mouth daily 90 capsule 0    sildenafil (VIAGRA) 100 mg tablet Take 1 tablet (100 mg total) by mouth daily as needed for erectile dysfunction 90 tablet 0    traMADol (ULTRAM) 50 mg tablet Take 1 tablet (50 mg total) by mouth every 6 (six) hours as needed for  moderate pain 60 tablet 0    triamcinolone (KENALOG) 0.5 % cream Apply topically 2 (two) times a day 45 g 3     No current facility-administered medications for this visit.     No Known Allergies   Immunizations:     Immunization History   Administered Date(s) Administered    COVID-19 Pfizer vac (Armando-sucrose, gray cap) 12 yr+ IM 08/19/2021, 09/09/2021, 02/10/2022, 07/05/2022    INFLUENZA 10/28/2014, 09/15/2016, 11/04/2017, 08/30/2018    Influenza Split High Dose Preservative Free IM 10/28/2014    Influenza, injectable, quadrivalent, preservative free 0.5 mL 08/30/2018    Influenza, recombinant, quadrivalent,injectable, preservative free 11/20/2019    Pneumococcal Conjugate 13-Valent 06/17/2015    Tdap 05/01/2019    Tuberculin Skin Test-PPD Intradermal 10/28/2009, 02/07/2011, 07/23/2012, 03/16/2015    Zoster 10/28/2014      Health Maintenance:         Topic Date Due    Colorectal Cancer Screening  04/22/2025    HIV Screening  Completed    Hepatitis C Screening  Discontinued         Topic Date Due    Hepatitis A Vaccine (1 of 2 - Risk 2-dose series) Never done    Pneumococcal Vaccine: Pediatrics (0 to 5 Years) and At-Risk Patients (6 to 64 Years) (2 - PPSV23 or PCV20) 08/12/2015    COVID-19 Vaccine (6 - 2023-24 season) 12/07/2023      Medicare Screening Tests and Risk Assessments:     Matt is here for his Subsequent Wellness visit.     Health Risk Assessment:   Patient rates overall health as good. Patient feels that their physical health rating is same. Patient is satisfied with their life. Eyesight was rated as same. Hearing was rated as same. Patient feels that their emotional and mental health rating is same. Patients states they are never, rarely angry. Patient states they are never, rarely unusually tired/fatigued. Pain experienced in the last 7 days has been none. Patient states that he has experienced weight loss or gain in last 6 months.     Depression Screening:   PHQ-2 Score: 0      Fall Risk Screening:    In the past year, patient has experienced: no history of falling in past year      Home Safety:  Patient does not have trouble with stairs inside or outside of their home. Patient has working smoke alarms and has working carbon monoxide detector. Home safety hazards include: none.     Nutrition:   Current diet is Regular.     Medications:   Patient is currently taking over-the-counter supplements. OTC medications include: see medication list. Patient is able to manage medications.     Activities of Daily Living (ADLs)/Instrumental Activities of Daily Living (IADLs):   Walk and transfer into and out of bed and chair?: Yes  Dress and groom yourself?: Yes    Bathe or shower yourself?: Yes    Feed yourself? Yes  Do your laundry/housekeeping?: Yes  Manage your money, pay your bills and track your expenses?: Yes  Make your own meals?: Yes    Do your own shopping?: Yes    Previous Hospitalizations:   Any hospitalizations or ED visits within the last 12 months?: No      Advance Care Planning:   Living will: No    Durable POA for healthcare: No    Advanced directive: No    Advanced directive counseling given: Yes    ACP document given: Yes      Cognitive Screening:   Provider or family/friend/caregiver concerned regarding cognition?: No    PREVENTIVE SCREENINGS      Cardiovascular Screening:    General: Screening Current    Due for: Lipid Panel      Diabetes Screening:     General: Screening Current    Due for: Blood Glucose      Colorectal Cancer Screening:     General: Screening Current      Prostate Cancer Screening:    General: Screening Current      Osteoporosis Screening:    General: Screening Not Indicated      Abdominal Aortic Aneurysm (AAA) Screening:    Risk factors include: tobacco use        General: Screening Not Indicated      Lung Cancer Screening:     General: Screening Not Indicated      Hepatitis C Screening:    General: Screening Current    Screening, Brief Intervention, and Referral to Treatment  (SBIRT)    Screening  Typical number of drinks in a day: 0  Typical number of drinks in a week: 0  Interpretation: Low risk drinking behavior.    Single Item Drug Screening:  How often have you used an illegal drug (including marijuana) or a prescription medication for non-medical reasons in the past year? never    Single Item Drug Screen Score: 0  Interpretation: Negative screen for possible drug use disorder    Review of Current Opioid Use    Opioid Risk Tool (ORT) Interpretation: Complete Opioid Risk Tool (ORT)    No results found.     Physical Exam:     /63   Pulse 63   Temp (!) 97.4 °F (36.3 °C)   Wt 98.4 kg (217 lb)   SpO2 95%   BMI 36.11 kg/m²     Physical Exam  Vitals reviewed.   Constitutional:       General: He is not in acute distress.     Appearance: Normal appearance. He is well-developed. He is not ill-appearing or diaphoretic.   HENT:      Head: Normocephalic and atraumatic.      Right Ear: A middle ear effusion (fluid behind TM) is present. There is no impacted cerumen. Tympanic membrane is erythematous and bulging.      Left Ear:  No middle ear effusion. There is no impacted cerumen. Tympanic membrane is not erythematous or bulging.      Nose: Congestion and rhinorrhea present.      Mouth/Throat:      Mouth: Mucous membranes are moist.      Pharynx: Oropharynx is clear. Posterior oropharyngeal erythema present. No oropharyngeal exudate.   Eyes:      General:         Right eye: No discharge.         Left eye: No discharge.      Conjunctiva/sclera: Conjunctivae normal.   Cardiovascular:      Rate and Rhythm: Normal rate and regular rhythm.      Pulses: Normal pulses.      Heart sounds: Normal heart sounds. No murmur heard.  Pulmonary:      Effort: Pulmonary effort is normal. No respiratory distress.      Breath sounds: Wheezing and rales (scattered crackles) present.   Musculoskeletal:         General: Normal range of motion.      Cervical back: Normal range of motion and neck supple.       Right lower leg: No edema.      Left lower leg: No edema.   Lymphadenopathy:      Cervical: Cervical adenopathy present.   Skin:     General: Skin is warm.   Neurological:      General: No focal deficit present.      Mental Status: He is alert.      Gait: Gait normal.   Psychiatric:         Mood and Affect: Mood normal.          Di Osborne PA-C

## 2024-01-08 NOTE — PATIENT INSTRUCTIONS
Medicare Preventive Visit Patient Instructions  Thank you for completing your Welcome to Medicare Visit or Medicare Annual Wellness Visit today. Your next wellness visit will be due in one year (1/8/2025).  The screening/preventive services that you may require over the next 5-10 years are detailed below. Some tests may not apply to you based off risk factors and/or age. Screening tests ordered at today's visit but not completed yet may show as past due. Also, please note that scanned in results may not display below.  Preventive Screenings:  Service Recommendations Previous Testing/Comments   Colorectal Cancer Screening  Colonoscopy    Fecal Occult Blood Test (FOBT)/Fecal Immunochemical Test (FIT)  Fecal DNA/Cologuard Test  Flexible Sigmoidoscopy Age: 45-75 years old   Colonoscopy: every 10 years (May be performed more frequently if at higher risk)  OR  FOBT/FIT: every 1 year  OR  Cologuard: every 3 years  OR  Sigmoidoscopy: every 5 years  Screening may be recommended earlier than age 45 if at higher risk for colorectal cancer. Also, an individualized decision between you and your healthcare provider will decide whether screening between the ages of 76-85 would be appropriate. Colonoscopy: 04/07/2022  FOBT/FIT: Not on file  Cologuard: 04/07/2022  Sigmoidoscopy: Not on file    Screening Current     Prostate Cancer Screening Individualized decision between patient and health care provider in men between ages of 55-69   Medicare will cover every 12 months beginning on the day after your 50th birthday PSA: 1.4 ng/mL           Hepatitis C Screening Once for adults born between 1945 and 1965  More frequently in patients at high risk for Hepatitis C Hep C Antibody: 08/19/2021        Diabetes Screening 1-2 times per year if you're at risk for diabetes or have pre-diabetes Fasting glucose: 98 mg/dL (3/28/2023)  A1C: 4.4 % (3/28/2023)  Screening Current   Cholesterol Screening Once every 5 years if you don't have a lipid  disorder. May order more often based on risk factors. Lipid panel: 03/28/2023  Screening Current      Other Preventive Screenings Covered by Medicare:  Abdominal Aortic Aneurysm (AAA) Screening: covered once if your at risk. You're considered to be at risk if you have a family history of AAA or a male between the age of 65-75 who smoking at least 100 cigarettes in your lifetime.  Lung Cancer Screening: covers low dose CT scan once per year if you meet all of the following conditions: (1) Age 55-77; (2) No signs or symptoms of lung cancer; (3) Current smoker or have quit smoking within the last 15 years; (4) You have a tobacco smoking history of at least 20 pack years (packs per day x number of years you smoked); (5) You get a written order from a healthcare provider.  Glaucoma Screening: covered annually if you're considered high risk: (1) You have diabetes OR (2) Family history of glaucoma OR (3)  aged 50 and older OR (4)  American aged 65 and older  Osteoporosis Screening: covered every 2 years if you meet one of the following conditions: (1) Have a vertebral abnormality; (2) On glucocorticoid therapy for more than 3 months; (3) Have primary hyperparathyroidism; (4) On osteoporosis medications and need to assess response to drug therapy.  HIV Screening: covered annually if you're between the age of 15-65. Also covered annually if you are younger than 15 and older than 65 with risk factors for HIV infection. For pregnant patients, it is covered up to 3 times per pregnancy.    Immunizations:  Immunization Recommendations   Influenza Vaccine Annual influenza vaccination during flu season is recommended for all persons aged >= 6 months who do not have contraindications   Pneumococcal Vaccine   * Pneumococcal conjugate vaccine = PCV13 (Prevnar 13), PCV15 (Vaxneuvance), PCV20 (Prevnar 20)  * Pneumococcal polysaccharide vaccine = PPSV23 (Pneumovax) Adults 19-65 yo with certain risk factors or if  65+ yo  If never received any pneumonia vaccine: recommend Prevnar 20 (PCV20)  Give PCV20 if previously received 1 dose of PCV13 or PPSV23   Hepatitis B Vaccine 3 dose series if at intermediate or high risk (ex: diabetes, end stage renal disease, liver disease)   Respiratory syncytial virus (RSV) Vaccine - COVERED BY MEDICARE PART D  * RSVPreF3 (Arexvy) CDC recommends that adults 60 years of age and older may receive a single dose of RSV vaccine using shared clinical decision-making (SCDM)   Tetanus (Td) Vaccine - COST NOT COVERED BY MEDICARE PART B Following completion of primary series, a booster dose should be given every 10 years to maintain immunity against tetanus. Td may also be given as tetanus wound prophylaxis.   Tdap Vaccine - COST NOT COVERED BY MEDICARE PART B Recommended at least once for all adults. For pregnant patients, recommended with each pregnancy.   Shingles Vaccine (Shingrix) - COST NOT COVERED BY MEDICARE PART B  2 shot series recommended in those 19 years and older who have or will have weakened immune systems or those 50 years and older     Health Maintenance Due:      Topic Date Due   • Colorectal Cancer Screening  04/22/2025   • HIV Screening  Completed   • Hepatitis C Screening  Discontinued     Immunizations Due:      Topic Date Due   • Hepatitis A Vaccine (1 of 2 - Risk 2-dose series) Never done   • Pneumococcal Vaccine: Pediatrics (0 to 5 Years) and At-Risk Patients (6 to 64 Years) (2 - PPSV23 or PCV20) 08/12/2015   • COVID-19 Vaccine (6 - 2023-24 season) 12/07/2023     Advance Directives   What are advance directives?  Advance directives are legal documents that state your wishes and plans for medical care. These plans are made ahead of time in case you lose your ability to make decisions for yourself. Advance directives can apply to any medical decision, such as the treatments you want, and if you want to donate organs.   What are the types of advance directives?  There are many  types of advance directives, and each state has rules about how to use them. You may choose a combination of any of the following:  Living will:  This is a written record of the treatment you want. You can also choose which treatments you do not want, which to limit, and which to stop at a certain time. This includes surgery, medicine, IV fluid, and tube feedings.   Durable power of  for healthcare (DPAHC):  This is a written record that states who you want to make healthcare choices for you when you are unable to make them for yourself. This person, called a proxy, is usually a family member or a friend. You may choose more than 1 proxy.  Do not resuscitate (DNR) order:  A DNR order is used in case your heart stops beating or you stop breathing. It is a request not to have certain forms of treatment, such as CPR. A DNR order may be included in other types of advance directives.  Medical directive:  This covers the care that you want if you are in a coma, near death, or unable to make decisions for yourself. You can list the treatments you want for each condition. Treatment may include pain medicine, surgery, blood transfusions, dialysis, IV or tube feedings, and a ventilator (breathing machine).  Values history:  This document has questions about your views, beliefs, and how you feel and think about life. This information can help others choose the care that you would choose.  Why are advance directives important?  An advance directive helps you control your care. Although spoken wishes may be used, it is better to have your wishes written down. Spoken wishes can be misunderstood, or not followed. Treatments may be given even if you do not want them. An advance directive may make it easier for your family to make difficult choices about your care.   Weight Management   Why it is important to manage your weight:  Being overweight increases your risk of health conditions such as heart disease, high blood  pressure, type 2 diabetes, and certain types of cancer. It can also increase your risk for osteoarthritis, sleep apnea, and other respiratory problems. Aim for a slow, steady weight loss. Even a small amount of weight loss can lower your risk of health problems.  How to lose weight safely:  A safe and healthy way to lose weight is to eat fewer calories and get regular exercise. You can lose up about 1 pound a week by decreasing the number of calories you eat by 500 calories each day.   Healthy meal plan for weight management:  A healthy meal plan includes a variety of foods, contains fewer calories, and helps you stay healthy. A healthy meal plan includes the following:  Eat whole-grain foods more often.  A healthy meal plan should contain fiber. Fiber is the part of grains, fruits, and vegetables that is not broken down by your body. Whole-grain foods are healthy and provide extra fiber in your diet. Some examples of whole-grain foods are whole-wheat breads and pastas, oatmeal, brown rice, and bulgur.  Eat a variety of vegetables every day.  Include dark, leafy greens such as spinach, kale, jt greens, and mustard greens. Eat yellow and orange vegetables such as carrots, sweet potatoes, and winter squash.   Eat a variety of fruits every day.  Choose fresh or canned fruit (canned in its own juice or light syrup) instead of juice. Fruit juice has very little or no fiber.  Eat low-fat dairy foods.  Drink fat-free (skim) milk or 1% milk. Eat fat-free yogurt and low-fat cottage cheese. Try low-fat cheeses such as mozzarella and other reduced-fat cheeses.  Choose meat and other protein foods that are low in fat.  Choose beans or other legumes such as split peas or lentils. Choose fish, skinless poultry (chicken or turkey), or lean cuts of red meat (beef or pork). Before you cook meat or poultry, cut off any visible fat.   Use less fat and oil.  Try baking foods instead of frying them. Add less fat, such as margarine,  sour cream, regular salad dressing and mayonnaise to foods. Eat fewer high-fat foods. Some examples of high-fat foods include french fries, doughnuts, ice cream, and cakes.  Eat fewer sweets.  Limit foods and drinks that are high in sugar. This includes candy, cookies, regular soda, and sweetened drinks.  Exercise:  Exercise at least 30 minutes per day on most days of the week. Some examples of exercise include walking, biking, dancing, and swimming. You can also fit in more physical activity by taking the stairs instead of the elevator or parking farther away from stores. Ask your healthcare provider about the best exercise plan for you.   Narcotic (Opioid) Safety    Use narcotics safely:  Take prescribed narcotics exactly as directed  Do not give narcotics to others or take narcotics that belong to someone else  Do not mix narcotics without medicines or alcohol  Do not drive or operate heavy machinery after you take the narcotic  Monitor for side effects and notify your healthcare provider if you experienced side effects such as nausea, sleepiness, itching, or trouble thinking clearly.    Manage constipation:    Constipation is the most common side effect of narcotic medicine. Constipation is when you have hard, dry bowel movements, or you go longer than usual between bowel movements. Tell your healthcare provider about all changes in your bowel movements while you are taking narcotics. He or she may recommend laxative medicine to help you have a bowel movement. He or she may also change the kind of narcotic you are taking, or change when you take it. The following are more ways you can prevent or relieve constipation:    Drink liquids as directed.  You may need to drink extra liquids to help soften and move your bowels. Ask how much liquid to drink each day and which liquids are best for you.  Eat high-fiber foods.  This may help decrease constipation by adding bulk to your bowel movements. High-fiber foods  include fruits, vegetables, whole-grain breads and cereals, and beans. Your healthcare provider or dietitian can help you create a high-fiber meal plan. Your provider may also recommend a fiber supplement if you cannot get enough fiber from food.  Exercise regularly.  Regular physical activity can help stimulate your intestines. Walking is a good exercise to prevent or relieve constipation. Ask which exercises are best for you.  Schedule a time each day to have a bowel movement.  This may help train your body to have regular bowel movements. Bend forward while you are on the toilet to help move the bowel movement out. Sit on the toilet for at least 10 minutes, even if you do not have a bowel movement.    Store narcotics safely:   Store narcotics where others cannot easily get them.  Keep them in a locked cabinet or secure area. Do not  keep them in a purse or other bag you carry with you. A person may be looking for something else and find the narcotics.  Make sure narcotics are stored out of the reach of children.  A child can easily overdose on narcotics. Narcotics may look like candy to a small child.    The best way to dispose of narcotics:      The laws vary by country and area. In the [place], the best way is to return the narcotics through a take-back program. This program is offered by the [place] Drug Enforcement Agency (REN). The following are options for using the program:  Take the narcotics to a REN collection site.  The site is often a law enforcement center. Call your local law enforcement center for scheduled take-back days in your area. You will be given information on where to go if the collection site is in a different location.  Take the narcotics to an approved pharmacy or hospital.  A pharmacy or hospital may be set up as a collection site. You will need to ask if it is a REN collection site if you were not directed there. A pharmacy or doctor's office may not be able to take back narcotics  unless it is a REN site.  Use a mail-back system.  This means you are given containers to put the narcotics into. You will then mail them in the containers.  Use a take-back drop box.  This is a place to leave the narcotics at any time. People and animals will not be able to get into the box. Your local law enforcement agency can tell you where to find a drop box in your area.    Other ways to manage pain:   Ask your healthcare provider about non-narcotic medicines to control pain.  Nonprescription medicines include NSAIDs (such as ibuprofen) and acetaminophen. Prescription medicines include muscle relaxers, antidepressants, and steroids.  Pain may be managed without any medicines.  Some ways to relieve pain include massage, aromatherapy, or meditation. Physical or occupational therapy may also help.    For more information:   Drug Enforcement Administration  [address] [place] [address]  Phone: 7- 969 - 655-5359  Web Address: https://www.deadiversion.[facility].gov/drug_disposal/    [place] Food and Drug Administration  [address] [place] [address]  Phone: 5- 636 - 979-9561  Web Address: http://www.fda.gov     © Copyright [employer] 2018 Information is for End User's use only and may not be sold, redistributed or otherwise used for commercial purposes. All illustrations and images included in CareNotes® are the copyrighted property of A.D.A[employer]M., Inc. or [employer]

## 2024-01-09 ENCOUNTER — TELEPHONE (OUTPATIENT)
Dept: GASTROENTEROLOGY | Facility: CLINIC | Age: 59
End: 2024-01-09

## 2024-01-09 NOTE — TELEPHONE ENCOUNTER
Called and LMOM informing pt that he can take a picture of the missing information and send it via [email or web address]. To call the office with any further questions.

## 2024-01-09 NOTE — TELEPHONE ENCOUNTER
Pt returning call about assistance paperwork. Pt will see if his wife can drop it off at the office as he is sick and cannot come out. He would like to speak with someone to find out other ways he can send the missing information. He can be reached at [phone]

## 2024-01-09 NOTE — TELEPHONE ENCOUNTER
Pt returned call. I relayed to pt can provide insurance cards in person or via [employer]. Pt verbalized understanding.     Pt also scheduled for f/u OV

## 2024-01-09 NOTE — TELEPHONE ENCOUNTER
Called and LMOM we received fax from [facility] patient assistant. Pt is missing a copy of medical/prescription card )front and back) and insurance information, to give us a call back.

## 2024-01-12 ENCOUNTER — PATIENT MESSAGE (OUTPATIENT)
Dept: GASTROENTEROLOGY | Facility: CLINIC | Age: 59
End: 2024-01-12

## 2024-01-12 NOTE — TELEPHONE ENCOUNTER
Pt calling in, he is locked out of his [facility] account to be able to upload his insurance information. His information is on his phone and does not have a way to print it to bring it into office.

## 2024-01-31 DIAGNOSIS — M54.42 ACUTE BILATERAL LOW BACK PAIN WITH LEFT-SIDED SCIATICA: ICD-10-CM

## 2024-01-31 RX ORDER — TRAMADOL HYDROCHLORIDE 50 MG/1
50 TABLET ORAL EVERY 6 HOURS PRN
Qty: 60 TABLET | Refills: 0 | Status: SHIPPED | OUTPATIENT
Start: 2024-01-31

## 2024-02-02 DIAGNOSIS — K74.60 HEPATIC CIRRHOSIS DUE TO CHRONIC HEPATITIS C INFECTION (HCC): ICD-10-CM

## 2024-02-02 DIAGNOSIS — B18.2 HEPATIC CIRRHOSIS DUE TO CHRONIC HEPATITIS C INFECTION (HCC): ICD-10-CM

## 2024-02-02 RX ORDER — AMILORIDE HYDROCHLORIDE 5 MG/1
5 TABLET ORAL DAILY
Qty: 90 TABLET | Refills: 1 | Status: SHIPPED | OUTPATIENT
Start: 2024-02-02

## 2024-02-14 ENCOUNTER — APPOINTMENT (OUTPATIENT)
Dept: LAB | Facility: CLINIC | Age: 59
End: 2024-02-14
Payer: COMMERCIAL

## 2024-02-14 DIAGNOSIS — D64.9 ANEMIA, UNSPECIFIED TYPE: ICD-10-CM

## 2024-02-14 DIAGNOSIS — Z00.00 MEDICARE ANNUAL WELLNESS VISIT, SUBSEQUENT: ICD-10-CM

## 2024-02-14 LAB
ALBUMIN SERPL BCP-MCNC: 3.4 G/DL (ref 3.5–5)
ALP SERPL-CCNC: 73 U/L (ref 34–104)
ALT SERPL W P-5'-P-CCNC: 21 U/L (ref 7–52)
ANION GAP SERPL CALCULATED.3IONS-SCNC: 10 MMOL/L
AST SERPL W P-5'-P-CCNC: 36 U/L (ref 13–39)
BASOPHILS # BLD AUTO: 0.04 THOUSANDS/ÂΜL (ref 0–0.1)
BASOPHILS NFR BLD AUTO: 1 % (ref 0–1)
BILIRUB SERPL-MCNC: 3.45 MG/DL (ref 0.2–1)
BUN SERPL-MCNC: 15 MG/DL (ref 5–25)
CALCIUM ALBUM COR SERPL-MCNC: 9.2 MG/DL (ref 8.3–10.1)
CALCIUM SERPL-MCNC: 8.7 MG/DL (ref 8.4–10.2)
CHLORIDE SERPL-SCNC: 109 MMOL/L (ref 96–108)
CHOLEST SERPL-MCNC: 155 MG/DL
CO2 SERPL-SCNC: 23 MMOL/L (ref 21–32)
CREAT SERPL-MCNC: 0.89 MG/DL (ref 0.6–1.3)
EOSINOPHIL # BLD AUTO: 0.32 THOUSAND/ÂΜL (ref 0–0.61)
EOSINOPHIL NFR BLD AUTO: 7 % (ref 0–6)
ERYTHROCYTE [DISTWIDTH] IN BLOOD BY AUTOMATED COUNT: 15.4 % (ref 11.6–15.1)
GFR SERPL CREATININE-BSD FRML MDRD: 93 ML/MIN/1.73SQ M
GLUCOSE P FAST SERPL-MCNC: 178 MG/DL (ref 65–99)
HCT VFR BLD AUTO: 36.5 % (ref 36.5–49.3)
HDLC SERPL-MCNC: 81 MG/DL
HGB BLD-MCNC: 12.2 G/DL (ref 12–17)
IMM GRANULOCYTES # BLD AUTO: 0.01 THOUSAND/UL (ref 0–0.2)
IMM GRANULOCYTES NFR BLD AUTO: 0 % (ref 0–2)
LDLC SERPL CALC-MCNC: 67 MG/DL (ref 0–100)
LYMPHOCYTES # BLD AUTO: 1.15 THOUSANDS/ÂΜL (ref 0.6–4.47)
LYMPHOCYTES NFR BLD AUTO: 26 % (ref 14–44)
MCH RBC QN AUTO: 35.2 PG (ref 26.8–34.3)
MCHC RBC AUTO-ENTMCNC: 33.4 G/DL (ref 31.4–37.4)
MCV RBC AUTO: 105 FL (ref 82–98)
MONOCYTES # BLD AUTO: 0.38 THOUSAND/ÂΜL (ref 0.17–1.22)
MONOCYTES NFR BLD AUTO: 9 % (ref 4–12)
NEUTROPHILS # BLD AUTO: 2.55 THOUSANDS/ÂΜL (ref 1.85–7.62)
NEUTS SEG NFR BLD AUTO: 57 % (ref 43–75)
NONHDLC SERPL-MCNC: 74 MG/DL
NRBC BLD AUTO-RTO: 0 /100 WBCS
PLATELET # BLD AUTO: 109 THOUSANDS/UL (ref 149–390)
PMV BLD AUTO: 11.7 FL (ref 8.9–12.7)
POTASSIUM SERPL-SCNC: 3.8 MMOL/L (ref 3.5–5.3)
PROT SERPL-MCNC: 5.6 G/DL (ref 6.4–8.4)
RBC # BLD AUTO: 3.47 MILLION/UL (ref 3.88–5.62)
SODIUM SERPL-SCNC: 142 MMOL/L (ref 135–147)
TRIGL SERPL-MCNC: 37 MG/DL
WBC # BLD AUTO: 4.45 THOUSAND/UL (ref 4.31–10.16)

## 2024-02-14 PROCEDURE — 36415 COLL VENOUS BLD VENIPUNCTURE: CPT

## 2024-02-14 PROCEDURE — 80061 LIPID PANEL: CPT

## 2024-02-14 PROCEDURE — 85025 COMPLETE CBC W/AUTO DIFF WBC: CPT

## 2024-02-14 PROCEDURE — 80053 COMPREHEN METABOLIC PANEL: CPT

## 2024-02-21 ENCOUNTER — APPOINTMENT (OUTPATIENT)
Dept: RADIOLOGY | Facility: CLINIC | Age: 59
End: 2024-02-21
Payer: COMMERCIAL

## 2024-02-21 ENCOUNTER — OFFICE VISIT (OUTPATIENT)
Dept: FAMILY MEDICINE CLINIC | Facility: CLINIC | Age: 59
End: 2024-02-21
Payer: COMMERCIAL

## 2024-02-21 ENCOUNTER — APPOINTMENT (OUTPATIENT)
Dept: LAB | Facility: CLINIC | Age: 59
End: 2024-02-21
Payer: COMMERCIAL

## 2024-02-21 VITALS
HEIGHT: 65 IN | WEIGHT: 240 LBS | DIASTOLIC BLOOD PRESSURE: 70 MMHG | SYSTOLIC BLOOD PRESSURE: 122 MMHG | OXYGEN SATURATION: 98 % | TEMPERATURE: 98 F | BODY MASS INDEX: 39.99 KG/M2 | HEART RATE: 66 BPM

## 2024-02-21 DIAGNOSIS — M54.50 LUMBAR BACK PAIN: ICD-10-CM

## 2024-02-21 DIAGNOSIS — R06.02 SHORTNESS OF BREATH: ICD-10-CM

## 2024-02-21 DIAGNOSIS — D75.839 THROMBOCYTHEMIA: ICD-10-CM

## 2024-02-21 DIAGNOSIS — D64.9 ANEMIA, UNSPECIFIED TYPE: ICD-10-CM

## 2024-02-21 DIAGNOSIS — R73.9 HYPERGLYCEMIA: ICD-10-CM

## 2024-02-21 DIAGNOSIS — M54.2 CERVICAL PAIN (NECK): ICD-10-CM
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
## 2024-02-21 PROBLEM — S01.01XA LACERATION OF SCALP WITHOUT FOREIGN BODY: Status: RESOLVED | Noted: 2019-06-14 | Resolved: 2024-02-21

## 2024-02-21 PROBLEM — Z13.220 NEED FOR LIPID SCREENING: Status: RESOLVED | Noted: 2019-05-01 | Resolved: 2024-02-21

## 2024-02-21 PROBLEM — Z13.1 DIABETES MELLITUS SCREENING: Status: RESOLVED | Noted: 2018-08-07 | Resolved: 2024-02-21

## 2024-02-21 PROCEDURE — 83036 HEMOGLOBIN GLYCOSYLATED A1C: CPT

## 2024-02-21 PROCEDURE — 71046 X-RAY EXAM CHEST 2 VIEWS: CPT

## 2024-02-21 PROCEDURE — 72100 X-RAY EXAM L-S SPINE 2/3 VWS: CPT

## 2024-02-21 PROCEDURE — 72040 X-RAY EXAM NECK SPINE 2-3 VW: CPT

## 2024-02-21 PROCEDURE — 36415 COLL VENOUS BLD VENIPUNCTURE: CPT

## 2024-02-21 PROCEDURE — 99214 OFFICE O/P EST MOD 30 MIN: CPT
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
## 2024-02-22 ENCOUNTER — TELEPHONE (OUTPATIENT)
Dept: HEMATOLOGY ONCOLOGY | Facility: CLINIC | Age: 59
End: 2024-02-22

## 2024-02-22 ENCOUNTER — OFFICE VISIT (OUTPATIENT)
Dept: GASTROENTEROLOGY | Facility: CLINIC | Age: 59
End: 2024-02-22
Payer: COMMERCIAL

## 2024-02-22 ENCOUNTER — PREP FOR PROCEDURE (OUTPATIENT)
Dept: GASTROENTEROLOGY | Facility: CLINIC | Age: 59
End: 2024-02-22

## 2024-02-22 VITALS
DIASTOLIC BLOOD PRESSURE: 86 MMHG | RESPIRATION RATE: 18 BRPM | WEIGHT: 239 LBS | SYSTOLIC BLOOD PRESSURE: 140 MMHG | TEMPERATURE: 99.7 F | OXYGEN SATURATION: 98 % | BODY MASS INDEX: 39.82 KG/M2 | HEART RATE: 75 BPM | HEIGHT: 65 IN

## 2024-02-22 DIAGNOSIS — I85.10 SECONDARY ESOPHAGEAL VARICES WITHOUT BLEEDING (HCC): Primary | ICD-10-CM

## 2024-02-22 DIAGNOSIS — I85.10 SECONDARY ESOPHAGEAL VARICES WITHOUT BLEEDING (HCC): ICD-10-CM

## 2024-02-22 DIAGNOSIS — K74.60 HEPATIC CIRRHOSIS DUE TO CHRONIC HEPATITIS C INFECTION (HCC): Primary | ICD-10-CM

## 2024-02-22 DIAGNOSIS — B18.2 HEPATIC CIRRHOSIS DUE TO CHRONIC HEPATITIS C INFECTION (HCC): Primary | ICD-10-CM

## 2024-02-22 LAB
EST. AVERAGE GLUCOSE BLD GHB EST-MCNC: 82 MG/DL
HBA1C MFR BLD: 4.5 %

## 2024-02-22 PROCEDURE — 99214 OFFICE O/P EST MOD 30 MIN: CPT | Performed by: PHYSICIAN ASSISTANT
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
## 2024-02-22 NOTE — PATIENT INSTRUCTIONS
Scheduled date of EGD(as of today):5/23/24  Physician performing EGD:Mando  Location of EGD:[place]  Instructions reviewed with patient by:Lorie DAVALOS  Clearances:  none

## 2024-02-22 NOTE — TELEPHONE ENCOUNTER
I called Matt in response to a referral that was received for patient to establish care with Hematology.     Outreach was made to schedule a consultation.    I left a voicemail explaining the reason for my call and advised patient to call hospitals at [phone].  Another attempt will be made to contact patient.

## 2024-02-23 ENCOUNTER — TELEPHONE (OUTPATIENT)
Dept: HEMATOLOGY ONCOLOGY | Facility: CLINIC | Age: 59
End: 2024-02-23

## 2024-02-23 NOTE — TELEPHONE ENCOUNTER
I called Matt in response to a referral that was received for patient to establish care with Hematology.     Outreach was made to schedule a consultation.    I left a voicemail explaining the reason for my call and advised patient to call [facility] at [phone].  Another attempt will be made to contact patient.

## 2024-02-24 DIAGNOSIS — E66.01 MORBID (SEVERE) OBESITY DUE TO EXCESS CALORIES (HCC): ICD-10-CM

## 2024-02-26 ENCOUNTER — TELEPHONE (OUTPATIENT)
Dept: HEMATOLOGY ONCOLOGY | Facility: CLINIC | Age: 59
End: 2024-02-26

## 2024-02-26 RX ORDER — OMEPRAZOLE 20 MG/1
20 CAPSULE, DELAYED RELEASE ORAL DAILY
Qty: 90 CAPSULE | Refills: 0 | Status: SHIPPED | OUTPATIENT
Start: 2024-02-26

## 2024-02-26 NOTE — TELEPHONE ENCOUNTER
Matt calling in response to a referral that was received for patient to establish care with Hematology.     Outreach was made to schedule a consultation.    A consultation was scheduled for patient during this call. Patient is scheduled on 4/16/24 at 3:30pm with Kalpana Samaniego at the [facility]

## 2024-02-28 ENCOUNTER — TELEPHONE (OUTPATIENT)
Age: 59
End: 2024-02-28

## 2024-02-28 DIAGNOSIS — K76.82 HEPATIC ENCEPHALOPATHY (HCC): Primary | ICD-10-CM

## 2024-02-28 NOTE — TELEPHONE ENCOUNTER
[employer] called in stating that on 01/17/2023 the patient insurance approved a prior auth for this medication with no end date. The pharmacy should be able to run this script through the plan without any issues.      Call back number: [phone]  Reference number: [id number]

## 2024-02-28 NOTE — TELEPHONE ENCOUNTER
PA for xifaxan Approved   Date(s) approved open ended  Case #[id number]      Patient advised by [x] MyChart Message                      [x] Phone call LMOM      Pharmacy advised by []Fax                                     []Phone call    Approval letter NOT scanned into Media No letter rcvd

## 2024-02-28 NOTE — TELEPHONE ENCOUNTER
PA for xifaxan    Submitted via    []CMM-KEY   []Surescripts-Case ID #   []Faxed to plan   [x]Other website Prompt TRAN [place] ID [id number]   []Phone call Case ID #     Office notes sent, clinical questions answered. Awaiting determination    Turnaround time for your insurance to make a decision on your Prior Authorization can take 7-21 business days.

## 2024-02-29 ENCOUNTER — OFFICE VISIT (OUTPATIENT)
Dept: FAMILY MEDICINE CLINIC | Facility: CLINIC | Age: 59
End: 2024-02-29
Payer: COMMERCIAL

## 2024-02-29 ENCOUNTER — TELEPHONE (OUTPATIENT)
Dept: GASTROENTEROLOGY | Facility: CLINIC | Age: 59
End: 2024-02-29

## 2024-02-29 VITALS
BODY MASS INDEX: 38.65 KG/M2 | HEART RATE: 60 BPM | WEIGHT: 232 LBS | TEMPERATURE: 98.2 F | OXYGEN SATURATION: 96 % | DIASTOLIC BLOOD PRESSURE: 74 MMHG | HEIGHT: 65 IN | SYSTOLIC BLOOD PRESSURE: 106 MMHG

## 2024-02-29 DIAGNOSIS — M51.36 NARROWING OF LUMBAR INTERVERTEBRAL DISC SPACE: Primary | ICD-10-CM

## 2024-02-29 DIAGNOSIS — M54.2 CERVICAL PAIN (NECK): ICD-10-CM

## 2024-02-29 DIAGNOSIS — M54.50 LUMBAR PAIN: ICD-10-CM

## 2024-02-29 PROCEDURE — 99213 OFFICE O/P EST LOW 20 MIN: CPT

## 2024-02-29 RX ORDER — METHYLPREDNISOLONE 4 MG/1
TABLET ORAL
Qty: 21 EACH | Refills: 0 | Status: SHIPPED | OUTPATIENT
Start: 2024-02-29

## 2024-02-29 NOTE — TELEPHONE ENCOUNTER
----- Message from Amelia Lopez sent at 2/29/2024  7:31 AM EST -----  Please see below message, we don't handle med auths    Thanks    ----- Message -----  From: Lluvia Ramirez PA-C  Sent: 2/28/2024   4:32 PM EST  To: [facility] Prior Authorizations    This patient reports he has been having a hard time getting rifaximin for hepatic encephalopathy. I represcribed it with the right diagnosis code, can we see if it still needs a prior auth or what the issue is? Thanks!

## 2024-02-29 NOTE — TELEPHONE ENCOUNTER
Please see notes from yesterday  Jessica GOODSON    2/28/24  5:32 PM  Note     [employer] called in stating that on 01/17/2023 the patient insurance approved a prior auth for this medication with no end date. The pharmacy should be able to run this script through the plan without any issues.        Call back number: [phone]  Reference number: [id number]
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
## 2024-03-04 ENCOUNTER — NURSE TRIAGE (OUTPATIENT)
Age: 59
End: 2024-03-04

## 2024-03-04 ENCOUNTER — TELEPHONE (OUTPATIENT)
Dept: NEUROLOGY | Facility: CLINIC | Age: 59
End: 2024-03-04

## 2024-03-04 RX ORDER — METHYLPREDNISOLONE 4 MG/1
TABLET ORAL
Qty: 21 EACH | Refills: 0 | Status: SHIPPED | OUTPATIENT
Start: 2024-03-04

## 2024-03-04 NOTE — TELEPHONE ENCOUNTER
"Pt call was meant for his PCP.   Reason for Disposition   Information only question and nurse able to answer    Answer Assessment - Initial Assessment Questions  1. REASON FOR CALL or QUESTION: \"What is your reason for calling today?\" or \"How can I best help you?\" or \"What question do you have that I can help answer?\"      Reached wrong provider    Protocols used: Information Only Call - No Triage-ADULT-[place]    "
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
## 2024-03-05 ENCOUNTER — PATIENT MESSAGE (OUTPATIENT)
Dept: RADIOLOGY | Facility: CLINIC | Age: 59
End: 2024-03-05

## 2024-03-05 ENCOUNTER — OFFICE VISIT (OUTPATIENT)
Dept: PAIN MEDICINE | Facility: CLINIC | Age: 59
End: 2024-03-05
Payer: COMMERCIAL

## 2024-03-05 ENCOUNTER — TELEPHONE (OUTPATIENT)
Dept: FAMILY MEDICINE CLINIC | Facility: CLINIC | Age: 59
End: 2024-03-05

## 2024-03-05 VITALS
BODY MASS INDEX: 38.65 KG/M2 | HEART RATE: 69 BPM | DIASTOLIC BLOOD PRESSURE: 81 MMHG | SYSTOLIC BLOOD PRESSURE: 129 MMHG | HEIGHT: 65 IN | WEIGHT: 232 LBS

## 2024-03-05 DIAGNOSIS — G89.29 ACUTE EXACERBATION OF CHRONIC LOW BACK PAIN: Primary | ICD-10-CM

## 2024-03-05 DIAGNOSIS — M62.830 LUMBAR PARASPINAL MUSCLE SPASM: ICD-10-CM

## 2024-03-05 DIAGNOSIS — M47.816 LUMBAR FACET ARTHROPATHY: ICD-10-CM

## 2024-03-05 DIAGNOSIS — M54.50 ACUTE EXACERBATION OF CHRONIC LOW BACK PAIN: Primary | ICD-10-CM

## 2024-03-05 PROCEDURE — 99214 OFFICE O/P EST MOD 30 MIN: CPT | Performed by: STUDENT IN AN ORGANIZED HEALTH CARE EDUCATION/TRAINING PROGRAM

## 2024-03-05 RX ORDER — LIDOCAINE 50 MG/G
1 PATCH TOPICAL DAILY
Qty: 10 PATCH | Refills: 0 | Status: SHIPPED | OUTPATIENT
Start: 2024-03-05

## 2024-03-05 NOTE — TELEPHONE ENCOUNTER
Pt called and stated he picked up methylprednisolone, but there is no instruction how to take it. Please advise.  Pt said it's ok to leave detail message with instruction.

## 2024-03-05 NOTE — TELEPHONE ENCOUNTER
This is actually not a medication that should have been refilled. I prescribed it for him acutely last week and it looks like it was refilled again through auto refill yesterday. Do not recommend he takes another course of it.

## 2024-03-05 NOTE — PATIENT COMMUNICATION
Have you completed PT/HEP/Chiro in the past 6 months for dedicated area? NO  If yes, how long did you complete?  What was the frequency?  Did it provide relief?  If no, reason therapy was not completed?

## 2024-03-06 ENCOUNTER — TELEPHONE (OUTPATIENT)
Dept: GASTROENTEROLOGY | Facility: CLINIC | Age: 59
End: 2024-03-06

## 2024-03-06 ENCOUNTER — HOSPITAL ENCOUNTER (OUTPATIENT)
Dept: ULTRASOUND IMAGING | Facility: HOSPITAL | Age: 59
Discharge: HOME | End: 2024-03-06
Payer: COMMERCIAL

## 2024-03-06 ENCOUNTER — TELEPHONE (OUTPATIENT)
Age: 59
End: 2024-03-06

## 2024-03-06 DIAGNOSIS — K76.82 HEPATIC ENCEPHALOPATHY (HCC): ICD-10-CM

## 2024-03-06 DIAGNOSIS — B18.2 HEPATIC CIRRHOSIS DUE TO CHRONIC HEPATITIS C INFECTION (HCC): ICD-10-CM

## 2024-03-06 DIAGNOSIS — K74.60 HEPATIC CIRRHOSIS DUE TO CHRONIC HEPATITIS C INFECTION (HCC): ICD-10-CM

## 2024-03-06 PROCEDURE — 76705 ECHO EXAM OF ABDOMEN: CPT

## 2024-03-06 NOTE — TELEPHONE ENCOUNTER
Patients GI provider:  TRAN Ramirez    Number to return call: [phone]    Reason for call: Pt calling stating he received a call regarding his xifaxin. No notes in chart.     Scheduled procedure/appointment date if applicable: N/A

## 2024-03-06 NOTE — TELEPHONE ENCOUNTER
Patients GI provider:  TRAN Ramirez     Number to return call: [phone]     Reason for call: Pt calling x2 stating he received a call regarding his xifaxin. No notes in chart.     Patient states he is need of script to Xifaxan to be send to pharmacy. I did make him aware RX was sent to [facility], but pharmacy is stating they do not have it.  Please contact pt with update.Patient is currently out of medication  Scheduled procedure/appointment date if applicable: N/A

## 2024-03-06 NOTE — TELEPHONE ENCOUNTER
Called and spoke to patient. Let patient know that the script for XIFAXAN was sent to [facility] mail order pharmacy. Patient voiced understanding and had no further questions or conccerns

## 2024-03-06 NOTE — TELEPHONE ENCOUNTER
PA for Xifaxan (However there is a message from [employer] that Xifaxan is covered with no end date... no fax was recvd)     Submitted via    []CMM-KEY   []Surescripts-Case ID #   []Faxed to plan   [x]Other website Prompt TRAN [place] ID [id number]   []Phone call Case ID #     Office notes sent, clinical questions answered. Awaiting determination    Turnaround time for your insurance to make a decision on your Prior Authorization can take 7-21 business days.

## 2024-03-06 NOTE — TELEPHONE ENCOUNTER
Called and spoke to patient. Patient stated got message he was approved for Xifaxan .. It was also approved through the [facility] patient assistance. Patient needs script sent to [facility] mail order pharmacy. Please advise. Thank you !

## 2024-03-07 DIAGNOSIS — K74.60 HEPATIC CIRRHOSIS DUE TO CHRONIC HEPATITIS C INFECTION (HCC): ICD-10-CM

## 2024-03-07 DIAGNOSIS — B18.2 HEPATIC CIRRHOSIS DUE TO CHRONIC HEPATITIS C INFECTION (HCC): ICD-10-CM

## 2024-03-07 RX ORDER — FUROSEMIDE 20 MG/1
20 TABLET ORAL 2 TIMES DAILY
Qty: 180 TABLET | Refills: 0 | Status: SHIPPED | OUTPATIENT
Start: 2024-03-07

## 2024-03-12 DIAGNOSIS — M79.18 MYOFASCIAL MUSCLE PAIN: ICD-10-CM

## 2024-03-12 DIAGNOSIS — M54.50 MIDLINE LOW BACK PAIN WITHOUT SCIATICA, UNSPECIFIED CHRONICITY: ICD-10-CM

## 2024-03-12 RX ORDER — IBUPROFEN 800 MG/1
TABLET ORAL
Qty: 30 TABLET | Refills: 0 | Status: SHIPPED | OUTPATIENT
Start: 2024-03-12

## 2024-03-14 ENCOUNTER — OFFICE VISIT (OUTPATIENT)
Dept: FAMILY MEDICINE CLINIC | Facility: CLINIC | Age: 59
End: 2024-03-14
Payer: COMMERCIAL

## 2024-03-14 ENCOUNTER — TELEPHONE (OUTPATIENT)
Dept: GASTROENTEROLOGY | Facility: CLINIC | Age: 59
End: 2024-03-14

## 2024-03-14 ENCOUNTER — TELEPHONE (OUTPATIENT)
Dept: FAMILY MEDICINE CLINIC | Facility: CLINIC | Age: 59
End: 2024-03-14

## 2024-03-14 VITALS
SYSTOLIC BLOOD PRESSURE: 136 MMHG | HEART RATE: 70 BPM | OXYGEN SATURATION: 97 % | BODY MASS INDEX: 38.49 KG/M2 | DIASTOLIC BLOOD PRESSURE: 84 MMHG | HEIGHT: 65 IN | WEIGHT: 231 LBS | TEMPERATURE: 98.1 F

## 2024-03-14 DIAGNOSIS — M54.50 LUMBAR PAIN: ICD-10-CM
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
## 2024-03-14 DIAGNOSIS — M51.36 NARROWING OF LUMBAR INTERVERTEBRAL DISC SPACE: ICD-10-CM

## 2024-03-14 DIAGNOSIS — M54.50 MIDLINE LOW BACK PAIN WITHOUT SCIATICA, UNSPECIFIED CHRONICITY: ICD-10-CM

## 2024-03-14 PROCEDURE — 99213 OFFICE O/P EST LOW 20 MIN: CPT

## 2024-03-14 NOTE — TELEPHONE ENCOUNTER
----- Message from Lluvia Ramirez PA-C sent at 3/14/2024  8:58 AM EDT -----  Please let him know that his ultrasound doesn't show any masses in his liver which is what we were screening for because of his cirrhosis so that is good news. He does have gallstones in his gallbladder which is not causing a problem currently but just so he is aware.
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
## 2024-03-14 NOTE — TELEPHONE ENCOUNTER
[facility] is requesting a new prescription for Nadolol 20 mg. It looks like it was discontinued.

## 2024-03-18 NOTE — TELEPHONE ENCOUNTER
Pt calling in, reports he received US results and is asking how to remove gallstones, he does not want to have his gallbladder removed. He is worried the gallstones will cause harm in the future. No currenty RUQ pain.

## 2024-03-20 ENCOUNTER — EVALUATION (OUTPATIENT)
Dept: PHYSICAL THERAPY | Age: 59
End: 2024-03-20
Payer: COMMERCIAL

## 2024-03-20 DIAGNOSIS — M62.830 LUMBAR PARASPINAL MUSCLE SPASM: Primary | ICD-10-CM

## 2024-03-20 DIAGNOSIS — M47.816 LUMBAR FACET ARTHROPATHY: ICD-10-CM

## 2024-03-20 PROCEDURE — 97161 PT EVAL LOW COMPLEX 20 MIN: CPT

## 2024-03-20 PROCEDURE — 97530 THERAPEUTIC ACTIVITIES: CPT
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
## 2024-03-22 ENCOUNTER — OFFICE VISIT (OUTPATIENT)
Dept: PHYSICAL THERAPY | Age: 59
End: 2024-03-22
Payer: COMMERCIAL

## 2024-03-22 DIAGNOSIS — M62.830 LUMBAR PARASPINAL MUSCLE SPASM: Primary | ICD-10-CM

## 2024-03-22 DIAGNOSIS — M47.816 LUMBAR FACET ARTHROPATHY: ICD-10-CM

## 2024-03-22 PROCEDURE — 97113 AQUATIC THERAPY/EXERCISES: CPT

## 2024-03-22 NOTE — PROGRESS NOTES
Daily Note     Today's date: 3/22/2024  Patient name: Matt Tobias Sr.  : 1965  MRN: [id number]  Referring provider: Hugh Horn MD  Dx:   Encounter Diagnosis     ICD-10-CM    1. Lumbar paraspinal muscle spasm  M62.830       2. Lumbar facet arthropathy  M47.816                      Subjective: Patient reports he is 5/10 Lbp today.       Objective: See treatment diary below      Assessment: Tolerated treatment well with light ex program today, slow stiff guarded movements in the water. Patient required verbal cueing throughout prescribed exercises for proper execution and dosage. Patient demonstrated fatigue post treatment and would benefit from continued PT      Plan: Continue per plan of care.      POC expires Unit limit Auth Expiration date PT/OT + Visit Limit?   2024  done 12                           Visit/Unit Tracking  AUTH Status:  Date 3/20 3/22             Auth needed - done Used 1 2              Remaining  11 10              FOTO                    Precautions:   Precautions:  Daily Treatment Diary     Date 3/22          Patient education           Water Walk (FW, BW, side) x10’            LE work at wall               Hip FF/ext swing  2            Toe/heel  1            Hip ABD/ADD  2                        Knee flexion & extension 1            Squats 1            UE noodle x3             Push/pull  1            Rotate  1            Row forward & back  2            OH side bend            UE/Core (AROM, paddles, MB)              ABD/ADD              Horizontal Pec Fly              Alt. arm swing (shld flex/ext)              Push pull              Single paddle rotation           SLS (EO, EC, ball toss)            Aqua Step (FW, lat, eccentric)            Core work:              MF press (red, blue, blk)             Kickboard press (blue, red)                Row/ext w/ tubing (red, blue, blk)           Seated on bench             ankle DF/PF              March               ABD/ADD              Knee flexion/extension            DW TX - hang in the deep water  5' 10'            DW ABD/ADD              DW Bicycle  5            DW Scissor hip flexion/extension              DW DKTC            Stretches              HS at step  nv            Wall stretches              Calf stretch at wall             Hot Tub - 10 minutes only 10

## 2024-03-25 ENCOUNTER — APPOINTMENT (OUTPATIENT)
Dept: PHYSICAL THERAPY | Age: 59
End: 2024-03-25
Payer: COMMERCIAL

## 2024-03-27 ENCOUNTER — PATIENT MESSAGE (OUTPATIENT)
Dept: RADIOLOGY | Facility: CLINIC | Age: 59
End: 2024-03-27

## 2024-03-27 ENCOUNTER — HOSPITAL ENCOUNTER (OUTPATIENT)
Dept: RADIOLOGY | Facility: CLINIC | Age: 59
Discharge: HOME | End: 2024-03-27
Payer: COMMERCIAL

## 2024-03-27 VITALS
OXYGEN SATURATION: 97 % | DIASTOLIC BLOOD PRESSURE: 71 MMHG | HEART RATE: 57 BPM | SYSTOLIC BLOOD PRESSURE: 129 MMHG | TEMPERATURE: 98.3 F | RESPIRATION RATE: 20 BRPM

## 2024-03-27 DIAGNOSIS — M47.816 LUMBAR FACET ARTHROPATHY: ICD-10-CM

## 2024-03-27 PROCEDURE — 64494 INJ PARAVERT F JNT L/S 2 LEV: CPT | Performed by: STUDENT IN AN ORGANIZED HEALTH CARE EDUCATION/TRAINING PROGRAM

## 2024-03-27 PROCEDURE — 64493 INJ PARAVERT F JNT L/S 1 LEV: CPT | Performed by: STUDENT IN AN ORGANIZED HEALTH CARE EDUCATION/TRAINING PROGRAM

## 2024-03-27 RX ORDER — BUPIVACAINE HCL/PF 2.5 MG/ML
3 VIAL (ML) INJECTION ONCE
Status: COMPLETED | OUTPATIENT
Start: 2024-03-27 | End: 2024-03-27

## 2024-03-27 RX ADMIN — Medication 3 ML: at 09:37

## 2024-03-27 NOTE — INTERVAL H&P NOTE
Update: (This section must be completed if the H&P was completed greater than 24 hrs to procedure or admission)    H&P reviewed. After examining the patient, I find no changed to the H&P since it had been written.    Patient re-evaluated. Accept as history and physical.    Hugh Horn MD/March 27, 2024/9:27 AM

## 2024-03-27 NOTE — DISCHARGE INSTR - LAB

## 2024-03-28 ENCOUNTER — APPOINTMENT (OUTPATIENT)
Dept: PHYSICAL THERAPY | Age: 59
End: 2024-03-28
Payer: COMMERCIAL

## 2024-03-29 ENCOUNTER — OFFICE VISIT (OUTPATIENT)
Dept: PHYSICAL THERAPY | Age: 59
End: 2024-03-29
Payer: COMMERCIAL

## 2024-03-29 DIAGNOSIS — M62.830 LUMBAR PARASPINAL MUSCLE SPASM: Primary | ICD-10-CM

## 2024-03-29 DIAGNOSIS — M47.816 LUMBAR FACET ARTHROPATHY: ICD-10-CM

## 2024-03-29 PROCEDURE — 97113 AQUATIC THERAPY/EXERCISES: CPT

## 2024-03-29 NOTE — PROGRESS NOTES
Daily Note     Today's date: 3/29/2024  Patient name: Matt Tobias Sr.  : 1965  MRN: [id number]  Referring provider: Hugh Horn MD  Dx:   Encounter Diagnosis     ICD-10-CM    1. Lumbar paraspinal muscle spasm  M62.830       2. Lumbar facet arthropathy  M47.816                      Subjective: Patient reports he had injections this week and feeling a little better, some tightness/pressure LB.       Objective: See treatment diary below      Assessment: Tolerated treatment well with program outlined below, better movements in the water today, less stiff/guarded movements in the water, added theraband ex focusing on core activation and posture. Patient required verbal cueing throughout prescribed exercises for proper execution and dosage. Patient demonstrated fatigue post treatment and would benefit from continued PT      Plan: Continue per plan of care.      POC expires Unit limit Auth Expiration date PT/OT + Visit Limit?   2024  done 12                           Visit/Unit Tracking  AUTH Status:  Date 3/20 3/22 3/29            Auth needed - done Used 1 2 3             Remaining  11 10 9             FOTO                      Daily Treatment Diary     Date 3/22 3/29         Patient education           Water Walk (FW, BW, side) x10’  6/4 10' (7'/3')         LE work at wall               Hip FF/ext swing  2 2           Toe/heel  1 1           Hip ABD/ADD  2 2            1           Knee flexion & extension 1 1           Squats 1 1           UE noodle x3             Push/pull  1 1           Rotate  1 1           Row forward & back  2 2           OH side bend            UE/Core (AROM, paddles, MB)              ABD/ADD              Horizontal Pec Fly              Alt. arm swing (shld flex/ext)              Push pull              Single paddle rotation           SLS (EO, EC, ball toss)            Aqua Step (FW, lat, eccentric)            Core work:              MF press (red, blue, blk)              Kickboard press (blue, red)                Row/ext w/ tubing (red, blue, blk)  2' yellow         Seated on bench             ankle DF/PF              March              ABD/ADD              Knee flexion/extension            DW TX - hang in the deep water  5' 10' 10'           DW ABD/ADD              DW Bicycle  5 5           DW Scissor hip flexion/extension              DW DKTC   1'         Stretches              HS at step  nv 2x           Wall stretches              Calf stretch at wall             Hot Tub - 10 minutes only 10 10'

## 2024-04-01 ENCOUNTER — OFFICE VISIT (OUTPATIENT)
Dept: PHYSICAL THERAPY | Age: 59
End: 2024-04-01
Payer: COMMERCIAL

## 2024-04-01 DIAGNOSIS — M62.830 LUMBAR PARASPINAL MUSCLE SPASM: Primary | ICD-10-CM

## 2024-04-01 DIAGNOSIS — M47.816 LUMBAR FACET ARTHROPATHY: ICD-10-CM

## 2024-04-01 PROCEDURE — 97113 AQUATIC THERAPY/EXERCISES: CPT

## 2024-04-01 NOTE — PROGRESS NOTES
Daily Note     Today's date: 2024  Patient name: Matt Tobias Sr.  : 1965  MRN: [id number]  Referring provider: Hugh Horn MD  Dx:   Encounter Diagnosis     ICD-10-CM    1. Lumbar paraspinal muscle spasm  M62.830       2. Lumbar facet arthropathy  M47.816           Start Time: 1505  Stop Time: 1545  Total time in clinic (min): 40 minutes    Subjective: pt notes pain continues to LB. Little relief post session today. He c/o stiffness in LB.       Objective: See treatment diary below      Assessment: Tolerated treatment fair. Patient would benefit from continued PT. Modified session due to time restraints. Slow and stead w/ ex's.       Plan: Continue per plan of care.      POC expires Unit limit Auth Expiration date PT/OT + Visit Limit?   2024  done 12                           Visit/Unit Tracking  AUTH Status:  Date 3/20 3/22 3/29 4/1           Auth needed - done Used 1 2 3 4            Remaining  11 10 9 8            FOTO                      Daily Treatment Diary     Date 3/22 3/29 4/1        Patient education           Water Walk (FW, BW, side) x10’   10' (7'/3') 10        LE work at wall               Hip FF/ext swing  2 2 2          Toe/heel  1 1 1          Hip ABD/ADD  2 2 2           1 1          Knee flexion & extension 1 1 1          Squats 1 1 1          UE noodle x3             Push/pull  1 1 1          Rotate  1 1 1          Row forward & back  2 2 2          OH side bend            UE/Core (AROM, paddles, MB)              ABD/ADD              Horizontal Pec Fly              Alt. arm swing (shld flex/ext)              Push pull              Single paddle rotation           SLS (EO, EC, ball toss)            Aqua Step (FW, lat, eccentric)            Core work:              MF press (red, blue, blk)             Kickboard press (blue, red)                Row/ext w/ tubing (red, blue, blk)  2' yellow 2        Seated on bench             ankle DF/PF              March               ABD/ADD              Knee flexion/extension            DW TX - hang in the deep water  5' 10' 10' 6          DW ABD/ADD              DW Bicycle  5 5           DW Scissor hip flexion/extension              DW DKTC   1'         Stretches              HS at step  nv 2x 1          Wall stretches              Calf stretch at wall             Hot Tub - 10 minutes only 10 10' 6

## 2024-04-02 ENCOUNTER — TELEPHONE (OUTPATIENT)
Dept: PAIN MEDICINE | Facility: CLINIC | Age: 59
End: 2024-04-02

## 2024-04-02 ENCOUNTER — TELEPHONE (OUTPATIENT)
Dept: RADIOLOGY | Facility: CLINIC | Age: 59
End: 2024-04-02

## 2024-04-02 DIAGNOSIS — M47.816 LUMBAR FACET ARTHROPATHY: Primary | ICD-10-CM

## 2024-04-02 NOTE — TELEPHONE ENCOUNTER
Returned call to pt after being disconnected-- left detailed message to confirm appt since the call was disconnected before we were able to do so

## 2024-04-02 NOTE — TELEPHONE ENCOUNTER
Caller: rebel Gutierrez    Doctor: Kory    Reason for call: pt call was disconnected while speaking to . Pt returning call    Call back#: [phone]

## 2024-04-02 NOTE — TELEPHONE ENCOUNTER
Pt scheduled for MBB#2 with Dr Horn on 4/18/24.    No medication holds needed for MBB.    Instructions reviewed over phone and sent via myc message.      Have you completed PT/HEP/Chiro in the past 6 months for dedicated area? Yes, pt currently doing aqua therapy with [facility]  If yes, how long did you complete?  What was the frequency?  Did it provide relief? Per last PT note, pt has gotten little relief  If no, reason therapy was not completed?

## 2024-04-02 NOTE — TELEPHONE ENCOUNTER
Caller: Matt    Doctor: Kory    Reason for call: patients call got disconnected while scheduling procedure    Please advise     Call back#: [phone]

## 2024-04-03 ENCOUNTER — OFFICE VISIT (OUTPATIENT)
Dept: PHYSICAL THERAPY | Age: 59
End: 2024-04-03
Payer: COMMERCIAL

## 2024-04-03 DIAGNOSIS — M62.830 LUMBAR PARASPINAL MUSCLE SPASM: Primary | ICD-10-CM

## 2024-04-03 DIAGNOSIS — M47.816 LUMBAR FACET ARTHROPATHY: ICD-10-CM

## 2024-04-03 PROCEDURE — 97113 AQUATIC THERAPY/EXERCISES: CPT

## 2024-04-03 PROCEDURE — 97150 GROUP THERAPEUTIC PROCEDURES: CPT

## 2024-04-03 NOTE — PROGRESS NOTES
Daily Note     Today's date: 4/3/2024  Patient name: Matt Tobias Sr.  : 1965  MRN: [id number]  Referring provider: Hugh Horn MD  Dx:   Encounter Diagnosis     ICD-10-CM    1. Lumbar paraspinal muscle spasm  M62.830       2. Lumbar facet arthropathy  M47.816           Start Time: 1300  Stop Time: 1400  Total time in clinic (min): 60 minutes    Subjective: pt notes pain is min today 3/10.       Objective: See treatment diary below  Pt seen 1:1 for 30 minutes and group therapy for remainder    Assessment: Tolerated treatment well.  Added MF press outs today, pt fatigued w/ new ex. Patient would benefit from continued PT      Plan: Continue per plan of care.        POC expires Unit limit Auth Expiration date PT/OT + Visit Limit?   2024  done 12                           Visit/Unit Tracking  AUTH Status:  Date 3/20 3/22 3/29 4/1 4/3          Auth needed - done Used 1 2 3 4 5           Remaining  11 10 9 8 7           FOTO                      Daily Treatment Diary     Date 3/22 3/29 4/1 4/3       Patient education           Water Walk (FW, BW, side) x10’   10' (7'/3') 10 10       LE work at wall               Hip FF/ext swing  2 2 2 2         Toe/heel  1 1 1 1         Hip ABD/ADD  2 2 2 2         March 1 1 1 1         Knee flexion & extension 1 1 1 1         Squats 1 1 1 1         UE noodle x3             Push/pull  1 1 1 1         Rotate  1 1 1 1         Row forward & back  2 2 2 2         OH side bend            UE/Core (AROM, paddles, MB)              ABD/ADD              Horizontal Pec Fly              Alt. arm swing (shld flex/ext)              Push pull              Single paddle rotation           SLS (EO, EC, ball toss)            Aqua Step (FW, lat, eccentric)            Core work:              MF press (red, blue, blk)    5''x5ea Y         Kickboard press (blue, red)                Row/ext w/ tubing (red, blue, blk)  2' yellow 2 2       Seated on bench             ankle DF/PF               March              ABD/ADD              Knee flexion/extension            DW TX - hang in the deep water  5' 10' 10' 6 10         DW ABD/ADD              DW Bicycle  5 5  6         DW Scissor hip flexion/extension              DW DKTC   1'         Stretches              HS at step  nv 2x 1 2         Wall stretches              Calf stretch at wall             Hot Tub - 10 minutes only 10 10' 6 10

## 2024-04-05 ENCOUNTER — APPOINTMENT (OUTPATIENT)
Dept: PHYSICAL THERAPY | Age: 59
End: 2024-04-05
Payer: COMMERCIAL

## 2024-04-08 ENCOUNTER — OFFICE VISIT (OUTPATIENT)
Dept: PHYSICAL THERAPY | Age: 59
End: 2024-04-08
Payer: COMMERCIAL

## 2024-04-08 DIAGNOSIS — M62.830 LUMBAR PARASPINAL MUSCLE SPASM: Primary | ICD-10-CM

## 2024-04-08 DIAGNOSIS — M47.816 LUMBAR FACET ARTHROPATHY: ICD-10-CM

## 2024-04-08 PROCEDURE — 97150 GROUP THERAPEUTIC PROCEDURES: CPT

## 2024-04-08 PROCEDURE — 97113 AQUATIC THERAPY/EXERCISES: CPT

## 2024-04-08 NOTE — PROGRESS NOTES
Daily Note     Today's date: 2024  Patient name: Matt Tobias Sr.  : 1965  MRN: [id number]  Referring provider: No ref. provider found  Dx:   Encounter Diagnosis     ICD-10-CM    1. Lumbar paraspinal muscle spasm  M62.830       2. Lumbar facet arthropathy  M47.816           Start Time: 1400  Stop Time: 1500  Total time in clinic (min): 60 minutes    Subjective: pt notes he is bruised on his R shoulder and hip from falling at home down the steps and hitting the wall on his R side. Sig pain and soreness noted today.       Objective: See treatment diary below  Pt seen 1:1 for 30 minutes and group therapy for remainder    Assessment: Tolerated treatment fairly well.  Continued w/ ex's as per flowsheet w/ pain to R LB w/ DWTX today. Visible bruising to R posterior shoulder. Patient would benefit from continued PT      Plan: Continue per plan of care.        POC expires Unit limit Auth Expiration date PT/OT + Visit Limit?   2024  done 12                           Visit/Unit Tracking  AUTH Status:  Date 3/20 3/22 3/29 4/1 4/3 4/8         Auth needed - done Used 1 2 3 4 5 6          Remaining  11 10 9 8 7 6          FOTO                      Daily Treatment Diary     Date 3/22 3/29 4/1 4/3 4/8      Patient education           Water Walk (FW, BW, side) x10’   10' (7'/3') 10 10 10      LE work at wall               Hip FF/ext swing  2 2 2 2 2        Toe/heel  1 1 1 1 1        Hip ABD/ADD  2 2 2 2 2         1 1 1 1        Knee flexion & extension 1 1 1 1 1        Squats 1 1 1 1 1        UE noodle x3             Push/pull  1 1 1 1         Rotate  1 1 1 1 1        Row forward & back  2 2 2 2 2        OH side bend            UE/Core (AROM, paddles, MB)              ABD/ADD              Horizontal Pec Fly              Alt. arm swing (shld flex/ext)              Push pull              Single paddle rotation           SLS (EO, EC, ball toss)            Aqua Step (FW, lat, eccentric)            Core  work:              MF press (red, blue, blk)    5''x5ea Y x5ea        Kickboard press (blue, red)                Row/ext w/ tubing (red, blue, blk)  2' yellow 2 2 2      Seated on bench             ankle DF/PF              March              ABD/ADD              Knee flexion/extension            DW TX - hang in the deep water  5' 10' 10' 6 10 10        DW ABD/ADD              DW Bicycle  5 5  6 6        DW Scissor hip flexion/extension              DW DKTC   1'         Stretches              HS at step  nv 2x 1 2 2        Wall stretches              Calf stretch at wall             Hot Tub - 10 minutes only 10 10' 6 10 10

## 2024-04-10 ENCOUNTER — OFFICE VISIT (OUTPATIENT)
Dept: PHYSICAL THERAPY | Age: 59
End: 2024-04-10
Payer: COMMERCIAL

## 2024-04-10 DIAGNOSIS — M47.816 LUMBAR FACET ARTHROPATHY: ICD-10-CM

## 2024-04-10 DIAGNOSIS — M62.830 LUMBAR PARASPINAL MUSCLE SPASM: Primary | ICD-10-CM

## 2024-04-10 PROCEDURE — 97113 AQUATIC THERAPY/EXERCISES: CPT

## 2024-04-10 NOTE — PROGRESS NOTES
Daily Note     Today's date: 4/10/2024  Patient name: Matt Tobias Sr.  : 1965  MRN: [id number]  Referring provider: No ref. provider found  Dx:   Encounter Diagnosis     ICD-10-CM    1. Lumbar paraspinal muscle spasm  M62.830       2. Lumbar facet arthropathy  M47.816           Start Time: 1400  Stop Time: 1500  Total time in clinic (min): 60 minutes    Subjective: Patient reports 3/10 LBP today, Patient reports he is getting relief in the pool.       Objective: See treatment diary below      Assessment: Tolerated treatment  fairly well,  no c/o increased pain with program some discomfort with standing hip abd.  Patient demonstrated fatigue post treatment and would benefit from continued PT      Plan: Continue per plan of care.      POC expires Unit limit Auth Expiration date PT/OT + Visit Limit?   2024  done 12                           Visit/Unit Tracking  AUTH Status:  Date 3/20 3/22 3/29 4/1 4/3 4/8 4/10        Auth needed - done Used 1 2 3 4 5 6 75         Remaining  11 10 9 8 7 6          FOTO                      Daily Treatment Diary     Date 3/22 3/29 4/1 4/3 4/8 4/10     Patient education           Water Walk (FW, BW, side) x10’   10' (7'/3') 10 10 10 10     LE work at wall               Hip FF/ext swing  2 2 2 2 2 2       Toe/heel  1 1 1 1 1 1       Hip ABD/ADD  2 2 2 2 2 2       March 1 1 1 1 1 1       Knee flexion & extension 1 1 1 1 1 1       Squats 1 1 1 1 1 1       UE noodle x3             Push/pull  1 1 1 1         Rotate  1 1 1 1 1 1       Row forward & back  2 2 2 2 2 2       OH side bend            UE/Core (AROM, paddles, MB)              ABD/ADD              Horizontal Pec Fly              Alt. arm swing (shld flex/ext)              Push pull              Single paddle rotation           SLS (EO, EC, ball toss)            Aqua Step (FW, lat, eccentric)            Core work:              MF press (red, blue, blk)    5''x5ea Y x5ea 5''x5       Kickboard press (blue, red)                 Row/ext w/ tubing (red, blue, blk)  2' yellow 2 2 2 2     Seated on bench             ankle DF/PF              March              ABD/ADD              Knee flexion/extension            DW TX - hang in the deep water  5' 10' 10' 6 10 10 10       DW ABD/ADD              DW Bicycle  5 5  6 6 6       DW Scissor hip flexion/extension              DW DKTC   1'         Stretches              HS at step  nv 2x 1 2 2 2       Wall stretches              Calf stretch at wall             Hot Tub - 10 minutes only 10 10' 6 10 10 10

## 2024-04-11 ENCOUNTER — OFFICE VISIT (OUTPATIENT)
Dept: PHYSICAL THERAPY | Age: 59
End: 2024-04-11
Payer: COMMERCIAL

## 2024-04-11 DIAGNOSIS — M62.830 LUMBAR PARASPINAL MUSCLE SPASM: Primary | ICD-10-CM

## 2024-04-11 DIAGNOSIS — M47.816 LUMBAR FACET ARTHROPATHY: ICD-10-CM

## 2024-04-11 PROCEDURE — 97113 AQUATIC THERAPY/EXERCISES: CPT | Performed by: PHYSICAL THERAPIST

## 2024-04-11 NOTE — PROGRESS NOTES
Daily Note     Today's date: 2024  Patient name: Matt Tobias Sr.  : 1965  MRN: [id number]  Referring provider: Hugh Horn MD  Dx:   Encounter Diagnosis     ICD-10-CM    1. Lumbar paraspinal muscle spasm  M62.830       2. Lumbar facet arthropathy  M47.816           Start Time: 1400  Stop Time: 1500  Total time in clinic (min): 60 minutes    Subjective: Patient notes 2-3/10 LBP this date. Notes some soreness right side following last session but better today. Relates that he thinks he tries to overdo it sometimes when doing the leg exercises.       Objective: See treatment diary below      Assessment: Tolerated treatment well. Patient would benefit from continued PT. Decreased pain today. Verbal cues for exercise technique and breath sequencing.       Plan: Continue per plan of care.      POC expires Unit limit Auth Expiration date PT/OT + Visit Limit?   2024  done 12                           Visit/Unit Tracking  AUTH Status:  Date 3/20 3/22 3/29 4/1 4/3 4/8 4/10 4/11       Auth needed - done Used 1 2 3 4 5 6 75 8        Remaining  11 10 9 8 7 6  4        FOTO                      Daily Treatment Diary     Date 3/22 3/29 4/1 4/3 4/8 4/10 4/11    Patient education           Water Walk (FW, BW, side) x10’  64 10' (7'/3') 10 10 10 10 10    LE work at wall               Hip FF/ext swing  2 2 2 2 2 2 2      Toe/heel  1 1 1 1 1 1 1      Hip ABD/ADD  2 2 2 2 2 2 2       1 1 1 1 1 1      Knee flexion & extension 1 1 1 1 1 1 1      Squats 1 1 1 1 1 1 1      UE noodle x3             Push/pull  1 1 1 1         Rotate  1 1 1 1 1 1 1      Row forward & back  2 2 2 2 2 2 2      OH side bend            UE/Core (AROM, paddles, MB)              ABD/ADD              Horizontal Pec Fly              Alt. arm swing (shld flex/ext)              Push pull              Single paddle rotation           SLS (EO, EC, ball toss)            Aqua Step (FW, lat, eccentric)            Core work:              ADDI  press (red, blue, blk)    5''x5ea Y x5ea 5''x5 5x      Kickboard press (blue, red)                Row/ext w/ tubing (red, blue, blk)  2' yellow 2 2 2 2 2    Seated on bench             ankle DF/PF              March              ABD/ADD              Knee flexion/extension            DW TX - hang in the deep water  5' 10' 10' 6 10 10 10 10      DW ABD/ADD              DW Bicycle  5 5  6 6 6 6      DW Scissor hip flexion/extension              DW DKTC   1'         Stretches              HS at step  nv 2x 1 2 2 2 2      Wall stretches              Calf stretch at wall             Hot Tub - 10 minutes only 10 10' 6 10 10 10 10

## 2024-04-15 ENCOUNTER — EVALUATION (OUTPATIENT)
Dept: PHYSICAL THERAPY | Age: 59
End: 2024-04-15
Payer: COMMERCIAL

## 2024-04-15 DIAGNOSIS — M62.830 LUMBAR PARASPINAL MUSCLE SPASM: Primary | ICD-10-CM

## 2024-04-15 DIAGNOSIS — M47.816 LUMBAR FACET ARTHROPATHY: ICD-10-CM

## 2024-04-15 PROCEDURE — 97164 PT RE-EVAL EST PLAN CARE: CPT

## 2024-04-15 PROCEDURE — 97530 THERAPEUTIC ACTIVITIES: CPT

## 2024-04-15 PROCEDURE — 97113 AQUATIC THERAPY/EXERCISES: CPT
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
## 2024-04-16 ENCOUNTER — TELEPHONE (OUTPATIENT)
Dept: HEMATOLOGY ONCOLOGY | Facility: CLINIC | Age: 59
End: 2024-04-16

## 2024-04-16 ENCOUNTER — OFFICE VISIT (OUTPATIENT)
Dept: HEMATOLOGY ONCOLOGY | Facility: CLINIC | Age: 59
End: 2024-04-16
Payer: COMMERCIAL

## 2024-04-16 VITALS
BODY MASS INDEX: 37.49 KG/M2 | SYSTOLIC BLOOD PRESSURE: 132 MMHG | HEART RATE: 60 BPM | WEIGHT: 225 LBS | DIASTOLIC BLOOD PRESSURE: 76 MMHG | OXYGEN SATURATION: 97 % | TEMPERATURE: 98.5 F | RESPIRATION RATE: 16 BRPM | HEIGHT: 65 IN

## 2024-04-16 DIAGNOSIS — K91.2 POSTSURGICAL MALABSORPTION: ICD-10-CM

## 2024-04-16 DIAGNOSIS — D75.839 THROMBOCYTHEMIA: Primary | ICD-10-CM

## 2024-04-16 DIAGNOSIS — D75.89 MACROCYTOSIS WITHOUT ANEMIA: ICD-10-CM

## 2024-04-16 PROCEDURE — 99204 OFFICE O/P NEW MOD 45 MIN: CPT | Performed by: PHYSICIAN ASSISTANT

## 2024-04-16 NOTE — TELEPHONE ENCOUNTER
Voicemail left for patient to see if he would like to come into office today at 11AM instead of original appointment for [phone]  [facility] number left to confirm

## 2024-04-16 NOTE — TELEPHONE ENCOUNTER
Patient Call    Who are you speaking with? Patient    If it is not the patient, are they listed on an active communication consent form? N/A   What is the reason for this call? Returning a call from the office in regards to coming in earlier for his appointment today.  Patient states that he is unable to make the earlier time, he will be in at his original time   Does this require a call back? N/A   If a call back is required, please list best call back number na   If a call back is required, advise that a message will be forwarded to their care team and someone will return their call as soon as possible.   Did you relay this information to the patient? N/A
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
## 2024-04-17 ENCOUNTER — TELEPHONE (OUTPATIENT)
Dept: HEMATOLOGY ONCOLOGY | Facility: CLINIC | Age: 59
End: 2024-04-17

## 2024-04-18 ENCOUNTER — HOSPITAL ENCOUNTER (OUTPATIENT)
Dept: RADIOLOGY | Facility: CLINIC | Age: 59
Discharge: HOME | End: 2024-04-18
Admitting: STUDENT IN AN ORGANIZED HEALTH CARE EDUCATION/TRAINING PROGRAM
Payer: COMMERCIAL

## 2024-04-18 VITALS
DIASTOLIC BLOOD PRESSURE: 74 MMHG | SYSTOLIC BLOOD PRESSURE: 137 MMHG | HEART RATE: 54 BPM | OXYGEN SATURATION: 97 % | RESPIRATION RATE: 20 BRPM | TEMPERATURE: 97.6 F

## 2024-04-18 DIAGNOSIS — M47.816 LUMBAR FACET ARTHROPATHY: ICD-10-CM

## 2024-04-18 DIAGNOSIS — M62.830 LUMBAR PARASPINAL MUSCLE SPASM: Primary | ICD-10-CM

## 2024-04-18 PROCEDURE — 64494 INJ PARAVERT F JNT L/S 2 LEV: CPT | Performed by: STUDENT IN AN ORGANIZED HEALTH CARE EDUCATION/TRAINING PROGRAM

## 2024-04-18 PROCEDURE — 64493 INJ PARAVERT F JNT L/S 1 LEV: CPT | Performed by: STUDENT IN AN ORGANIZED HEALTH CARE EDUCATION/TRAINING PROGRAM

## 2024-04-18 RX ORDER — BUPIVACAINE HYDROCHLORIDE 7.5 MG/ML
3 INJECTION, SOLUTION EPIDURAL; RETROBULBAR ONCE
Status: COMPLETED | OUTPATIENT
Start: 2024-04-18 | End: 2024-04-18

## 2024-04-18 RX ADMIN — BUPIVACAINE HYDROCHLORIDE 3 ML: 7.5 INJECTION, SOLUTION EPIDURAL; RETROBULBAR at 15:51

## 2024-04-18 NOTE — DISCHARGE INSTR - LAB

## 2024-04-18 NOTE — H&P
History of Present Illness: The patient is a 58 y.o. male who presents with complaints of bilateral ow back pain    Past Medical History:   Diagnosis Date    CPAP (continuous positive airway pressure) dependence     does not use machine    Esophageal varices (HCC)     stable 9/2020 gets checked yearly    Hepatic cirrhosis (HCC)     treated with harvoni   Hep C- dx age [age]    Hepatic encephalopathy (HCC)     Liver disease     Obesity     Pneumonia     in past    Postgastrectomy malabsorption     Sleep apnea     Wears glasses        Past Surgical History:   Procedure Laterality Date    COLONOSCOPY      ESOPHAGOGASTRODUODENOSCOPY N/A 4/12/2018    Procedure: ESOPHAGOGASTRODUODENOSCOPY (EGD);  Surgeon: Willy Traylor MD;  [facility];  Service: Gastroenterology    FRACTURE SURGERY Left     ankle    OTHER SURGICAL HISTORY      banding esophageal varices    AZ EXCISION EXCESSIVE SKIN & SUBQ TISSUE ABDOMEN N/A 9/16/2020    Procedure: ABDOMINOPLASTY;  Surgeon: Enrrique Birmingham MD;  [facility];  Service: Plastics    AZ EXCISION SKIN ABD INFRAUMBILICAL PANNICULECTOMY N/A 9/16/2020    Procedure: PANNICULECTOMY;  Surgeon: Enrrique Birmingham MD;  [facility];  Service: Plastics    AZ LAPS GSTRC RSTRICTIV PX LONGITUDINAL GASTRECTOMY N/A 6/5/2018    Procedure: GASTRECTOMY LAPAROSCOPIC SLEEVE;  Surgeon: Diana Mcdonnell MD;  [facility];  Service: Bariatrics         Current Outpatient Medications:     AMILoride 5 mg tablet, Take 1 tablet (5 mg total) by mouth daily, Disp: 90 tablet, Rfl: 1    carvedilol (COREG) 6.25 mg tablet, Take 1 tablet (6.25 mg total) by mouth 2 (two) times a day with meals, Disp: 180 tablet, Rfl: 3    Claritin-D 12 Hour 5-120 MG per tablet, TAKE ONE TABLET BY MOUTH TWICE DAILY FOR 5 DAYS, Disp: 10 tablet, Rfl: 0    fluticasone (FLONASE) 50 mcg/act nasal spray, 1 spray into each nostril daily, Disp: 48 g, Rfl: 3    furosemide (LASIX) 20 mg tablet, Take 1 tablet  (20 mg total) by mouth 2 (two) times a day, Disp: 180 tablet, Rfl: 0    ibuprofen (MOTRIN) 800 mg tablet, TAKE 1 TABLET (800 MG TOTAL) BY MOUTH EVERY EIGHT (EIGHT) HOURS AS NEEDED FOR MILD PAIN, Disp: 30 tablet, Rfl: 0    lactulose (CHRONULAC) 10 g/15 mL solution, TAKE 15 ML (10 G TOTAL) BY MOUTH DAILY AS NEEDED (CONSTIPATION OR CONFUSION), Disp: 946 mL, Rfl: 0    lidocaine (Lidoderm) 5 %, Apply 1 patch topically over 12 hours daily Remove & Discard patch within 12 hours or as directed by MD (Patient not taking: Reported on 4/16/2024), Disp: 10 patch, Rfl: 0    methocarbamol (ROBAXIN) 750 mg tablet, TAKE 1 TABLET BY MOUTH EVERY EIGHT HOURS, Disp: 30 tablet, Rfl: 0    methylPREDNISolone 4 MG tablet therapy pack, Use as directed on package (Patient not taking: Reported on 4/16/2024), Disp: 21 each, Rfl: 0    omeprazole (PriLOSEC) 20 mg delayed release capsule, Take 1 capsule (20 mg total) by mouth daily, Disp: 90 capsule, Rfl: 0    sildenafil (VIAGRA) 100 mg tablet, Take 1 tablet (100 mg total) by mouth daily as needed for erectile dysfunction (Patient not taking: Reported on 4/16/2024), Disp: 90 tablet, Rfl: 0    traMADol (ULTRAM) 50 mg tablet, Take 1 tablet by mouth every 6 hours as needed for moderate pain, Disp: 60 tablet, Rfl: 0    triamcinolone (KENALOG) 0.5 % cream, Apply topically 2 (two) times a day, Disp: 45 g, Rfl: 3    No Known Allergies    Physical Exam: There were no vitals filed for this visit.  General: Awake, Alert, Oriented x 3, Mood and affect appropriate  Respiratory: Respirations even and unlabored  Cardiovascular: Peripheral pulses intact; no edema  Musculoskeletal Exam: facet laoding positive bilaterally    ASA Score: 3         Assessment:   1. Lumbar facet arthropathy        Plan: B/L L4-5 and L5-S1 MBB #2

## 2024-04-19 ENCOUNTER — TELEPHONE (OUTPATIENT)
Dept: PAIN MEDICINE | Facility: CLINIC | Age: 59
End: 2024-04-19

## 2024-04-19 ENCOUNTER — APPOINTMENT (OUTPATIENT)
Dept: PHYSICAL THERAPY | Age: 59
End: 2024-04-19
Payer: COMMERCIAL

## 2024-04-19 NOTE — TELEPHONE ENCOUNTER
Caller: Matt    Doctor: Kory    Reason for call: patient lost pain diary can we please email it to him [email or web address]

## 2024-04-22 ENCOUNTER — OFFICE VISIT (OUTPATIENT)
Dept: PHYSICAL THERAPY | Age: 59
End: 2024-04-22
Payer: COMMERCIAL

## 2024-04-22 DIAGNOSIS — M47.816 LUMBAR FACET ARTHROPATHY: Primary | ICD-10-CM

## 2024-04-22 DIAGNOSIS — M62.830 LUMBAR PARASPINAL MUSCLE SPASM: ICD-10-CM

## 2024-04-22 PROCEDURE — 97113 AQUATIC THERAPY/EXERCISES: CPT

## 2024-04-22 NOTE — PROGRESS NOTES
Daily Note     Today's date: 2024  Patient name: Matt Tobias Sr.  : 1965  MRN: [id number]  Referring provider: Hugh Horn MD  Dx:   Encounter Diagnosis     ICD-10-CM    1. Lumbar facet arthropathy  M47.816       2. Lumbar paraspinal muscle spasm  M62.830                      Subjective: Patient reports he is hurting today.       Objective: See treatment diary below      Assessment: Tolerated treatment fairly well with core and LE exercises, challenging with step ups.  Patient exhibited good technique with therapeutic exercises and would benefit from continued PT      Plan: Continue per plan of care.      POC expires Unit limit Auth Expiration date PT/OT + Visit Limit?   2024  done 12                           Visit/Unit Tracking  AUTH Status:  Date 3/20 3/22 3/29 4/1 4/3 4/8 4/10 4/11 4/15 4/22     Auth needed - done Used 1 2 3 4 5 6 7 8 9 10      Remaining  11 10 9 8 7 6 5 4 3 2      FOTO                      Daily Treatment Diary     Date 4/22  4/1 4/3 4/8 4/10 4/11 4/15   Patient education           Water Walk (FW, BW, side) x10’  10' 10' (7'/3') 10 10 10 10 10 5'   LE work at wall               Hip FF/ext swing  2 2 2 2 2 2 2 2     Toe/heel  1 1 1 1 1 1 1      Hip ABD/ADD  2 2 2 2 2 2 2       1 1 1 1 1 1 1     Knee flexion & extension 1 1 1 1 1 1 1 1     Squats 1 1 1 1 1 1 1      UE noodle x3             Push/pull   1 1 1         Rotate  1 1 1 1 1 1 1 1     Row forward & back   2 2 2 2 2 2      OH side bend            UE/Core (AROM, paddles, MB)              ABD/ADD              Horizontal Pec Fly              Alt. arm swing (shld flex/ext)              Push pull              Single paddle rotation           SLS (EO, EC, ball toss)            Aqua Step (FW, lat, eccentric)  1'       FW 1'    Core work:              MF press (red, blue, blk) 5x yellow   5''x5ea Y x5ea 5''x5 5x 5x      Kickboard press (blue, red)                Row/ext w/ tubing (red, blue, blk) 2' 2' yellow 2 2 2  "2 2    Seated on bench             ankle DF/PF              March              ABD/ADD              Knee flexion/extension            DW TX - hang in the deep water  10' 10' 6 10 10 10 10 10     DW ABD/ADD              DW Bicycle  6' 5  6 6 6 6      DW Scissor hip flexion/extension              DW DKTC   1'         Stretches              HS at step  2x 2x 1 2 2 2 2      Wall stretches              Calf stretch at wall  3x15\"           Hot Tub - 10 minutes only 10 10' 6 10 10 10 10         "

## 2024-04-23 NOTE — TELEPHONE ENCOUNTER
Caller: rebel Gutierrez    Doctor: Kory     Reason for call: pt returning nurses call, pt also wanted to know if he can have another script for aqua therapy, because he will be discharged soon.Please Advise     Call back#: [phone]

## 2024-04-23 NOTE — TELEPHONE ENCOUNTER
Please place RFA orders    S/W pt who states post MBB #2 he had relief of pain throughout trial which lasted for 3 days  New diary filled out with information given by pt  Per protocol, ok to move forward with RFA's  Pt states right LBP greater than Left

## 2024-04-24 ENCOUNTER — DOCUMENTATION (OUTPATIENT)
Dept: PAIN MEDICINE | Facility: CLINIC | Age: 59
End: 2024-04-24

## 2024-04-24 ENCOUNTER — OFFICE VISIT (OUTPATIENT)
Dept: PHYSICAL THERAPY | Age: 59
End: 2024-04-24
Payer: COMMERCIAL

## 2024-04-24 DIAGNOSIS — M62.830 LUMBAR PARASPINAL MUSCLE SPASM: ICD-10-CM

## 2024-04-24 DIAGNOSIS — M47.816 LUMBAR FACET ARTHROPATHY: Primary | ICD-10-CM

## 2024-04-24 PROCEDURE — 97113 AQUATIC THERAPY/EXERCISES: CPT

## 2024-04-24 NOTE — PROGRESS NOTES
Daily Note     Today's date: 2024  Patient name: Matt Tobias Sr.  : 1965  MRN: [id number]  Referring provider: Hugh Horn MD  Dx:   Encounter Diagnosis     ICD-10-CM    1. Lumbar facet arthropathy  M47.816       2. Lumbar paraspinal muscle spasm  M62.830           Start Time: 1400  Stop Time: 1455  Total time in clinic (min): 55 minutes    Subjective: pt c/o increased tightness today and pain 3/10. He notes after he lies down and gets up he's real stiff and pain is worse. Pt notes min pain post session, 1/10.       Objective: See treatment diary below      Assessment: Tolerated treatment well. Decreased pain in the water. No c/o w/ ex's. Pt slowly improving w/ ex's and tolerance for ex's. Added STS using a 6#MB w/o complaints. Slow gait in the water w/ water walking. Patient would benefit from continued PT      Plan: Continue per plan of care.      POC expires Unit limit Auth Expiration date PT/OT + Visit Limit?   2024  done 12                           Visit/Unit Tracking  AUTH Status:  Date 3/20 3/22 3/29 4/1 4/3 4/8 4/10 4/11 4/15 4/22 4/24    Auth needed - done Used 1 2 3 4 5 6 7 8 9 10 11     Remaining  11 10 9 8 7 6 5 4 3 2 1     FOTO                      Daily Treatment Diary     Date   4/3 4/8 4/10 4/11 4/15   Patient education           Water Walk (FW, BW, side) x10’  10' 10' 10 10 10 10 10 5'   LE work at wall               Hip FF/ext swing  2 2 2 2 2 2 2 2     Toe/heel  1 1 1 1 1 1 1      Hip ABD/ADD  2 2 2 2 2 2 2       1 1 1 1 1 1 1     Knee flexion & extension 1 1 1 1 1 1 1 1     Squats 1 1 1 1 1 1 1      UE noodle x3             Push/pull    1 1         Rotate  1 1 1 1 1 1 1 1     Row forward & back   2 2 2 2 2 2      OH side bend            UE/Core (AROM, paddles, MB)              ABD/ADD              Horizontal Pec Fly              Alt. arm swing (shld flex/ext)              Push pull              Single paddle rotation           STS6#MB  x20         Aqua  "Step (FW, lat, eccentric)  1' D/c      FW 1'    Core work:              MF press (red, blue, blk) 5x yellow 5''x7ea  5''x5ea Y x5ea 5''x5 5x 5x      Kickboard press (blue, red)                Row/ext w/ tubing (red, blue, blk) 2' 2' yellow 2 2 2 2 2    Seated on bench             ankle DF/PF              March              ABD/ADD              Knee flexion/extension            DW TX - hang in the deep water  10' 10' 6 10 10 10 10 10     DW ABD/ADD              DW Bicycle  6' 5  6 6 6 6      DW Scissor hip flexion/extension              DW DKTC   1'         Stretches              HS at step  2x 2x 1 2 2 2 2      Wall stretches              Calf stretch at wall  3x15\"           Hot Tub - 10 minutes only 10 10' 6 10 10 10 10           "

## 2024-04-25 ENCOUNTER — OFFICE VISIT (OUTPATIENT)
Dept: PHYSICAL THERAPY | Age: 59
End: 2024-04-25
Payer: COMMERCIAL

## 2024-04-25 ENCOUNTER — PATIENT MESSAGE (OUTPATIENT)
Dept: RADIOLOGY | Facility: CLINIC | Age: 59
End: 2024-04-25

## 2024-04-25 DIAGNOSIS — M47.816 LUMBAR FACET ARTHROPATHY: ICD-10-CM

## 2024-04-25 DIAGNOSIS — M47.816 LUMBAR FACET ARTHROPATHY: Primary | ICD-10-CM

## 2024-04-25 DIAGNOSIS — M62.830 LUMBAR PARASPINAL MUSCLE SPASM: ICD-10-CM

## 2024-04-25 PROCEDURE — 97113 AQUATIC THERAPY/EXERCISES: CPT

## 2024-04-25 NOTE — PATIENT COMMUNICATION
Pt scheduled for RFA's with Dr Horn on 5/30/24 and 6/13/24    No med holds needed for RFAs    Reviewed instructions with pt over phone and via myc message    Have you completed PT/HEP/Chiro in the past 6 months for dedicated area? Yes, pt currently doing aqua therapy with [facility]  If yes, how long did you complete?  What was the frequency?  Did it provide relief? Per last PT note, pt has gotten little relief  If no, reason therapy was not completed?

## 2024-04-25 NOTE — PROGRESS NOTES
Daily Note     Today's date: 2024  Patient name: Matt Tobias Sr.  : 1965  MRN: [id number]  Referring provider: Hugh Horn MD  Dx:   Encounter Diagnosis     ICD-10-CM    1. Lumbar paraspinal muscle spasm  M62.830 Ambulatory referral to Physical Therapy      2. Lumbar facet arthropathy  M47.816 Ambulatory referral to Physical Therapy          Start Time: 1400  Stop Time: 1500  Total time in clinic (min): 60 minutes    Subjective: Patient reports elevated pain levels today, 5/10 pain at his LB.       Objective: See treatment diary below  Patient was seen 1:1 with Sheridan Cano PTA and the remainder of the time with Alondra Galvan PTA.     Assessment: Cues and demonstrations for proper performance of aquatic TE as well as monitoring of time to ensure proper dosage is completed. Emphasis on upright posture, LE strength, and mobility. Did not progress today secondary to elevated pain levels upon arrival. Patient would benefit from continued PT      Plan: Continue per plan of care.      POC expires Unit limit Auth Expiration date PT/OT + Visit Limit?   2024  done 12                           Visit/Unit Tracking  AUTH Status:  Date 3/20 3/22 3/29 4/1 4/3 4/8 4/10 4/11 4/15 4/22 4/24 4/25   Auth needed - done Used 1 2 3 4 5 6 7 8 9 10 11 12    Remaining  11 10 9 8 7 6 5 4 3 2 1 0    FOTO                      Daily Treatment Diary     Date  4/3 4/8 4/10 4/11 4/15   Patient education           Water Walk (FW, BW, side) x10’  10' 10' 10 10 10 10 10 5'   LE work at wall               Hip FF/ext swing  2 2 2 2 2 2 2 2     Toe/heel  1 1 1 1 1 1 1      Hip ABD/ADD  2 2 2 2 2 2 2      March 1 1 1 1 1 1 1 1     Knee flexion & extension 1 1 1 1 1 1 1 1     Squats 1 1 1 1 1 1 1      UE noodle x3             Push/pull     1         Rotate  1 1 1 1 1 1 1 1     Row forward & back   2 2 2 2 2 2      OH side bend            UE/Core (AROM, paddles, MB)              ABD/ADD              Horizontal  "Pec Fly              Alt. arm swing (shld flex/ext)              Push pull              Single paddle rotation           STS6#MB  x20 x20        Aqua Step (FW, lat, eccentric)  1' D/c      FW 1'    Core work:              MF press (red, blue, blk) 5x yellow 5''x7ea 5''x5ea 5''x5ea Y x5ea 5''x5 5x 5x      Kickboard press (blue, red)                Row/ext w/ tubing (red, blue, blk) 2' 2' yellow 2 2 2 2 2    Seated on bench             ankle DF/PF              March              ABD/ADD              Knee flexion/extension            DW TX - hang in the deep water  10' 10' 10 10 10 10 10 10     DW ABD/ADD              DW Bicycle  6' 5 5 6 6 6 6      DW Scissor hip flexion/extension              DW DKTC   1' 1        Stretches              HS at step  2x 2x 1 2 2 2 2      Wall stretches              Calf stretch at wall  3x15\"           Hot Tub - 10 minutes only 10 10' 10 10 10 10 10             "

## 2024-04-26 ENCOUNTER — APPOINTMENT (OUTPATIENT)
Dept: LAB | Facility: CLINIC | Age: 59
End: 2024-04-26
Payer: COMMERCIAL

## 2024-04-26 DIAGNOSIS — D75.89 MACROCYTOSIS WITHOUT ANEMIA: ICD-10-CM

## 2024-04-26 DIAGNOSIS — K91.2 POSTSURGICAL MALABSORPTION: ICD-10-CM

## 2024-04-26 DIAGNOSIS — D75.839 THROMBOCYTHEMIA: ICD-10-CM

## 2024-04-26 LAB
BASOPHILS # BLD AUTO: 0.05 THOUSANDS/ÂΜL (ref 0–0.1)
BASOPHILS NFR BLD AUTO: 1 % (ref 0–1)
EOSINOPHIL # BLD AUTO: 0.42 THOUSAND/ÂΜL (ref 0–0.61)
EOSINOPHIL NFR BLD AUTO: 7 % (ref 0–6)
ERYTHROCYTE [DISTWIDTH] IN BLOOD BY AUTOMATED COUNT: 14.5 % (ref 11.6–15.1)
FERRITIN SERPL-MCNC: 58 NG/ML (ref 24–336)
FOLATE SERPL-MCNC: 10.9 NG/ML
HCT VFR BLD AUTO: 39.5 % (ref 36.5–49.3)
HGB BLD-MCNC: 13.6 G/DL (ref 12–17)
IMM GRANULOCYTES # BLD AUTO: 0.04 THOUSAND/UL (ref 0–0.2)
IMM GRANULOCYTES NFR BLD AUTO: 1 % (ref 0–2)
LYMPHOCYTES # BLD AUTO: 1.34 THOUSANDS/ÂΜL (ref 0.6–4.47)
LYMPHOCYTES NFR BLD AUTO: 21 % (ref 14–44)
MCH RBC QN AUTO: 34.2 PG (ref 26.8–34.3)
MCHC RBC AUTO-ENTMCNC: 34.4 G/DL (ref 31.4–37.4)
MCV RBC AUTO: 99 FL (ref 82–98)
MONOCYTES # BLD AUTO: 0.47 THOUSAND/ÂΜL (ref 0.17–1.22)
MONOCYTES NFR BLD AUTO: 7 % (ref 4–12)
NEUTROPHILS # BLD AUTO: 4.02 THOUSANDS/ÂΜL (ref 1.85–7.62)
NEUTS SEG NFR BLD AUTO: 63 % (ref 43–75)
NRBC BLD AUTO-RTO: 0 /100 WBCS
PLATELET # BLD AUTO: 128 THOUSANDS/UL (ref 149–390)
PMV BLD AUTO: 12.2 FL (ref 8.9–12.7)
RBC # BLD AUTO: 3.98 MILLION/UL (ref 3.88–5.62)
VIT B12 SERPL-MCNC: 216 PG/ML (ref 180–914)
WBC # BLD AUTO: 6.34 THOUSAND/UL (ref 4.31–10.16)

## 2024-04-26 PROCEDURE — 84165 PROTEIN E-PHORESIS SERUM: CPT

## 2024-04-26 PROCEDURE — 83540 ASSAY OF IRON: CPT

## 2024-04-26 PROCEDURE — 85025 COMPLETE CBC W/AUTO DIFF WBC: CPT

## 2024-04-26 PROCEDURE — 82607 VITAMIN B-12: CPT

## 2024-04-26 PROCEDURE — 80053 COMPREHEN METABOLIC PANEL: CPT

## 2024-04-26 PROCEDURE — 82728 ASSAY OF FERRITIN: CPT

## 2024-04-26 PROCEDURE — 82746 ASSAY OF FOLIC ACID SERUM: CPT

## 2024-04-26 PROCEDURE — 83550 IRON BINDING TEST: CPT

## 2024-04-26 PROCEDURE — 36415 COLL VENOUS BLD VENIPUNCTURE: CPT

## 2024-04-26 PROCEDURE — 86334 IMMUNOFIX E-PHORESIS SERUM: CPT

## 2024-04-27 LAB
ALBUMIN SERPL BCP-MCNC: 3.6 G/DL (ref 3.5–5)
ALP SERPL-CCNC: 77 U/L (ref 34–104)
ALT SERPL W P-5'-P-CCNC: 28 U/L (ref 7–52)
ANION GAP SERPL CALCULATED.3IONS-SCNC: 8 MMOL/L (ref 4–13)
AST SERPL W P-5'-P-CCNC: 50 U/L (ref 13–39)
BILIRUB SERPL-MCNC: 4.79 MG/DL (ref 0.2–1)
BUN SERPL-MCNC: 14 MG/DL (ref 5–25)
CALCIUM SERPL-MCNC: 8.8 MG/DL (ref 8.4–10.2)
CHLORIDE SERPL-SCNC: 105 MMOL/L (ref 96–108)
CO2 SERPL-SCNC: 27 MMOL/L (ref 21–32)
CREAT SERPL-MCNC: 0.81 MG/DL (ref 0.6–1.3)
GFR SERPL CREATININE-BSD FRML MDRD: 97 ML/MIN/1.73SQ M
GLUCOSE P FAST SERPL-MCNC: 105 MG/DL (ref 65–99)
IRON SERPL-MCNC: 271 UG/DL (ref 50–212)
POTASSIUM SERPL-SCNC: 3.9 MMOL/L (ref 3.5–5.3)
PROT SERPL-MCNC: 5.9 G/DL (ref 6.4–8.4)
SODIUM SERPL-SCNC: 140 MMOL/L (ref 135–147)
UIBC SERPL-MCNC: <55 UG/DL (ref 155–355)

## 2024-04-28 DIAGNOSIS — Z00.6 ENCOUNTER FOR EXAMINATION FOR NORMAL COMPARISON OR CONTROL IN CLINICAL RESEARCH PROGRAM: ICD-10-CM

## 2024-04-29 ENCOUNTER — TELEPHONE (OUTPATIENT)
Dept: HEMATOLOGY ONCOLOGY | Facility: CLINIC | Age: 59
End: 2024-04-29

## 2024-04-29 NOTE — TELEPHONE ENCOUNTER
Attempted to call pt, let patient know to call back and our hours, also informed that this was about his recent bloodwork    ----- Message from Kalpana Samaniego PA-C sent at 4/29/2024 10:01 AM EDT -----  Is he taking iron supplements over the counter, ferrous sulfate or through multivitamin? If no please order hereditary hemachromatosis panel and advise patient to complete f/u blood work

## 2024-04-29 NOTE — TELEPHONE ENCOUNTER
Attempted to call this patient  and left detailed message inquiring if he was taking over the counter, ferrous sulfate or through multivitamin any iron supplements.   Left hope line for him to call us back with information and let him know if he didn't she wanted to do some additional labs.

## 2024-04-29 NOTE — TELEPHONE ENCOUNTER
Patient Call    Who are you speaking with? Patient    If it is not the patient, are they listed on an active communication consent form? Yes   What is the reason for this call? Calling Nely back   Does this require a call back? Yes   If a call back is required, please list best call back number [phone]    If a call back is required, advise that a message will be forwarded to their care team and someone will return their call as soon as possible.   Did you relay this information to the patient? Yes

## 2024-04-30 ENCOUNTER — TELEPHONE (OUTPATIENT)
Dept: HEMATOLOGY ONCOLOGY | Facility: CLINIC | Age: 59
End: 2024-04-30

## 2024-04-30 DIAGNOSIS — D75.89 MACROCYTOSIS WITHOUT ANEMIA: ICD-10-CM

## 2024-04-30 DIAGNOSIS — D75.839 THROMBOCYTHEMIA: Primary | ICD-10-CM

## 2024-04-30 DIAGNOSIS — K91.2 POSTSURGICAL MALABSORPTION: ICD-10-CM

## 2024-04-30 LAB
ALBUMIN SERPL ELPH-MCNC: 3.49 G/DL (ref 3.2–5.1)
ALBUMIN SERPL ELPH-MCNC: 58.1 % (ref 48–70)
ALPHA1 GLOB SERPL ELPH-MCNC: 0.22 G/DL (ref 0.15–0.47)
ALPHA1 GLOB SERPL ELPH-MCNC: 3.7 % (ref 1.8–7)
ALPHA2 GLOB SERPL ELPH-MCNC: 0.47 G/DL (ref 0.42–1.04)
ALPHA2 GLOB SERPL ELPH-MCNC: 7.8 % (ref 5.9–14.9)
BETA GLOB ABNORMAL SERPL ELPH-MCNC: 0.38 G/DL (ref 0.31–0.57)
BETA1 GLOB SERPL ELPH-MCNC: 6.3 % (ref 4.7–7.7)
BETA2 GLOB SERPL ELPH-MCNC: 7.2 % (ref 3.1–7.9)
BETA2+GAMMA GLOB SERPL ELPH-MCNC: 0.43 G/DL (ref 0.2–0.58)
GAMMA GLOB ABNORMAL SERPL ELPH-MCNC: 1.01 G/DL (ref 0.4–1.66)
GAMMA GLOB SERPL ELPH-MCNC: 16.9 % (ref 6.9–22.3)
IGG/ALB SER: 1.39 {RATIO} (ref 1.1–1.8)
INTERPRETATION UR IFE-IMP: NORMAL
PROT PATTERN SERPL ELPH-IMP: ABNORMAL
PROT SERPL-MCNC: 6 G/DL (ref 6.4–8.2)

## 2024-04-30 PROCEDURE — 84165 PROTEIN E-PHORESIS SERUM: CPT | Performed by: STUDENT IN AN ORGANIZED HEALTH CARE EDUCATION/TRAINING PROGRAM

## 2024-04-30 PROCEDURE — 86334 IMMUNOFIX E-PHORESIS SERUM: CPT | Performed by: STUDENT IN AN ORGANIZED HEALTH CARE EDUCATION/TRAINING PROGRAM

## 2024-04-30 NOTE — TELEPHONE ENCOUNTER
Patient Call    Who are you speaking with? Patient    If it is not the patient, are they listed on an active communication consent form? N/A   What is the reason for this call? The patient states his phone keeps blocking our calls because we are not in his contact list, he can't add us to his contact because we call off different numbers.     The patient would like the office to send him a [place] message instead to go over everything.     The patient would like to discuss what brand of ferrous sulfate and multivitamins he should be taking. Patient also states he will be going for his lab work tomorrow.    Does this require a call back? N/A   If a call back is required, please list best call back number N/A    If a call back is required, advise that a message will be forwarded to their care team and someone will return their call as soon as possible.   Did you relay this information to the patient? N/A

## 2024-04-30 NOTE — TELEPHONE ENCOUNTER
Patient Call    Who are you speaking with? Patient    If it is not the patient, are they listed on an active communication consent form? N/A   What is the reason for this call? Retuning a call from Tess   Does this require a call back? Yes   If a call back is required, please list best call back number [phone]    If a call back is required, advise that a message will be forwarded to their care team and someone will return their call as soon as possible.   Did you relay this information to the patient? Yes

## 2024-04-30 NOTE — TELEPHONE ENCOUNTER
Vikki and confirmed that he is not taking any supplements at this time apart from b12 when he remembers. Patient is going for blood work tomorrow, he is aware of lab orders being put in      Entering lab order, routing to [place]

## 2024-05-01 ENCOUNTER — APPOINTMENT (OUTPATIENT)
Dept: LAB | Facility: CLINIC | Age: 59
End: 2024-05-01

## 2024-05-01 ENCOUNTER — OFFICE VISIT (OUTPATIENT)
Dept: PHYSICAL THERAPY | Age: 59
End: 2024-05-01
Payer: COMMERCIAL

## 2024-05-01 DIAGNOSIS — M62.830 LUMBAR PARASPINAL MUSCLE SPASM: Primary | ICD-10-CM

## 2024-05-01 DIAGNOSIS — Z00.6 ENCOUNTER FOR EXAMINATION FOR NORMAL COMPARISON OR CONTROL IN CLINICAL RESEARCH PROGRAM: ICD-10-CM

## 2024-05-01 DIAGNOSIS — M47.816 LUMBAR FACET ARTHROPATHY: ICD-10-CM

## 2024-05-01 PROCEDURE — 36415 COLL VENOUS BLD VENIPUNCTURE: CPT

## 2024-05-01 PROCEDURE — 97113 AQUATIC THERAPY/EXERCISES: CPT

## 2024-05-01 NOTE — PROGRESS NOTES
Daily Note     Today's date: 2024  Patient name: Matt Tobias Sr.  : 1965  MRN: [id number]  Referring provider: Hugh Horn MD  Dx:   Encounter Diagnosis     ICD-10-CM    1. Lumbar paraspinal muscle spasm  M62.830       2. Lumbar facet arthropathy  M47.816           Start Time: 1405  Stop Time: 1500  Total time in clinic (min): 55 minutes    Subjective: pt notes min pain today in LB 1/10. He states he has been taking a muscle relaxer but he didn't take one today.       Objective: See treatment diary below      Assessment: Tolerated treatment fairly well. Pt able to complete all ex's as per flowsheet w/o complaints. Pt has a slow and steady gait. Progressing w/ ex's to tolerance. Patient would benefit from continued PT      Plan: Progress note during next visit.      POC expires Unit limit Auth Expiration date PT/OT + Visit Limit?   2024  done 12     10/31/24 12                     Visit/Unit Tracking  AUTH Status:  Date 3/20 3/22 3/29 4/1 4/3 4/8 4/10 4/11 4/15 4/22 4/24 4/25   Auth needed - done Used 1 2 3 4 5 6 7 8 9 10 11 12    Remaining  11 10 9 8 7 6 5 4 3 2 1 0    FOTO                 Visit/Unit Tracking  AUTH Status:  Date               12 visits approved Used 1               Remaining  11               FOTO                             Daily Treatment Diary     Date 4/22 4/24 4/25 5/1  4/10 4/11 4/15   Patient education           Water Walk (FW, BW, side) x10’  10' 10' 10 10 10 10 10 5'   LE work at wall               Hip FF/ext swing  2 2 2 2 2 2 2 2     Toe/heel  1 1 1 1 1 1 1      Hip ABD/ADD  2 2 2 2 2 2 2      March 1 1 1 1 1 1 1 1     Knee flexion & extension 1 1 1 1 1 1 1 1     Squats 1 1 1 1 1 1 1      UE noodle x3             Push/pull              Rotate  1 1 1 1 1 1 1 1     Row forward & back   2 2 2 2 2 2      OH side bend            UE/Core (AROM, paddles, MB)              ABD/ADD              Horizontal Pec Fly              Alt. arm swing (shld flex/ext)               "Push pull              Single paddle rotation           STS6#MB  x20 x20 x20       Aqua Step (FW, lat, eccentric)  1' D/c      FW 1'    Core work:              MF press (red, blue, blk) 5x yellow 5''x7ea 5''x5ea 5''x8ea Y x5ea 5''x5 5x 5x      Kickboard press (blue, red)                Row/ext w/ tubing (red, blue, blk) 2' 2' yellow 2 2 2 2 2    Seated on bench             ankle DF/PF              March              ABD/ADD              Knee flexion/extension            DW TX - hang in the deep water  10' 10' 10 10 10 10 10 10     DW ABD/ADD              DW Bicycle  6' 5 5 6 6 6 6      DW Scissor hip flexion/extension              DW DKTC   1' 1        Stretches              HS at step  2x 2x 1 2 2 2 2      Wall stretches              Calf stretch at wall  3x15\"           Hot Tub - 10 minutes only 10 10' 10 10 10 10 10               "

## 2024-05-02 ENCOUNTER — OFFICE VISIT (OUTPATIENT)
Dept: PHYSICAL THERAPY | Age: 59
End: 2024-05-02
Payer: COMMERCIAL

## 2024-05-02 ENCOUNTER — TELEPHONE (OUTPATIENT)
Dept: HEMATOLOGY ONCOLOGY | Facility: CLINIC | Age: 59
End: 2024-05-02

## 2024-05-02 DIAGNOSIS — M62.830 LUMBAR PARASPINAL MUSCLE SPASM: Primary | ICD-10-CM

## 2024-05-02 DIAGNOSIS — M47.816 LUMBAR FACET ARTHROPATHY: ICD-10-CM

## 2024-05-02 PROCEDURE — 97113 AQUATIC THERAPY/EXERCISES: CPT

## 2024-05-02 NOTE — PROGRESS NOTES
Daily Note     Today's date: 2024  Patient name: Matt Tobias Sr.  : 1965  MRN: [id number]  Referring provider: Hugh Horn MD  Dx:   Encounter Diagnosis     ICD-10-CM    1. Lumbar paraspinal muscle spasm  M62.830       2. Lumbar facet arthropathy  M47.816           Start Time: 1415  Stop Time: 1503  Total time in clinic (min): 48 minutes    Subjective: pt notes he thinks he slept wrong and has a little more pain today -3/10.       Objective: See treatment diary below  Pt 15 min late to appt today modified session, pt seen 1:1 for 30 min    Assessment: Tolerated treatment fairly well. Pt is still very slow w/ WW.  Modified session today due to pt being late to appt. Patient would benefit from continued PT      Plan: Progress note during next visit.      POC expires Unit limit Auth Expiration date PT/OT + Visit Limit?   2024  done 12     10/31/24 12                     Visit/Unit Tracking  AUTH Status:  Date 3/20 3/22 3/29 4/1 4/3 4/8 4/10 4/11 4/15 4/22 4/24 4/25   Auth needed - done Used 1 2 3 4 5 6 7 8 9 10 11 12    Remaining  11 10 9 8 7 6 5 4 3 2 1 0    FOTO                 Visit/Unit Tracking  AUTH Status:  Date              12 visits approved Used 1 2              Remaining  11 10              FOTO                             Daily Treatment Diary     Date 4/22 4/24 4/25 5/1 5/2  4/11 4/15   Patient education           Water Walk (FW, BW, side) x10’  10' 10' 10 10 5 10 10 5'   LE work at wall               Hip FF/ext swing  2 2 2 2 2 2 2 2     Toe/heel  1 1 1 1 1 1 1      Hip ABD/ADD  2 2 2 2 2 2 2       1 1 1 1 1 1 1     Knee flexion & extension 1 1 1 1 1 1 1 1     Squats 1 1 1 1  1 1      UE noodle x3             Push/pull              Rotate  1 1 1 1  1 1 1     Row forward & back   2 2 2 2 2 2      OH side bend            UE/Core (AROM, paddles, MB)              ABD/ADD              Horizontal Pec Fly              Alt. arm swing (shld flex/ext)              Push pull    "           Single paddle rotation           STS6#MB  x20 x20 x20 x20      Aqua Step (FW, lat, eccentric)  1' D/c      FW 1'    Core work:              MF press (red, blue, blk) 5x yellow 5''x7ea 5''x5ea 5''x8ea Y x8ea 5''x5 5x 5x      Kickboard press (blue, red)                Row/ext w/ tubing (red, blue, blk) 2' 2' yellow 2 2 2 2 2    Seated on bench             ankle DF/PF              March              ABD/ADD              Knee flexion/extension            DW TX - hang in the deep water  10' 10' 10 10 10 10 10 10     DW ABD/ADD              DW Bicycle  6' 5 5 6 6 6 6      DW Scissor hip flexion/extension              DW DKTC   1' 1        Stretches              HS at step  2x 2x 1 2 2 2 2      Wall stretches              Calf stretch at wall  3x15\"           Hot Tub - 10 minutes only 10 10' 10 10 10 10 10               "

## 2024-05-02 NOTE — TELEPHONE ENCOUNTER
Called patient and answered questions regarding labs. He went to have labs drawn and they said they didn't see labs Kalpana had entered.  Pt asked that I call [facility] lab and coordinate. I informed I will do this tomorrow morning and make sure they can see the script. The patient is going away for a week and will have labs done tomorrow.

## 2024-05-02 NOTE — TELEPHONE ENCOUNTER
Pt called again regarding this. Pt states his phone goes right to voicemail if the phone number is not stored in his phone. He is requesting a phone number that he can call directly so he can get his test results.

## 2024-05-02 NOTE — TELEPHONE ENCOUNTER
Patient Call    Who are you speaking with? Patient    If it is not the patient, are they listed on an active communication consent form? Yes   What is the reason for this call? Lab results, but caller phone goes right to voicemail   Does this require a call back? Yes   If a call back is required, please list best call back number [phone] or leave message in Check would be better   If a call back is required, advise that a message will be forwarded to their care team and someone will return their call as soon as possible.   Did you relay this information to the patient? Yes

## 2024-05-03 ENCOUNTER — APPOINTMENT (OUTPATIENT)
Dept: LAB | Facility: CLINIC | Age: 59
End: 2024-05-03
Payer: COMMERCIAL

## 2024-05-03 ENCOUNTER — OFFICE VISIT (OUTPATIENT)
Dept: FAMILY MEDICINE CLINIC | Facility: CLINIC | Age: 59
End: 2024-05-03
Payer: COMMERCIAL

## 2024-05-03 VITALS
BODY MASS INDEX: 38.12 KG/M2 | SYSTOLIC BLOOD PRESSURE: 118 MMHG | DIASTOLIC BLOOD PRESSURE: 70 MMHG | HEIGHT: 65 IN | WEIGHT: 228.8 LBS | OXYGEN SATURATION: 98 % | HEART RATE: 63 BPM | TEMPERATURE: 97.8 F

## 2024-05-03 DIAGNOSIS — D75.839 THROMBOCYTHEMIA: ICD-10-CM

## 2024-05-03 DIAGNOSIS — M54.50 MIDLINE LOW BACK PAIN WITHOUT SCIATICA, UNSPECIFIED CHRONICITY: ICD-10-CM

## 2024-05-03 DIAGNOSIS — M51.36 NARROWING OF LUMBAR INTERVERTEBRAL DISC SPACE: ICD-10-CM

## 2024-05-03 DIAGNOSIS — K91.2 POSTSURGICAL MALABSORPTION: ICD-10-CM

## 2024-05-03 DIAGNOSIS — D75.89 MACROCYTOSIS WITHOUT ANEMIA: ICD-10-CM
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
## 2024-05-03 DIAGNOSIS — M54.50 LUMBAR PAIN: Primary | ICD-10-CM

## 2024-05-03 PROCEDURE — 99213 OFFICE O/P EST LOW 20 MIN: CPT

## 2024-05-03 PROCEDURE — 36415 COLL VENOUS BLD VENIPUNCTURE: CPT
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
## 2024-05-13 ENCOUNTER — APPOINTMENT (OUTPATIENT)
Dept: PHYSICAL THERAPY | Age: 59
End: 2024-05-13
Payer: COMMERCIAL

## 2024-05-14 ENCOUNTER — OFFICE VISIT (OUTPATIENT)
Dept: HEMATOLOGY ONCOLOGY | Facility: CLINIC | Age: 59
End: 2024-05-14
Payer: COMMERCIAL

## 2024-05-14 VITALS
HEART RATE: 60 BPM | OXYGEN SATURATION: 95 % | TEMPERATURE: 98.9 F | WEIGHT: 230 LBS | BODY MASS INDEX: 38.32 KG/M2 | SYSTOLIC BLOOD PRESSURE: 132 MMHG | DIASTOLIC BLOOD PRESSURE: 68 MMHG | RESPIRATION RATE: 16 BRPM | HEIGHT: 65 IN

## 2024-05-14 DIAGNOSIS — E53.8 B12 DEFICIENCY: Primary | ICD-10-CM

## 2024-05-14 DIAGNOSIS — D75.839 THROMBOCYTHEMIA: ICD-10-CM

## 2024-05-14 DIAGNOSIS — R79.0 ABNORMAL SERUM IRON LEVEL: ICD-10-CM

## 2024-05-14 PROCEDURE — 99213 OFFICE O/P EST LOW 20 MIN: CPT | Performed by: PHYSICIAN ASSISTANT

## 2024-05-14 NOTE — PROGRESS NOTES
[facility] HEMATOLOGY ONCOLOGY SPECIALISTS [place]  [address] [address]  Hematology Ambulatory Follow-Up  Matt EDUAR Micheline Renteria., 1965, [id number]  5/14/2024      Assessment and Plan   1. B12 deficiency  2. Thrombocythemia  3. Abnormal serum iron  - cyanocobalamin (VITAMIN B-12) 1000 MCG tablet; Take 1 tablet (1,000 mcg total) by mouth daily  Dispense: 30 tablet; Refill: 2  - CBC and differential; Standing  - Vitamin B12; Future  - Methylmalonic acid, serum; Future    59-year-old male with history of cirrhosis who who is seen in follow up today for macrocytosis, thrombocytopenia.  On chart review, thrombocytopenia has been present since at least 2017 with platelet count ranging in the ,000's.  Patient has not had any major bleeding or hospitalization related to his low platelet count.  Spleen top normal on abdominal imaging last year. Labs also demonstrate very mild macrocytosis without anemia. No alcohol use. He denies any constitutional symptoms.  most recent lab -> WBC 6.34, hemoglobin 13.6, MCV 99, platelets 128,000.  B12 level is low.  Folate, SPEP were normal.  He did have an iron panel that showed differential showed elevated iron levels, not on oral iron.   Hereditary hemochromatosis testing was ordered and is pending.     Discussion/Plan   Suspect thrombocytopenia due to spleen sequestration in setting of portal hypertension vs less likely ITP or bone marrow d/o.  PLT count is stable. Recommend continued observation with CBCD q3m.  If he develops other cytopenias or constitutional symptoms, would consider bone marrow biopsy.  Consider platelet transfusion in the future for platelet count less than 15,000 or less than 50,000 with any bleeding.   For macrocytosis, suspect this is also due to liver disease vs b12 deficiency. Begin b12 1,000mcg po daily. Rx sent to pharmacy per patient request. May be purchased OTC. Repeat B12 level in 3m  Will follow-up  on hemochromatosis mutation testing and call patient with results when they are available to me.  CBCD q3m   RTC 6m     Patient voiced agreement and understanding to the above.   Patient advised to call the Hematology/Oncology office with any questions and concerns regarding the above.    Barrier(s) to care: None  The patient is able to self care.    Kalpana Samaniego PA-C   Medical Oncology/Hematology  [facility]    Subjective   No chief complaint on file.      History of present illness: 59-year-old male with past medical history of hepatic cirrhosis due to chronic hep C infection, esophageal varices, ELLEN, gastric sleeve who has been referred by his primary care provider for evaluation of anemia, thrombocytopenia.     Macrocytosis without anemia   On chart review, patient has elevated MCV without evidence of anemia, which has been intermittent since at least 2019.  He has never required IV iron or blood transfusion in the past.  Most recent labs demonstrate WBC 4.4, hemoglobin 12.2, , platelet count 109,000.  Differential shows mild relative increase in eosinophils 7% otherwise normal.  Last B12 308 and Folate 8.1 in 2022. He has history of gastric sleeve in 2022.  No other known malabsorption condition or autoimmune disease.has known history of esophageal varices on prior endoscopies.  Last EGD 05/2023 showed 3 columns of grade 1 esophageal varices with no bleeding.  Last colonoscopy in 2015 and reportedly had a single hyperplastic polyp removed.  Record not available for review. Currently not on any oral iron or B12 supplements.  He is on chronic PPI. Consumes red meat.  Does not consume vegetables regularly regularly.  Currently, no alcohol use or drug use.  Admits to occasional tobacco use.  No known family history of cancer.      04/2024: WBC 6.34, hemoglobin 13.6, MCV 99, platelets 128,000. Differential shows relative increase in eosinophils with normal absolute eosinophil  count. Folate 10.9. B12 216. Iron panel revealed elevated iron levels with low UIBC. Ferritin 58.SPEP/IF without monoclonal gammopathy      2.  Thrombocytopenia  Has been present since at least 2017.  Ranging from ,000.  No bleeding complications or hospitalizations related to low platelets.  - 04/2024: ,000      3. Cirrhosis with esophageal varices   - Diagnosed in 1990s from hep C    - Follows with gastroenterology   - Next EGD 05/2023     4. Right portal vein thrombus 2013  - Took ac for a few months, now off     05/14/24 :  Lab Results   Component Value Date    IRON 271 (H) 04/26/2024    TIBC  04/26/2024      Comment:      Unable to calculate.    FERRITIN 58 04/26/2024     Lab Results   Component Value Date    WBC 6.34 04/26/2024    HGB 13.6 04/26/2024    HCT 39.5 04/26/2024    MCV 99 (H) 04/26/2024     (L) 04/26/2024       Interval history: Patient denies acute complaints.  No fever, night sweats, unintentional weight loss, hematochezia or melena.  Denies alcohol use.     Review of Systems   All other systems reviewed and are negative.      Patient Active Problem List   Diagnosis    Former smoker    Erectile dysfunction    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)    Gastroesophageal reflux disease without esophagitis    Morbid obesity with BMI of 40.0-44.9, adult (HCC)    Obstructive sleep apnea    Secondary esophageal varices without bleeding (HCC)    Hepatic encephalopathy (HCC)    S/P laparoscopic sleeve gastrectomy    Mood swings    Acute pain of right knee    Effusion of right knee    Anemia    Dizzy    Postsurgical malabsorption    Esophageal varices without bleeding (HCC)    Need for Tdap vaccination    Midline low back pain without sciatica    Excess skin of abdominal wall    Myofascial muscle pain    Hyperammonemia (HCC)    Vitamin A deficiency    Need for influenza vaccination    Hypotension    Heartburn    S/P abdominoplasty    Encounter for surgical aftercare  following surgery of digestive system    Platelets decreased (HCC)    Leukocytosis    Bowel and bladder incontinence    Hyperbilirubinemia    Tremor    Acute bilateral low back pain with left-sided sciatica     Past Medical History:   Diagnosis Date    CPAP (continuous positive airway pressure) dependence     does not use machine    Esophageal varices (HCC)     stable 9/2020 gets checked yearly    Hepatic cirrhosis (HCC)     treated with harvoni   Hep C- dx age 1995    Hepatic encephalopathy (HCC)     Liver disease     Obesity     Pneumonia     in past    Postgastrectomy malabsorption     Sleep apnea     Wears glasses      Past Surgical History:   Procedure Laterality Date    COLONOSCOPY      ESOPHAGOGASTRODUODENOSCOPY N/A 4/12/2018    Procedure: ESOPHAGOGASTRODUODENOSCOPY (EGD);  Surgeon: Willy Traylor MD;  Location: [facility];  Service: Gastroenterology    FRACTURE SURGERY Left     ankle    OTHER SURGICAL HISTORY      banding esophageal varices    GA EXCISION EXCESSIVE SKIN & SUBQ TISSUE ABDOMEN N/A 9/16/2020    Procedure: ABDOMINOPLASTY;  Surgeon: Enrrique Birmingham MD;  Location: [facility];  Service: Plastics    GA EXCISION SKIN ABD INFRAUMBILICAL PANNICULECTOMY N/A 9/16/2020    Procedure: PANNICULECTOMY;  Surgeon: Enrrique Birmingham MD;  Location: [facility];  Service: Plastics    GA LAPS GSTRC RSTRICTIV PX LONGITUDINAL GASTRECTOMY N/A 6/5/2018    Procedure: GASTRECTOMY LAPAROSCOPIC SLEEVE;  Surgeon: Diana Mcdonnell MD;  Location: [facility];  Service: Bariatrics     Family History   Problem Relation Age of Onset    Heart disease Mother     Lupus Mother     Diabetes Father     Stroke Father      Social History     Socioeconomic History    Marital status: /Civil Union     Spouse name: None    Number of children: 2    Years of education: None    Highest education level: None   Occupational History    Occupation: disabled   Tobacco Use    Smoking status: Former     Current packs/day: 0.00      Average packs/day: 0.3 packs/day for 37.0 years (9.3 ttl pk-yrs)     Types: Cigarettes     Start date: 1985     Quit date: 2022     Years since quittin.4    Smokeless tobacco: Never    Tobacco comments:     stopped 2020   Vaping Use    Vaping status: Every Day    Substances: Flavoring   Substance and Sexual Activity    Alcohol use: Never     Alcohol/week: 0.0 standard drinks of alcohol    Drug use: No    Sexual activity: Yes   Other Topics Concern    None   Social History Narrative    None     Social Determinants of Health     Financial Resource Strain: Low Risk  (2024)    Overall Financial Resource Strain (CARDIA)     Difficulty of Paying Living Expenses: Not hard at all   Food Insecurity: Not on file   Transportation Needs: No Transportation Needs (2024)    PRAPARE - Transportation     Lack of Transportation (Medical): No     Lack of Transportation (Non-Medical): No   Physical Activity: Not on file   Stress: Not on file   Social Connections: Not on file   Intimate Partner Violence: Not on file   Housing Stability: Not on file       Current Outpatient Medications:     AMILoride 5 mg tablet, Take 1 tablet (5 mg total) by mouth daily, Disp: 90 tablet, Rfl: 1    carvedilol (COREG) 6.25 mg tablet, Take 1 tablet (6.25 mg total) by mouth 2 (two) times a day with meals, Disp: 180 tablet, Rfl: 3    Claritin-D 12 Hour 5-120 MG per tablet, TAKE ONE TABLET BY MOUTH TWICE DAILY FOR 5 DAYS, Disp: 10 tablet, Rfl: 0    fluticasone (FLONASE) 50 mcg/act nasal spray, 1 spray into each nostril daily, Disp: 48 g, Rfl: 3    furosemide (LASIX) 20 mg tablet, Take 1 tablet (20 mg total) by mouth 2 (two) times a day, Disp: 180 tablet, Rfl: 0    ibuprofen (MOTRIN) 800 mg tablet, TAKE 1 TABLET (800 MG TOTAL) BY MOUTH EVERY EIGHT (EIGHT) HOURS AS NEEDED FOR MILD PAIN, Disp: 30 tablet, Rfl: 0    lactulose (CHRONULAC) 10 g/15 mL solution, TAKE 15 ML (10 G TOTAL) BY MOUTH DAILY AS NEEDED (CONSTIPATION OR CONFUSION),  "Disp: 946 mL, Rfl: 0    methocarbamol (ROBAXIN) 750 mg tablet, TAKE 1 TABLET BY MOUTH EVERY EIGHT HOURS, Disp: 30 tablet, Rfl: 0    omeprazole (PriLOSEC) 20 mg delayed release capsule, Take 1 capsule (20 mg total) by mouth daily, Disp: 90 capsule, Rfl: 0    traMADol (ULTRAM) 50 mg tablet, Take 1 tablet by mouth every 6 hours as needed for moderate pain, Disp: 60 tablet, Rfl: 0    triamcinolone (KENALOG) 0.5 % cream, Apply topically 2 (two) times a day, Disp: 45 g, Rfl: 3    lidocaine (Lidoderm) 5 %, Apply 1 patch topically over 12 hours daily Remove & Discard patch within 12 hours or as directed by MD (Patient not taking: Reported on 4/16/2024), Disp: 10 patch, Rfl: 0    methylPREDNISolone 4 MG tablet therapy pack, Use as directed on package (Patient not taking: Reported on 4/16/2024), Disp: 21 each, Rfl: 0    sildenafil (VIAGRA) 100 mg tablet, Take 1 tablet (100 mg total) by mouth daily as needed for erectile dysfunction (Patient not taking: Reported on 4/16/2024), Disp: 90 tablet, Rfl: 0  No Known Allergies    Objective   Resp 16   Ht 5' 5\" (1.651 m)   Wt 104 kg (230 lb)   BMI 38.27 kg/m²    Physical Exam  Vitals reviewed.   Constitutional:       Appearance: Normal appearance.   Eyes:      General: No scleral icterus.     Extraocular Movements: Extraocular movements intact.      Pupils: Pupils are equal, round, and reactive to light.   Cardiovascular:      Rate and Rhythm: Normal rate.   Pulmonary:      Effort: Pulmonary effort is normal.   Abdominal:      General: There is no distension.   Musculoskeletal:      Cervical back: Normal range of motion and neck supple.   Skin:     Coloration: Skin is not jaundiced.         Result Review  Labs:  Appointment on 04/26/2024   Component Date Value Ref Range Status    Vitamin B-12 04/26/2024 216  180 - 914 pg/mL Final    Folate 04/26/2024 10.9  >5.9 ng/mL Final    The World Health Organization has determined deficient folate concentrations are considered to be <4.0 " ng/mL.    WBC 04/26/2024 6.34  4.31 - 10.16 Thousand/uL Final    RBC 04/26/2024 3.98  3.88 - 5.62 Million/uL Final    Hemoglobin 04/26/2024 13.6  12.0 - 17.0 g/dL Final    Hematocrit 04/26/2024 39.5  36.5 - 49.3 % Final    MCV 04/26/2024 99 (H)  82 - 98 fL Final    MCH 04/26/2024 34.2  26.8 - 34.3 pg Final    MCHC 04/26/2024 34.4  31.4 - 37.4 g/dL Final    RDW 04/26/2024 14.5  11.6 - 15.1 % Final    MPV 04/26/2024 12.2  8.9 - 12.7 fL Final    Platelets 04/26/2024 128 (L)  149 - 390 Thousands/uL Final    nRBC 04/26/2024 0  /100 WBCs Final    Segmented % 04/26/2024 63  43 - 75 % Final    Immature Grans % 04/26/2024 1  0 - 2 % Final    Lymphocytes % 04/26/2024 21  14 - 44 % Final    Monocytes % 04/26/2024 7  4 - 12 % Final    Eosinophils Relative 04/26/2024 7 (H)  0 - 6 % Final    Basophils Relative 04/26/2024 1  0 - 1 % Final    Absolute Neutrophils 04/26/2024 4.02  1.85 - 7.62 Thousands/µL Final    Absolute Immature Grans 04/26/2024 0.04  0.00 - 0.20 Thousand/uL Final    Absolute Lymphocytes 04/26/2024 1.34  0.60 - 4.47 Thousands/µL Final    Absolute Monocytes 04/26/2024 0.47  0.17 - 1.22 Thousand/µL Final    Eosinophils Absolute 04/26/2024 0.42  0.00 - 0.61 Thousand/µL Final    Basophils Absolute 04/26/2024 0.05  0.00 - 0.10 Thousands/µL Final    Sodium 04/26/2024 140  135 - 147 mmol/L Final    Potassium 04/26/2024 3.9  3.5 - 5.3 mmol/L Final    Chloride 04/26/2024 105  96 - 108 mmol/L Final    CO2 04/26/2024 27  21 - 32 mmol/L Final    ANION GAP 04/26/2024 8  4 - 13 mmol/L Final    BUN 04/26/2024 14  5 - 25 mg/dL Final    Creatinine 04/26/2024 0.81  0.60 - 1.30 mg/dL Final    Standardized to IDMS reference method    Glucose, Fasting 04/26/2024 105 (H)  65 - 99 mg/dL Final    Calcium 04/26/2024 8.8  8.4 - 10.2 mg/dL Final    AST 04/26/2024 50 (H)  13 - 39 U/L Final    ALT 04/26/2024 28  7 - 52 U/L Final    Specimen collection should occur prior to Sulfasalazine administration due to the potential for falsely  depressed results.     Alkaline Phosphatase 04/26/2024 77  34 - 104 U/L Final    Total Protein 04/26/2024 5.9 (L)  6.4 - 8.4 g/dL Final    Albumin 04/26/2024 3.6  3.5 - 5.0 g/dL Final    Total Bilirubin 04/26/2024 4.79 (H)  0.20 - 1.00 mg/dL Final    Use of this assay is not recommended for patients undergoing treatment with eltrombopag due to the potential for falsely elevated results.  N-acetyl-p-benzoquinone imine (metabolite of Acetaminophen) will generate erroneously low results in samples for patients that have taken an overdose of Acetaminophen.    eGFR 04/26/2024 97  ml/min/1.73sq m Final    A/G Ratio 04/26/2024 1.39  1.10 - 1.80 Final    Albumin Electrophoresis 04/26/2024 58.1  48.0 - 70.0 % Final    Albumin CONC 04/26/2024 3.49  3.20 - 5.10 g/dl Final    Alpha 1 04/26/2024 3.7  1.8 - 7.0 % Final    ALPHA 1 CONC 04/26/2024 0.22  0.15 - 0.47 g/dL Final    Alpha 2 04/26/2024 7.8  5.9 - 14.9 % Final    ALPHA 2 CONC 04/26/2024 0.47  0.42 - 1.04 g/dL Final    Beta-1 04/26/2024 6.3  4.7 - 7.7 % Final    BETA 1 CONC 04/26/2024 0.38  0.31 - 0.57 g/dL Final    Beta-2 04/26/2024 7.2  3.1 - 7.9 % Final    BETA 2 CONC 04/26/2024 0.43  0.20 - 0.58 g/dL Final    Gamma Globulin 04/26/2024 16.9  6.9 - 22.3 % Final    GAMMA CONC 04/26/2024 1.01  0.40 - 1.66 g/dL Final    Total Protein 04/26/2024 6.0 (L)  6.4 - 8.2 g/dL Final    SPEP Interpretation 04/26/2024 See Comment   Final    The SPEP shows an abnormal distribution in the gamma region. Immunofixation to be performed. Reviewed by:  Oswald Keane MD  **Electronic Signature**    Iron Saturation 04/26/2024    Final    Unable to Calculate.    TIBC 04/26/2024    Final    Unable to calculate.    Iron 04/26/2024 271 (H)  50 - 212 ug/dL Final    Patients treated with metal-binding drugs (ie. Deferoxamine) may have depressed iron values.    UIBC 04/26/2024 <55 (L)  155 - 355 ug/dL Final    Ferritin 04/26/2024 58  24 - 336 ng/mL Final    Immunofixation Interpretation  04/26/2024 See Comment   Final    Serum immunofixation shows no monoclonal immunoglobulins.  Reviewed by:  Oswald Keane MD **Electronic Signature**       Imaging:   I reviewed relevant imaging    Please note:  This report has been generated by a voice recognition software system. Therefore there may be syntax, spelling, and/or grammatical errors. Please call if you have any questions.

## 2024-05-15 ENCOUNTER — EVALUATION (OUTPATIENT)
Dept: PHYSICAL THERAPY | Age: 59
End: 2024-05-15
Payer: COMMERCIAL

## 2024-05-15 DIAGNOSIS — M47.816 LUMBAR FACET ARTHROPATHY: ICD-10-CM

## 2024-05-15 DIAGNOSIS — M62.830 LUMBAR PARASPINAL MUSCLE SPASM: Primary | ICD-10-CM

## 2024-05-15 PROCEDURE — 97530 THERAPEUTIC ACTIVITIES: CPT
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
## 2024-05-17 ENCOUNTER — APPOINTMENT (OUTPATIENT)
Dept: PHYSICAL THERAPY | Age: 59
End: 2024-05-17
Payer: COMMERCIAL

## 2024-05-17 ENCOUNTER — HOSPITAL ENCOUNTER (OUTPATIENT)
Dept: MRI IMAGING | Facility: HOSPITAL | Age: 59
End: 2024-05-17
Payer: COMMERCIAL

## 2024-05-17 DIAGNOSIS — M51.36 NARROWING OF LUMBAR INTERVERTEBRAL DISC SPACE: ICD-10-CM

## 2024-05-17 DIAGNOSIS — M54.50 MIDLINE LOW BACK PAIN WITHOUT SCIATICA, UNSPECIFIED CHRONICITY: ICD-10-CM

## 2024-05-17 DIAGNOSIS — M54.50 LUMBAR PAIN: ICD-10-CM
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
## 2024-05-17 PROCEDURE — 72148 MRI LUMBAR SPINE W/O DYE: CPT

## 2024-05-23 ENCOUNTER — ANESTHESIA (OUTPATIENT)
Dept: GASTROENTEROLOGY | Facility: HOSPITAL | Age: 59
End: 2024-05-23

## 2024-05-23 ENCOUNTER — APPOINTMENT (OUTPATIENT)
Dept: PHYSICAL THERAPY | Age: 59
End: 2024-05-23
Payer: COMMERCIAL

## 2024-05-23 ENCOUNTER — HOSPITAL ENCOUNTER (OUTPATIENT)
Dept: GASTROENTEROLOGY | Facility: HOSPITAL | Age: 59
Setting detail: OUTPATIENT SURGERY
End: 2024-05-23
Payer: COMMERCIAL

## 2024-05-23 ENCOUNTER — ANESTHESIA EVENT (OUTPATIENT)
Dept: GASTROENTEROLOGY | Facility: HOSPITAL | Age: 59
End: 2024-05-23

## 2024-05-23 VITALS
DIASTOLIC BLOOD PRESSURE: 63 MMHG | HEART RATE: 61 BPM | OXYGEN SATURATION: 96 % | HEIGHT: 66 IN | BODY MASS INDEX: 36.88 KG/M2 | TEMPERATURE: 97.9 F | WEIGHT: 229.5 LBS | SYSTOLIC BLOOD PRESSURE: 120 MMHG | RESPIRATION RATE: 17 BRPM

## 2024-05-23 DIAGNOSIS — I85.10 SECONDARY ESOPHAGEAL VARICES WITHOUT BLEEDING (HCC): ICD-10-CM

## 2024-05-23 PROCEDURE — 43239 EGD BIOPSY SINGLE/MULTIPLE: CPT | Performed by: INTERNAL MEDICINE

## 2024-05-23 PROCEDURE — 88342 IMHCHEM/IMCYTCHM 1ST ANTB: CPT | Performed by: PATHOLOGY

## 2024-05-23 PROCEDURE — 88305 TISSUE EXAM BY PATHOLOGIST: CPT | Performed by: PATHOLOGY

## 2024-05-23 RX ORDER — LIDOCAINE HYDROCHLORIDE 20 MG/ML
INJECTION, SOLUTION EPIDURAL; INFILTRATION; INTRACAUDAL; PERINEURAL AS NEEDED
Status: DISCONTINUED | OUTPATIENT
Start: 2024-05-23 | End: 2024-05-23

## 2024-05-23 RX ORDER — PROPOFOL 10 MG/ML
INJECTION, EMULSION INTRAVENOUS AS NEEDED
Status: DISCONTINUED | OUTPATIENT
Start: 2024-05-23 | End: 2024-05-23

## 2024-05-23 RX ORDER — SODIUM CHLORIDE, SODIUM LACTATE, POTASSIUM CHLORIDE, CALCIUM CHLORIDE 600; 310; 30; 20 MG/100ML; MG/100ML; MG/100ML; MG/100ML
75 INJECTION, SOLUTION INTRAVENOUS CONTINUOUS
Status: DISCONTINUED | OUTPATIENT
Start: 2024-05-23 | End: 2024-05-27 | Stop reason: HOSPADM

## 2024-05-23 RX ADMIN — LIDOCAINE HYDROCHLORIDE 100 MG: 20 INJECTION, SOLUTION EPIDURAL; INFILTRATION; INTRACAUDAL; PERINEURAL at 09:10

## 2024-05-23 RX ADMIN — SODIUM CHLORIDE, SODIUM LACTATE, POTASSIUM CHLORIDE, AND CALCIUM CHLORIDE 75 ML/HR: .6; .31; .03; .02 INJECTION, SOLUTION INTRAVENOUS at 08:45

## 2024-05-23 RX ADMIN — PROPOFOL 170 MG: 10 INJECTION, EMULSION INTRAVENOUS at 09:10

## 2024-05-23 RX ADMIN — PROPOFOL 30 MG: 10 INJECTION, EMULSION INTRAVENOUS at 09:12

## 2024-05-23 NOTE — H&P
History and Physical - [facility]  Matt Tobias Sr. 59 y.o. male MRN: [id number]                  HPI: Matt Tobias Sr. is a 59 y.o. year old male who presents for endoscopy for GERD and varices      REVIEW OF SYSTEMS: Per the HPI, and otherwise unremarkable.    Historical Information   Past Medical History:   Diagnosis Date    CPAP (continuous positive airway pressure) dependence     does not use machine    Esophageal varices (HCC)     stable 9/2020 gets checked yearly    Hepatic cirrhosis (HCC)     treated with harvoni   Hep C- dx age 1995    Hepatic encephalopathy (HCC)     Liver disease     Obesity     Pneumonia     in past    Postgastrectomy malabsorption     Sleep apnea     Wears glasses      Past Surgical History:   Procedure Laterality Date    COLONOSCOPY      ESOPHAGOGASTRODUODENOSCOPY N/A 4/12/2018    Procedure: ESOPHAGOGASTRODUODENOSCOPY (EGD);  Surgeon: Willy Traylor MD;  Location: [facility];  Service: Gastroenterology    FRACTURE SURGERY Left     ankle    OTHER SURGICAL HISTORY      banding esophageal varices    NV EXCISION EXCESSIVE SKIN & SUBQ TISSUE ABDOMEN N/A 9/16/2020    Procedure: ABDOMINOPLASTY;  Surgeon: Enrrique Birmingham MD;  Location: [facility];  Service: Plastics    NV EXCISION SKIN ABD INFRAUMBILICAL PANNICULECTOMY N/A 9/16/2020    Procedure: PANNICULECTOMY;  Surgeon: Enrrique Birmingham MD;  Location: [facility];  Service: Plastics    NV LAPS GSTRC RSTRICTIV PX LONGITUDINAL GASTRECTOMY N/A 6/5/2018    Procedure: GASTRECTOMY LAPAROSCOPIC SLEEVE;  Surgeon: Diana Mcdonnell MD;  Location: [facility];  Service: Bariatrics     Social History   Social History     Substance and Sexual Activity   Alcohol Use Not Currently    Comment: occasionally     Social History     Substance and Sexual Activity   Drug Use No     Social History     Tobacco Use   Smoking Status Some Days    Current packs/day: 0.00    Average packs/day: 0.3 packs/day for 37.0 years (9.3 ttl  "pk-yrs)    Types: Cigarettes    Start date: 1985    Last attempt to quit: 2022    Years since quittin.4   Smokeless Tobacco Never   Tobacco Comments    stopped 2020     Family History   Problem Relation Age of Onset    Heart disease Mother     Lupus Mother     Diabetes Father     Stroke Father        Meds/Allergies     Not in a hospital admission.    No Known Allergies    Objective     Blood pressure 122/58, pulse 61, temperature 98 °F (36.7 °C), temperature source Temporal, resp. rate 20, height 5' 6\" (1.676 m), weight 104 kg (229 lb 8 oz), SpO2 97%.      PHYSICAL EXAM    /58   Pulse 61   Temp 98 °F (36.7 °C) (Temporal)   Resp 20   Ht 5' 6\" (1.676 m)   Wt 104 kg (229 lb 8 oz)   SpO2 97%   BMI 37.04 kg/m²       Gen: NAD  CV: RRR  CHEST: Clear  ABD: soft, NT/ND  EXT: no edema      ASSESSMENT/PLAN:  This is a 59 y.o. year old male here for endoscopy, and he is stable and optimized for his procedure.        "

## 2024-05-23 NOTE — ANESTHESIA PREPROCEDURE EVALUATION
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  Patient also has history of obesity status post gastric sleeve surgery in 2018.   Physical Exam    Airway    Mallampati score: II  TM Distance: >3 FB  Neck ROM: full     Dental       Cardiovascular  Cardiovascular exam normal    Pulmonary  Pulmonary exam normal     Other Findings        Anesthesia Plan  ASA Score- 2     Anesthesia Type- IV sedation with anesthesia with ASA Monitors.         Additional Monitors:     Airway Plan:            Plan Factors-Exercise tolerance (METS): >4 METS.    Chart reviewed. EKG reviewed. Imaging results reviewed. Existing labs reviewed. Patient summary reviewed.                  Induction- intravenous.    Postoperative Plan-         Informed Consent- Anesthetic plan and risks discussed with patient.  I personally reviewed this patient with the CRNA. Discussed and agreed on the Anesthesia Plan with the CRNA..         Ms. Schwartz

## 2024-05-23 NOTE — ANESTHESIA POSTPROCEDURE EVALUATION
Post-Op Assessment Note    CV Status:  Stable  Pain Score: 0    Pain management: adequate       Mental Status:  Sleepy   Hydration Status:  Euvolemic   PONV Controlled:  Controlled   Airway Patency:  Patent     Post Op Vitals Reviewed: Yes    No anethesia notable event occurred.    Staff: CRNA               BP 99/55 (05/23/24 0918)    Temp 97.9 °F (36.6 °C) (05/23/24 0918)    Pulse 64 (05/23/24 0918)   Resp 16 (05/23/24 0918)    SpO2 95 % (05/23/24 0918)

## 2024-05-24 ENCOUNTER — TELEPHONE (OUTPATIENT)
Dept: RADIOLOGY | Facility: CLINIC | Age: 59
End: 2024-05-24

## 2024-05-24 ENCOUNTER — OFFICE VISIT (OUTPATIENT)
Dept: PHYSICAL THERAPY | Age: 59
End: 2024-05-24
Payer: COMMERCIAL

## 2024-05-24 DIAGNOSIS — M62.830 LUMBAR PARASPINAL MUSCLE SPASM: Primary | ICD-10-CM

## 2024-05-24 DIAGNOSIS — E66.01 MORBID (SEVERE) OBESITY DUE TO EXCESS CALORIES (HCC): ICD-10-CM

## 2024-05-24 DIAGNOSIS — M47.816 LUMBAR FACET ARTHROPATHY: ICD-10-CM

## 2024-05-24 PROCEDURE — 97530 THERAPEUTIC ACTIVITIES: CPT

## 2024-05-24 RX ORDER — OMEPRAZOLE 20 MG/1
20 CAPSULE, DELAYED RELEASE ORAL DAILY
Qty: 90 CAPSULE | Refills: 1 | Status: SHIPPED | OUTPATIENT
Start: 2024-05-24

## 2024-05-24 NOTE — TELEPHONE ENCOUNTER
Satish for pt -- asked for rcb to verify if he has a pacemaker or defibrillator in advance of his RFA on 5/30/24

## 2024-05-24 NOTE — PROGRESS NOTES
Daily Note     Today's date: 2024  Patient name: Matt Tobias Sr.  : 1965  MRN: [id number]  Referring provider: Hugh Horn MD  Dx:   Encounter Diagnosis     ICD-10-CM    1. Lumbar paraspinal muscle spasm  M62.830       2. Lumbar facet arthropathy  M47.816           Start Time: 1400  Stop Time: 1500  Total time in clinic (min): 60 minutes    Subjective: min c/o pain today 2/10.  He states he stopped his muscle relaxer and pain meds due to recent bloodwork.       Objective: See treatment diary below      Assessment: Tolerated treatment well. Patient would benefit from continued PT      Plan: Continue per plan of care.      POC expires Unit limit Auth Expiration date PT/OT + Visit Limit?   2024  done 12     10/31/24 12                     Visit/Unit Tracking  AUTH Status:  Date 3/20 3/22 3/29 4/1 4/3 4/8 4/10 4/11 4/15 4/22 4/24 4/25   Auth needed - done Used 1 2 3 4 5 6 7 8 9 10 11 12    Remaining  11 10 9 8 7 6 5 4 3 2 1 0    FOTO                 Visit/Unit Tracking  AUTH Status:  Date 5/1 5/2 5/15 5/24           12 visits approved Used 1 2 3 4            Remaining  11 10 9 8            FOTO                             Daily Treatment Diary     Date 4/22 4/24 4/25 5/1 5/2 5/24  4/15   Patient education           Water Walk (FW, BW, side) x10’  10' 10' 10 10 5 10 10 5'   LE work at wall               Hip FF/ext swing  2 2 2 2 2 2 2 2     Toe/heel  1 1 1 1 1 1 1      Hip ABD/ADD  2 2 2 2 2 2 2       1 1 1 1 1 1 1     Knee flexion & extension 1 1 1 1 1 1 1 1     Squats 1 1 1 1  1 1      UE noodle x3             Push/pull              Rotate  1 1 1 1  1 1 1     Row forward & back   2 2 2 2 2 2      OH side bend            UE/Core (AROM, paddles, MB)              ABD/ADD              Horizontal Pec Fly              Alt. arm swing (shld flex/ext)              Push pull              Single paddle rotation           STS6#MB  x20 x20 x20 x20 x20     Aqua Step (FW, lat, eccentric)  1' D/c       "FW 1'    Core work:              MF press (red, blue, blk) 5x yellow 5''x7ea 5''x5ea 5''x8ea Y x8ea 5''x8 5x 5x      Kickboard press (blue, red)                Row/ext w/ tubing (red, blue, blk) 2' 2' yellow 2 2 2 2 2    Seated on bench             ankle DF/PF              March              ABD/ADD              Knee flexion/extension            DW TX - hang in the deep water  10' 10' 10 10 10 10 10 10     DW ABD/ADD       1       DW Bicycle  6' 5 5 6 6 6 6      DW Scissor hip flexion/extension              DW DKTC   1' 1        Stretches              HS at step  2x 2x 1 2 2 2 2      Wall stretches              Calf stretch at wall  3x15\"           Hot Tub - 10 minutes only 10 10' 10 10 10 10 10             "

## 2024-05-28 ENCOUNTER — OFFICE VISIT (OUTPATIENT)
Dept: PHYSICAL THERAPY | Age: 59
End: 2024-05-28
Payer: COMMERCIAL

## 2024-05-28 DIAGNOSIS — M47.816 LUMBAR FACET ARTHROPATHY: ICD-10-CM

## 2024-05-28 DIAGNOSIS — M79.18 MYOFASCIAL MUSCLE PAIN: ICD-10-CM

## 2024-05-28 DIAGNOSIS — M62.830 LUMBAR PARASPINAL MUSCLE SPASM: Primary | ICD-10-CM

## 2024-05-28 DIAGNOSIS — M54.50 MIDLINE LOW BACK PAIN WITHOUT SCIATICA, UNSPECIFIED CHRONICITY: ICD-10-CM

## 2024-05-28 LAB
APOB+LDLR+PCSK9 GENE MUT ANL BLD/T: NOT DETECTED
BRCA1+BRCA2 DEL+DUP + FULL MUT ANL BLD/T: NOT DETECTED
MLH1+MSH2+MSH6+PMS2 GN DEL+DUP+FUL M: NOT DETECTED

## 2024-05-28 PROCEDURE — 97150 GROUP THERAPEUTIC PROCEDURES: CPT

## 2024-05-28 PROCEDURE — 97113 AQUATIC THERAPY/EXERCISES: CPT

## 2024-05-28 RX ORDER — IBUPROFEN 800 MG/1
800 TABLET ORAL EVERY 8 HOURS PRN
Qty: 30 TABLET | Refills: 0 | Status: SHIPPED | OUTPATIENT
Start: 2024-05-28 | End: 2024-06-05

## 2024-05-28 NOTE — TELEPHONE ENCOUNTER
Caller: Matt    Doctor: dada    Reason for call: pt advised that he does not use either of those at all     Call back#: [phone]

## 2024-05-28 NOTE — TELEPHONE ENCOUNTER
LMOMTCB    ----- Message from Di Osborne PA-C sent at 5/27/2024  5:10 PM EDT -----  MRI lumbar spine shows mild bulging of the discs of the lumbar spine with canal narrowing. Also shows mild scoliosis. This could be contributing to his lower back pain. Would recommend continuing to follow with spine management. If he would like, I can also place a referral to ortho spine for further eval.

## 2024-05-28 NOTE — PROGRESS NOTES
Daily Note     Today's date: 2024  Patient name: Matt Tobias Sr.  : 1965  MRN: [id number]  Referring provider: Hugh Horn MD  Dx:   Encounter Diagnosis     ICD-10-CM    1. Lumbar paraspinal muscle spasm  M62.830       2. Lumbar facet arthropathy  M47.816           Start Time: 1100  Stop Time: 1200  Total time in clinic (min): 60 minutes    Subjective: min c/o pain today but notes LB tightness. Pt did get his MRI results back and notes he has a bulging disc.       Objective: See treatment diary below  Pt seen 1:1 for 30 min and group therapy for remainder    Assessment: Tolerated treatment well. Patient would benefit from continued PT      Plan: Continue per plan of care.      POC expires Unit limit Auth Expiration date PT/OT + Visit Limit?   2024  done 12     10/31/24 12                     Visit/Unit Tracking  AUTH Status:  Date 3/20 3/22 3/29 4/1 4/3 4/8 4/10 4/11 4/15 4/22 4/24 4/25   Auth needed - done Used 1 2 3 4 5 6 7 8 9 10 11 12    Remaining  11 10 9 8 7 6 5 4 3 2 1 0    FOTO                 Visit/Unit Tracking  AUTH Status:  Date 5/1 5/2 5/15 5/24 5/28          12 visits approved Used 1 2 3 4 5           Remaining  11 10 9 8 7           FOTO                             Daily Treatment Diary     Date     Patient education           Water Walk (FW, BW, side) x10’  10' 10' 10 10 5 10 10 5'   LE work at wall               Hip FF/ext swing  2 2 2 2 2 2 2 2     Toe/heel  1 1 1 1 1 1 1      Hip ABD/ADD  2 2 2 2 2 2 2       1 1 1 1 1 1 1     Knee flexion & extension 1 1 1 1 1 1 1 1     Squats 1 1 1 1  1 1      UE noodle x3             Push/pull              Rotate  1 1 1 1  1 1 1     Row forward & back   2 2 2 2 2 2      OH side bend            UE/Core (AROM, paddles, MB)              ABD/ADD              Horizontal Pec Fly              Alt. arm swing (shld flex/ext)              Push pull              Single paddle rotation           STS6#MB  x20  "x20 x20 x20 x20 x20    Aqua Step (FW, lat, eccentric)  1' D/c      FW 1'    Core work:              MF press (red, blue, blk) 5x yellow 5''x7ea 5''x5ea 5''x8ea Y x8ea 5''x8 5''x8 5x      Kickboard press (blue, red)                Row/ext w/ tubing (red, blue, blk) 2' 2' yellow 2 2 2 2 2    Seated on bench             ankle DF/PF              March              ABD/ADD              Knee flexion/extension            DW TX - hang in the deep water  10' 10' 10 10 10 10 10 10     DW ABD/ADD       1       DW Bicycle  6' 5 5 6 6 6 6      DW Scissor hip flexion/extension              DW DKTC   1' 1        Stretches              HS at step  2x 2x 1 2 2 2 2      Wall stretches              Calf stretch at wall  3x15\"           Hot Tub - 10 minutes only 10 10' 10 10 10 10 10 10            "

## 2024-05-29 PROCEDURE — 88305 TISSUE EXAM BY PATHOLOGIST: CPT | Performed by: PATHOLOGY

## 2024-05-29 PROCEDURE — 88342 IMHCHEM/IMCYTCHM 1ST ANTB: CPT | Performed by: PATHOLOGY

## 2024-05-30 ENCOUNTER — APPOINTMENT (OUTPATIENT)
Dept: PHYSICAL THERAPY | Age: 59
End: 2024-05-30
Payer: COMMERCIAL

## 2024-05-30 ENCOUNTER — HOSPITAL ENCOUNTER (OUTPATIENT)
Dept: RADIOLOGY | Facility: CLINIC | Age: 59
Discharge: HOME | End: 2024-05-30
Admitting: STUDENT IN AN ORGANIZED HEALTH CARE EDUCATION/TRAINING PROGRAM
Payer: COMMERCIAL

## 2024-05-30 VITALS
TEMPERATURE: 99.6 F | DIASTOLIC BLOOD PRESSURE: 95 MMHG | OXYGEN SATURATION: 98 % | RESPIRATION RATE: 18 BRPM | HEART RATE: 74 BPM | SYSTOLIC BLOOD PRESSURE: 156 MMHG

## 2024-05-30 DIAGNOSIS — M47.816 LUMBAR FACET ARTHROPATHY: ICD-10-CM

## 2024-05-30 PROCEDURE — 64635 DESTROY LUMB/SAC FACET JNT: CPT | Performed by: STUDENT IN AN ORGANIZED HEALTH CARE EDUCATION/TRAINING PROGRAM

## 2024-05-30 PROCEDURE — 64636 DESTROY L/S FACET JNT ADDL: CPT | Performed by: STUDENT IN AN ORGANIZED HEALTH CARE EDUCATION/TRAINING PROGRAM

## 2024-05-30 RX ADMIN — Medication 3 ML: at 12:14

## 2024-05-30 NOTE — H&P
History of Present Illness: The patient is a 59 y.o. male who presents with complaints of right low back pain    Past Medical History:   Diagnosis Date    CPAP (continuous positive airway pressure) dependence     does not use machine    Esophageal varices (HCC)     stable 9/2020 gets checked yearly    Hepatic cirrhosis (HCC)     treated with harvoni   Hep C- dx age 1995    Hepatic encephalopathy (HCC)     Liver disease     Obesity     Pneumonia     in past    Postgastrectomy malabsorption     Sleep apnea     Wears glasses        Past Surgical History:   Procedure Laterality Date    COLONOSCOPY      ESOPHAGOGASTRODUODENOSCOPY N/A 4/12/2018    Procedure: ESOPHAGOGASTRODUODENOSCOPY (EGD);  Surgeon: Willy Traylor MD;  [facility]: [facility];  Service: Gastroenterology    FRACTURE SURGERY Left     ankle    OTHER SURGICAL HISTORY      banding esophageal varices    CT EXCISION EXCESSIVE SKIN & SUBQ TISSUE ABDOMEN N/A 9/16/2020    Procedure: ABDOMINOPLASTY;  Surgeon: Enrrique Birmingham MD;  [facility];  Service: Plastics    CT EXCISION SKIN ABD INFRAUMBILICAL PANNICULECTOMY N/A 9/16/2020    Procedure: PANNICULECTOMY;  Surgeon: Enrrique Birmingham MD;  [facility];  Service: Plastics    CT LAPS GSTRC RSTRICTIV PX LONGITUDINAL GASTRECTOMY N/A 6/5/2018    Procedure: GASTRECTOMY LAPAROSCOPIC SLEEVE;  Surgeon: Diana Mcdonnell MD;  [facility];  Service: Bariatrics         Current Outpatient Medications:     AMILoride 5 mg tablet, Take 1 tablet (5 mg total) by mouth daily, Disp: 90 tablet, Rfl: 1    carvedilol (COREG) 6.25 mg tablet, Take 1 tablet (6.25 mg total) by mouth 2 (two) times a day with meals, Disp: 180 tablet, Rfl: 3    Claritin-D 12 Hour 5-120 MG per tablet, TAKE ONE TABLET BY MOUTH TWICE DAILY FOR 5 DAYS, Disp: 10 tablet, Rfl: 0    cyanocobalamin (VITAMIN B-12) 1000 MCG tablet, Take 1 tablet (1,000 mcg total) by mouth daily, Disp: 30 tablet, Rfl: 2    fluticasone (FLONASE) 50  mcg/act nasal spray, 1 spray into each nostril daily, Disp: 48 g, Rfl: 3    furosemide (LASIX) 20 mg tablet, Take 1 tablet (20 mg total) by mouth 2 (two) times a day, Disp: 180 tablet, Rfl: 0    ibuprofen (MOTRIN) 800 mg tablet, Take 1 tablet (800 mg total) by mouth every 8 (eight) hours as needed for mild pain, Disp: 30 tablet, Rfl: 0    lactulose (CHRONULAC) 10 g/15 mL solution, TAKE 15 ML (10 G TOTAL) BY MOUTH DAILY AS NEEDED (CONSTIPATION OR CONFUSION), Disp: 946 mL, Rfl: 0    lidocaine (Lidoderm) 5 %, Apply 1 patch topically over 12 hours daily Remove & Discard patch within 12 hours or as directed by MD (Patient not taking: Reported on 4/16/2024), Disp: 10 patch, Rfl: 0    methocarbamol (ROBAXIN) 750 mg tablet, TAKE 1 TABLET BY MOUTH EVERY EIGHT HOURS, Disp: 30 tablet, Rfl: 0    methylPREDNISolone 4 MG tablet therapy pack, Use as directed on package (Patient not taking: Reported on 4/16/2024), Disp: 21 each, Rfl: 0    omeprazole (PriLOSEC) 20 mg delayed release capsule, Take 1 capsule (20 mg total) by mouth daily, Disp: 90 capsule, Rfl: 1    sildenafil (VIAGRA) 100 mg tablet, Take 1 tablet (100 mg total) by mouth daily as needed for erectile dysfunction (Patient not taking: Reported on 4/16/2024), Disp: 90 tablet, Rfl: 0    traMADol (ULTRAM) 50 mg tablet, Take 1 tablet by mouth every 6 hours as needed for moderate pain, Disp: 60 tablet, Rfl: 0    triamcinolone (KENALOG) 0.5 % cream, Apply topically 2 (two) times a day, Disp: 45 g, Rfl: 3    No Known Allergies    Physical Exam:   Vitals:    05/30/24 1153   BP: 141/75   Pulse: 73   Resp: 18   Temp: 99.6 °F (37.6 °C)   SpO2: 100%     General: Awake, Alert, Oriented x 3, Mood and affect appropriate  Respiratory: Respirations even and unlabored  Cardiovascular: Peripheral pulses intact; no edema  Musculoskeletal Exam: facet loading positive right    ASA Score: 3    Patient/Chart Verification  Patient ID Verified: Verbal  ID Band Applied: No  Consents Confirmed:  Procedural, To be obtained in the Pre-Procedure area  H&P( within 30 days) Verified: To be obtained in the Pre-Procedure area  Interval H&P(within 24 hr) Complete (required for Outpatients and Surgery Admit only): To be obtained in the Pre-Procedure area  Allergies Reviewed: Yes  Anticoag/NSAID held?: NA  Currently on antibiotics?: No    Assessment:   1. Lumbar facet arthropathy        Plan: Right L4-5 and L5-S1 RFA

## 2024-06-03 ENCOUNTER — TELEPHONE (OUTPATIENT)
Dept: GYNECOLOGIC ONCOLOGY | Facility: CLINIC | Age: 59
End: 2024-06-03

## 2024-06-03 DIAGNOSIS — B18.2 HEPATIC CIRRHOSIS DUE TO CHRONIC HEPATITIS C INFECTION (HCC): ICD-10-CM

## 2024-06-03 DIAGNOSIS — K74.60 HEPATIC CIRRHOSIS DUE TO CHRONIC HEPATITIS C INFECTION (HCC): ICD-10-CM

## 2024-06-03 RX ORDER — FUROSEMIDE 20 MG/1
20 TABLET ORAL 2 TIMES DAILY
Qty: 180 TABLET | Refills: 1 | Status: SHIPPED | OUTPATIENT
Start: 2024-06-03

## 2024-06-03 NOTE — TELEPHONE ENCOUNTER
Called patient to review lab results.  No answer.  Left message to call back.  Will send [employer] message as well.

## 2024-06-04 ENCOUNTER — TELEPHONE (OUTPATIENT)
Dept: GASTROENTEROLOGY | Facility: CLINIC | Age: 59
End: 2024-06-04

## 2024-06-04 NOTE — TELEPHONE ENCOUNTER
----- Message from Stan Gilmore MD sent at 6/4/2024  7:22 AM EDT -----  Please review with the patient    GUANAKO Gutierrez-     The biopsy of the stomach was normal.  Have a great day.

## 2024-06-04 NOTE — TELEPHONE ENCOUNTER
Called pt an LMOM with biopsies result and to call us back if any questions.  Office # provided.

## 2024-06-05 ENCOUNTER — OFFICE VISIT (OUTPATIENT)
Dept: PHYSICAL THERAPY | Age: 59
End: 2024-06-05
Payer: COMMERCIAL

## 2024-06-05 DIAGNOSIS — M62.830 LUMBAR PARASPINAL MUSCLE SPASM: Primary | ICD-10-CM

## 2024-06-05 DIAGNOSIS — M54.50 MIDLINE LOW BACK PAIN WITHOUT SCIATICA, UNSPECIFIED CHRONICITY: ICD-10-CM

## 2024-06-05 DIAGNOSIS — M79.18 MYOFASCIAL MUSCLE PAIN: ICD-10-CM

## 2024-06-05 DIAGNOSIS — M47.816 LUMBAR FACET ARTHROPATHY: ICD-10-CM

## 2024-06-05 PROCEDURE — 97113 AQUATIC THERAPY/EXERCISES: CPT

## 2024-06-05 RX ORDER — IBUPROFEN 800 MG/1
TABLET ORAL
Qty: 30 TABLET | Refills: 2 | Status: SHIPPED | OUTPATIENT
Start: 2024-06-05

## 2024-06-05 NOTE — PROGRESS NOTES
Daily Note     Today's date: 2024  Patient name: Matt Tobias Sr.  : 1965  MRN: [id number]  Referring provider: Hugh Horn MD  Dx:   Encounter Diagnosis     ICD-10-CM    1. Lumbar paraspinal muscle spasm  M62.830       2. Lumbar facet arthropathy  M47.816           Start Time: 1400  Stop Time: 1500  Total time in clinic (min): 60 minutes    Subjective: Patient reports that he is not feeling well today.       Objective: See treatment diary below      Assessment: Tolerated treatment well with program outlined below, no c/o pain with exercises. Patient exhibited good technique with therapeutic exercises and would benefit from continued PT      Plan: Continue per plan of care.      POC expires Unit limit Auth Expiration date PT/OT + Visit Limit?   2024  done 12     10/31/24 12                     Visit/Unit Tracking  AUTH Status:  Date 3/20 3/22 3/29 4/1 4/3 4/8 4/10 4/11 4/15 4/22 4/24 4/25   Auth needed - done Used 1 2 3 4 5 6 7 8 9 10 11 12    Remaining  11 10 9 8 7 6 5 4 3 2 1 0    FOTO                 Visit/Unit Tracking  AUTH Status:  Date 5/1 5/2 5/15 5/24 5/28 6         12 visits approved Used 1 2 3 4 5 6          Remaining  11 10 9 8 7 6          FOTO                             Daily Treatment Diary     Date  6   Patient education           Water Walk (FW, BW, side) x10’  10' 10' 10 10 5 10 10 10'   LE work at wall               Hip FF/ext swing  2 2 2 2 2 2 2 2     Toe/heel  1 1 1 1 1 1 1 1     Hip ABD/ADD  2 2 2 2 2 2 2 2     March 1 1 1 1 1 1 1 1     Knee flexion & extension 1 1 1 1 1 1 1 1     Squats 1 1 1 1  1 1 1     UE noodle x3             Push/pull              Rotate  1 1 1 1  1 1 1     Row forward & back   2 2 2 2 2 2 2     OH side bend            UE/Core (AROM, paddles, MB)              ABD/ADD              Horizontal Pec Fly              Alt. arm swing (shld flex/ext)              Push pull              Single paddle rotation          "  STS6#MB  x20 x20 x20 x20 x20 x20    Aqua Step (FW, lat, eccentric)  1' D/c         Core work:              MF press (red, blue, blk) 5x yellow 5''x7ea 5''x5ea 5''x8ea Y x8ea 5''x8 5''x8 5\"x8 yellow      Kickboard press (blue, red)                Row/ext w/ tubing (red, blue, blk) 2' 2' yellow 2 2 2 2 2 2' Y   Seated on bench             ankle DF/PF              March              ABD/ADD              Knee flexion/extension            DW TX - hang in the deep water  10' 10' 10 10 10 10 10 10     DW ABD/ADD       1  1     DW Bicycle  6' 5 5 6 6 6 6 6     DW Scissor hip flexion/extension              DW DKTC   1' 1        Stretches              HS at step  2x 2x 1 2 2 2 2 2     Wall stretches              Calf stretch at wall  3x15\"           Hot Tub - 10 minutes only 10 10' 10 10 10 10 10 10              "

## 2024-06-10 ENCOUNTER — NURSE TRIAGE (OUTPATIENT)
Age: 59
End: 2024-06-10

## 2024-06-10 ENCOUNTER — OFFICE VISIT (OUTPATIENT)
Dept: FAMILY MEDICINE CLINIC | Facility: CLINIC | Age: 59
End: 2024-06-10
Payer: COMMERCIAL

## 2024-06-10 ENCOUNTER — APPOINTMENT (OUTPATIENT)
Dept: RADIOLOGY | Facility: CLINIC | Age: 59
End: 2024-06-10
Payer: COMMERCIAL

## 2024-06-10 ENCOUNTER — APPOINTMENT (OUTPATIENT)
Dept: PHYSICAL THERAPY | Age: 59
End: 2024-06-10
Payer: COMMERCIAL

## 2024-06-10 ENCOUNTER — TELEPHONE (OUTPATIENT)
Dept: FAMILY MEDICINE CLINIC | Facility: CLINIC | Age: 59
End: 2024-06-10

## 2024-06-10 VITALS
HEART RATE: 80 BPM | OXYGEN SATURATION: 98 % | WEIGHT: 227 LBS | HEIGHT: 66 IN | TEMPERATURE: 98.2 F | DIASTOLIC BLOOD PRESSURE: 76 MMHG | SYSTOLIC BLOOD PRESSURE: 122 MMHG | BODY MASS INDEX: 36.48 KG/M2

## 2024-06-10 DIAGNOSIS — R05.1 ACUTE COUGH: ICD-10-CM

## 2024-06-10 DIAGNOSIS — J40 BRONCHITIS: ICD-10-CM

## 2024-06-10 DIAGNOSIS — J40 BRONCHITIS: Primary | ICD-10-CM

## 2024-06-10 DIAGNOSIS — J18.9 PNEUMONIA OF BOTH LOWER LOBES DUE TO INFECTIOUS ORGANISM: Primary | ICD-10-CM

## 2024-06-10 LAB
SARS-COV-2 AG UPPER RESP QL IA: NEGATIVE
VALID CONTROL: NORMAL

## 2024-06-10 PROCEDURE — 99213 OFFICE O/P EST LOW 20 MIN: CPT | Performed by: NURSE PRACTITIONER

## 2024-06-10 PROCEDURE — 87811 SARS-COV-2 COVID19 W/OPTIC: CPT | Performed by: NURSE PRACTITIONER

## 2024-06-10 PROCEDURE — G2211 COMPLEX E/M VISIT ADD ON: HCPCS | Performed by: NURSE PRACTITIONER

## 2024-06-10 PROCEDURE — 71046 X-RAY EXAM CHEST 2 VIEWS: CPT

## 2024-06-10 RX ORDER — AMOXICILLIN AND CLAVULANATE POTASSIUM 875; 125 MG/1; MG/1
1 TABLET, FILM COATED ORAL EVERY 12 HOURS SCHEDULED
Qty: 14 TABLET | Refills: 0 | Status: SHIPPED | OUTPATIENT
Start: 2024-06-10 | End: 2024-06-17

## 2024-06-10 RX ORDER — AZITHROMYCIN 250 MG/1
TABLET, FILM COATED ORAL
Qty: 6 TABLET | Refills: 0 | Status: SHIPPED | OUTPATIENT
Start: 2024-06-10 | End: 2024-06-14

## 2024-06-10 RX ORDER — BENZONATATE 200 MG/1
200 CAPSULE ORAL 3 TIMES DAILY PRN
Qty: 20 CAPSULE | Refills: 0 | Status: SHIPPED | OUTPATIENT
Start: 2024-06-10 | End: 2024-06-17

## 2024-06-10 NOTE — TELEPHONE ENCOUNTER
[id number]    ----- Message from KAILA Eckert sent at 6/10/2024 12:23 PM EDT -----  Chest x-ray is showing pneumonia in the lower lungs I will also send for azithromycin will cover for pneumonia with the augmentin and should recheck the chest x-ray in 4 weeks I will order

## 2024-06-10 NOTE — TELEPHONE ENCOUNTER
Regarding: Immediate findings  ----- Message from Captain Wise RITA sent at 6/10/2024  2:29 PM EDT -----  Carmen called from radiology with immediate findings on Chest Xray.

## 2024-06-10 NOTE — PROGRESS NOTES
Ambulatory Visit  Name: Matt Tobias Sr.      : 1965      MRN: [id number]  Encounter Provider: KAILA Joseph  Encounter Date: 6/10/2024   Encounter department: [facility]    Assessment & Plan   1. Bronchitis  -     amoxicillin-clavulanate (AUGMENTIN) 875-125 mg per tablet; Take 1 tablet by mouth every 12 (twelve) hours for 7 days  -     benzonatate (TESSALON) 200 MG capsule; Take 1 capsule (200 mg total) by mouth 3 (three) times a day as needed for cough  -     XR chest pa & lateral; Future; Expected date: 06/10/2024  2. Acute cough  -     amoxicillin-clavulanate (AUGMENTIN) 875-125 mg per tablet; Take 1 tablet by mouth every 12 (twelve) hours for 7 days  -     benzonatate (TESSALON) 200 MG capsule; Take 1 capsule (200 mg total) by mouth 3 (three) times a day as needed for cough  -     XR chest pa & lateral; Future; Expected date: 06/10/2024         History of Present Illness   {Disappearing Hyperlinks I Encounters * My Last Note * Since Last Visit * History :22015}  Patient here today and reports that he has been having fevers and running 103 T max yesterday and is running elevated and is not takng anything. Patient is having a cough now for the past two weeks and has been getting worse and coughing up phlegm. No sick contacts with similar symptoms was taking Robitussin and was not effective. He has been smoking      Review of Systems   Constitutional:  Positive for fatigue and fever. Negative for activity change, appetite change, chills, diaphoresis and unexpected weight change.   HENT:  Negative for congestion, ear discharge, ear pain, facial swelling, hearing loss, postnasal drip, sinus pressure, sinus pain, sneezing, sore throat and trouble swallowing.    Eyes:  Negative for pain, redness and visual disturbance.   Respiratory:  Positive for cough and shortness of breath. Negative for apnea, choking, chest tightness, wheezing and stridor.    Cardiovascular:   Negative for chest pain and leg swelling.   Gastrointestinal:  Negative for abdominal pain, diarrhea, nausea and vomiting.   Endocrine: Negative.    Genitourinary: Negative.    Musculoskeletal:  Positive for back pain. Negative for arthralgias.   Allergic/Immunologic: Negative.    Neurological:  Positive for headaches. Negative for dizziness and light-headedness.   Hematological: Negative.    Psychiatric/Behavioral:  Negative for behavioral problems and dysphoric mood.      Past Medical History:   Diagnosis Date   • CPAP (continuous positive airway pressure) dependence     does not use machine   • Esophageal varices (HCC)     stable 9/2020 gets checked yearly   • Hepatic cirrhosis (HCC)     treated with harvoni   Hep C- dx age 1995   • Hepatic encephalopathy (HCC)    • Liver disease    • Obesity    • Pneumonia     in past   • Postgastrectomy malabsorption    • Sleep apnea    • Wears glasses      Past Surgical History:   Procedure Laterality Date   • COLONOSCOPY     • ESOPHAGOGASTRODUODENOSCOPY N/A 4/12/2018    Procedure: ESOPHAGOGASTRODUODENOSCOPY (EGD);  Surgeon: Willy Traylor MD;  Location: [facility];  Service: Gastroenterology   • FRACTURE SURGERY Left     ankle   • OTHER SURGICAL HISTORY      banding esophageal varices   • ID EXCISION EXCESSIVE SKIN & SUBQ TISSUE ABDOMEN N/A 9/16/2020    Procedure: ABDOMINOPLASTY;  Surgeon: Enrrique Birmingham MD;  [facility];  Service: Plastics   • ID EXCISION SKIN ABD INFRAUMBILICAL PANNICULECTOMY N/A 9/16/2020    Procedure: PANNICULECTOMY;  Surgeon: Enrrique Birmingham MD;  [facility];  Service: Plastics   • ID LAPS GSTRC RSTRICTIV PX LONGITUDINAL GASTRECTOMY N/A 6/5/2018    Procedure: GASTRECTOMY LAPAROSCOPIC SLEEVE;  Surgeon: Diana Mcdonnell MD;  [facility];  Service: Bariatrics     Family History   Problem Relation Age of Onset   • Heart disease Mother    • Lupus Mother    • Diabetes Father    • Stroke Father      Social History      Tobacco Use   • Smoking status: Some Days     Current packs/day: 0.00     Average packs/day: 0.3 packs/day for 37.0 years (9.3 ttl pk-yrs)     Types: Cigarettes     Start date: 1985     Last attempt to quit: 2022     Years since quittin.4   • Smokeless tobacco: Never   • Tobacco comments:     stopped 2020   Vaping Use   • Vaping status: Every Day   • Substances: Flavoring   Substance and Sexual Activity   • Alcohol use: Not Currently     Comment: occasionally   • Drug use: No   • Sexual activity: Yes     Current Outpatient Medications on File Prior to Visit   Medication Sig   • AMILoride 5 mg tablet Take 1 tablet (5 mg total) by mouth daily   • carvedilol (COREG) 6.25 mg tablet Take 1 tablet (6.25 mg total) by mouth 2 (two) times a day with meals   • Claritin-D 12 Hour 5-120 MG per tablet TAKE ONE TABLET BY MOUTH TWICE DAILY FOR 5 DAYS   • cyanocobalamin (VITAMIN B-12) 1000 MCG tablet Take 1 tablet (1,000 mcg total) by mouth daily   • fluticasone (FLONASE) 50 mcg/act nasal spray 1 spray into each nostril daily   • furosemide (LASIX) 20 mg tablet Take 1 tablet by mouth 2 times a day   • ibuprofen (MOTRIN) 800 mg tablet Take 1 tablet (800 mg total) by mouth every 8 hours as needed for mild pain   • lactulose (CHRONULAC) 10 g/15 mL solution TAKE 15 ML (10 G TOTAL) BY MOUTH DAILY AS NEEDED (CONSTIPATION OR CONFUSION)   • lidocaine (Lidoderm) 5 % Apply 1 patch topically over 12 hours daily Remove & Discard patch within 12 hours or as directed by MD (Patient not taking: Reported on 2024)   • methocarbamol (ROBAXIN) 750 mg tablet TAKE 1 TABLET BY MOUTH EVERY EIGHT HOURS   • methylPREDNISolone 4 MG tablet therapy pack Use as directed on package (Patient not taking: Reported on 2024)   • omeprazole (PriLOSEC) 20 mg delayed release capsule Take 1 capsule (20 mg total) by mouth daily   • sildenafil (VIAGRA) 100 mg tablet Take 1 tablet (100 mg total) by mouth daily as needed for erectile  "dysfunction (Patient not taking: Reported on 4/16/2024)   • traMADol (ULTRAM) 50 mg tablet Take 1 tablet by mouth every 6 hours as needed for moderate pain   • triamcinolone (KENALOG) 0.5 % cream Apply topically 2 (two) times a day     No Known Allergies  Immunization History   Administered Date(s) Administered   • COVID-19 PFIZER VACCINE 0.3 ML IM 10/01/2022   • COVID-19 Pfizer mRNA vacc PF armando-sucrose 12 yr and older (Comirnaty) 10/12/2023   • COVID-19 Pfizer vac (Armando-sucrose, gray cap) 12 yr+ IM 08/19/2021, 09/09/2021, 02/10/2022, 07/05/2022   • INFLUENZA 10/28/2014, 09/15/2016, 11/04/2017, 08/30/2018, 10/22/2021, 10/12/2023   • Influenza Split High Dose Preservative Free IM 10/28/2014   • Influenza, injectable, quadrivalent, preservative free 0.5 mL 08/30/2018   • Influenza, recombinant, quadrivalent,injectable, preservative free 11/20/2019   • Pneumococcal Conjugate 13-Valent 06/17/2015   • Tdap 05/01/2019   • Tuberculin Skin Test-PPD Intradermal 10/28/2009, 02/07/2011, 07/23/2012, 03/16/2015   • Zoster 10/28/2014     Objective   {Disappearing Hyperlinks   Review Vitals * Enter New Vitals * Results Review * Labs * Imaging * Cardiology * Procedures * Lung Cancer Screening :[id number]}  /76 (BP Location: Left arm, Patient Position: Sitting, Cuff Size: Large)   Pulse 80   Temp 98.2 °F (36.8 °C)   Ht 5' 6\" (1.676 m)   Wt 103 kg (227 lb)   SpO2 98%   BMI 36.64 kg/m²     Physical Exam  Vitals and nursing note reviewed.   Constitutional:       General: He is not in acute distress.     Appearance: He is well-developed.   HENT:      Head: Normocephalic and atraumatic.      Right Ear: Tympanic membrane normal. There is impacted cerumen.      Left Ear: Tympanic membrane normal. There is impacted cerumen.      Nose: Congestion present.      Mouth/Throat:      Lips: Pink.      Mouth: Mucous membranes are moist.   Eyes:      General: Lids are normal.      Pupils: Pupils are equal, round, and reactive to light. "   Neck:      Thyroid: No thyromegaly.   Cardiovascular:      Rate and Rhythm: Normal rate and regular rhythm.      Heart sounds: Normal heart sounds. No murmur heard.  Pulmonary:      Effort: Pulmonary effort is normal. No respiratory distress.      Breath sounds: Wheezing and rhonchi present.   Abdominal:      General: Bowel sounds are normal.      Palpations: Abdomen is soft.   Musculoskeletal:         General: Normal range of motion.      Cervical back: Normal range of motion.      Right lower leg: No edema.      Left lower leg: No edema.   Skin:     General: Skin is warm and dry.   Neurological:      Mental Status: He is alert and oriented to person, place, and time.   Psychiatric:         Behavior: Behavior is cooperative.       Administrative Statements {Disappearing Hyperlinks I  Level of Service * [id number]}

## 2024-06-10 NOTE — TELEPHONE ENCOUNTER
X-ray resulted at 12:23 pm by provider and office staff attempted to contact the patient about the findings at 12:45 pm.

## 2024-06-12 ENCOUNTER — OFFICE VISIT (OUTPATIENT)
Dept: PHYSICAL THERAPY | Age: 59
End: 2024-06-12
Payer: COMMERCIAL

## 2024-06-12 ENCOUNTER — TELEPHONE (OUTPATIENT)
Dept: RADIOLOGY | Facility: CLINIC | Age: 59
End: 2024-06-12

## 2024-06-12 DIAGNOSIS — M47.816 LUMBAR FACET ARTHROPATHY: ICD-10-CM

## 2024-06-12 DIAGNOSIS — M62.830 LUMBAR PARASPINAL MUSCLE SPASM: Primary | ICD-10-CM

## 2024-06-12 PROCEDURE — 97530 THERAPEUTIC ACTIVITIES: CPT

## 2024-06-12 NOTE — TELEPHONE ENCOUNTER
Per note in chart from PCP, pt has pneumonia and is going to be on ABX.  Discussed with SP.  Will need to CX RFA for tomorrow and reschedule.  Attempted to contact pt  LMOM to CB, CB# provided

## 2024-06-12 NOTE — PROGRESS NOTES
Daily Note     Today's date: 2024  Patient name: Matt Tobias Sr.  : 1965  MRN: [id number]  Referring provider: Hugh Horn MD  Dx:   Encounter Diagnosis     ICD-10-CM    1. Lumbar paraspinal muscle spasm  M62.830       2. Lumbar facet arthropathy  M47.816           Start Time: 1345  Stop Time: 1410  Total time in clinic (min): 25 minutes    Subjective: Pt currently has a diagnosis of pneumonia in both lungs.       Objective: See treatment diary below      Assessment: He has miss the passed three aquatic visits due to subjective note. The patient has been coming to therapy inconsistently for three months. Based on chart review and discussion with pool staff the patient has a good understanding of his aquatic program and would be able to transition to the aquatic fitness program. Discussed this option with him and he has difficulty understanding that the aquatic fitness program is not covered insurance and it is an out of pocket expense. Additionally, he is interested in the land fitness program; however, he was not treated on land and I would have to discuss this with fitness program, Rebecca. Patient met partial goals set during physical therapy. He will be discharged today. Tolerated treatment well. Patient demonstrated fatigue post treatmentn       Plan: Continue per plan of care.      POC expires Unit limit Auth Expiration date PT/OT + Visit Limit?   2024  done 12     10/31/24 12                     Visit/Unit Tracking  AUTH Status:  Date 3/20 3/22 3/29 4/1 4/3 4 4/10 4/11 4/15 4/22 4/24 4/25   Auth needed - done Used 1 2 3 4 5 6 7 8 9 10 11 12    Remaining  11 10 9 8 7 6 5 4 3 2 1 0    FOTO                 Visit/Unit Tracking  AUTH Status:  Date 5/1 5/2 5/15 5/24 5/28 6        12 visits approved Used 1 2 3 4 5 6 7         Remaining  11 10 9 8 7 6 5         FOTO                             Daily Treatment Diary     Date    Patient education      "      Water Walk (FW, BW, side) x10’  10' 10' 10 10 5 10 10 10'   LE work at wall               Hip FF/ext swing  2 2 2 2 2 2 2 2     Toe/heel  1 1 1 1 1 1 1 1     Hip ABD/ADD  2 2 2 2 2 2 2 2     March 1 1 1 1 1 1 1 1     Knee flexion & extension 1 1 1 1 1 1 1 1     Squats 1 1 1 1  1 1 1     UE noodle x3             Push/pull              Rotate  1 1 1 1  1 1 1     Row forward & back   2 2 2 2 2 2 2     OH side bend            UE/Core (AROM, paddles, MB)              ABD/ADD              Horizontal Pec Fly              Alt. arm swing (shld flex/ext)              Push pull              Single paddle rotation           STS6#MB  x20 x20 x20 x20 x20 x20    Aqua Step (FW, lat, eccentric)  1' D/c         Core work:              MF press (red, blue, blk) 5x yellow 5''x7ea 5''x5ea 5''x8ea Y x8ea 5''x8 5''x8 5\"x8 yellow      Kickboard press (blue, red)                Row/ext w/ tubing (red, blue, blk) 2' 2' yellow 2 2 2 2 2 2' Y   Seated on bench             ankle DF/PF              March              ABD/ADD              Knee flexion/extension            DW TX - hang in the deep water  10' 10' 10 10 10 10 10 10     DW ABD/ADD       1  1     DW Bicycle  6' 5 5 6 6 6 6 6     DW Scissor hip flexion/extension              DW DKTC   1' 1        Stretches              HS at step  2x 2x 1 2 2 2 2 2     Wall stretches              Calf stretch at wall  3x15\"           Hot Tub - 10 minutes only 10 10' 10 10 10 10 10 10                "

## 2024-06-12 NOTE — TELEPHONE ENCOUNTER
Caller:  Marco Antonio Gutierrez     Doctor: Kory     Reason for call: I advised pt per nurse, he needs to call his PCP and get chest xray results, and once he is better and off of the antibiotics we will r/s. I advised him the  would be in touch. Pt understood    Call back#: [phone]

## 2024-06-13 ENCOUNTER — TELEPHONE (OUTPATIENT)
Age: 59
End: 2024-06-13

## 2024-06-13 NOTE — TELEPHONE ENCOUNTER
Pt called and said that he finished the antibiotic Linda prescribed for him but he still isnt feeling good after finishing it. Having fevers, still coughing. Please advise if he should come back in to be re-eval and notify pt. Thanks

## 2024-06-13 NOTE — TELEPHONE ENCOUNTER
Called x3, call could not be completed, [employer] message sent to the patient with this information.

## 2024-06-16 PROBLEM — L98.7 EXCESS SKIN OF ABDOMINAL WALL: Status: RESOLVED | Noted: 2019-10-04 | Resolved: 2024-06-16

## 2024-06-16 PROBLEM — R42 DIZZY: Status: RESOLVED | Noted: 2018-08-07 | Resolved: 2024-06-16

## 2024-06-16 PROBLEM — M79.18 MYOFASCIAL MUSCLE PAIN: Status: RESOLVED | Noted: 2019-10-18 | Resolved: 2024-06-16

## 2024-06-16 PROBLEM — Z48.815 ENCOUNTER FOR SURGICAL AFTERCARE FOLLOWING SURGERY OF DIGESTIVE SYSTEM: Status: RESOLVED | Noted: 2021-01-11 | Resolved: 2024-06-16

## 2024-06-16 PROBLEM — M54.50 MIDLINE LOW BACK PAIN WITHOUT SCIATICA: Status: RESOLVED | Noted: 2019-10-04 | Resolved: 2024-06-16

## 2024-06-16 PROBLEM — K76.82 HEPATIC ENCEPHALOPATHY (HCC): Status: RESOLVED | Noted: 2018-03-23 | Resolved: 2024-06-16

## 2024-06-16 PROBLEM — D64.9 ANEMIA: Status: RESOLVED | Noted: 2018-08-07 | Resolved: 2024-06-16

## 2024-06-16 PROBLEM — M25.461 EFFUSION OF RIGHT KNEE: Status: RESOLVED | Noted: 2018-07-03 | Resolved: 2024-06-16

## 2024-06-16 PROBLEM — D72.829 LEUKOCYTOSIS: Status: RESOLVED | Noted: 2021-09-10 | Resolved: 2024-06-16

## 2024-06-16 PROBLEM — Z23 NEED FOR TDAP VACCINATION: Status: RESOLVED | Noted: 2019-05-01 | Resolved: 2024-06-16

## 2024-06-16 PROBLEM — R45.86 MOOD SWINGS: Status: RESOLVED | Noted: 2018-06-13 | Resolved: 2024-06-16

## 2024-06-16 PROBLEM — I95.9 HYPOTENSION: Status: RESOLVED | Noted: 2019-11-20 | Resolved: 2024-06-16

## 2024-06-16 PROBLEM — Z23 NEED FOR INFLUENZA VACCINATION: Status: RESOLVED | Noted: 2019-11-20 | Resolved: 2024-06-16

## 2024-06-16 PROBLEM — M54.42 ACUTE BILATERAL LOW BACK PAIN WITH LEFT-SIDED SCIATICA: Status: RESOLVED | Noted: 2022-10-07 | Resolved: 2024-06-16

## 2024-06-16 PROBLEM — R12 HEARTBURN: Status: RESOLVED | Noted: 2020-03-16 | Resolved: 2024-06-16

## 2024-06-17 ENCOUNTER — OFFICE VISIT (OUTPATIENT)
Dept: FAMILY MEDICINE CLINIC | Facility: CLINIC | Age: 59
End: 2024-06-17
Payer: COMMERCIAL

## 2024-06-17 VITALS
TEMPERATURE: 98 F | DIASTOLIC BLOOD PRESSURE: 64 MMHG | BODY MASS INDEX: 36.16 KG/M2 | HEIGHT: 66 IN | WEIGHT: 225 LBS | OXYGEN SATURATION: 96 % | HEART RATE: 67 BPM | SYSTOLIC BLOOD PRESSURE: 110 MMHG

## 2024-06-17 DIAGNOSIS — L71.9 ROSACEA: ICD-10-CM

## 2024-06-17 DIAGNOSIS — J18.9 PNEUMONIA OF BOTH LOWER LOBES DUE TO INFECTIOUS ORGANISM: Primary | ICD-10-CM

## 2024-06-17 DIAGNOSIS — H61.22 IMPACTED CERUMEN OF LEFT EAR: ICD-10-CM

## 2024-06-17 PROCEDURE — 99213 OFFICE O/P EST LOW 20 MIN: CPT | Performed by: NURSE PRACTITIONER

## 2024-06-17 PROCEDURE — 69210 REMOVE IMPACTED EAR WAX UNI: CPT | Performed by: NURSE PRACTITIONER

## 2024-06-17 RX ORDER — TRIAMCINOLONE ACETONIDE 5 MG/G
CREAM TOPICAL 2 TIMES DAILY
Qty: 45 G | Refills: 3 | Status: SHIPPED | OUTPATIENT
Start: 2024-06-17

## 2024-06-17 NOTE — PROGRESS NOTES
Ambulatory Visit  Name: Matt Tobias Sr.      : 1965      MRN: [id number]  Encounter Provider: KAILA Joseph  Encounter Date: 2024   Encounter department: [facility]    Assessment & Plan   1. Pneumonia of both lower lobes due to infectious organism  Assessment & Plan:  Patient is improving with his pneumonia and has completed the ABX treatment and will be due for recheck chest x-ray in 4 weeks   2. Rosacea  -     triamcinolone (KENALOG) 0.5 % cream; Apply topically 2 (two) times a day  3. Impacted cerumen of left ear  -     Ear cerumen removal         History of Present Illness   {Disappearing Hyperlinks I Encounters * My Last Note * Since Last Visit * History :29205}  Patient here today and reports that he was in last week he was diagnosed with pneumonia and was treated with Augmentin and azithromycin and is still having a cough and is scheduled for a chest x-ray in 4 weeks to recheck. Patient denies any fevers or chills and does still have a cough. Patient is also had diarrhea and is having since having the ABX and he has completed the medication.       Review of Systems   Constitutional:  Negative for activity change, appetite change, chills, diaphoresis, fatigue, fever and unexpected weight change.   HENT:  Negative for congestion, ear pain, hearing loss, postnasal drip, sinus pressure, sinus pain, sneezing and sore throat.    Eyes:  Negative for pain, redness and visual disturbance.   Respiratory:  Positive for cough. Negative for shortness of breath.    Cardiovascular:  Negative for chest pain and leg swelling.   Gastrointestinal:  Negative for abdominal pain, diarrhea, nausea and vomiting.   Endocrine: Negative.    Genitourinary: Negative.    Musculoskeletal:  Negative for arthralgias.   Skin: Negative.    Allergic/Immunologic: Negative.    Neurological:  Negative for dizziness and light-headedness.   Hematological: Negative.    Psychiatric/Behavioral:   Negative for behavioral problems and dysphoric mood.      Past Medical History:   Diagnosis Date   • CPAP (continuous positive airway pressure) dependence     does not use machine   • Esophageal varices (HCC)     stable 2020 gets checked yearly   • Hepatic cirrhosis (HCC)     treated with harvoni   Hep C- dx age    • Hepatic encephalopathy (HCC)    • Liver disease    • Obesity    • Pneumonia     in past   • Postgastrectomy malabsorption    • Sleep apnea    • Wears glasses      Past Surgical History:   Procedure Laterality Date   • COLONOSCOPY     • ESOPHAGOGASTRODUODENOSCOPY N/A 2018    Procedure: ESOPHAGOGASTRODUODENOSCOPY (EGD);  Surgeon: Willy Traylor MD;  Location:  GI LAB;  Service: Gastroenterology   • FRACTURE SURGERY Left     ankle   • OTHER SURGICAL HISTORY      banding esophageal varices   • AL EXCISION EXCESSIVE SKIN & SUBQ TISSUE ABDOMEN N/A 2020    Procedure: ABDOMINOPLASTY;  Surgeon: Enrrique Birmingham MD;  Location:  MAIN OR;  Service: Plastics   • AL EXCISION SKIN ABD INFRAUMBILICAL PANNICULECTOMY N/A 2020    Procedure: PANNICULECTOMY;  Surgeon: Enrrique Birmingham MD;  Location:  MAIN OR;  Service: Plastics   • AL LAPS GSTRC RSTRICTIV PX LONGITUDINAL GASTRECTOMY N/A 2018    Procedure: GASTRECTOMY LAPAROSCOPIC SLEEVE;  Surgeon: Diana Mcdonnell MD;  [facility];  Service: Bariatrics     Family History   Problem Relation Age of Onset   • Heart disease Mother    • Lupus Mother    • Diabetes Father    • Stroke Father      Social History     Tobacco Use   • Smoking status: Some Days     Current packs/day: 0.00     Average packs/day: 0.3 packs/day for 37.0 years (9.3 ttl pk-yrs)     Types: Cigarettes     Start date: 1985     Last attempt to quit: 2022     Years since quittin.4   • Smokeless tobacco: Never   • Tobacco comments:     stopped 2020   Vaping Use   • Vaping status: Every Day   • Substances: Flavoring   Substance and Sexual  Activity   • Alcohol use: Not Currently     Comment: occasionally   • Drug use: No   • Sexual activity: Yes     Current Outpatient Medications on File Prior to Visit   Medication Sig   • AMILoride 5 mg tablet Take 1 tablet (5 mg total) by mouth daily   • carvedilol (COREG) 6.25 mg tablet Take 1 tablet (6.25 mg total) by mouth 2 (two) times a day with meals   • Claritin-D 12 Hour 5-120 MG per tablet TAKE ONE TABLET BY MOUTH TWICE DAILY FOR 5 DAYS   • cyanocobalamin (VITAMIN B-12) 1000 MCG tablet Take 1 tablet (1,000 mcg total) by mouth daily   • fluticasone (FLONASE) 50 mcg/act nasal spray 1 spray into each nostril daily   • furosemide (LASIX) 20 mg tablet Take 1 tablet by mouth 2 times a day   • ibuprofen (MOTRIN) 800 mg tablet Take 1 tablet (800 mg total) by mouth every 8 hours as needed for mild pain   • lactulose (CHRONULAC) 10 g/15 mL solution TAKE 15 ML (10 G TOTAL) BY MOUTH DAILY AS NEEDED (CONSTIPATION OR CONFUSION)   • methocarbamol (ROBAXIN) 750 mg tablet TAKE 1 TABLET BY MOUTH EVERY EIGHT HOURS   • omeprazole (PriLOSEC) 20 mg delayed release capsule Take 1 capsule (20 mg total) by mouth daily   • traMADol (ULTRAM) 50 mg tablet Take 1 tablet by mouth every 6 hours as needed for moderate pain   • [DISCONTINUED] amoxicillin-clavulanate (AUGMENTIN) 875-125 mg per tablet Take 1 tablet by mouth every 12 (twelve) hours for 7 days   • [DISCONTINUED] benzonatate (TESSALON) 200 MG capsule Take 1 capsule (200 mg total) by mouth 3 (three) times a day as needed for cough   • [DISCONTINUED] lidocaine (Lidoderm) 5 % Apply 1 patch topically over 12 hours daily Remove & Discard patch within 12 hours or as directed by MD (Patient not taking: Reported on 4/16/2024)   • [DISCONTINUED] methylPREDNISolone 4 MG tablet therapy pack Use as directed on package (Patient not taking: Reported on 4/16/2024)   • [DISCONTINUED] sildenafil (VIAGRA) 100 mg tablet Take 1 tablet (100 mg total) by mouth daily as needed for erectile  "dysfunction (Patient not taking: Reported on 4/16/2024)   • [DISCONTINUED] triamcinolone (KENALOG) 0.5 % cream Apply topically 2 (two) times a day     No Known Allergies  Immunization History   Administered Date(s) Administered   • COVID-19 PFIZER VACCINE 0.3 ML IM 10/01/2022   • COVID-19 Pfizer mRNA vacc PF armando-sucrose 12 yr and older (Comirnaty) 10/12/2023   • COVID-19 Pfizer vac (Armando-sucrose, gray cap) 12 yr+ IM 08/19/2021, 09/09/2021, 02/10/2022, 07/05/2022   • INFLUENZA 10/28/2014, 09/15/2016, 11/04/2017, 08/30/2018, 10/22/2021, 10/12/2023   • Influenza Split High Dose Preservative Free IM 10/28/2014   • Influenza, injectable, quadrivalent, preservative free 0.5 mL 08/30/2018   • Influenza, recombinant, quadrivalent,injectable, preservative free 11/20/2019   • Pneumococcal Conjugate 13-Valent 06/17/2015   • Tdap 05/01/2019   • Tuberculin Skin Test-PPD Intradermal 10/28/2009, 02/07/2011, 07/23/2012, 03/16/2015   • Zoster 10/28/2014     Objective   {Disappearing Hyperlinks   Review Vitals * Enter New Vitals * Results Review * Labs * Imaging * Cardiology * Procedures * Lung Cancer Screening :56972}  /64 (BP Location: Left arm, Patient Position: Sitting, Cuff Size: Large)   Pulse 67   Temp 98 °F (36.7 °C)   Ht 5' 6\" (1.676 m)   Wt 102 kg (225 lb)   SpO2 96%   BMI 36.32 kg/m²     Physical Exam  Constitutional:       General: He is not in acute distress.     Appearance: He is well-developed.   HENT:      Head: Normocephalic and atraumatic.      Right Ear: Tympanic membrane normal.      Left Ear: Tympanic membrane normal. There is impacted cerumen.      Nose: Nose normal.      Mouth/Throat:      Mouth: Mucous membranes are moist.   Eyes:      Pupils: Pupils are equal, round, and reactive to light.   Neck:      Thyroid: No thyromegaly.   Cardiovascular:      Rate and Rhythm: Normal rate and regular rhythm.      Heart sounds: Normal heart sounds. No murmur heard.  Pulmonary:      Effort: Pulmonary effort " is normal. No respiratory distress.      Breath sounds: Normal breath sounds. No wheezing.   Abdominal:      General: Bowel sounds are normal.      Palpations: Abdomen is soft.   Musculoskeletal:         General: Normal range of motion.      Cervical back: Normal range of motion.   Skin:     General: Skin is warm and dry.   Neurological:      General: No focal deficit present.      Mental Status: He is alert and oriented to person, place, and time.       Ear cerumen removal    Date/Time: 6/17/2024 8:40 AM    Performed by: KAILA Joseph  Authorized by: KAILA Joseph    Patient location:  Clinic  Procedure details:     Local anesthetic:  None    Location:  L ear    Procedure type: irrigation with instrumentation      Instrumentation: curette      Approach:  External  Post-procedure details:     Complication:  None    Hearing quality:  Improved    Patient tolerance of procedure:  Tolerated well, no immediate complications

## 2024-06-19 ENCOUNTER — TELEPHONE (OUTPATIENT)
Age: 59
End: 2024-06-19

## 2024-06-19 NOTE — TELEPHONE ENCOUNTER
Caller: patient    Doctor: dada    Reason for call: would like to reschedule his procedure. Looking for a Tuesday    Call back#:

## 2024-06-19 NOTE — TELEPHONE ENCOUNTER
Spoke with pt and rescheduled RFA -- first opening was for 8/6.  Will place pt on cancellation list for any sooner openings

## 2024-06-20 DIAGNOSIS — I85.10 SECONDARY ESOPHAGEAL VARICES WITHOUT BLEEDING (HCC): ICD-10-CM

## 2024-06-20 RX ORDER — CARVEDILOL 6.25 MG/1
6.25 TABLET ORAL 2 TIMES DAILY WITH MEALS
Qty: 180 TABLET | Refills: 1 | Status: SHIPPED | OUTPATIENT
Start: 2024-06-20 | End: 2024-06-21

## 2024-06-20 NOTE — TELEPHONE ENCOUNTER
Reason for call:   [x] Refill   [] Prior Auth  [] Other:     Office:   [x] PCP/Provider -Rodrigo Jo MD -[facility]      [] Specialty/Provider -     Medication: carvedilol (COREG) 6.25 mg tablet - Take 1 tablet (6.25 mg total) by mouth 2 (two) times a day with meals       Pharmacy: [facility]     Does the patient have enough for 3 days?   [] Yes   [] No - Send as HP to POD

## 2024-06-21 DIAGNOSIS — I85.10 SECONDARY ESOPHAGEAL VARICES WITHOUT BLEEDING (HCC): ICD-10-CM

## 2024-06-21 DIAGNOSIS — B18.2 HEPATIC CIRRHOSIS DUE TO CHRONIC HEPATITIS C INFECTION (HCC): ICD-10-CM

## 2024-06-21 DIAGNOSIS — K74.60 HEPATIC CIRRHOSIS DUE TO CHRONIC HEPATITIS C INFECTION (HCC): ICD-10-CM

## 2024-06-21 RX ORDER — CARVEDILOL 6.25 MG/1
TABLET ORAL
Qty: 60 TABLET | Refills: 5 | Status: SHIPPED | OUTPATIENT
Start: 2024-06-21

## 2024-06-21 RX ORDER — FUROSEMIDE 20 MG/1
TABLET ORAL
Qty: 30 TABLET | Refills: 1 | Status: SHIPPED | OUTPATIENT
Start: 2024-06-21

## 2024-07-02 DIAGNOSIS — M54.50 MIDLINE LOW BACK PAIN WITHOUT SCIATICA, UNSPECIFIED CHRONICITY: ICD-10-CM

## 2024-07-02 DIAGNOSIS — M79.18 MYOFASCIAL MUSCLE PAIN: ICD-10-CM

## 2024-07-02 RX ORDER — IBUPROFEN 800 MG/1
800 TABLET ORAL EVERY 8 HOURS PRN
Qty: 270 TABLET | Refills: 0 | Status: SHIPPED | OUTPATIENT
Start: 2024-07-02

## 2024-07-09 ENCOUNTER — HOSPITAL ENCOUNTER (OUTPATIENT)
Dept: RADIOLOGY | Facility: CLINIC | Age: 59
Discharge: HOME | End: 2024-07-09

## 2024-07-09 ENCOUNTER — TELEPHONE (OUTPATIENT)
Age: 59
End: 2024-07-09

## 2024-07-09 NOTE — TELEPHONE ENCOUNTER
Caller: Matt    Doctor: Kory     Reason for call: Patient called stating he over slept and needs to reschedule procedure please advise     Call back#: [phone]

## 2024-07-12 ENCOUNTER — APPOINTMENT (OUTPATIENT)
Dept: LAB | Facility: CLINIC | Age: 59
End: 2024-07-12
Payer: COMMERCIAL

## 2024-07-12 DIAGNOSIS — D75.839 THROMBOCYTHEMIA: ICD-10-CM

## 2024-07-12 DIAGNOSIS — E53.8 B12 DEFICIENCY: ICD-10-CM

## 2024-07-12 PROCEDURE — 85025 COMPLETE CBC W/AUTO DIFF WBC: CPT

## 2024-07-12 PROCEDURE — 36415 COLL VENOUS BLD VENIPUNCTURE: CPT

## 2024-07-13 LAB
BASOPHILS # BLD AUTO: 0.06 THOUSANDS/ÂΜL (ref 0–0.1)
BASOPHILS NFR BLD AUTO: 1 % (ref 0–1)
EOSINOPHIL # BLD AUTO: 0.35 THOUSAND/ÂΜL (ref 0–0.61)
EOSINOPHIL NFR BLD AUTO: 6 % (ref 0–6)
ERYTHROCYTE [DISTWIDTH] IN BLOOD BY AUTOMATED COUNT: 15.1 % (ref 11.6–15.1)
HCT VFR BLD AUTO: 39.1 % (ref 36.5–49.3)
HGB BLD-MCNC: 13.1 G/DL (ref 12–17)
IMM GRANULOCYTES # BLD AUTO: 0.02 THOUSAND/UL (ref 0–0.2)
IMM GRANULOCYTES NFR BLD AUTO: 0 % (ref 0–2)
LYMPHOCYTES # BLD AUTO: 1.28 THOUSANDS/ÂΜL (ref 0.6–4.47)
LYMPHOCYTES NFR BLD AUTO: 23 % (ref 14–44)
MCH RBC QN AUTO: 34.1 PG (ref 26.8–34.3)
MCHC RBC AUTO-ENTMCNC: 33.5 G/DL (ref 31.4–37.4)
MCV RBC AUTO: 102 FL (ref 82–98)
MONOCYTES # BLD AUTO: 0.51 THOUSAND/ÂΜL (ref 0.17–1.22)
MONOCYTES NFR BLD AUTO: 9 % (ref 4–12)
NEUTROPHILS # BLD AUTO: 3.26 THOUSANDS/ÂΜL (ref 1.85–7.62)
NEUTS SEG NFR BLD AUTO: 61 % (ref 43–75)
NRBC BLD AUTO-RTO: 0 /100 WBCS
PLATELET # BLD AUTO: 128 THOUSANDS/UL (ref 149–390)
PMV BLD AUTO: 12.4 FL (ref 8.9–12.7)
RBC # BLD AUTO: 3.84 MILLION/UL (ref 3.88–5.62)
WBC # BLD AUTO: 5.48 THOUSAND/UL (ref 4.31–10.16)

## 2024-07-17 PROBLEM — J18.9 PNEUMONIA OF BOTH LOWER LOBES DUE TO INFECTIOUS ORGANISM: Status: RESOLVED | Noted: 2024-06-17 | Resolved: 2024-07-17

## 2024-07-18 ENCOUNTER — TELEPHONE (OUTPATIENT)
Dept: RADIOLOGY | Facility: CLINIC | Age: 59
End: 2024-07-18

## 2024-07-18 NOTE — TELEPHONE ENCOUNTER
Spoke with pt -- reports the right side RFA he had completed on 5/30/24 is not working.    He is scheduled for left RFA on 7/23.      Pt would like advisement - is unsure if the reason the first RFA is not working is because the second has not been done, or if there is another reason for the problem.    Requesting a call back.

## 2024-07-19 NOTE — TELEPHONE ENCOUNTER
LMOM for patient to call back in regards to either making an ov to follow up on prev RFA or if he wants to keep his RFA appt for 7/23 if he makes OV 7/23 appt will be canceled

## 2024-07-24 ENCOUNTER — TELEPHONE (OUTPATIENT)
Dept: OTHER | Facility: OTHER | Age: 59
End: 2024-07-24

## 2024-07-24 NOTE — TELEPHONE ENCOUNTER
Caller: rebel    Doctor: dada    Reason for call: pt needs to r/s his procedure.    Call back#: [phone]

## 2024-07-24 NOTE — TELEPHONE ENCOUNTER
Patient called, regarding missed procedure. Per patient, he would like a callback from office to r/s procedure. Per patient, if he is not able to  the phone, to please leave a detail message with new scheduled date and time of procedure

## 2024-07-25 ENCOUNTER — TELEPHONE (OUTPATIENT)
Dept: PAIN MEDICINE | Facility: CLINIC | Age: 59
End: 2024-07-25

## 2024-07-25 NOTE — TELEPHONE ENCOUNTER
Caller: Matt    Doctor: Ranjith    Reason for call: patient calling to be added to cancellation list to get sooner date for procedure he is currently scheduled 8/27    Call back#: [phone]

## 2024-07-25 NOTE — TELEPHONE ENCOUNTER
Multiple message from pt regarding reschedule -- placed in soonest RFA opening on 8/27/24 @ 11:30 -- left detailed message, ok'd by pt, with information.  Will also send myc message

## 2024-07-25 NOTE — TELEPHONE ENCOUNTER
Caller: patient    Doctor: dada    Reason for call: would like to reschedule his procedure  Please leave a VM    Call back#:

## 2024-07-29 DIAGNOSIS — B18.2 HEPATIC CIRRHOSIS DUE TO CHRONIC HEPATITIS C INFECTION (HCC): ICD-10-CM

## 2024-07-29 DIAGNOSIS — K74.60 HEPATIC CIRRHOSIS DUE TO CHRONIC HEPATITIS C INFECTION (HCC): ICD-10-CM

## 2024-07-29 RX ORDER — AMILORIDE HYDROCHLORIDE 5 MG/1
5 TABLET ORAL DAILY
Qty: 90 TABLET | Refills: 1 | Status: SHIPPED | OUTPATIENT
Start: 2024-07-29

## 2024-08-19 DIAGNOSIS — M54.42 ACUTE BILATERAL LOW BACK PAIN WITH LEFT-SIDED SCIATICA: ICD-10-CM

## 2024-08-19 RX ORDER — TRAMADOL HYDROCHLORIDE 50 MG/1
50 TABLET ORAL EVERY 6 HOURS PRN
Qty: 60 TABLET | Refills: 0 | Status: SHIPPED | OUTPATIENT
Start: 2024-08-19 | End: 2024-08-27

## 2024-08-19 RX ORDER — DICLOFENAC SODIUM 75 MG/1
75 TABLET, DELAYED RELEASE ORAL 2 TIMES DAILY
Qty: 180 TABLET | Refills: 0 | Status: SHIPPED | OUTPATIENT
Start: 2024-08-19 | End: 2024-08-20 | Stop reason: SDUPTHER

## 2024-08-20 RX ORDER — DICLOFENAC SODIUM 75 MG/1
75 TABLET, DELAYED RELEASE ORAL 2 TIMES DAILY
Qty: 180 TABLET | Refills: 0 | Status: SHIPPED | OUTPATIENT
Start: 2024-08-20

## 2024-08-20 NOTE — TELEPHONE ENCOUNTER
Transmission to pharmacy error occurred. Medication resent to pharmacy. Receipt confirmed by pharmacy.    Receipt confirmed by pharmacy (8/20/2024  8:06 AM EDT)

## 2024-08-23 DIAGNOSIS — K76.82 HEPATIC ENCEPHALOPATHY (HCC): ICD-10-CM

## 2024-08-23 RX ORDER — LACTULOSE 10 G/15ML
10 SOLUTION ORAL DAILY PRN
Qty: 946 ML | Refills: 0 | Status: SHIPPED | OUTPATIENT
Start: 2024-08-23

## 2024-08-26 ENCOUNTER — TELEPHONE (OUTPATIENT)
Age: 59
End: 2024-08-26

## 2024-08-26 DIAGNOSIS — Z23 NEED FOR COVID-19 VACCINE: Primary | ICD-10-CM

## 2024-08-26 RX ORDER — COVID-19 VACCINE, MRNA 0.2 MG/ML
0.5 INJECTION, SUSPENSION INTRAMUSCULAR ONCE
Qty: 0.5 ML | Refills: 0 | Status: SHIPPED | OUTPATIENT
Start: 2024-08-26 | End: 2024-08-26

## 2024-08-27 ENCOUNTER — HOSPITAL ENCOUNTER (OUTPATIENT)
Dept: RADIOLOGY | Facility: CLINIC | Age: 59
Discharge: HOME | End: 2024-08-27
Payer: COMMERCIAL

## 2024-08-27 VITALS
SYSTOLIC BLOOD PRESSURE: 126 MMHG | HEART RATE: 73 BPM | TEMPERATURE: 98.3 F | OXYGEN SATURATION: 96 % | DIASTOLIC BLOOD PRESSURE: 78 MMHG | RESPIRATION RATE: 18 BRPM

## 2024-08-27 DIAGNOSIS — M54.42 ACUTE BILATERAL LOW BACK PAIN WITH LEFT-SIDED SCIATICA: ICD-10-CM

## 2024-08-27 DIAGNOSIS — M47.816 LUMBAR FACET ARTHROPATHY: ICD-10-CM

## 2024-08-27 PROCEDURE — 64635 DESTROY LUMB/SAC FACET JNT: CPT | Performed by: STUDENT IN AN ORGANIZED HEALTH CARE EDUCATION/TRAINING PROGRAM

## 2024-08-27 PROCEDURE — 64636 DESTROY L/S FACET JNT ADDL: CPT | Performed by: STUDENT IN AN ORGANIZED HEALTH CARE EDUCATION/TRAINING PROGRAM

## 2024-08-27 RX ORDER — TRAMADOL HYDROCHLORIDE 50 MG/1
50 TABLET ORAL EVERY 6 HOURS PRN
Qty: 60 TABLET | Refills: 0 | Status: SHIPPED | OUTPATIENT
Start: 2024-08-27

## 2024-08-27 RX ADMIN — Medication 3 ML: at 11:43

## 2024-08-27 NOTE — DISCHARGE INSTR - LAB

## 2024-08-27 NOTE — H&P
History of Present Illness: The patient is a 59 y.o. male who presents with complaints of left lower backpain    Past Medical History:   Diagnosis Date    CPAP (continuous positive airway pressure) dependence     does not use machine    Esophageal varices (HCC)     stable 9/2020 gets checked yearly    Hepatic cirrhosis (HCC)     treated with harvoni   Hep C- dx age [age]    Hepatic encephalopathy (HCC)     Liver disease     Obesity     Pneumonia     in past    Postgastrectomy malabsorption     Sleep apnea     Wears glasses        Past Surgical History:   Procedure Laterality Date    COLONOSCOPY      ESOPHAGOGASTRODUODENOSCOPY N/A 4/12/2018    Procedure: ESOPHAGOGASTRODUODENOSCOPY (EGD);  Surgeon: Willy Traylor MD;  Location: [facility];  Service: Gastroenterology    FRACTURE SURGERY Left     ankle    OTHER SURGICAL HISTORY      banding esophageal varices    CT EXCISION EXCESSIVE SKIN & SUBQ TISSUE ABDOMEN N/A 9/16/2020    Procedure: ABDOMINOPLASTY;  Surgeon: Enrrique Birmingham MD;  Location: [facility];  Service: Plastics    CT EXCISION SKIN ABD INFRAUMBILICAL PANNICULECTOMY N/A 9/16/2020    Procedure: PANNICULECTOMY;  Surgeon: Enrrique Birmingham MD;  Location: [facility];  Service: Plastics    CT LAPS GSTRC RSTRICTIV PX LONGITUDINAL GASTRECTOMY N/A 6/5/2018    Procedure: GASTRECTOMY LAPAROSCOPIC SLEEVE;  Surgeon: Diana Mcdonnell MD;  Location: [facility];  Service: Bariatrics         Current Outpatient Medications:     AMILoride 5 mg tablet, Take 1 tablet by mouth daily, Disp: 90 tablet, Rfl: 1    carvedilol (COREG) 6.25 mg tablet, TAKE 1 TABLET (6.25 MG TOTAL) BY MOUTH TWO (TWO) TIMES A DAY WITH MEALS, Disp: 60 tablet, Rfl: 5    Claritin-D 12 Hour 5-120 MG per tablet, TAKE ONE TABLET BY MOUTH TWICE DAILY FOR 5 DAYS, Disp: 10 tablet, Rfl: 0    cyanocobalamin (VITAMIN B-12) 1000 MCG tablet, Take 1 tablet (1,000 mcg total) by mouth daily, Disp: 30 tablet, Rfl: 2    diclofenac (VOLTAREN) 75 mg EC tablet,  Take 1 tablet (75 mg total) by mouth 2 (two) times a day, Disp: 180 tablet, Rfl: 0    fluticasone (FLONASE) 50 mcg/act nasal spray, 1 spray into each nostril daily, Disp: 48 g, Rfl: 3    furosemide (LASIX) 20 mg tablet, TAKE 1 TABLET (20 MG TOTAL) BY MOUTH TWO (TWO) TIMES A DAY, Disp: 30 tablet, Rfl: 1    ibuprofen (MOTRIN) 800 mg tablet, Take 1 tablet by mouth every 8 hours as needed for mild pain, Disp: 270 tablet, Rfl: 0    lactulose (CHRONULAC) 10 g/15 mL solution, Take 15 mL (10 g total) by mouth daily as needed (constipation or confusion), Disp: 946 mL, Rfl: 0    methocarbamol (ROBAXIN) 750 mg tablet, TAKE 1 TABLET BY MOUTH EVERY EIGHT HOURS, Disp: 30 tablet, Rfl: 0    omeprazole (PriLOSEC) 20 mg delayed release capsule, Take 1 capsule (20 mg total) by mouth daily, Disp: 90 capsule, Rfl: 1    traMADol (ULTRAM) 50 mg tablet, Take 1 tablet by mouth every 6 hours as needed for moderate pain, Disp: 60 tablet, Rfl: 0    triamcinolone (KENALOG) 0.5 % cream, Apply topically 2 (two) times a day, Disp: 45 g, Rfl: 3    No Known Allergies    Physical Exam: There were no vitals filed for this visit.  General: Awake, Alert, Oriented x 3, Mood and affect appropriate  Respiratory: Respirations even and unlabored  Cardiovascular: Peripheral pulses intact; no edema  Musculoskeletal Exam: facet loading positive left    ASA Score: 3         Assessment:   1. Lumbar facet arthropathy        Plan: Left L4-5 and L5-S1 RFA

## 2024-09-03 ENCOUNTER — TELEPHONE (OUTPATIENT)
Age: 59
End: 2024-09-03

## 2024-09-03 DIAGNOSIS — R05.9 COUGH: ICD-10-CM

## 2024-09-03 RX ORDER — FLUTICASONE PROPIONATE 50 MCG
1 SPRAY, SUSPENSION (ML) NASAL DAILY
Qty: 48 G | Refills: 3 | Status: SHIPPED | OUTPATIENT
Start: 2024-09-03

## 2024-09-03 NOTE — TELEPHONE ENCOUNTER
Guille from [employer] called, he was following up on Release of Info fax. He stated he has faxed multiply releases to the office. I did confirm the fax number with him. He stated they represent the patient in a personal injury case and are looking for his records. I did provided him with Medical Records information as well.    Please advise, thank you.

## 2024-09-12 DIAGNOSIS — M54.42 ACUTE BILATERAL LOW BACK PAIN WITH LEFT-SIDED SCIATICA: ICD-10-CM

## 2024-09-12 RX ORDER — TRAMADOL HYDROCHLORIDE 50 MG/1
50 TABLET ORAL EVERY 6 HOURS PRN
Qty: 60 TABLET | Refills: 0 | Status: SHIPPED | OUTPATIENT
Start: 2024-09-12 | End: 2024-09-20

## 2024-09-12 NOTE — TELEPHONE ENCOUNTER
Reason for call:   [x] Refill   [] Prior Auth  [] Other:     Office:   [x] PCP/Provider -   [] Specialty/Provider -     Medication:     traMADol (ULTRAM) 50 mg tablet       Dose/Frequency: Take 1 tablet by mouth every 6 hours as needed for moderate pain,     Quantity: 60 tablet     Pharmacy: [facility] [place] [address] [phone]     Does the patient have enough for 3 days?   [x] Yes   [] No - Send as HP to POD

## 2024-09-20 DIAGNOSIS — M54.42 ACUTE BILATERAL LOW BACK PAIN WITH LEFT-SIDED SCIATICA: ICD-10-CM

## 2024-09-20 RX ORDER — TRAMADOL HYDROCHLORIDE 50 MG/1
50 TABLET ORAL EVERY 6 HOURS PRN
Qty: 60 TABLET | Refills: 0 | Status: SHIPPED | OUTPATIENT
Start: 2024-09-20

## 2024-09-24 NOTE — H&P (VIEW-ONLY)
[facility]  Gildardo Willy Sr  61 y o  male MRN: [id number]       CC: Follow-up for cirrhosis    HPI:  Mikala Newberry is a 76-year-old male who presents to the office for  follow-up as he has history of hepatitis-C cirrhosis status post treatment complicated by esophageal varices and previous a patent encephalopathy  Patient also has history of obesity status post gastric sleeve surgery in 2018  Patient is here to follow-up and has not been seen since 2019 in the office  Patient has no complaints at this time  He denies signs overt GI bleeding, unintentional weight loss  or lower extremity edema  Patient's last EGD was in 2019, he was due for a 1 year recall secondary to small esophageal varices visualized on exam   His last colonoscopy that I can see was from 2015, he had a single hyperplastic polyp removed  He reports that he is very reluctant to have another colonoscopy as he reports post polypectomy bleeding  Review of Systems:    CONSTITUTIONAL: Denies any fever, chills, or rigors  Good appetite, and no recent weight loss  HEENT: No earache or tinnitus  Denies hearing loss or visual disturbances  CARDIOVASCULAR: No chest pain or palpitations  RESPIRATORY: Denies any cough, hemoptysis, shortness of breath or dyspnea on exertion  GASTROINTESTINAL: As noted in the History of Present Illness  GENITOURINARY: No problems with urination  Denies any hematuria or dysuria  NEUROLOGIC: No dizziness or vertigo, denies headaches  MUSCULOSKELETAL: Denies any muscle or joint pain  SKIN: Denies skin rashes or itching  ENDOCRINE: Denies excessive thirst  Denies intolerance to heat or cold  PSYCHOSOCIAL: Denies depression or anxiety  Denies any recent memory loss         Current Outpatient Medications   Medication Sig Dispense Refill    AMILoride 5 mg tablet TAKE ONE TABLET BY MOUTH ONE TIME DAILY 30 tablet 0    furosemide (LASIX) 20 mg tablet Take 20 mg by mouth 2 (two) times a day      ibuprofen (MOTRIN) 800 mg tablet TAKE ONE TABLET BY MOUTH EVERY EIGHT HOURS AS NEEDED FOR MILD PAIN 30 tablet 0    lactulose (Constulose) 10 g/15 mL solution Take 15 mL (10 g total) by mouth daily as needed (constipation or confusion) 946 mL 1    methocarbamol (ROBAXIN) 750 mg tablet TAKE ONE TABLET BY MOUTH EVERY EIGHT HOURS 30 tablet 0    nadolol (CORGARD) 20 mg tablet Take 1 tablet (20 mg total) by mouth daily 90 tablet 2    omeprazole (PriLOSEC) 20 mg delayed release capsule TAKE ONE CAPSULE BY MOUTH ONE TIME DAILY 30 capsule 0    rifaximin (XIFAXAN) 550 mg tablet Take 550 mg by mouth 2 (two) times a day        sildenafil (VIAGRA) 100 mg tablet TAKE ONE TABLET BY MOUTH DAILY AS NEEDED for erectile dysfunction 30 tablet 3    cholestyramine (QUESTRAN) 4 g packet Take 1 packet (4 g total) by mouth 2 (two) times a day with meals 60 packet 1    fluticasone (FLONASE) 50 mcg/act nasal spray 1 spray into each nostril daily (Patient not taking: Reported on 3/30/2022 ) 11 1 mL 1    metroNIDAZOLE (METROGEL) 1 % gel Apply topically daily (Patient not taking: Reported on 3/30/2022 ) 45 g 1     No current facility-administered medications for this visit  Past Medical History:   Diagnosis Date    CPAP (continuous positive airway pressure) dependence     does not use machine    Esophageal varices ([facility] [address] )     stable 9/2020 gets checked yearly    Hepatic cirrhosis ([facility] [address] )     treated with harvoni   Hep C- dx age 17    Hepatic encephalopathy ([facility] [address] )     Liver disease     Obesity     Pneumonia     in past    Postgastrectomy malabsorption     Sleep apnea     Wears glasses      Past Surgical History:   Procedure Laterality Date    COLONOSCOPY      ESOPHAGOGASTRODUODENOSCOPY N/A 4/12/2018    Procedure: ESOPHAGOGASTRODUODENOSCOPY (EGD); Surgeon: Shawn Wick MD;  [facility];   Service: Gastroenterology    FRACTURE SURGERY Left     ankle    OTHER SURGICAL HISTORY      banding esophageal varices    ID EXCISE EXCESS SKIN TISSUE,ABDOMEN N/A 9/16/2020    Procedure: PANNICULECTOMY;  Surgeon: Jody Pedraza MD;  [facility];  Service: Plastics    MA EXCISE EXCESS SKIN TISSUE,ABDOMEN, ADD-ON N/A 9/16/2020    Procedure: ABDOMINOPLASTY;  Surgeon: Jody Pedraza MD;  [facility];  Service: Plastics    MA LAP, TERESA RESTRICT PROC, LONGITUDINAL GASTRECTOMY N/A 6/5/2018    Procedure: GASTRECTOMY LAPAROSCOPIC SLEEVE;  Surgeon: Francine Monroy MD;  [facility];  Service: Bariatrics     Social History     Socioeconomic History    Marital status: /Civil Union     Spouse name: None    Number of children: 2    Years of education: None    Highest education level: None   Occupational History    Occupation: disabled   Tobacco Use    Smoking status: Current Every Day Smoker     Packs/day: 0 25     Years: 37 00     Pack years: 9 25    Smokeless tobacco: Never Used    Tobacco comment: stopped 7/2020   Vaping Use    Vaping Use: Former    Substances: Flavoring   Substance and Sexual Activity    Alcohol use: Never     Alcohol/week: 0 0 standard drinks    Drug use: No    Sexual activity: Yes   Other Topics Concern    None   Social History Narrative    None     Social Determinants of Health     Financial Resource Strain: Not on file   Food Insecurity: Not on file   Transportation Needs: Not on file   Physical Activity: Not on file   Stress: Not on file   Social Connections: Not on file   Intimate Partner Violence: Not on file   Housing Stability: Not on file     Family History   Problem Relation Age of Onset    Heart disease Mother     Lupus Mother     Diabetes Father     Stroke Father             PHYSICAL EXAM:    There were no vitals filed for this visit  General Appearance:   Alert and oriented x 3   Cooperative, and in no respiratory distress   HEENT:   Normocephalic, atraumatic, anicteric      Neck:  Supple, symmetrical, trachea midline   Lungs:   Clear to auscultation bilaterally    Heart[de-identified]   Regular rate and rhythm   Abdomen:   Soft, non-tender, non-distended; normal bowel sounds; no masses, no organomegaly    Genitalia:   Deferred    Rectal:   Deferred    Extremities:  No cyanosis, clubbing or edema    Pulses:  2+ and symmetric all extremities    Skin:  Skin color, texture, turgor normal, no rashes or lesions    Lymph nodes:  No palpable cervical or supraclavicular lymphadenopathy        Lab Results:   Results from last 6 Months   Lab Units 03/07/22  1008   WBC Thousand/uL 5 49   HEMOGLOBIN g/dL 13 4   HEMATOCRIT % 37 2   PLATELETS Thousands/uL 112*   NEUTROS PCT % 62   LYMPHS PCT % 21   MONOS PCT % 10   EOS PCT % 6     Results from last 6 Months   Lab Units 03/07/22  1008   POTASSIUM mmol/L 3 6   CHLORIDE mmol/L 108   CO2 mmol/L 24   BUN mg/dL 9   CREATININE mg/dL 0 71   CALCIUM mg/dL 8 5   ALK PHOS U/L 93   ALT U/L 25   AST U/L 36               Imaging Studies: I have personally reviewed pertinent imaging studies  US right upper quadrant    Result Date: 3/10/2022  Impression: Cirrhotic liver Cholelithiasis  No biliary dilation No focal lesion in the visualized liver parenchyma If screening for right CC MRI can Workstation performed: [id number]       ASSESSMENT and PLAN:      1)  Hepatitis-C cirrhosis status post treatment complicated by previous hepatic encephalopathy and small esophageal varices - Unable to calculate MELD as INR not available  -  Will schedule EGD for varices screening as he is overdue  -  Will order MRI for [facility] [address]  screening, then again in 6 months  -  Continued abstinence from alcohol    2)  Need for colon cancer screening, personal history of colon polyps -  From records available to me, I see that patient had a colonoscopy in 2015, and a single polyp was removed  This was hyperplastic  Patient reports that he had a post polypectomy bleed afterwards  He is very reluctant to have another colonoscopy secondary to this   After discussion, he is agreeable to Cologuacleve  He reports he would consider getting a colonoscopy if this is positive  -   Will order Jose for the patient        Follow up in 3-6 months  [Time Spent: ___ minutes] : I have spent [unfilled] minutes of time on the encounter which excludes teaching and separately reported services.

## 2024-09-28 DIAGNOSIS — M79.18 MYOFASCIAL MUSCLE PAIN: ICD-10-CM

## 2024-09-28 DIAGNOSIS — M54.50 MIDLINE LOW BACK PAIN WITHOUT SCIATICA, UNSPECIFIED CHRONICITY: ICD-10-CM

## 2024-09-28 RX ORDER — IBUPROFEN 800 MG/1
800 TABLET, FILM COATED ORAL EVERY 8 HOURS PRN
Qty: 270 TABLET | Refills: 0 | Status: SHIPPED | OUTPATIENT
Start: 2024-09-28

## 2024-10-02 DIAGNOSIS — M54.42 ACUTE BILATERAL LOW BACK PAIN WITH LEFT-SIDED SCIATICA: ICD-10-CM

## 2024-10-02 RX ORDER — TRAMADOL HYDROCHLORIDE 50 MG/1
50 TABLET ORAL EVERY 6 HOURS PRN
Qty: 60 TABLET | Refills: 0 | Status: SHIPPED | OUTPATIENT
Start: 2024-10-02

## 2024-10-03 ENCOUNTER — TELEPHONE (OUTPATIENT)
Age: 59
End: 2024-10-03
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
## 2024-10-03 NOTE — TELEPHONE ENCOUNTER
Caller: Patient     Doctor: Kory     Reason for call: Patient returning call with fax number for : [phone] attn: Guille Thorpe     He needs his visits from Feb to be sent, they were being sent but nothing has been sent since.    Patient aware TJ may need to be filled out and asking if it can be sent via [employer].    Please advise     Call back#: [phone] (Ok to leave detailed message)

## 2024-10-17 ENCOUNTER — TELEPHONE (OUTPATIENT)
Dept: FAMILY MEDICINE CLINIC | Facility: CLINIC | Age: 59
End: 2024-10-17

## 2024-10-17 DIAGNOSIS — M54.42 ACUTE BILATERAL LOW BACK PAIN WITH LEFT-SIDED SCIATICA: ICD-10-CM

## 2024-10-17 RX ORDER — TRAMADOL HYDROCHLORIDE 50 MG/1
50 TABLET ORAL EVERY 6 HOURS PRN
Qty: 60 TABLET | Refills: 0 | Status: SHIPPED | OUTPATIENT
Start: 2024-10-17

## 2024-10-17 NOTE — PROGRESS NOTES
Assessment:  1. Lumbar facet arthropathy    2. Chronic bilateral low back pain, unspecified whether sciatica present        Plan:    Status post bilateral L4-5 and L5-S1 radiofrequency ablation with 60% relief which is ongoing.  She reports chronic discomfort in the bilateral upper lumbar region with stiffness and tightness.  Appears to be muscular in nature.  He has been taking Robaxin for quite some time.  We will discontinue this and try tizanidine 6 mg twice daily as needed.  Initially advised him to discontinue NSAIDs given history of esophageal varices.  I also recommend lidocaine patch to apply over the upper lumbar region.    [place] Prescription Drug Monitoring Program report was reviewed and was appropriate     Complete risks and benefits including bleeding, infection, tissue reaction, nerve injury and allergic reaction were discussed. The approach was demonstrated using models and literature was provided. Verbal and written consent was obtained.    My impressions and treatment recommendations were discussed in detail with the patient who verbalized understanding and had no further questions.  Discharge instructions were provided. I personally saw and examined the patient and I agree with the above discussed plan of care.    No orders of the defined types were placed in this encounter.    New Medications Ordered This Visit   Medications    tiZANidine (ZANAFLEX) 4 mg tablet     Sig: Take 1.5 tablets (6 mg total) by mouth 2 (two) times a day as needed for muscle spasms     Dispense:  60 tablet     Refill:  0       History of Present Illness:  Matt Tobias  is a 59 y.o. male who presents for a follow up office visit in regards to Back Pain.   The patient’s current symptoms include lower back pain.  He is status post bilateral L4-5 and L5-S1 radiofrequency ablation with about 60% relief.  Pain score 4 out of 10 today.  Morning and night are worse.  Intermittent, dull/aching in nature.    He reports  "most of the pain in the upper lumbar paraspinal musculature and reports significant tightness there with prolonged sitting or laying.     He reports RFA alleviate \"sharp pains\".     I have personally reviewed and/or updated the patient's past medical history, past surgical history, family history, social history, current medications, allergies, and vital signs today.     Review of Systems   Musculoskeletal:  Positive for back pain.       Patient Active Problem List   Diagnosis    Former smoker    Erectile dysfunction    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)    Gastroesophageal reflux disease without esophagitis    Morbid obesity with BMI of 40.0-44.9, adult (HCC)    Obstructive sleep apnea    Secondary esophageal varices without bleeding (HCC)    S/P laparoscopic sleeve gastrectomy    Acute pain of right knee    Postsurgical malabsorption    Esophageal varices without bleeding (HCC)    Hyperammonemia (HCC)    Vitamin A deficiency    S/P abdominoplasty    Platelets decreased (HCC)    Bowel and bladder incontinence    Hyperbilirubinemia    Tremor    Rosacea       Past Medical History:   Diagnosis Date    CPAP (continuous positive airway pressure) dependence     does not use machine    Esophageal varices (HCC)     stable 9/2020 gets checked yearly    Hepatic cirrhosis (HCC)     treated with harvoni   Hep C- dx age [age]    Hepatic encephalopathy (HCC)     Liver disease     Obesity     Pneumonia     in past    Postgastrectomy malabsorption     Sleep apnea     Wears glasses        Past Surgical History:   Procedure Laterality Date    COLONOSCOPY      ESOPHAGOGASTRODUODENOSCOPY N/A 4/12/2018    Procedure: ESOPHAGOGASTRODUODENOSCOPY (EGD);  Surgeon: Willy Traylor MD;  [facility];  Service: Gastroenterology    FRACTURE SURGERY Left     ankle    OTHER SURGICAL HISTORY      banding esophageal varices    FL EXCISION EXCESSIVE SKIN & SUBQ TISSUE ABDOMEN N/A 9/16/2020    Procedure: ABDOMINOPLASTY;  " Surgeon: Enrrique Birmingham MD;  [facility];  Service: Plastics    WA EXCISION SKIN ABD INFRAUMBILICAL PANNICULECTOMY N/A 2020    Procedure: PANNICULECTOMY;  Surgeon: Enrrique Birmingham MD;  [facility];  Service: Plastics    WA LAPS GSTRC RSTRICTIV PX LONGITUDINAL GASTRECTOMY N/A 2018    Procedure: GASTRECTOMY LAPAROSCOPIC SLEEVE;  Surgeon: Diana Mcdonnell MD;  [facility];  Service: Bariatrics       Family History   Problem Relation Age of Onset    Heart disease Mother     Lupus Mother     Diabetes Father     Stroke Father        Social History     Occupational History    Occupation: disabled   Tobacco Use    Smoking status: Some Days     Current packs/day: 0.00     Average packs/day: 0.3 packs/day for 37.0 years (9.3 ttl pk-yrs)     Types: Cigarettes     Start date: 1985     Last attempt to quit: 2022     Years since quittin.8    Smokeless tobacco: Never    Tobacco comments:     stopped 2020   Vaping Use    Vaping status: Every Day    Substances: Flavoring   Substance and Sexual Activity    Alcohol use: Not Currently     Comment: occasionally    Drug use: No    Sexual activity: Yes       Current Outpatient Medications on File Prior to Visit   Medication Sig    AMILoride 5 mg tablet Take 1 tablet by mouth daily    carvedilol (COREG) 6.25 mg tablet TAKE 1 TABLET (6.25 MG TOTAL) BY MOUTH TWO (TWO) TIMES A DAY WITH MEALS    Claritin-D 12 Hour 5-120 MG per tablet TAKE ONE TABLET BY MOUTH TWICE DAILY FOR 5 DAYS    fluticasone (FLONASE) 50 mcg/act nasal spray 1 spray into each nostril daily    furosemide (LASIX) 20 mg tablet TAKE 1 TABLET (20 MG TOTAL) BY MOUTH TWO (TWO) TIMES A DAY    lactulose (CHRONULAC) 10 g/15 mL solution Take 15 mL (10 g total) by mouth daily as needed (constipation or confusion)    omeprazole (PriLOSEC) 20 mg delayed release capsule Take 1 capsule (20 mg total) by mouth daily    traMADol (ULTRAM) 50 mg tablet Take 1 Tablet by  "mouth every 6 hours as needed for moderate pain    triamcinolone (KENALOG) 0.5 % cream Apply topically 2 (two) times a day    [DISCONTINUED] diclofenac (VOLTAREN) 75 mg EC tablet Take 1 tablet (75 mg total) by mouth 2 (two) times a day    [DISCONTINUED] ibuprofen (MOTRIN) 800 mg tablet Take 1 tablet by mouth every 8 hours as needed for mild pain    [DISCONTINUED] methocarbamol (ROBAXIN) 750 mg tablet TAKE 1 TABLET BY MOUTH EVERY EIGHT HOURS    cyanocobalamin (VITAMIN B-12) 1000 MCG tablet Take 1 tablet (1,000 mcg total) by mouth daily     No current facility-administered medications on file prior to visit.       No Known Allergies    Physical Exam:    /81   Pulse 66   Ht 5' 6\" (1.676 m)   Wt 102 kg (225 lb)   BMI 36.32 kg/m²     Constitutional:normal, well developed, well nourished, alert, in no distress and non-toxic and no overt pain behavior.  Eyes:anicteric  HEENT:grossly intact  Neck:supple, symmetric, trachea midline and no masses   Pulmonary:even and unlabored  Cardiovascular:No edema or pitting edema present  Skin:Normal without rashes or lesions and well hydrated  Psychiatric:Mood and affect appropriate  Neurologic:Cranial Nerves II-XII grossly intact  Musculoskeletal: no significant tenderness over bilateral upper lumbar parspinal musculature    Imaging    "

## 2024-10-17 NOTE — TELEPHONE ENCOUNTER
Pharmacy called in regards to refilling medication traMADol (ULTRAM) 50 mg tablet . Informed pharmacy that a request was already submitted. No further action required.

## 2024-10-21 ENCOUNTER — OFFICE VISIT (OUTPATIENT)
Dept: PAIN MEDICINE | Facility: CLINIC | Age: 59
End: 2024-10-21
Payer: COMMERCIAL

## 2024-10-21 VITALS
BODY MASS INDEX: 36.16 KG/M2 | DIASTOLIC BLOOD PRESSURE: 81 MMHG | WEIGHT: 225 LBS | SYSTOLIC BLOOD PRESSURE: 131 MMHG | HEIGHT: 66 IN | HEART RATE: 66 BPM

## 2024-10-21 DIAGNOSIS — M47.816 LUMBAR FACET ARTHROPATHY: Primary | ICD-10-CM

## 2024-10-21 DIAGNOSIS — M54.50 CHRONIC BILATERAL LOW BACK PAIN, UNSPECIFIED WHETHER SCIATICA PRESENT: ICD-10-CM

## 2024-10-21 DIAGNOSIS — G89.29 CHRONIC BILATERAL LOW BACK PAIN, UNSPECIFIED WHETHER SCIATICA PRESENT: ICD-10-CM

## 2024-10-21 PROCEDURE — 99214 OFFICE O/P EST MOD 30 MIN: CPT | Performed by: STUDENT IN AN ORGANIZED HEALTH CARE EDUCATION/TRAINING PROGRAM

## 2024-10-21 NOTE — PATIENT INSTRUCTIONS
Patient Education     Lidocaine (Topical) (CAITLYN armstrong)   Brand Names: US 7T Lido [DSC]; Alocane Emergency Burn Max Str [OTC]; Alocane First Aid Spray [OTC]; Alocane Max Bonita Burn w/ Anti [OTC]; Alocane Max [OTC]; Alocane Plus [OTC]; AneCream [OTC]; AneCream5 [OTC]; Asperflex Lidocaine [OTC]; Asperflex Max St [OTC]; Asperflex Pain Relieving [OTC]; Astero; Bactine Max Spray [OTC]; Bactine Max [OTC]; Blue Tube/ Aloe [OTC] [DSC]; Bruselix; DermacinRx Lidogel; Eha; First Care Pain Relief [OTC]; FT Pain Relief Max Strength [OTC]; Gen7T [DSC]; Glydo; GoodSense Burn Relief [OTC]; HealthWise Pain Relief [OTC]; LDO Plus; LevigoSP [OTC]; Lido Elier [OTC]; Lido-Sorb; Lidocaine Max St 24 Hours [OTC]; Lidocaine Pain Relief Max St [OTC]; Lidocaine Pain Relief [OTC]; Lidocaine Plus [OTC]; Lidocan; Lidocanna [OTC]; Lidocore [OTC]; Lidoderm; LidoDose Pediatric Bulk Pack [OTC]; LidoDose [OTC]; LidoFore Flexipatch [OTC] [DSC]; Lidogel [OTC] [DSC]; LidoHeal-90 [OTC] [DSC]; LidoLite; Lidopac [DSC]; Lidopin; LidoPure Patch; Lidorex; LidoRx; Lidosol; Lidosol-50; Lidotral; Lidotran; Lidotrex (Aloe Vera) [DSC]; Lidovex [DSC]; Lidovix L; Lidozion [DSC]; Lipocaine 5 [OTC]; LMX 4 Plus [OTC]; LMX 4 [OTC]; LMX 5 [OTC]; Lubricaine [OTC]; Lydexa; Medi-First Burn [place] [OTC]; Moxicaine [DSC]; NeuroMed7 [OTC] [DSC]; NumbCream [OTC]; Pain Relief Maximum Strength [OTC]; Pain Relieving [OTC] [DSC]; Pharmacist Choice Lidocaine [OTC]; Pharmacist Choice Pain Relief [OTC]; Predator [OTC] [DSC]; Premium Lidocaine; Proxivol; RadiaGuard Advanced [OTC]; Re-Lieved Maximum Strength [OTC]; RectaSmoothe [OTC]; RectiCare [OTC]; RectoProtect [OTC]; Salonpas Pain Relieving [OTC]; Sun Burnt Plus [OTC]; TheraCare Pain Relief [OTC]; Theraworx Diabet Pain Roll-On [OTC]; Topicaine 5 [OTC]; Topicaine [OTC]; Tridacaine; Venipuncture Px1 Phlebotomy; XeroBurn [OTC]; Xolido XP [OTC] [DSC]; Xolido [OTC] [DSC]; Xyliderm; ZiloVal [DSC]; Zingo [DSC]; Zionodil; Zionodil 100;  ZTlido; Zylotrol-L [OTC] [DSC]   Brand Names: Jm Gloriajell; Jelido; Lidodan; Lidodan Endotracheal; Xylocaine; Xylocaine Spray   Warning   Viscous lidocaine   Very bad health problems (like seizures and heart that stops working) and death have happened in children younger than 3 years old. In these cases, this drug was not used how it was recommended. Do not use this drug to treat infants and children with teething pain. Talk with the doctor.  This drug must only be used in children younger than 3 years old when other treatments cannot be used. If using in a child younger than 3 years old for a reason other than teething pain, follow how to give as you were told by the doctor. Talk with the doctor.  What is this drug used for?   It is used to manage pain.  It is used to treat painful nerve diseases.  It is used to treat signs of hemorrhoids or rectal irritation.  It is used to ease pain caused by shingles.  It is used to treat mouth sores.  It may be given to you for other reasons. Talk with the doctor.  What do I need to tell my doctor BEFORE I take this drug?   All products:   If you are allergic to this drug; any part of this drug; or any other drugs, foods, or substances. Tell your doctor about the allergy and what signs you had.  Rectal cream, rectal gel, and all skin products:   If there is an infection where this drug will be used.  If you have broken skin or open wounds where the drug will be used.  If you have swollen skin, numbness, or are not able to feel pain where the drug will be used.  All skin products:   If a large area needs to be treated.  This is not a list of all drugs or health problems that interact with this drug.  Tell your doctor and pharmacist about all of your drugs (prescription or OTC, natural products, vitamins) and health problems. You must check to make sure that it is safe for you to take this drug with all of your drugs and health problems. Do not start, stop, or change the dose  of any drug without checking with your doctor.  What are some things I need to know or do while I take this drug?   All products:   Tell all of your health care providers that you take this drug. This includes your doctors, nurses, pharmacists, and dentists.  A severe blood problem called methemoglobinemia has happened with drugs like this one. The risk may be raised in people who have glucose-6-phosphate dehydrogenase (G6PD) deficiency, heart problems, or lung problems. The risk may also be raised while taking certain other drugs and in infants younger than 6 months of age. Tell your doctor if you have ever had methemoglobinemia.  Different brands of this drug may be for use in different ages of children. Talk with the doctor before giving this drug to a child.  If the patient is a child, use this drug with care. The risk of some side effects may be higher in children.  If you are 65 or older, use this drug with care. You could have more side effects.  Tell your doctor if you are pregnant, plan on getting pregnant, or are breast-feeding. You will need to talk about the benefits and risks to you and the baby.  Rectal cream, rectal gel, and all skin products:   Talk with your doctor before you use other drugs or products on your skin.  Do not use this drug for longer than you were told by your doctor.  Do not scratch or rub the skin while it is numb. Do not let it get very hot or very cold.  Do not put on cuts, scrapes, or damaged skin unless the doctor tells you to.  Avoid use of heat sources (such as sunlamps, tanning beds, heating pads, electric blankets, heat lamps, saunas, hot tubs, heated waterbeds). Avoid long, hot baths or sunbathing. Your temperature may rise and cause too much drug to pass into your body.  Rectal cream, rectal gel, and skin products other than skin patch and skin system:   This drug may cause harm if swallowed. If this drug is swallowed, call a doctor or poison control center right  away.  Skin patch and skin system:   If you have burning or other irritation where this drug is put on, take it off. Wait until the irritation goes away to put back on.  It is common to have skin reactions where this drug is put on during or right after treatment. This includes blisters, bruising, burning or abnormal feeling, change in color, swelling, redness, pain, itching, peeling, flaking, or pimples. Most of the time, these skin reactions go away within a few minutes to hours. Call your doctor if any of these effects are very bad, bother you, or do not go away.  You may wear clothing over the area where this drug is placed.  This drug may cause harm if chewed or swallowed. This includes used patches. If this drug has been put in the mouth, call a doctor or poison control center right away.  Skin patch:   Do not get this drug wet. It may not stick. Do not bathe, swim, or shower while you are wearing this drug.  Skin system (ZTlido):   This skin system may be worn in water for a short time. This includes showering for 10 minutes or bathing for 15 minutes. After it gets wet, dry by gently patting the skin. Do not rub the skin or skin system.  This drug may be put on after use of moderate heat like after 15 minutes of using a heating pad on a medium setting. This drug may also be used during moderate exercise like biking for 30 minutes.  All oral products:   Do not eat while your mouth feels numb. You may bite your tongue.  What are some side effects that I need to call my doctor about right away?   WARNING/CAUTION: Even though it may be rare, some people may have very bad and sometimes deadly side effects when taking a drug. Tell your doctor or get medical help right away if you have any of the following signs or symptoms that may be related to a very bad side effect:  All products:   Signs of an allergic reaction, like rash; hives; itching; red, swollen, blistered, or peeling skin with or without fever; wheezing;  tightness in the chest or throat; trouble breathing, swallowing, or talking; unusual hoarseness; or swelling of the mouth, face, lips, tongue, or throat.  Signs of too much acid in the blood (acidosis) like confusion; fast breathing; fast heartbeat; a heartbeat that does not feel normal; very bad stomach pain, upset stomach, or throwing up; feeling very sleepy; shortness of breath; or feeling very tired or weak.  Signs of methemoglobinemia like a blue or gray color of the lips, nails, or skin; a heartbeat that does not feel normal; seizures; severe dizziness or passing out; severe headache; feeling very sleepy; feeling tired or weak; or shortness of breath. This effect is rare but may be deadly if it happens.  Trouble breathing, slow breathing, or shallow breathing.  Very bad numbness and tingling.  Feeling lightheaded, sleepy, confused, or having blurred eyesight.  Seizures.  Change in eyesight.  Feeling nervous and excitable.  Dizziness or passing out.  Ringing in ears.  Upset stomach or throwing up.  Feeling hot or cold.  Shakiness.  Twitching.  Slow heartbeat.  Chest pain.  All skin products:   Signs of skin infection like oozing, heat, swelling, redness, or pain.  All rectal products:   Bleeding from rectum or rectal pain.  What are some other side effects of this drug?   All drugs may cause side effects. However, many people have no side effects or only have minor side effects. Call your doctor or get medical help if any of these side effects or any other side effects bother you or do not go away:  All products:   Irritation where this drug was used.  Rectal cream, rectal gel, and skin products other than skin patch and skin system:   Swelling.  Redness.  Change in color of skin.  These are not all of the side effects that may occur. If you have questions about side effects, call your doctor. Call your doctor for medical advice about side effects.  You may report side effects to your [facility]  agency.  You may report side effects to the FDA at 1-635.757.8826. You may also report side effects at https://www.fda.gov/medwatch.  How is this drug best taken?   Use this drug as ordered by your doctor. Read all information given to you. Follow all instructions closely.  Rectal cream and gel:   Wash your hands before and after use.  Clean affected part before use. Make sure to dry well.  Put this drug on as you have been told by the doctor or on the package labeling.  Suppository:   Use suppository rectally.  Wash your hands before and after use.  Put suppository into the rectum with gentle pressure, pointed end first. Do not handle too much.  All skin products:   Do not take this drug by mouth. Use on your skin only. Keep out of your mouth, nose, and eyes (may burn).  If you get this drug in any of these areas, rinse well with water.  Wash your hands before and after use. If your hand is the treated area, do not wash your hand after use.  Clean affected part before use. Make sure to dry well.  Put on clean, dry, healthy skin.  Do not use coverings (bandages, dressings) unless told to do so by the doctor.  Skin liquid and spray:   This drug may catch on fire. Do not use near an open flame or while smoking.  Some of these drugs need to be shaken before use. Be sure you know if this product needs to be shaken before using it.  Spray:   If you are using the spray for your face, spray it on your hand or gauze and then put it on your face.  Skin patch and skin system:   Put on the most painful parts of the skin.  Trim to cover the affected skin.  Put on at the same time of day.  Different products may be left on the skin for different amounts of time. Be sure you know how long this drug can be worn before you need to take it off. Be sure you know how long to wait before you can put on another patch or skin system. Do not wear for longer than you have been told. If you have questions, talk to the doctor.  Do not put on  more skin patches or systems than you have been told by the doctor.  If the skin patch or system loosens at the edges, press the edges firmly. If it comes off, try to put it back on. If you cannot put the skin patch or system back on, put on a new one.  After you take off a skin patch or system, be sure to fold the sticky sides to each other. Throw away used patches, used systems, and pieces that were cut where children and pets cannot get to them.  Some of these drugs may catch on fire. Do not use near an open flame or while smoking.  All oral products:   If using as a rinse, swish it in mouth as long as you can. Swallow or spit out as you are told.  If putting on with a cotton swab, put on affected part as your doctor or the label tells you.  Do not eat anything for at least 60 minutes (1 hour) after you use this drug.  Oral liquid:   Shake well before use.  Measure liquid doses carefully.  What do I do if I miss a dose?   If you use this drug on a regular basis, use a missed dose as soon as you think about it.  If it is close to the time for your next dose, skip the missed dose and go back to your normal time.  Do not put on 2 doses at the same time or extra doses.  Many times this drug is used on an as needed basis. Do not use more often than told by the doctor.  How do I store and/or throw out this drug?   All products:   Store at room temperature.  Keep all drugs in a safe place. Keep all drugs out of the reach of children and pets.  Throw away unused or  drugs. Do not flush down a toilet or pour down a drain unless you are told to do so. Check with your pharmacist if you have questions about the best way to throw out drugs. There may be drug take-back programs in your area.  Suppository:   Protect from heat.  All skin products other than skin patch and skin system:   Do not freeze.  Skin liquid and spray:   Protect from heat or open flame.  Spray:   Do not puncture.  Skin patch and skin system:   Store  in the envelope that this drug comes in to help keep away from children. Do not open the envelope until you are ready to use this drug.  General drug facts   If your symptoms or health problems do not get better or if they become worse, call your doctor.  Do not share your drugs with others and do not take anyone else's drugs.  Some drugs may have another patient information leaflet. If you have any questions about this drug, please talk with your doctor, nurse, pharmacist, or other health care provider.  Some drugs may have another patient information leaflet. Check with your pharmacist. If you have any questions about this drug, please talk with your doctor, nurse, pharmacist, or other health care provider.  If you think there has been an overdose, call your poison control center or get medical care right away. Be ready to tell or show what was taken, how much, and when it happened.  Consumer Information Use and Disclaimer   This generalized information is a limited summary of diagnosis, treatment, and/or medication information. It is not meant to be comprehensive and should be used as a tool to help the user understand and/or assess potential diagnostic and treatment options. It does NOT include all information about conditions, treatments, medications, side effects, or risks that may apply to a specific patient. It is not intended to be medical advice or a substitute for the medical advice, diagnosis, or treatment of a health care provider based on the health care provider's examination and assessment of a patient's specific and unique circumstances. Patients must speak with a health care provider for complete information about their health, medical questions, and treatment options, including any risks or benefits regarding use of medications. This information does not endorse any treatments or medications as safe, effective, or approved for treating a specific patient. [employer] and its affiliates disclaim  any warranty or liability relating to this information or the use thereof. The use of this information is governed by the Terms of Use, available at https://www.wolterskluwer.com/en/know/clinical-effectiveness-terms.  Last Reviewed Date   2024-02-16  Copyright   © 2024 [employer] and its affiliates and/or licensors. All rights reserved.

## 2024-10-22 ENCOUNTER — DOCUMENTATION (OUTPATIENT)
Dept: PAIN MEDICINE | Facility: CLINIC | Age: 59
End: 2024-10-22

## 2024-10-26 DIAGNOSIS — M54.42 ACUTE BILATERAL LOW BACK PAIN WITH LEFT-SIDED SCIATICA: ICD-10-CM

## 2024-10-28 ENCOUNTER — HOSPITAL ENCOUNTER (INPATIENT)
Facility: HOSPITAL | Age: 59
LOS: 1 days | Discharge: HOME | DRG: 690 | End: 2024-10-30
Attending: EMERGENCY MEDICINE | Admitting: INTERNAL MEDICINE
Payer: COMMERCIAL

## 2024-10-28 ENCOUNTER — APPOINTMENT (EMERGENCY)
Dept: CT IMAGING | Facility: HOSPITAL | Age: 59
DRG: 690 | End: 2024-10-28
Payer: COMMERCIAL

## 2024-10-28 ENCOUNTER — APPOINTMENT (EMERGENCY)
Dept: RADIOLOGY | Facility: HOSPITAL | Age: 59
DRG: 690 | End: 2024-10-28
Payer: COMMERCIAL

## 2024-10-28 DIAGNOSIS — N12 PYELONEPHRITIS: Primary | ICD-10-CM

## 2024-10-28 DIAGNOSIS — N17.9 AKI (ACUTE KIDNEY INJURY) (HCC): ICD-10-CM

## 2024-10-28 LAB
2HR DELTA HS TROPONIN: -1 NG/L
4HR DELTA HS TROPONIN: -2 NG/L
ABO GROUP BLD: NORMAL
ALBUMIN SERPL BCG-MCNC: 3.3 G/DL (ref 3.5–5)
ALP SERPL-CCNC: 77 U/L (ref 34–104)
ALT SERPL W P-5'-P-CCNC: 27 U/L (ref 7–52)
AMMONIA PLAS-SCNC: 76 UMOL/L (ref 18–72)
ANION GAP SERPL CALCULATED.3IONS-SCNC: 6 MMOL/L (ref 4–13)
APTT PPP: 35 SECONDS (ref 23–34)
AST SERPL W P-5'-P-CCNC: 46 U/L (ref 13–39)
BACTERIA UR QL AUTO: ABNORMAL /HPF
BASOPHILS # BLD AUTO: 0.03 THOUSANDS/ΜL (ref 0–0.1)
BASOPHILS NFR BLD AUTO: 0 % (ref 0–1)
BILIRUB SERPL-MCNC: 7.23 MG/DL (ref 0.2–1)
BILIRUB UR QL STRIP: ABNORMAL
BLD GP AB SCN SERPL QL: NEGATIVE
BUN SERPL-MCNC: 19 MG/DL (ref 5–25)
CALCIUM ALBUM COR SERPL-MCNC: 9.1 MG/DL (ref 8.3–10.1)
CALCIUM SERPL-MCNC: 8.5 MG/DL (ref 8.4–10.2)
CARDIAC TROPONIN I PNL SERPL HS: 3 NG/L
CARDIAC TROPONIN I PNL SERPL HS: 4 NG/L
CARDIAC TROPONIN I PNL SERPL HS: 5 NG/L
CHLORIDE SERPL-SCNC: 105 MMOL/L (ref 96–108)
CLARITY UR: CLEAR
CO2 SERPL-SCNC: 21 MMOL/L (ref 21–32)
COLOR UR: ABNORMAL
CREAT SERPL-MCNC: 1.46 MG/DL (ref 0.6–1.3)
EOSINOPHIL # BLD AUTO: 0.12 THOUSAND/ΜL (ref 0–0.61)
EOSINOPHIL NFR BLD AUTO: 1 % (ref 0–6)
ERYTHROCYTE [DISTWIDTH] IN BLOOD BY AUTOMATED COUNT: 14.6 % (ref 11.6–15.1)
GFR SERPL CREATININE-BSD FRML MDRD: 51 ML/MIN/1.73SQ M
GLUCOSE SERPL-MCNC: 150 MG/DL (ref 65–140)
GLUCOSE SERPL-MCNC: 155 MG/DL (ref 65–140)
GLUCOSE UR STRIP-MCNC: NEGATIVE MG/DL
HCT VFR BLD AUTO: 40.5 % (ref 36.5–49.3)
HGB BLD-MCNC: 13.7 G/DL (ref 12–17)
HGB UR QL STRIP.AUTO: ABNORMAL
IMM GRANULOCYTES # BLD AUTO: 0.03 THOUSAND/UL (ref 0–0.2)
IMM GRANULOCYTES NFR BLD AUTO: 0 % (ref 0–2)
INR PPP: 1.39 (ref 0.85–1.19)
KETONES UR STRIP-MCNC: NEGATIVE MG/DL
LACTATE SERPL-SCNC: 1.9 MMOL/L (ref 0.5–2)
LEUKOCYTE ESTERASE UR QL STRIP: ABNORMAL
LYMPHOCYTES # BLD AUTO: 1.11 THOUSANDS/ΜL (ref 0.6–4.47)
LYMPHOCYTES NFR BLD AUTO: 11 % (ref 14–44)
MCH RBC QN AUTO: 34.2 PG (ref 26.8–34.3)
MCHC RBC AUTO-ENTMCNC: 33.8 G/DL (ref 31.4–37.4)
MCV RBC AUTO: 101 FL (ref 82–98)
MONOCYTES # BLD AUTO: 1.16 THOUSAND/ΜL (ref 0.17–1.22)
MONOCYTES NFR BLD AUTO: 11 % (ref 4–12)
MUCOUS THREADS UR QL AUTO: ABNORMAL
NEUTROPHILS # BLD AUTO: 7.77 THOUSANDS/ΜL (ref 1.85–7.62)
NEUTS SEG NFR BLD AUTO: 77 % (ref 43–75)
NITRITE UR QL STRIP: NEGATIVE
NON-SQ EPI CELLS URNS QL MICRO: ABNORMAL /HPF
NRBC BLD AUTO-RTO: 0 /100 WBCS
PH UR STRIP.AUTO: 7 [PH]
PLATELET # BLD AUTO: 102 THOUSANDS/UL (ref 149–390)
PMV BLD AUTO: 11.7 FL (ref 8.9–12.7)
POTASSIUM SERPL-SCNC: 4.1 MMOL/L (ref 3.5–5.3)
PROCALCITONIN SERPL-MCNC: 0.61 NG/ML
PROT SERPL-MCNC: 5.8 G/DL (ref 6.4–8.4)
PROT UR STRIP-MCNC: ABNORMAL MG/DL
PROTHROMBIN TIME: 17.9 SECONDS (ref 12.3–15)
RBC # BLD AUTO: 4.01 MILLION/UL (ref 3.88–5.62)
RBC #/AREA URNS AUTO: ABNORMAL /HPF
RH BLD: POSITIVE
SODIUM SERPL-SCNC: 132 MMOL/L (ref 135–147)
SP GR UR STRIP.AUTO: >=1.05 (ref 1–1.03)
SPECIMEN EXPIRATION DATE: NORMAL
UROBILINOGEN UR STRIP-ACNC: >=12 MG/DL
WBC # BLD AUTO: 10.22 THOUSAND/UL (ref 4.31–10.16)
WBC #/AREA URNS AUTO: ABNORMAL /HPF

## 2024-10-28 PROCEDURE — 85730 THROMBOPLASTIN TIME PARTIAL: CPT | Performed by: EMERGENCY MEDICINE

## 2024-10-28 PROCEDURE — 70450 CT HEAD/BRAIN W/O DYE: CPT

## 2024-10-28 PROCEDURE — 71045 X-RAY EXAM CHEST 1 VIEW: CPT

## 2024-10-28 PROCEDURE — 86850 RBC ANTIBODY SCREEN: CPT | Performed by: EMERGENCY MEDICINE

## 2024-10-28 PROCEDURE — 87086 URINE CULTURE/COLONY COUNT: CPT

## 2024-10-28 PROCEDURE — 80053 COMPREHEN METABOLIC PANEL: CPT | Performed by: EMERGENCY MEDICINE

## 2024-10-28 PROCEDURE — 99291 CRITICAL CARE FIRST HOUR: CPT | Performed by: EMERGENCY MEDICINE

## 2024-10-28 PROCEDURE — 84484 ASSAY OF TROPONIN QUANT: CPT

## 2024-10-28 PROCEDURE — 93005 ELECTROCARDIOGRAM TRACING: CPT

## 2024-10-28 PROCEDURE — 84484 ASSAY OF TROPONIN QUANT: CPT | Performed by: EMERGENCY MEDICINE

## 2024-10-28 PROCEDURE — 84145 PROCALCITONIN (PCT): CPT | Performed by: EMERGENCY MEDICINE

## 2024-10-28 PROCEDURE — 36415 COLL VENOUS BLD VENIPUNCTURE: CPT | Performed by: EMERGENCY MEDICINE

## 2024-10-28 PROCEDURE — 82140 ASSAY OF AMMONIA: CPT | Performed by: EMERGENCY MEDICINE

## 2024-10-28 PROCEDURE — 85610 PROTHROMBIN TIME: CPT | Performed by: EMERGENCY MEDICINE

## 2024-10-28 PROCEDURE — 99223 1ST HOSP IP/OBS HIGH 75: CPT

## 2024-10-28 PROCEDURE — 74177 CT ABD & PELVIS W/CONTRAST: CPT

## 2024-10-28 PROCEDURE — 96365 THER/PROPH/DIAG IV INF INIT: CPT

## 2024-10-28 PROCEDURE — 83605 ASSAY OF LACTIC ACID: CPT | Performed by: EMERGENCY MEDICINE

## 2024-10-28 PROCEDURE — 86901 BLOOD TYPING SEROLOGIC RH(D): CPT | Performed by: EMERGENCY MEDICINE

## 2024-10-28 PROCEDURE — 72125 CT NECK SPINE W/O DYE: CPT

## 2024-10-28 PROCEDURE — 99285 EMERGENCY DEPT VISIT HI MDM: CPT

## 2024-10-28 PROCEDURE — 87040 BLOOD CULTURE FOR BACTERIA: CPT | Performed by: EMERGENCY MEDICINE

## 2024-10-28 PROCEDURE — 86900 BLOOD TYPING SEROLOGIC ABO: CPT | Performed by: EMERGENCY MEDICINE

## 2024-10-28 PROCEDURE — 85025 COMPLETE CBC W/AUTO DIFF WBC: CPT | Performed by: EMERGENCY MEDICINE

## 2024-10-28 PROCEDURE — 82948 REAGENT STRIP/BLOOD GLUCOSE: CPT

## 2024-10-28 PROCEDURE — 71260 CT THORAX DX C+: CPT

## 2024-10-28 PROCEDURE — 81001 URINALYSIS AUTO W/SCOPE: CPT

## 2024-10-28 RX ORDER — FLUTICASONE PROPIONATE 50 MCG
1 SPRAY, SUSPENSION (ML) NASAL DAILY
Status: DISCONTINUED | OUTPATIENT
Start: 2024-10-29 | End: 2024-10-30 | Stop reason: HOSPADM

## 2024-10-28 RX ORDER — HEPARIN SODIUM 5000 [USP'U]/ML
5000 INJECTION, SOLUTION INTRAVENOUS; SUBCUTANEOUS EVERY 8 HOURS SCHEDULED
Status: DISCONTINUED | OUTPATIENT
Start: 2024-10-28 | End: 2024-10-30 | Stop reason: HOSPADM

## 2024-10-28 RX ORDER — SODIUM CHLORIDE, SODIUM LACTATE, POTASSIUM CHLORIDE, CALCIUM CHLORIDE 600; 310; 30; 20 MG/100ML; MG/100ML; MG/100ML; MG/100ML
100 INJECTION, SOLUTION INTRAVENOUS CONTINUOUS
Status: DISPENSED | OUTPATIENT
Start: 2024-10-28 | End: 2024-10-29

## 2024-10-28 RX ORDER — CARVEDILOL 6.25 MG/1
6.25 TABLET ORAL 2 TIMES DAILY WITH MEALS
Status: DISCONTINUED | OUTPATIENT
Start: 2024-10-29 | End: 2024-10-30 | Stop reason: HOSPADM

## 2024-10-28 RX ORDER — TRAMADOL HYDROCHLORIDE 50 MG/1
50 TABLET ORAL EVERY 6 HOURS PRN
Qty: 60 TABLET | Refills: 0 | OUTPATIENT
Start: 2024-10-28

## 2024-10-28 RX ORDER — AMILORIDE HYDROCHLORIDE 5 MG/1
5 TABLET ORAL DAILY
Status: DISCONTINUED | OUTPATIENT
Start: 2024-10-29 | End: 2024-10-30 | Stop reason: HOSPADM

## 2024-10-28 RX ADMIN — SODIUM CHLORIDE, SODIUM LACTATE, POTASSIUM CHLORIDE, AND CALCIUM CHLORIDE 100 ML/HR: .6; .31; .03; .02 INJECTION, SOLUTION INTRAVENOUS at 21:53

## 2024-10-28 RX ADMIN — CEFTRIAXONE SODIUM 2000 MG: 10 INJECTION, POWDER, FOR SOLUTION INTRAVENOUS at 22:14

## 2024-10-28 RX ADMIN — SODIUM CHLORIDE, SODIUM LACTATE, POTASSIUM CHLORIDE, AND CALCIUM CHLORIDE 1000 ML: .6; .31; .03; .02 INJECTION, SOLUTION INTRAVENOUS at 18:49

## 2024-10-28 RX ADMIN — HEPARIN SODIUM 5000 UNITS: 5000 INJECTION INTRAVENOUS; SUBCUTANEOUS at 21:53

## 2024-10-28 RX ADMIN — Medication 2000 MG: at 20:19

## 2024-10-28 RX ADMIN — IOHEXOL 100 ML: 350 INJECTION, SOLUTION INTRAVENOUS at 19:05

## 2024-10-28 NOTE — ED PROVIDER NOTES
Time reflects when diagnosis was documented in both MDM as applicable and the Disposition within this note       Time User Action Codes Description Comment    10/28/2024  7:58 PM Jeaneth Isbell Add [N12] Pyelonephritis     10/28/2024  7:59 PM Jeaneth Isbell Add [N17.9] AXEL (acute kidney injury) (HCC)           ED Disposition       ED Disposition   Admit    Condition   Stable    Date/Time   Mon Oct 28, 2024  7:58 PM    Comment   Case was discussed with Dr. Woodall and the patient's admission status was agreed to be Admission Status: observation status to the service of Dr. Woodall .               Assessment & Plan       Medical Decision Making  Patient is a 59-year-old male past medical history of cirrhosis, GERD, gastric sleeve, varices presenting following fall.  Patient is currently hypotensive to 60/34 but mentating appropriately and denying any pain, no signs of respiratory distress, soft nontender abdomen, jaundice and scleral icterus but no other significant physical exam findings.  Will obtain imaging to assess for traumatic injuries as well as pneumonia, pneumothorax, SBO, diverticulitis, appendicitis, other intra-abdominal pathology, labs to assess for liver failure, kidney injury, electrolyte abnormalities, anemia, give fluids and continue to monitor.    Amount and/or Complexity of Data Reviewed  Labs: ordered.  Radiology: ordered.    Risk  Prescription drug management.  Decision regarding hospitalization.        ED Course as of 10/28/24 2057   Mon Oct 28, 2024   1946 Patient with elevated procalcitonin, mildly elevated white count, given hypotension will give antibiotics.   1949 Patient with signs of pyelonephritis, will admit.       Medications   lactated ringers bolus 1,000 mL (0 mL Intravenous Stopped 10/28/24 1925)   iohexol (OMNIPAQUE) 350 MG/ML injection (MULTI-DOSE) 100 mL (100 mL Intravenous Given 10/28/24 1905)   cefepime (MAXIPIME) 2 g/50 mL dextrose IVPB (2,000 mg Intravenous New Bag  10/28/24 2019)       ED Risk Strat Scores                           SBIRT 22yo+      Flowsheet Row Most Recent Value   Initial Alcohol Screen: US AUDIT-C     1. How often do you have a drink containing alcohol? 0 Filed at: 10/28/2024 1952   2. How many drinks containing alcohol do you have on a typical day you are drinking?  0 Filed at: 10/28/2024 1952   3a. Male UNDER 65: How often do you have five or more drinks on one occasion? 0 Filed at: 10/28/2024 1952   Audit-C Score 0 Filed at: 10/28/2024 1952   ARIS: How many times in the past year have you...    Used an illegal drug or used a prescription medication for non-medical reasons? Never Filed at: 10/28/2024 1952                            History of Present Illness       Chief Complaint   Patient presents with    Fall     States he fell approx 30mins ago, -BT -HS + loc, has been having cold sweats. Pt appears pale.pt denies pain     Chills       Past Medical History:   Diagnosis Date    CPAP (continuous positive airway pressure) dependence     does not use machine    Esophageal varices (HCC)     stable 9/2020 gets checked yearly    Hepatic cirrhosis (HCC)     treated with harvoni   Hep C- dx age 1995    Hepatic encephalopathy (HCC)     Liver disease     Obesity     Pneumonia     in past    Postgastrectomy malabsorption     Sleep apnea     Wears glasses       Past Surgical History:   Procedure Laterality Date    COLONOSCOPY      ESOPHAGOGASTRODUODENOSCOPY N/A 4/12/2018    Procedure: ESOPHAGOGASTRODUODENOSCOPY (EGD);  Surgeon: Willy Traylor MD;  Location: [facility];  Service: Gastroenterology    FRACTURE SURGERY Left     ankle    OTHER SURGICAL HISTORY      banding esophageal varices    ND EXCISION EXCESSIVE SKIN & SUBQ TISSUE ABDOMEN N/A 9/16/2020    Procedure: ABDOMINOPLASTY;  Surgeon: Enrrique Birmingham MD;  Location: [facility];  Service: Plastics    ND EXCISION SKIN ABD INFRAUMBILICAL PANNICULECTOMY N/A 9/16/2020    Procedure: PANNICULECTOMY;  Surgeon:  Enrrique Birmnigham MD;  Location:  [facility];  Service: Plastics    NH LAPS GSTRC RSTRICTIV PX LONGITUDINAL GASTRECTOMY N/A 2018    Procedure: GASTRECTOMY LAPAROSCOPIC SLEEVE;  Surgeon: Diana Mcdonnell MD;  Location: [facility];  Service: Bariatrics      Family History   Problem Relation Age of Onset    Heart disease Mother     Lupus Mother     Diabetes Father     Stroke Father       Social History     Tobacco Use    Smoking status: Some Days     Current packs/day: 0.00     Average packs/day: 0.3 packs/day for 37.0 years (9.3 ttl pk-yrs)     Types: Cigarettes     Start date: 1985     Last attempt to quit: 2022     Years since quittin.8    Smokeless tobacco: Never    Tobacco comments:     stopped 2020   Vaping Use    Vaping status: Every Day    Substances: Flavoring   Substance Use Topics    Alcohol use: Not Currently     Comment: occasionally    Drug use: No      E-Cigarette/Vaping    E-Cigarette Use Current Every Day User       E-Cigarette/Vaping Substances    Nicotine No     THC No     CBD No     Flavoring Yes     Other No     Unknown No       I have reviewed and agree with the history as documented.     Patient is a 59-year-old male past medical history of cirrhosis, GERD, varices, gastric sleeve presenting for fall.  Patient states that 30 minutes ago he had a fall from standing as he states that his legs felt weak and shaky and that they become weak and he experiences shaking involuntarily every time he stands.  He did not hit his head and fell flat onto his bottom and denies any back pain, chest pain, abdominal pain, headache but does note bilateral pain to the shoulder blades which he states started after his fall while lying down.  Does not take any blood thinners.  Does note sweats and chills as well as shortness of breath and nausea for 1 week.  Patient does note loss of consciousness.  Denies any cough, rashes, vision changes, dysuria, vomiting/diarrhea/constipation but does  note nausea.  States his urine is dark.        Review of Systems   All other systems reviewed and are negative.          Objective       ED Triage Vitals [10/28/24 1834]   Temperature Pulse Blood Pressure Respirations SpO2 Patient Position - Orthostatic VS   97.9 °F (36.6 °C) 64 (!) 60/34 16 100 % Sitting      Temp Source Heart Rate Source BP Location FiO2 (%) Pain Score    Oral Monitor Left arm -- --      Vitals      Date and Time Temp Pulse SpO2 Resp BP Pain Score FACES Pain Rating User   10/28/24 2015 -- 54 94 % 16 114/57 -- -- VMW   10/28/24 2000 -- 55 95 % 18 123/68 -- -- VMW   10/28/24 1945 -- 56 95 % 17 125/60 -- -- VMW   10/28/24 1944 -- 57 96 % -- -- -- -- VMW   10/28/24 1930 -- 57 96 % 21 131/60 -- -- VMW   10/28/24 1915 -- 60 95 % 20 107/53 -- -- VMW   10/28/24 1900 -- 56 97 % 18 89/52 -- -- VMW   10/28/24 1845 -- 63 96 % 17 132/87 -- -- VMW   10/28/24 1834 97.9 °F (36.6 °C) 64 100 % 16 60/34 -- -- FB            Physical Exam  Vitals reviewed.   Constitutional:       General: He is not in acute distress.     Appearance: Normal appearance. He is not ill-appearing.   HENT:      Head: Normocephalic and atraumatic.      Mouth/Throat:      Mouth: Mucous membranes are moist.   Eyes:      General: Scleral icterus present.      Extraocular Movements: Extraocular movements intact.   Cardiovascular:      Rate and Rhythm: Normal rate and regular rhythm.      Pulses: Normal pulses.      Heart sounds: Normal heart sounds.   Pulmonary:      Effort: Pulmonary effort is normal.      Breath sounds: Normal breath sounds.   Abdominal:      General: Abdomen is flat.      Palpations: Abdomen is soft.      Tenderness: There is no abdominal tenderness.   Musculoskeletal:         General: No swelling. Normal range of motion.      Cervical back: Neck supple.   Skin:     General: Skin is warm and dry.      Coloration: Skin is jaundiced.   Neurological:      General: No focal deficit present.      Mental Status: He is alert.       Motor: No weakness.   Psychiatric:         Mood and Affect: Mood normal.         Results Reviewed       Procedure Component Value Units Date/Time    Ammonia [[id number]]  (Abnormal) Collected: 10/28/24 2013    Lab Status: Final result Specimen: Blood from Arm, Right Updated: 10/28/24 2043     Ammonia 76 umol/L     Narrative:      Specimen Icteric.    Blood culture #2 [[id number]] Collected: 10/28/24 2013    Lab Status: In process Specimen: Blood from Arm, Right Updated: 10/28/24 2020    Blood culture #1 [[id number]] Collected: 10/28/24 2002    Lab Status: In process Specimen: Blood from Arm, Left Updated: 10/28/24 2005    HS Troponin I 4hr [[id number]]     Lab Status: No result Specimen: Blood     Procalcitonin [[id number]]  (Abnormal) Collected: 10/28/24 1850    Lab Status: Final result Specimen: Blood from Arm, Right Updated: 10/28/24 1943     Procalcitonin 0.61 ng/ml     HS Troponin 0hr (reflex protocol) [[id number]]  (Normal) Collected: 10/28/24 1850    Lab Status: Final result Specimen: Blood from Arm, Right Updated: 10/28/24 1940     hs TnI 0hr 5 ng/L     HS Troponin I 2hr [[id number]]     Lab Status: No result Specimen: Blood     CBC and differential [[id number]]  (Abnormal) Collected: 10/28/24 1850    Lab Status: Final result Specimen: Blood from Arm, Right Updated: 10/28/24 1939     WBC 10.22 Thousand/uL      RBC 4.01 Million/uL      Hemoglobin 13.7 g/dL      Hematocrit 40.5 %       fL      MCH 34.2 pg      MCHC 33.8 g/dL      RDW 14.6 %      MPV 11.7 fL      Platelets 102 Thousands/uL      nRBC 0 /100 WBCs      Segmented % 77 %      Immature Grans % 0 %      Lymphocytes % 11 %      Monocytes % 11 %      Eosinophils Relative 1 %      Basophils Relative 0 %      Absolute Neutrophils 7.77 Thousands/µL      Absolute Immature Grans 0.03 Thousand/uL      Absolute Lymphocytes 1.11 Thousands/µL      Absolute Monocytes 1.16 Thousand/µL      Eosinophils Absolute 0.12 Thousand/µL      Basophils Absolute 0.03  Thousands/µL     Lactic acid, plasma (w/reflex if result > 2.0) [[id number]]  (Normal) Collected: 10/28/24 1850    Lab Status: Final result Specimen: Blood from Arm, Right Updated: 10/28/24 1937     LACTIC ACID 1.9 mmol/L     Narrative:      Specimen Icteric.  Result may be elevated if tourniquet was used during collection.    Comprehensive metabolic panel [[id number]]  (Abnormal) Collected: 10/28/24 1850    Lab Status: Final result Specimen: Blood from Arm, Right Updated: 10/28/24 1937     Sodium 132 mmol/L      Potassium 4.1 mmol/L      Chloride 105 mmol/L      CO2 21 mmol/L      ANION GAP 6 mmol/L      BUN 19 mg/dL      Creatinine 1.46 mg/dL      Glucose 150 mg/dL      Calcium 8.5 mg/dL      Corrected Calcium 9.1 mg/dL      AST 46 U/L      ALT 27 U/L      Alkaline Phosphatase 77 U/L      Total Protein 5.8 g/dL      Albumin 3.3 g/dL      Total Bilirubin 7.23 mg/dL      eGFR 51 ml/min/1.73sq m     Narrative:      Specimen Icteric.  National Kidney Disease Foundation guidelines for Chronic Kidney Disease (CKD):     Stage 1 with normal or high GFR (GFR > 90 mL/min/1.73 square meters)    Stage 2 Mild CKD (GFR = 60-89 mL/min/1.73 square meters)    Stage 3A Moderate CKD (GFR = 45-59 mL/min/1.73 square meters)    Stage 3B Moderate CKD (GFR = 30-44 mL/min/1.73 square meters)    Stage 4 Severe CKD (GFR = 15-29 mL/min/1.73 square meters)    Stage 5 End Stage CKD (GFR <15 mL/min/1.73 square meters)  Note: GFR calculation is accurate only with a steady state creatinine    APTT [[id number]]  (Abnormal) Collected: 10/28/24 1850    Lab Status: Final result Specimen: Blood from Arm, Right Updated: 10/28/24 1936     PTT 35 seconds     Protime-INR [[id number]]  (Abnormal) Collected: 10/28/24 1850    Lab Status: Final result Specimen: Blood from Arm, Right Updated: 10/28/24 1936     Protime 17.9 seconds      INR 1.39    Narrative:      INR Therapeutic Range    Indication                                             INR Range      Atrial  Fibrillation                                               2.0-3.0  Hypercoagulable State                                    2.0.2.3  Left Ventricular Asist Device                            2.0-3.0  Mechanical Heart Valve                                  -    Aortic(with afib, MI, embolism, HF, LA enlargement,    and/or coagulopathy)                                     2.0-3.0 (2.5-3.5)     Mitral                                                             2.5-3.5  Prosthetic/Bioprosthetic Heart Valve               2.0-3.0  Venous thromboembolism (VTE: VT, PE        2.0-3.0    Fingerstick Glucose (POCT) [[id number]]  (Abnormal) Collected: 10/28/24 1848    Lab Status: Final result Specimen: Blood Updated: 10/28/24 1849     POC Glucose 155 mg/dl             TRAUMA - CT chest abdomen pelvis w contrast   Final Interpretation by Vitor Pedraza MD (10/28 1947)      No acute findings in the chest.      2.6 cm indeterminate area of cortical hypointense enhancement within the lower pole of the right kidney versus a hypoenhancing lower pole renal mass. I favor pyelonephritis to a mass. Follow-up with renal ultrasound or CT renal protocol after treatment.      Hepatic cirrhosis with findings of portal hypertension including probable small paraesophageal varices.      The study was marked in EPIC for immediate notification.         Workstation performed: [id number]         CT head without contrast   Final Interpretation by Vitor Pedraza MD (10/28 1929)      No acute intracranial abnormality.                  Workstation performed: [id number]         CT spine cervical without contrast   Final Interpretation by Vitor Pedraza MD (10/28 1934)      No cervical spine fracture or traumatic malalignment.                  Workstation performed: [id number]         XR Trauma chest portable   Final Interpretation by Merlene Mcleod MD (10/28 1859)      No acute pulmonary pathology.      No  obvious displaced fractures within limitation of supine AP chest technique.                  Workstation performed: [id number]             ECG 12 Lead Documentation Only    Date/Time: 10/28/2024 6:51 PM    Performed by: Jeaneth Isbell DO  Authorized by: Jeaneth Isbell DO    Patient location:  ED  Previous ECG:     Previous ECG:  Compared to current    Comparison ECG info:  Inferior T wave flattening and inversions which are new  Interpretation:     Interpretation: abnormal    Quality:     Tracing quality:  Limited by artifact  Rate:     ECG rate assessment: normal    Rhythm:     Rhythm: sinus rhythm    Ectopy:     Ectopy: none    QRS:     QRS axis:  Normal    QRS intervals:  Normal  Conduction:     Conduction: normal    ST segments:     ST segments:  Normal  T waves:     T waves: flattening and inverted      Flattening:  AVF    Inverted:  III  CriticalCare Time    Date/Time: 10/28/2024 8:57 PM    Performed by: Jeaneth Isbell DO  Authorized by: Jeaneth Isbell DO    Critical care provider statement:     Critical care time (minutes):  32    Critical care time was exclusive of:  Teaching time and separately billable procedures and treating other patients    Critical care was necessary to treat or prevent imminent or life-threatening deterioration of the following conditions:  Renal failure, shock, trauma and sepsis    Critical care was time spent personally by me on the following activities:  Obtaining history from patient or surrogate, development of treatment plan with patient or surrogate, discussions with consultants, evaluation of patient's response to treatment, examination of patient, interpretation of cardiac output measurements, ordering and performing treatments and interventions, ordering and review of laboratory studies, ordering and review of radiographic studies and re-evaluation of patient's condition      ED Medication and Procedure Management   Prior to Admission Medications    Prescriptions Last Dose Informant Patient Reported? Taking?   AMILoride 5 mg tablet  Self No No   Sig: Take 1 tablet by mouth daily   Claritin-D 12 Hour 5-120 MG per tablet  Self No No   Sig: TAKE ONE TABLET BY MOUTH TWICE DAILY FOR 5 DAYS   carvedilol (COREG) 6.25 mg tablet  Self No No   Sig: TAKE 1 TABLET (6.25 MG TOTAL) BY MOUTH TWO (TWO) TIMES A DAY WITH MEALS   cyanocobalamin (VITAMIN B-12) 1000 MCG tablet   No No   Sig: Take 1 tablet (1,000 mcg total) by mouth daily   fluticasone (FLONASE) 50 mcg/act nasal spray  Self No No   Si spray into each nostril daily   furosemide (LASIX) 20 mg tablet  Self No No   Sig: TAKE 1 TABLET (20 MG TOTAL) BY MOUTH TWO (TWO) TIMES A DAY   lactulose (CHRONULAC) 10 g/15 mL solution  Self No No   Sig: Take 15 mL (10 g total) by mouth daily as needed (constipation or confusion)   omeprazole (PriLOSEC) 20 mg delayed release capsule  Self No No   Sig: Take 1 capsule (20 mg total) by mouth daily   tiZANidine (ZANAFLEX) 4 mg tablet   No No   Sig: Take 1.5 tablets (6 mg total) by mouth 2 (two) times a day as needed for muscle spasms   traMADol (ULTRAM) 50 mg tablet  Self No No   Sig: Take 1 Tablet by mouth every 6 hours as needed for moderate pain   triamcinolone (KENALOG) 0.5 % cream  Self No No   Sig: Apply topically 2 (two) times a day      Facility-Administered Medications: None     Patient's Medications   Discharge Prescriptions    No medications on file     No discharge procedures on file.  ED SEPSIS DOCUMENTATION   Time reflects when diagnosis was documented in both MDM as applicable and the Disposition within this note       Time User Action Codes Description Comment    10/28/2024  7:58 PM Jeaneth Isbell [N12] Pyelonephritis     10/28/2024  7:59 PM Jeaneth Isbell [N17.9] AXEL (acute kidney injury) (HCC)                  Jeaneth Isbell DO  10/28/24 2057

## 2024-10-29 LAB
ALBUMIN SERPL BCG-MCNC: 3.1 G/DL (ref 3.5–5)
ALP SERPL-CCNC: 77 U/L (ref 34–104)
ALT SERPL W P-5'-P-CCNC: 24 U/L (ref 7–52)
AMMONIA PLAS-SCNC: 76 UMOL/L (ref 18–72)
ANION GAP SERPL CALCULATED.3IONS-SCNC: 5 MMOL/L (ref 4–13)
AST SERPL W P-5'-P-CCNC: 35 U/L (ref 13–39)
ATRIAL RATE: 61 BPM
BILIRUB SERPL-MCNC: 5.18 MG/DL (ref 0.2–1)
BUN SERPL-MCNC: 19 MG/DL (ref 5–25)
CALCIUM ALBUM COR SERPL-MCNC: 9 MG/DL (ref 8.3–10.1)
CALCIUM SERPL-MCNC: 8.3 MG/DL (ref 8.4–10.2)
CHLORIDE SERPL-SCNC: 106 MMOL/L (ref 96–108)
CO2 SERPL-SCNC: 23 MMOL/L (ref 21–32)
CREAT SERPL-MCNC: 1 MG/DL (ref 0.6–1.3)
ERYTHROCYTE [DISTWIDTH] IN BLOOD BY AUTOMATED COUNT: 14.6 % (ref 11.6–15.1)
GFR SERPL CREATININE-BSD FRML MDRD: 81 ML/MIN/1.73SQ M
GLUCOSE P FAST SERPL-MCNC: 121 MG/DL (ref 65–99)
GLUCOSE SERPL-MCNC: 121 MG/DL (ref 65–140)
HCT VFR BLD AUTO: 37.2 % (ref 36.5–49.3)
HGB BLD-MCNC: 12.5 G/DL (ref 12–17)
MCH RBC QN AUTO: 33.9 PG (ref 26.8–34.3)
MCHC RBC AUTO-ENTMCNC: 33.6 G/DL (ref 31.4–37.4)
MCV RBC AUTO: 101 FL (ref 82–98)
P AXIS: 25 DEGREES
PLATELET # BLD AUTO: 85 THOUSANDS/UL (ref 149–390)
PMV BLD AUTO: 11.6 FL (ref 8.9–12.7)
POTASSIUM SERPL-SCNC: 4.1 MMOL/L (ref 3.5–5.3)
PR INTERVAL: 146 MS
PROCALCITONIN SERPL-MCNC: 0.46 NG/ML
PROT SERPL-MCNC: 5.5 G/DL (ref 6.4–8.4)
QRS AXIS: 14 DEGREES
QRSD INTERVAL: 92 MS
QT INTERVAL: 468 MS
QTC INTERVAL: 471 MS
RBC # BLD AUTO: 3.69 MILLION/UL (ref 3.88–5.62)
SODIUM SERPL-SCNC: 134 MMOL/L (ref 135–147)
T WAVE AXIS: 3 DEGREES
VENTRICULAR RATE: 61 BPM
WBC # BLD AUTO: 7.44 THOUSAND/UL (ref 4.31–10.16)

## 2024-10-29 PROCEDURE — 84145 PROCALCITONIN (PCT): CPT

## 2024-10-29 PROCEDURE — 82140 ASSAY OF AMMONIA: CPT

## 2024-10-29 PROCEDURE — 80053 COMPREHEN METABOLIC PANEL: CPT

## 2024-10-29 PROCEDURE — 85027 COMPLETE CBC AUTOMATED: CPT

## 2024-10-29 PROCEDURE — 93010 ELECTROCARDIOGRAM REPORT: CPT | Performed by: INTERNAL MEDICINE

## 2024-10-29 PROCEDURE — 99232 SBSQ HOSP IP/OBS MODERATE 35: CPT | Performed by: INTERNAL MEDICINE

## 2024-10-29 RX ORDER — ACETAMINOPHEN 325 MG/1
650 TABLET ORAL EVERY 6 HOURS PRN
Status: DISCONTINUED | OUTPATIENT
Start: 2024-10-29 | End: 2024-10-30 | Stop reason: HOSPADM

## 2024-10-29 RX ORDER — PANTOPRAZOLE SODIUM 40 MG/1
40 TABLET, DELAYED RELEASE ORAL
Status: DISCONTINUED | OUTPATIENT
Start: 2024-10-29 | End: 2024-10-30 | Stop reason: HOSPADM

## 2024-10-29 RX ADMIN — HEPARIN SODIUM 5000 UNITS: 5000 INJECTION INTRAVENOUS; SUBCUTANEOUS at 05:01

## 2024-10-29 RX ADMIN — PANTOPRAZOLE SODIUM 40 MG: 40 TABLET, DELAYED RELEASE ORAL at 15:29

## 2024-10-29 RX ADMIN — HEPARIN SODIUM 5000 UNITS: 5000 INJECTION INTRAVENOUS; SUBCUTANEOUS at 13:20

## 2024-10-29 RX ADMIN — SODIUM CHLORIDE, SODIUM LACTATE, POTASSIUM CHLORIDE, AND CALCIUM CHLORIDE 100 ML/HR: .6; .31; .03; .02 INJECTION, SOLUTION INTRAVENOUS at 20:13

## 2024-10-29 RX ADMIN — CEFTRIAXONE SODIUM 2000 MG: 10 INJECTION, POWDER, FOR SOLUTION INTRAVENOUS at 21:26

## 2024-10-29 RX ADMIN — ACETAMINOPHEN 650 MG: 325 TABLET ORAL at 22:23

## 2024-10-29 RX ADMIN — ACETAMINOPHEN 650 MG: 325 TABLET ORAL at 06:02

## 2024-10-29 RX ADMIN — SODIUM CHLORIDE, SODIUM LACTATE, POTASSIUM CHLORIDE, AND CALCIUM CHLORIDE 100 ML/HR: .6; .31; .03; .02 INJECTION, SOLUTION INTRAVENOUS at 08:40

## 2024-10-29 RX ADMIN — HEPARIN SODIUM 5000 UNITS: 5000 INJECTION INTRAVENOUS; SUBCUTANEOUS at 21:26

## 2024-10-29 RX ADMIN — CYANOCOBALAMIN TAB 500 MCG 1000 MCG: 500 TAB at 08:38

## 2024-10-29 NOTE — CASE MANAGEMENT
Case Management Progress Note    Patient name Matt Tobias .  Location [address] 252-01 MRN [id number]  : 1965 Date 10/29/2024       LOS (days): 0  Geometric Mean LOS (GMLOS) (days):   Days to GMLOS:        OBJECTIVE:     Current admission status: Observation  Preferred Pharmacy:   [facility] - [place], [place] - [address]  [address]  [facility] [address]  Phone: [phone] Fax: [phone]    [facility] - [place] - [address]  [address]  [address]  [facility] [address]  Phone: [phone] Fax: [phone]    Primary Care Provider: Rodrigo Jo MD  Primary Insurance: [facility]  Secondary Insurance:     PROGRESS NOTE:  Patient reviewed with SLIM and RN for interdisciplinary rounding.  Anticipated for d/c in 24hrs.  Patient is currently OBS status.  AMPAC is 21. No PT/OT orders noted at this time.  Patient is insured and established with a PCP.  No OP CM referral indicators at this time.  CM assessment pended at this time.  Will remain available through to d/c for needs.

## 2024-10-29 NOTE — ASSESSMENT & PLAN NOTE
As evidenced by Cr 1.46 increased from baseline of 0.6-0.8  S/p 1L LR bolus in ED  Most recent echo 2018 revealing LVEF 55-65%  Continue with IV fluids  Kidney function improved

## 2024-10-29 NOTE — PROGRESS NOTES
"Progress Note - Hospitalist   Name: Matt Tobias Sr. 59 y.o. male I MRN: [id number]  Unit/Bed#: 2 E 252-01 I Date of Admission: 10/28/2024   Date of Service: 10/29/2024 I Hospital Day: 0    Assessment & Plan  Pyelonephritis  Presents to ED complaining of weakness in legs causing him to fall today.  Denies head strike or loss of consciousness  CT head negative for acute intracranial abnormality, CT C-spine negative for fracture  PT/OT consulted  Also admits to increased thirst and increased urinary frequency x 4 days  CT a/p revealing \"Indeterminate wedge-shaped 2.6 cm area of cortical hypoenhancement enhancement in the lower pole of the right kidney more likely to represent pyelonephritis than a hypoenhancing mass, without hydronephrosis or urinary tract calculi.\"  Continue with IV ceftriaxone  Follow-up urine culture  Follow-up blood cultures  Clinically improving  Anticipate likely transition to p.o. antibiotics in next 24 to 48 hours  AXEL (acute kidney injury) (HCC)  As evidenced by Cr 1.46 increased from baseline of 0.6-0.8  S/p 1L LR bolus in ED  Most recent echo 2018 revealing LVEF 55-65%  Continue with IV fluids  Kidney function improved  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)  History of cirrhosis due to chronic hep C infection   Follows with GI as outpt  Continue to hold Lasix and amiloride given AXEL  Continue lactulose as needed    Hyperbilirubinemia  Patient with known history of hyperbilirubinemia  CT a/p revealing cirrhotic morphology of liver, no acute changes/findings to liver or gallbladder  Bilirubin 7.23 POA, elevated from baseline 3-4.  Continue to trend    VTE Pharmacologic Prophylaxis: VTE Score: 3 Moderate Risk (Score 3-4) - Pharmacological DVT Prophylaxis Ordered: heparin.    Mobility:   Basic Mobility Inpatient Raw Score: 21  JH-HLM Goal: 6: Walk 10 steps or more  JH-HLM Achieved: 1: Laying in bed  JH-HLM Goal achieved. Continue to encourage appropriate " mobility.    Patient Centered Rounds: I performed bedside rounds with nursing staff today.   Discussions with Specialists or Other Care Team Provider: cm, nursing    Education and Discussions with Family / Patient: Patient declined call to .     Current Length of Stay: 0 day(s)  Current Patient Status: Observation   Certification Statement: The patient will continue to require additional inpatient hospital stay due to see below  Discharge Plan:  Still requiring IV antibiotics.  Anticipate 24 to 48 hours    Code Status: Level 1 - Full Code    Subjective   Reports intermittent chills.  Otherwise denies any acute complaints.  Nuys abdominal pain, nausea, vomiting, diarrhea    Objective :  Temp:  [97.9 °F (36.6 °C)-101.8 °F (38.8 °C)] 100.5 °F (38.1 °C)  HR:  [54-71] 71  BP: ()/(34-87) 100/53  Resp:  [16-21] 16  SpO2:  [94 %-100 %] 98 %  O2 Device: None (Room air)    Body mass index is 36.33 kg/m².     Input and Output Summary (last 24 hours):     Intake/Output Summary (Last 24 hours) at 10/29/2024 1024  Last data filed at 10/29/2024 0840  Gross per 24 hour   Intake 2438.33 ml   Output 700 ml   Net 1738.33 ml       Physical Exam  Constitutional:       General: He is not in acute distress.     Appearance: He is well-developed. He is not diaphoretic.   HENT:      Head: Normocephalic and atraumatic.      Nose: Nose normal.      Mouth/Throat:      Pharynx: No oropharyngeal exudate.   Eyes:      General: No scleral icterus.     Conjunctiva/sclera: Conjunctivae normal.   Cardiovascular:      Rate and Rhythm: Normal rate and regular rhythm.      Heart sounds: Normal heart sounds. No murmur heard.     No friction rub. No gallop.   Pulmonary:      Effort: Pulmonary effort is normal. No respiratory distress.      Breath sounds: Normal breath sounds. No wheezing or rales.   Chest:      Chest wall: No tenderness.   Abdominal:      General: Bowel sounds are normal. There is no distension.      Palpations: Abdomen  is soft.      Tenderness: There is no abdominal tenderness. There is no guarding.   Musculoskeletal:         General: No tenderness or deformity. Normal range of motion.      Cervical back: Normal range of motion and neck supple.   Skin:     General: Skin is warm and dry.      Findings: No erythema.   Neurological:      Mental Status: He is alert. Mental status is at baseline.           Lines/Drains:              Lab Results: I have reviewed the following results:   Results from last 7 days   Lab Units 10/29/24  0544 10/28/24  1850   WBC Thousand/uL 7.44 10.22*   HEMOGLOBIN g/dL 12.5 13.7   HEMATOCRIT % 37.2 40.5   PLATELETS Thousands/uL 85* 102*   SEGS PCT %  --  77*   LYMPHO PCT %  --  11*   MONO PCT %  --  11   EOS PCT %  --  1     Results from last 7 days   Lab Units 10/29/24  0544   SODIUM mmol/L 134*   POTASSIUM mmol/L 4.1   CHLORIDE mmol/L 106   CO2 mmol/L 23   BUN mg/dL 19   CREATININE mg/dL 1.00   ANION GAP mmol/L 5   CALCIUM mg/dL 8.3*   ALBUMIN g/dL 3.1*   TOTAL BILIRUBIN mg/dL 5.18*   ALK PHOS U/L 77   ALT U/L 24   AST U/L 35   GLUCOSE RANDOM mg/dL 121     Results from last 7 days   Lab Units 10/28/24  1850   INR  1.39*     Results from last 7 days   Lab Units 10/28/24  1848   POC GLUCOSE mg/dl 155*         Results from last 7 days   Lab Units 10/29/24  0544 10/28/24  1850   LACTIC ACID mmol/L  --  1.9   PROCALCITONIN ng/ml 0.46* 0.61*       Recent Cultures (last 7 days):   Results from last 7 days   Lab Units 10/28/24  2013 10/28/24  2002   BLOOD CULTURE  Received in Microbiology Lab. Culture in Progress. Received in Microbiology Lab. Culture in Progress.       Imaging Results Review: I personally reviewed the following image studies/reports in PACS and discussed pertinent findings with Radiology: chest xray. My interpretation of the radiology images/reports is:  .  Other Study Results Review: EKG was reviewed.     Last 24 Hours Medication List:     Current Facility-Administered Medications:      acetaminophen (TYLENOL) tablet 650 mg, Q6H PRN    [Held by provider] AMILoride tablet 5 mg, Daily    [Held by provider] carvedilol (COREG) tablet 6.25 mg, BID With Meals    cefTRIAXone (ROCEPHIN) 2,000 mg in dextrose 5 % 50 mL IVPB, Q24H, Last Rate: 2,000 mg (10/28/24 7894)    cyanocobalamin (VITAMIN B-12) tablet 1,000 mcg, Daily    fluticasone (FLONASE) 50 mcg/act nasal spray 1 spray, Daily    heparin (porcine) subcutaneous injection 5,000 Units, Q8H JEANINE    lactated ringers infusion, Continuous, Last Rate: 100 mL/hr (10/29/24 0840)    nicotine (NICODERM CQ) 7 mg/24hr TD 24 hr patch 7 mg, Daily    Administrative Statements   Today, Patient Was Seen By: Sixto Vu MD  I have spent a total time of 60 minutes in caring for this patient on the day of the visit/encounter including Diagnostic results.    **Please Note: This note may have been constructed using a voice recognition system.**

## 2024-10-29 NOTE — H&P
"H&P - Hospitalist   Name: Matt Tobias Sr. 59 y.o. male I MRN: [id number]  Unit/Bed#: 2 E 252-01 I Date of Admission: 10/28/2024   Date of Service: 10/28/2024 I Hospital Day: 0     Assessment & Plan  Pyelonephritis  Presents to ED complaining of weakness in legs causing him to fall today.  Denies head strike or loss of consciousness  CT head negative for acute intracranial abnormality, CT C-spine negative for fracture  PT/OT consulted  Also admits to increased thirst and increased urinary frequency x 4 days  CT a/p revealing \"Indeterminate wedge-shaped 2.6 cm area of cortical hypoenhancement enhancement in the lower pole of the right kidney more likely to represent pyelonephritis than a hypoenhancing mass, without hydronephrosis or urinary tract calculi.\"  Patient hypotensive on arrival to ED with BP 60/34, since stabilized s/p 1L LR bolus  UA with moderate blood, small leuks, small bilirubin, innumerable bacteria, 10-20 WBC; follow up urine and blood cultures  Currently hemodynamically stable and afebrile, continue to monitor  Mild leukocytosis 10.22, procal 0.61 - continue to trend  Received 2 g cefepime in ED. Begin 2 g ceftriaxone every 24 hours  AXEL (acute kidney injury) (HCC)  As evidenced by Cr 1.46 increased from baseline of 0.6-0.8  S/p 1L LR bolus in ED  Most recent echo 2018 revealing LVEF 55-65%  100 cc/h LR initiated  Avoid nephrotoxic agents  Monitor I's/O's  Urinary retention protocol in place  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)  History of cirrhosis due to chronic hep C infection   Follows with GI as outpt  History of edema, currently maintained on Lasix 40 mg daily and amiloride 5 mg daily; patient is euvolemic on exam, hold in setting of AXEL  Takes lactulose as needed  Ammonia 76 POA. Recheck ammonia with a.m. labs  Hyperbilirubinemia  Patient with known history of hyperbilirubinemia  CT a/p revealing cirrhotic morphology of liver, no acute changes/findings to liver or " "gallbladder  Bilirubin 7.23 POA, elevated from baseline 3-4.  Continue to trend      VTE Pharmacologic Prophylaxis: VTE Score: 3 Moderate Risk (Score 3-4) - Pharmacological DVT Prophylaxis Ordered: heparin.  Code Status: Level 1 - Full Code   Discussion with family: Updated  (wife) at bedside.    Anticipated Length of Stay: Patient will be admitted on an inpatient basis with an anticipated length of stay of greater than 2 midnights secondary to IV antibiotics for pyelonephritis, IVF for AXEL.    History of Present Illness   Chief Complaint: Michelle Tobias Sr. is a 59 y.o. male with a PMH of cirrhosis, HTN who presents with fall.  Patient reports he was standing today and felt his legs give out causing him to fall to his knees.  He denies head strike or loss of consciousness.  Additionally, he reports increased thirst and increased urinary frequency x 4 days.  Also reports decreased appetite. he denies weakness, fever, chills, chest pain, numbness, tingling, dizziness or lightheadedness.  Admits to mild shortness of breath with exertion.  He denies any current or recent changes to mentation, although states he was recently prescribed tizanidine for muscle spasm and states that when he took it today he felt \"weird\".    Review of Systems   Constitutional:  Positive for appetite change. Negative for fatigue and fever.   Respiratory:  Positive for shortness of breath. Negative for chest tightness.    Cardiovascular:  Negative for chest pain, palpitations and leg swelling.   Gastrointestinal:  Negative for abdominal pain, diarrhea, nausea and vomiting.   Genitourinary:  Positive for flank pain, frequency and urgency. Negative for dysuria.   Neurological:  Negative for dizziness, weakness, light-headedness and numbness.   Psychiatric/Behavioral:  Negative for confusion.        Historical Information   Past Medical History:   Diagnosis Date    CPAP (continuous positive airway pressure) dependence     " does not use machine    Esophageal varices (HCC)     stable 2020 gets checked yearly    Hepatic cirrhosis (HCC)     treated with harvoni   Hep C- dx age     Hepatic encephalopathy (HCC)     Liver disease     Obesity     Pneumonia     in past    Postgastrectomy malabsorption     Sleep apnea     Wears glasses      Past Surgical History:   Procedure Laterality Date    COLONOSCOPY      ESOPHAGOGASTRODUODENOSCOPY N/A 2018    Procedure: ESOPHAGOGASTRODUODENOSCOPY (EGD);  Surgeon: Willy Tralyor MD;  Location: [facility];  Service: Gastroenterology    FRACTURE SURGERY Left     ankle    OTHER SURGICAL HISTORY      banding esophageal varices    WI EXCISION EXCESSIVE SKIN & SUBQ TISSUE ABDOMEN N/A 2020    Procedure: ABDOMINOPLASTY;  Surgeon: Enrrique Birmingham MD;  Location: [facility];  Service: Plastics    WI EXCISION SKIN ABD INFRAUMBILICAL PANNICULECTOMY N/A 2020    Procedure: PANNICULECTOMY;  Surgeon: Enrrique Birmingham MD;  Location: [facility];  Service: Plastics    WI LAPS GSTRC RSTRICTIV PX LONGITUDINAL GASTRECTOMY N/A 2018    Procedure: GASTRECTOMY LAPAROSCOPIC SLEEVE;  Surgeon: Diana Mcdonnell MD;  Location: [facility];  Service: Bariatrics     Social History     Tobacco Use    Smoking status: Some Days     Current packs/day: 0.00     Average packs/day: 0.3 packs/day for 37.0 years (9.3 ttl pk-yrs)     Types: Cigarettes     Start date: 1985     Last attempt to quit: 2022     Years since quittin.8    Smokeless tobacco: Never    Tobacco comments:     stopped 2020   Vaping Use    Vaping status: Every Day    Substances: Flavoring   Substance and Sexual Activity    Alcohol use: Not Currently     Comment: occasionally    Drug use: No    Sexual activity: Yes     E-Cigarette/Vaping    E-Cigarette Use Current Every Day User      E-Cigarette/Vaping Substances    Nicotine No     THC No     CBD No     Flavoring Yes     Other No     Unknown No      Family History   Problem  Relation Age of Onset    Heart disease Mother     Lupus Mother     Diabetes Father     Stroke Father      Social History:  Marital Status: /Civil Union   Occupation:   Patient Pre-hospital Living Situation: Home  Patient Pre-hospital Level of Mobility: walks  Patient Pre-hospital Diet Restrictions:     Meds/Allergies   I have reviewed home medications with patient personally.  Prior to Admission medications    Medication Sig Start Date End Date Taking? Authorizing Provider   AMILoride 5 mg tablet Take 1 tablet by mouth daily 7/29/24   Rodrigo Jo MD   carvedilol (COREG) 6.25 mg tablet TAKE 1 TABLET (6.25 MG TOTAL) BY MOUTH TWO (TWO) TIMES A DAY WITH MEALS 6/21/24   Rodrigo Jo MD   Claritin-D 12 Hour 5-120 MG per tablet TAKE ONE TABLET BY MOUTH TWICE DAILY FOR 5 DAYS 5/3/22   Rodrigo Jo MD   cyanocobalamin (VITAMIN B-12) 1000 MCG tablet Take 1 tablet (1,000 mcg total) by mouth daily 5/14/24 8/12/24  Kalpana Samaniego PA-C   fluticasone (FLONASE) 50 mcg/act nasal spray 1 spray into each nostril daily 9/3/24   Rodrigo Jo MD   furosemide (LASIX) 20 mg tablet TAKE 1 TABLET (20 MG TOTAL) BY MOUTH TWO (TWO) TIMES A DAY 6/21/24   Rodrigo Jo MD   lactulose (CHRONULAC) 10 g/15 mL solution Take 15 mL (10 g total) by mouth daily as needed (constipation or confusion) 8/23/24   Rodrigo Jo MD   omeprazole (PriLOSEC) 20 mg delayed release capsule Take 1 capsule (20 mg total) by mouth daily 5/24/24   Rodrigo Jo MD   tiZANidine (ZANAFLEX) 4 mg tablet Take 1.5 tablets (6 mg total) by mouth 2 (two) times a day as needed for muscle spasms 10/21/24   Hugh Horn MD   traMADol (ULTRAM) 50 mg tablet Take 1 Tablet by mouth every 6 hours as needed for moderate pain 10/17/24   Rodrigo Jo MD   triamcinolone (KENALOG) 0.5 % cream Apply topically 2 (two) times a day 6/17/24   KAILA Joseph     No Known Allergies    Objective :  Temp:  [97.9 °F (36.6 °C)] 97.9 °F (36.6 °C)  HR:  [54-64] 54  BP:  ()/(34-87) 117/66  Resp:  [16-21] 20  SpO2:  [94 %-100 %] 99 %  O2 Device: None (Room air)    Physical Exam  Vitals reviewed.   Constitutional:       General: He is not in acute distress.     Appearance: Normal appearance. He is not ill-appearing.      Comments: Patient is well-appearing resting comfortably in bed in no acute distress   HENT:      Head:      Comments: Scleral icterus present  Cardiovascular:      Rate and Rhythm: Normal rate and regular rhythm.   Pulmonary:      Effort: Pulmonary effort is normal. No respiratory distress.      Breath sounds: Normal breath sounds.   Abdominal:      General: Bowel sounds are normal. There is no distension.      Palpations: Abdomen is soft.      Tenderness: There is no abdominal tenderness. There is left CVA tenderness.   Musculoskeletal:         General: No swelling.      Right lower leg: No edema.      Left lower leg: No edema.   Skin:     General: Skin is warm and dry.   Neurological:      Mental Status: He is alert.   Psychiatric:         Mood and Affect: Mood normal.         Judgment: Judgment normal.         Lab Results: I have reviewed the following results:  Results from last 7 days   Lab Units 10/28/24  1850   WBC Thousand/uL 10.22*   HEMOGLOBIN g/dL 13.7   HEMATOCRIT % 40.5   PLATELETS Thousands/uL 102*   SEGS PCT % 77*   LYMPHO PCT % 11*   MONO PCT % 11   EOS PCT % 1     Results from last 7 days   Lab Units 10/28/24  1850   SODIUM mmol/L 132*   POTASSIUM mmol/L 4.1   CHLORIDE mmol/L 105   CO2 mmol/L 21   BUN mg/dL 19   CREATININE mg/dL 1.46*   ANION GAP mmol/L 6   CALCIUM mg/dL 8.5   ALBUMIN g/dL 3.3*   TOTAL BILIRUBIN mg/dL 7.23*   ALK PHOS U/L 77   ALT U/L 27   AST U/L 46*   GLUCOSE RANDOM mg/dL 150*     Results from last 7 days   Lab Units 10/28/24  1850   INR  1.39*     Results from last 7 days   Lab Units 10/28/24  1848   POC GLUCOSE mg/dl 155*     Lab Results   Component Value Date    HGBA1C 4.5 02/21/2024    HGBA1C 4.4 03/28/2023    HGBA1C 4.5  08/19/2021     Results from last 7 days   Lab Units 10/28/24  1850   LACTIC ACID mmol/L 1.9   PROCALCITONIN ng/ml 0.61*       Imaging Results Review: I reviewed radiology reports from this admission including: chest xray, CT chest, CT abdomen/pelvis, and CT C-spine.  Other Study Results Review: EKG was personally reviewed and my interpretation is: NSR. HR 61..    Administrative Statements     ** Please Note: This note has been constructed using a voice recognition system. **

## 2024-10-29 NOTE — UTILIZATION REVIEW
OBSERVATION 10/28/24 @1959 CONVERTED TO INPATIENT 10/29/24@1537 DUE TO pyelonephritis  Initial Clinical Review    Admission: Date/Time/Statement:   Admission Orders (From admission, onward)       Ordered        10/29/24 1537  INPATIENT ADMISSION  Once            10/28/24 1959  Place in Observation  Once                          Orders Placed This Encounter   Procedures    Place in Observation     Standing Status:   Standing     Number of Occurrences:   1     Order Specific Question:   Level of Care     Answer:   Med Surg [16]    INPATIENT ADMISSION     Standing Status:   Standing     Number of Occurrences:   1     Order Specific Question:   Level of Care     Answer:   Med Surg [16]     Order Specific Question:   Estimated length of stay     Answer:   More than 2 Midnights     Order Specific Question:   Certification     Answer:   I certify that inpatient services are medically necessary for this patient for a duration of greater than two midnights. See H&P and MD Progress Notes for additional information about the patient's course of treatment.     ED Arrival Information       Expected   -    Arrival   10/28/2024 18:30    Acuity   Immediate              Means of arrival   Wheelchair    Escorted by   Spouse    Service   Hospitalist    Admission type   Emergency              Arrival complaint   Fall             Chief Complaint   Patient presents with    Fall     States he fell approx 30mins ago, -BT -HS + loc, has been having cold sweats. Pt appears pale.pt denies pain     Chills       Initial Presentation: 59 y.o. male to the ED from home with complaints of fall 30 minutes prior to arrival after legs were feeling weak, shaky. Admitted under observation then converted to inpatient  for pyelonephritis. H/O cirrhosis, GERD, gastric sleeve, varices .  Arrives to the ED pale, bp 60/34, with jaundice, scleral icterus. Ammonia 76. Total albumin 7.23.CT head shows no acute findings. Given 1 liter iv fluids in the ED, started  on IV abx. Creat 1.46 on arrival up from baseline 0.6-0.8. Wbcs 10.22, procal 0.61. Bp improved after liter of iv fluids. +left CVA tenderness.     Date: 10/29   Day 2:  PT/OT eval pending.  Continue with IV abx, follow urine and blood cultures.      Date: 10/30  Day 3: Has surpassed a 2nd midnight with active treatments and services. Initially admitted for pyelonephritis, improving with IV abx.  Cultures pending.      ED Treatment-Medication Administration from 10/28/2024 1828 to 10/28/2024 2103         Date/Time Order Dose Route Action     10/28/2024 1849 lactated ringers bolus 1,000 mL 1,000 mL Intravenous New Bag     10/28/2024 1905 iohexol (OMNIPAQUE) 350 MG/ML injection (MULTI-DOSE) 100 mL 100 mL Intravenous Given     10/28/2024 2019 cefepime (MAXIPIME) 2 g/50 mL dextrose IVPB 2,000 mg Intravenous New Bag            Scheduled Medications:  [Held by provider] AMILoride, 5 mg, Oral, Daily  [Held by provider] carvedilol, 6.25 mg, Oral, BID With Meals  cefTRIAXone, 2,000 mg, Intravenous, Q24H  cyanocobalamin, 1,000 mcg, Oral, Daily  fluticasone, 1 spray, Nasal, Daily  heparin (porcine), 5,000 Units, Subcutaneous, Q8H JEANINE  nicotine, 7 mg, Transdermal, Daily      Continuous IV Infusions:       PRN Meds:  acetaminophen, 650 mg, Oral, Q6H PRN      ED Triage Vitals   Temperature Pulse Respirations Blood Pressure SpO2 Pain Score   10/28/24 1834 10/28/24 1834 10/28/24 1834 10/28/24 1834 10/28/24 1834 10/28/24 2112   97.9 °F (36.6 °C) 64 16 (!) 60/34 100 % No Pain     Weight (last 2 days)       Date/Time Weight    10/30/24 0534 102 (225.75)    10/30/24 0500 102 (225.75)    10/29/24 0554 102 (225.09)    10/29/24 0500 102 (225.09)    10/28/24 21:05:35 102 (224.87)            Vital Signs (last 3 days)       Date/Time Temp Pulse Resp BP MAP (mmHg) SpO2 O2 Device Patient Position - Orthostatic VS Martell Coma Scale Score Pain    10/30/24 07:43:23 98.8 °F (37.1 °C) 64 19 118/75 89 97 % None (Room air) Lying -- --    10/30/24  0710 -- -- -- -- -- -- -- -- -- No Pain    10/29/24 2223 -- -- -- -- -- -- -- -- -- 4    10/29/24 22:07:58 99.4 °F (37.4 °C) 68 -- 103/57 72 96 % -- -- -- --    10/29/24 22:06:53 99.4 °F (37.4 °C) -- -- 103/57 72 -- -- -- -- --    10/29/24 2005 -- -- -- -- -- -- None (Room air) -- 15 No Pain    10/29/24 15:24:55 99.5 °F (37.5 °C) 63 18 108/59 75 96 % -- -- -- --    10/29/24 07:30:49 100.5 °F (38.1 °C) 71 16 100/53 69 98 % None (Room air) Lying 15 No Pain    10/29/24 0602 -- -- -- -- -- -- -- -- -- Med Not Given for Pain - for MAR use only    10/29/24 0500 101.8 °F (38.8 °C) -- -- -- -- -- -- -- -- --    10/28/24 23:46:24 -- -- -- -- -- 97 % -- -- -- --    10/28/24 23:22:44 -- -- -- -- -- 96 % -- -- -- --    10/28/24 2300 98.2 °F (36.8 °C) 54 18 104/49 67 96 % None (Room air) Lying -- --    10/28/24 2138 -- -- -- -- -- 97 % None (Room air) -- 15 No Pain    10/28/24 2112 -- -- -- -- -- -- -- -- -- No Pain    10/28/24 21:05:35 97.9 °F (36.6 °C) 54 20 117/66 83 99 % None (Room air) Lying -- --    10/28/24 2015 -- 54 16 114/57 80 94 % None (Room air) Lying -- --    10/28/24 2000 -- 55 18 123/68 91 95 % None (Room air) Lying -- --    10/28/24 1945 -- 56 17 125/60 -- 95 % None (Room air) Lying 15 --    10/28/24 1944 -- 57 -- -- -- 96 % None (Room air) Lying -- --    10/28/24 1930 -- 57 21 131/60 -- 96 % None (Room air) Lying 15 --    10/28/24 1915 -- 60 20 107/53 -- 95 % None (Room air) Lying 15 --    10/28/24 1900 -- 56 18 89/52 -- 97 % None (Room air) Lying 15 --    10/28/24 1845 -- 63 17 132/87 -- 96 % None (Room air) Sitting 15 --    10/28/24 1834 97.9 °F (36.6 °C) 64 16 60/34 -- 100 % None (Room air) Sitting -- --            Pertinent Labs/Diagnostic Test Results:   Radiology:  TRAUMA - CT chest abdomen pelvis w contrast   Final Interpretation by Vitor Pedraza MD (10/28 1947)      No acute findings in the chest.      2.6 cm indeterminate area of cortical hypointense enhancement within the lower pole of  the right kidney versus a hypoenhancing lower pole renal mass. I favor pyelonephritis to a mass. Follow-up with renal ultrasound or CT renal protocol after treatment.      Hepatic cirrhosis with findings of portal hypertension including probable small paraesophageal varices.      The study was marked in EPIC for immediate notification.         Workstation performed: [id number]         CT head without contrast   Final Interpretation by Vitor Pedraza MD (10/28 1929)      No acute intracranial abnormality.                  Workstation performed: [id number]         CT spine cervical without contrast   Final Interpretation by Vitor Pedraza MD (10/28 1934)      No cervical spine fracture or traumatic malalignment.                  Workstation performed: [id number]         XR Trauma chest portable   Final Interpretation by Merlene Mcleod MD (10/28 1859)      No acute pulmonary pathology.      No obvious displaced fractures within limitation of supine AP chest technique.                  Workstation performed: [id number]           Cardiology:  10/28 EKG:   Narrative & Impression    Normal sinus rhythm       Component  Ref Range & Units 10/29/24 0544 10/28/24 2013   Ammonia  18 - 72 umol/L 76 High  76 High      Results from last 7 days   Lab Units 10/29/24  0544 10/28/24  1850   WBC Thousand/uL 7.44 10.22*   HEMOGLOBIN g/dL 12.5 13.7   HEMATOCRIT % 37.2 40.5   PLATELETS Thousands/uL 85* 102*   TOTAL NEUT ABS Thousands/µL  --  7.77*         Results from last 7 days   Lab Units 10/29/24  0544 10/28/24  1850   SODIUM mmol/L 134* 132*   POTASSIUM mmol/L 4.1 4.1   CHLORIDE mmol/L 106 105   CO2 mmol/L 23 21   ANION GAP mmol/L 5 6   BUN mg/dL 19 19   CREATININE mg/dL 1.00 1.46*   EGFR ml/min/1.73sq m 81 51   CALCIUM mg/dL 8.3* 8.5     Results from last 7 days   Lab Units 10/29/24  0544 10/28/24  2013 10/28/24  1850   AST U/L 35  --  46*   ALT U/L 24  --  27   ALK PHOS U/L 77  --  77   TOTAL PROTEIN  g/dL 5.5*  --  5.8*   ALBUMIN g/dL 3.1*  --  3.3*   TOTAL BILIRUBIN mg/dL 5.18*  --  7.23*   AMMONIA umol/L 76* 76*  --      Results from last 7 days   Lab Units 10/28/24  1848   POC GLUCOSE mg/dl 155*     Results from last 7 days   Lab Units 10/29/24  0544 10/28/24  1850   GLUCOSE RANDOM mg/dL 121 150*             Results from last 7 days   Lab Units 10/28/24  2249 10/28/24  2135 10/28/24  1850   HS TNI 0HR ng/L  --   --  5   HS TNI 2HR ng/L  --  4  --    HSTNI D2 ng/L  --  -1  --    HS TNI 4HR ng/L 3  --   --    HSTNI D4 ng/L -2  --   --          Results from last 7 days   Lab Units 10/28/24  1850   PROTIME seconds 17.9*   INR  1.39*   PTT seconds 35*         Results from last 7 days   Lab Units 10/29/24  0544 10/28/24  1850   PROCALCITONIN ng/ml 0.46* 0.61*     Results from last 7 days   Lab Units 10/28/24  1850   LACTIC ACID mmol/L 1.9         Results from last 7 days   Lab Units 10/28/24  2147   CLARITY UA  Clear   COLOR UA  Dark Yellow   SPEC GRAV UA  >=1.050*   PH UA  7.0   GLUCOSE UA mg/dl Negative   KETONES UA mg/dl Negative   BLOOD UA  Moderate*   PROTEIN UA mg/dl 50 (1+)*   NITRITE UA  Negative   BILIRUBIN UA  Small*   UROBILINOGEN UA (BE) mg/dl >=12.0*   LEUKOCYTES UA  Small*   WBC UA /hpf 10-20*   RBC UA /hpf 30-50*   BACTERIA UA /hpf Innumerable*   EPITHELIAL CELLS WET PREP /hpf Occasional   MUCUS THREADS  Occasional*       Results from last 7 days   Lab Units 10/28/24  2013 10/28/24  2002   BLOOD CULTURE  No Growth at 24 hrs. No Growth at 24 hrs.         Past Medical History:   Diagnosis Date    CPAP (continuous positive airway pressure) dependence     does not use machine    Esophageal varices (HCC)     stable 9/2020 gets checked yearly    Hepatic cirrhosis (HCC)     treated with harvoni   Hep C- dx age [age]    Hepatic encephalopathy (HCC)     Liver disease     Obesity     Pneumonia     in past    Postgastrectomy malabsorption     Sleep apnea     Wears glasses      Present on Admission:   Hepatic  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 (HCC)   Hyperbilirubinemia      Admitting Diagnosis: Chills [R68.83]  Loss of consciousness (HCC) [R40.20]  Pyelonephritis [N12]  AXEL (acute kidney injury) (HCC) [N17.9]  Age/Sex: 59 y.o. male    Network Utilization Review Department  ATTENTION: Please call with any questions or concerns to [phone] and carefully listen to the prompts so that you are directed to the right person. All voicemails are confidential.   For Discharge needs, contact Care Management DC Support Team at [phone] opt. [phone]  Send all requests for admission clinical reviews, approved or denied determinations and any other requests to dedicated fax number below belonging to the campus where the patient is receiving treatment. List of dedicated fax numbers for the Facilities:  FACILITY NAME UR FAX NUMBER   ADMISSION DENIALS (Administrative/Medical Necessity) [phone]   DISCHARGE SUPPORT TEAM (NETWORK) [phone]   PARENT CHILD HEALTH (Maternity/NICU/Pediatrics) [phone]   [facility] [phone]   [facility] [phone]   [facility] [phone]   [facility] [phone]   [facility] [phone]   [facility] [phone]   [facility] [phone]   [facility] [phone]   [facility] [phone]   [facility] [phone]   [facility] [phone]   [facility] [phone]

## 2024-10-29 NOTE — ASSESSMENT & PLAN NOTE
History of cirrhosis due to chronic hep C infection   Follows with GI as outpt  Continue to hold Lasix and amiloride given AXEL  Continue lactulose as needed

## 2024-10-29 NOTE — ASSESSMENT & PLAN NOTE
Patient with known history of hyperbilirubinemia  CT a/p revealing cirrhotic morphology of liver, no acute changes/findings to liver or gallbladder  Bilirubin 7.23 POA, elevated from baseline 3-4.  Continue to trend

## 2024-10-29 NOTE — PLAN OF CARE
Problem: Potential for Falls  Goal: Patient will remain free of falls  Description: INTERVENTIONS:  - Educate patient/family on patient safety including physical limitations  - Instruct patient to call for assistance with activity   - Consult OT/PT to assist with strengthening/mobility   - Keep Call bell within reach  - Keep bed low and locked with side rails adjusted as appropriate  - Keep care items and personal belongings within reach  - Initiate and maintain comfort rounds  - Make Fall Risk Sign visible to staff  - Offer Toileting every 2 Hours, in advance of need  - Initiate/Maintain bed alarm  - Obtain necessary fall risk management equipment:   - Apply yellow socks and bracelet for high fall risk patients  - Consider moving patient to room near nurses station  Outcome: Progressing     Problem: PAIN - ADULT  Goal: Verbalizes/displays adequate comfort level or baseline comfort level  Description: Interventions:  - Encourage patient to monitor pain and request assistance  - Assess pain using appropriate pain scale  - Administer analgesics based on type and severity of pain and evaluate response  - Implement non-pharmacological measures as appropriate and evaluate response  - Consider cultural and social influences on pain and pain management  - Notify physician/advanced practitioner if interventions unsuccessful or patient reports new pain  Outcome: Progressing     Problem: INFECTION - ADULT  Goal: Absence or prevention of progression during hospitalization  Description: INTERVENTIONS:  - Assess and monitor for signs and symptoms of infection  - Monitor lab/diagnostic results  - Monitor all insertion sites, i.e. indwelling lines, tubes, and drains  - Monitor endotracheal if appropriate and nasal secretions for changes in amount and color  - [place] appropriate cooling/warming therapies per order  - Administer medications as ordered  - Instruct and encourage patient and family to use good hand hygiene  technique  - Identify and instruct in appropriate isolation precautions for identified infection/condition  Outcome: Progressing  Goal: Absence of fever/infection during neutropenic period  Description: INTERVENTIONS:  - Monitor WBC    Outcome: Progressing     Problem: SAFETY ADULT  Goal: Patient will remain free of falls  Description: INTERVENTIONS:  - Educate patient/family on patient safety including physical limitations  - Instruct patient to call for assistance with activity   - Consult OT/PT to assist with strengthening/mobility   - Keep Call bell within reach  - Keep bed low and locked with side rails adjusted as appropriate  - Keep care items and personal belongings within reach  - Initiate and maintain comfort rounds  - Make Fall Risk Sign visible to staff  - Offer Toileting every 2 Hours, in advance of need  - Initiate/Maintain bed alarm  - Obtain necessary fall risk management equipment:   - Apply yellow socks and bracelet for high fall risk patients  - Consider moving patient to room near nurses station  Outcome: Progressing  Goal: Maintain or return to baseline ADL function  Description: INTERVENTIONS:  -  Assess patient's ability to carry out ADLs; assess patient's baseline for ADL function and identify physical deficits which impact ability to perform ADLs (bathing, care of mouth/teeth, toileting, grooming, dressing, etc.)  - Assess/evaluate cause of self-care deficits   - Assess range of motion  - Assess patient's mobility; develop plan if impaired  - Assess patient's need for assistive devices and provide as appropriate  - Encourage maximum independence but intervene and supervise when necessary  - Involve family in performance of ADLs  - Assess for home care needs following discharge   - Consider OT consult to assist with ADL evaluation and planning for discharge  - Provide patient education as appropriate  Outcome: Progressing  Goal: Maintains/Returns to pre admission functional  level  Description: INTERVENTIONS:  - Perform AM-PAC 6 Click Basic Mobility/ Daily Activity assessment daily.  - Set and communicate daily mobility goal to care team and patient/family/caregiver.   - Collaborate with rehabilitation services on mobility goals if consulted  - Perform Range of Motion 3 times a day.  - Reposition patient every 2 hours.  - Dangle patient 3 times a day  - Stand patient 3 times a day  - Ambulate patient 3 times a day  - Out of bed to chair 3 times a day   - Out of bed for meals 3 times a day  - Out of bed for toileting  - Record patient progress and toleration of activity level   Outcome: Progressing     Problem: DISCHARGE PLANNING  Goal: Discharge to home or other facility with appropriate resources  Description: INTERVENTIONS:  - Identify barriers to discharge w/patient and caregiver  - Arrange for needed discharge resources and transportation as appropriate  - Identify discharge learning needs (meds, wound care, etc.)  - Arrange for interpretive services to assist at discharge as needed  - Refer to Case Management Department for coordinating discharge planning if the patient needs post-hospital services based on physician/advanced practitioner order or complex needs related to functional status, cognitive ability, or social support system  Outcome: Progressing     Problem: Knowledge Deficit  Goal: Patient/family/caregiver demonstrates understanding of disease process, treatment plan, medications, and discharge instructions  Description: Complete learning assessment and assess knowledge base.  Interventions:  - Provide teaching at level of understanding  - Provide teaching via preferred learning methods  Outcome: Progressing     Problem: Prexisting or High Potential for Compromised Skin Integrity  Goal: Skin integrity is maintained or improved  Description: INTERVENTIONS:  - Identify patients at risk for skin breakdown  - Assess and monitor skin integrity  - Assess and monitor nutrition  and hydration status  - Monitor labs   - Assess for incontinence   - Turn and reposition patient  - Assist with mobility/ambulation  - Relieve pressure over bony prominences  - Avoid friction and shearing  - Provide appropriate hygiene as needed including keeping skin clean and dry  - Evaluate need for skin moisturizer/barrier cream  - Collaborate with interdisciplinary team   - Patient/family teaching  - Consider wound care consult   Outcome: Progressing

## 2024-10-29 NOTE — ASSESSMENT & PLAN NOTE
History of cirrhosis due to chronic hep C infection   Follows with GI as outpt  History of edema, currently maintained on Lasix 40 mg daily and amiloride 5 mg daily; patient is euvolemic on exam, hold in setting of AXEL  Takes lactulose as needed  Ammonia 76 POA. Recheck ammonia with a.m. labs

## 2024-10-29 NOTE — ASSESSMENT & PLAN NOTE
"Presents to ED complaining of weakness in legs causing him to fall today.  Denies head strike or loss of consciousness  CT head negative for acute intracranial abnormality, CT C-spine negative for fracture  PT/OT consulted  Also admits to increased thirst and increased urinary frequency x 4 days  CT a/p revealing \"Indeterminate wedge-shaped 2.6 cm area of cortical hypoenhancement enhancement in the lower pole of the right kidney more likely to represent pyelonephritis than a hypoenhancing mass, without hydronephrosis or urinary tract calculi.\"  Patient hypotensive on arrival to ED with BP 60/34, since stabilized s/p 1L LR bolus  UA with moderate blood, small leuks, small bilirubin, innumerable bacteria, 10-20 WBC; follow up urine and blood cultures  Currently hemodynamically stable and afebrile, continue to monitor  Mild leukocytosis 10.22, procal 0.61 - continue to trend  Received 2 g cefepime in ED. Begin 2 g ceftriaxone every 24 hours  "

## 2024-10-29 NOTE — PLAN OF CARE

## 2024-10-29 NOTE — ASSESSMENT & PLAN NOTE
"Presents to ED complaining of weakness in legs causing him to fall today.  Denies head strike or loss of consciousness  CT head negative for acute intracranial abnormality, CT C-spine negative for fracture  PT/OT consulted  Also admits to increased thirst and increased urinary frequency x 4 days  CT a/p revealing \"Indeterminate wedge-shaped 2.6 cm area of cortical hypoenhancement enhancement in the lower pole of the right kidney more likely to represent pyelonephritis than a hypoenhancing mass, without hydronephrosis or urinary tract calculi.\"  Continue with IV ceftriaxone  Follow-up urine culture  Follow-up blood cultures  Clinically improving  Anticipate likely transition to p.o. antibiotics in next 24 to 48 hours  "

## 2024-10-29 NOTE — ED NOTES
Per provider communication via Epic Secure Chat, ok to remove C Collar at this time      Josephine Zazueta  10/28/24 2024

## 2024-10-29 NOTE — ASSESSMENT & PLAN NOTE
As evidenced by Cr 1.46 increased from baseline of 0.6-0.8  S/p 1L LR bolus in ED  Most recent echo 2018 revealing LVEF 55-65%  100 cc/h LR initiated  Avoid nephrotoxic agents  Monitor I's/O's  Urinary retention protocol in place

## 2024-10-30 ENCOUNTER — TRANSITIONAL CARE MANAGEMENT (OUTPATIENT)
Dept: FAMILY MEDICINE CLINIC | Facility: CLINIC | Age: 59
End: 2024-10-30

## 2024-10-30 VITALS
SYSTOLIC BLOOD PRESSURE: 118 MMHG | DIASTOLIC BLOOD PRESSURE: 75 MMHG | HEART RATE: 64 BPM | HEIGHT: 66 IN | BODY MASS INDEX: 36.28 KG/M2 | WEIGHT: 225.75 LBS | OXYGEN SATURATION: 97 % | RESPIRATION RATE: 19 BRPM | TEMPERATURE: 98.8 F

## 2024-10-30 LAB — BACTERIA UR CULT: NORMAL

## 2024-10-30 PROCEDURE — 99239 HOSP IP/OBS DSCHRG MGMT >30: CPT | Performed by: INTERNAL MEDICINE

## 2024-10-30 RX ORDER — LIDOCAINE 50 MG/G
2 PATCH TOPICAL DAILY
Status: DISCONTINUED | OUTPATIENT
Start: 2024-10-30 | End: 2024-10-30 | Stop reason: HOSPADM

## 2024-10-30 RX ORDER — LIDOCAINE 50 MG/G
PATCH TOPICAL
Status: COMPLETED
Start: 2024-10-30 | End: 2024-10-30

## 2024-10-30 RX ORDER — CEFPODOXIME PROXETIL 200 MG/1
200 TABLET, FILM COATED ORAL 2 TIMES DAILY
Qty: 14 TABLET | Refills: 0 | Status: SHIPPED | OUTPATIENT
Start: 2024-10-30 | End: 2024-10-30

## 2024-10-30 RX ORDER — CEFPODOXIME PROXETIL 200 MG/1
200 TABLET, FILM COATED ORAL 2 TIMES DAILY
Qty: 14 TABLET | Refills: 0 | Status: SHIPPED | OUTPATIENT
Start: 2024-10-30 | End: 2024-11-06

## 2024-10-30 RX ADMIN — HEPARIN SODIUM 5000 UNITS: 5000 INJECTION INTRAVENOUS; SUBCUTANEOUS at 05:08

## 2024-10-30 RX ADMIN — PANTOPRAZOLE SODIUM 40 MG: 40 TABLET, DELAYED RELEASE ORAL at 07:18

## 2024-10-30 RX ADMIN — LIDOCAINE 2 PATCH: 50 PATCH CUTANEOUS at 05:34

## 2024-10-30 RX ADMIN — LIDOCAINE: 700 PATCH TOPICAL at 06:02

## 2024-10-30 RX ADMIN — CYANOCOBALAMIN TAB 500 MCG 1000 MCG: 500 TAB at 08:18

## 2024-10-30 NOTE — ASSESSMENT & PLAN NOTE
As evidenced by Cr 1.46 increased from baseline of 0.6-0.8  S/p 1L LR bolus in ED  Most recent echo 2018 revealing LVEF 55-65%  Has since resolved with IV fluids  Kidney function back to baseline

## 2024-10-30 NOTE — ASSESSMENT & PLAN NOTE
"Presents to ED complaining of weakness in legs causing him to fall today.  Denies head strike or loss of consciousness  CT head negative for acute intracranial abnormality, CT C-spine negative for fracture  PT/OT consulted  Also admits to increased thirst and increased urinary frequency x 4 days  CT a/p revealing \"Indeterminate wedge-shaped 2.6 cm area of cortical hypoenhancement enhancement in the lower pole of the right kidney more likely to represent pyelonephritis than a hypoenhancing mass, without hydronephrosis or urinary tract calculi.\"  Clinically improved on IV ceftriaxone  Transition to oral Vantin on discharge to complete course  "

## 2024-10-30 NOTE — ASSESSMENT & PLAN NOTE
Patient with known history of hyperbilirubinemia  CT a/p revealing cirrhotic morphology of liver, no acute changes/findings to liver or gallbladder  Bilirubin remained stable likely secondary to cirrhosis  Outpatient follow-up

## 2024-10-30 NOTE — PLAN OF CARE
Problem: Potential for Falls  Goal: Patient will remain free of falls  Description: INTERVENTIONS:  - Educate patient/family on patient safety including physical limitations  - Instruct patient to call for assistance with activity   - Consult OT/PT to assist with strengthening/mobility   - Keep Call bell within reach  - Keep bed low and locked with side rails adjusted as appropriate  - Keep care items and personal belongings within reach  - Initiate and maintain comfort rounds  - Make Fall Risk Sign visible to staff  - Offer Toileting every  Hours, in advance of need  - Initiate/Maintain alarm  - Obtain necessary fall risk management equipment:   - Apply yellow socks and bracelet for high fall risk patients  - Consider moving patient to room near nurses station  Outcome: Progressing     Problem: PAIN - ADULT  Goal: Verbalizes/displays adequate comfort level or baseline comfort level  Description: Interventions:  - Encourage patient to monitor pain and request assistance  - Assess pain using appropriate pain scale  - Administer analgesics based on type and severity of pain and evaluate response  - Implement non-pharmacological measures as appropriate and evaluate response  - Consider cultural and social influences on pain and pain management  - Notify physician/advanced practitioner if interventions unsuccessful or patient reports new pain  Outcome: Progressing     Problem: INFECTION - ADULT  Goal: Absence or prevention of progression during hospitalization  Description: INTERVENTIONS:  - Assess and monitor for signs and symptoms of infection  - Monitor lab/diagnostic results  - Monitor all insertion sites, i.e. indwelling lines, tubes, and drains  - Monitor endotracheal if appropriate and nasal secretions for changes in amount and color  - [place] appropriate cooling/warming therapies per order  - Administer medications as ordered  - Instruct and encourage patient and family to use good hand hygiene technique  -  Identify and instruct in appropriate isolation precautions for identified infection/condition  Outcome: Progressing  Goal: Absence of fever/infection during neutropenic period  Description: INTERVENTIONS:  - Monitor WBC    Outcome: Progressing     Problem: SAFETY ADULT  Goal: Patient will remain free of falls  Description: INTERVENTIONS:  - Educate patient/family on patient safety including physical limitations  - Instruct patient to call for assistance with activity   - Consult OT/PT to assist with strengthening/mobility   - Keep Call bell within reach  - Keep bed low and locked with side rails adjusted as appropriate  - Keep care items and personal belongings within reach  - Initiate and maintain comfort rounds  - Make Fall Risk Sign visible to staff  - Offer Toileting every  Hours, in advance of need  - Initiate/Maintain alarm  - Obtain necessary fall risk management equipment:   - Apply yellow socks and bracelet for high fall risk patients  - Consider moving patient to room near nurses station  Outcome: Progressing  Goal: Maintain or return to baseline ADL function  Description: INTERVENTIONS:  -  Assess patient's ability to carry out ADLs; assess patient's baseline for ADL function and identify physical deficits which impact ability to perform ADLs (bathing, care of mouth/teeth, toileting, grooming, dressing, etc.)  - Assess/evaluate cause of self-care deficits   - Assess range of motion  - Assess patient's mobility; develop plan if impaired  - Assess patient's need for assistive devices and provide as appropriate  - Encourage maximum independence but intervene and supervise when necessary  - Involve family in performance of ADLs  - Assess for home care needs following discharge   - Consider OT consult to assist with ADL evaluation and planning for discharge  - Provide patient education as appropriate  Outcome: Progressing  Goal: Maintains/Returns to pre admission functional level  Description:  INTERVENTIONS:  - Perform AM-PAC 6 Click Basic Mobility/ Daily Activity assessment daily.  - Set and communicate daily mobility goal to care team and patient/family/caregiver.   - Collaborate with rehabilitation services on mobility goals if consulted  - Perform Range of Motion  times a day.  - Reposition patient every  hours.  - Dangle patient  times a day  - Stand patient  times a day  - Ambulate patient  times a day  - Out of bed to chair  times a day   - Out of bed for meals times a day  - Out of bed for toileting  - Record patient progress and toleration of activity level   Outcome: Progressing     Problem: DISCHARGE PLANNING  Goal: Discharge to home or other facility with appropriate resources  Description: INTERVENTIONS:  - Identify barriers to discharge w/patient and caregiver  - Arrange for needed discharge resources and transportation as appropriate  - Identify discharge learning needs (meds, wound care, etc.)  - Arrange for interpretive services to assist at discharge as needed  - Refer to Case Management Department for coordinating discharge planning if the patient needs post-hospital services based on physician/advanced practitioner order or complex needs related to functional status, cognitive ability, or social support system  Outcome: Progressing     Problem: Knowledge Deficit  Goal: Patient/family/caregiver demonstrates understanding of disease process, treatment plan, medications, and discharge instructions  Description: Complete learning assessment and assess knowledge base.  Interventions:  - Provide teaching at level of understanding  - Provide teaching via preferred learning methods  Outcome: Progressing     Problem: Prexisting or High Potential for Compromised Skin Integrity  Goal: Skin integrity is maintained or improved  Description: INTERVENTIONS:  - Identify patients at risk for skin breakdown  - Assess and monitor skin integrity  - Assess and monitor nutrition and hydration status  -  Monitor labs   - Assess for incontinence   - Turn and reposition patient  - Assist with mobility/ambulation  - Relieve pressure over bony prominences  - Avoid friction and shearing  - Provide appropriate hygiene as needed including keeping skin clean and dry  - Evaluate need for skin moisturizer/barrier cream  - Collaborate with interdisciplinary team   - Patient/family teaching  - Consider wound care consult   Outcome: Progressing

## 2024-10-30 NOTE — CASE MANAGEMENT
Case Management Discharge Planning Note    Patient name Matt Tobias Sr.  Location [address] MRN [id number]  : 1965 Date 10/30/2024       Current Admission Date: 10/28/2024  Current Admission Diagnosis:Pyelonephritis   Patient Active Problem List    Diagnosis Date Noted Date Diagnosed    Pyelonephritis 10/28/2024     AXEL (acute kidney injury) (HCC) 10/28/2024     Rosacea 2024     Tremor 2022     Platelets decreased (HCC) 09/10/2021     Bowel and bladder incontinence 09/10/2021     Hyperbilirubinemia 09/10/2021     S/P abdominoplasty 2020     Vitamin A deficiency 2019     Hyperammonemia (HCC) 10/30/2019     Esophageal varices without bleeding (HCC) 2019     Postsurgical malabsorption 2018     Acute pain of right knee 2018     S/P laparoscopic sleeve gastrectomy 2018     Secondary esophageal varices without bleeding (HCC) 2018     Obstructive sleep apnea 2018     Morbid obesity with BMI of 40.0-44.9, adult (HCC) 2018     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) 2018     Gastroesophageal reflux disease without esophagitis 2018     Former smoker 2018     Erectile dysfunction 2018       LOS (days): 1  Geometric Mean LOS (GMLOS) (days): 2.8  Days to GMLOS:2     OBJECTIVE:  Risk of Unplanned Readmission Score: 13.21      Current admission status: Inpatient   Preferred Pharmacy:   [facility] [place] - [address]  [address]  [place] [address]  Phone: [phone] Fax: [phone]    [facility] [place] - [address]  [address]  [address]  [place] [address]  Phone: [phone] Fax: [phone]    Primary Care Provider: Rodrigo Jo MD  Primary Insurance: [facility]  Secondary Insurance:     DISCHARGE DETAILS:    Discharge planning discussed with:: Patient at bedside.  Freedom of Choice: Yes  Comments  - Freedom of Choice: FOC maintained - CM introduced self and role.  Reviewed DCP.  Patient for d/c home today with PO ABX.  AMPAC is 23.  No CM needs identified, patient endorses same.  Aware CM will remain available through to d/c.  CM contacted family/caregiver?: No- see comments (independent)  Were Treatment Team discharge recommendations reviewed with patient/caregiver?: Yes  Did patient/caregiver verbalize understanding of patient care needs?: Yes  Were patient/caregiver advised of the risks associated with not following Treatment Team discharge recommendations?: Yes    Requested Home Health Care         Is the patient interested in HHC at discharge?: No    DME Referral Provided  Referral made for DME?: No    Other Referral/Resources/Interventions Provided:  Interventions: None Indicated    Treatment Team Recommendation: Home  Discharge Destination Plan:: Home  Transport at Discharge : Family    IMM Given (Date):: 10/30/24  IMM Given to:: Patient (Verbal review of IMM at bedside with patient.  Understanding verbalized, no questions or concerns.  Patient copy provided.  Original to medical records.)

## 2024-10-30 NOTE — DISCHARGE SUMMARY
"Discharge Summary - Hospitalist   Name: Matt Tobias Sr. 59 y.o. male I MRN: [id number]  Unit/Bed#: 2 E 252-01 I Date of Admission: 10/28/2024   Date of Service: 10/30/2024 I Hospital Day: 1     Assessment & Plan  Pyelonephritis  Presents to ED complaining of weakness in legs causing him to fall today.  Denies head strike or loss of consciousness  CT head negative for acute intracranial abnormality, CT C-spine negative for fracture  PT/OT consulted  Also admits to increased thirst and increased urinary frequency x 4 days  CT a/p revealing \"Indeterminate wedge-shaped 2.6 cm area of cortical hypoenhancement enhancement in the lower pole of the right kidney more likely to represent pyelonephritis than a hypoenhancing mass, without hydronephrosis or urinary tract calculi.\"  Clinically improved on IV ceftriaxone  Transition to oral Vantin on discharge to complete course  AXEL (acute kidney injury) (HCC)  As evidenced by Cr 1.46 increased from baseline of 0.6-0.8  S/p 1L LR bolus in ED  Most recent echo 2018 revealing LVEF 55-65%  Has since resolved with IV fluids  Kidney function back to baseline  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)  History of cirrhosis due to chronic hep C infection   Follows with GI as outpt  Continue to hold Lasix and amiloride given AXEL  Continue lactulose as needed   Will have repeat lab work in approximately 1 week  Hyperbilirubinemia  Patient with known history of hyperbilirubinemia  CT a/p revealing cirrhotic morphology of liver, no acute changes/findings to liver or gallbladder  Bilirubin remained stable likely secondary to cirrhosis  Outpatient follow-up   Discharging Physician / Practitioner: Sixto Vu MD  PCP: Rodrigo Jo MD  Admission Date:   Admission Orders (From admission, onward)       Ordered        10/29/24 1537  INPATIENT ADMISSION  Once            10/28/24 1959  Place in Observation  Once                          Discharge Date: 10/30/24    Medical " Problems       Resolved Problems  Date Reviewed: 10/30/2024   None         Consultations During Hospital Stay:  none    Procedures Performed:   none    Significant Findings / Test Results:   TRAUMA - CT chest abdomen pelvis w contrast    Result Date: 10/28/2024  Impression: No acute findings in the chest. 2.6 cm indeterminate area of cortical hypointense enhancement within the lower pole of the right kidney versus a hypoenhancing lower pole renal mass. I favor pyelonephritis to a mass. Follow-up with renal ultrasound or CT renal protocol after treatment. Hepatic cirrhosis with findings of portal hypertension including probable small paraesophageal varices. The study was marked in EPIC for immediate notification. Workstation performed: [id number]     CT spine cervical without contrast    Result Date: 10/28/2024  Impression: No cervical spine fracture or traumatic malalignment. Workstation performed: [id number]     CT head without contrast    Result Date: 10/28/2024  Impression: No acute intracranial abnormality. Workstation performed: [id number]     XR Trauma chest portable    Result Date: 10/28/2024  Impression: No acute pulmonary pathology. No obvious displaced fractures within limitation of supine AP chest technique. Workstation performed: [id number]      Incidental Findings:   none     Test Results Pending at Discharge (will require follow up):   none     Outpatient Tests Requested:  none    Complications:  none    Reason for Admission: Pyelonephritis    Hospital Course:     Matt WITT Micheline Diaz is a 59 y.o. male patient who originally presented to the hospital on 10/28/2024 with past medical history significant cirrhosis initially presented with fever and generalized weakness found to have pyelonephritis treated with IV antibiotics with significant improvement ultimately transition to oral Vantin on discharge  Condition at Discharge: stable     Discharge Day Visit / Exam:     Subjective:    Vitals: Blood  "Pressure: 118/75 (10/30/24 0743)  Pulse: 64 (10/30/24 0743)  Temperature: 98.8 °F (37.1 °C) (10/30/24 0743)  Temp Source: Oral (10/30/24 0743)  Respirations: 19 (10/30/24 0743)  Height: 5' 6\" (167.6 cm) (10/28/24 2105)  Weight - Scale: 102 kg (225 lb 12 oz) (10/30/24 0534)  SpO2: 97 % (10/30/24 0743)  Exam:   Physical Exam  Constitutional:       General: He is not in acute distress.     Appearance: He is well-developed. He is not diaphoretic.   HENT:      Head: Normocephalic and atraumatic.      Nose: Nose normal.      Mouth/Throat:      Pharynx: No oropharyngeal exudate.   Eyes:      General: No scleral icterus.     Conjunctiva/sclera: Conjunctivae normal.   Cardiovascular:      Rate and Rhythm: Normal rate and regular rhythm.      Heart sounds: Normal heart sounds. No murmur heard.     No friction rub. No gallop.   Pulmonary:      Effort: Pulmonary effort is normal. No respiratory distress.      Breath sounds: Normal breath sounds. No wheezing or rales.   Chest:      Chest wall: No tenderness.   Abdominal:      General: Bowel sounds are normal. There is no distension.      Palpations: Abdomen is soft.      Tenderness: There is no abdominal tenderness. There is no guarding.   Musculoskeletal:         General: No tenderness or deformity. Normal range of motion.      Cervical back: Normal range of motion and neck supple.   Skin:     General: Skin is warm and dry.      Findings: No erythema.   Neurological:      Mental Status: He is alert. Mental status is at baseline.         Discussion with Family: pt    Discharge instructions/Information to patient and family:   See after visit summary for information provided to patient and family.      Provisions for Follow-Up Care:  See after visit summary for information related to follow-up care and any pertinent home health orders.      Disposition:     Home    For Discharges to [facility] SNF:   Not Applicable to this Patient - Not Applicable to this " Patient    Planned Readmission: none     Discharge Statement:  I spent 60 minutes discharging the patient. This time was spent on the day of discharge. I had direct contact with the patient on the day of discharge. Greater than 50% of the total time was spent examining patient, answering all patient questions, arranging and discussing plan of care with patient as well as directly providing post-discharge instructions.  Additional time then spent on discharge activities.    Discharge Medications:  See after visit summary for reconciled discharge medications provided to patient and family.      ** Please Note: This note has been constructed using a voice recognition system **

## 2024-10-30 NOTE — UTILIZATION REVIEW
NOTIFICATION OF INPATIENT ADMISSION   AUTHORIZATION REQUEST   SERVICING FACILITY:   [place] [address]  Tax ID: [id number]  NPI: [id number] ATTENDING PROVIDER:  Attending Name and NPI#: Sixto Vu Md [[id number]]  Address: [address] [place] [address]  Phone: [phone]     ADMISSION INFORMATION:  Place of Service: Inpatient [facility]  Place of Service Code: 21  Inpatient Admission Date/Time: 10/29/24  3:37 PM  Discharge Date/Time: No discharge date for patient encounter.  Admitting Diagnosis Code/Description:  Chills [R68.83]  Loss of consciousness (HCC) [R40.20]  Pyelonephritis [N12]  AXEL (acute kidney injury) ([facility]) [N17.9]     UTILIZATION REVIEW CONTACT:  Brie Loving, Utilization   Network Utilization Review Department  Phone: [phone]  Fax [phone]  Email: [email or web address]  Contact for approvals/pending authorizations, clinical reviews, and discharge.     PHYSICIAN ADVISORY SERVICES:  Medical Necessity Denial & [employer]bad[employer] Review  Phone: [phone]  Fax: [phone]  Email: [email or web address]     DISCHARGE SUPPORT TEAM:  For Patients Discharge Needs & Updates  Phone: [phone] opt. 2 Fax: [phone]  Email: [email or web address]

## 2024-10-30 NOTE — ASSESSMENT & PLAN NOTE
History of cirrhosis due to chronic hep C infection   Follows with GI as outpt  Continue to hold Lasix and amiloride given AXEL  Continue lactulose as needed   Will have repeat lab work in approximately 1 week

## 2024-11-02 LAB
BACTERIA BLD CULT: NORMAL
BACTERIA BLD CULT: NORMAL

## 2024-11-06 ENCOUNTER — APPOINTMENT (OUTPATIENT)
Dept: LAB | Facility: CLINIC | Age: 59
End: 2024-11-06
Payer: COMMERCIAL

## 2024-11-06 ENCOUNTER — OFFICE VISIT (OUTPATIENT)
Dept: FAMILY MEDICINE CLINIC | Facility: CLINIC | Age: 59
End: 2024-11-06
Payer: COMMERCIAL

## 2024-11-06 VITALS
SYSTOLIC BLOOD PRESSURE: 124 MMHG | HEART RATE: 99 BPM | TEMPERATURE: 99.8 F | BODY MASS INDEX: 35.68 KG/M2 | WEIGHT: 222 LBS | DIASTOLIC BLOOD PRESSURE: 76 MMHG | OXYGEN SATURATION: 98 % | HEIGHT: 66 IN

## 2024-11-06 DIAGNOSIS — E87.1 HYPONATREMIA: ICD-10-CM

## 2024-11-06 DIAGNOSIS — N17.9 AKI (ACUTE KIDNEY INJURY) (HCC): ICD-10-CM

## 2024-11-06 DIAGNOSIS — D75.839 THROMBOCYTHEMIA: ICD-10-CM

## 2024-11-06 DIAGNOSIS — N12 PYELONEPHRITIS: Primary | ICD-10-CM

## 2024-11-06 DIAGNOSIS — K74.60 HEPATIC CIRRHOSIS DUE TO CHRONIC HEPATITIS C INFECTION (HCC): ICD-10-CM

## 2024-11-06 DIAGNOSIS — E53.8 B12 DEFICIENCY: ICD-10-CM

## 2024-11-06 DIAGNOSIS — Z76.89 ENCOUNTER FOR SUPPORT AND COORDINATION OF TRANSITION OF CARE: ICD-10-CM

## 2024-11-06 DIAGNOSIS — B18.2 HEPATIC CIRRHOSIS DUE TO CHRONIC HEPATITIS C INFECTION (HCC): ICD-10-CM

## 2024-11-06 LAB
ALBUMIN SERPL BCG-MCNC: 3.4 G/DL (ref 3.5–5)
ALP SERPL-CCNC: 91 U/L (ref 34–104)
ALT SERPL W P-5'-P-CCNC: 53 U/L (ref 7–52)
ANION GAP SERPL CALCULATED.3IONS-SCNC: 7 MMOL/L (ref 4–13)
AST SERPL W P-5'-P-CCNC: 92 U/L (ref 13–39)
BASOPHILS # BLD AUTO: 0.03 THOUSANDS/ÂΜL (ref 0–0.1)
BASOPHILS NFR BLD AUTO: 1 % (ref 0–1)
BILIRUB SERPL-MCNC: 2.88 MG/DL (ref 0.2–1)
BUN SERPL-MCNC: 11 MG/DL (ref 5–25)
CALCIUM ALBUM COR SERPL-MCNC: 9.6 MG/DL (ref 8.3–10.1)
CALCIUM SERPL-MCNC: 9.1 MG/DL (ref 8.4–10.2)
CHLORIDE SERPL-SCNC: 109 MMOL/L (ref 96–108)
CO2 SERPL-SCNC: 25 MMOL/L (ref 21–32)
CREAT SERPL-MCNC: 0.76 MG/DL (ref 0.6–1.3)
EOSINOPHIL # BLD AUTO: 0.22 THOUSAND/ÂΜL (ref 0–0.61)
EOSINOPHIL NFR BLD AUTO: 4 % (ref 0–6)
ERYTHROCYTE [DISTWIDTH] IN BLOOD BY AUTOMATED COUNT: 14.5 % (ref 11.6–15.1)
GFR SERPL CREATININE-BSD FRML MDRD: 99 ML/MIN/1.73SQ M
GLUCOSE SERPL-MCNC: 117 MG/DL (ref 65–140)
HCT VFR BLD AUTO: 40.9 % (ref 36.5–49.3)
HGB BLD-MCNC: 13.7 G/DL (ref 12–17)
IMM GRANULOCYTES # BLD AUTO: 0.01 THOUSAND/UL (ref 0–0.2)
IMM GRANULOCYTES NFR BLD AUTO: 0 % (ref 0–2)
LYMPHOCYTES # BLD AUTO: 0.98 THOUSANDS/ÂΜL (ref 0.6–4.47)
LYMPHOCYTES NFR BLD AUTO: 18 % (ref 14–44)
MCH RBC QN AUTO: 34.6 PG (ref 26.8–34.3)
MCHC RBC AUTO-ENTMCNC: 33.5 G/DL (ref 31.4–37.4)
MCV RBC AUTO: 103 FL (ref 82–98)
MONOCYTES # BLD AUTO: 0.48 THOUSAND/ÂΜL (ref 0.17–1.22)
MONOCYTES NFR BLD AUTO: 9 % (ref 4–12)
NEUTROPHILS # BLD AUTO: 3.66 THOUSANDS/ÂΜL (ref 1.85–7.62)
NEUTS SEG NFR BLD AUTO: 68 % (ref 43–75)
NRBC BLD AUTO-RTO: 0 /100 WBCS
PLATELET # BLD AUTO: 201 THOUSANDS/UL (ref 149–390)
PMV BLD AUTO: 11.7 FL (ref 8.9–12.7)
POTASSIUM SERPL-SCNC: 3.7 MMOL/L (ref 3.5–5.3)
PROT SERPL-MCNC: 6.2 G/DL (ref 6.4–8.4)
RBC # BLD AUTO: 3.96 MILLION/UL (ref 3.88–5.62)
SL AMB  POCT GLUCOSE, UA: NEGATIVE
SL AMB LEUKOCYTE ESTERASE,UA: NEGATIVE
SL AMB POCT BILIRUBIN,UA: NEGATIVE
SL AMB POCT BLOOD,UA: NEGATIVE
SL AMB POCT KETONES,UA: NEGATIVE
SL AMB POCT NITRITE,UA: NEGATIVE
SL AMB POCT PH,UA: 6
SL AMB POCT SPECIFIC GRAVITY,UA: 1.03
SL AMB POCT URINE PROTEIN: NORMAL
SL AMB POCT UROBILINOGEN: NEGATIVE
SODIUM SERPL-SCNC: 141 MMOL/L (ref 135–147)
VIT B12 SERPL-MCNC: 1150 PG/ML (ref 180–914)
WBC # BLD AUTO: 5.38 THOUSAND/UL (ref 4.31–10.16)

## 2024-11-06 PROCEDURE — 87086 URINE CULTURE/COLONY COUNT: CPT

## 2024-11-06 PROCEDURE — 99495 TRANSJ CARE MGMT MOD F2F 14D: CPT

## 2024-11-06 PROCEDURE — 85025 COMPLETE CBC W/AUTO DIFF WBC: CPT

## 2024-11-06 PROCEDURE — 81003 URINALYSIS AUTO W/O SCOPE: CPT

## 2024-11-06 PROCEDURE — 80053 COMPREHEN METABOLIC PANEL: CPT

## 2024-11-06 NOTE — PROGRESS NOTES
Transition of Care Visit  Name: Matt Tobias Sr.      : 1965      MRN: [id number]  Encounter Provider: Di Osborne PA-C  Encounter Date: 2024   Encounter department: [facility]    Assessment & Plan  Pyelonephritis  - admitted to  [facility] from 10/28-10/30/24 for pyelonephritis identified on CT abdomen -- was treated with IV ceftriaxone while admitted and finished a week course of oral Vantin  - reports he is feeling much better, weakness has resolved and no longer having fevers  - physical exam unremarkable today -- clear lungs, afebrile  - will recheck urine culture to confirm resolution of infection   - advised to make sure he is staying hydrated, will recheck CMP  - advised return to ER if he develop any fevers, chills, abdominal pain, or back pain    Orders:    POCT urine dip auto non-scope    Urine culture; Future    Urine culture    AXEL (acute kidney injury) (HCC)  - AXEL while admitted, resolved with IVF   - will recheck CMP to ensure back to baseline  - encouraged to stay hydrated       Hyponatremia  - hyponatremia while admitted, likely related to AXEL   - will recheck CMP for further evaluation     Orders:    Comprehensive metabolic panel; Future    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)  - chronic, follows with GI -- encouraged to schedule follow up with his GI provider       Encounter for support and coordination of transition of care            History of Present Illness     Transitional Care Management Review:   Matt Tobias Sr. is a 59 y.o. male here for TCM follow up.     During the TCM phone call patient stated:  TCM Call       Date and time call was made  10/31/2024  9:03 AM    Hospital care reviewed  Records reviewed    Date of Admission  10/28/24    Date of discharge  10/30/24    Diagnosis  pyelonephritis    Disposition  Home    Current Symptoms  None          TCM Call       Post hospital issues  None    Should patient be enrolled  "in anticoag monitoring?  No    Scheduled for follow up?  Yes    I have advised the patient to call PCP with any new or worsening symptoms  Shonna Gallo MA          CC: TCM     Patient presents for TCM -- he was admitted from 10/28-10/30/24 for pyelonephritis. Hospital course below per discharge note:   \"Matt Tobias Sr. is a 59 y.o. male patient who originally presented to the hospital on 10/28/2024 with past medical history significant cirrhosis initially presented with fever and generalized weakness found to have pyelonephritis treated with IV antibiotics with significant improvement ultimately transition to oral Vantin on discharge\"    Patient finished the Vantin, reports he is feeling a lot better. No longer weak and fevers have subsided. He reports he never had any urinary symptoms or back pain. He is eating normally and staying hydrated. No abdominal pain, chest pain, or SOB. He feels well.   No acute complaints.   He does have chronic cirrhosis which he follows with GI for -- advised to schedule follow up with them.     He continues to have chronic low back pain, however follows with spine management who has been treating.       Review of Systems   Constitutional:  Negative for chills, diaphoresis and fever.   Respiratory:  Negative for cough, chest tightness, shortness of breath and wheezing.    Neurological:  Negative for dizziness, light-headedness and headaches.     Objective     /76 (BP Location: Left arm, Patient Position: Sitting, Cuff Size: Large)   Pulse 99   Temp 99.8 °F (37.7 °C)   Ht 5' 6\" (1.676 m)   Wt 101 kg (222 lb)   SpO2 98%   BMI 35.83 kg/m²     Physical Exam  Vitals reviewed.   Constitutional:       General: He is not in acute distress.     Appearance: Normal appearance. He is not ill-appearing or diaphoretic.   HENT:      Head: Normocephalic and atraumatic.      Right Ear: External ear normal.      Left Ear: External ear normal.      Nose: Nose normal.      Mouth/Throat: "      Mouth: Mucous membranes are moist.   Eyes:      General:         Right eye: No discharge.         Left eye: No discharge.      Conjunctiva/sclera: Conjunctivae normal.   Cardiovascular:      Rate and Rhythm: Normal rate and regular rhythm.      Pulses: Normal pulses.      Heart sounds: Normal heart sounds. No murmur heard.  Pulmonary:      Effort: Pulmonary effort is normal. No respiratory distress.      Breath sounds: Normal breath sounds. No wheezing, rhonchi or rales.   Abdominal:      General: Bowel sounds are normal. There is no distension.      Palpations: Abdomen is soft. There is no mass.      Tenderness: There is no abdominal tenderness. There is no guarding or rebound.      Hernia: No hernia is present.   Musculoskeletal:         General: Normal range of motion.      Cervical back: Normal range of motion.      Right lower leg: No edema.      Left lower leg: No edema.   Skin:     General: Skin is warm.   Neurological:      General: No focal deficit present.      Mental Status: He is alert.      Gait: Gait normal.   Psychiatric:         Mood and Affect: Mood normal.       Medications have been reviewed by provider in current encounter

## 2024-11-06 NOTE — ASSESSMENT & PLAN NOTE
- AXEL while admitted, resolved with IVF   - will recheck CMP to ensure back to baseline  - encouraged to stay hydrated

## 2024-11-06 NOTE — ASSESSMENT & PLAN NOTE
- admitted to [facility] from 10/28-10/30/24 for pyelonephritis identified on CT abdomen -- was treated with IV ceftriaxone while admitted and finished a week course of oral Vantin  - reports he is feeling much better, weakness has resolved and no longer having fevers  - physical exam unremarkable today -- clear lungs, afebrile  - will recheck urine culture to confirm resolution of infection   - advised to make sure he is staying hydrated, will recheck CMP  - advised return to ER if he develop any fevers, chills, abdominal pain, or back pain    Orders:    POCT urine dip auto non-scope    Urine culture; Future    Urine culture

## 2024-11-06 NOTE — ASSESSMENT & PLAN NOTE
- hyponatremia while admitted, likely related to AXEL   - will recheck CMP for further evaluation     Orders:    Comprehensive metabolic panel; Future

## 2024-11-07 ENCOUNTER — TELEPHONE (OUTPATIENT)
Dept: FAMILY MEDICINE CLINIC | Facility: CLINIC | Age: 59
End: 2024-11-07

## 2024-11-07 NOTE — TELEPHONE ENCOUNTER
----- Message from Di Osborne PA-C sent at 11/7/2024  7:33 AM EST -----  Kidney function improved back to baseline  Sodium back in normal range  Liver function tests are elevated -- please advise I would recommend reaching out to his GI provider to schedule a follow up

## 2024-11-08 ENCOUNTER — TELEPHONE (OUTPATIENT)
Dept: FAMILY MEDICINE CLINIC | Facility: CLINIC | Age: 59
End: 2024-11-08

## 2024-11-08 LAB — BACTERIA UR CULT: NORMAL

## 2024-11-08 NOTE — TELEPHONE ENCOUNTER
----- Message from Di Osborne PA-C sent at 11/8/2024  7:07 AM EST -----  Urine culture negative, no evidence of lasting infection

## 2024-11-09 LAB — METHYLMALONATE SERPL-SCNC: 68 NMOL/L (ref 0–378)

## 2024-11-15 ENCOUNTER — TELEPHONE (OUTPATIENT)
Dept: HEMATOLOGY ONCOLOGY | Facility: CLINIC | Age: 59
End: 2024-11-15

## 2024-11-18 DIAGNOSIS — E66.01 MORBID (SEVERE) OBESITY DUE TO EXCESS CALORIES (HCC): ICD-10-CM

## 2024-11-21 ENCOUNTER — OFFICE VISIT (OUTPATIENT)
Dept: FAMILY MEDICINE CLINIC | Facility: CLINIC | Age: 59
End: 2024-11-21
Payer: COMMERCIAL

## 2024-11-21 VITALS
BODY MASS INDEX: 35.52 KG/M2 | HEART RATE: 68 BPM | SYSTOLIC BLOOD PRESSURE: 118 MMHG | DIASTOLIC BLOOD PRESSURE: 70 MMHG | HEIGHT: 66 IN | WEIGHT: 221 LBS | OXYGEN SATURATION: 97 % | TEMPERATURE: 99.4 F

## 2024-11-21 DIAGNOSIS — R82.998 DARK URINE: Primary | ICD-10-CM

## 2024-11-21 DIAGNOSIS — N28.9 RENAL LESION: ICD-10-CM

## 2024-11-21 DIAGNOSIS — Z87.448 HISTORY OF PYELONEPHRITIS: ICD-10-CM

## 2024-11-21 LAB
BACTERIA UR QL AUTO: ABNORMAL /HPF
BILIRUB UR QL STRIP: NEGATIVE
CLARITY UR: CLEAR
COLOR UR: YELLOW
GLUCOSE UR STRIP-MCNC: NEGATIVE MG/DL
HGB UR QL STRIP.AUTO: NEGATIVE
KETONES UR STRIP-MCNC: NEGATIVE MG/DL
LEUKOCYTE ESTERASE UR QL STRIP: ABNORMAL
MUCOUS THREADS UR QL AUTO: ABNORMAL
NITRITE UR QL STRIP: NEGATIVE
NON-SQ EPI CELLS URNS QL MICRO: ABNORMAL /HPF
PH UR STRIP.AUTO: 6.5 [PH]
PROT UR STRIP-MCNC: ABNORMAL MG/DL
RBC #/AREA URNS AUTO: ABNORMAL /HPF
SL AMB  POCT GLUCOSE, UA: NORMAL
SL AMB LEUKOCYTE ESTERASE,UA: NORMAL
SL AMB POCT BILIRUBIN,UA: NORMAL
SL AMB POCT BLOOD,UA: NORMAL
SL AMB POCT KETONES,UA: NORMAL
SL AMB POCT NITRITE,UA: NORMAL
SL AMB POCT PH,UA: 6
SL AMB POCT SPECIFIC GRAVITY,UA: 1.01
SL AMB POCT URINE PROTEIN: NORMAL
SL AMB POCT UROBILINOGEN: 0.2
SP GR UR STRIP.AUTO: 1.02 (ref 1–1.03)
UROBILINOGEN UR STRIP-ACNC: 6 MG/DL
WBC #/AREA URNS AUTO: ABNORMAL /HPF

## 2024-11-21 PROCEDURE — 99214 OFFICE O/P EST MOD 30 MIN: CPT

## 2024-11-21 PROCEDURE — 81003 URINALYSIS AUTO W/O SCOPE: CPT

## 2024-11-21 PROCEDURE — G2211 COMPLEX E/M VISIT ADD ON: HCPCS

## 2024-11-21 PROCEDURE — 87186 SC STD MICRODIL/AGAR DIL: CPT

## 2024-11-21 PROCEDURE — 81001 URINALYSIS AUTO W/SCOPE: CPT

## 2024-11-21 PROCEDURE — 87086 URINE CULTURE/COLONY COUNT: CPT

## 2024-11-21 PROCEDURE — 87077 CULTURE AEROBIC IDENTIFY: CPT

## 2024-11-21 NOTE — PROGRESS NOTES
"Name: Matt Tobias .      : 1965      MRN: [id number]  Encounter Provider: Di Osborne PA-C  Encounter Date: 2024   Encounter department: [facility]  :  Assessment & Plan  Dark urine  Dark urine x 2 days  UA completed today unremarkable, no blood or bacteria; will send for culture  Physical exam unremarkable - negative for CVA tenderness, afebrile and normal BP  Suspect related to patient not hydrating - discussed proper hydration today and encouraged consumption of water  Given his recent episode of pyelonephritis with recommendation to repeat CT renal after treatment, we will also check a STAT CT renal to ensure pyelonephritis has resolved given this new symptom  Will hold off on treatment until culture/microanalysis results are received   Advised immediate ER if he develops any worsening urinary symptoms, fevers, chills, or weakness -- patient agreeable, he will monitor closely    Orders:    CT renal protocol; Future    POCT urine dip auto non-scope    Urine culture; Future    Urinalysis with microscopic; Future    Renal lesion  Question of renal lesion vs pyelonephritis on recent CT abdomen given renal enhancement, will repeat CT renal for further evaluation     Orders:    CT renal protocol; Future    History of pyelonephritis    Orders:    CT renal protocol; Future      I have spent a total time of 30 minutes in caring for this patient on the day of the visit/encounter including Risks and benefits of tx options, Instructions for management, Patient and family education, Documenting in the medical record, Reviewing / ordering tests, medicine, procedures  , and Obtaining or reviewing history  .         History of Present Illness     CC: dark urine    Patient presents for evaluation of dark urine. Patient noticed his urine looked darker than usual yesterday and then went to the pharmacy to get his BP checked and it was 101/60 so he was concerned. He reports \"feels " "heated\" when he urinates, but denies burning or irritation. No urinary frequency or urgency. He has chronic back pain. He denies any fevers, chills, or weakness.   Patient concerned as he was recently hospitalized with pyelonephritis - per CT abdomen on 10/28 showed \"2.6 cm indeterminate area of cortical hypointense enhancement within the lower pole of the right kidney versus a hypoenhancing lower pole renal mass. I favor pyelonephritis to a mass. Follow-up with renal ultrasound or CT renal protocol after treatment\"   Patient never had CT renal after his treatment -- with his new symptoms we will repeat stat     Patient also admits he has not been drinking water recently as he is trying to lose weight.        Review of Systems   Constitutional:  Negative for chills, diaphoresis and fever.   Respiratory:  Negative for cough, chest tightness, shortness of breath and wheezing.    Cardiovascular:  Negative for chest pain and palpitations.   Gastrointestinal:  Negative for abdominal pain.   Genitourinary:  Negative for decreased urine volume, difficulty urinating, dysuria, flank pain, frequency, hematuria and urgency.        Dark urine   Musculoskeletal:  Positive for back pain.   Neurological:  Negative for dizziness, light-headedness and headaches.     Medical History Reviewed by provider this encounter:     .  Past Medical History   Past Medical History:   Diagnosis Date    CPAP (continuous positive airway pressure) dependence     does not use machine    Esophageal varices (HCC)     stable 9/2020 gets checked yearly    Hepatic cirrhosis (HCC)     treated with harvoni   Hep C- dx age [age]    Hepatic encephalopathy (HCC)     Liver disease     Obesity     Pneumonia     in past    Postgastrectomy malabsorption     Sleep apnea     Wears glasses      Past Surgical History:   Procedure Laterality Date    COLONOSCOPY      ESOPHAGOGASTRODUODENOSCOPY N/A 4/12/2018    Procedure: ESOPHAGOGASTRODUODENOSCOPY (EGD);  Surgeon: Willy" DEVORA Traylor MD;  Location: [facility];  Service: Gastroenterology    FRACTURE SURGERY Left     ankle    OTHER SURGICAL HISTORY      banding esophageal varices    WI EXCISION EXCESSIVE SKIN & SUBQ TISSUE ABDOMEN N/A 9/16/2020    Procedure: ABDOMINOPLASTY;  Surgeon: Enrrique Birmingham MD;  Location: [facility];  Service: Plastics    WI EXCISION SKIN ABD INFRAUMBILICAL PANNICULECTOMY N/A 9/16/2020    Procedure: PANNICULECTOMY;  Surgeon: Enrrique Birmingham MD;  Location: [facility];  Service: Plastics    [facility]/A 6/5/2018    Procedure: GASTRECTOMY LAPAROSCOPIC SLEEVE;  Surgeon: Diana Mcdonnell MD;  Location: [facility];  Service: Bariatrics     Family History   Problem Relation Age of Onset    Heart disease Mother     Lupus Mother     Diabetes Father     Stroke Father       reports that he has been smoking cigarettes. He started smoking about 38 years ago. He has a 9.3 pack-year smoking history. He has never used smokeless tobacco. He reports that he does not currently use alcohol. He reports that he does not use drugs.  Current Outpatient Medications on File Prior to Visit   Medication Sig Dispense Refill    Claritin-D 12 Hour 5-120 MG per tablet TAKE ONE TABLET BY MOUTH TWICE DAILY FOR 5 DAYS 10 tablet 0    cyanocobalamin (VITAMIN B-12) 1000 MCG tablet Take 1 tablet (1,000 mcg total) by mouth daily 30 tablet 2    fluticasone (FLONASE) 50 mcg/act nasal spray 1 spray into each nostril daily 48 g 3    lactulose (CHRONULAC) 10 g/15 mL solution Take 15 mL (10 g total) by mouth daily as needed (constipation or confusion) 946 mL 0    omeprazole (PriLOSEC) 20 mg delayed release capsule Take 1 capsule (20 mg total) by mouth daily 90 capsule 1    triamcinolone (KENALOG) 0.5 % cream Apply topically 2 (two) times a day 45 g 3     No current facility-administered medications on file prior to visit.   No Known Allergies   Current Outpatient Medications on File Prior to Visit  "  Medication Sig Dispense Refill    Claritin-D 12 Hour 5-120 MG per tablet TAKE ONE TABLET BY MOUTH TWICE DAILY FOR 5 DAYS 10 tablet 0    cyanocobalamin (VITAMIN B-12) 1000 MCG tablet Take 1 tablet (1,000 mcg total) by mouth daily 30 tablet 2    fluticasone (FLONASE) 50 mcg/act nasal spray 1 spray into each nostril daily 48 g 3    lactulose (CHRONULAC) 10 g/15 mL solution Take 15 mL (10 g total) by mouth daily as needed (constipation or confusion) 946 mL 0    omeprazole (PriLOSEC) 20 mg delayed release capsule Take 1 capsule (20 mg total) by mouth daily 90 capsule 1    triamcinolone (KENALOG) 0.5 % cream Apply topically 2 (two) times a day 45 g 3     No current facility-administered medications on file prior to visit.      Social History     Tobacco Use    Smoking status: Some Days     Current packs/day: 0.00     Average packs/day: 0.3 packs/day for 37.0 years (9.3 ttl pk-yrs)     Types: Cigarettes     Start date: 1985     Last attempt to quit: 2022     Years since quittin.9    Smokeless tobacco: Never    Tobacco comments:     stopped 2020   Vaping Use    Vaping status: Every Day    Substances: Flavoring   Substance and Sexual Activity    Alcohol use: Not Currently     Comment: occasionally    Drug use: No    Sexual activity: Yes        Objective   /70   Pulse 68   Temp 99.4 °F (37.4 °C)   Ht 5' 6\" (1.676 m)   Wt 100 kg (221 lb)   SpO2 97%   BMI 35.67 kg/m²      Physical Exam  Constitutional:       General: He is not in acute distress.     Appearance: Normal appearance. He is not ill-appearing or diaphoretic.   HENT:      Head: Normocephalic and atraumatic.      Right Ear: External ear normal.      Left Ear: External ear normal.      Nose: Nose normal.      Mouth/Throat:      Mouth: Mucous membranes are moist.   Eyes:      General:         Right eye: No discharge.         Left eye: No discharge.      Conjunctiva/sclera: Conjunctivae normal.   Cardiovascular:      Rate and Rhythm: Normal " rate and regular rhythm.      Pulses: Normal pulses.      Heart sounds: Normal heart sounds. No murmur heard.  Pulmonary:      Effort: Pulmonary effort is normal. No respiratory distress.      Breath sounds: Normal breath sounds. No wheezing, rhonchi or rales.   Abdominal:      General: Bowel sounds are normal. There is no distension.      Palpations: Abdomen is soft.      Tenderness: There is no abdominal tenderness. There is no right CVA tenderness, left CVA tenderness or guarding.   Musculoskeletal:         General: Normal range of motion.      Cervical back: Normal range of motion.      Right lower leg: No edema.      Left lower leg: No edema.   Neurological:      General: No focal deficit present.      Mental Status: He is alert.   Psychiatric:         Mood and Affect: Mood normal.

## 2024-11-22 ENCOUNTER — HOSPITAL ENCOUNTER (OUTPATIENT)
Dept: CT IMAGING | Facility: HOSPITAL | Age: 59
End: 2024-11-22
Payer: COMMERCIAL

## 2024-11-22 ENCOUNTER — RESULTS FOLLOW-UP (OUTPATIENT)
Dept: FAMILY MEDICINE CLINIC | Facility: CLINIC | Age: 59
End: 2024-11-22

## 2024-11-22 DIAGNOSIS — R82.998 DARK URINE: ICD-10-CM

## 2024-11-22 DIAGNOSIS — Z87.448 HISTORY OF PYELONEPHRITIS: ICD-10-CM

## 2024-11-22 DIAGNOSIS — N28.9 RENAL LESION: ICD-10-CM

## 2024-11-22 PROCEDURE — 74178 CT ABD&PLV WO CNTR FLWD CNTR: CPT

## 2024-11-22 RX ADMIN — IOHEXOL 100 ML: 350 INJECTION, SOLUTION INTRAVENOUS at 13:39

## 2024-11-24 LAB — BACTERIA UR CULT: ABNORMAL

## 2024-11-25 DIAGNOSIS — N39.0 URINARY TRACT INFECTION WITHOUT HEMATURIA, SITE UNSPECIFIED: Primary | ICD-10-CM

## 2024-11-27 ENCOUNTER — TELEPHONE (OUTPATIENT)
Dept: PAIN MEDICINE | Facility: CLINIC | Age: 59
End: 2024-11-27

## 2024-11-27 PROBLEM — N12 PYELONEPHRITIS: Status: RESOLVED | Noted: 2024-10-28 | Resolved: 2024-11-27

## 2024-12-03 ENCOUNTER — OFFICE VISIT (OUTPATIENT)
Dept: HEMATOLOGY ONCOLOGY | Facility: CLINIC | Age: 59
End: 2024-12-03
Payer: COMMERCIAL

## 2024-12-03 VITALS
SYSTOLIC BLOOD PRESSURE: 120 MMHG | DIASTOLIC BLOOD PRESSURE: 72 MMHG | BODY MASS INDEX: 35.68 KG/M2 | RESPIRATION RATE: 18 BRPM | TEMPERATURE: 98.4 F | OXYGEN SATURATION: 99 % | HEIGHT: 66 IN | HEART RATE: 78 BPM | WEIGHT: 222 LBS

## 2024-12-03 DIAGNOSIS — D75.839 THROMBOCYTHEMIA: ICD-10-CM

## 2024-12-03 DIAGNOSIS — D75.89 MACROCYTOSIS WITHOUT ANEMIA: ICD-10-CM

## 2024-12-03 DIAGNOSIS — E53.8 B12 DEFICIENCY: Primary | ICD-10-CM

## 2024-12-03 DIAGNOSIS — R74.01 TRANSAMINITIS: ICD-10-CM

## 2024-12-03 PROCEDURE — 99213 OFFICE O/P EST LOW 20 MIN: CPT | Performed by: PHYSICIAN ASSISTANT

## 2024-12-03 RX ORDER — CARVEDILOL 6.25 MG/1
6.25 TABLET ORAL 2 TIMES DAILY WITH MEALS
COMMUNITY
Start: 2024-11-27

## 2024-12-03 NOTE — PATIENT INSTRUCTIONS
You are a carried for an abnormal gene associated with hereditary hemochromatosis. This condition is associated with increased iron absorption. Typically carriers are unaffected by this. Children should have ferritin level tested. If ferritin level is high or borderline high they should be tested for hereditary hemochromatosis

## 2024-12-03 NOTE — PROGRESS NOTES
[facility]ATOLOGY ON[facility] SPECIAL[facility] [place]  [address] [id number]  Hematology Ambulatory Follow-Up  Matt WITT Micheline Renteria., 1965, [id number]  12/3/2024      Assessment and Plan     59-year-old male with history of cirrhosis secondary to HepC, macrocytosis, thrombocytopenia and heterozygous H63D hemochromatosis who presents as follow-up.  Most recent CBC shows WBC 5.3, hemoglobin 15.7, , platelets 201,000.  Differential is normal.  B12 1,150.    Thrombocytopenia   - Thrombocytopenia since at least 2017 with platelet count ranging in the ,000's.    - Abdominal US last year showed top normal spleen,   - Suspect secondary to cirrhosis vs less likely chronic ITP.   - recent PLT count improved, patient aware PLT count likely will fluctuate   - Recommend continued observation with CBCD q5-6m.  If he develops other cytopenias or constitutional symptoms, would consider bone marrow biopsy.  Consider platelet transfusion in the future for platelet count less than 15,000 or less than 50,000 with any bleeding.     2. Macrocytosis   - since 2019   - Suspect secondary to cirrhosis    3. B12 deficiency   - 04/2024: B12 level 216 and he was started on oral B12 supplements 1,000mcg daily   - Reduce B12 supplements to once a week   - Repeat B12 level in 3m     4.  Hemochromatosis carrier   - Typically carriers do not develop iron overload.   - MRI abdomen 04/2023 did not show any evidence of iron deposition.  - He is getting AFP tumor markers through GI.  Last AFP 03/2023 was 1.9.  - Last ferritin 58   - Ferritin vs genetic testing recommend for children   - check ferritin q6-12m.     5. Cirrhosis   - Secondary to Hep C s/p treatment   - Mild transaminitis present on most recent labs, recommend repeating CMP.  - Continue follow up with GI     No alcohol use. He denies any constitutional symptoms.  most recent lab -> WBC 6.34, hemoglobin 13.6, MCV 99, platelets  128,000.  B12 level is low.  Folate, SPEP were normal.  He did have an iron panel that showed differential showed elevated iron levels, not on oral iron.   Hereditary hemochromatosis testing was ordered and is pending.     Hereditary hemochromatosis heterozygous   Decrease B12 to once per week     Patient voiced agreement and understanding to the above.   Patient advised to call the Hematology/Oncology office with any questions and concerns regarding the above.    Barrier(s) to care: None  The patient is able to self care.    Kalpana Samaniego PA-C   Medical Oncology/Hematology  [facility]    Subjective   No chief complaint on file.      History of present illness: 59-year-old male with past medical history of hepatic cirrhosis due to chronic hep C infection, esophageal varices, ELLEN, gastric sleeve who has been referred by his primary care provider for evaluation of anemia, thrombocytopenia.     Macrocytosis without anemia   On chart review, patient has elevated MCV without evidence of anemia, which has been intermittent since at least 2019.  He has never required IV iron or blood transfusion in the past.  Most recent labs demonstrate WBC 4.4, hemoglobin 12.2, , platelet count 109,000.  Differential shows mild relative increase in eosinophils 7% otherwise normal.  Last B12 308 and Folate 8.1 in 2022. He has history of gastric sleeve in 2022.  No other known malabsorption condition or autoimmune disease.has known history of esophageal varices on prior endoscopies.  Last EGD 05/2023 showed 3 columns of grade 1 esophageal varices with no bleeding.  Last colonoscopy in 2015 and reportedly had a single hyperplastic polyp removed.  Record not available for review. Currently not on any oral iron or B12 supplements.  He is on chronic PPI. Consumes red meat.  Does not consume vegetables regularly regularly.  Currently, no alcohol use or drug use.  Admits to occasional tobacco use.  No known  family history of cancer.       04/2024: WBC 6.34, hemoglobin 13.6, MCV 99, platelets 128,000. Differential shows relative increase in eosinophils with normal absolute eosinophil count. Folate 10.9. B12 216. Iron panel revealed elevated iron levels with low UIBC. Ferritin 58.SPEP/IF without monoclonal gammopathy      - 04/2024: B12 level 216 and he was started on oral B12 supplements 1,000mcg daily   - Reduce B12 supplements to once a week   - Repeat B12 level in 3m     2.  Thrombocytopenia  Has been present since at least 2017.  Ranging from ,000.  No bleeding complications or hospitalizations related to low platelets.  Platelets (Thousands/uL)   Date Value   11/06/2024 201   10/29/2024 85 (L)   10/28/2024 102 (L)   07/12/2024 128 (L)   04/26/2024 128 (L)     3. Cirrhosis with esophageal varices   - Diagnosed in 1990s from Hep C    - Follows with gastroenterology   - Last EGD 05/2023, due again 2016      4. Right portal vein thrombus 2013  - Took ac for a few months, now off     12/03/24 :  Lab Results   Component Value Date    IRON 271 (H) 04/26/2024    TIBC  04/26/2024      Comment:      Unable to calculate.    FERRITIN 58 04/26/2024     Lab Results   Component Value Date    WBC 5.38 11/06/2024    HGB 13.7 11/06/2024    HCT 40.9 11/06/2024     (H) 11/06/2024     11/06/2024       Interval history:Overall feeling well.  No acute complaints.  Denies any bleeding or bruising    Review of Systems   All other systems reviewed and are negative.      Patient Active Problem List   Diagnosis    Former smoker    Erectile dysfunction    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)    Gastroesophageal reflux disease without esophagitis    Morbid obesity with BMI of 40.0-44.9, adult (HCC)    Obstructive sleep apnea    Secondary esophageal varices without bleeding (HCC)    S/P laparoscopic sleeve gastrectomy    Acute pain of right knee    Postsurgical malabsorption    Esophageal varices  without bleeding (HCC)    Hyperammonemia (HCC)    Vitamin A deficiency    S/P abdominoplasty    Platelets decreased (HCC)    Bowel and bladder incontinence    Hyperbilirubinemia    Tremor    Rosacea    AXEL (acute kidney injury) (HCC)     Past Medical History:   Diagnosis Date    CPAP (continuous positive airway pressure) dependence     does not use machine    Esophageal varices (HCC)     stable 9/2020 gets checked yearly    Hepatic cirrhosis (HCC)     treated with harvoni   Hep C- dx age 1995    Hepatic encephalopathy (HCC)     Liver disease     Obesity     Pneumonia     in past    Postgastrectomy malabsorption     Sleep apnea     Wears glasses      Past Surgical History:   Procedure Laterality Date    COLONOSCOPY      ESOPHAGOGASTRODUODENOSCOPY N/A 4/12/2018    Procedure: ESOPHAGOGASTRODUODENOSCOPY (EGD);  Surgeon: Willy Traylor MD;  Location: [facility];  Service: Gastroenterology    FRACTURE SURGERY Left     ankle    OTHER SURGICAL HISTORY      banding esophageal varices    FL EXCISION EXCESSIVE SKIN & SUBQ TISSUE ABDOMEN N/A 9/16/2020    Procedure: ABDOMINOPLASTY;  Surgeon: Enrrique Birmingham MD;  Location: [facility];  Service: Plastics    FL EXCISION SKIN ABD INFRAUMBILICAL PANNICULECTOMY N/A 9/16/2020    Procedure: PANNICULECTOMY;  Surgeon: Enrrique Birmingham MD;  Location: [facility];  Service: Plastics    FL LAPS GSTRC RSTRICTIV PX LONGITUDINAL GASTRECTOMY N/A 6/5/2018    Procedure: GASTRECTOMY LAPAROSCOPIC SLEEVE;  Surgeon: Diana Mcdonnell MD;  Location: [facility];  Service: Bariatrics     Family History   Problem Relation Age of Onset    Heart disease Mother     Lupus Mother     Diabetes Father     Stroke Father      Social History     Socioeconomic History    Marital status: /Civil Union     Spouse name: Not on file    Number of children: 2    Years of education: Not on file    Highest education level: Not on file   Occupational History    Occupation: disabled   Tobacco Use    Smoking  status: Some Days     Current packs/day: 0.00     Average packs/day: 0.3 packs/day for 37.0 years (9.3 ttl pk-yrs)     Types: Cigarettes     Start date: 1985     Last attempt to quit: 2022     Years since quittin.9    Smokeless tobacco: Never    Tobacco comments:     stopped 2020   Vaping Use    Vaping status: Every Day    Substances: Flavoring   Substance and Sexual Activity    Alcohol use: Not Currently     Comment: occasionally    Drug use: No    Sexual activity: Yes   Other Topics Concern    Not on file   Social History Narrative    Not on file     Social Drivers of Health     Financial Resource Strain: Low Risk  (2024)    Overall Financial Resource Strain (CARDIA)     Difficulty of Paying Living Expenses: Not hard at all   Food Insecurity: No Food Insecurity (10/28/2024)    Nursing - Inadequate Food Risk Classification     Worried About Running Out of Food in the Last Year: Not on file     Ran Out of Food in the Last Year: Not on file     Ran Out of Food in the Last Year: 1   Transportation Needs: No Transportation Needs (10/28/2024)    Nursing - Transportation Risk Classification     Lack of Transportation: Not on file     Lack of Transportation: 2   Physical Activity: Not on file   Stress: Not on file   Social Connections: Unknown (2024)    Received from [facility]    Social M2 Connections     How often do you feel lonely or isolated from those around you? (Adult - for ages 18 years and over): Not on file   Intimate Partner Violence: Unknown (10/28/2024)    Nursing IPS     Feels Physically and Emotionally Safe: Not on file     Physically Hurt by Someone: Not on file     Humiliated or Emotionally Abused by Someone: Not on file     Physically Hurt by Someone: 2     Hurt or Threatened by Someone: 2   Housing Stability: Unknown (10/28/2024)    Nursing: Inadequate Housing Risk Classification     Has Housing: Not on file     Worried About Losing Housing: Not on file     Unable to Get  Utilities: Not on file     Unable to Pay for Housing in the Last Year: 2     Has Housin       Current Outpatient Medications:     Claritin-D 12 Hour 5-120 MG per tablet, TAKE ONE TABLET BY MOUTH TWICE DAILY FOR 5 DAYS, Disp: 10 tablet, Rfl: 0    cyanocobalamin (VITAMIN B-12) 1000 MCG tablet, Take 1 tablet (1,000 mcg total) by mouth daily, Disp: 30 tablet, Rfl: 2    fluticasone (FLONASE) 50 mcg/act nasal spray, 1 spray into each nostril daily, Disp: 48 g, Rfl: 3    lactulose (CHRONULAC) 10 g/15 mL solution, Take 15 mL (10 g total) by mouth daily as needed (constipation or confusion), Disp: 946 mL, Rfl: 0    omeprazole (PriLOSEC) 20 mg delayed release capsule, Take 1 capsule (20 mg total) by mouth daily, Disp: 90 capsule, Rfl: 1    triamcinolone (KENALOG) 0.5 % cream, Apply topically 2 (two) times a day, Disp: 45 g, Rfl: 3  No Known Allergies    Objective   There were no vitals taken for this visit.   Physical Exam  Vitals reviewed.   HENT:      Head: Normocephalic.   Cardiovascular:      Rate and Rhythm: Normal rate and regular rhythm.   Pulmonary:      Effort: Pulmonary effort is normal.      Breath sounds: Normal breath sounds.   Abdominal:      Palpations: Abdomen is soft.      Tenderness: There is no abdominal tenderness.   Musculoskeletal:      Cervical back: Neck supple.   Skin:     Findings: No rash.   Neurological:      Mental Status: He is alert.         Result Review  Labs:  Office Visit on 2024   Component Date Value Ref Range Status    SPECIFIC GRAVITY,UA 2024 1.015   Final     PH,UA 2024 6.0   Final    LEUKOCYTE ESTERASE,UA 2024 neg   Final    NITRITE,UA 2024 neg   Final    GLUCOSE, UA 2024 neg   Final    KETONES,UA 2024 neg   Final    BILIRUBIN,UA 2024 neg   Final    BLOOD,UA 2024 neg   Final    POCT URINE PROTEIN 2024 neg   Final    SL AMB POCT UROBILINOGEN 2024 0.2   Final    Urine Culture 2024 50,000-59,000 cfu/ml Klebsiella  pneumoniae (A)   Final    This organism has been edited. The previous result was Gram Negative [place] on 11/23/2024 at 0747 EST.    Color, UA 11/21/2024 Yellow   Final    Clarity, UA 11/21/2024 Clear   Final    Specific [place], UA 11/21/2024 1.021  1.003 - 1.030 Final    pH, UA 11/21/2024 6.5  4.5, 5.0, 5.5, 6.0, 6.5, 7.0, 7.5, 8.0 Final    Leukocytes, UA 11/21/2024 Small (A)  Negative Final    Nitrite, UA 11/21/2024 Negative  Negative Final    Protein, UA 11/21/2024 Trace (A)  Negative mg/dl Final    Glucose, UA 11/21/2024 Negative  Negative mg/dl Final    Ketones, UA 11/21/2024 Negative  Negative mg/dl Final    Urobilinogen, UA 11/21/2024 6.0 (A)  <2.0 mg/dl mg/dl Final    Bilirubin, UA 11/21/2024 Negative  Negative Final    Occult Blood, UA 11/21/2024 Negative  Negative Final    RBC, UA 11/21/2024 2-4 (A)  None Seen, 1-2 /hpf Final    WBC, UA 11/21/2024 10-20 (A)  None Seen, 1-2 /hpf Final    Epithelial Cells 11/21/2024 Occasional  None Seen, Occasional /hpf Final    Bacteria, UA 11/21/2024 Occasional  None Seen, Occasional /hpf Final    MUCUS THREADS 11/21/2024 Occasional (A)  None Seen Final   Appointment on 11/06/2024   Component Date Value Ref Range Status    WBC 11/06/2024 5.38  4.31 - 10.16 Thousand/uL Final    RBC 11/06/2024 3.96  3.88 - 5.62 Million/uL Final    Hemoglobin 11/06/2024 13.7  12.0 - 17.0 g/dL Final    Hematocrit 11/06/2024 40.9  36.5 - 49.3 % Final    MCV 11/06/2024 103 (H)  82 - 98 fL Final    MCH 11/06/2024 34.6 (H)  26.8 - 34.3 pg Final    MCHC 11/06/2024 33.5  31.4 - 37.4 g/dL Final    RDW 11/06/2024 14.5  11.6 - 15.1 % Final    MPV 11/06/2024 11.7  8.9 - 12.7 fL Final    Platelets 11/06/2024 201  149 - 390 Thousands/uL Final    nRBC 11/06/2024 0  /100 WBCs Final    Segmented % 11/06/2024 68  43 - 75 % Final    Immature Grans % 11/06/2024 0  0 - 2 % Final    Lymphocytes % 11/06/2024 18  14 - 44 % Final    Monocytes % 11/06/2024 9  4 - 12 % Final    Eosinophils Relative 11/06/2024 4  0 - 6 %  Final    Basophils Relative 11/06/2024 1  0 - 1 % Final    Absolute Neutrophils 11/06/2024 3.66  1.85 - 7.62 Thousands/µL Final    Absolute Immature Grans 11/06/2024 0.01  0.00 - 0.20 Thousand/uL Final    Absolute Lymphocytes 11/06/2024 0.98  0.60 - 4.47 Thousands/µL Final    Absolute Monocytes 11/06/2024 0.48  0.17 - 1.22 Thousand/µL Final    Eosinophils Absolute 11/06/2024 0.22  0.00 - 0.61 Thousand/µL Final    Basophils Absolute 11/06/2024 0.03  0.00 - 0.10 Thousands/µL Final    Sodium 11/06/2024 141  135 - 147 mmol/L Final    Potassium 11/06/2024 3.7  3.5 - 5.3 mmol/L Final    Chloride 11/06/2024 109 (H)  96 - 108 mmol/L Final    CO2 11/06/2024 25  21 - 32 mmol/L Final    ANION GAP 11/06/2024 7  4 - 13 mmol/L Final    BUN 11/06/2024 11  5 - 25 mg/dL Final    Creatinine 11/06/2024 0.76  0.60 - 1.30 mg/dL Final    Standardized to IDMS reference method    Glucose 11/06/2024 117  65 - 140 mg/dL Final    If the patient is fasting, the ADA then defines impaired fasting glucose as > 100 mg/dL and diabetes as > or equal to 123 mg/dL.    Calcium 11/06/2024 9.1  8.4 - 10.2 mg/dL Final    Corrected Calcium 11/06/2024 9.6  8.3 - 10.1 mg/dL Final    AST 11/06/2024 92 (H)  13 - 39 U/L Final    ALT 11/06/2024 53 (H)  7 - 52 U/L Final    Specimen collection should occur prior to Sulfasalazine administration due to the potential for falsely depressed results.     Alkaline Phosphatase 11/06/2024 91  34 - 104 U/L Final    Total Protein 11/06/2024 6.2 (L)  6.4 - 8.4 g/dL Final    Albumin 11/06/2024 3.4 (L)  3.5 - 5.0 g/dL Final    Total Bilirubin 11/06/2024 2.88 (H)  0.20 - 1.00 mg/dL Final    Use of this assay is not recommended for patients undergoing treatment with eltrombopag due to the potential for falsely elevated results.  N-acetyl-p-benzoquinone imine (metabolite of Acetaminophen) will generate erroneously low results in samples for patients that have taken an overdose of Acetaminophen.    eGFR 11/06/2024 99  ml/min/1.73sq  m Final   Office Visit on 11/06/2024   Component Date Value Ref Range Status    SPECIFIC GRAVITY,UA 11/06/2024 1.030   Final     PH,UA 11/06/2024 6.0   Final    LEUKOCYTE ESTERASE,UA 11/06/2024 Negative   Final    NITRITE,UA 11/06/2024 Negative   Final    GLUCOSE, UA 11/06/2024 Negative   Final    KETONES,UA 11/06/2024 Negative   Final    BILIRUBIN,UA 11/06/2024 Negative   Final    BLOOD,UA 11/06/2024 Negative   Final    POCT URINE PROTEIN 11/06/2024 +-   Final    SL AMB POCT UROBILINOGEN 11/06/2024 Negative   Final    Urine Culture 11/06/2024 No Growth <1000 cfu/mL   Final       Imaging:   I reviewed relevant imaging    Please note:  This report has been generated by a voice recognition software system. Therefore there may be syntax, spelling, and/or grammatical errors. Please call if you have any questions.

## 2024-12-17 DIAGNOSIS — I85.10 SECONDARY ESOPHAGEAL VARICES WITHOUT BLEEDING (HCC): ICD-10-CM

## 2024-12-17 NOTE — TELEPHONE ENCOUNTER
Reason for call:   [x] Refill   [] Prior Auth  [] Other:     Office:   [] PCP/Provider -   [x] Specialty/Provider - Hem/Onc    Medication: carvedilol (COREG) 6.25 mg tablet     Dose/Frequency: Take 6.25 mg by mouth 2 (two) times a day with meals     Pharmacy: [facility] - [place] - [address]     Does the patient have enough for 3 days?   [x] Yes   [] No - Send as HP to POD

## 2024-12-18 RX ORDER — CARVEDILOL 6.25 MG/1
6.25 TABLET ORAL 2 TIMES DAILY WITH MEALS
Qty: 180 TABLET | Refills: 1 | Status: SHIPPED | OUTPATIENT
Start: 2024-12-18

## 2024-12-21 ENCOUNTER — VBI (OUTPATIENT)
Dept: ADMINISTRATIVE | Facility: OTHER | Age: 59
End: 2024-12-21

## 2024-12-21 NOTE — TELEPHONE ENCOUNTER
12/21/24 10:25 AM     Chart reviewed for CRC: Cologuard was/were submitted to the patient's insurance.     Jacqueline Mata MA   [facility]

## 2024-12-27 NOTE — PROGRESS NOTES
Pain Medicine Follow-Up Note    Assessment:  1. Foraminal stenosis of lumbar region    2. Lumbar spondylosis    3. Mid back pain        Plan:  Orders Placed This Encounter   Procedures   • FL spine and pain procedure     Standing Status:   Future     Expected Date:   12/31/2024     Expiration Date:   12/31/2028     Reason for Exam::   LESI  T12-L1     Anticoagulant hold needed?:   unknown   • Ambulatory referral to Physical Therapy     Standing Status:   Future     Expiration Date:   12/31/2025     Referral Priority:   Routine     Referral Type:   Physical Therapy     Referral Reason:   Specialty Services Required     Requested Specialty:   Physical Therapy     Number of Visits Requested:   1     Expiration Date:   12/31/2025       New Medications Ordered This Visit   Medications   • traMADol (ULTRAM) 50 mg tablet     Sig: Take 50 mg by mouth every 6 (six) hours as needed for moderate pain   • methocarbamol (ROBAXIN) 750 mg tablet     Sig: Take 750 mg by mouth every 6 (six) hours as needed for muscle spasms       My impressions and treatment recommendations were discussed in detail with the patient who verbalized understanding and had no further questions.      The patient returns to the office with worsening mid low back pain that radiates into his bilateral flank area.  Patient recently had radiofrequency ablation on his right L 4-L5, L5-S1 in August 2024 and had his left side done in June 2024.  Today the patient's pain is notably higher than this area.  Upon review of the patient's MRI he does have significant disc degeneration at T12-L1 which is causing bilateral foraminal narrowing which could be contributing to the patient's radicular symptoms.  I recommend that the patient have a lumbar epidural steroid injection at T12-L1. Complete risks and benefits including bleeding, infection, tissue reaction, nerve injury and allergic reaction were discussed. The approach was demonstrated using models and literature  was provided. Verbal and written consent was obtained.    Patient notably uncomfortable initially offered a Medrol dose steroid pack since NSAIDs are contraindicated for him due to esophageal varices however patient states that he has the chills his temperature was checked and he currently had 100.7 fever advised the patient that I would hold off on steroids.  Patient to follow-up with his PCP should his illness worsen.  Patient states he will continue to use tramadol which has been prescribed through his PCP.  Patient also describes weakness in his low back patient unable to do a butt bridge, I advised the patient he would likely benefit from physical therapy, patient is agreeable, referral provided.    Follow-up is planned in 4 weeks after injection time or sooner as warranted.  Discharge instructions were provided. I personally saw and examined the patient and I agree with the above discussed plan of care.    History of Present Illness:    Matt Tobias Sr. is a 59 y.o. male who presents to [facility] for interval re-evaluation of the above stated pain complaints. The patient has a past medical and chronic pain history as outlined in the assessment section. He was last seen on 10/21/2024.    At today's visit patient states that their pain symptoms are worse with a pain score of 8/10 on the verbal numeric pain scale.  The patient's pain is worse in the morning, evening and at night.  The patient's pain is constant in nature.  And the quality of the patient's pain is described as sharp.  The patient's pain is located in the mid/low back radiating out to flank areas.  The amount of pain relief the patient is obtaining from his current pain relievers which are being prescribed through his PCP is enough to make a difference in his life.  Patient is currently prescribed tramadol through his PCP and methocarbamol through [facility] patient does not need a refill at this time.    Other than as stated  above, the patient denies any interval changes in medications, medical condition, mental condition, symptoms, or allergies since the last office visit.         Review of Systems:    Review of Systems   Respiratory:  Negative for shortness of breath.    Cardiovascular:  Negative for chest pain.   Gastrointestinal:  Negative for constipation, diarrhea, nausea and vomiting.   Musculoskeletal:  Positive for arthralgias and gait problem. Negative for joint swelling and myalgias.        DROM   Skin:  Negative for rash.   Neurological:  Negative for dizziness, seizures and weakness.   All other systems reviewed and are negative.        Past Medical History:   Diagnosis Date   • CPAP (continuous positive airway pressure) dependence     does not use machine   • Esophageal varices (HCC)     stable 9/2020 gets checked yearly   • Hepatic cirrhosis (HCC)     treated with harvoni   Hep C- dx age 1995   • Hepatic encephalopathy (HCC)    • Liver disease    • Obesity    • Pneumonia     in past   • Postgastrectomy malabsorption    • Sleep apnea    • Wears glasses        Past Surgical History:   Procedure Laterality Date   • COLONOSCOPY     • ESOPHAGOGASTRODUODENOSCOPY N/A 4/12/2018    Procedure: ESOPHAGOGASTRODUODENOSCOPY (EGD);  Surgeon: Willy Traylor MD;  Location: [facility];  Service: Gastroenterology   • FRACTURE SURGERY Left     ankle   • OTHER SURGICAL HISTORY      banding esophageal varices   • KS EXCISION EXCESSIVE SKIN & SUBQ TISSUE ABDOMEN N/A 9/16/2020    Procedure: ABDOMINOPLASTY;  Surgeon: Enrrique Birmingham MD;  Location: [facility];  Service: Plastics   • KS EXCISION SKIN ABD INFRAUMBILICAL PANNICULECTOMY N/A 9/16/2020    Procedure: PANNICULECTOMY;  Surgeon: Enrrique Birmingham MD;  Location: [facility];  Service: Plastics   • KS LAPS GSTRC RSTRICTIV PX LONGITUDINAL GASTRECTOMY N/A 6/5/2018    Procedure: GASTRECTOMY LAPAROSCOPIC SLEEVE;  Surgeon: Diana Mcdonnell MD;  Location: [facility];  Service:  "Bariatrics       Family History   Problem Relation Age of Onset   • Heart disease Mother    • Lupus Mother    • Diabetes Father    • Stroke Father        Social History     Occupational History   • Occupation: disabled   Tobacco Use   • Smoking status: Some Days     Current packs/day: 0.00     Average packs/day: 0.3 packs/day for 37.0 years (9.3 ttl pk-yrs)     Types: Cigarettes     Start date: 1985     Last attempt to quit: 2022     Years since quittin.0   • Smokeless tobacco: Never   • Tobacco comments:     stopped 2020   Vaping Use   • Vaping status: Every Day   • Substances: Flavoring   Substance and Sexual Activity   • Alcohol use: Not Currently     Comment: occasionally   • Drug use: No   • Sexual activity: Yes         Current Outpatient Medications:   •  carvedilol (COREG) 6.25 mg tablet, Take 1 tablet by mouth 2 (two) times a day with meals, Disp: 180 tablet, Rfl: 1  •  Claritin-D 12 Hour 5-120 MG per tablet, TAKE ONE TABLET BY MOUTH TWICE DAILY FOR 5 DAYS, Disp: 10 tablet, Rfl: 0  •  fluticasone (FLONASE) 50 mcg/act nasal spray, 1 spray into each nostril daily, Disp: 48 g, Rfl: 3  •  lactulose (CHRONULAC) 10 g/15 mL solution, Take 15 mL (10 g total) by mouth daily as needed (constipation or confusion), Disp: 946 mL, Rfl: 0  •  methocarbamol (ROBAXIN) 750 mg tablet, Take 750 mg by mouth every 6 (six) hours as needed for muscle spasms, Disp: , Rfl:   •  omeprazole (PriLOSEC) 20 mg delayed release capsule, Take 1 capsule (20 mg total) by mouth daily, Disp: 90 capsule, Rfl: 1  •  traMADol (ULTRAM) 50 mg tablet, Take 50 mg by mouth every 6 (six) hours as needed for moderate pain, Disp: , Rfl:   •  triamcinolone (KENALOG) 0.5 % cream, Apply topically 2 (two) times a day, Disp: 45 g, Rfl: 3  •  cyanocobalamin (VITAMIN B-12) 1000 MCG tablet, Take 1 tablet (1,000 mcg total) by mouth daily, Disp: 30 tablet, Rfl: 2    No Known Allergies    Physical Exam:    Ht 5' 6\" (1.676 m)   Wt 101 kg (222 lb)   " BMI 35.83 kg/m²     Constitutional:normal, well developed, well nourished, alert, in no distress and non-toxic and no overt pain behavior. and obese  Eyes:anicteric  HEENT:grossly intact  Neck:supple, symmetric, trachea midline and no masses   Pulmonary:even and unlabored  Cardiovascular:No edema or pitting edema present  Skin:Normal without rashes or lesions and well hydrated  Psychiatric:Mood and affect appropriate  Neurologic:Cranial Nerves II-XII grossly intact  Musculoskeletal:antalgic gait    Lumbar Spine Exam    Appearance:  Normal lordosis  Palpation/Tenderness:  left lumbar paraspinal tenderness  right lumbar paraspinal tenderness  Thoracic paraspinal tenderness bilaterally  Special Tests:  Left Straight Leg Test:  positive  Right Straight Leg Test:  positive  Left Devin's Maneuver:  negative  Right Devin's Maneuver:  negative  Left Gaenslen's Test:  negative  Right Gaenslen's Test:  negative  Left Pelvic Distraction Test:  negative  Right Pelvic Distraction Test:  negative       Imaging  FL spine and pain procedure    (Results Pending)         Orders Placed This Encounter   Procedures   • FL spine and pain procedure   • Ambulatory referral to Physical Therapy       This document was created using speech voice recognition software.   Grammatical errors, random word insertions, pronoun errors, and incomplete sentences are an occasional consequence of this system due to software limitations, ambient noise, and hardware issues.   Any formal questions or concerns about content, text, or information contained within the body of this dictation should be directly addressed to the provider for clarification.

## 2024-12-31 ENCOUNTER — OFFICE VISIT (OUTPATIENT)
Dept: PAIN MEDICINE | Facility: CLINIC | Age: 59
End: 2024-12-31
Payer: COMMERCIAL

## 2024-12-31 VITALS — BODY MASS INDEX: 35.68 KG/M2 | WEIGHT: 222 LBS | HEIGHT: 66 IN

## 2024-12-31 DIAGNOSIS — M54.9 MID BACK PAIN: ICD-10-CM

## 2024-12-31 DIAGNOSIS — M47.816 LUMBAR SPONDYLOSIS: ICD-10-CM

## 2024-12-31 DIAGNOSIS — M48.061 FORAMINAL STENOSIS OF LUMBAR REGION: Primary | ICD-10-CM

## 2024-12-31 PROCEDURE — 99214 OFFICE O/P EST MOD 30 MIN: CPT

## 2024-12-31 RX ORDER — METHOCARBAMOL 750 MG/1
750 TABLET, FILM COATED ORAL EVERY 6 HOURS PRN
COMMUNITY

## 2024-12-31 RX ORDER — TRAMADOL HYDROCHLORIDE 50 MG/1
50 TABLET ORAL EVERY 6 HOURS PRN
COMMUNITY

## 2025-01-01 NOTE — PATIENT INSTRUCTIONS
Epidural Steroid Injection, Ambulatory Care   GENERAL INFORMATION:   What do I need to know about an epidural steroid injection?  An epidural steroid injection (RAIN) is a procedure to inject steroid medicine into the epidural space. The epidural space is between your spinal cord and vertebrae. Steroids reduce inflammation and fluid buildup in your spine that may be causing pain. You may be given pain medicine along with the steroids.   How do I prepare for an RAIN?  Your healthcare provider will talk to you about how to prepare for your procedure. He will tell you what medicines to take or not take on the day of your procedure. You may need to stop taking blood thinners or other medicines several days before your procedure. You may need to adjust any diabetes medicine you take on the day of your procedure. Steroid medicine can increase your blood sugar level.   What will happen during an RAIN?   You will be given medicine to numb the procedure area. You will be awake for the procedure, but you will not feel pain. You may also be given medicine to help you relax during the procedure. Contrast liquid will be used to help your healthcare provider see the area better. Tell the healthcare provider if you have ever had an allergic reaction to contrast liquid.    Your healthcare provider may place the needle into your neck area, middle of your back, or tailbone area. He may inject the medicine next to the nerves that are causing your pain. He may instead inject the medicine into a larger area of the epidural space. This helps the medicine spread to more nerves. Your healthcare provider will use a fluoroscope to help guide the needle to the right place. A fluoroscope is a type of x-ray. After the procedure, a bandage will be placed over the injection site to prevent infection.  What are the risks of an RAIN?  You may have temporary or permanent nerve damage or paralysis. You may have bleeding or develop a serious infection,  such as meningitis (swelling of the brain coverings). An abscess may also develop. You may need surgery to fix the abscess. You may have a seizure, anxiety, or trouble sleeping. If you are a man, you may have temporary erectile dysfunction (not able to have an erection).   CARE AGREEMENT:   You have the right to help plan your care. Learn about your health condition and how it may be treated. Discuss treatment options with your caregivers to decide what care you want to receive. You always have the right to refuse treatment. The above information is an  only. It is not intended as medical advice for individual conditions or treatments. Talk to your doctor, nurse or pharmacist before following any medical regimen to see if it is safe and effective for you.  © 2014 [employer] Information is for End User's use only and may not be sold, redistributed or otherwise used for commercial purposes. All illustrations and images included in CareNotes® are the copyrighted property of [employer].D.A[employer]M., Inc[employer] or [employer].

## 2025-01-02 ENCOUNTER — PATIENT MESSAGE (OUTPATIENT)
Dept: PAIN MEDICINE | Facility: CLINIC | Age: 60
End: 2025-01-02

## 2025-01-02 NOTE — PATIENT COMMUNICATION
Pt is scheduled for LESI with Dr Horn on 1/21/25     Pt is not diabetic and per chart, does not take prescription blood thinners or aspirin     Pt given new instruction sheet in office and sent review via [employer] message     Have you completed PT/HEP/Chiro in the past 6 months for dedicated area? Yes, pt completed aqua therapy with [facility] -- pt also had MBB/RFA series completed  If yes, how long did you complete?  What was the frequency?  Did it provide relief? Per last PT note, pt got little relief  If no, reason therapy was not completed?

## 2025-01-06 ENCOUNTER — TELEPHONE (OUTPATIENT)
Dept: OTHER | Facility: OTHER | Age: 60
End: 2025-01-06

## 2025-01-06 PROBLEM — K27.9 PEPTIC ULCER: Status: ACTIVE | Noted: 2025-01-06

## 2025-01-06 PROBLEM — M25.561 ACUTE PAIN OF RIGHT KNEE: Status: RESOLVED | Noted: 2018-07-03 | Resolved: 2025-01-06

## 2025-01-06 PROBLEM — N17.9 AKI (ACUTE KIDNEY INJURY) (HCC): Status: RESOLVED | Noted: 2024-10-28 | Resolved: 2025-01-06

## 2025-01-06 PROBLEM — K27.9 PEPTIC ULCER: Status: RESOLVED | Noted: 2025-01-06 | Resolved: 2025-01-06

## 2025-01-06 PROBLEM — I85.00 ESOPHAGEAL VARICES WITHOUT BLEEDING (HCC): Status: RESOLVED | Noted: 2019-03-13 | Resolved: 2025-01-06

## 2025-01-06 PROBLEM — E66.812 CLASS 2 SEVERE OBESITY DUE TO EXCESS CALORIES WITH SERIOUS COMORBIDITY AND BODY MASS INDEX (BMI) OF 35.0 TO 35.9 IN ADULT (HCC): Status: ACTIVE | Noted: 2018-03-02

## 2025-01-06 NOTE — PROGRESS NOTES
Name: Matt Tobias .      : 1965      MRN: [id number]  Encounter Provider: Jessica Raza DO  Encounter Date: 2025   Encounter department: [facility]  :  Assessment & Plan  BRBPR (bright red blood per rectum)  Unclear etiology of symptoms -- possible hemorrhoidal vs diverticular vs polyp vs secondary to thrombocytopenia in setting of cirrhosis. Pt already has labs in process to recheck CBC, Iron, LFTs -- await results. Given pt's vitals are stable and he is otherwise asymptomatic, can be managed outpatient -- encouraged to schedule f/u with GI to discuss. Did review ED precautions including worsening/persistent bloody stools, worsening shortness of breath, development of lightheadedness. If labs show severe anemia, would also benefit from inpatient management.   Orders:  •  Ambulatory referral to Gastroenterology; Future    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)  No recent follow up with GI -- encouraged to schedule as above. Pt has been using OTC supplements to help with liver function -- await LFTs.        Secondary esophageal varices without bleeding (HCC)  Last EGD 1 year ago -- this bleeding would likely be more dark black if present. Regardless, f/u with GI as above.        Disorder of urea cycle metabolism, unspecified (HCC)  Elevated ammonia secondary to cirrhosis -- asymptomatic        Platelets decreased (HCC)  Secondary to cirrhosis -- labs pending at time of visit        Continuous opioid dependence (HCC)  Managed by PCP, last Tramadol fill 10/2024        Class 2 severe obesity due to excess calories with serious comorbidity and body mass index (BMI) of 35.0 to 35.9 in adult (HCC)  S/p bariatric surgery, continue lifestyle modifications               History of Present Illness     HPI    Pt presents due to bloody stools     Had had two episodes of blood in his stool -- both times there was blood in the water and with wiping   Had a day in  "between with no bleeding. No BM today as of yet   No abdominal pain, constipation/diarrhea   Has chronic shortness of breath, unchanged from previous   No chest pain/palpitations       Review of Systems   Gastrointestinal:  Positive for blood in stool. Negative for abdominal pain, constipation and diarrhea.       Objective   /70 (BP Location: Left arm, Patient Position: Sitting, Cuff Size: Large)   Pulse 66   Temp 98.8 °F (37.1 °C)   Ht 5' 6\" (1.676 m)   Wt 99.3 kg (219 lb)   SpO2 99%   BMI 35.35 kg/m²      Physical Exam  Vitals and nursing note reviewed.   Constitutional:       General: He is not in acute distress.     Appearance: Normal appearance.   Cardiovascular:      Rate and Rhythm: Normal rate and regular rhythm.   Pulmonary:      Effort: Pulmonary effort is normal. No respiratory distress.      Breath sounds: Normal breath sounds.   Abdominal:      General: Bowel sounds are normal. There is no distension.      Palpations: Abdomen is soft.      Tenderness: There is no abdominal tenderness. There is no guarding or rebound.   Neurological:      Mental Status: He is alert.      Comments: Grossly intact   Psychiatric:         Mood and Affect: Mood normal.         "

## 2025-01-07 ENCOUNTER — TELEPHONE (OUTPATIENT)
Dept: FAMILY MEDICINE CLINIC | Facility: CLINIC | Age: 60
End: 2025-01-07

## 2025-01-07 ENCOUNTER — APPOINTMENT (OUTPATIENT)
Dept: LAB | Facility: CLINIC | Age: 60
End: 2025-01-07
Payer: COMMERCIAL

## 2025-01-07 ENCOUNTER — OFFICE VISIT (OUTPATIENT)
Dept: FAMILY MEDICINE CLINIC | Facility: CLINIC | Age: 60
End: 2025-01-07
Payer: COMMERCIAL

## 2025-01-07 ENCOUNTER — RESULTS FOLLOW-UP (OUTPATIENT)
Dept: OTHER | Facility: HOSPITAL | Age: 60
End: 2025-01-07

## 2025-01-07 VITALS
HEART RATE: 66 BPM | DIASTOLIC BLOOD PRESSURE: 70 MMHG | HEIGHT: 66 IN | SYSTOLIC BLOOD PRESSURE: 128 MMHG | WEIGHT: 219 LBS | OXYGEN SATURATION: 99 % | TEMPERATURE: 98.8 F | BODY MASS INDEX: 35.2 KG/M2

## 2025-01-07 DIAGNOSIS — D75.89 MACROCYTOSIS WITHOUT ANEMIA: ICD-10-CM

## 2025-01-07 DIAGNOSIS — D69.6 PLATELETS DECREASED (HCC): ICD-10-CM

## 2025-01-07 DIAGNOSIS — F11.20 CONTINUOUS OPIOID DEPENDENCE (HCC): ICD-10-CM

## 2025-01-07 DIAGNOSIS — B18.2 HEPATIC CIRRHOSIS DUE TO CHRONIC HEPATITIS C INFECTION (HCC): ICD-10-CM

## 2025-01-07 DIAGNOSIS — K62.5 BRBPR (BRIGHT RED BLOOD PER RECTUM): Primary | ICD-10-CM

## 2025-01-07 DIAGNOSIS — E53.8 B12 DEFICIENCY: ICD-10-CM

## 2025-01-07 DIAGNOSIS — I85.10 SECONDARY ESOPHAGEAL VARICES WITHOUT BLEEDING (HCC): ICD-10-CM

## 2025-01-07 DIAGNOSIS — K74.60 HEPATIC CIRRHOSIS DUE TO CHRONIC HEPATITIS C INFECTION (HCC): ICD-10-CM

## 2025-01-07 DIAGNOSIS — E66.01 CLASS 2 SEVERE OBESITY DUE TO EXCESS CALORIES WITH SERIOUS COMORBIDITY AND BODY MASS INDEX (BMI) OF 35.0 TO 35.9 IN ADULT (HCC): ICD-10-CM

## 2025-01-07 DIAGNOSIS — R74.01 TRANSAMINITIS: ICD-10-CM

## 2025-01-07 DIAGNOSIS — D75.839 THROMBOCYTHEMIA: ICD-10-CM

## 2025-01-07 DIAGNOSIS — E72.20 DISORDER OF UREA CYCLE METABOLISM, UNSPECIFIED (HCC): ICD-10-CM

## 2025-01-07 DIAGNOSIS — E66.812 CLASS 2 SEVERE OBESITY DUE TO EXCESS CALORIES WITH SERIOUS COMORBIDITY AND BODY MASS INDEX (BMI) OF 35.0 TO 35.9 IN ADULT (HCC): ICD-10-CM

## 2025-01-07 LAB
ALBUMIN SERPL BCG-MCNC: 3.1 G/DL (ref 3.5–5)
ALP SERPL-CCNC: 85 U/L (ref 34–104)
ALT SERPL W P-5'-P-CCNC: 16 U/L (ref 7–52)
ANION GAP SERPL CALCULATED.3IONS-SCNC: 7 MMOL/L (ref 4–13)
AST SERPL W P-5'-P-CCNC: 32 U/L (ref 13–39)
BASOPHILS # BLD AUTO: 0.03 THOUSANDS/ΜL (ref 0–0.1)
BASOPHILS NFR BLD AUTO: 1 % (ref 0–1)
BILIRUB SERPL-MCNC: 2.73 MG/DL (ref 0.2–1)
BUN SERPL-MCNC: 12 MG/DL (ref 5–25)
CALCIUM ALBUM COR SERPL-MCNC: 9.6 MG/DL (ref 8.3–10.1)
CALCIUM SERPL-MCNC: 8.9 MG/DL (ref 8.4–10.2)
CHLORIDE SERPL-SCNC: 110 MMOL/L (ref 96–108)
CO2 SERPL-SCNC: 25 MMOL/L (ref 21–32)
CREAT SERPL-MCNC: 0.61 MG/DL (ref 0.6–1.3)
EOSINOPHIL # BLD AUTO: 0.33 THOUSAND/ΜL (ref 0–0.61)
EOSINOPHIL NFR BLD AUTO: 8 % (ref 0–6)
ERYTHROCYTE [DISTWIDTH] IN BLOOD BY AUTOMATED COUNT: 14.7 % (ref 11.6–15.1)
FERRITIN SERPL-MCNC: 92 NG/ML (ref 24–336)
GFR SERPL CREATININE-BSD FRML MDRD: 108 ML/MIN/1.73SQ M
GLUCOSE P FAST SERPL-MCNC: 107 MG/DL (ref 65–99)
HCT VFR BLD AUTO: 36.7 % (ref 36.5–49.3)
HGB BLD-MCNC: 12.1 G/DL (ref 12–17)
IMM GRANULOCYTES # BLD AUTO: 0.03 THOUSAND/UL (ref 0–0.2)
IMM GRANULOCYTES NFR BLD AUTO: 1 % (ref 0–2)
IRON SATN MFR SERPL: 43 % (ref 15–50)
IRON SERPL-MCNC: 125 UG/DL (ref 50–212)
LYMPHOCYTES # BLD AUTO: 1.2 THOUSANDS/ΜL (ref 0.6–4.47)
LYMPHOCYTES NFR BLD AUTO: 28 % (ref 14–44)
MCH RBC QN AUTO: 34 PG (ref 26.8–34.3)
MCHC RBC AUTO-ENTMCNC: 33 G/DL (ref 31.4–37.4)
MCV RBC AUTO: 103 FL (ref 82–98)
MONOCYTES # BLD AUTO: 0.46 THOUSAND/ΜL (ref 0.17–1.22)
MONOCYTES NFR BLD AUTO: 11 % (ref 4–12)
NEUTROPHILS # BLD AUTO: 2.27 THOUSANDS/ΜL (ref 1.85–7.62)
NEUTS SEG NFR BLD AUTO: 51 % (ref 43–75)
NRBC BLD AUTO-RTO: 0 /100 WBCS
PLATELET # BLD AUTO: 124 THOUSANDS/UL (ref 149–390)
PMV BLD AUTO: 11.7 FL (ref 8.9–12.7)
POTASSIUM SERPL-SCNC: 4.2 MMOL/L (ref 3.5–5.3)
PROT SERPL-MCNC: 5.6 G/DL (ref 6.4–8.4)
RBC # BLD AUTO: 3.56 MILLION/UL (ref 3.88–5.62)
SODIUM SERPL-SCNC: 142 MMOL/L (ref 135–147)
TIBC SERPL-MCNC: 289.8 UG/DL (ref 250–450)
TRANSFERRIN SERPL-MCNC: 207 MG/DL (ref 203–362)
UIBC SERPL-MCNC: 165 UG/DL (ref 155–355)
VIT B12 SERPL-MCNC: 614 PG/ML (ref 180–914)
WBC # BLD AUTO: 4.32 THOUSAND/UL (ref 4.31–10.16)

## 2025-01-07 PROCEDURE — 83540 ASSAY OF IRON: CPT

## 2025-01-07 PROCEDURE — 99214 OFFICE O/P EST MOD 30 MIN: CPT | Performed by: FAMILY MEDICINE

## 2025-01-07 PROCEDURE — 82607 VITAMIN B-12: CPT

## 2025-01-07 PROCEDURE — 85025 COMPLETE CBC W/AUTO DIFF WBC: CPT

## 2025-01-07 PROCEDURE — 82728 ASSAY OF FERRITIN: CPT

## 2025-01-07 PROCEDURE — 80053 COMPREHEN METABOLIC PANEL: CPT

## 2025-01-07 PROCEDURE — 83550 IRON BINDING TEST: CPT

## 2025-01-07 PROCEDURE — G2211 COMPLEX E/M VISIT ADD ON: HCPCS | Performed by: FAMILY MEDICINE

## 2025-01-07 PROCEDURE — 36415 COLL VENOUS BLD VENIPUNCTURE: CPT

## 2025-01-07 NOTE — TELEPHONE ENCOUNTER
Guille from [employer] called the after hours department stating he submitted a request for pt's medical records and the response he received was that they were not records for the patient. He requested me to verify if patient was a patient of [facility], I stated I was not comfortable providing him that information, but advised I was going to sent message to the office. I provided him with the office and medical record fax number.     Please advise.

## 2025-01-07 NOTE — ASSESSMENT & PLAN NOTE
Last EGD 1 year ago -- this bleeding would likely be more dark black if present. Regardless, f/u with GI as above.

## 2025-01-07 NOTE — ASSESSMENT & PLAN NOTE
No recent follow up with GI -- encouraged to schedule as above. Pt has been using OTC supplements to help with liver function -- await LFTs.

## 2025-01-10 ENCOUNTER — OFFICE VISIT (OUTPATIENT)
Dept: GASTROENTEROLOGY | Facility: CLINIC | Age: 60
End: 2025-01-10
Payer: COMMERCIAL

## 2025-01-10 VITALS
DIASTOLIC BLOOD PRESSURE: 67 MMHG | OXYGEN SATURATION: 98 % | WEIGHT: 214 LBS | TEMPERATURE: 97.8 F | HEIGHT: 66 IN | BODY MASS INDEX: 34.39 KG/M2 | HEART RATE: 98 BPM | SYSTOLIC BLOOD PRESSURE: 103 MMHG

## 2025-01-10 DIAGNOSIS — K74.60 HEPATIC CIRRHOSIS DUE TO CHRONIC HEPATITIS C INFECTION (HCC): Primary | ICD-10-CM

## 2025-01-10 DIAGNOSIS — B18.2 HEPATIC CIRRHOSIS DUE TO CHRONIC HEPATITIS C INFECTION (HCC): Primary | ICD-10-CM

## 2025-01-10 DIAGNOSIS — K62.5 BRBPR (BRIGHT RED BLOOD PER RECTUM): ICD-10-CM

## 2025-01-10 PROCEDURE — 99214 OFFICE O/P EST MOD 30 MIN: CPT | Performed by: PHYSICIAN ASSISTANT

## 2025-01-10 RX ORDER — SODIUM CHLORIDE, SODIUM LACTATE, POTASSIUM CHLORIDE, CALCIUM CHLORIDE 600; 310; 30; 20 MG/100ML; MG/100ML; MG/100ML; MG/100ML
125 INJECTION, SOLUTION INTRAVENOUS CONTINUOUS
OUTPATIENT
Start: 2025-01-10

## 2025-01-10 NOTE — PROGRESS NOTES
Name: Matt Tobias Sr.      : 1965      MRN: [id number]  Encounter Provider: Sheridan Sotelo PA-C  Encounter Date: 1/10/2025   Encounter department: [facility] [place]  :  Assessment & Plan  BRBPR (bright red blood per rectum)    Patient reports of intermittent BRBPR associated with a bowel movement in the toilet bowel x couple weeks.  HGB was normal. No melena.    Will plan for colonoscopy to investigate.    Orders:    Ambulatory referral to Gastroenterology    Colonoscopy; Future    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)    Patient has a hx of HCV cirrhosis s/p Harvoni tx with SVR years ago.  MELD score 18.  HE: Grade 0, has Lactulose but requires form to be filled out to obtain Xifaxan, he will have it sent to us.  Varices: EGD in May 2024 showed grade 1 varices and sleeve gastrectomy anatomy; he is on Carvedilol.  Ascites: No ascites on CT scan in October, he takes Lasix on a prn basis when he has LE edema; <2 gram sodium diet.  HCC Screen: Will update abdominal ultrasound for screening.    Orders:    US abdomen complete; Future      History of Present Illness   HPI  Matt Tobias Sr. is a 59 y.o. male who presents to the office for an evaluation of rectal bleeding.  Patient reports intermittent rectal bleeding over the past couple of weeks when he has a bowel movement.  Rectal bleeding is bright red in the toilet bowel.  Stool is brown - no melena.  No abdominal pain.  He reports his last colonoscopy was several years ago.  He reports a history of colon polyps.  No rectal pain.    I discussed informed consent with the patient for the colonoscopy. The risks/benefits of the procedure were discussed with the patient. Risks included, but not limited to, infection, bleeding, perforation were discussed. Patient was agreeable.     History obtained from: patient.      Medical History Reviewed by provider this encounter:     .  Past Medical History    Past Medical History:   Diagnosis Date    AXEL (acute kidney injury) (HCC) 10/28/2024    CPAP (continuous positive airway pressure) dependence     does not use machine    Esophageal varices (HCC)     stable 9/2020 gets checked yearly    Hepatic cirrhosis (HCC)     treated with harvoni   Hep C- dx age 1995    Hepatic encephalopathy (HCC)     Hyperammonemia (HCC) 10/30/2019    Liver disease     Obesity     Peptic ulcer 01/06/2025    Pneumonia     in past    Postgastrectomy malabsorption     Proctitis 05/29/2015    Sleep apnea     Wears glasses      Past Surgical History:   Procedure Laterality Date    COLONOSCOPY      ESOPHAGOGASTRODUODENOSCOPY N/A 4/12/2018    Procedure: ESOPHAGOGASTRODUODENOSCOPY (EGD);  Surgeon: Willy Traylor MD;  Location: [facility];  Service: Gastroenterology    FRACTURE SURGERY Left     ankle    OTHER SURGICAL HISTORY      banding esophageal varices    ND EXC EXCSV SKN ABD INFRAUMBILICAL PANNICULECTOMY N/A 9/16/2020    Procedure: PANNICULECTOMY;  Surgeon: Enrrique Birmingham MD;  [facility];  Service: Plastics    ND EXCISION EXCESSIVE SKIN & SUBQ TISSUE ABDOMEN N/A 9/16/2020    Procedure: ABDOMINOPLASTY;  Surgeon: Enrrique Birmingham MD;  [facility];  Service: Plastics    ND LAPS GSTRC RSTRICTIV PX LONGITUDINAL GASTRECTOMY N/A 6/5/2018    Procedure: GASTRECTOMY LAPAROSCOPIC SLEEVE;  Surgeon: Diana Mcdonnell MD;  [facility];  Service: Bariatrics     Family History   Problem Relation Age of Onset    Heart disease Mother     Lupus Mother     Diabetes Father     Stroke Father       reports that he has been smoking cigarettes. He started smoking about 39 years ago. He has a 9.3 pack-year smoking history. He has never used smokeless tobacco. He reports that he does not currently use alcohol. He reports that he does not use drugs.  Current Outpatient Medications on File Prior to Visit   Medication Sig Dispense Refill    carvedilol (COREG) 6.25 mg tablet Take 1  tablet by mouth 2 (two) times a day with meals 180 tablet 1    Claritin-D 12 Hour 5-120 MG per tablet TAKE ONE TABLET BY MOUTH TWICE DAILY FOR 5 DAYS 10 tablet 0    cyanocobalamin (VITAMIN B-12) 1000 MCG tablet Take 1 tablet (1,000 mcg total) by mouth daily 30 tablet 2    fluticasone (FLONASE) 50 mcg/act nasal spray 1 spray into each nostril daily 48 g 3    lactulose (CHRONULAC) 10 g/15 mL solution Take 15 mL (10 g total) by mouth daily as needed (constipation or confusion) 946 mL 0    methocarbamol (ROBAXIN) 750 mg tablet Take 750 mg by mouth every 6 (six) hours as needed for muscle spasms      omeprazole (PriLOSEC) 20 mg delayed release capsule Take 1 capsule (20 mg total) by mouth daily 90 capsule 1    traMADol (ULTRAM) 50 mg tablet Take 50 mg by mouth every 6 (six) hours as needed for moderate pain      triamcinolone (KENALOG) 0.5 % cream Apply topically 2 (two) times a day 45 g 3     No current facility-administered medications on file prior to visit.   No Known Allergies   Current Outpatient Medications on File Prior to Visit   Medication Sig Dispense Refill    carvedilol (COREG) 6.25 mg tablet Take 1 tablet by mouth 2 (two) times a day with meals 180 tablet 1    Claritin-D 12 Hour 5-120 MG per tablet TAKE ONE TABLET BY MOUTH TWICE DAILY FOR 5 DAYS 10 tablet 0    cyanocobalamin (VITAMIN B-12) 1000 MCG tablet Take 1 tablet (1,000 mcg total) by mouth daily 30 tablet 2    fluticasone (FLONASE) 50 mcg/act nasal spray 1 spray into each nostril daily 48 g 3    lactulose (CHRONULAC) 10 g/15 mL solution Take 15 mL (10 g total) by mouth daily as needed (constipation or confusion) 946 mL 0    methocarbamol (ROBAXIN) 750 mg tablet Take 750 mg by mouth every 6 (six) hours as needed for muscle spasms      omeprazole (PriLOSEC) 20 mg delayed release capsule Take 1 capsule (20 mg total) by mouth daily 90 capsule 1    traMADol (ULTRAM) 50 mg tablet Take 50 mg by mouth every 6 (six) hours as needed for moderate pain       "triamcinolone (KENALOG) 0.5 % cream Apply topically 2 (two) times a day 45 g 3     No current facility-administered medications on file prior to visit.      Social History     Tobacco Use    Smoking status: Some Days     Current packs/day: 0.00     Average packs/day: 0.3 packs/day for 37.0 years (9.3 ttl pk-yrs)     Types: Cigarettes     Start date: 1985     Last attempt to quit: 2022     Years since quittin.0    Smokeless tobacco: Never    Tobacco comments:     stopped 2020   Vaping Use    Vaping status: Former    Substances: Flavoring   Substance and Sexual Activity    Alcohol use: Not Currently     Comment: occasionally    Drug use: No    Sexual activity: Yes        Objective   /67 (BP Location: Right arm, Patient Position: Sitting, Cuff Size: Standard)   Pulse 98   Temp 97.8 °F (36.6 °C) (Temporal)   Ht 5' 6\" (1.676 m)   Wt 97.1 kg (214 lb)   SpO2 98%   BMI 34.54 kg/m²      Physical Exam  Constitutional:       General: He is not in acute distress.     Appearance: Normal appearance. He is not ill-appearing.   HENT:      Head: Normocephalic and atraumatic.   Cardiovascular:      Rate and Rhythm: Normal rate and regular rhythm.   Pulmonary:      Effort: Pulmonary effort is normal. No respiratory distress.      Breath sounds: Normal breath sounds.   Abdominal:      General: Bowel sounds are normal.      Palpations: Abdomen is soft.      Tenderness: There is no abdominal tenderness.   Skin:     General: Skin is warm and dry.   Neurological:      Mental Status: He is alert and oriented to person, place, and time.   Psychiatric:         Mood and Affect: Mood normal.         Behavior: Behavior normal.           "

## 2025-01-10 NOTE — ASSESSMENT & PLAN NOTE
Patient has a hx of HCV cirrhosis s/p Harvoni tx with SVR years ago.  MELD score 18.  HE: Grade 0, has Lactulose but requires form to be filled out to obtain Xifaxan, he will have it sent to us.  Varices: EGD in May 2024 showed grade 1 varices and sleeve gastrectomy anatomy; he is on Carvedilol.  Ascites: No ascites on CT scan in October, he takes Lasix on a prn basis when he has LE edema; <2 gram sodium diet.  HCC Screen: Will update abdominal ultrasound for screening.    Orders:    US abdomen complete; Future

## 2025-01-10 NOTE — PATIENT INSTRUCTIONS
Scheduled date of colonoscopy (as of today):1/27/25  Physician performing colonoscopy:Mando  Location of colonoscopy:[facility]  Bowel prep reviewed with patient:Daysi/Miralax  Instructions reviewed with patient by:Ozzy charles  Clearances:  none

## 2025-01-10 NOTE — H&P (VIEW-ONLY)
Name: Matt Tobias Sr.      : 1965      MRN: [id number]  Encounter Provider: Sheridan Sotelo PA-C  Encounter Date: 1/10/2025   Encounter department: [facility] [place]  :  Assessment & Plan  BRBPR (bright red blood per rectum)    Patient reports of intermittent BRBPR associated with a bowel movement in the toilet bowel x couple weeks.  HGB was normal. No melena.    Will plan for colonoscopy to investigate.    Orders:    Ambulatory referral to Gastroenterology    Colonoscopy; Future    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)    Patient has a hx of HCV cirrhosis s/p Harvoni tx with SVR years ago.  MELD score 18.  HE: Grade 0, has Lactulose but requires form to be filled out to obtain Xifaxan, he will have it sent to us.  Varices: EGD in May 2024 showed grade 1 varices and sleeve gastrectomy anatomy; he is on Carvedilol.  Ascites: No ascites on CT scan in October, he takes Lasix on a prn basis when he has LE edema; <2 gram sodium diet.  HCC Screen: Will update abdominal ultrasound for screening.    Orders:    US abdomen complete; Future      History of Present Illness   HPI  Matt Tobias Sr. is a 59 y.o. male who presents to the office for an evaluation of rectal bleeding.  Patient reports intermittent rectal bleeding over the past couple of weeks when he has a bowel movement.  Rectal bleeding is bright red in the toilet bowel.  Stool is brown - no melena.  No abdominal pain.  He reports his last colonoscopy was several years ago.  He reports a history of colon polyps.  No rectal pain.    I discussed informed consent with the patient for the colonoscopy. The risks/benefits of the procedure were discussed with the patient. Risks included, but not limited to, infection, bleeding, perforation were discussed. Patient was agreeable.     History obtained from: patient.      Medical History Reviewed by provider this encounter:     .  Past Medical History    Past Medical History:   Diagnosis Date    AXEL (acute kidney injury) (HCC) 10/28/2024    CPAP (continuous positive airway pressure) dependence     does not use machine    Esophageal varices (HCC)     stable 9/2020 gets checked yearly    Hepatic cirrhosis (HCC)     treated with harvoni   Hep C- dx age 1995    Hepatic encephalopathy (HCC)     Hyperammonemia (HCC) 10/30/2019    Liver disease     Obesity     Peptic ulcer 01/06/2025    Pneumonia     in past    Postgastrectomy malabsorption     Proctitis 05/29/2015    Sleep apnea     Wears glasses      Past Surgical History:   Procedure Laterality Date    COLONOSCOPY      ESOPHAGOGASTRODUODENOSCOPY N/A 4/12/2018    Procedure: ESOPHAGOGASTRODUODENOSCOPY (EGD);  Surgeon: Willy Traylor MD;  [facility];  Service: Gastroenterology    FRACTURE SURGERY Left     ankle    OTHER SURGICAL HISTORY      banding esophageal varices    MA EXC EXCSV SKN ABD INFRAUMBILICAL PANNICULECTOMY N/A 9/16/2020    Procedure: PANNICULECTOMY;  Surgeon: Enrrique Birmingham MD;  [facility];  Service: Plastics    MA EXCISION EXCESSIVE SKIN & SUBQ TISSUE ABDOMEN N/A 9/16/2020    Procedure: ABDOMINOPLASTY;  Surgeon: Enrrique Birmingham MD;  [facility];  Service: Plastics    MA LAPS GSTRC RSTRICTIV PX LONGITUDINAL GASTRECTOMY N/A 6/5/2018    Procedure: GASTRECTOMY LAPAROSCOPIC SLEEVE;  Surgeon: Diana Mcdonnell MD;  [facility];  Service: Bariatrics     Family History   Problem Relation Age of Onset    Heart disease Mother     Lupus Mother     Diabetes Father     Stroke Father       reports that he has been smoking cigarettes. He started smoking about 39 years ago. He has a 9.3 pack-year smoking history. He has never used smokeless tobacco. He reports that he does not currently use alcohol. He reports that he does not use drugs.  Current Outpatient Medications on File Prior to Visit   Medication Sig Dispense Refill    carvedilol (COREG) 6.25 mg tablet Take 1  tablet by mouth 2 (two) times a day with meals 180 tablet 1    Claritin-D 12 Hour 5-120 MG per tablet TAKE ONE TABLET BY MOUTH TWICE DAILY FOR 5 DAYS 10 tablet 0    cyanocobalamin (VITAMIN B-12) 1000 MCG tablet Take 1 tablet (1,000 mcg total) by mouth daily 30 tablet 2    fluticasone (FLONASE) 50 mcg/act nasal spray 1 spray into each nostril daily 48 g 3    lactulose (CHRONULAC) 10 g/15 mL solution Take 15 mL (10 g total) by mouth daily as needed (constipation or confusion) 946 mL 0    methocarbamol (ROBAXIN) 750 mg tablet Take 750 mg by mouth every 6 (six) hours as needed for muscle spasms      omeprazole (PriLOSEC) 20 mg delayed release capsule Take 1 capsule (20 mg total) by mouth daily 90 capsule 1    traMADol (ULTRAM) 50 mg tablet Take 50 mg by mouth every 6 (six) hours as needed for moderate pain      triamcinolone (KENALOG) 0.5 % cream Apply topically 2 (two) times a day 45 g 3     No current facility-administered medications on file prior to visit.   No Known Allergies   Current Outpatient Medications on File Prior to Visit   Medication Sig Dispense Refill    carvedilol (COREG) 6.25 mg tablet Take 1 tablet by mouth 2 (two) times a day with meals 180 tablet 1    Claritin-D 12 Hour 5-120 MG per tablet TAKE ONE TABLET BY MOUTH TWICE DAILY FOR 5 DAYS 10 tablet 0    cyanocobalamin (VITAMIN B-12) 1000 MCG tablet Take 1 tablet (1,000 mcg total) by mouth daily 30 tablet 2    fluticasone (FLONASE) 50 mcg/act nasal spray 1 spray into each nostril daily 48 g 3    lactulose (CHRONULAC) 10 g/15 mL solution Take 15 mL (10 g total) by mouth daily as needed (constipation or confusion) 946 mL 0    methocarbamol (ROBAXIN) 750 mg tablet Take 750 mg by mouth every 6 (six) hours as needed for muscle spasms      omeprazole (PriLOSEC) 20 mg delayed release capsule Take 1 capsule (20 mg total) by mouth daily 90 capsule 1    traMADol (ULTRAM) 50 mg tablet Take 50 mg by mouth every 6 (six) hours as needed for moderate pain       "triamcinolone (KENALOG) 0.5 % cream Apply topically 2 (two) times a day 45 g 3     No current facility-administered medications on file prior to visit.      Social History     Tobacco Use    Smoking status: Some Days     Current packs/day: 0.00     Average packs/day: 0.3 packs/day for 37.0 years (9.3 ttl pk-yrs)     Types: Cigarettes     Start date: 1985     Last attempt to quit: 2022     Years since quittin.0    Smokeless tobacco: Never    Tobacco comments:     stopped 2020   Vaping Use    Vaping status: Former    Substances: Flavoring   Substance and Sexual Activity    Alcohol use: Not Currently     Comment: occasionally    Drug use: No    Sexual activity: Yes        Objective   /67 (BP Location: Right arm, Patient Position: Sitting, Cuff Size: Standard)   Pulse 98   Temp 97.8 °F (36.6 °C) (Temporal)   Ht 5' 6\" (1.676 m)   Wt 97.1 kg (214 lb)   SpO2 98%   BMI 34.54 kg/m²      Physical Exam  Constitutional:       General: He is not in acute distress.     Appearance: Normal appearance. He is not ill-appearing.   HENT:      Head: Normocephalic and atraumatic.   Cardiovascular:      Rate and Rhythm: Normal rate and regular rhythm.   Pulmonary:      Effort: Pulmonary effort is normal. No respiratory distress.      Breath sounds: Normal breath sounds.   Abdominal:      General: Bowel sounds are normal.      Palpations: Abdomen is soft.      Tenderness: There is no abdominal tenderness.   Skin:     General: Skin is warm and dry.   Neurological:      Mental Status: He is alert and oriented to person, place, and time.   Psychiatric:         Mood and Affect: Mood normal.         Behavior: Behavior normal.           "

## 2025-01-21 ENCOUNTER — TELEPHONE (OUTPATIENT)
Age: 60
End: 2025-01-21

## 2025-01-21 ENCOUNTER — HOSPITAL ENCOUNTER (OUTPATIENT)
Dept: RADIOLOGY | Facility: CLINIC | Age: 60
Discharge: HOME | End: 2025-01-21

## 2025-01-21 VITALS
SYSTOLIC BLOOD PRESSURE: 134 MMHG | DIASTOLIC BLOOD PRESSURE: 74 MMHG | TEMPERATURE: 102 F | RESPIRATION RATE: 18 BRPM | OXYGEN SATURATION: 92 % | HEART RATE: 83 BPM

## 2025-01-21 DIAGNOSIS — M48.061 FORAMINAL STENOSIS OF LUMBAR REGION: ICD-10-CM

## 2025-01-21 NOTE — TELEPHONE ENCOUNTER
S/W pt who is scheduled for LESI this afternoon  States has had a cough and congestions recently.  Cough is intermittent and he does not feel sick  Denies fever  Sounds congested  Pt feels he can lay on the table without coughing but cannot guarantee he will not cough    Please advise

## 2025-01-21 NOTE — TELEPHONE ENCOUNTER
LMOM to CB, CB# provided  Pt can have procedure as long as he does not feel sick (which pt stated earlier)

## 2025-01-21 NOTE — TELEPHONE ENCOUNTER
Caller: rebel Gutierrez     Doctor: Kory     Reason for call: pt has procedure scheduled for today. Pt stated that he has a cough and is a little congested. Pt would like to know if he still can come in or if he needs to r/s the procedure. Please Advise     Call back#: [phone]

## 2025-01-21 NOTE — NURSING NOTE
Pt in for procedure. Has temp 102 on arrival. Pt with cough and c/o shortness of breath earlier today. Advised he go to urgent care at this time.  Pt will cb to reschedule.

## 2025-01-22 ENCOUNTER — OFFICE VISIT (OUTPATIENT)
Dept: FAMILY MEDICINE CLINIC | Facility: CLINIC | Age: 60
End: 2025-01-22
Payer: COMMERCIAL

## 2025-01-22 ENCOUNTER — RESULTS FOLLOW-UP (OUTPATIENT)
Dept: FAMILY MEDICINE CLINIC | Facility: CLINIC | Age: 60
End: 2025-01-22

## 2025-01-22 ENCOUNTER — APPOINTMENT (OUTPATIENT)
Dept: LAB | Facility: CLINIC | Age: 60
End: 2025-01-22
Payer: COMMERCIAL

## 2025-01-22 ENCOUNTER — APPOINTMENT (OUTPATIENT)
Dept: RADIOLOGY | Facility: CLINIC | Age: 60
End: 2025-01-22
Payer: COMMERCIAL

## 2025-01-22 VITALS
DIASTOLIC BLOOD PRESSURE: 60 MMHG | TEMPERATURE: 98.1 F | OXYGEN SATURATION: 96 % | WEIGHT: 212.6 LBS | HEIGHT: 66 IN | SYSTOLIC BLOOD PRESSURE: 134 MMHG | HEART RATE: 75 BPM | BODY MASS INDEX: 34.17 KG/M2

## 2025-01-22 DIAGNOSIS — B18.2 HEPATIC CIRRHOSIS DUE TO CHRONIC HEPATITIS C INFECTION (HCC): ICD-10-CM

## 2025-01-22 DIAGNOSIS — R05.9 COUGH, UNSPECIFIED TYPE: ICD-10-CM

## 2025-01-22 DIAGNOSIS — Z13.220 LIPID SCREENING: ICD-10-CM

## 2025-01-22 DIAGNOSIS — J22 LOWER RESP. TRACT INFECTION: Primary | ICD-10-CM

## 2025-01-22 DIAGNOSIS — J22 LOWER RESP. TRACT INFECTION: ICD-10-CM

## 2025-01-22 DIAGNOSIS — Z12.5 SCREENING FOR PROSTATE CANCER: ICD-10-CM

## 2025-01-22 DIAGNOSIS — K74.60 HEPATIC CIRRHOSIS DUE TO CHRONIC HEPATITIS C INFECTION (HCC): ICD-10-CM

## 2025-01-22 DIAGNOSIS — E53.8 B12 DEFICIENCY: ICD-10-CM

## 2025-01-22 DIAGNOSIS — Z00.00 MEDICARE ANNUAL WELLNESS VISIT, SUBSEQUENT: ICD-10-CM

## 2025-01-22 DIAGNOSIS — D75.839 THROMBOCYTHEMIA: ICD-10-CM

## 2025-01-22 DIAGNOSIS — R73.01 IFG (IMPAIRED FASTING GLUCOSE): ICD-10-CM

## 2025-01-22 LAB
ANISOCYTOSIS BLD QL SMEAR: PRESENT
BASOPHILS # BLD AUTO: 0.01 THOUSANDS/ΜL (ref 0–0.1)
BASOPHILS NFR BLD AUTO: 0 % (ref 0–1)
CHOLEST SERPL-MCNC: 104 MG/DL (ref ?–200)
EOSINOPHIL # BLD AUTO: 0.04 THOUSAND/ΜL (ref 0–0.61)
EOSINOPHIL NFR BLD AUTO: 2 % (ref 0–6)
ERYTHROCYTE [DISTWIDTH] IN BLOOD BY AUTOMATED COUNT: 14.6 % (ref 11.6–15.1)
EST. AVERAGE GLUCOSE BLD GHB EST-MCNC: 85 MG/DL
HBA1C MFR BLD: 4.6 %
HCT VFR BLD AUTO: 39.5 % (ref 36.5–49.3)
HDLC SERPL-MCNC: 35 MG/DL
HGB BLD-MCNC: 13.4 G/DL (ref 12–17)
IMM GRANULOCYTES # BLD AUTO: 0.01 THOUSAND/UL (ref 0–0.2)
IMM GRANULOCYTES NFR BLD AUTO: 0 % (ref 0–2)
LDLC SERPL CALC-MCNC: 52 MG/DL (ref 0–100)
LYMPHOCYTES # BLD AUTO: 0.72 THOUSANDS/ΜL (ref 0.6–4.47)
LYMPHOCYTES NFR BLD AUTO: 32 % (ref 14–44)
MCH RBC QN AUTO: 34.6 PG (ref 26.8–34.3)
MCHC RBC AUTO-ENTMCNC: 33.9 G/DL (ref 31.4–37.4)
MCV RBC AUTO: 102 FL (ref 82–98)
MONOCYTES # BLD AUTO: 0.31 THOUSAND/ΜL (ref 0.17–1.22)
MONOCYTES NFR BLD AUTO: 14 % (ref 4–12)
NEUTROPHILS # BLD AUTO: 1.19 THOUSANDS/ΜL (ref 1.85–7.62)
NEUTS SEG NFR BLD AUTO: 52 % (ref 43–75)
NONHDLC SERPL-MCNC: 69 MG/DL
NRBC BLD AUTO-RTO: 0 /100 WBCS
PLATELET BLD QL SMEAR: ABNORMAL
PSA SERPL-MCNC: 0.76 NG/ML (ref 0–4)
RBC # BLD AUTO: 3.87 MILLION/UL (ref 3.88–5.62)
RBC MORPH BLD: PRESENT
SARS-COV-2 AG UPPER RESP QL IA: NEGATIVE
SL AMB POCT RAPID FLU A: NEGATIVE
SL AMB POCT RAPID FLU B: NEGATIVE
TRIGL SERPL-MCNC: 84 MG/DL (ref ?–150)
VALID CONTROL: NORMAL
WBC # BLD AUTO: 2.28 THOUSAND/UL (ref 4.31–10.16)

## 2025-01-22 PROCEDURE — 85025 COMPLETE CBC W/AUTO DIFF WBC: CPT

## 2025-01-22 PROCEDURE — 99213 OFFICE O/P EST LOW 20 MIN: CPT

## 2025-01-22 PROCEDURE — 36415 COLL VENOUS BLD VENIPUNCTURE: CPT

## 2025-01-22 PROCEDURE — G0103 PSA SCREENING: HCPCS

## 2025-01-22 PROCEDURE — 87804 INFLUENZA ASSAY W/OPTIC: CPT

## 2025-01-22 PROCEDURE — 71046 X-RAY EXAM CHEST 2 VIEWS: CPT

## 2025-01-22 PROCEDURE — 83036 HEMOGLOBIN GLYCOSYLATED A1C: CPT

## 2025-01-22 PROCEDURE — G2211 COMPLEX E/M VISIT ADD ON: HCPCS

## 2025-01-22 PROCEDURE — G0439 PPPS, SUBSEQ VISIT: HCPCS

## 2025-01-22 PROCEDURE — 80061 LIPID PANEL: CPT

## 2025-01-22 PROCEDURE — 87811 SARS-COV-2 COVID19 W/OPTIC: CPT

## 2025-01-22 RX ORDER — AZITHROMYCIN 250 MG/1
TABLET, FILM COATED ORAL
Qty: 6 TABLET | Refills: 0 | Status: SHIPPED | OUTPATIENT
Start: 2025-01-22 | End: 2025-01-26

## 2025-01-22 NOTE — PROGRESS NOTES
Name: Matt Tobias Sr.      : 1965      MRN: [id number]  Encounter Provider: Di Osborne PA-C  Encounter Date: 2025   Encounter department: [facility]    Assessment & Plan  Lower resp. tract infection  Patient presents for evaluation of cough, congestion, headache x 3 days  Rapid flu and covid completed today which were negative  Physical exam reveals ronchi in RUL -- otherwise unremarkable; O2 96% on room air and afebrile  Based on exam findings, will order STAT CXR to rule out pneumonia and treat for lower respiratory infection with azithromycin -- discussed side effects, advised to take with food  Otherwise, continue supportive care, stay hydrated and rest  Advised ER for worsening symptoms or if he develop any chest pain/SOB which he is agreeable with  He is to call with any questions/concerns    Orders:    XR chest pa and lateral; Future    azithromycin (ZITHROMAX) 250 mg tablet; Take 2 tablets today then 1 tablet daily x 4 days    Cough, unspecified type    Orders:    POCT Rapid Covid Ag    POCT rapid flu A and B    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)  Following with GI, last visit on 25  Advised to schedule US that was ordered by GI   Has colonoscopy scheduled for next week        Medicare annual wellness visit, subsequent  Physical exam unremarkable; BP WNL   Ordered routine labs that were due  Ordered PSA  Colonoscopy scheduled with GI for colon cancer screening   Provided ACP paperwork        Screening for prostate cancer    Orders:    PSA, Total Screen; Future    Lipid screening    Orders:    Lipid panel; Future    IFG (impaired fasting glucose)  Check A1C    Orders:    Hemoglobin A1C; Future       Preventive health issues were discussed with patient, and age appropriate screening tests were ordered as noted in patient's After Visit Summary. Personalized health advice and appropriate referrals for health education or preventive  services given if needed, as noted in patient's After Visit Summary.    History of Present Illness     CC: cough, congestion x 3 days    Patient presents for cough, congestion, headache x 3 days  Reports fever yesterday which has since resolved   Patient has been taking nyquil   Staying hydrated without issue   No GI symptoms -- denies vomiting, diarrhea   No chest pain or SOB   Wife sick with similar symptoms at home        Patient Care Team:  Rodrigo Jo MD as PCP - General  MD Lluvia Mejia PA-C (Gastroenterology)  Sheridan Darby PA-C as Physician Assistant (Gastroenterology)  Stan Gilmore III, MD (Gastroenterology)    Review of Systems   Constitutional:  Positive for fever. Negative for chills and diaphoresis.   HENT:  Positive for congestion and rhinorrhea. Negative for ear pain, sinus pressure, sinus pain and sore throat.    Respiratory:  Positive for cough. Negative for chest tightness, shortness of breath and wheezing.    Cardiovascular:  Negative for chest pain and palpitations.   Gastrointestinal:  Negative for abdominal pain, constipation, diarrhea, nausea and vomiting.   Genitourinary:  Negative for difficulty urinating and urgency.   Neurological:  Positive for headaches. Negative for dizziness and light-headedness.     Medical History Reviewed by provider this encounter:       Annual Wellness Visit Questionnaire   Matt is here for his Subsequent Wellness visit.     Health Risk Assessment:   Patient rates overall health as fair. Patient feels that their physical health rating is slightly better. Patient is satisfied with their life. Eyesight was rated as slightly worse. Hearing was rated as same. Patient feels that their emotional and mental health rating is same. Patients states they are never, rarely angry. Patient states they are sometimes unusually tired/fatigued. Pain experienced in the last 7 days has been none. Patient states that he has experienced no weight loss or  gain in last 6 months.     Depression Screening:   PHQ-2 Score: 1      Fall Risk Screening:   In the past year, patient has experienced: history of falling in past year    Number of falls: 1  Injured during fall?: No    Feels unsteady when standing or walking?: No    Worried about falling?: No      Home Safety:  Patient does not have trouble with stairs inside or outside of their home. Patient has working smoke alarms and has working carbon monoxide detector. Home safety hazards include: none.     Nutrition:   Current diet is Regular.     Medications:   Patient is not currently taking any over-the-counter supplements. Patient is able to manage medications.     Activities of Daily Living (ADLs)/Instrumental Activities of Daily Living (IADLs):   Walk and transfer into and out of bed and chair?: Yes  Dress and groom yourself?: Yes    Bathe or shower yourself?: Yes    Feed yourself? Yes  Do your laundry/housekeeping?: Yes  Manage your money, pay your bills and track your expenses?: Yes  Make your own meals?: Yes    Do your own shopping?: Yes    Previous Hospitalizations:   Any hospitalizations or ED visits within the last 12 months?: Yes    How many hospitalizations have you had in the last year?: 1-2    Hospitalization Comments: Kidney infection December 2024    Advance Care Planning:   Living will: No    Durable POA for healthcare: No    Advanced directive: No    Advanced directive counseling given: Yes    ACP document given: Yes    Patient declined ACP directive: No      Cognitive Screening:   Provider or family/friend/caregiver concerned regarding cognition?: No    PREVENTIVE SCREENINGS      Cardiovascular Screening:    General: Risks and Benefits Discussed    Due for: Lipid Panel      Diabetes Screening:     General: Risks and Benefits Discussed and Screening Current      Colorectal Cancer Screening:     General: Screening Current    Due for: Colonoscopy - High Risk      Prostate Cancer Screening:    General: Risks  "and Benefits Discussed    Due for: PSA      Osteoporosis Screening:    General: Risks and Benefits Discussed and Screening Not Indicated      Abdominal Aortic Aneurysm (AAA) Screening:    Risk factors include: tobacco use        General: Risks and Benefits Discussed      Lung Cancer Screening:     General: Screening Not Indicated and Risks and Benefits Discussed      Hepatitis C Screening:    General: Risks and Benefits Discussed and History Hepatitis C    Hep C Screening Accepted: Yes      Screening, Brief Intervention, and Referral to Treatment (SBIRT)    Screening  Typical number of drinks in a day: 0  Typical number of drinks in a week: 0  Interpretation: Low risk drinking behavior.    Single Item Drug Screening:  How often have you used an illegal drug (including marijuana) or a prescription medication for non-medical reasons in the past year? never    Single Item Drug Screen Score: 0  Interpretation: Negative screen for possible drug use disorder    Social Drivers of Health     Financial Resource Strain: Low Risk  (1/8/2024)    Overall Financial Resource Strain (CARDIA)     Difficulty of Paying Living Expenses: Not hard at all   Food Insecurity: No Food Insecurity (1/22/2025)    Hunger Vital Sign     Worried About Running Out of Food in the Last Year: Never true     Ran Out of Food in the Last Year: Never true   Transportation Needs: No Transportation Needs (1/22/2025)    PRAPARE - Transportation     Lack of Transportation (Medical): No     Lack of Transportation (Non-Medical): No   Housing Stability: Low Risk  (1/22/2025)    Housing Stability Vital Sign     Unable to Pay for Housing in the Last Year: No     Number of Times Moved in the Last Year: 0     Homeless in the Last Year: No   Utilities: Not At Risk (1/22/2025)    [facility] Utilities     Threatened with loss of utilities: No     No results found.    Objective   /60   Pulse 75   Temp 98.1 °F (36.7 °C)   Ht 5' 6\" (1.676 m)   Wt 96.4 kg (212 lb 9.6 " oz)   SpO2 96%   BMI 34.31 kg/m²     Physical Exam  Vitals reviewed.   Constitutional:       General: He is not in acute distress.     Appearance: Normal appearance. He is not ill-appearing or diaphoretic.   HENT:      Head: Normocephalic and atraumatic.      Right Ear: Tympanic membrane, ear canal and external ear normal. There is no impacted cerumen.      Left Ear: Tympanic membrane, ear canal and external ear normal. There is no impacted cerumen.      Nose: Congestion present. No rhinorrhea.      Mouth/Throat:      Mouth: Mucous membranes are moist.      Pharynx: Oropharynx is clear. No oropharyngeal exudate or posterior oropharyngeal erythema.   Eyes:      General:         Right eye: No discharge.         Left eye: No discharge.      Conjunctiva/sclera: Conjunctivae normal.   Cardiovascular:      Rate and Rhythm: Normal rate and regular rhythm.      Pulses: Normal pulses.      Heart sounds: Normal heart sounds. No murmur heard.  Pulmonary:      Effort: Pulmonary effort is normal. No respiratory distress.      Breath sounds: Rhonchi (RUL) present. No wheezing or rales.   Musculoskeletal:         General: Normal range of motion.      Cervical back: Normal range of motion and neck supple.      Right lower leg: No edema.      Left lower leg: No edema.   Lymphadenopathy:      Cervical: No cervical adenopathy.   Skin:     General: Skin is warm.   Neurological:      General: No focal deficit present.      Mental Status: He is alert.      Gait: Gait normal.   Psychiatric:         Mood and Affect: Mood normal.

## 2025-01-22 NOTE — PATIENT INSTRUCTIONS
Medicare Preventive Visit Patient Instructions  Thank you for completing your Welcome to Medicare Visit or Medicare Annual Wellness Visit today. Your next wellness visit will be due in one year (1/23/2026).  The screening/preventive services that you may require over the next 5-10 years are detailed below. Some tests may not apply to you based off risk factors and/or age. Screening tests ordered at today's visit but not completed yet may show as past due. Also, please note that scanned in results may not display below.  Preventive Screenings:  Service Recommendations Previous Testing/Comments   Colorectal Cancer Screening  Colonoscopy    Fecal Occult Blood Test (FOBT)/Fecal Immunochemical Test (FIT)  Fecal DNA/Cologuard Test  Flexible Sigmoidoscopy Age: 45-75 years old   Colonoscopy: every 10 years (May be performed more frequently if at higher risk)  OR  FOBT/FIT: every 1 year  OR  Cologuard: every 3 years  OR  Sigmoidoscopy: every 5 years  Screening may be recommended earlier than age 45 if at higher risk for colorectal cancer. Also, an individualized decision between you and your healthcare provider will decide whether screening between the ages of 76-85 would be appropriate. Colonoscopy: 04/22/2015  FOBT/FIT: Not on file  Cologuard: 04/07/2022  Sigmoidoscopy: Not on file    Screening Current     Prostate Cancer Screening Individualized decision between patient and health care provider in men between ages of 55-69   Medicare will cover every 12 months beginning on the day after your 50th birthday PSA: 1.4 ng/mL           Hepatitis C Screening Once for adults born between 1945 and 1965  More frequently in patients at high risk for Hepatitis C Hep C Antibody: 08/19/2021        Diabetes Screening 1-2 times per year if you're at risk for diabetes or have pre-diabetes Fasting glucose: 107 mg/dL (1/7/2025)  A1C: 4.5 % (2/21/2024)  Screening Current   Cholesterol Screening Once every 5 years if you don't have a lipid  disorder. May order more often based on risk factors. Lipid panel: 02/14/2024  Screening Current      Other Preventive Screenings Covered by Medicare:  Abdominal Aortic Aneurysm (AAA) Screening: covered once if your at risk. You're considered to be at risk if you have a family history of AAA or a male between the age of 65-75 who smoking at least 100 cigarettes in your lifetime.  Lung Cancer Screening: covers low dose CT scan once per year if you meet all of the following conditions: (1) Age 55-77; (2) No signs or symptoms of lung cancer; (3) Current smoker or have quit smoking within the last 15 years; (4) You have a tobacco smoking history of at least 20 pack years (packs per day x number of years you smoked); (5) You get a written order from a healthcare provider.  Glaucoma Screening: covered annually if you're considered high risk: (1) You have diabetes OR (2) Family history of glaucoma OR (3)  aged 50 and older OR (4)  American aged 65 and older  Osteoporosis Screening: covered every 2 years if you meet one of the following conditions: (1) Have a vertebral abnormality; (2) On glucocorticoid therapy for more than 3 months; (3) Have primary hyperparathyroidism; (4) On osteoporosis medications and need to assess response to drug therapy.  HIV Screening: covered annually if you're between the age of 15-65. Also covered annually if you are younger than 15 and older than 65 with risk factors for HIV infection. For pregnant patients, it is covered up to 3 times per pregnancy.    Immunizations:  Immunization Recommendations   Influenza Vaccine Annual influenza vaccination during flu season is recommended for all persons aged >= 6 months who do not have contraindications   Pneumococcal Vaccine   * Pneumococcal conjugate vaccine = PCV13 (Prevnar 13), PCV15 (Vaxneuvance), PCV20 (Prevnar 20)  * Pneumococcal polysaccharide vaccine = PPSV23 (Pneumovax) Adults 19-65 yo with certain risk factors or if  65+ yo  If never received any pneumonia vaccine: recommend Prevnar 20 (PCV20)  Give PCV20 if previously received 1 dose of PCV13 or PPSV23   Hepatitis B Vaccine 3 dose series if at intermediate or high risk (ex: diabetes, end stage renal disease, liver disease)   Respiratory syncytial virus (RSV) Vaccine - COVERED BY MEDICARE PART D  * RSVPreF3 (Arexvy) CDC recommends that adults 60 years of age and older may receive a single dose of RSV vaccine using shared clinical decision-making (SCDM)   Tetanus (Td) Vaccine - COST NOT COVERED BY MEDICARE PART B Following completion of primary series, a booster dose should be given every 10 years to maintain immunity against tetanus. Td may also be given as tetanus wound prophylaxis.   Tdap Vaccine - COST NOT COVERED BY MEDICARE PART B Recommended at least once for all adults. For pregnant patients, recommended with each pregnancy.   Shingles Vaccine (Shingrix) - COST NOT COVERED BY MEDICARE PART B  2 shot series recommended in those 19 years and older who have or will have weakened immune systems or those 50 years and older     Health Maintenance Due:      Topic Date Due   • Colorectal Cancer Screening  04/22/2025   • HIV Screening  Completed   • Hepatitis C Screening  Discontinued     Immunizations Due:      Topic Date Due   • Hepatitis A Vaccine (1 of 2 - Risk 2-dose series) Never done   • Pneumococcal Vaccine: Pediatrics (0 to 5 Years) and At-Risk Patients (6 to 64 Years) (2 of 2 - PPSV23 or PCV20) 08/12/2015   • Hepatitis B Vaccine (1 of 3 - Risk 3-dose series) Never done   • Influenza Vaccine (1) 09/01/2024   • COVID-19 Vaccine (7 - 2024-25 season) 09/01/2024     Advance Directives   What are advance directives?  Advance directives are legal documents that state your wishes and plans for medical care. These plans are made ahead of time in case you lose your ability to make decisions for yourself. Advance directives can apply to any medical decision, such as the treatments  you want, and if you want to donate organs.   What are the types of advance directives?  There are many types of advance directives, and each state has rules about how to use them. You may choose a combination of any of the following:  Living will:  This is a written record of the treatment you want. You can also choose which treatments you do not want, which to limit, and which to stop at a certain time. This includes surgery, medicine, IV fluid, and tube feedings.   Durable power of  for healthcare (DPAHC):  This is a written record that states who you want to make healthcare choices for you when you are unable to make them for yourself. This person, called a proxy, is usually a family member or a friend. You may choose more than 1 proxy.  Do not resuscitate (DNR) order:  A DNR order is used in case your heart stops beating or you stop breathing. It is a request not to have certain forms of treatment, such as CPR. A DNR order may be included in other types of advance directives.  Medical directive:  This covers the care that you want if you are in a coma, near death, or unable to make decisions for yourself. You can list the treatments you want for each condition. Treatment may include pain medicine, surgery, blood transfusions, dialysis, IV or tube feedings, and a ventilator (breathing machine).  Values history:  This document has questions about your views, beliefs, and how you feel and think about life. This information can help others choose the care that you would choose.  Why are advance directives important?  An advance directive helps you control your care. Although spoken wishes may be used, it is better to have your wishes written down. Spoken wishes can be misunderstood, or not followed. Treatments may be given even if you do not want them. An advance directive may make it easier for your family to make difficult choices about your care.   Weight Management   Why it is important to manage your  weight:  Being overweight increases your risk of health conditions such as heart disease, high blood pressure, type 2 diabetes, and certain types of cancer. It can also increase your risk for osteoarthritis, sleep apnea, and other respiratory problems. Aim for a slow, steady weight loss. Even a small amount of weight loss can lower your risk of health problems.  How to lose weight safely:  A safe and healthy way to lose weight is to eat fewer calories and get regular exercise. You can lose up about 1 pound a week by decreasing the number of calories you eat by 500 calories each day.   Healthy meal plan for weight management:  A healthy meal plan includes a variety of foods, contains fewer calories, and helps you stay healthy. A healthy meal plan includes the following:  Eat whole-grain foods more often.  A healthy meal plan should contain fiber. Fiber is the part of grains, fruits, and vegetables that is not broken down by your body. Whole-grain foods are healthy and provide extra fiber in your diet. Some examples of whole-grain foods are whole-wheat breads and pastas, oatmeal, brown rice, and bulgur.  Eat a variety of vegetables every day.  Include dark, leafy greens such as spinach, kale, jt greens, and mustard greens. Eat yellow and orange vegetables such as carrots, sweet potatoes, and winter squash.   Eat a variety of fruits every day.  Choose fresh or canned fruit (canned in its own juice or light syrup) instead of juice. Fruit juice has very little or no fiber.  Eat low-fat dairy foods.  Drink fat-free (skim) milk or 1% milk. Eat fat-free yogurt and low-fat cottage cheese. Try low-fat cheeses such as mozzarella and other reduced-fat cheeses.  Choose meat and other protein foods that are low in fat.  Choose beans or other legumes such as split peas or lentils. Choose fish, skinless poultry (chicken or turkey), or lean cuts of red meat (beef or pork). Before you cook meat or poultry, cut off any visible  fat.   Use less fat and oil.  Try baking foods instead of frying them. Add less fat, such as margarine, sour cream, regular salad dressing and mayonnaise to foods. Eat fewer high-fat foods. Some examples of high-fat foods include french fries, doughnuts, ice cream, and cakes.  Eat fewer sweets.  Limit foods and drinks that are high in sugar. This includes candy, cookies, regular soda, and sweetened drinks.  Exercise:  Exercise at least 30 minutes per day on most days of the week. Some examples of exercise include walking, biking, dancing, and swimming. You can also fit in more physical activity by taking the stairs instead of the elevator or parking farther away from stores. Ask your healthcare provider about the best exercise plan for you.      © Copyright [employer] 2018 Information is for End User's use only and may not be sold, redistributed or otherwise used for commercial purposes. All illustrations and images included in CareNotes® are the copyrighted property of A.D.A.M., Inc. or [employer]

## 2025-01-22 NOTE — PROGRESS NOTES
Name: Matt Tobias .      : 1965      MRN: [id number]  Encounter Provider: Di Osborne PA-C  Encounter Date: 2025   Encounter department: [facility]  :  Assessment & Plan           History of Present Illness {?Quick Links Encounters * My Last Note * Last Note in Specialty * Snapshot * Since Last Visit * History :39255}  HPI  Review of Systems    Objective {?Quick Links Trend Vitals * Enter New Vitals * Results Review * Timeline (Adult) * Labs * Imaging * Cardiology * Procedures * Lung Cancer Screening * Surgical eConsent :62199}  There were no vitals taken for this visit.     Physical Exam  {Administrative / Billing Section (Optional):85161}

## 2025-01-22 NOTE — ASSESSMENT & PLAN NOTE
Following with GI, last visit on 1/7/25  Advised to schedule US that was ordered by GI   Has colonoscopy scheduled for next week

## 2025-01-23 NOTE — TELEPHONE ENCOUNTER
Caller: rebel    Doctor: dada    Reason for call: pt needs to r/s his procedure.    Call back#: [phone]

## 2025-01-24 ENCOUNTER — HOSPITAL ENCOUNTER (OUTPATIENT)
Dept: ULTRASOUND IMAGING | Facility: HOSPITAL | Age: 60
End: 2025-01-24
Payer: COMMERCIAL

## 2025-01-24 DIAGNOSIS — M48.04 FORAMINAL STENOSIS OF THORACIC REGION: ICD-10-CM

## 2025-01-24 DIAGNOSIS — M54.9 MID BACK PAIN: ICD-10-CM

## 2025-01-24 DIAGNOSIS — M48.061 FORAMINAL STENOSIS OF LUMBAR REGION: Primary | ICD-10-CM

## 2025-01-24 DIAGNOSIS — B18.2 HEPATIC CIRRHOSIS DUE TO CHRONIC HEPATITIS C INFECTION (HCC): ICD-10-CM

## 2025-01-24 DIAGNOSIS — K74.60 HEPATIC CIRRHOSIS DUE TO CHRONIC HEPATITIS C INFECTION (HCC): ICD-10-CM

## 2025-01-24 PROCEDURE — 76700 US EXAM ABDOM COMPLETE: CPT

## 2025-01-27 ENCOUNTER — HOSPITAL ENCOUNTER (OUTPATIENT)
Dept: GASTROENTEROLOGY | Facility: HOSPITAL | Age: 60
Setting detail: OUTPATIENT SURGERY
Discharge: HOME | End: 2025-01-27
Payer: COMMERCIAL

## 2025-01-27 ENCOUNTER — RESULTS FOLLOW-UP (OUTPATIENT)
Dept: OTHER | Facility: HOSPITAL | Age: 60
End: 2025-01-27

## 2025-01-27 ENCOUNTER — ANESTHESIA (OUTPATIENT)
Dept: GASTROENTEROLOGY | Facility: HOSPITAL | Age: 60
End: 2025-01-27
Payer: COMMERCIAL

## 2025-01-27 ENCOUNTER — ANESTHESIA EVENT (OUTPATIENT)
Dept: GASTROENTEROLOGY | Facility: HOSPITAL | Age: 60
End: 2025-01-27
Payer: COMMERCIAL

## 2025-01-27 VITALS
DIASTOLIC BLOOD PRESSURE: 57 MMHG | HEART RATE: 62 BPM | RESPIRATION RATE: 19 BRPM | OXYGEN SATURATION: 95 % | WEIGHT: 207.89 LBS | BODY MASS INDEX: 33.41 KG/M2 | SYSTOLIC BLOOD PRESSURE: 103 MMHG | HEIGHT: 66 IN | TEMPERATURE: 97.5 F

## 2025-01-27 DIAGNOSIS — K62.5 BRBPR (BRIGHT RED BLOOD PER RECTUM): ICD-10-CM

## 2025-01-27 DIAGNOSIS — D72.819 LEUKOPENIA: Primary | ICD-10-CM

## 2025-01-27 PROCEDURE — 45378 DIAGNOSTIC COLONOSCOPY: CPT | Performed by: INTERNAL MEDICINE

## 2025-01-27 RX ORDER — PROPOFOL 10 MG/ML
INJECTION, EMULSION INTRAVENOUS AS NEEDED
Status: DISCONTINUED | OUTPATIENT
Start: 2025-01-27 | End: 2025-01-27

## 2025-01-27 RX ORDER — PHENYLEPHRINE HCL IN 0.9% NACL 1 MG/10 ML
SYRINGE (ML) INTRAVENOUS AS NEEDED
Status: DISCONTINUED | OUTPATIENT
Start: 2025-01-27 | End: 2025-01-27

## 2025-01-27 RX ORDER — SODIUM CHLORIDE, SODIUM LACTATE, POTASSIUM CHLORIDE, CALCIUM CHLORIDE 600; 310; 30; 20 MG/100ML; MG/100ML; MG/100ML; MG/100ML
INJECTION, SOLUTION INTRAVENOUS CONTINUOUS PRN
Status: DISCONTINUED | OUTPATIENT
Start: 2025-01-27 | End: 2025-01-27

## 2025-01-27 RX ORDER — LIDOCAINE HYDROCHLORIDE 20 MG/ML
INJECTION, SOLUTION EPIDURAL; INFILTRATION; INTRACAUDAL; PERINEURAL AS NEEDED
Status: DISCONTINUED | OUTPATIENT
Start: 2025-01-27 | End: 2025-01-27

## 2025-01-27 RX ADMIN — SODIUM CHLORIDE, SODIUM LACTATE, POTASSIUM CHLORIDE, AND CALCIUM CHLORIDE: .6; .31; .03; .02 INJECTION, SOLUTION INTRAVENOUS at 11:11

## 2025-01-27 RX ADMIN — PROPOFOL 30 MG: 10 INJECTION, EMULSION INTRAVENOUS at 11:20

## 2025-01-27 RX ADMIN — PROPOFOL 150 MG: 10 INJECTION, EMULSION INTRAVENOUS at 11:12

## 2025-01-27 RX ADMIN — PROPOFOL 50 MG: 10 INJECTION, EMULSION INTRAVENOUS at 11:16

## 2025-01-27 RX ADMIN — Medication 100 MCG: at 11:29

## 2025-01-27 RX ADMIN — SODIUM CHLORIDE, SODIUM LACTATE, POTASSIUM CHLORIDE, AND CALCIUM CHLORIDE: .6; .31; .03; .02 INJECTION, SOLUTION INTRAVENOUS at 11:09

## 2025-01-27 RX ADMIN — PROPOFOL 20 MG: 10 INJECTION, EMULSION INTRAVENOUS at 11:15

## 2025-01-27 RX ADMIN — LIDOCAINE HYDROCHLORIDE 100 MG: 20 INJECTION, SOLUTION EPIDURAL; INFILTRATION; INTRACAUDAL at 11:12

## 2025-01-27 RX ADMIN — PROPOFOL 30 MG: 10 INJECTION, EMULSION INTRAVENOUS at 11:13

## 2025-01-27 RX ADMIN — Medication 200 MCG: at 11:26

## 2025-01-27 NOTE — ANESTHESIA POSTPROCEDURE EVALUATION
Post-Op Assessment Note    CV Status:  Stable    Pain management: adequate       Mental Status:  Sleepy   Hydration Status:  Euvolemic   PONV Controlled:  Controlled   Airway Patency:  Patent     Post Op Vitals Reviewed: Yes    No anethesia notable event occurred.    Staff: CRNA           Last Filed PACU Vitals:  Vitals Value Taken Time   Temp 97.5 °F (36.4 °C) 01/27/25 1125   Pulse 60 01/27/25 1138   /54 01/27/25 1133   Resp 39 01/27/25 1138   SpO2 92 % 01/27/25 1138   Vitals shown include unfiled device data.    Modified Luis Eduardo:     Vitals Value Taken Time   Activity 2 01/27/25 1125   Respiration 2 01/27/25 1125   Circulation 1 01/27/25 1125   Consciousness 1 01/27/25 1125   Oxygen Saturation 2 01/27/25 1125     Modified Luis Eduardo Score: 8

## 2025-01-27 NOTE — ANESTHESIA PREPROCEDURE EVALUATION
Procedure:  COLONOSCOPY    Coreg today    Chronic cough     Patient's MELD Score is: 18    MELD Score 90-day mortality   <=9 1.9%   10-19 6.0%   20-29 19.6%   30-39 52.6%   >=40 71.3%     MELD Score Component Values:  Component Value Date   Creatinine: 1 mg/dL 10/29/2024   Dialysis at least twice   in the past week  Or CVVHD for >=24 hours   in the past week:     No    Bilirubin, total: 5.18 mg/dL 10/29/2024   INR: 1.39 10/28/2024   Sodium: 134 mmol/L 10/29/2024     LEFT VENTRICLE:  Systolic function was normal. Ejection fraction was estimated in the range of 55 % to 65 %.  There were no regional wall motion abnormalities.  Doppler parameters were consistent with abnormal left ventricular relaxation (grade 1 diastolic dysfunction).     TRICUSPID VALVE:  There was mild regurgitation.    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         Additional Monitors:     Airway Plan:            Plan Factors-    Chart reviewed.   Existing labs reviewed. Patient summary reviewed.    Patient is not a current smoker.              Induction- intravenous.    Postoperative Plan-         Informed Consent- Anesthetic plan and risks discussed with patient.  I personally reviewed this patient with the CRNA. Discussed and agreed on the Anesthesia Plan with the CRNA..      NPO Status:  Vitals Value Taken Time   Date of last liquid 01/27/25 01/27/25 1017   Time of last  liquid 0800 01/27/25 1017   Date of last solid 01/25/25 01/27/25 1017   Time of last solid

## 2025-01-28 ENCOUNTER — TELEPHONE (OUTPATIENT)
Dept: HEMATOLOGY ONCOLOGY | Facility: CLINIC | Age: 60
End: 2025-01-28

## 2025-01-28 ENCOUNTER — RESULTS FOLLOW-UP (OUTPATIENT)
Age: 60
End: 2025-01-28

## 2025-01-28 NOTE — TELEPHONE ENCOUNTER
Attempted to call pt regarding recent lab results and [facility] instructions but the phone number was invalid. Sent [employer] message as pt has been active lately.

## 2025-02-03 ENCOUNTER — TELEPHONE (OUTPATIENT)
Dept: FAMILY MEDICINE CLINIC | Facility: CLINIC | Age: 60
End: 2025-02-03

## 2025-02-03 DIAGNOSIS — K76.82 HEPATIC ENCEPHALOPATHY (HCC): ICD-10-CM

## 2025-02-03 DIAGNOSIS — F11.20 CONTINUOUS OPIOID DEPENDENCE (HCC): Primary | ICD-10-CM

## 2025-02-03 DIAGNOSIS — E66.01 MORBID (SEVERE) OBESITY DUE TO EXCESS CALORIES (HCC): ICD-10-CM

## 2025-02-03 DIAGNOSIS — L71.9 ROSACEA: ICD-10-CM

## 2025-02-03 DIAGNOSIS — R05.9 COUGH: ICD-10-CM

## 2025-02-03 NOTE — TELEPHONE ENCOUNTER
Reason for call:   [x] Refill   [] Prior Auth  [] Other:     Office:   [x] PCP/Provider - Sandro  [] Specialty/Provider -     Fluticasone nasal spray  1 spray into each nostril #48    Lactulose solution  10g daily prn     Omeprazole 20mg  1 cap daily #90    Tramadol 50mg  1 tab q6h prn     Triamcinolone 0.5% cream   Apply bid #45    Pharmacy: [facility] ([facility] - [place], [place] - [address] [place] [address] [phone]     Does the patient have enough for 3 days?   [x] Yes   [] No - Send as HP to POD

## 2025-02-03 NOTE — TELEPHONE ENCOUNTER
Patient is requesting Amiloride 10mg 1 tab daily #90. Patient now uses [facility] ([facility]) - [place], [place] - [address] [facility] [phone]

## 2025-02-04 ENCOUNTER — HOSPITAL ENCOUNTER (OUTPATIENT)
Dept: RADIOLOGY | Facility: CLINIC | Age: 60
Discharge: HOME | End: 2025-02-04
Payer: COMMERCIAL

## 2025-02-04 VITALS
OXYGEN SATURATION: 93 % | DIASTOLIC BLOOD PRESSURE: 73 MMHG | RESPIRATION RATE: 20 BRPM | HEART RATE: 69 BPM | SYSTOLIC BLOOD PRESSURE: 128 MMHG | TEMPERATURE: 97.5 F

## 2025-02-04 DIAGNOSIS — M48.061 FORAMINAL STENOSIS OF LUMBAR REGION: ICD-10-CM

## 2025-02-04 DIAGNOSIS — M54.9 MID BACK PAIN: ICD-10-CM

## 2025-02-04 DIAGNOSIS — M48.04 FORAMINAL STENOSIS OF THORACIC REGION: ICD-10-CM

## 2025-02-04 PROCEDURE — 62323 NJX INTERLAMINAR LMBR/SAC: CPT | Performed by: STUDENT IN AN ORGANIZED HEALTH CARE EDUCATION/TRAINING PROGRAM

## 2025-02-04 RX ORDER — FLUTICASONE PROPIONATE 50 MCG
1 SPRAY, SUSPENSION (ML) NASAL DAILY
Qty: 48 G | Refills: 0 | Status: SHIPPED | OUTPATIENT
Start: 2025-02-04

## 2025-02-04 RX ORDER — METHYLPREDNISOLONE ACETATE 80 MG/ML
80 INJECTION, SUSPENSION INTRA-ARTICULAR; INTRALESIONAL; INTRAMUSCULAR; PARENTERAL; SOFT TISSUE ONCE
Status: COMPLETED | OUTPATIENT
Start: 2025-02-04 | End: 2025-02-04

## 2025-02-04 RX ORDER — TRIAMCINOLONE ACETONIDE 5 MG/G
CREAM TOPICAL 2 TIMES DAILY
Qty: 45 G | Refills: 0 | Status: SHIPPED | OUTPATIENT
Start: 2025-02-04

## 2025-02-04 RX ORDER — TRAMADOL HYDROCHLORIDE 50 MG/1
50 TABLET ORAL EVERY 8 HOURS PRN
Qty: 60 TABLET | Refills: 0 | Status: SHIPPED | OUTPATIENT
Start: 2025-02-04

## 2025-02-04 RX ORDER — LACTULOSE 10 G/15ML
10 SOLUTION ORAL DAILY PRN
Qty: 946 ML | Refills: 0 | Status: SHIPPED | OUTPATIENT
Start: 2025-02-04

## 2025-02-04 RX ADMIN — METHYLPREDNISOLONE ACETATE 80 MG: 80 INJECTION, SUSPENSION INTRA-ARTICULAR; INTRALESIONAL; INTRAMUSCULAR; SOFT TISSUE at 11:49

## 2025-02-04 RX ADMIN — IOHEXOL 1 ML: 300 INJECTION, SOLUTION INTRAVENOUS at 11:49

## 2025-02-04 NOTE — DISCHARGE INSTR - LAB
Epidural Steroid Injection   WHAT YOU NEED TO KNOW:   An epidural steroid injection (RAIN) is a procedure to inject steroid medicine into the epidural space. The epidural space is between your spinal cord and vertebrae. Steroids reduce inflammation and fluid buildup in your spine that may be causing pain. You may be given pain medicine along with the steroids.          ACTIVITY  Do not drive or operate machinery today.  No strenuous activity today - bending, lifting, etc.  You may resume normal activites starting tomorrow - start slowly and as tolerated.  You may shower today, but no tub baths or hot tubs.  You may have numbness for several hours from the local anesthetic. Please use caution and common sense, especially with weight-bearing activities.    CARE OF THE INJECTION SITE  If you have soreness or pain, apply ice to the area today (20 minutes on/20 minutes off).  Starting tomorrow, you may use warm, moist heat or ice if needed.  You may have an increase or change in your discomfort for 36-48 hours after your treatment.  Apply ice and continue with any pain medication you have been prescribed.  Notify the [facility] if you have any of the following: redness, drainage, swelling, headache, stiff neck or fever above 100°F.    SPECIAL INSTRUCTIONS  Our office will contact you in approximately 14 days for a progress report.    MEDICATIONS  Continue to take all routine medications.  Our office may have instructed you to hold some medications.    As no general anesthesia was used in today's procedure, you should not experience any side effects related to anesthesia.     If you are diabetic, the steroids used in today's injection may temporarily increase your blood sugar levels after the first few days after your injection. Please keep a close eye on your sugars and alert the doctor who manages your diabetes if your sugars are significantly high from your baseline or you are symptomatic.     If you have a  problem specifically related to your procedure, please call our office at [phone].  Problems not related to your procedure should be directed to your primary care physician.

## 2025-02-04 NOTE — H&P
History of Present Illness: The patient is a 59 y.o. male who presents with complaints of lower back pain    Past Medical History:   Diagnosis Date    AXEL (acute kidney injury) (HCC) 10/28/2024    CPAP (continuous positive airway pressure) dependence     does not use machine    Esophageal varices (HCC)     stable 9/2020 gets checked yearly    Headache(784.0)     Hepatic cirrhosis (HCC)     treated with harvoni   Hep C- dx age 1995    Hepatic encephalopathy (HCC)     Hyperammonemia (HCC) 10/30/2019    Liver disease     Obesity     Peptic ulcer 01/06/2025    Pneumonia     in past    Postgastrectomy malabsorption     Proctitis 05/29/2015    Sleep apnea     history of    Wears glasses        Past Surgical History:   Procedure Laterality Date    COLONOSCOPY      ESOPHAGOGASTRODUODENOSCOPY N/A 04/12/2018    Procedure: ESOPHAGOGASTRODUODENOSCOPY (EGD);  Surgeon: Willy Traylor MD;  [facility]: [facility];  Service: Gastroenterology    FRACTURE SURGERY Left     ankle- hardware    OTHER SURGICAL HISTORY      banding esophageal varices    AR EXC EXCSV SKN ABD INFRAUMBILICAL PANNICULECTOMY N/A 09/16/2020    Procedure: PANNICULECTOMY;  Surgeon: Enrrique Birmingham MD;  [facility];  Service: Plastics    AR EXCISION EXCESSIVE SKIN & SUBQ TISSUE ABDOMEN N/A 09/16/2020    Procedure: ABDOMINOPLASTY;  Surgeon: Enrrique Birmingham MD;  [facility];  Service: Plastics    AR LAPS GSTRC RSTRICTIV PX LONGITUDINAL GASTRECTOMY N/A 06/05/2018    Procedure: GASTRECTOMY LAPAROSCOPIC SLEEVE;  Surgeon: Diana Mcdonnell MD;  [facility];  Service: Bariatrics         Current Outpatient Medications:     carvedilol (COREG) 6.25 mg tablet, Take 1 tablet by mouth 2 (two) times a day with meals, Disp: 180 tablet, Rfl: 1    Claritin-D 12 Hour 5-120 MG per tablet, TAKE ONE TABLET BY MOUTH TWICE DAILY FOR 5 DAYS, Disp: 10 tablet, Rfl: 0    cyanocobalamin (VITAMIN B-12) 1000 MCG tablet, Take 1 tablet (1,000 mcg total) by  mouth daily, Disp: 30 tablet, Rfl: 2    fluticasone (FLONASE) 50 mcg/act nasal spray, 1 spray into each nostril daily, Disp: 48 g, Rfl: 3    lactulose (CHRONULAC) 10 g/15 mL solution, Take 15 mL (10 g total) by mouth daily as needed (constipation or confusion), Disp: 946 mL, Rfl: 0    omeprazole (PriLOSEC) 20 mg delayed release capsule, Take 1 capsule (20 mg total) by mouth daily, Disp: 90 capsule, Rfl: 1    traMADol (ULTRAM) 50 mg tablet, Take 50 mg by mouth every 6 (six) hours as needed for moderate pain, Disp: , Rfl:     triamcinolone (KENALOG) 0.5 % cream, Apply topically 2 (two) times a day, Disp: 45 g, Rfl: 3    Current Facility-Administered Medications:     iohexol (OMNIPAQUE) 300 mg/mL injection 1 mL, 1 mL, Epidural, Once, Hugh Horn MD    methylPREDNISolone acetate (DEPO-MEDROL) injection 80 mg, 80 mg, Epidural, Once, Hugh Horn MD    No Known Allergies    Physical Exam:   Vitals:    02/04/25 1138   BP: 134/82   Pulse: 71   Resp: 20   Temp: 97.5 °F (36.4 °C)   SpO2: 93%     General: Awake, Alert, Oriented x 3, Mood and affect appropriate  Respiratory: Respirations even and unlabored  Cardiovascular: Peripheral pulses intact; no edema  Musculoskeletal Exam: back non erythematous no lesions    ASA Score: 3    Patient/Chart Verification  Patient ID Verified: Verbal  ID Band Applied: No  Consents Confirmed: To be obtained in the Procedural area  H&P( within 30 days) Verified: To be obtained in the Procedural area  Interval H&P(within 24 hr) Complete (required for Outpatients and Surgery Admit only): To be obtained in the Procedural area  Allergies Reviewed: Yes  Anticoag/NSAID held?: NA  Currently on antibiotics?: No  Pregnancy denied?: NA    Assessment:   1. Foraminal stenosis of lumbar region    2. Foraminal stenosis of thoracic region    3. Mid back pain        Plan: LESI  T12-L1

## 2025-02-05 ENCOUNTER — NURSE TRIAGE (OUTPATIENT)
Age: 60
End: 2025-02-05

## 2025-02-05 ENCOUNTER — TELEPHONE (OUTPATIENT)
Dept: GASTROENTEROLOGY | Facility: CLINIC | Age: 60
End: 2025-02-05

## 2025-02-05 DIAGNOSIS — K76.82 HEPATIC ENCEPHALOPATHY (HCC): ICD-10-CM

## 2025-02-05 DIAGNOSIS — K76.82 HEPATIC ENCEPHALOPATHY (HCC): Primary | ICD-10-CM

## 2025-02-05 RX ORDER — AMILORIDE HYDROCHLORIDE 5 MG/1
5 TABLET ORAL DAILY
Qty: 90 TABLET | Refills: 1 | Status: SHIPPED | OUTPATIENT
Start: 2025-02-05

## 2025-02-05 RX ORDER — AMILORIDE HYDROCHLORIDE 5 MG/1
5 TABLET ORAL DAILY
Qty: 90 TABLET | Refills: 1 | Status: SHIPPED | OUTPATIENT
Start: 2025-02-05 | End: 2025-02-05 | Stop reason: SDUPTHER

## 2025-02-05 NOTE — TELEPHONE ENCOUNTER
"Reason for Disposition   Prescription prescribed recently is not at pharmacy and triager has access to patient's EMR and prescription is recorded in the EMR    Answer Assessment - Initial Assessment Questions  1. NAME of MEDICINE: \"What medicine(s) are you calling about?\"      AMILoride 5 mg tablet  2. QUESTION: \"What is your question?\" (e.g., double dose of medicine, side effect)      Would like this sent to [facility] pharmacy  3. PRESCRIBER: \"Who prescribed the medicine?\" Reason: if prescribed by specialist, call should be referred to that group.      Dr Jo    Protocols used: Medication Question Call-Adult-[place]    "

## 2025-02-05 NOTE — TELEPHONE ENCOUNTER
Pt called back to have pharmacy changed for his medication. He states that is is prescribed for hepatic encephalopathy. Per refill protocol was able to send to [facility] for patient.

## 2025-02-05 NOTE — TELEPHONE ENCOUNTER
Pt calling in to request GI reach out to his PCP's office to provide rationale for Amiloride script. Pt reports his PCP does not know why this medication was ordered. Informed pt this medication was initially prescribed by, and managed by, his PCP for hepatic encephalopathy. Pt states his PCP will no refill the medication. Informed pt his PCP sent a refill for Amiloride to [facility]; confirmed receipt was acknowledged by the pharmacy @ 12:53 noon, today. Pt reports this is the incorrect pharmacy; [facility] should be filling this. Advised pt to call his PCP to update. Pt is agreeable and without further questions.

## 2025-02-21 ENCOUNTER — OFFICE VISIT (OUTPATIENT)
Dept: FAMILY MEDICINE CLINIC | Facility: CLINIC | Age: 60
End: 2025-02-21
Payer: COMMERCIAL

## 2025-02-21 VITALS
OXYGEN SATURATION: 98 % | DIASTOLIC BLOOD PRESSURE: 82 MMHG | BODY MASS INDEX: 34.62 KG/M2 | SYSTOLIC BLOOD PRESSURE: 126 MMHG | WEIGHT: 215.4 LBS | TEMPERATURE: 97.1 F | HEIGHT: 66 IN | HEART RATE: 66 BPM

## 2025-02-21 DIAGNOSIS — K21.9 GASTROESOPHAGEAL REFLUX DISEASE WITHOUT ESOPHAGITIS: ICD-10-CM

## 2025-02-21 DIAGNOSIS — B18.2 HEPATIC CIRRHOSIS DUE TO CHRONIC HEPATITIS C INFECTION (HCC): Primary | ICD-10-CM

## 2025-02-21 DIAGNOSIS — K74.60 HEPATIC CIRRHOSIS DUE TO CHRONIC HEPATITIS C INFECTION (HCC): Primary | ICD-10-CM

## 2025-02-21 DIAGNOSIS — F11.20 CONTINUOUS OPIOID DEPENDENCE (HCC): ICD-10-CM

## 2025-02-21 PROCEDURE — G2211 COMPLEX E/M VISIT ADD ON: HCPCS

## 2025-02-21 PROCEDURE — 99213 OFFICE O/P EST LOW 20 MIN: CPT

## 2025-02-21 NOTE — ASSESSMENT & PLAN NOTE
Follows routinely with gastroenterology for management  Continues on lactulose and amiloride  CMP 1/7/25 showed stable chronic elevated total bilirubin, normal LFTs

## 2025-02-21 NOTE — PROGRESS NOTES
"Name: Matt Tobias Sr.      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 Jo          Follow up in six months with PCP  Sooner as needed     History of Present Illness   CC: \"check up\"    Patient presents for routine check up.  He is up to date on routine labs -- completed 2025.   Up to date on PSA and CRC screening, medicare wellness completed at last visit.   He has no acute complaints today.   Reports he is feeling well.       Review of Systems   Constitutional:  Negative for chills, diaphoresis and fever.   Respiratory:  Negative for cough, chest tightness, shortness of breath and wheezing.    Cardiovascular:  Negative for chest pain and palpitations.   Neurological:  Negative for dizziness, light-headedness and headaches.       Objective   /82   Pulse 66   Temp (!) 97.1 °F (36.2 °C)   Ht 5' 6\" (1.676 m)   Wt 97.7 kg (215 lb 6.4 oz)   SpO2 98%   BMI 34.77 kg/m²      Physical Exam  Vitals reviewed.   Constitutional:       General: He is not in acute distress.     Appearance: Normal appearance. He is not ill-appearing or diaphoretic.   HENT:      Head: Normocephalic and atraumatic.      Right Ear: External ear normal.      Left Ear: External ear normal.      Nose: Nose normal.      Mouth/Throat:      Mouth: Mucous membranes are moist.   Eyes:      General:    "      Right eye: No discharge.         Left eye: No discharge.      Conjunctiva/sclera: Conjunctivae normal.   Cardiovascular:      Rate and Rhythm: Normal rate and regular rhythm.      Pulses: Normal pulses.      Heart sounds: Normal heart sounds. No murmur heard.  Pulmonary:      Effort: Pulmonary effort is normal. No respiratory distress.      Breath sounds: Normal breath sounds. No wheezing, rhonchi or rales.   Musculoskeletal:      Cervical back: Normal range of motion.   Skin:     General: Skin is warm.   Neurological:      General: No focal deficit present.      Mental Status: He is alert.   Psychiatric:         Mood and Affect: Mood normal.

## 2025-02-21 NOTE — ASSESSMENT & PLAN NOTE
Chronic back pain -- follows with spine management, starting PT soon  Takes tramadol daily, managed by PCP, Dr. Jo

## 2025-03-03 NOTE — H&P (VIEW-ONLY)
Pain Medicine Follow-Up Note    Assessment:  1. Stenosis of lateral recess of lumbar spine    2. Lumbar spondylosis    3. Lumbar disc herniation    4. Lumbar disc disease with radiculopathy        Plan:  Orders Placed This Encounter   Procedures   • [place] spine and pain procedure     Standing Status:   Future     Expected Date:   3/4/2025     Expiration Date:   3/4/2029     Reason for Exam::   Lesi L4-L5     Anticoagulant hold needed?:   no     My impressions and treatment recommendations were discussed in detail with the patient who verbalized understanding and had no further questions.      Patient returns to the office after having a thoracic epidural steroid injection at T12-L1 on 2/4/2025.  Patient states that the injection only provided him relief for about a week, however today the patient's pain appears lower.  Upon reviewing reviewing patient's imaging correlating with the patient's pain symptoms I feel he would benefit from a lumbar epidural steroid injection at L4-L5. Complete risks and benefits including bleeding, infection, tissue reaction, nerve injury and allergic reaction were discussed. The approach was demonstrated using models and literature was provided. Verbal and written consent was obtained.    Patient also has atrophy of his multifidus muscle in his lumbar region and will benefit from physical therapy.  Patient reports initiating physical therapy.  He continues to use pain medications (tramadol) which are being prescribed through his PCP.    Follow-up is planned in 4 weeks after injection.  Time or sooner as warranted.  Discharge instructions were provided. I personally saw and examined the patient and I agree with the above discussed plan of care.    History of Present Illness:    Matt Tobias  is a 59 y.o. male who presents to [facility] for interval re-evaluation of the above stated pain complaints. The patient has a past medical and chronic pain history as  outlined in the assessment section. He was last seen on 2/4/2025.    At today's visit patient states that their pain symptoms are same with a pain score of 3/10 on the verbal numeric pain scale.  The patient's pain is worse in the morning and at night.  The patient's pain is constant in nature.  And the quality of the patient's pain is described as dull-aching and pressure-like.  The patient's pain is located in the mid back and bilateral low back.  Patient reports pain relief using tramadol which is being prescribed through his PCP allows him the ability to bend with ease.  Patient denies any side effects.    Other than as stated above, the patient denies any interval changes in medications, medical condition, mental condition, symptoms, or allergies since the last office visit.         Review of Systems:    Review of Systems   Respiratory:  Negative for shortness of breath.    Cardiovascular:  Negative for chest pain.   Gastrointestinal:  Negative for constipation, diarrhea, nausea and vomiting.   Musculoskeletal:  Negative for arthralgias, gait problem, joint swelling and myalgias.        DROM  Pain in Extremity    Skin:  Negative for rash.   Neurological:  Negative for dizziness, seizures and weakness.   All other systems reviewed and are negative.        Past Medical History:   Diagnosis Date   • AXEL (acute kidney injury) (HCC) 10/28/2024   • CPAP (continuous positive airway pressure) dependence     does not use machine   • Esophageal varices (HCC)     stable 9/2020 gets checked yearly   • Headache(784.0)    • Hepatic cirrhosis (HCC)     treated with harvoni   Hep C- dx age 1995   • Hepatic encephalopathy (HCC)    • Hyperammonemia (HCC) 10/30/2019   • Liver disease    • Obesity    • Peptic ulcer 01/06/2025   • Pneumonia     in past   • Postgastrectomy malabsorption    • Proctitis 05/29/2015   • Sleep apnea     history of   • Wears glasses        Past Surgical History:   Procedure Laterality Date   • COLONOSCOPY      • ESOPHAGOGASTRODUODENOSCOPY N/A 2018    Procedure: ESOPHAGOGASTRODUODENOSCOPY (EGD);  Surgeon: Wlily Traylor MD;  [facility];  Service: Gastroenterology   • FRACTURE SURGERY Left     ankle- hardware   • OTHER SURGICAL HISTORY      banding esophageal varices   • GA EXC EXCSV SKN ABD INFRAUMBILICAL PANNICULECTOMY N/A 2020    Procedure: PANNICULECTOMY;  Surgeon: Enrrique Birmingham MD;  [facility];  Service: Plastics   • GA EXCISION EXCESSIVE SKIN & SUBQ TISSUE ABDOMEN N/A 2020    Procedure: ABDOMINOPLASTY;  Surgeon: Enrrique Birmingham MD;  [facility];  Service: Plastics   • GA LAPS GSTRC RSTRICTIV PX LONGITUDINAL GASTRECTOMY N/A 2018    Procedure: GASTRECTOMY LAPAROSCOPIC SLEEVE;  Surgeon: Diana Mcdonnell MD;  [facility];  Service: Bariatrics       Family History   Problem Relation Age of Onset   • Heart disease Mother    • Lupus Mother    • Diabetes Father    • Stroke Father        Social History     Occupational History   • Occupation: disabled   Tobacco Use   • Smoking status: Former     Current packs/day: 0.00     Average packs/day: 0.2 packs/day for 40.5 years (10.1 ttl pk-yrs)     Types: Cigarettes     Start date: 1982     Quit date: 2022     Years since quittin.2   • Smokeless tobacco: Never   • Tobacco comments:     stopped 2020   Vaping Use   • Vaping status: Former   • Substances: Flavoring   Substance and Sexual Activity   • Alcohol use: Not Currently     Comment: occasionally   • Drug use: No   • Sexual activity: Yes     Partners: Female     Birth control/protection: None         Current Outpatient Medications:   •  AMILoride 5 mg tablet, Take 1 tablet (5 mg total) by mouth daily, Disp: 90 tablet, Rfl: 1  •  carvedilol (COREG) 6.25 mg tablet, Take 1 tablet by mouth 2 (two) times a day with meals, Disp: 180 tablet, Rfl: 1  •  Claritin-D 12 Hour 5-120 MG per tablet, TAKE ONE TABLET BY MOUTH TWICE DAILY FOR 5 DAYS,  "Disp: 10 tablet, Rfl: 0  •  fluticasone (FLONASE) 50 mcg/act nasal spray, 1 spray into each nostril daily, Disp: 48 g, Rfl: 0  •  lactulose (CHRONULAC) 10 g/15 mL solution, Take 15 mL (10 g total) by mouth daily as needed (constipation or confusion), Disp: 946 mL, Rfl: 0  •  omeprazole (PriLOSEC) 20 mg delayed release capsule, Take 1 capsule (20 mg total) by mouth daily, Disp: 90 capsule, Rfl: 0  •  traMADol (ULTRAM) 50 mg tablet, Take 1 tablet (50 mg total) by mouth every 8 (eight) hours as needed for moderate pain, Disp: 60 tablet, Rfl: 0  •  triamcinolone (KENALOG) 0.5 % cream, Apply topically 2 (two) times a day, Disp: 45 g, Rfl: 0  •  cyanocobalamin (VITAMIN B-12) 1000 MCG tablet, Take 1 tablet (1,000 mcg total) by mouth daily, Disp: 30 tablet, Rfl: 2    No Known Allergies    Physical Exam:    Ht 5' 6\" (1.676 m)   Wt 97.7 kg (215 lb 6.2 oz)   BMI 34.76 kg/m²     Constitutional:normal, well developed, well nourished, alert, in no distress and non-toxic and no overt pain behavior. and obese  Eyes:anicteric  HEENT:grossly intact  Neck:supple, symmetric, trachea midline and no masses   Pulmonary:even and unlabored  Cardiovascular:No edema or pitting edema present  Skin:Normal without rashes or lesions and well hydrated  Psychiatric:Mood and affect appropriate  Neurologic:Cranial Nerves II-XII grossly intact  Musculoskeletal:antalgic gait    Lumbar Spine Exam    Appearance:  Normal lordosis  Palpation/Tenderness:  left lumbar paraspinal tenderness  right lumbar paraspinal tenderness  Special Tests:  Left Straight Leg Test:  negative  Right Straight Leg Test:  negative  Left Devin's Maneuver:  negative  Right Devin's Maneuver:  negative  Left Pelvic Distraction Test:  negative  Right Pelvic Distraction Test:  negative       Imaging  FL spine and pain procedure    (Results Pending)         Orders Placed This Encounter   Procedures   • FL spine and pain procedure       This document was created using speech voice " recognition software.   Grammatical errors, random word insertions, pronoun errors, and incomplete sentences are an occasional consequence of this system due to software limitations, ambient noise, and hardware issues.   Any formal questions or concerns about content, text, or information contained within the body of this dictation should be directly addressed to the provider for clarification.

## 2025-03-03 NOTE — PROGRESS NOTES
Pain Medicine Follow-Up Note    Assessment:  1. Stenosis of lateral recess of lumbar spine    2. Lumbar spondylosis    3. Lumbar disc herniation    4. Lumbar disc disease with radiculopathy        Plan:  Orders Placed This Encounter   Procedures   • FL spine and pain procedure     Standing Status:   Future     Expected Date:   3/4/2025     Expiration Date:   3/4/2029     Reason for Exam::   Lesi L4-L5     Anticoagulant hold needed?:   no     My impressions and treatment recommendations were discussed in detail with the patient who verbalized understanding and had no further questions.      Patient returns to the office after having a thoracic epidural steroid injection at T12-L1 on 2/4/2025.  Patient states that the injection only provided him relief for about a week, however today the patient's pain appears lower.  Upon reviewing reviewing patient's imaging correlating with the patient's pain symptoms I feel he would benefit from a lumbar epidural steroid injection at L4-L5. Complete risks and benefits including bleeding, infection, tissue reaction, nerve injury and allergic reaction were discussed. The approach was demonstrated using models and literature was provided. Verbal and written consent was obtained.    Patient also has atrophy of his multifidus muscle in his lumbar region and will benefit from physical therapy.  Patient reports initiating physical therapy.  He continues to use pain medications (tramadol) which are being prescribed through his PCP.    Follow-up is planned in 4 weeks after injection.  Time or sooner as warranted.  Discharge instructions were provided. I personally saw and examined the patient and I agree with the above discussed plan of care.    History of Present Illness:    Matt Tobias  is a 59 y.o. male who presents to [facility] for interval re-evaluation of the above stated pain complaints. The patient has a past medical and chronic pain history as  outlined in the assessment section. He was last seen on 2/4/2025.    At today's visit patient states that their pain symptoms are same with a pain score of 3/10 on the verbal numeric pain scale.  The patient's pain is worse in the morning and at night.  The patient's pain is constant in nature.  And the quality of the patient's pain is described as dull-aching and pressure-like.  The patient's pain is located in the mid back and bilateral low back.  Patient reports pain relief using tramadol which is being prescribed through his PCP allows him the ability to bend with ease.  Patient denies any side effects.    Other than as stated above, the patient denies any interval changes in medications, medical condition, mental condition, symptoms, or allergies since the last office visit.         Review of Systems:    Review of Systems   Respiratory:  Negative for shortness of breath.    Cardiovascular:  Negative for chest pain.   Gastrointestinal:  Negative for constipation, diarrhea, nausea and vomiting.   Musculoskeletal:  Negative for arthralgias, gait problem, joint swelling and myalgias.        DROM  Pain in Extremity    Skin:  Negative for rash.   Neurological:  Negative for dizziness, seizures and weakness.   All other systems reviewed and are negative.        Past Medical History:   Diagnosis Date   • AXEL (acute kidney injury) (HCC) 10/28/2024   • CPAP (continuous positive airway pressure) dependence     does not use machine   • Esophageal varices (HCC)     stable 9/2020 gets checked yearly   • Headache(784.0)    • Hepatic cirrhosis (HCC)     treated with harvoni   Hep C- dx age 1995   • Hepatic encephalopathy (HCC)    • Hyperammonemia (HCC) 10/30/2019   • Liver disease    • Obesity    • Peptic ulcer 01/06/2025   • Pneumonia     in past   • Postgastrectomy malabsorption    • Proctitis 05/29/2015   • Sleep apnea     history of   • Wears glasses        Past Surgical History:   Procedure Laterality Date   • COLONOSCOPY      • ESOPHAGOGASTRODUODENOSCOPY N/A 2018    Procedure: ESOPHAGOGASTRODUODENOSCOPY (EGD);  Surgeon: Willy Traylor MD;  Location: [facility];  Service: Gastroenterology   • FRACTURE SURGERY Left     ankle- hardware   • OTHER SURGICAL HISTORY      banding esophageal varices   • LA EXC EXCSV SKN ABD INFRAUMBILICAL PANNICULECTOMY N/A 2020    Procedure: PANNICULECTOMY;  Surgeon: Enrrique Birmingham MD;  Location: [facility];  Service: Plastics   • LA EXCISION EXCESSIVE SKIN & SUBQ TISSUE ABDOMEN N/A 2020    Procedure: ABDOMINOPLASTY;  Surgeon: Enrrique Birmingham MD;  Location: [facility];  Service: Plastics   • LA LAPS GSTRC RSTRICTIV PX LONGITUDINAL GASTRECTOMY N/A 2018    Procedure: GASTRECTOMY LAPAROSCOPIC SLEEVE;  Surgeon: Diana Mcdonnell MD;  Location: [facility];  Service: Bariatrics       Family History   Problem Relation Age of Onset   • Heart disease Mother    • Lupus Mother    • Diabetes Father    • Stroke Father        Social History     Occupational History   • Occupation: disabled   Tobacco Use   • Smoking status: Former     Current packs/day: 0.00     Average packs/day: 0.2 packs/day for 40.5 years (10.1 ttl pk-yrs)     Types: Cigarettes     Start date: 1982     Quit date: 2022     Years since quittin.2   • Smokeless tobacco: Never   • Tobacco comments:     stopped 2020   Vaping Use   • Vaping status: Former   • Substances: Flavoring   Substance and Sexual Activity   • Alcohol use: Not Currently     Comment: occasionally   • Drug use: No   • Sexual activity: Yes     Partners: Female     Birth control/protection: None         Current Outpatient Medications:   •  AMILoride 5 mg tablet, Take 1 tablet (5 mg total) by mouth daily, Disp: 90 tablet, Rfl: 1  •  carvedilol (COREG) 6.25 mg tablet, Take 1 tablet by mouth 2 (two) times a day with meals, Disp: 180 tablet, Rfl: 1  •  Claritin-D 12 Hour 5-120 MG per tablet, TAKE ONE TABLET BY MOUTH TWICE DAILY FOR 5 DAYS,  "Disp: 10 tablet, Rfl: 0  •  fluticasone (FLONASE) 50 mcg/act nasal spray, 1 spray into each nostril daily, Disp: 48 g, Rfl: 0  •  lactulose (CHRONULAC) 10 g/15 mL solution, Take 15 mL (10 g total) by mouth daily as needed (constipation or confusion), Disp: 946 mL, Rfl: 0  •  omeprazole (PriLOSEC) 20 mg delayed release capsule, Take 1 capsule (20 mg total) by mouth daily, Disp: 90 capsule, Rfl: 0  •  traMADol (ULTRAM) 50 mg tablet, Take 1 tablet (50 mg total) by mouth every 8 (eight) hours as needed for moderate pain, Disp: 60 tablet, Rfl: 0  •  triamcinolone (KENALOG) 0.5 % cream, Apply topically 2 (two) times a day, Disp: 45 g, Rfl: 0  •  cyanocobalamin (VITAMIN B-12) 1000 MCG tablet, Take 1 tablet (1,000 mcg total) by mouth daily, Disp: 30 tablet, Rfl: 2    No Known Allergies    Physical Exam:    Ht 5' 6\" (1.676 m)   Wt 97.7 kg (215 lb 6.2 oz)   BMI 34.76 kg/m²     Constitutional:normal, well developed, well nourished, alert, in no distress and non-toxic and no overt pain behavior. and obese  Eyes:anicteric  HEENT:grossly intact  Neck:supple, symmetric, trachea midline and no masses   Pulmonary:even and unlabored  Cardiovascular:No edema or pitting edema present  Skin:Normal without rashes or lesions and well hydrated  Psychiatric:Mood and affect appropriate  Neurologic:Cranial Nerves II-XII grossly intact  Musculoskeletal:antalgic gait    Lumbar Spine Exam    Appearance:  Normal lordosis  Palpation/Tenderness:  left lumbar paraspinal tenderness  right lumbar paraspinal tenderness  Special Tests:  Left Straight Leg Test:  negative  Right Straight Leg Test:  negative  Left Devin's Maneuver:  negative  Right Devin's Maneuver:  negative  Left Pelvic Distraction Test:  negative  Right Pelvic Distraction Test:  negative       Imaging  FL spine and pain procedure    (Results Pending)         Orders Placed This Encounter   Procedures   • FL spine and pain procedure       This document was created using speech voice " recognition software.   Grammatical errors, random word insertions, pronoun errors, and incomplete sentences are an occasional consequence of this system due to software limitations, ambient noise, and hardware issues.   Any formal questions or concerns about content, text, or information contained within the body of this dictation should be directly addressed to the provider for clarification.

## 2025-03-04 ENCOUNTER — OFFICE VISIT (OUTPATIENT)
Dept: PAIN MEDICINE | Facility: CLINIC | Age: 60
End: 2025-03-04

## 2025-03-04 VITALS — HEIGHT: 66 IN | WEIGHT: 215.39 LBS | BODY MASS INDEX: 34.62 KG/M2

## 2025-03-04 DIAGNOSIS — M51.16 LUMBAR DISC DISEASE WITH RADICULOPATHY: ICD-10-CM

## 2025-03-04 DIAGNOSIS — M51.26 LUMBAR DISC HERNIATION: ICD-10-CM

## 2025-03-04 DIAGNOSIS — M47.816 LUMBAR SPONDYLOSIS: ICD-10-CM

## 2025-03-04 DIAGNOSIS — M48.061 STENOSIS OF LATERAL RECESS OF LUMBAR SPINE: Primary | ICD-10-CM

## 2025-03-05 ENCOUNTER — PATIENT MESSAGE (OUTPATIENT)
Dept: PAIN MEDICINE | Facility: CLINIC | Age: 60
End: 2025-03-05

## 2025-03-05 NOTE — PATIENT INSTRUCTIONS
Epidural Steroid Injection, Ambulatory Care   GENERAL INFORMATION:   What do I need to know about an epidural steroid injection?  An epidural steroid injection (RAIN) is a procedure to inject steroid medicine into the epidural space. The epidural space is between your spinal cord and vertebrae. Steroids reduce inflammation and fluid buildup in your spine that may be causing pain. You may be given pain medicine along with the steroids.   How do I prepare for an RAIN?  Your healthcare provider will talk to you about how to prepare for your procedure. He will tell you what medicines to take or not take on the day of your procedure. You may need to stop taking blood thinners or other medicines several days before your procedure. You may need to adjust any diabetes medicine you take on the day of your procedure. Steroid medicine can increase your blood sugar level.   What will happen during an RAIN?   You will be given medicine to numb the procedure area. You will be awake for the procedure, but you will not feel pain. You may also be given medicine to help you relax during the procedure. Contrast liquid will be used to help your healthcare provider see the area better. Tell the healthcare provider if you have ever had an allergic reaction to contrast liquid.    Your healthcare provider may place the needle into your neck area, middle of your back, or tailbone area. He may inject the medicine next to the nerves that are causing your pain. He may instead inject the medicine into a larger area of the epidural space. This helps the medicine spread to more nerves. Your healthcare provider will use a fluoroscope to help guide the needle to the right place. A fluoroscope is a type of x-ray. After the procedure, a bandage will be placed over the injection site to prevent infection.  What are the risks of an RAIN?  You may have temporary or permanent nerve damage or paralysis. You may have bleeding or develop a serious infection,  such as meningitis (swelling of the brain coverings). An abscess may also develop. You may need surgery to fix the abscess. You may have a seizure, anxiety, or trouble sleeping. If you are a man, you may have temporary erectile dysfunction (not able to have an erection).   CARE AGREEMENT:   You have the right to help plan your care. Learn about your health condition and how it may be treated. Discuss treatment options with your caregivers to decide what care you want to receive. You always have the right to refuse treatment. The above information is an  only. It is not intended as medical advice for individual conditions or treatments. Talk to your doctor, nurse or pharmacist before following any medical regimen to see if it is safe and effective for you.  © 2014 [employer] Information is for End User's use only and may not be sold, redistributed or otherwise used for commercial purposes. All illustrations and images included in CareNotes® are the copyrighted property of [employer] Inc[employer] or [employer].

## 2025-03-06 ENCOUNTER — TELEPHONE (OUTPATIENT)
Age: 60
End: 2025-03-06

## 2025-03-06 NOTE — PATIENT COMMUNICATION
Pt is scheduled for repeat LESI with Dr Horn on 3/18/25     Pt is not diabetic and per chart, does not take prescription blood thinners or aspirin     Pt given new instruction sheet in office and sent review via [employer] message     Have you completed PT/HEP/Chiro in the past 6 months for dedicated area? Yes, pt completed aqua therapy with [facility] -- pt also had MBB/RFA series and previous LESI completed  If yes, how long did you complete?  What was the frequency?  Did it provide relief? Per last PT note, pt got little relief  If no, reason therapy was not completed?

## 2025-03-06 NOTE — TELEPHONE ENCOUNTER
[employer] called to see what kind of injection the pt was getting. I let him know it was an epidural steroid injection.

## 2025-03-10 DIAGNOSIS — F11.20 CONTINUOUS OPIOID DEPENDENCE (HCC): ICD-10-CM

## 2025-03-10 RX ORDER — TRAMADOL HYDROCHLORIDE 50 MG/1
50 TABLET ORAL EVERY 8 HOURS PRN
Qty: 60 TABLET | Refills: 0 | OUTPATIENT
Start: 2025-03-10

## 2025-03-10 NOTE — TELEPHONE ENCOUNTER
Patient states that pharmacy is requesting a 90 day supply or they will charge him for the medication.   Patient is completley out of medication     Please advise  Thank you       Medication: Tramadol    Dose/Frequency: 50 mg tab     Quantity: 60    Pharmacy: [facility]    Office:   [x] PCP/Provider - Rodrigo Jo MD   [] Speciality/Provider -     Does the patient have enough for 3 days?   [] Yes   [x] No - Send as HP to POD

## 2025-03-18 ENCOUNTER — HOSPITAL ENCOUNTER (OUTPATIENT)
Dept: RADIOLOGY | Facility: CLINIC | Age: 60
Discharge: HOME | End: 2025-03-18
Payer: COMMERCIAL

## 2025-03-18 ENCOUNTER — TELEPHONE (OUTPATIENT)
Age: 60
End: 2025-03-18

## 2025-03-18 VITALS
RESPIRATION RATE: 20 BRPM | TEMPERATURE: 97 F | OXYGEN SATURATION: 95 % | HEART RATE: 55 BPM | DIASTOLIC BLOOD PRESSURE: 67 MMHG | SYSTOLIC BLOOD PRESSURE: 119 MMHG

## 2025-03-18 DIAGNOSIS — M47.816 LUMBAR SPONDYLOSIS: ICD-10-CM

## 2025-03-18 DIAGNOSIS — M48.061 STENOSIS OF LATERAL RECESS OF LUMBAR SPINE: ICD-10-CM

## 2025-03-18 DIAGNOSIS — M51.16 LUMBAR DISC DISEASE WITH RADICULOPATHY: ICD-10-CM

## 2025-03-18 DIAGNOSIS — M51.26 LUMBAR DISC HERNIATION: ICD-10-CM

## 2025-03-18 PROCEDURE — 62323 NJX INTERLAMINAR LMBR/SAC: CPT | Performed by: STUDENT IN AN ORGANIZED HEALTH CARE EDUCATION/TRAINING PROGRAM

## 2025-03-18 RX ORDER — METHYLPREDNISOLONE ACETATE 80 MG/ML
80 INJECTION, SUSPENSION INTRA-ARTICULAR; INTRALESIONAL; INTRAMUSCULAR; PARENTERAL; SOFT TISSUE ONCE
Status: COMPLETED | OUTPATIENT
Start: 2025-03-18 | End: 2025-03-18

## 2025-03-18 RX ADMIN — IOHEXOL 1 ML: 300 INJECTION, SOLUTION INTRAVENOUS at 11:41

## 2025-03-18 RX ADMIN — METHYLPREDNISOLONE ACETATE 80 MG: 80 INJECTION, SUSPENSION INTRA-ARTICULAR; INTRALESIONAL; INTRAMUSCULAR; SOFT TISSUE at 11:43

## 2025-03-18 NOTE — TELEPHONE ENCOUNTER
Caller: Patient    Doctor: Dr. Horn    Reason for call: patient was told procedure was at 11    Please advise    Call back#:

## 2025-03-18 NOTE — DISCHARGE INSTR - LAB
Epidural Steroid Injection   WHAT YOU NEED TO KNOW:   An epidural steroid injection (RAIN) is a procedure to inject steroid medicine into the epidural space. The epidural space is between your spinal cord and vertebrae. Steroids reduce inflammation and fluid buildup in your spine that may be causing pain. You may be given pain medicine along with the steroids.          ACTIVITY  Do not drive or operate machinery today.  No strenuous activity today - bending, lifting, etc.  You may resume normal activites starting tomorrow - start slowly and as tolerated.  You may shower today, but no tub baths or hot tubs.  You may have numbness for several hours from the local anesthetic. Please use caution and common sense, especially with weight-bearing activities.    CARE OF THE INJECTION SITE  If you have soreness or pain, apply ice to the area today (20 minutes on/20 minutes off).  Starting tomorrow, you may use warm, moist heat or ice if needed.  You may have an increase or change in your discomfort for 36-48 hours after your treatment.  Apply ice and continue with any pain medication you have been prescribed.  Notify the [facility] if you have any of the following: redness, drainage, swelling, headache, stiff neck or fever above 100°F.    SPECIAL INSTRUCTIONS  Our office will contact you in approximately 14 days for a progress report.    MEDICATIONS  Continue to take all routine medications.  Our office may have instructed you to hold some medications.    As no general anesthesia was used in today's procedure, you should not experience any side effects related to anesthesia.     If you are diabetic, the steroids used in today's injection may temporarily increase your blood sugar levels after the first few days after your injection. Please keep a close eye on your sugars and alert the doctor who manages your diabetes if your sugars are significantly high from your baseline or you are symptomatic.     If you have a  problem specifically related to your procedure, please call our office at [phone].  Problems not related to your procedure should be directed to your primary care physician.

## 2025-03-19 ENCOUNTER — OFFICE VISIT (OUTPATIENT)
Dept: FAMILY MEDICINE CLINIC | Facility: CLINIC | Age: 60
End: 2025-03-19
Payer: COMMERCIAL

## 2025-03-19 VITALS
HEART RATE: 69 BPM | OXYGEN SATURATION: 98 % | HEIGHT: 66 IN | WEIGHT: 209.6 LBS | DIASTOLIC BLOOD PRESSURE: 72 MMHG | TEMPERATURE: 98.2 F | BODY MASS INDEX: 33.68 KG/M2 | SYSTOLIC BLOOD PRESSURE: 122 MMHG

## 2025-03-19 DIAGNOSIS — K74.60 HEPATIC CIRRHOSIS DUE TO CHRONIC HEPATITIS C INFECTION (HCC): ICD-10-CM

## 2025-03-19 DIAGNOSIS — Z72.89 OTHER PROBLEMS RELATED TO LIFESTYLE: ICD-10-CM

## 2025-03-19 DIAGNOSIS — Z71.85 IMMUNIZATION COUNSELING: ICD-10-CM

## 2025-03-19 DIAGNOSIS — Z28.39 IMMUNIZATIONS INCOMPLETE: ICD-10-CM

## 2025-03-19 DIAGNOSIS — B18.2 HEPATIC CIRRHOSIS DUE TO CHRONIC HEPATITIS C INFECTION (HCC): ICD-10-CM

## 2025-03-19 DIAGNOSIS — M54.50 CHRONIC MIDLINE LOW BACK PAIN WITHOUT SCIATICA: Primary | ICD-10-CM

## 2025-03-19 DIAGNOSIS — G89.29 CHRONIC MIDLINE LOW BACK PAIN WITHOUT SCIATICA: Primary | ICD-10-CM

## 2025-03-19 DIAGNOSIS — Z23 NEED FOR COVID-19 VACCINE: ICD-10-CM

## 2025-03-19 DIAGNOSIS — Z23 ENCOUNTER FOR IMMUNIZATION: ICD-10-CM

## 2025-03-19 DIAGNOSIS — F11.20 CONTINUOUS OPIOID DEPENDENCE (HCC): ICD-10-CM

## 2025-03-19 PROCEDURE — G0009 ADMIN PNEUMOCOCCAL VACCINE: HCPCS

## 2025-03-19 PROCEDURE — 91320 SARSCV2 VAC 30MCG TRS-SUC IM: CPT

## 2025-03-19 PROCEDURE — 90480 ADMN SARSCOV2 VAC 1/ONLY CMP: CPT

## 2025-03-19 PROCEDURE — 90677 PCV20 VACCINE IM: CPT

## 2025-03-19 PROCEDURE — 99213 OFFICE O/P EST LOW 20 MIN: CPT | Performed by: FAMILY MEDICINE

## 2025-03-19 RX ORDER — TRAMADOL HYDROCHLORIDE 50 MG/1
50 TABLET ORAL EVERY 8 HOURS PRN
Qty: 60 TABLET | Refills: 0 | Status: SHIPPED | OUTPATIENT
Start: 2025-03-19

## 2025-03-19 NOTE — PROGRESS NOTES
"Name: Matt Tobias Sr.      : 1965      MRN: [id number]  Encounter Provider: Rodrigo Jo MD  Encounter Date: 3/19/2025   Encounter department: [facility]  :  Assessment & Plan  Chronic midline low back pain without sciatica  Continue pain medications  Controlled substance agreement signed today.       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)  F/U with gastro       Need for COVID-19 vaccine    Orders:  •  COVID-19 Pfizer mRNA vaccine 12 yr and older (Comirnaty pre-filled syringe)    Encounter for immunization    Orders:  •  Pneumococcal Conjugate Vaccine 20-valent (Pcv20)    Immunization counseling         Immunizations incomplete    Orders:  •  Hepatitis A antibody, IgM; Future  •  Hepatitis B surface antibody; Future    Other problems related to lifestyle    Orders:  •  Hepatitis B surface antibody; Future    Continuous opioid dependence (HCC)    Orders:  •  traMADol (ULTRAM) 50 mg tablet; Take 1 tablet (50 mg total) by mouth every 8 (eight) hours as needed for moderate pain    Follow up in 6 months or as needed       History of Present Illness   Patient is here to follow up for chronic low back pain. Takes tramadol as needed for pain.  Has a Hx of Hep C and liver cirrhosis that was treated.        Review of Systems   Constitutional:  Negative for activity change, appetite change, fatigue and fever.   HENT:  Negative for congestion and ear discharge.    Respiratory:  Negative for cough and shortness of breath.    Cardiovascular:  Negative for chest pain and palpitations.   Gastrointestinal:  Negative for diarrhea and nausea.   Musculoskeletal:  Positive for back pain. Negative for arthralgias.   Skin:  Negative for color change and rash.   Neurological:  Negative for dizziness and headaches.   Psychiatric/Behavioral:  Negative for agitation and behavioral problems.        Objective   /72   Pulse 69   Temp 98.2 °F (36.8 °C)   Ht 5' 6\" (1.676 m)   Wt " 95.1 kg (209 lb 9.6 oz)   SpO2 98%   BMI 33.83 kg/m²      Physical Exam  Constitutional:       General: He is not in acute distress.     Appearance: He is well-developed. He is not diaphoretic.   Eyes:      General: No scleral icterus.     Pupils: Pupils are equal, round, and reactive to light.   Cardiovascular:      Rate and Rhythm: Normal rate and regular rhythm.      Heart sounds: Normal heart sounds. No murmur heard.  Pulmonary:      Effort: Pulmonary effort is normal. No respiratory distress.      Breath sounds: Normal breath sounds. No wheezing.   Abdominal:      General: Bowel sounds are normal. There is no distension.      Palpations: Abdomen is soft.      Tenderness: There is no abdominal tenderness.   Skin:     General: Skin is warm and dry.      Findings: No rash.   Neurological:      Mental Status: He is alert and oriented to person, place, and time.

## 2025-03-19 NOTE — ASSESSMENT & PLAN NOTE
Orders:  •  traMADol (ULTRAM) 50 mg tablet; Take 1 tablet (50 mg total) by mouth every 8 (eight) hours as needed for moderate pain

## 2025-03-25 NOTE — TELEPHONE ENCOUNTER
Previously spoke to patient at his last office visit that his  would have to request the records, we cannot print out all to give to the patient it would have to go though Medical Records to obtain the printed copies of those.

## 2025-03-25 NOTE — TELEPHONE ENCOUNTER
Caller: pt    Doctor: Dr. garland    Reason for call: pt is asking that all of his procedure notes be printed so that he can have them at his appt for tomorrow?    Call back#: [phone]

## 2025-03-26 ENCOUNTER — APPOINTMENT (OUTPATIENT)
Dept: LAB | Facility: CLINIC | Age: 60
End: 2025-03-26
Payer: COMMERCIAL

## 2025-03-26 DIAGNOSIS — Z28.39 IMMUNIZATIONS INCOMPLETE: ICD-10-CM

## 2025-03-26 DIAGNOSIS — Z72.89 OTHER PROBLEMS RELATED TO LIFESTYLE: ICD-10-CM

## 2025-03-26 DIAGNOSIS — D72.819 LEUKOPENIA: ICD-10-CM

## 2025-03-26 DIAGNOSIS — D75.839 THROMBOCYTHEMIA: ICD-10-CM

## 2025-03-26 DIAGNOSIS — E53.8 B12 DEFICIENCY: ICD-10-CM

## 2025-03-26 LAB
BASOPHILS # BLD AUTO: 0.05 THOUSANDS/ÂΜL (ref 0–0.1)
BASOPHILS NFR BLD AUTO: 1 % (ref 0–1)
EOSINOPHIL # BLD AUTO: 0.19 THOUSAND/ÂΜL (ref 0–0.61)
EOSINOPHIL NFR BLD AUTO: 2 % (ref 0–6)
ERYTHROCYTE [DISTWIDTH] IN BLOOD BY AUTOMATED COUNT: 15.8 % (ref 11.6–15.1)
HCT VFR BLD AUTO: 42.9 % (ref 36.5–49.3)
HGB BLD-MCNC: 14.5 G/DL (ref 12–17)
IMM GRANULOCYTES # BLD AUTO: 0.07 THOUSAND/UL (ref 0–0.2)
IMM GRANULOCYTES NFR BLD AUTO: 1 % (ref 0–2)
LYMPHOCYTES # BLD AUTO: 1.87 THOUSANDS/ÂΜL (ref 0.6–4.47)
LYMPHOCYTES NFR BLD AUTO: 20 % (ref 14–44)
MCH RBC QN AUTO: 35.1 PG (ref 26.8–34.3)
MCHC RBC AUTO-ENTMCNC: 33.8 G/DL (ref 31.4–37.4)
MCV RBC AUTO: 104 FL (ref 82–98)
MONOCYTES # BLD AUTO: 0.71 THOUSAND/ÂΜL (ref 0.17–1.22)
MONOCYTES NFR BLD AUTO: 8 % (ref 4–12)
NEUTROPHILS # BLD AUTO: 6.49 THOUSANDS/ÂΜL (ref 1.85–7.62)
NEUTS SEG NFR BLD AUTO: 68 % (ref 43–75)
NRBC BLD AUTO-RTO: 0 /100 WBCS
PLATELET # BLD AUTO: 184 THOUSANDS/UL (ref 149–390)
PMV BLD AUTO: 11.4 FL (ref 8.9–12.7)
RBC # BLD AUTO: 4.13 MILLION/UL (ref 3.88–5.62)
WBC # BLD AUTO: 9.38 THOUSAND/UL (ref 4.31–10.16)

## 2025-03-26 PROCEDURE — 85025 COMPLETE CBC W/AUTO DIFF WBC: CPT

## 2025-03-26 PROCEDURE — 86706 HEP B SURFACE ANTIBODY: CPT

## 2025-03-26 PROCEDURE — 86709 HEPATITIS A IGM ANTIBODY: CPT

## 2025-03-26 PROCEDURE — 36415 COLL VENOUS BLD VENIPUNCTURE: CPT

## 2025-03-26 NOTE — TELEPHONE ENCOUNTER
Caller: pt Matt     Doctor: Dr. Horn    Reason for call: I advised pt that we do not have access of printing out any pt records. It would have to go through Medical Records to obtain the printed copies of those.     Call back#: [phone]

## 2025-03-27 ENCOUNTER — RESULTS FOLLOW-UP (OUTPATIENT)
Dept: FAMILY MEDICINE CLINIC | Facility: CLINIC | Age: 60
End: 2025-03-27

## 2025-03-27 LAB
HAV IGM SER QL: NORMAL
HBV SURFACE AB SER-ACNC: 15.4 MIU/ML

## 2025-03-27 NOTE — H&P (VIEW-ONLY)
Pain Medicine Follow-Up Note    Assessment:  1. Lumbar radiculopathy    2. Lumbar spondylosis    3. Bilateral stenosis of lateral recess of lumbar spine        Plan:  Orders Placed This Encounter   Procedures   • FL spine and pain procedure     Standing Status:   Future     Expected Date:   3/31/2025     Expiration Date:   3/31/2029     Reason for Exam::   TFESI L5 bilaterally     Anticoagulant hold needed?:   no       My impressions and treatment recommendations were discussed in detail with the patient who verbalized understanding and had no further questions.      Patient returns to the office after having a lumbar epidural steroid injection at L4-L5 on 3/18/2025 patient reports significant improvement of his pain in some ways,  he feels it helped in his upper low back but he does not feel it provided him significant pain relief in the actual low back.  Patient does have tenderness with palpation of bilateral SI joints however on exam he is negative for sacroiliitis.  Patient does report some transient leg weakness stating that he fell twice due to his legs buckling.  The patient states when he is at the gym he can leg press significant amount of weight.      I recommend the patient try a bilateral transforaminal epidural steroid injection at L5 at this level he does have subarticular recess stenosis and facet arthrosis. Complete risks and benefits including bleeding, infection, tissue reaction, nerve injury and allergic reaction were discussed. The approach was demonstrated using models and literature was provided. Verbal and written consent was obtained.    Patient does have significant facet arthropathy that is likely contributing to his low back pain patient would be due for repeat right-sided radiofrequency ablation of his L4-L5, L5-S1 on 5/30/2025.  Patient does have reproducible pain with right-sided facet loading.  Patient also reports that he will be initiating physical therapy on 4/3/2025.       Follow-up is planned in 4 weeks after injection time or sooner as warranted.  Discharge instructions were provided. I personally saw and examined the patient and I agree with the above discussed plan of care.    History of Present Illness:    Matt Tobias Sr. is a 59 y.o. male who presents to [facility] for interval re-evaluation of the above stated pain complaints. The patient has a past medical and chronic pain history as outlined in the assessment section. He was last seen on 3/18/2025.    At today's visit patient states that their pain symptoms are the same with a pain score of 3/10 on the verbal numeric pain scale.  The patient's pain is worse in the morning and at night.  The patient's pain is intermittent in nature.  And the quality of the patient's pain is described as sharp.  The patient's pain is located in the bilateral low back/buttock.  Patient is currently using tramadol which is prescribed through his PCP patient takes 50 mg every 8 hours as needed for moderate pain.    Other than as stated above, the patient denies any interval changes in medications, medical condition, mental condition, symptoms, or allergies since the last office visit.         Review of Systems:    Review of Systems   Respiratory:  Negative for shortness of breath.    Cardiovascular:  Negative for chest pain.   Gastrointestinal:  Negative for constipation, diarrhea, nausea and vomiting.   Musculoskeletal:  Negative for arthralgias, gait problem, joint swelling and myalgias.   Skin:  Negative for rash.   Neurological:  Positive for weakness and numbness. Negative for dizziness and seizures.   All other systems reviewed and are negative.        Past Medical History:   Diagnosis Date   • AXEL (acute kidney injury) (HCC) 10/28/2024   • CPAP (continuous positive airway pressure) dependence     does not use machine   • Esophageal varices (HCC)     stable 9/2020 gets checked yearly   • Headache(784.0)    •  Hepatic cirrhosis (HCC)     treated with harvoni   Hep C- dx age 1995   • Hepatic encephalopathy (HCC)    • Hyperammonemia (HCC) 10/30/2019   • Liver disease    • Obesity    • Peptic ulcer 2025   • Pneumonia     in past   • Postgastrectomy malabsorption    • Proctitis 2015   • Sleep apnea     history of   • Wears glasses        Past Surgical History:   Procedure Laterality Date   • COLONOSCOPY     • ESOPHAGOGASTRODUODENOSCOPY N/A 2018    Procedure: ESOPHAGOGASTRODUODENOSCOPY (EGD);  Surgeon: Willy Traylor MD;  [facility]: [facility];  Service: Gastroenterology   • FRACTURE SURGERY Left     ankle- hardware   • OTHER SURGICAL HISTORY      banding esophageal varices   • CA EXC EXCSV SKN ABD INFRAUMBILICAL PANNICULECTOMY N/A 2020    Procedure: PANNICULECTOMY;  Surgeon: Enrrique Birmingham MD;  [facility];  Service: Plastics   • CA EXCISION EXCESSIVE SKIN & SUBQ TISSUE ABDOMEN N/A 2020    Procedure: ABDOMINOPLASTY;  Surgeon: Enrrique Birmingham MD;  [facility];  Service: Plastics   • CA LAPS GSTRC RSTRICTIV PX LONGITUDINAL GASTRECTOMY N/A 2018    Procedure: GASTRECTOMY LAPAROSCOPIC SLEEVE;  Surgeon: Diana Mcdonnell MD;  [facility]: [facility];  Service: Bariatrics       Family History   Problem Relation Age of Onset   • Heart disease Mother    • Lupus Mother    • Diabetes Father    • Stroke Father        Social History     Occupational History   • Occupation: disabled   Tobacco Use   • Smoking status: Former     Current packs/day: 0.00     Average packs/day: 0.2 packs/day for 40.5 years (10.1 ttl pk-yrs)     Types: Cigarettes     Start date: 1982     Quit date: 2022     Years since quittin.2   • Smokeless tobacco: Never   • Tobacco comments:     stopped 2020   Vaping Use   • Vaping status: Former   • Substances: Flavoring   Substance and Sexual Activity   • Alcohol use: Not Currently     Comment: occasionally   • Drug use: No   • Sexual  "activity: Yes     Partners: Female     Birth control/protection: None         Current Outpatient Medications:   •  AMILoride 5 mg tablet, Take 1 tablet (5 mg total) by mouth daily, Disp: 90 tablet, Rfl: 1  •  carvedilol (COREG) 6.25 mg tablet, Take 1 tablet by mouth 2 (two) times a day with meals, Disp: 180 tablet, Rfl: 1  •  Claritin-D 12 Hour 5-120 MG per tablet, TAKE ONE TABLET BY MOUTH TWICE DAILY FOR 5 DAYS, Disp: 10 tablet, Rfl: 0  •  fluticasone (FLONASE) 50 mcg/act nasal spray, 1 spray into each nostril daily, Disp: 48 g, Rfl: 0  •  lactulose (CHRONULAC) 10 g/15 mL solution, Take 15 mL (10 g total) by mouth daily as needed (constipation or confusion), Disp: 946 mL, Rfl: 0  •  omeprazole (PriLOSEC) 20 mg delayed release capsule, Take 1 capsule (20 mg total) by mouth daily, Disp: 90 capsule, Rfl: 0  •  traMADol (ULTRAM) 50 mg tablet, Take 1 tablet (50 mg total) by mouth every 8 (eight) hours as needed for moderate pain, Disp: 60 tablet, Rfl: 0  •  triamcinolone (KENALOG) 0.5 % cream, Apply topically 2 (two) times a day, Disp: 45 g, Rfl: 0  •  cyanocobalamin (VITAMIN B-12) 1000 MCG tablet, Take 1 tablet (1,000 mcg total) by mouth daily, Disp: 30 tablet, Rfl: 2    No Known Allergies    Physical Exam:    Ht 5' 6\" (1.676 m)   Wt 94.8 kg (209 lb)   BMI 33.73 kg/m²     Constitutional:normal, well developed, well nourished, alert, in no distress and non-toxic and no overt pain behavior. and obese  Eyes:anicteric  HEENT:grossly intact  Neck:supple, symmetric, trachea midline and no masses   Pulmonary:even and unlabored  Cardiovascular:No edema or pitting edema present  Skin:Normal without rashes or lesions and well hydrated  Psychiatric:Mood and affect appropriate  Neurologic:Cranial Nerves II-XII grossly intact  Musculoskeletal:antalgic gait    Lumbar Spine Exam    Appearance:  Normal lordosis  Palpation/Tenderness:  left sacroiliac joint tenderness  right sacroiliac joint tenderness  Range of Motion:  Flexion:  No " limitation  without pain  Extension:  Minimally limited  with pain  Lateral Flexion - Left:  Minimally limited  without pain  Lateral Flexion - Right:  Moderately limited  with pain  Special Tests:  Left Straight Leg Test:  positive  Right Straight Leg Test:  positive  Left Devin's Maneuver:  negative  Right Devin's Maneuver:  negative  Left Gaenslen's Test:  negative  Right Gaenslen's Test:  negative  Left Pelvic Distraction Test:  negative  Right Pelvic Distraction Test:  negative    This document was created using speech voice recognition software.   Grammatical errors, random word insertions, pronoun errors, and incomplete sentences are an occasional consequence of this system due to software limitations, ambient noise, and hardware issues.   Any formal questions or concerns about content, text, or information contained within the body of this dictation should be directly addressed to the provider for clarification.

## 2025-03-27 NOTE — PROGRESS NOTES
Pain Medicine Follow-Up Note    Assessment:  1. Lumbar radiculopathy    2. Lumbar spondylosis    3. Bilateral stenosis of lateral recess of lumbar spine        Plan:  Orders Placed This Encounter   Procedures   • FL spine and pain procedure     Standing Status:   Future     Expected Date:   3/31/2025     Expiration Date:   3/31/2029     Reason for Exam::   TFESI L5 bilaterally     Anticoagulant hold needed?:   no       My impressions and treatment recommendations were discussed in detail with the patient who verbalized understanding and had no further questions.      Patient returns to the office after having a lumbar epidural steroid injection at L4-L5 on 3/18/2025 patient reports significant improvement of his pain in some ways,  he feels it helped in his upper low back but he does not feel it provided him significant pain relief in the actual low back.  Patient does have tenderness with palpation of bilateral SI joints however on exam he is negative for sacroiliitis.  Patient does report some transient leg weakness stating that he fell twice due to his legs buckling.  The patient states when he is at the gym he can leg press significant amount of weight.      I recommend the patient try a bilateral transforaminal epidural steroid injection at L5 at this level he does have subarticular recess stenosis and facet arthrosis. Complete risks and benefits including bleeding, infection, tissue reaction, nerve injury and allergic reaction were discussed. The approach was demonstrated using models and literature was provided. Verbal and written consent was obtained.    Patient does have significant facet arthropathy that is likely contributing to his low back pain patient would be due for repeat right-sided radiofrequency ablation of his L4-L5, L5-S1 on 5/30/2025.  Patient does have reproducible pain with right-sided facet loading.  Patient also reports that he will be initiating physical therapy on 4/3/2025.       Follow-up is planned in 4 weeks after injection time or sooner as warranted.  Discharge instructions were provided. I personally saw and examined the patient and I agree with the above discussed plan of care.    History of Present Illness:    Matt Tobias Sr. is a 59 y.o. male who presents to [facility] for interval re-evaluation of the above stated pain complaints. The patient has a past medical and chronic pain history as outlined in the assessment section. He was last seen on 3/18/2025.    At today's visit patient states that their pain symptoms are the same with a pain score of 3/10 on the verbal numeric pain scale.  The patient's pain is worse in the morning and at night.  The patient's pain is intermittent in nature.  And the quality of the patient's pain is described as sharp.  The patient's pain is located in the bilateral low back/buttock.  Patient is currently using tramadol which is prescribed through his PCP patient takes 50 mg every 8 hours as needed for moderate pain.    Other than as stated above, the patient denies any interval changes in medications, medical condition, mental condition, symptoms, or allergies since the last office visit.         Review of Systems:    Review of Systems   Respiratory:  Negative for shortness of breath.    Cardiovascular:  Negative for chest pain.   Gastrointestinal:  Negative for constipation, diarrhea, nausea and vomiting.   Musculoskeletal:  Negative for arthralgias, gait problem, joint swelling and myalgias.   Skin:  Negative for rash.   Neurological:  Positive for weakness and numbness. Negative for dizziness and seizures.   All other systems reviewed and are negative.        Past Medical History:   Diagnosis Date   • AXEL (acute kidney injury) (HCC) 10/28/2024   • CPAP (continuous positive airway pressure) dependence     does not use machine   • Esophageal varices (HCC)     stable 9/2020 gets checked yearly   • Headache(784.0)    •  Hepatic cirrhosis (HCC)     treated with harvoni   Hep C- dx age 1995   • Hepatic encephalopathy (HCC)    • Hyperammonemia (HCC) 10/30/2019   • Liver disease    • Obesity    • Peptic ulcer 2025   • Pneumonia     in past   • Postgastrectomy malabsorption    • Proctitis 2015   • Sleep apnea     history of   • Wears glasses        Past Surgical History:   Procedure Laterality Date   • COLONOSCOPY     • ESOPHAGOGASTRODUODENOSCOPY N/A 2018    Procedure: ESOPHAGOGASTRODUODENOSCOPY (EGD);  Surgeon: Willy Traylor MD;  [facility];  Service: Gastroenterology   • FRACTURE SURGERY Left     ankle- hardware   • OTHER SURGICAL HISTORY      banding esophageal varices   • W[place] EXC EXCSV SKN ABD INFRAUMBILICAL PANNICULECTOMY N/A 2020    Procedure: PANNICULECTOMY;  Surgeon: Enrrique Birmingham MD;  [facility];  Service: Plastics   • W[place] EXCISION EXCESSIVE SKIN & SUBQ TISSUE ABDOMEN N/A 2020    Procedure: ABDOMINOPLASTY;  Surgeon: Enrrique Birmingham MD;  [facility];  Service: Plastics   • W[place] LAPS GSTRC RSTRICTIV PX LONGITUDINAL GASTRECTOMY N/A 2018    Procedure: GASTRECTOMY LAPAROSCOPIC SLEEVE;  Surgeon: Diana Mcdonnell MD;  [facility];  Service: Bariatrics       Family History   Problem Relation Age of Onset   • Heart disease Mother    • Lupus Mother    • Diabetes Father    • Stroke Father        Social History     Occupational History   • Occupation: disabled   Tobacco Use   • Smoking status: Former     Current packs/day: 0.00     Average packs/day: 0.2 packs/day for 40.5 years (10.1 ttl pk-yrs)     Types: Cigarettes     Start date: 1982     Quit date: 2022     Years since quittin.2   • Smokeless tobacco: Never   • Tobacco comments:     stopped 2020   Vaping Use   • Vaping status: Former   • Substances: Flavoring   Substance and Sexual Activity   • Alcohol use: Not Currently     Comment: occasionally   • Drug use: No   • Sexual  "activity: Yes     Partners: Female     Birth control/protection: None         Current Outpatient Medications:   •  AMILoride 5 mg tablet, Take 1 tablet (5 mg total) by mouth daily, Disp: 90 tablet, Rfl: 1  •  carvedilol (COREG) 6.25 mg tablet, Take 1 tablet by mouth 2 (two) times a day with meals, Disp: 180 tablet, Rfl: 1  •  Claritin-D 12 Hour 5-120 MG per tablet, TAKE ONE TABLET BY MOUTH TWICE DAILY FOR 5 DAYS, Disp: 10 tablet, Rfl: 0  •  fluticasone (FLONASE) 50 mcg/act nasal spray, 1 spray into each nostril daily, Disp: 48 g, Rfl: 0  •  lactulose (CHRONULAC) 10 g/15 mL solution, Take 15 mL (10 g total) by mouth daily as needed (constipation or confusion), Disp: 946 mL, Rfl: 0  •  omeprazole (PriLOSEC) 20 mg delayed release capsule, Take 1 capsule (20 mg total) by mouth daily, Disp: 90 capsule, Rfl: 0  •  traMADol (ULTRAM) 50 mg tablet, Take 1 tablet (50 mg total) by mouth every 8 (eight) hours as needed for moderate pain, Disp: 60 tablet, Rfl: 0  •  triamcinolone (KENALOG) 0.5 % cream, Apply topically 2 (two) times a day, Disp: 45 g, Rfl: 0  •  cyanocobalamin (VITAMIN B-12) 1000 MCG tablet, Take 1 tablet (1,000 mcg total) by mouth daily, Disp: 30 tablet, Rfl: 2    No Known Allergies    Physical Exam:    Ht 5' 6\" (1.676 m)   Wt 94.8 kg (209 lb)   BMI 33.73 kg/m²     Constitutional:normal, well developed, well nourished, alert, in no distress and non-toxic and no overt pain behavior. and obese  Eyes:anicteric  HEENT:grossly intact  Neck:supple, symmetric, trachea midline and no masses   Pulmonary:even and unlabored  Cardiovascular:No edema or pitting edema present  Skin:Normal without rashes or lesions and well hydrated  Psychiatric:Mood and affect appropriate  Neurologic:Cranial Nerves II-XII grossly intact  Musculoskeletal:antalgic gait    Lumbar Spine Exam    Appearance:  Normal lordosis  Palpation/Tenderness:  left sacroiliac joint tenderness  right sacroiliac joint tenderness  Range of Motion:  Flexion:  No " limitation  without pain  Extension:  Minimally limited  with pain  Lateral Flexion - Left:  Minimally limited  without pain  Lateral Flexion - Right:  Moderately limited  with pain  Special Tests:  Left Straight Leg Test:  positive  Right Straight Leg Test:  positive  Left Devin's Maneuver:  negative  Right Devin's Maneuver:  negative  Left Gaenslen's Test:  negative  Right Gaenslen's Test:  negative  Left Pelvic Distraction Test:  negative  Right Pelvic Distraction Test:  negative    This document was created using speech voice recognition software.   Grammatical errors, random word insertions, pronoun errors, and incomplete sentences are an occasional consequence of this system due to software limitations, ambient noise, and hardware issues.   Any formal questions or concerns about content, text, or information contained within the body of this dictation should be directly addressed to the provider for clarification.

## 2025-03-31 ENCOUNTER — PATIENT MESSAGE (OUTPATIENT)
Dept: PAIN MEDICINE | Facility: CLINIC | Age: 60
End: 2025-03-31

## 2025-03-31 ENCOUNTER — OFFICE VISIT (OUTPATIENT)
Dept: PAIN MEDICINE | Facility: CLINIC | Age: 60
End: 2025-03-31
Payer: COMMERCIAL

## 2025-03-31 VITALS — HEIGHT: 66 IN | WEIGHT: 209 LBS | BODY MASS INDEX: 33.59 KG/M2

## 2025-03-31 DIAGNOSIS — M47.816 LUMBAR SPONDYLOSIS: ICD-10-CM

## 2025-03-31 DIAGNOSIS — M54.16 LUMBAR RADICULOPATHY: Primary | ICD-10-CM

## 2025-03-31 DIAGNOSIS — M48.061 BILATERAL STENOSIS OF LATERAL RECESS OF LUMBAR SPINE: ICD-10-CM

## 2025-03-31 PROCEDURE — 99214 OFFICE O/P EST MOD 30 MIN: CPT

## 2025-03-31 NOTE — PATIENT COMMUNICATION
Pt is scheduled for TFESI with Dr Horn on 4/22/25     Pt is not diabetic and per chart, does not take prescription blood thinners      Pt sent scheduling info instructions via [employer] message, per his request     Have you completed PT/HEP/Chiro in the past 6 months for dedicated area? Yes, pt completed aqua therapy with [facility] -- pt also had MBB/RFA series and previous LESI completed  If yes, how long did you complete?  What was the frequency?  Did it provide relief? Per last PT note, pt got little relief  If no, reason therapy was not completed?

## 2025-04-01 ENCOUNTER — TELEPHONE (OUTPATIENT)
Dept: PAIN MEDICINE | Facility: CLINIC | Age: 60
End: 2025-04-01

## 2025-04-03 ENCOUNTER — EVALUATION (OUTPATIENT)
Dept: PHYSICAL THERAPY | Age: 60
End: 2025-04-03
Payer: COMMERCIAL

## 2025-04-03 ENCOUNTER — TELEPHONE (OUTPATIENT)
Age: 60
End: 2025-04-03

## 2025-04-03 DIAGNOSIS — L71.9 ROSACEA: ICD-10-CM

## 2025-04-03 DIAGNOSIS — R05.9 COUGH: ICD-10-CM

## 2025-04-03 DIAGNOSIS — M54.50 CHRONIC MIDLINE LOW BACK PAIN WITHOUT SCIATICA: Primary | ICD-10-CM

## 2025-04-03 DIAGNOSIS — G89.29 CHRONIC MIDLINE LOW BACK PAIN WITHOUT SCIATICA: Primary | ICD-10-CM

## 2025-04-03 DIAGNOSIS — E66.01 MORBID (SEVERE) OBESITY DUE TO EXCESS CALORIES (HCC): ICD-10-CM

## 2025-04-03 DIAGNOSIS — I85.10 SECONDARY ESOPHAGEAL VARICES WITHOUT BLEEDING (HCC): ICD-10-CM

## 2025-04-03 DIAGNOSIS — K76.82 HEPATIC ENCEPHALOPATHY (HCC): ICD-10-CM

## 2025-04-03 PROCEDURE — 97530 THERAPEUTIC ACTIVITIES: CPT

## 2025-04-03 PROCEDURE — 97162 PT EVAL MOD COMPLEX 30 MIN: CPT

## 2025-04-03 NOTE — TELEPHONE ENCOUNTER
Medication: Amiloride     Dose/Frequency: 5 mg 1 tablet daily     Quantity: 90    Pharmacy: [facility]     Office:   [x] PCP/Provider - Dr. Jo   [] Speciality/Provider -     Does the patient have enough for 3 days?   [] Yes   [] No - Send as HP to POD       Medication: Coreg     Dose/Frequency: 6.25 mg 1 tablet 2 times daily    Quantity: 180    Pharmacy: [facility]    Office:   [x] PCP/Provider - Dr. Jo   [] Speciality/Provider -     Does the patient have enough for 3 days?   [] Yes   [] No - Send as HP to POD      Medication: Flonase     Dose/Frequency:     Quantity: 50 mcg/act nasal spray    Pharmacy: [facility]    Office:   [x] PCP/Provider - Dr. Jo   [] Speciality/Provider -     Does the patient have enough for 3 days?   [] Yes   [] No - Send as HP to POD      Medication: Lactulose     Dose/Frequency: 10 g/ 15 mL  take 15 ml daily as needed     Quantity: 946 mL    Pharmacy: [facility]    Office:   [x] PCP/Provider - Dr. Jo   [] Speciality/Provider -     Does the patient have enough for 3 days?   [] Yes   [] No - Send as HP to POD     Medication: Omeprazole     Dose/Frequency: 20 mg 1 capsule daily    Quantity: 90    Pharmacy: [facility]    Office:   [x] PCP/Provider - Dr. Jo   [] Speciality/Provider -     Does the patient have enough for 3 days?   [] Yes   [] No - Send as HP to POD       Medication: Kenalog     Dose/Frequency: 0.5 % cream    Quantity: 45 g apply 2 times daily     Pharmacy: [facility]     Office:   [x] PCP/Provider - Dr. Jo   [] Speciality/Provider -     Does the patient have enough for 3 days?   [] Yes   [] No - Send as HP to POD

## 2025-04-03 NOTE — TELEPHONE ENCOUNTER
Patient called and stated that he wanted his future refills be send to [facility] pharmacy in [place]. States that he recently changed insurance and he was informed that his Tramadol would not be cover and was told to ask his provider if there is another generic version. Patient was asked if he was told by the insurance what they cover and he stated they told him to check with his provider.
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
## 2025-04-04 DIAGNOSIS — M54.50 CHRONIC MIDLINE LOW BACK PAIN WITHOUT SCIATICA: Primary | ICD-10-CM

## 2025-04-04 DIAGNOSIS — G89.29 CHRONIC MIDLINE LOW BACK PAIN WITHOUT SCIATICA: Primary | ICD-10-CM

## 2025-04-04 RX ORDER — FLUTICASONE PROPIONATE 50 MCG
1 SPRAY, SUSPENSION (ML) NASAL DAILY
Qty: 48 G | Refills: 1 | Status: SHIPPED | OUTPATIENT
Start: 2025-04-04

## 2025-04-04 RX ORDER — OMEPRAZOLE 20 MG/1
20 CAPSULE, DELAYED RELEASE ORAL DAILY
Qty: 90 CAPSULE | Refills: 1 | Status: SHIPPED | OUTPATIENT
Start: 2025-04-04

## 2025-04-04 RX ORDER — LACTULOSE 10 G/15ML
10 SOLUTION ORAL DAILY PRN
Qty: 946 ML | Refills: 1 | Status: SHIPPED | OUTPATIENT
Start: 2025-04-04

## 2025-04-04 RX ORDER — AMILORIDE HYDROCHLORIDE 5 MG/1
5 TABLET ORAL DAILY
Qty: 90 TABLET | Refills: 1 | Status: SHIPPED | OUTPATIENT
Start: 2025-04-04

## 2025-04-04 RX ORDER — TRIAMCINOLONE ACETONIDE 5 MG/G
CREAM TOPICAL 2 TIMES DAILY
Qty: 45 G | Refills: 0 | Status: SHIPPED | OUTPATIENT
Start: 2025-04-04

## 2025-04-04 RX ORDER — CARVEDILOL 6.25 MG/1
6.25 TABLET ORAL 2 TIMES DAILY WITH MEALS
Qty: 180 TABLET | Refills: 1 | Status: SHIPPED | OUTPATIENT
Start: 2025-04-04

## 2025-04-04 NOTE — TELEPHONE ENCOUNTER
I sent Ultracet to his pharmacy which is tramadol and tylenol to see if his insurance covers this.

## 2025-04-07 ENCOUNTER — OFFICE VISIT (OUTPATIENT)
Dept: PHYSICAL THERAPY | Age: 60
End: 2025-04-07
Payer: COMMERCIAL

## 2025-04-07 DIAGNOSIS — G89.29 CHRONIC MIDLINE LOW BACK PAIN WITHOUT SCIATICA: Primary | ICD-10-CM

## 2025-04-07 DIAGNOSIS — M54.50 CHRONIC MIDLINE LOW BACK PAIN WITHOUT SCIATICA: Primary | ICD-10-CM

## 2025-04-07 PROCEDURE — 97110 THERAPEUTIC EXERCISES: CPT

## 2025-04-07 NOTE — PROGRESS NOTES
"Daily Note     Today's date: 2025  Patient name: Matt Tobias Sr.  : 1965  MRN: [id number]  Referring provider: Wen Freeman CRNP  Dx:   Encounter Diagnosis     ICD-10-CM    1. Chronic midline low back pain without sciatica  M54.50     G89.29           Start Time: 1410  Stop Time: 1445  Total time in clinic (min): 35 minutes    Subjective: Pt presents with back brace that he got last week. He has been wearing it when his back hurts.      Objective: See treatment diary below      Assessment: Introduced pt to POC focused on normalizing lumbar ROM and core engagement. The patient required verbal and visual cues for exercises. He was properly challenged by these interventions.  Tolerated treatment fair. Patient demonstrated fatigue post treatment      Plan: Continue per plan of care.      $25 co-pay  POC expires Unit limit Auth Expiration date PT/OT + Visit Limit?   25  Done  16                           Visit/Unit Tracking  AUTH Status:  Date 4/3 4/7             16 visits - Done  Used 1 2              Remaining  15 14              FOTO                     Precautions:     Access Code: [email or web address]  URL: [email or web address]  Date: 2025  Prepared by: Marisol Mishra    Exercises  - Standing March with Counter Support  - 1 x daily - 7 x weekly - 3 sets - 10 reps  - Sit to Stand with Hands on Knees  - 1 x daily - 7 x weekly - 3 sets - 10 reps  - Standing 'L' Stretch at Counter  - 1 x daily - 7 x weekly - 3 sets - 10 reps  Manuals                                                                 Neuro Re-Ed             Tandem walking             Toe taps                                                                               Ther Ex             nustep 5'            LTR x15            DKTC X30 w/ ball                         Ball press downs X10 5\"             Step ups             bronwyn extensions Green 2x10             Pallof press  Black x10 10\"                                 " "                   Hip add  x15 10\"             Hip abd X30 btb             Leg press             Ther Activity             Sit to stands             sidestepping             Gait Training                                       Modalities                                            "

## 2025-04-10 ENCOUNTER — OFFICE VISIT (OUTPATIENT)
Dept: PHYSICAL THERAPY | Age: 60
End: 2025-04-10
Payer: COMMERCIAL

## 2025-04-10 DIAGNOSIS — M54.50 CHRONIC MIDLINE LOW BACK PAIN WITHOUT SCIATICA: Primary | ICD-10-CM

## 2025-04-10 DIAGNOSIS — G89.29 CHRONIC MIDLINE LOW BACK PAIN WITHOUT SCIATICA: Primary | ICD-10-CM

## 2025-04-10 PROCEDURE — 97110 THERAPEUTIC EXERCISES: CPT

## 2025-04-10 NOTE — PROGRESS NOTES
Daily Note     Today's date: 4/10/2025  Patient name: Matt Tobias Sr.  : 1965  MRN: [id number]  Referring provider: Wen Freeman CRNP  Dx:   Encounter Diagnosis     ICD-10-CM    1. Chronic midline low back pain without sciatica  M54.50     G89.29           Start Time: 1300  Stop Time: 1345  Total time in clinic (min): 45 minutes    Subjective: Pt took his medications prior to therapy and reports that it has helped his soreness. Pt reports he was very sore following last session which is why he decided to take his pain medications.       Objective: See treatment diary below      Assessment: Tolerated treatment fair. Patient demonstrated fatigue post treatment. Pt was able to progress additional LE strengthening activities, however continues to demonstrate difficulty with core activation activities and tolerance to certain movements. Pt required verbal cueing for proper breathing techniques and to avoid valsalva.      Plan: Continue per plan of care.      $25 co-pay  POC expires Unit limit Auth Expiration date PT/OT + Visit Limit?   25  Done  16                           Visit/Unit Tracking  AUTH Status:  Date 4/3 4/7 4/10            16 visits - Done  Used 1 2 3             Remaining  15 14 13             FOTO                     Precautions:     Access Code: [email or web address]  URL: [email or web address]  Date: 2025  Prepared by: Marisol Mishra    Exercises  - Standing March with Counter Support  - 1 x daily - 7 x weekly - 3 sets - 10 reps  - Sit to Stand with Hands on Knees  - 1 x daily - 7 x weekly - 3 sets - 10 reps  - Standing 'L' Stretch at Counter  - 1 x daily - 7 x weekly - 3 sets - 10 reps  Manuals 4/7 4/10                                                               Neuro Re-Ed             Tandem walking             Toe taps                                                                               Ther Ex             nustep 5' 5'           LTR x15 x15           DKTC X30 w/  "ball X30 w/ball           TA contraction  X10, 5 sec hold           Ball press downs X10 5\"             Step ups             bronwyn extensions Green 2x10  Blue  2x10           Pallof press  Black x10 10\"  Black x10 ea                                                  Hip add  x15 10\"  X15  5 sec hold           Hip abd X30 btb  X30 btb           Leg press             Ther Activity             Sit to stands  3x10           sidestepping Rtb  Rtb 2x15 ft            Gait Training                                       Modalities                                              "

## 2025-04-14 ENCOUNTER — OFFICE VISIT (OUTPATIENT)
Dept: PHYSICAL THERAPY | Age: 60
End: 2025-04-14
Payer: COMMERCIAL

## 2025-04-14 DIAGNOSIS — G89.29 CHRONIC MIDLINE LOW BACK PAIN WITHOUT SCIATICA: Primary | ICD-10-CM

## 2025-04-14 DIAGNOSIS — M54.50 CHRONIC MIDLINE LOW BACK PAIN WITHOUT SCIATICA: Primary | ICD-10-CM

## 2025-04-14 PROCEDURE — 97110 THERAPEUTIC EXERCISES: CPT

## 2025-04-14 NOTE — PROGRESS NOTES
"Daily Note     Today's date: 2025  Patient name: Matt Tobias Sr.  : 1965  MRN: [id number]  Referring provider: Wen Freeman CRNP  Dx:   Encounter Diagnosis     ICD-10-CM    1. Chronic midline low back pain without sciatica  M54.50     G89.29           Start Time: 1400  Stop Time: 1445  Total time in clinic (min): 45 minutes    Subjective: Pt feels good at therapy and experiences appropriate mms soreness but when that decreases the pain presents making bed mobility difficult. He states that his L side is more painful than the R and he feels it with sidebending.       Objective: See treatment diary below      Assessment: Progressed several interventions by adding resistance. Discussed DOMS and he demonstrated understanding. Tolerated treatment fair. Patient demonstrated fatigue post treatment      Plan: Continue per plan of care.      $25 co-pay  POC expires Unit limit Auth Expiration date PT/OT + Visit Limit?   25  Done  8                           Visit/Unit Tracking  AUTH Status:  Date 4/3 4/7 4/10 4/14           8 Used 1 2 3 4            Remaining  15 14 13 12            FOTO                     Precautions:     Access Code: [email or web address]  URL: [email or web address]  Date: 2025  Prepared by: Marisol Mishra    Exercises  - Standing March with Counter Support  - 1 x daily - 7 x weekly - 3 sets - 10 reps  - Sit to Stand with Hands on Knees  - 1 x daily - 7 x weekly - 3 sets - 10 reps  - Standing 'L' Stretch at Counter  - 1 x daily - 7 x weekly - 3 sets - 10 reps  Manuals 4/7 4/10 4/14                                                              Neuro Re-Ed                                                                                                        Ther Ex             nustep 5' 5' 5'          LTR x15 x15 x20          DKTC X30 w/ ball X30 w/ball X20 ball           TA contraction  X10, 5 sec hold X20 5\"           Ball press downs X10 5\"   X20 5\"           Step ups         " "    susan extensions Green 2x10  Blue  2x10 Susan 15# x20 ea           Pallof press  Black x10 10\"  Black x10 ea Black x10 ea 10\"           SLRs   Nv           Bridges    nv                       Hip add  x15 10\"  X15  5 sec hold Cybex 30/30          Hip abd X30 btb  X30 btb Cybex 30/30          Leg press             Ther Activity             Sit to stands  3x10 3x10           sidestepping Rtb  Rtb 2x15 ft  gtb 2x15 ft           Gait Training                                       Modalities                                                "

## 2025-04-17 ENCOUNTER — OFFICE VISIT (OUTPATIENT)
Dept: PHYSICAL THERAPY | Age: 60
End: 2025-04-17
Payer: COMMERCIAL

## 2025-04-17 DIAGNOSIS — M54.50 CHRONIC MIDLINE LOW BACK PAIN WITHOUT SCIATICA: Primary | ICD-10-CM

## 2025-04-17 DIAGNOSIS — G89.29 CHRONIC MIDLINE LOW BACK PAIN WITHOUT SCIATICA: Primary | ICD-10-CM

## 2025-04-17 PROCEDURE — 97110 THERAPEUTIC EXERCISES: CPT

## 2025-04-17 NOTE — PROGRESS NOTES
"Daily Note     Today's date: 2025  Patient name: Matt Tobias Sr.  : 1965  MRN: [id number]  Referring provider: Wen Freeman CRNP  Dx:   Encounter Diagnosis     ICD-10-CM    1. Chronic midline low back pain without sciatica  M54.50     G89.29           Start Time: 1345  Stop Time: 1430  Total time in clinic (min): 45 minutes    Subjective: Pt did not take any type of pain medication today and he is uncomfortable today because of it.       Objective: See treatment diary below      Assessment: Added SLRs and bridges, these appropriately challenged the patient. Continued current POC only by increasing resistance or repetitions as outlined below. Pt requires Vcs for TA contraction during interventions. Tolerated treatment well. Patient demonstrated fatigue post treatment      Plan: Continue per plan of care.      $25 co-pay  POC expires Unit limit Auth Expiration date PT/OT + Visit Limit?   25  Done  8                           Visit/Unit Tracking  AUTH Status:  Date 4/3 4/7 4/10 4/14 4/17          8 Used 1 2 3 4 5           Remaining  7 6 5 4 3           FOTO                     Precautions:     Access Code: [email or web address]  URL: [email or web address]  Date: 2025  Prepared by: Marisol Mishra    Exercises  - Standing March with Counter Support  - 1 x daily - 7 x weekly - 3 sets - 10 reps  - Sit to Stand with Hands on Knees  - 1 x daily - 7 x weekly - 3 sets - 10 reps  - Standing 'L' Stretch at Counter  - 1 x daily - 7 x weekly - 3 sets - 10 reps  Manuals 4/7 4/10 4/14 4/17                                                             Neuro Re-Ed                                                                                                        Ther Ex             nustep 5' 5' 5' 5'         LTR x15 x15 x20          DKTC X30 w/ ball X30 w/ball X20 ball           TA contraction  X10, 5 sec hold X20 5\"           Ball press downs X10 5\"   X20 5\"  X20 5\" w/ march          Step ups          " "   susan extensions Green 2x10  Blue  2x10 Susan 15# x20 ea  [place] 15# x20 ea          Pallof press  Black x10 10\"  Black x10 ea Black x10 ea 10\"  NBOS 5\"x10 ea   [place]vin 8#          SLRs   Nv           Bridges    nv 3x5                       Hip add  x15 10\"  X15  5 sec hold Cybex 30/30 50/30         Hip abd X30 btb  X30 btb Cybex 30/30 50/30         Leg press             Ther Activity             Sit to stands  3x10 3x10  Squats 2x10          sidestepping Rtb  Rtb 2x15 ft  gtb 2x15 ft  Gtb 2x30 ft         Gait Training                                       Modalities                                                  "

## 2025-04-21 ENCOUNTER — OFFICE VISIT (OUTPATIENT)
Dept: PHYSICAL THERAPY | Age: 60
End: 2025-04-21
Payer: COMMERCIAL

## 2025-04-21 DIAGNOSIS — M54.50 CHRONIC MIDLINE LOW BACK PAIN WITHOUT SCIATICA: Primary | ICD-10-CM

## 2025-04-21 DIAGNOSIS — G89.29 CHRONIC MIDLINE LOW BACK PAIN WITHOUT SCIATICA: Primary | ICD-10-CM

## 2025-04-21 PROCEDURE — 97110 THERAPEUTIC EXERCISES: CPT

## 2025-04-21 PROCEDURE — 97110 THERAPEUTIC EXERCISES: CPT | Performed by: PHYSICAL THERAPIST

## 2025-04-21 NOTE — PROGRESS NOTES
"Daily Note     Today's date: 2025  Patient name: Matt Tobias Sr.  : 1965  MRN: [id number]  Referring provider: Wen Freeman CRNP  Dx:   Encounter Diagnosis     ICD-10-CM    1. Chronic midline low back pain without sciatica  M54.50     G89.29           Start Time: 1400  Stop Time: 1455  Total time in clinic (min): 55 minutes    Subjective: Pt feels fine as he took pain meds and mms relaxors prior to tx      Objective: See treatment diary below      Assessment: Discussed holding medications as sometimes they make us feel worse, pt demonstrated understanding but was not 100% accepting. Included step ups with a hold to address the patients subjective complaint. He continues to require verbal cues for core bracing. Tolerated treatment well. Patient demonstrated fatigue post treatment and has a decreased amplitude while performing exercises.       Plan: Continue per plan of care.      $25 co-pay  POC expires Unit limit Auth Expiration date PT/OT + Visit Limit?   25  Done  8                           Visit/Unit Tracking  AUTH Status:  Date 4/3 4/7 4/10 4/14 4/17 4/21         8 Used 1 2 3 4 5 6          Remaining  7 6 5 4 3 2          FOTO                     Precautions:     Access Code: [email or web address]  URL: [email or web address]  Date: 2025  Prepared by: Marisol Mishra    Exercises  - Standing March with Counter Support  - 1 x daily - 7 x weekly - 3 sets - 10 reps  - Sit to Stand with Hands on Knees  - 1 x daily - 7 x weekly - 3 sets - 10 reps  - Standing 'L' Stretch at Counter  - 1 x daily - 7 x weekly - 3 sets - 10 reps  Manuals 4/7 4/10 4/14 4/17 4/21                                                            Neuro Re-Ed                                                                                                        Ther Ex             nustep 5' 5' 5' 5' 8'         LTR x15 x15 x20          DKTC X30 w/ ball X30 w/ball X20 ball           TA contraction  X10, 5 sec hold X20 5\"   " "        Ball press downs X10 5\"   X20 5\"  X20 5\" w/ march  X20 5\" w/ march         Step ups     4\" x20 ea         susan extensions Green 2x10  Blue  2x10 Susan 15# x20 ea  Susan 15# x20 ea  [place] 15# x20 ea         Pallof press  Black x10 10\"  Black x10 ea Black x10 ea 10\"  NBOS 5\"x10 ea   Susan 8#  NBOS 5\"x10 ea   Susan 8#         SLRs   Nv  x10 2x10        Bridges    nv 3x5  3x5         Hip add  x15 10\"  X15  5 sec hold Cybex 30/30 50/30 50/30        Hip abd X30 btb  X30 btb Cybex 30/30 50/30 50/30        Wall lateral planks      2x15\" ea         Ther Activity             Sit to stands  3x10 3x10  Squats 2x10  X20  x30       sidestepping Rtb  Rtb 2x15 ft  gtb 2x15 ft  Gtb 2x30 ft nv        Gait Training                                       Modalities                                                    "

## 2025-04-22 ENCOUNTER — HOSPITAL ENCOUNTER (OUTPATIENT)
Dept: RADIOLOGY | Facility: CLINIC | Age: 60
Discharge: HOME | End: 2025-04-22
Payer: COMMERCIAL

## 2025-04-22 VITALS
DIASTOLIC BLOOD PRESSURE: 70 MMHG | HEART RATE: 55 BPM | SYSTOLIC BLOOD PRESSURE: 120 MMHG | RESPIRATION RATE: 20 BRPM | OXYGEN SATURATION: 97 % | TEMPERATURE: 97.4 F

## 2025-04-22 DIAGNOSIS — M54.16 LUMBAR RADICULOPATHY: ICD-10-CM

## 2025-04-22 PROCEDURE — 64483 NJX AA&/STRD TFRM EPI L/S 1: CPT | Performed by: STUDENT IN AN ORGANIZED HEALTH CARE EDUCATION/TRAINING PROGRAM

## 2025-04-22 RX ORDER — BUPIVACAINE HCL/PF 2.5 MG/ML
2 VIAL (ML) INJECTION ONCE
Status: COMPLETED | OUTPATIENT
Start: 2025-04-22 | End: 2025-04-22

## 2025-04-22 RX ORDER — METHYLPREDNISOLONE ACETATE 80 MG/ML
80 INJECTION, SUSPENSION INTRA-ARTICULAR; INTRALESIONAL; INTRAMUSCULAR; PARENTERAL; SOFT TISSUE ONCE
Status: COMPLETED | OUTPATIENT
Start: 2025-04-22 | End: 2025-04-22

## 2025-04-22 RX ADMIN — METHYLPREDNISOLONE ACETATE 80 MG: 80 INJECTION, SUSPENSION INTRA-ARTICULAR; INTRALESIONAL; INTRAMUSCULAR; SOFT TISSUE at 10:56

## 2025-04-22 RX ADMIN — BUPIVACAINE HYDROCHLORIDE 2 ML: 2.5 INJECTION, SOLUTION EPIDURAL; INFILTRATION; INTRACAUDAL at 10:56

## 2025-04-22 RX ADMIN — IOHEXOL 1 ML: 300 INJECTION, SOLUTION INTRAVENOUS at 10:56

## 2025-04-22 NOTE — DISCHARGE INSTR - LAB
Epidural Steroid Injection   WHAT YOU NEED TO KNOW:   An epidural steroid injection (RAIN) is a procedure to inject steroid medicine into the epidural space. The epidural space is between your spinal cord and vertebrae. Steroids reduce inflammation and fluid buildup in your spine that may be causing pain. You may be given pain medicine along with the steroids.          ACTIVITY  Do not drive or operate machinery today.  No strenuous activity today - bending, lifting, etc.  You may resume normal activites starting tomorrow - start slowly and as tolerated.  You may shower today, but no tub baths or hot tubs.  You may have numbness for several hours from the local anesthetic. Please use caution and common sense, especially with weight-bearing activities.    CARE OF THE INJECTION SITE  If you have soreness or pain, apply ice to the area today (20 minutes on/20 minutes off).  Starting tomorrow, you may use warm, moist heat or ice if needed.  You may have an increase or change in your discomfort for 36-48 hours after your treatment.  Apply ice and continue with any pain medication you have been prescribed.  Notify the [facility] if you have any of the following: redness, drainage, swelling, headache, stiff neck or fever above 100°F.    SPECIAL INSTRUCTIONS  Our office will contact you in approximately 14 days for a progress report.    MEDICATIONS  Continue to take all routine medications.  Our office may have instructed you to hold some medications.    As no general anesthesia was used in today's procedure, you should not experience any side effects related to anesthesia.     If you are diabetic, the steroids used in today's injection may temporarily increase your blood sugar levels after the first few days after your injection. Please keep a close eye on your sugars and alert the doctor who manages your diabetes if your sugars are significantly high from your baseline or you are symptomatic.     If you have a  problem specifically related to your procedure, please call our office at [phone].  Problems not related to your procedure should be directed to your primary care physician.

## 2025-04-22 NOTE — INTERVAL H&P NOTE
Update: (This section must be completed if the H&P was completed greater than 24 hrs to procedure or admission)    H&P reviewed. After examining the patient, I find no changed to the H&P since it had been written.    Patient re-evaluated. Accept as history and physical.    Hugh Horn MD/April 22, 2025/10:38 AM

## 2025-04-28 ENCOUNTER — OFFICE VISIT (OUTPATIENT)
Dept: PHYSICAL THERAPY | Age: 60
End: 2025-04-28
Payer: COMMERCIAL

## 2025-04-28 DIAGNOSIS — M54.50 CHRONIC MIDLINE LOW BACK PAIN WITHOUT SCIATICA: Primary | ICD-10-CM

## 2025-04-28 DIAGNOSIS — G89.29 CHRONIC MIDLINE LOW BACK PAIN WITHOUT SCIATICA: Primary | ICD-10-CM

## 2025-04-28 PROCEDURE — 97110 THERAPEUTIC EXERCISES: CPT

## 2025-04-28 NOTE — PROGRESS NOTES
"Daily Note     Today's date: 2025  Patient name: Matt Tobias Sr.  : 1965  MRN: [id number]  Referring provider: Wen Freeman CRNP  Dx:   Encounter Diagnosis     ICD-10-CM    1. Chronic midline low back pain without sciatica  M54.50     G89.29           Start Time: 1345  Stop Time: 1430  Total time in clinic (min): 45 minutes    Subjective: Pt missed last week because of the cortisone shots. Today he presents with bilateral low back soreness, worse on the L than the R.       Objective: See treatment diary below      Assessment: Added seated ball roll outs and a QL stretch to address his subjective. Continued current POC only by increasing resistance or repetitions as outlined below. Tolerated treatment fair. Patient demonstrated fatigue post treatment      Plan: Progress note during next visit.      $25 co-pay  POC expires Unit limit Auth Expiration date PT/OT + Visit Limit?   25  Done  8                           Visit/Unit Tracking  AUTH Status:  Date 4/3 4/7 4/10 4/14 4/17 4/21 4/28        8 Used 1 2 3 4 5 6 7         Remaining  7 6 5 4 3 2 1         FOTO        Auth              Precautions:     Access Code: [email or web address]  URL: [email or web address]  Date: 2025  Prepared by: Marisol Mishra    Exercises  - Standing March with Counter Support  - 1 x daily - 7 x weekly - 3 sets - 10 reps  - Sit to Stand with Hands on Knees  - 1 x daily - 7 x weekly - 3 sets - 10 reps  - Standing 'L' Stretch at Counter  - 1 x daily - 7 x weekly - 3 sets - 10 reps  Manuals 4/7 4/10 4/14 4/17 4/21 4/28                                                           Neuro Re-Ed                                                                                                        Ther Ex             nustep 5' 5' 5' 5' 8'  5'        LTR x15 x15 x20          DKTC X30 w/ ball X30 w/ball X20 ball           TA contraction  X10, 5 sec hold X20 5\"    X20        Ball press downs X10 5\"   X20 5\"  X20 5\" / march  " "X20 5\" w/ march  X10        Step ups     4\" x20 ea  6\" x20 ea        susan extensions Green 2x10  Blue  2x10 Susan 15# x20 ea  [place] 15# x20 ea  Susan 15# x20 ea  [place] 20# x20 ea        Pallof press  Black x10 10\"  Black x10 ea Black x10 ea 10\"  NBOS 5\"x10 ea   Susan 8#  NBOS 5\"x10 ea   Susan 8#  NBOS 5\"x10 ea   [place] 8#        SLRs   Nv  x10 2x10 x20       Bridges    nv 3x5  3x5         Hip add  x15 10\"  X15  5 sec hold Cybex 30/30 50/30 50/30 55/30        Hip abd X30 btb  X30 btb Cybex 30/30 50/30 50/30 55/30       Wall lateral planks      2x15\" ea  nv                    Seated ball roll outs       X5 ea       Ther Activity             Sit to stands  3x10 3x10  Squats 2x10  X20  x30       sidestepping Rtb  Rtb 2x15 ft  gtb 2x15 ft  Gtb 2x30 ft nv ytb 2x30ft shins        Gait Training                                       Modalities                                                      "

## 2025-05-01 ENCOUNTER — TELEPHONE (OUTPATIENT)
Dept: HEMATOLOGY ONCOLOGY | Facility: CLINIC | Age: 60
End: 2025-05-01

## 2025-05-01 ENCOUNTER — OFFICE VISIT (OUTPATIENT)
Dept: PHYSICAL THERAPY | Age: 60
End: 2025-05-01
Payer: COMMERCIAL

## 2025-05-01 DIAGNOSIS — D75.89 MACROCYTOSIS WITHOUT ANEMIA: ICD-10-CM

## 2025-05-01 DIAGNOSIS — M54.50 CHRONIC MIDLINE LOW BACK PAIN WITHOUT SCIATICA: Primary | ICD-10-CM

## 2025-05-01 DIAGNOSIS — E53.8 B12 DEFICIENCY: Primary | ICD-10-CM

## 2025-05-01 DIAGNOSIS — R74.01 TRANSAMINITIS: ICD-10-CM

## 2025-05-01 DIAGNOSIS — G89.29 CHRONIC MIDLINE LOW BACK PAIN WITHOUT SCIATICA: Primary | ICD-10-CM

## 2025-05-01 DIAGNOSIS — D75.839 THROMBOCYTHEMIA: ICD-10-CM

## 2025-05-01 PROCEDURE — 97110 THERAPEUTIC EXERCISES: CPT

## 2025-05-01 NOTE — PROGRESS NOTES
Daily Note     Today's date: 2025  Patient name: Matt Tobias Sr.  : 1965  MRN: [id number]  Referring provider: Wen Freeman CRNP  Dx:   Encounter Diagnosis     ICD-10-CM    1. Chronic midline low back pain without sciatica  M54.50     G89.29           Start Time: 1400  Stop Time: 1445  Total time in clinic (min): 45 minutes    Subjective: Pt has no new complaints       Objective: See treatment diary below  In 4 visits  Pt will improve FOTO score by 6 to meet LTGs MET   Pt will improve 5 x sit to stand by 3-4 seconds to work toward LTGs and show MDC MET   Pt will improve hip flexor resisted isometric from weak and painless to strong and painless WORKING TOWARD  Pt will improve quality of spine AROM from aberrant to smooth MET            In 8 visits  Pt will improve 5 x sit to stand from 33 seconds to 23 seconds to show LE muscular gains and meet personal goals MET   Pt will improve FOTO score from 46 pts to 57 pts to demonstrate a measurable change in physical status MET   Pt will demonstrate 100% competency of HEP to be appropriate for D/C from therapy after goals have been met, pt is functioning at PLOF, and/or the pt displays significant improvement. NOT MET          Assessment: Performed a progress note today, goals are addressed above. Discussed findings and future POC. Pt is making good progress with PT; however, he would still benefit from skilled PT for improving motor control and strengthening. Scheduled two additional appointments due to insurance and pts schedule. Tolerated treatment well. Patient demonstrated fatigue post treatment      Plan: Continue per plan of care.      $25 co-pay  POC expires Unit limit Auth Expiration date PT/OT + Visit Limit?   25  Done  8                           Visit/Unit Tracking  AUTH Status:  Date 4/3 4/7 4/10 4/14 4/17 4/21 4/28 5/1       8 Used 1 2 3 4 5 6 7 8        Remaining  7 6 5 4 3 2 1 0        FOTO        Auth              Precautions:  "    Access Code: [email or web address]  URL: [email or web address]  Date: 04/03/2025  Prepared by: Marisol Mishra    Exercises  - Standing March with Counter Support  - 1 x daily - 7 x weekly - 3 sets - 10 reps  - Sit to Stand with Hands on Knees  - 1 x daily - 7 x weekly - 3 sets - 10 reps  - Standing 'L' Stretch at Counter  - 1 x daily - 7 x weekly - 3 sets - 10 reps  Manuals 4/7 4/10 4/14 4/17 4/21 4/28 5/1                                                          Neuro Re-Ed                                                                                                        Ther Ex             nustep 5' 5' 5' 5' 8'  5'  6'      LTR x15 x15 x20          DKTC X30 w/ ball X30 w/ball X20 ball           TA contraction  X10, 5 sec hold X20 5\"    X20        Ball press downs X10 5\"   X20 5\"  X20 5\" w/ march  X20 5\" w/ march  X10        Step ups     4\" x20 ea  6\" x20 ea        susan extensions Green 2x10  Blue  2x10 [place] 15# x20 ea  Susan 15# x20 ea  [place] 15# x20 ea  [place] 20# x20 ea        Pallof press  Black x10 10\"  Black x10 ea Black x10 ea 10\"  NBOS 5\"x10 ea   [place] 8#  NBOS 5\"x10 ea   Susan 8#  NBOS 5\"x10 ea   [place] 8#        SLRs   Nv  x10 2x10 x20       Bridges    nv 3x5  3x5         Hip add  x15 10\"  X15  5 sec hold Cybex 30/30 50/30 50/30 55/30        Hip abd X30 btb  X30 btb Cybex 30/30 50/30 50/30 55/30       Wall lateral planks      2x15\" ea  nv                    Seated ball roll outs       X5 ea X5 ea       Leg press        Dl 150 x10  160 x10  170 x10      Seated lumbar extensions       X15       Lat pull down       5/30      Ther Activity             Sit to stands  3x10 3x10  Squats 2x10  X20  x30       sidestepping Rtb  Rtb 2x15 ft  gtb 2x15 ft  Gtb 2x30 ft nv ytb 2x30ft shins        Gait Training                                       Modalities                                                        "

## 2025-05-02 ENCOUNTER — TELEPHONE (OUTPATIENT)
Dept: PAIN MEDICINE | Facility: CLINIC | Age: 60
End: 2025-05-02

## 2025-05-02 NOTE — TELEPHONE ENCOUNTER
Caller: Matt    Doctor: Dr. Horn    Reason for call: patient requesting to speak with  patient needs from 3/18/2024 procedure notes only from Spine and pain needs to be sent to his      He will call back with Fax #    Call back#: [phone]

## 2025-05-02 NOTE — TELEPHONE ENCOUNTER
Caller: Matt    Doctor: Dr. Horn     Reason for call: pt called back with fax number [phone] procedure notes were faxed to Guille Thorpe his the .     Call back#: [phone]

## 2025-05-02 NOTE — TELEPHONE ENCOUNTER
team forwarding request to [facility], as request is for several medical records/notes and has to be processed thru [facility] services

## 2025-05-05 ENCOUNTER — OFFICE VISIT (OUTPATIENT)
Dept: PHYSICAL THERAPY | Age: 60
End: 2025-05-05
Payer: COMMERCIAL

## 2025-05-05 DIAGNOSIS — G89.29 CHRONIC MIDLINE LOW BACK PAIN WITHOUT SCIATICA: Primary | ICD-10-CM

## 2025-05-05 DIAGNOSIS — M54.50 CHRONIC MIDLINE LOW BACK PAIN WITHOUT SCIATICA: Primary | ICD-10-CM

## 2025-05-05 PROCEDURE — 97110 THERAPEUTIC EXERCISES: CPT

## 2025-05-05 NOTE — PROGRESS NOTES
Daily Note     Today's date: 2025  Patient name: Matt oTbias Sr.  : 1965  MRN: [id number]  Referring provider: Wen Freeman CRNP  Dx:   Encounter Diagnosis     ICD-10-CM    1. Chronic midline low back pain without sciatica  M54.50     G89.29           Start Time: 1600  Stop Time: 1645  Total time in clinic (min): 45 minutes    Subjective: pt notes he feels better after PT but soreness in his legs the next day. He feels he is getting stronger.       Objective: See treatment diary below      Assessment: Tolerated treatment well. Pt increases his  weights on the machines himself. He wants to work hard and get stronger. Cueing for core activation and bracing to protect his back. Seated lumbar ext was painful in the chair because the back of the chair was too hard on his back so we performed JUAN CARLOS. Patient would benefit from continued PT      Plan: Continue per plan of care.      $25 co-pay  POC expires Unit limit Auth Expiration date PT/OT + Visit Limit?   25  Done  8                           Visit/Unit Tracking  AUTH Status:  Date 4/3 4/7 4/10 4/14 4/17 4/21 4/28 5/1 5/5      8 Used 1 2 3 4 5 6 7 8 9       Remaining  7 6 5 4 3 2 1 0        FOTO        Auth  Auth today            Precautions:     Access Code: [email or web address]  URL: [email or web address]  Date: 2025  Prepared by: Marisol Mishra    Exercises  - Standing March with Counter Support  - 1 x daily - 7 x weekly - 3 sets - 10 reps  - Sit to Stand with Hands on Knees  - 1 x daily - 7 x weekly - 3 sets - 10 reps  - Standing 'L' Stretch at Counter  - 1 x daily - 7 x weekly - 3 sets - 10 reps  Manuals 4/7 4/10 4/14 4/17 4/21 4/28 5/1 5/5                                                         Neuro Re-Ed                                                                                                        Ther Ex             nustep 5' 5' 5' 5' 8'  5'  6' 6' L 8     LTR x15 x15 x20          DKTC X30 w/ ball X30 w/ball X20 ball        "    TA contraction  X10, 5 sec hold X20 5\"    X20        Ball press downs X10 5\"   X20 5\"  X20 5\" w/ march  X20 5\" w/ march  X10   x10     Step ups     4\" x20 ea  6\" x20 ea        susan extensions Green 2x10  Blue  2x10 [place] 15# x20 ea  Susan 15# x20 ea  [place] 15# x20 ea  Susan 20# x20 ea   Susan 20#x20     Pallof press  Black x10 10\"  Black x10 ea Black x10 ea 10\"  NBOS 5\"x10 ea   Susan 8#  NBOS 5\"x10 ea   [place] 8#  NBOS 5\"x10 ea   Susan 8#   FT 5''x10ea 8#     SLRs   Nv  x10 2x10 x20       Bridges    nv 3x5  3x5         Hip add  x15 10\"  X15  5 sec hold Cybex 30/30 50/30 50/30 55/30  55/30 55/20 60/10     Hip abd X30 btb  X30 btb Cybex 30/30 50/30 50/30 55/30 55/30 55/20  60/10     Wall lateral planks      2x15\" ea  nv                    Seated ball roll outs       X5 ea X5 ea  x5ea     Leg press        Dl 150 x10  160 x10  170 x10 DL 165x10  170x10  180x10       Seated lumbar extensions on foam and sm foam roller       X15  X5 painful went to JUAN CARLOS x10     Lat pull down       5/30 50/30     Ther Activity             Sit to stands  3x10 3x10  Squats 2x10  X20  x30       sidestepping Rtb  Rtb 2x15 ft  gtb 2x15 ft  Gtb 2x30 ft nv ytb 2x30ft shins        Gait Training                                       Modalities                                                          "

## 2025-05-06 ENCOUNTER — TELEPHONE (OUTPATIENT)
Dept: PAIN MEDICINE | Facility: CLINIC | Age: 60
End: 2025-05-06

## 2025-05-06 NOTE — TELEPHONE ENCOUNTER
Pt reports 90% improvement post inj    Pain level 0/10  He said he is having pain on the sides of his back 4/10

## 2025-05-08 ENCOUNTER — APPOINTMENT (OUTPATIENT)
Dept: LAB | Facility: CLINIC | Age: 60
End: 2025-05-08
Payer: COMMERCIAL

## 2025-05-08 ENCOUNTER — TELEPHONE (OUTPATIENT)
Dept: HEMATOLOGY ONCOLOGY | Facility: CLINIC | Age: 60
End: 2025-05-08

## 2025-05-08 ENCOUNTER — OFFICE VISIT (OUTPATIENT)
Dept: PHYSICAL THERAPY | Age: 60
End: 2025-05-08
Payer: COMMERCIAL

## 2025-05-08 DIAGNOSIS — M54.50 CHRONIC MIDLINE LOW BACK PAIN WITHOUT SCIATICA: Primary | ICD-10-CM

## 2025-05-08 DIAGNOSIS — D75.839 THROMBOCYTHEMIA: ICD-10-CM

## 2025-05-08 DIAGNOSIS — G89.29 CHRONIC MIDLINE LOW BACK PAIN WITHOUT SCIATICA: Primary | ICD-10-CM

## 2025-05-08 DIAGNOSIS — R74.01 TRANSAMINITIS: ICD-10-CM

## 2025-05-08 DIAGNOSIS — E53.8 B12 DEFICIENCY: ICD-10-CM

## 2025-05-08 DIAGNOSIS — D75.89 MACROCYTOSIS WITHOUT ANEMIA: ICD-10-CM

## 2025-05-08 LAB
ALBUMIN SERPL BCG-MCNC: 3.4 G/DL (ref 3.5–5)
ALP SERPL-CCNC: 103 U/L (ref 34–104)
ALT SERPL W P-5'-P-CCNC: 33 U/L (ref 7–52)
ANION GAP SERPL CALCULATED.3IONS-SCNC: 7 MMOL/L (ref 4–13)
AST SERPL W P-5'-P-CCNC: 38 U/L (ref 13–39)
BASOPHILS # BLD AUTO: 0.05 THOUSANDS/ÂΜL (ref 0–0.1)
BASOPHILS NFR BLD AUTO: 1 % (ref 0–1)
BILIRUB SERPL-MCNC: 3.87 MG/DL (ref 0.2–1)
BUN SERPL-MCNC: 13 MG/DL (ref 5–25)
CALCIUM ALBUM COR SERPL-MCNC: 9.7 MG/DL (ref 8.3–10.1)
CALCIUM SERPL-MCNC: 9.2 MG/DL (ref 8.4–10.2)
CHLORIDE SERPL-SCNC: 107 MMOL/L (ref 96–108)
CO2 SERPL-SCNC: 26 MMOL/L (ref 21–32)
CREAT SERPL-MCNC: 0.73 MG/DL (ref 0.6–1.3)
EOSINOPHIL # BLD AUTO: 0.35 THOUSAND/ÂΜL (ref 0–0.61)
EOSINOPHIL NFR BLD AUTO: 6 % (ref 0–6)
ERYTHROCYTE [DISTWIDTH] IN BLOOD BY AUTOMATED COUNT: 15 % (ref 11.6–15.1)
GFR SERPL CREATININE-BSD FRML MDRD: 100 ML/MIN/1.73SQ M
GLUCOSE SERPL-MCNC: 111 MG/DL (ref 65–140)
HCT VFR BLD AUTO: 39.8 % (ref 36.5–49.3)
HGB BLD-MCNC: 13.7 G/DL (ref 12–17)
IMM GRANULOCYTES # BLD AUTO: 0.03 THOUSAND/UL (ref 0–0.2)
IMM GRANULOCYTES NFR BLD AUTO: 1 % (ref 0–2)
IRON SATN MFR SERPL: 78 % (ref 15–50)
IRON SERPL-MCNC: 254 UG/DL (ref 50–212)
LYMPHOCYTES # BLD AUTO: 1.65 THOUSANDS/ÂΜL (ref 0.6–4.47)
LYMPHOCYTES NFR BLD AUTO: 27 % (ref 14–44)
MCH RBC QN AUTO: 35.4 PG (ref 26.8–34.3)
MCHC RBC AUTO-ENTMCNC: 34.4 G/DL (ref 31.4–37.4)
MCV RBC AUTO: 103 FL (ref 82–98)
MONOCYTES # BLD AUTO: 0.61 THOUSAND/ÂΜL (ref 0.17–1.22)
MONOCYTES NFR BLD AUTO: 10 % (ref 4–12)
NEUTROPHILS # BLD AUTO: 3.47 THOUSANDS/ÂΜL (ref 1.85–7.62)
NEUTS SEG NFR BLD AUTO: 55 % (ref 43–75)
NRBC BLD AUTO-RTO: 0 /100 WBCS
PLATELET # BLD AUTO: 131 THOUSANDS/UL (ref 149–390)
PMV BLD AUTO: 12 FL (ref 8.9–12.7)
POTASSIUM SERPL-SCNC: 4.2 MMOL/L (ref 3.5–5.3)
PROT SERPL-MCNC: 6 G/DL (ref 6.4–8.4)
RBC # BLD AUTO: 3.87 MILLION/UL (ref 3.88–5.62)
SODIUM SERPL-SCNC: 140 MMOL/L (ref 135–147)
TIBC SERPL-MCNC: 327.6 UG/DL (ref 250–450)
TRANSFERRIN SERPL-MCNC: 234 MG/DL (ref 203–362)
UIBC SERPL-MCNC: 74 UG/DL (ref 155–355)
WBC # BLD AUTO: 6.16 THOUSAND/UL (ref 4.31–10.16)

## 2025-05-08 PROCEDURE — 83540 ASSAY OF IRON: CPT

## 2025-05-08 PROCEDURE — 36415 COLL VENOUS BLD VENIPUNCTURE: CPT

## 2025-05-08 PROCEDURE — 82728 ASSAY OF FERRITIN: CPT

## 2025-05-08 PROCEDURE — 80053 COMPREHEN METABOLIC PANEL: CPT

## 2025-05-08 PROCEDURE — 97110 THERAPEUTIC EXERCISES: CPT

## 2025-05-08 PROCEDURE — 82607 VITAMIN B-12: CPT

## 2025-05-08 PROCEDURE — 85025 COMPLETE CBC W/AUTO DIFF WBC: CPT

## 2025-05-08 PROCEDURE — 83550 IRON BINDING TEST: CPT

## 2025-05-08 NOTE — TELEPHONE ENCOUNTER
LMOM for Matt on spouse's vm box as the call couldn't be completed when I called the # listed for him. I was calling in regards to the labs that he needs to complete for upcoming appt. Provided hope line # in case pt may need to r/s.

## 2025-05-08 NOTE — PROGRESS NOTES
"Daily Note     Today's date: 2025  Patient name: Matt Tobias Sr.  : 1965  MRN: [id number]  Referring provider: Wen Freeman CRNP  Dx:   Encounter Diagnosis     ICD-10-CM    1. Chronic midline low back pain without sciatica  M54.50     G89.29           Start Time: 1500  Stop Time: 1530  Total time in clinic (min): 30 minutes    Subjective: Pt has some LB soreness       Objective: See treatment diary below      Assessment: Added cybex deadlifts and the patient performed with good form. Continued current POC only by increasing resistance or repetitions as outlined below. Tolerated treatment well. Patient demonstrated fatigue post treatment      Plan: Continue per plan of care.      $25 co-pay  POC expires Unit limit Auth Expiration date PT/OT + Visit Limit?   25  Done  8                           Visit/Unit Tracking  AUTH Status:  Date 4/3 4/7 4/10 4/14 4/17 4/21 4/28 5/1 5/5      8 Used 1 2 3 4 5 6 7 8 9       Remaining  7 6 5 4 3 2 1 0        FOTO        Auth  Auth today        AUTH Status:  Date               12 Used 1               Remaining  11               FOTO                 Precautions:     Access Code: [email or web address]  URL: [email or web address]  Date: 2025  Prepared by: Marisol Mishra    Exercises  - Standing March with Counter Support  - 1 x daily - 7 x weekly - 3 sets - 10 reps  - Sit to Stand with Hands on Knees  - 1 x daily - 7 x weekly - 3 sets - 10 reps  - Standing 'L' Stretch at Counter  - 1 x daily - 7 x weekly - 3 sets - 10 reps  Manuals 4/7 4/10 4/14 4/17 4/21 4/28 5/1 5/5 5/8                                                        Neuro Re-Ed                                                                                                        Ther Ex             nustep 5' 5' 5' 5' 8'  5'  6' 6' L 8 L6 8'    LTR x15 x15 x20          DKTC X30 w/ ball X30 w/ball X20 ball           TA contraction  X10, 5 sec hold X20 5\"    X20        Ball press downs X10 5\"   " "X20 5\"  X20 5\" w/ march  X20 5\" w/ march  X10   x10 X15 w/ march    Step ups     4\" x20 ea  6\" x20 ea    8\" x20 ea     susan extensions Green 2x10  Blue  2x10 [place] 15# x20 ea  Susan 15# x20 ea  [place] 15# x20 ea  [place] 20# x20 ea   Susan 20#x20     Pallof press  Black x10 10\"  Black x10 ea Black x10 ea 10\"  NBOS 5\"x10 ea   [place] 8#  NBOS 5\"x10 ea   Susan 8#  NBOS 5\"x10 ea   Susan 8#   FT 5''x10ea 8#     SLRs   Nv  x10 2x10 x20   2#/20     Bridges    nv 3x5  3x5     3x10     Hip add  x15 10\"  X15  5 sec hold Cybex 30/30 50/30 50/30 55/30  55/30 55/20 60/10 60/10     Hip abd X30 btb  X30 btb Cybex 30/30 50/30 50/30 55/30 55/30 55/20  60/10 60/10                              Seated ball roll outs       X5 ea X5 ea  x5ea X10 ea     Leg press        Dl 150 x10  160 x10  170 x10 DL 165x10  170x10  180x10   DL 165x10  170x10  180x10      Seated lumbar extensions on foam and sm foam roller       X15  X5 painful went to ROSEANN x10 Roseann x15     Lat pull down       5/30 50/30 nt    Cybex deadlift          6/20                  Ther Activity             Sit to stands  3x10 3x10  Squats 2x10  X20  x30   Squats x20 10#     sidestepping Rtb  Rtb 2x15 ft  gtb 2x15 ft  Gtb 2x30 ft nv ytb 2x30ft shins        Gait Training                                       Modalities                                                            "

## 2025-05-09 ENCOUNTER — TELEPHONE (OUTPATIENT)
Dept: GASTROENTEROLOGY | Facility: CLINIC | Age: 60
End: 2025-05-09

## 2025-05-09 ENCOUNTER — OFFICE VISIT (OUTPATIENT)
Dept: HEMATOLOGY ONCOLOGY | Facility: CLINIC | Age: 60
End: 2025-05-09
Payer: COMMERCIAL

## 2025-05-09 VITALS
HEIGHT: 66 IN | HEART RATE: 60 BPM | WEIGHT: 213 LBS | BODY MASS INDEX: 34.23 KG/M2 | SYSTOLIC BLOOD PRESSURE: 130 MMHG | DIASTOLIC BLOOD PRESSURE: 80 MMHG | OXYGEN SATURATION: 98 % | RESPIRATION RATE: 14 BRPM | TEMPERATURE: 98.1 F

## 2025-05-09 DIAGNOSIS — K91.2 POSTSURGICAL MALABSORPTION: ICD-10-CM

## 2025-05-09 DIAGNOSIS — E53.8 B12 DEFICIENCY: ICD-10-CM

## 2025-05-09 DIAGNOSIS — B18.2 HEPATIC CIRRHOSIS DUE TO CHRONIC HEPATITIS C INFECTION (HCC): ICD-10-CM

## 2025-05-09 DIAGNOSIS — D69.6 THROMBOCYTOPENIA (HCC): Primary | ICD-10-CM

## 2025-05-09 DIAGNOSIS — G89.29 CHRONIC MIDLINE LOW BACK PAIN WITHOUT SCIATICA: ICD-10-CM

## 2025-05-09 DIAGNOSIS — Z14.8 HEMOCHROMATOSIS CARRIER: ICD-10-CM

## 2025-05-09 DIAGNOSIS — D75.89 MACROCYTOSIS WITHOUT ANEMIA: ICD-10-CM

## 2025-05-09 DIAGNOSIS — K74.60 HEPATIC CIRRHOSIS DUE TO CHRONIC HEPATITIS C INFECTION (HCC): ICD-10-CM

## 2025-05-09 DIAGNOSIS — M54.50 CHRONIC MIDLINE LOW BACK PAIN WITHOUT SCIATICA: ICD-10-CM

## 2025-05-09 LAB
FERRITIN SERPL-MCNC: 41 NG/ML (ref 30–336)
VIT B12 SERPL-MCNC: 560 PG/ML (ref 180–914)

## 2025-05-09 PROCEDURE — 99214 OFFICE O/P EST MOD 30 MIN: CPT | Performed by: PHYSICIAN ASSISTANT

## 2025-05-09 NOTE — ASSESSMENT & PLAN NOTE
- Secondary to Hep C s/p treatment   - Mild transaminitis present on most recent labs, recommend repeating CMP.  - Continue follow up with GI

## 2025-05-09 NOTE — ASSESSMENT & PLAN NOTE
At risk for nutritoinal anemias given history of gastric bypass   B12, Iron panel q 6-12m  Orders:    Iron Panel (Includes Ferritin, Iron Sat%, Iron, and TIBC); Future

## 2025-05-09 NOTE — PROGRESS NOTES
Name: Matt Tobias Sr.      : 1965      MRN: [id number]  Encounter Provider: Kalpana aSmaniego PA-C  Encounter Date: 2025   Encounter department: [facility] [place]      59-year-old male with history of cirrhosis secondary to HepC, macrocytosis, thrombocytopenia and heterozygous H63D hemochromatosis who presents as follow-up.  Most recent CBC shows WBC 6.16, hemoglobin 13.7, , platelets 231,000.  WBC DIF is normal.  The MP shows persistently elevated bilirubin level 3.87 with hypoalbuminemia albumin 3.4.  Iron saturation 78%, ferritin 41.  Vitamin B12 560.  Assessment & Plan  Thrombocytopenia (HCC)  - Thrombocytopenia since at least  with platelet count ranging in the ,000's.    - Abdominal US last year showed top normal spleen,   - Suspect secondary to cirrhosis vs less likely chronic ITP.   - no bruising or bleeding   - Recommend continued observation with CBCD q6m.  If he develops other cytopenias or constitutional symptoms, would consider bone marrow biopsy.  Consider platelet transfusion in the future for platelet count less than 15,000 or less than 50,000 with any bleeding.   Orders:    CBC and differential; Future    Iron Panel (Includes Ferritin, Iron Sat%, Iron, and TIBC); Future    Specimen draw for platelet clumping; Future    Postsurgical malabsorption  At risk for nutritoinal anemias given history of gastric bypass   B12, Iron panel q 6-12m  Orders:    Iron Panel (Includes Ferritin, Iron Sat%, Iron, and TIBC); Future    B12 deficiency  Acceptable   Continue oral B12 supplements   Orders:    Iron Panel (Includes Ferritin, Iron Sat%, Iron, and TIBC); Future    Hemochromatosis carrier   Typically carriers do not develop iron overload.   - MRI abdomen 2023 did not show any evidence of iron deposition.  - He is getting AFP tumor markers through GI.   - Last ferritin 41   - Ferritin vs genetic testing recommend for children   -  check ferritin q6-12m.        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)  - Secondary to Hep C s/p treatment   - Mild transaminitis present on most recent labs, recommend repeating CMP.  - Continue follow up with GI        Macrocytosis without anemia  Due to cirrhosis          Assessment & Plan        Return in about 6 months (around 11/9/2025) for Office Visit.    History of Present Illness   Chief Complaint   Patient presents with    Follow-up   History of Present Illness  59-year-old male with past medical history of hepatic cirrhosis due to chronic hep C infection, esophageal varices, ELLEN, gastric sleeve who has been referred by his primary care provider for evaluation of anemia, thrombocytopenia.     Macrocytosis without anemia   On chart review, patient has elevated MCV without evidence of anemia, which has been intermittent since at least 2019.  He has never required IV iron or blood transfusion in the past.  Most recent labs demonstrate WBC 4.4, hemoglobin 12.2, , platelet count 109,000.  Differential shows mild relative increase in eosinophils 7% otherwise normal.  Last B12 308 and Folate 8.1 in 2022. He has history of gastric sleeve in 2022.  No other known malabsorption condition or autoimmune disease.has known history of esophageal varices on prior endoscopies.  Last EGD 05/2023 showed 3 columns of grade 1 esophageal varices with no bleeding.  Last colonoscopy in 2015 and reportedly had a single hyperplastic polyp removed.  Record not available for review. Currently not on any oral iron or B12 supplements.  He is on chronic PPI. Consumes red meat.  Does not consume vegetables regularly regularly.  Currently, no alcohol use or drug use.  Admits to occasional tobacco use.  No known family history of cancer.       04/2024: WBC 6.34, hemoglobin 13.6, MCV 99, platelets 128,000. Differential shows relative increase in eosinophils with normal absolute eosinophil count. Folate 10.9. B12  "216. Iron panel revealed elevated iron levels with low UIBC. Ferritin 58.SPEP/IF without monoclonal gammopathy      - 04/2024: B12 level 216 and he was started on oral B12 supplements 1,000mcg daily   - Reduce B12 supplements to once a week   - Repeat B12 level in 3m      2.  Thrombocytopenia  Has been present since at least 2017.  Ranging from ,000.  No bleeding complications or hospitalizations related to low platelets.      Platelets (Thousands/uL)   Date Value   11/06/2024 201   10/29/2024 85 (L)   10/28/2024 102 (L)   07/12/2024 128 (L)   04/26/2024 128 (L)      3. Cirrhosis with esophageal varices   - Diagnosed in 1990s from Hep C    - Follows with gastroenterology   - Last EGD 05/2023, due again 2016      4. Right portal vein thrombus 2013  - Took ac for a few months, now off     Pertinent Medical History     05/09/25: No complaints, overall patient is feeling well.  Did recently have colonoscopy in January for hematochezia.  He was found to have internal hemorrhoids.  No bleeding was observed. No melena.  He bruises with trauma however no spontaneous bruising or petechial like rash.     Review of Systems   All other systems reviewed and are negative.          Objective   /80 (BP Location: Left arm, Patient Position: Sitting, Cuff Size: Standard)   Pulse 60   Temp 98.1 °F (36.7 °C) (Temporal)   Resp 14   Ht 5' 6\" (1.676 m)   Wt 96.6 kg (213 lb)   SpO2 98%   BMI 34.38 kg/m²     Physical Exam  Vitals reviewed.   HENT:      Head: Normocephalic.   Cardiovascular:      Rate and Rhythm: Normal rate and regular rhythm.   Pulmonary:      Effort: Pulmonary effort is normal.      Breath sounds: Normal breath sounds.   Musculoskeletal:      Cervical back: Neck supple.   Skin:     Findings: No rash.   Neurological:      Mental Status: He is alert.       Physical Exam      Results    Labs: I have reviewed the following labs:  Results for orders placed or performed in visit on 05/08/25   CBC and " differential   Result Value Ref Range    WBC 6.16 4.31 - 10.16 Thousand/uL    RBC 3.87 (L) 3.88 - 5.62 Million/uL    Hemoglobin 13.7 12.0 - 17.0 g/dL    Hematocrit 39.8 36.5 - 49.3 %     (H) 82 - 98 fL    MCH 35.4 (H) 26.8 - 34.3 pg    MCHC 34.4 31.4 - 37.4 g/dL    RDW 15.0 11.6 - 15.1 %    MPV 12.0 8.9 - 12.7 fL    Platelets 131 (L) 149 - 390 Thousands/uL    nRBC 0 /100 WBCs    Segmented % 55 43 - 75 %    Immature Grans % 1 0 - 2 %    Lymphocytes % 27 14 - 44 %    Monocytes % 10 4 - 12 %    Eosinophils Relative 6 0 - 6 %    Basophils Relative 1 0 - 1 %    Absolute Neutrophils 3.47 1.85 - 7.62 Thousands/µL    Absolute Immature Grans 0.03 0.00 - 0.20 Thousand/uL    Absolute Lymphocytes 1.65 0.60 - 4.47 Thousands/µL    Absolute Monocytes 0.61 0.17 - 1.22 Thousand/µL    Eosinophils Absolute 0.35 0.00 - 0.61 Thousand/µL    Basophils Absolute 0.05 0.00 - 0.10 Thousands/µL   Comprehensive metabolic panel   Result Value Ref Range    Sodium 140 135 - 147 mmol/L    Potassium 4.2 3.5 - 5.3 mmol/L    Chloride 107 96 - 108 mmol/L    CO2 26 21 - 32 mmol/L    ANION GAP 7 4 - 13 mmol/L    BUN 13 5 - 25 mg/dL    Creatinine 0.73 0.60 - 1.30 mg/dL    Glucose 111 65 - 140 mg/dL    Calcium 9.2 8.4 - 10.2 mg/dL    Corrected Calcium 9.7 8.3 - 10.1 mg/dL    AST 38 13 - 39 U/L    ALT 33 7 - 52 U/L    Alkaline Phosphatase 103 34 - 104 U/L    Total Protein 6.0 (L) 6.4 - 8.4 g/dL    Albumin 3.4 (L) 3.5 - 5.0 g/dL    Total Bilirubin 3.87 (H) 0.20 - 1.00 mg/dL    eGFR 100 ml/min/1.73sq m   Vitamin B12   Result Value Ref Range    Vitamin B-12 560 180 - 914 pg/mL   TIBC Panel (incl. Iron, TIBC, % Iron Saturation)   Result Value Ref Range    Iron Saturation 78 (H) 15 - 50 %    TIBC 327.6 250 - 450 ug/dL    Iron 254 (H) 50 - 212 ug/dL    Transferrin 234 203 - 362 mg/dL    UIBC 74 (L) 155 - 355 ug/dL   Result Value Ref Range    Ferritin 41 30 - 336 ng/mL     *Note: Due to a large number of results and/or encounters for the requested time  period, some results have not been displayed. A complete set of results can be found in Results Review.

## 2025-05-09 NOTE — TELEPHONE ENCOUNTER
----- Message from Sheridan Sotelo PA-C sent at 5/9/2025 12:14 PM EDT -----  Regarding: RE:  Thanks for reaching out!!    Clerical team - can we reach out to get him a follow up with one of the PAs (I think we have all seen him in the past) in June or July. Ty!  ----- Message -----  From: Kalpana Samaniego PA-C  Sent: 5/9/2025  12:02 PM EDT  To: Sheridan Sotelo PA-C    Hey just wanted to check with you when you guys want to see him again. Nothing urgent going on, just doesn't have any follow up and I didn't want him to get lost to follow up with you.     Kalpana

## 2025-05-20 ENCOUNTER — OFFICE VISIT (OUTPATIENT)
Dept: PHYSICAL THERAPY | Age: 60
End: 2025-05-20
Payer: COMMERCIAL

## 2025-05-20 DIAGNOSIS — M54.50 CHRONIC MIDLINE LOW BACK PAIN WITHOUT SCIATICA: Primary | ICD-10-CM

## 2025-05-20 DIAGNOSIS — G89.29 CHRONIC MIDLINE LOW BACK PAIN WITHOUT SCIATICA: Primary | ICD-10-CM

## 2025-05-20 PROCEDURE — 97110 THERAPEUTIC EXERCISES: CPT

## 2025-05-20 NOTE — PROGRESS NOTES
Daily Note     Today's date: 2025  Patient name: Matt Tobias Sr.  : 1965  MRN: [id number]  Referring provider: Wen Freeman CRNP  Dx:   Encounter Diagnosis     ICD-10-CM    1. Chronic midline low back pain without sciatica  M54.50     G89.29           Start Time: 1308  Stop Time: 1401  Total time in clinic (min): 53 minutes    Subjective: Reports no new concerns. Patient states he will continue his exercises at his local gym, but will consider joining the step down program if he does not feel it works out for him.       Objective: See treatment diary below  Patient was seen 1:1 for 40 min.     Assessment: Reviewed program to ensure independence prior to discharge. Progressed PREs as documented below with appropriate level of challenge. Patient will transition to an independent HEP and is considering joining the step down program.       Plan: Patient will be discharged by primary PT.     $25 co-pay  POC expires Unit limit Auth Expiration date PT/OT + Visit Limit?   25   1 visit - auth after eval       8     -25 2               Visit/Unit Tracking  AUTH Status:  Date 4/3 4/7 4/10 4/14 4/17 4/21 4/28 5/1 5/5   Auth after eval, +8  Used 1 2 3 4 5 6 7 8 9    Remaining  9 8 7 6 5 4 3 2 1    FOTO Auth         Auth today     AUTH Status:  Date    12 Used 1 2    Remaining  11     FOTO       Precautions:     Access Code: [email or web address]  URL: [email or web address]  Date: 2025  Prepared by: Marisol Mishra    Exercises  - Standing March with Counter Support  - 1 x daily - 7 x weekly - 3 sets - 10 reps  - Sit to Stand with Hands on Knees  - 1 x daily - 7 x weekly - 3 sets - 10 reps  - Standing 'L' Stretch at Counter  - 1 x daily - 7 x weekly - 3 sets - 10 reps  Manuals 4/7 4/10 4/14 4/17 4/21 4/28 5/1 5/5 5/8 5/20                                                       Neuro Re-Ed                                                                                                      "   Ther Ex             nustep 5' 5' 5' 5' 8'  5'  6' 6' L 8 L6 8' L6 8'    LTR x15 x15 x20          DKTC X30 w/ ball X30 w/ball X20 ball           TA contraction  X10, 5 sec hold X20 5\"    X20        Ball press downs X10 5\"   X20 5\"  X20 5\" w/ march  X20 5\" w/ march  X10   x10 X15 w/ march X15 w/ march   Step ups     4\" x20 ea  6\" x20 ea    8\" x20 ea  8\" 20x    susan extensions Green 2x10  Blue  2x10 Susan 15# x20 ea  Susan 15# x20 ea  Brunswick 15# x20 ea  Susan 20# x20 ea   Susan 20#x20     Pallof press  Black x10 10\"  Black x10 ea Black x10 ea 10\"  NBOS 5\"x10 ea   Brunswick 8#  NBOS 5\"x10 ea   Susan 8#  NBOS 5\"x10 ea   Brunswick 8#   FT 5''x10ea 8#     SLRs   Nv  x10 2x10 x20   2#/20  2# ankle 2x10 ea    Bridges    nv 3x5  3x5     3x10     Hip add  x15 10\"  X15  5 sec hold Cybex 30/30 50/30 50/30 55/30  55/30 55/20 60/10 60/10  65# 10x  70# 10x   Hip abd X30 btb  X30 btb Cybex 30/30 50/30 50/30 55/30 55/30 55/20  60/10 60/10 65# 10x  70# 10x                             Seated ball roll outs       X5 ea X5 ea  x5ea X10 ea  10x ea   Leg press        Dl 150 x10  160 x10  170 x10 DL 165x10  170x10  180x10   DL 165x10  170x10  180x10   195# 10x  205# 10x      Seated lumbar extensions on foam and sm foam roller       X15  X5 painful went to ROSEANN x10 Roseann x15  ROSEANN x15    Lat pull down       5/30 50/30 nt Plate 6 2x5    Plate 7 x10   Cybex deadlift          6/20  Plate 6 2x10                 Ther Activity             Sit to stands  3x10 3x10  Squats 2x10  X20  x30   Squats x20 10#  Squats x20 10# KB   sidestepping Rtb  Rtb 2x15 ft  gtb 2x15 ft  Gtb 2x30 ft nv ytb 2x30ft shins        Gait Training                                       Modalities                                                              "

## 2025-05-22 ENCOUNTER — APPOINTMENT (OUTPATIENT)
Dept: PHYSICAL THERAPY | Age: 60
End: 2025-05-22
Payer: COMMERCIAL

## 2025-05-28 ENCOUNTER — APPOINTMENT (OUTPATIENT)
Dept: PHYSICAL THERAPY | Age: 60
End: 2025-05-28
Payer: COMMERCIAL

## 2025-05-30 ENCOUNTER — APPOINTMENT (OUTPATIENT)
Dept: PHYSICAL THERAPY | Age: 60
End: 2025-05-30
Payer: COMMERCIAL

## 2025-06-03 ENCOUNTER — TELEPHONE (OUTPATIENT)
Age: 60
End: 2025-06-03

## 2025-06-03 DIAGNOSIS — M54.50 CHRONIC MIDLINE LOW BACK PAIN WITHOUT SCIATICA: ICD-10-CM

## 2025-06-03 DIAGNOSIS — G89.29 CHRONIC MIDLINE LOW BACK PAIN WITHOUT SCIATICA: ICD-10-CM

## 2025-06-04 DIAGNOSIS — I85.10 SECONDARY ESOPHAGEAL VARICES WITHOUT BLEEDING (HCC): ICD-10-CM

## 2025-06-04 DIAGNOSIS — L71.9 ROSACEA: ICD-10-CM

## 2025-06-05 RX ORDER — CARVEDILOL 6.25 MG/1
TABLET ORAL
Qty: 180 TABLET | Refills: 1 | Status: SHIPPED | OUTPATIENT
Start: 2025-06-05

## 2025-06-05 RX ORDER — TRIAMCINOLONE ACETONIDE 5 MG/G
CREAM TOPICAL
Qty: 45 G | Refills: 2 | Status: SHIPPED | OUTPATIENT
Start: 2025-06-05

## 2025-06-12 ENCOUNTER — DOCUMENTATION (OUTPATIENT)
Dept: ADMINISTRATIVE | Facility: OTHER | Age: 60
End: 2025-06-12

## 2025-06-12 NOTE — PROGRESS NOTES
06/12/25 1:33 PM    CRC outreach is not required, Colonoscopy/ FIT/ other screening completed.    Thank you.  Myke Sidhu MA  [facility]

## 2025-06-23 ENCOUNTER — TELEPHONE (OUTPATIENT)
Age: 60
End: 2025-06-23

## 2025-06-23 ENCOUNTER — OFFICE VISIT (OUTPATIENT)
Dept: FAMILY MEDICINE CLINIC | Facility: CLINIC | Age: 60
End: 2025-06-23
Payer: COMMERCIAL

## 2025-06-23 VITALS
HEIGHT: 66 IN | HEART RATE: 71 BPM | TEMPERATURE: 99.4 F | OXYGEN SATURATION: 96 % | BODY MASS INDEX: 34.87 KG/M2 | WEIGHT: 217 LBS | DIASTOLIC BLOOD PRESSURE: 72 MMHG | SYSTOLIC BLOOD PRESSURE: 128 MMHG

## 2025-06-23 DIAGNOSIS — G89.29 CHRONIC MID BACK PAIN: ICD-10-CM

## 2025-06-23 DIAGNOSIS — M43.06 LUMBAR SPONDYLOLYSIS: Primary | ICD-10-CM

## 2025-06-23 DIAGNOSIS — M54.9 CHRONIC MID BACK PAIN: ICD-10-CM

## 2025-06-23 PROCEDURE — 99214 OFFICE O/P EST MOD 30 MIN: CPT | Performed by: FAMILY MEDICINE

## 2025-06-23 NOTE — PROGRESS NOTES
"Name: Matt Tobias Sr.      : 1965      MRN: [id number]  Encounter Provider: Rodrigo Jo MD  Encounter Date: 2025   Encounter department: [facility]  :  Assessment & Plan  Lumbar spondylolysis  Continue Ultracet  F/U with pain management  Orders:  •  MRI lumbar spine wo contrast; Future    Chronic mid back pain  Continue Ultracet  F/U with pain management  Orders:  •  MRI thoracic spine wo contrast; Future    Follow up in 6 months or as needed       History of Present Illness   Patient is here due to chronic low back and mid back pain most severe when he wakes up. He has daily symptoms he has difficulty getting up from a chair. No injuries to his back. He has been taking ultracet which helps some. Also has done PT in the past which has not helped much. Sees pain management had several injections in his back which only helped temporarily.      Review of Systems   Constitutional:  Negative for activity change, appetite change, fatigue and fever.   HENT:  Negative for congestion and ear discharge.    Respiratory:  Negative for cough and shortness of breath.    Cardiovascular:  Negative for chest pain and palpitations.   Gastrointestinal:  Negative for diarrhea and nausea.   Musculoskeletal:  Positive for arthralgias and back pain.   Skin:  Negative for color change and rash.   Neurological:  Negative for dizziness and headaches.   Psychiatric/Behavioral:  Negative for agitation and behavioral problems.        Objective   /72   Pulse 71   Temp 99.4 °F (37.4 °C)   Ht 5' 6\" (1.676 m)   Wt 98.4 kg (217 lb)   SpO2 96%   BMI 35.02 kg/m²      Physical Exam  Constitutional:       General: He is not in acute distress.     Appearance: He is well-developed. He is not diaphoretic.     Eyes:      General: No scleral icterus.     Pupils: Pupils are equal, round, and reactive to light.       Cardiovascular:      Rate and Rhythm: Normal rate and regular rhythm.      Heart sounds: " Normal heart sounds. No murmur heard.  Pulmonary:      Effort: Pulmonary effort is normal. No respiratory distress.      Breath sounds: Normal breath sounds. No wheezing.   Abdominal:      General: Bowel sounds are normal. There is no distension.      Palpations: Abdomen is soft.      Tenderness: There is no abdominal tenderness.     Musculoskeletal:         General: Tenderness present.      Comments: B/L straight leg raise produces lumbar tenderness  Mid back tenderness     Skin:     General: Skin is warm and dry.      Findings: No rash.     Neurological:      Mental Status: He is alert and oriented to person, place, and time.

## 2025-06-23 NOTE — TELEPHONE ENCOUNTER
Caller: pt    Doctor: Dr. garland    Reason for call: pt would like to talk to a rn about his low back pain.    Call back#: [phone]

## 2025-06-24 NOTE — TELEPHONE ENCOUNTER
We can reorder bilateral L5 TFESI however this generally will help with low back pain that radiates down into his legs does not get a help with pain near the scapula.  Typically that is muscle related.     He had a T12-L1 on 2/4/2025  Then a L4-L5 on 3/18/2025  And a bilateral L5 TFESI on 4/22/2025    Patient likely needs an office visit to determine the best treatment option.    If it is tightness it could be severe muscle spasm where trigger point injections may be beneficial.

## 2025-06-24 NOTE — TELEPHONE ENCOUNTER
S/W pt who had Froylan L5 TFESI on 4/22 with 90% improvement up until about 2 weeks ago  States pain is along his spine and everything feels tight.  Pain is Low back up to scapula.  Taking Ultracet with some help.  States pain is worse in am and at night.    Went to PCP yesterday and MRI's were ordered  Pt has not scheduled yet.    Pt would like to know next step in treatment  LOV in March  He has had several RAIN's and RFA

## 2025-06-25 NOTE — TELEPHONE ENCOUNTER
Tried to reach pt via phone call, call was unable to be completed will try and call patient again

## 2025-06-26 ENCOUNTER — OFFICE VISIT (OUTPATIENT)
Dept: GASTROENTEROLOGY | Facility: CLINIC | Age: 60
End: 2025-06-26
Payer: COMMERCIAL

## 2025-06-26 ENCOUNTER — TELEPHONE (OUTPATIENT)
Dept: GASTROENTEROLOGY | Facility: CLINIC | Age: 60
End: 2025-06-26

## 2025-06-26 VITALS
DIASTOLIC BLOOD PRESSURE: 68 MMHG | HEART RATE: 67 BPM | SYSTOLIC BLOOD PRESSURE: 98 MMHG | BODY MASS INDEX: 34.55 KG/M2 | OXYGEN SATURATION: 96 % | HEIGHT: 66 IN | TEMPERATURE: 97.5 F | WEIGHT: 215 LBS

## 2025-06-26 DIAGNOSIS — K74.60 HEPATIC CIRRHOSIS DUE TO CHRONIC HEPATITIS C INFECTION (HCC): Primary | ICD-10-CM

## 2025-06-26 DIAGNOSIS — B18.2 HEPATIC CIRRHOSIS DUE TO CHRONIC HEPATITIS C INFECTION (HCC): Primary | ICD-10-CM

## 2025-06-26 PROCEDURE — 99214 OFFICE O/P EST MOD 30 MIN: CPT | Performed by: PHYSICIAN ASSISTANT

## 2025-06-26 NOTE — ASSESSMENT & PLAN NOTE
Patient has a hx of HCV cirrhosis s/p Harvoni tx with SVR years ago.  MELD score 18 (but based on a dated INR level).  HE: Grade 0, has Lactulose which he takes prn; Xifaxan is too expensive.  Varices: EGD in May 2024 showed grade 1 varices and sleeve gastrectomy anatomy; he is on Carvedilol. Repeat EGD recommended in May of 2026.  Ascites: None on last imaging, he takes Lasix on a prn basis when he has LE edema; <2 gram sodium diet.  HCC Screen: Will plan for MRI of the abdomen with and without contrast. Will check an AFP tumor marker.  Transplant: Previously saw [facility] years ago but did not have a good experience.  Will have him set up with out Hepatology group.  Will update labs to determine a current MELD score.  He has serologic immunity to Hep B.  Will check for serologic immunity to Hep A and recommend vaccination if not.  Do not take > 2 grams of Tylenol in a 24 hour period. No NSAIDs.    MELD 3.0: 19 at 10/29/2024  5:44 AM  MELD-Na: 18 at 10/29/2024  5:44 AM  Calculated from:  Serum Creatinine: 1 mg/dL at 10/29/2024  5:44 AM  Serum Sodium: 134 mmol/L at 10/29/2024  5:44 AM  Total Bilirubin: 5.18 mg/dL at 10/29/2024  5:44 AM  Serum Albumin: 3.1 g/dL at 10/29/2024  5:44 AM  INR(ratio): 1.39 at 10/28/2024  6:50 PM  Age at listing (hypothetical): 59 years  Sex: Male at 10/29/2024  5:44 AM     Orders:    MRI abdomen w wo contrast; Future    CBC and differential; Future    Comprehensive metabolic panel; Future    Protime-INR; Future    AFP tumor marker; Future    Hepatitis A antibody, total; Future    Follow up in 6 months.

## 2025-06-26 NOTE — PROGRESS NOTES
Name: Matt WITT Micheline Renteria.      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  MELD score 18 (but based on a dated INR level).  HE: Grade 0, has Lactulose which he takes prn; Xifaxan is too expensive.  Varices: EGD in May 2024 showed grade 1 varices and sleeve gastrectomy anatomy; he is on Carvedilol. Repeat EGD recommended in May of 2026.  Ascites: None on last imaging, he takes Lasix on a prn basis when he has LE edema; <2 gram sodium diet.  HCC Screen: Will plan for MRI of the abdomen with and without contrast. Will check an AFP tumor marker.  Transplant: Previously saw [facility] years ago but did not have a good experience.  Will have him set up with out Hepatology group.  Will update labs to determine a current MELD score.  He has serologic immunity to Hep B.  Will check for serologic immunity to Hep A and recommend vaccination if not.  Do not take > 2 grams of Tylenol in a 24 hour period. No NSAIDs.    MELD 3.0: 19 at 10/29/2024  5:44 AM  MELD-Na: 18 at 10/29/2024  5:44 AM  Calculated from:  Serum Creatinine: 1 mg/dL at 10/29/2024  5:44 AM  Serum Sodium: 134 mmol/L at 10/29/2024  5:44 AM  Total Bilirubin: 5.18 mg/dL at 10/29/2024  5:44 AM  Serum Albumin: 3.1 g/dL at 10/29/2024  5:44 AM  INR(ratio): 1.39 at 10/28/2024  6:50 PM  Age at listing (hypothetical): 59 years  Sex: Male at 10/29/2024  5:44 AM     Orders:    MRI abdomen w wo contrast; Future    CBC and differential; Future    Comprehensive metabolic panel; Future    Protime-INR; Future    AFP tumor marker; Future    Hepatitis A antibody, total; Future    Follow up in 6 months.      History of Present Illness   HPI  Matt EDUAR Tobias Sr. is a 60 y.o. male who  presents to the office for follow up of his cirrhosis secondary to hepatitis C status post SVR with Harvoni treatment years ago..  He reports he has overall been doing fairly well.  He is not taking lactulose regularly.  He reports he will take a dose if he feels he needs it.  He reports Xifaxan has been too expensive.  He also has Lasix which he utilizes occasionally when needed lower extremity edema.  He is following a low sodium diet.  He is a hemochromatosis ptosis carrier.  Follows with hematology.  Alcohol use.  A colonoscopy in January 2025 which showed diverticulosis and internal hemorrhoids.  History obtained from: patient      Medical History Reviewed by provider this encounter:     .  Past Medical History   Past Medical History[1]  Past Surgical History[2]  Family History[3]   reports that he quit smoking about 2 years ago. His smoking use included cigarettes. He started smoking about 43 years ago. He has a 10.1 pack-year smoking history. He has never used smokeless tobacco. He reports that he does not currently use alcohol. He reports that he does not use drugs.  Current Outpatient Medications   Medication Instructions    AMILoride 5 mg, Oral, Daily    carvedilol (COREG) 6.25 mg tablet TAKE 1 TABLET (6.25 MG TOTAL) BY MOUTH TWO (TWO) TIMES A DAY WITH MEALS    Claritin-D 12 Hour 5-120 MG per tablet TAKE ONE TABLET BY MOUTH TWICE DAILY FOR 5 DAYS    cyanocobalamin (VITAMIN B-12) 1,000 mcg, Oral, Daily    fluticasone (FLONASE) 50 mcg/act nasal spray 1 spray, Nasal, Daily    lactulose (CHRONULAC) 10 g, Oral, Daily PRN    omeprazole (PRILOSEC) 20 mg, Oral, Daily    traMADol-acetaminophen (ULTRACET) 37.5-325 mg per tablet 1 tablet, Oral, 2 times daily PRN    triamcinolone (KENALOG) 0.5 % cream APPLY TOPICALLY TWO (TWO) TIMES A DAY   Allergies[4]   Medications Ordered Prior to Encounter[5]   Social History[6]     Objective   BP 98/68 (BP Location: Right arm, Patient Position: Sitting, Cuff Size: Standard)    "Pulse 67   Temp 97.5 °F (36.4 °C) (Temporal)   Ht 5' 6\" (1.676 m)   Wt 97.5 kg (215 lb)   SpO2 96%   BMI 34.70 kg/m²      Physical Exam  Constitutional:       General: He is not in acute distress.     Appearance: Normal appearance. He is not ill-appearing.     Cardiovascular:      Rate and Rhythm: Normal rate and regular rhythm.   Pulmonary:      Effort: Pulmonary effort is normal. No respiratory distress.      Breath sounds: Normal breath sounds.   Abdominal:      General: There is no distension.      Palpations: Abdomen is soft.      Tenderness: There is no abdominal tenderness.     Skin:     General: Skin is warm and dry.     Neurological:      Mental Status: He is alert and oriented to person, place, and time.     Psychiatric:         Mood and Affect: Mood normal.         Behavior: Behavior normal.                [1]   Past Medical History:  Diagnosis Date    AXEL (acute kidney injury) (HCC) 10/28/2024    CPAP (continuous positive airway pressure) dependence     does not use machine    Esophageal varices (HCC)     stable 9/2020 gets checked yearly    Headache(784.0)     Hepatic cirrhosis (HCC)     treated with harvoni   Hep C- dx age [age]    Hepatic encephalopathy (HCC)     Hyperammonemia (HCC) 10/30/2019    Liver disease     Obesity     Peptic ulcer 01/06/2025    Pneumonia     in past    Postgastrectomy malabsorption     Proctitis 05/29/2015    Sleep apnea     history of    Wears glasses    [2]   Past Surgical History:  Procedure Laterality Date    COLONOSCOPY      ESOPHAGOGASTRODUODENOSCOPY N/A 04/12/2018    Procedure: ESOPHAGOGASTRODUODENOSCOPY (EGD);  Surgeon: Willy Traylor MD;  Location: [facility];  Service: Gastroenterology    FRACTURE SURGERY Left     ankle- hardware    OTHER SURGICAL HISTORY      banding esophageal varices    WI EXC EXCSV SKN ABD INFRAUMBILICAL PANNICULECTOMY N/A 09/16/2020    Procedure: PANNICULECTOMY;  Surgeon: Enrrique Birmingham MD;  Location: [facility];  Service: Plastics "    LA EXCISION EXCESSIVE SKIN & SUBQ TISSUE ABDOMEN N/A 2020    Procedure: ABDOMINOPLASTY;  Surgeon: Enrrique Birmingham MD;  Location: [facility];  Service: Plastics    LA LAPS GSTRC RSTRICTIV PX LONGITUDINAL GASTRECTOMY N/A 2018    Procedure: GASTRECTOMY LAPAROSCOPIC SLEEVE;  Surgeon: Diana Mcdonnell MD;  Location: [facility];  Service: Bariatrics   [3]   Family History  Problem Relation Name Age of Onset    Heart disease Mother      Lupus Mother      Diabetes Father      Stroke Father     [4] No Known Allergies  [5]   Current Outpatient Medications on File Prior to Visit   Medication Sig Dispense Refill    AMILoride 5 mg tablet Take 1 tablet (5 mg total) by mouth daily 90 tablet 1    carvedilol (COREG) 6.25 mg tablet TAKE 1 TABLET (6.25 MG TOTAL) BY MOUTH TWO (TWO) TIMES A DAY WITH MEALS 180 tablet 1    Claritin-D 12 Hour 5-120 MG per tablet TAKE ONE TABLET BY MOUTH TWICE DAILY FOR 5 DAYS 10 tablet 0    cyanocobalamin (VITAMIN B-12) 1000 MCG tablet Take 1 tablet (1,000 mcg total) by mouth daily 30 tablet 2    fluticasone (FLONASE) 50 mcg/act nasal spray 1 spray into each nostril daily 48 g 1    lactulose (CHRONULAC) 10 g/15 mL solution Take 15 mL (10 g total) by mouth daily as needed (constipation or confusion) 946 mL 1    omeprazole (PriLOSEC) 20 mg delayed release capsule Take 1 capsule (20 mg total) by mouth daily 90 capsule 1    traMADol-acetaminophen (ULTRACET) 37.5-325 mg per tablet Take 1 tablet by mouth 2 (two) times a day as needed for moderate pain 60 tablet 0    triamcinolone (KENALOG) 0.5 % cream APPLY TOPICALLY TWO (TWO) TIMES A DAY 45 g 2     No current facility-administered medications on file prior to visit.   [6]   Social History  Tobacco Use    Smoking status: Former     Current packs/day: 0.00     Average packs/day: 0.2 packs/day for 40.5 years (10.1 ttl pk-yrs)     Types: Cigarettes     Start date: 1982     Quit date: 2022     Years since quittin.5    Smokeless  tobacco: Never    Tobacco comments:     stopped 7/2020   Vaping Use    Vaping status: Former    Substances: Flavoring   Substance and Sexual Activity    Alcohol use: Not Currently     Comment: occasionally    Drug use: No    Sexual activity: Yes     Partners: Female     Birth control/protection: None

## 2025-06-27 ENCOUNTER — OFFICE VISIT (OUTPATIENT)
Dept: PAIN MEDICINE | Facility: CLINIC | Age: 60
End: 2025-06-27
Payer: COMMERCIAL

## 2025-06-27 ENCOUNTER — TELEPHONE (OUTPATIENT)
Age: 60
End: 2025-06-27

## 2025-06-27 VITALS — HEIGHT: 66 IN | BODY MASS INDEX: 34.55 KG/M2 | WEIGHT: 215 LBS

## 2025-06-27 DIAGNOSIS — M47.816 LUMBAR SPONDYLOSIS: ICD-10-CM

## 2025-06-27 DIAGNOSIS — M62.830 LUMBAR PARASPINAL MUSCLE SPASM: Primary | ICD-10-CM

## 2025-06-27 DIAGNOSIS — M48.061 STENOSIS OF LATERAL RECESS OF LUMBAR SPINE: ICD-10-CM

## 2025-06-27 DIAGNOSIS — M54.50 MIDLINE LOW BACK PAIN WITHOUT SCIATICA, UNSPECIFIED CHRONICITY: ICD-10-CM

## 2025-06-27 PROCEDURE — 99214 OFFICE O/P EST MOD 30 MIN: CPT
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
## 2025-06-27 NOTE — PATIENT INSTRUCTIONS
Trigger Point Injection   WHAT YOU NEED TO KNOW:   What do I need to know about a trigger point injection?  A trigger point injection is used to relax a muscle knot. This helps relieve pain. You may be able to have more than one trigger point treated during a session.  How do I prepare for a trigger point injection?   Your healthcare provider will tell you how to prepare. Arrange to have someone drive you home after the injection.    Tell your provider about all medicines you take, including pain medicine, blood thinners, and muscle relaxers. He or she will tell you if you need to stop any medicine for the injection, and when to stop. He or she will tell you which medicines to take or not take on the day of the injection.    Tell your provider about all your allergies, including to any pain medicine.    What will happen during a trigger point injection?   You may be sitting or lying, depending on where the trigger point is located. Your healthcare provider will feel for a knot in the muscle. He or she may niya your skin over the knot.    Your provider will put a needle through your skin and into the trigger point. Saline (salt solution), pain relievers, or other medicines may be pushed through the needle into the trigger point. Your provider may use only a dry needle (no medicine). He or she will pull the needle almost all the way out and then push it in again. He or she will repeat this several times until the muscle stops twitching or feels relaxed.    Your provider will remove the needle and stretch the muscle area. He or she may apply pressure to the area for 2 minutes. A bandage will be put over the injection site to prevent bleeding or an infection.    What should I expect after a trigger point injection?   You may feel pain relief right away. It is normal for some pain to start again 2 hours later. An ice pack or over-the-counter pain medicine can help lower the pain.    You may feel sore in the injection  site for a few days. If you need another injection in the same area, wait until the area is not sore.    Your healthcare provider may give you specific activity instructions to follow at home or recommend physical therapy. In general, you should try to stay active. Avoid strenuous activity for the first 3 or 4 days after the injection.    Do not have more injections if you still have trigger point pain after 2 or 3 injections.    What are the risks of a trigger point injection?  You may have a severe allergic reaction to pain medicine injected. The injection may be painful, or you may be sore where you got the injection. You may bleed, bruise, or develop an infection in the injection area. The injection may cause you to feel faint. Rarely, the needle may cause muscle or blood vessel damage or your lung may collapse if you get the injection near your chest.  CARE AGREEMENT:   You have the right to help plan your care. Learn about your health condition and how it may be treated. Discuss treatment options with your healthcare providers to decide what care you want to receive. You always have the right to refuse treatment. The above information is an  only. It is not intended as medical advice for individual conditions or treatments. Talk to your doctor, nurse or pharmacist before following any medical regimen to see if it is safe and effective for you.  © Copyright [employer] 2022 Information is for End User's use only and may not be sold, redistributed or otherwise used for commercial purposes. All illustrations and images included in CareNotes® are the copyrighted property of [employer] or [employer]     Muscle Spasm   WHAT YOU NEED TO KNOW:   A muscle spasm is a sudden contraction of any muscle or group of muscles. A muscle cramp is a painful muscle spasm. Muscle cramps commonly occur after intense exercise or during pregnancy. They may also be caused by certain medications,  dehydration, low calcium or magnesium levels, or another medical condition.   DISCHARGE INSTRUCTIONS:   Medicines:  You may need the following:  NSAIDs  help decrease swelling and pain or fever. This medicine is available with or without a doctor's order. NSAIDs can cause stomach bleeding or kidney problems in certain people. If you take blood thinner medicine, always ask your healthcare provider if NSAIDs are safe for you. Always read the medicine label and follow directions.    Take your medicine as directed.  Contact your healthcare provider if you think your medicine is not helping or if you have side effects. Tell him of her if you are allergic to any medicine. Keep a list of the medicines, vitamins, and herbs you take. Include the amounts, and when and why you take them. Bring the list or the pill bottles to follow-up visits. Carry your medicine list with you in case of an emergency.    Follow up with your healthcare provider as directed:  You may need other tests or treatment. You may also be referred to a physical therapist or other specialist. Write down your questions so you remember to ask them during your visits.   Self-care:   Stretch  your muscle to help relieve the cramp. It may be helpful to keep your muscle in the stretched position until the cramp is gone.     Apply heat  to help decrease pain and muscle spasms. Apply heat on the area for 20 to 30 minutes every 2 hours for as many days as directed.     Apply ice  to help decrease swelling and pain. Ice may also help prevent tissue damage. Use an ice pack, or put crushed ice in a plastic bag. Cover it with a towel and place it on your muscle for 15 to 20 minutes every hour or as directed.    Drink more liquids  to help prevent muscle cramps caused by dehydration. Sports drinks may help replace electrolytes you lose through sweat during exercise. Ask your healthcare provider how much liquid to drink each day and which liquids are best for you.      Eat healthy foods , such as fruits, vegetables, whole grains, low-fat dairy products, and lean proteins (meat, beans, and fish). If you are pregnant, ask your healthcare provider about foods that are high in magnesium and sodium. They may help to relieve cramps during pregnancy.     Massage your muscle  to help relieve the cramp.     Take frequent deep breaths  until the cramp feels better. Lie down while you take the deep breaths so you do not get dizzy or lightheaded.    Contact your healthcare provider if:   You have signs of dehydration, such as a headache, dark yellow urine, dry eyes or mouth, or a fast heartbeat.     You have questions or concerns about your condition or care.    Return to the emergency department if:   You have warmth, swelling, or redness in the cramping muscle.     You have frequent or unrelieved muscle cramps in several different muscles.     You have muscle cramps with numbness, tingling, and burning in your hands and feet.    © Copyright [employer] 2022 Information is for End User's use only and may not be sold, redistributed or otherwise used for commercial purposes. All illustrations and images included in CareNotes® are the copyrighted property of [employer] or [employer]  The above information is an  only. It is not intended as medical advice for individual conditions or treatments. Talk to your doctor, nurse or pharmacist before following any medical regimen to see if it is safe and effective for you.

## 2025-06-30 RX ORDER — METHOCARBAMOL 750 MG/1
TABLET, FILM COATED ORAL
Qty: 30 TABLET | Refills: 0 | Status: SHIPPED | OUTPATIENT
Start: 2025-06-30 | End: 2025-07-09 | Stop reason: SDUPTHER

## 2025-07-01 DIAGNOSIS — R05.9 COUGH: ICD-10-CM

## 2025-07-02 RX ORDER — FLUTICASONE PROPIONATE 50 MCG
1 SPRAY, SUSPENSION (ML) NASAL DAILY
Qty: 16 G | Refills: 1 | Status: SHIPPED | OUTPATIENT
Start: 2025-07-02

## 2025-07-03 NOTE — TELEPHONE ENCOUNTER
Called and spoke to patient about appt with Hepatology per Gastroenterology provider TRAN [facility]. Patient scheduled on 7/31 at [facility].

## 2025-07-07 ENCOUNTER — APPOINTMENT (OUTPATIENT)
Dept: LAB | Facility: HOSPITAL | Age: 60
End: 2025-07-07
Payer: COMMERCIAL

## 2025-07-07 DIAGNOSIS — D69.6 THROMBOCYTOPENIA (HCC): ICD-10-CM

## 2025-07-07 DIAGNOSIS — K91.2 POSTSURGICAL MALABSORPTION: ICD-10-CM

## 2025-07-07 DIAGNOSIS — N17.9 AKI (ACUTE KIDNEY INJURY) (HCC): Primary | ICD-10-CM

## 2025-07-07 DIAGNOSIS — M54.50 MIDLINE LOW BACK PAIN WITHOUT SCIATICA, UNSPECIFIED CHRONICITY: Primary | ICD-10-CM

## 2025-07-07 DIAGNOSIS — K74.60 HEPATIC CIRRHOSIS DUE TO CHRONIC HEPATITIS C INFECTION (HCC): ICD-10-CM

## 2025-07-07 DIAGNOSIS — B18.2 HEPATIC CIRRHOSIS DUE TO CHRONIC HEPATITIS C INFECTION (HCC): ICD-10-CM

## 2025-07-07 DIAGNOSIS — E53.8 B12 DEFICIENCY: ICD-10-CM

## 2025-07-07 LAB
AFP-TM SERPL-MCNC: 2.03 NG/ML (ref 0–9)
ALBUMIN SERPL BCG-MCNC: 3.6 G/DL (ref 3.5–5)
ALP SERPL-CCNC: 101 U/L (ref 34–104)
ALT SERPL W P-5'-P-CCNC: 20 U/L (ref 7–52)
ANION GAP SERPL CALCULATED.3IONS-SCNC: 5 MMOL/L (ref 4–13)
AST SERPL W P-5'-P-CCNC: 29 U/L (ref 13–39)
BASOPHILS # BLD AUTO: 0.04 THOUSANDS/ÂΜL (ref 0–0.1)
BASOPHILS NFR BLD AUTO: 1 % (ref 0–1)
BILIRUB SERPL-MCNC: 4.43 MG/DL (ref 0.2–1)
BUN SERPL-MCNC: 22 MG/DL (ref 5–25)
CALCIUM SERPL-MCNC: 9.2 MG/DL (ref 8.4–10.2)
CHLORIDE SERPL-SCNC: 109 MMOL/L (ref 96–108)
CO2 SERPL-SCNC: 25 MMOL/L (ref 21–32)
CREAT SERPL-MCNC: 1.34 MG/DL (ref 0.6–1.3)
EOSINOPHIL # BLD AUTO: 0.34 THOUSAND/ÂΜL (ref 0–0.61)
EOSINOPHIL NFR BLD AUTO: 6 % (ref 0–6)
ERYTHROCYTE [DISTWIDTH] IN BLOOD BY AUTOMATED COUNT: 13.6 % (ref 11.6–15.1)
GFR SERPL CREATININE-BSD FRML MDRD: 56 ML/MIN/1.73SQ M
GLUCOSE P FAST SERPL-MCNC: 104 MG/DL (ref 65–99)
HAV AB SER QL IA: REACTIVE
HCT VFR BLD AUTO: 41.9 % (ref 36.5–49.3)
HGB BLD-MCNC: 14 G/DL (ref 12–17)
IMM GRANULOCYTES # BLD AUTO: 0.01 THOUSAND/UL (ref 0–0.2)
IMM GRANULOCYTES NFR BLD AUTO: 0 % (ref 0–2)
INR PPP: 1.15 (ref 0.85–1.19)
LYMPHOCYTES # BLD AUTO: 1 THOUSANDS/ÂΜL (ref 0.6–4.47)
LYMPHOCYTES NFR BLD AUTO: 17 % (ref 14–44)
MCH RBC QN AUTO: 33.3 PG (ref 26.8–34.3)
MCHC RBC AUTO-ENTMCNC: 33.4 G/DL (ref 31.4–37.4)
MCV RBC AUTO: 100 FL (ref 82–98)
MONOCYTES # BLD AUTO: 0.4 THOUSAND/ÂΜL (ref 0.17–1.22)
MONOCYTES NFR BLD AUTO: 7 % (ref 4–12)
NEUTROPHILS # BLD AUTO: 3.98 THOUSANDS/ÂΜL (ref 1.85–7.62)
NEUTS SEG NFR BLD AUTO: 69 % (ref 43–75)
NRBC BLD AUTO-RTO: 0 /100 WBCS
PLATELET # BLD AUTO: 133 THOUSANDS/UL (ref 149–390)
PMV BLD AUTO: 11.6 FL (ref 8.9–12.7)
POTASSIUM SERPL-SCNC: 4.5 MMOL/L (ref 3.5–5.3)
PROT SERPL-MCNC: 6.4 G/DL (ref 6.4–8.4)
PROTHROMBIN TIME: 15.4 SECONDS (ref 12.3–15)
RBC # BLD AUTO: 4.21 MILLION/UL (ref 3.88–5.62)
SODIUM SERPL-SCNC: 139 MMOL/L (ref 135–147)
WBC # BLD AUTO: 5.77 THOUSAND/UL (ref 4.31–10.16)

## 2025-07-07 PROCEDURE — 80053 COMPREHEN METABOLIC PANEL: CPT

## 2025-07-07 PROCEDURE — 36415 COLL VENOUS BLD VENIPUNCTURE: CPT

## 2025-07-07 PROCEDURE — 85025 COMPLETE CBC W/AUTO DIFF WBC: CPT

## 2025-07-07 PROCEDURE — 86708 HEPATITIS A ANTIBODY: CPT

## 2025-07-07 PROCEDURE — 85610 PROTHROMBIN TIME: CPT

## 2025-07-07 PROCEDURE — 82105 ALPHA-FETOPROTEIN SERUM: CPT

## 2025-07-07 RX ORDER — DICLOFENAC SODIUM 75 MG/1
75 TABLET, DELAYED RELEASE ORAL 2 TIMES DAILY
Qty: 180 TABLET | Refills: 3 | Status: SHIPPED | OUTPATIENT
Start: 2025-07-07

## 2025-07-09 DIAGNOSIS — I85.10 SECONDARY ESOPHAGEAL VARICES WITHOUT BLEEDING (HCC): ICD-10-CM

## 2025-07-09 DIAGNOSIS — M54.50 MIDLINE LOW BACK PAIN WITHOUT SCIATICA, UNSPECIFIED CHRONICITY: ICD-10-CM

## 2025-07-09 DIAGNOSIS — R05.9 COUGH: ICD-10-CM

## 2025-07-09 DIAGNOSIS — E66.01 MORBID (SEVERE) OBESITY DUE TO EXCESS CALORIES (HCC): ICD-10-CM

## 2025-07-09 DIAGNOSIS — L71.9 ROSACEA: ICD-10-CM

## 2025-07-09 DIAGNOSIS — K76.82 HEPATIC ENCEPHALOPATHY (HCC): ICD-10-CM

## 2025-07-09 RX ORDER — AMILORIDE HYDROCHLORIDE 5 MG/1
5 TABLET ORAL DAILY
Qty: 90 TABLET | Refills: 1 | Status: SHIPPED | OUTPATIENT
Start: 2025-07-09

## 2025-07-09 RX ORDER — CARVEDILOL 6.25 MG/1
6.25 TABLET ORAL 2 TIMES DAILY WITH MEALS
Qty: 180 TABLET | Refills: 1 | Status: SHIPPED | OUTPATIENT
Start: 2025-07-09

## 2025-07-09 RX ORDER — TRIAMCINOLONE ACETONIDE 5 MG/G
CREAM TOPICAL 2 TIMES DAILY
Qty: 45 G | Refills: 2 | Status: SHIPPED | OUTPATIENT
Start: 2025-07-09

## 2025-07-09 RX ORDER — METHOCARBAMOL 750 MG/1
TABLET, FILM COATED ORAL
Qty: 30 TABLET | Refills: 0 | Status: SHIPPED | OUTPATIENT
Start: 2025-07-09

## 2025-07-09 RX ORDER — OMEPRAZOLE 20 MG/1
20 CAPSULE, DELAYED RELEASE ORAL DAILY
Qty: 90 CAPSULE | Refills: 1 | Status: SHIPPED | OUTPATIENT
Start: 2025-07-09

## 2025-07-09 RX ORDER — LACTULOSE 10 G/15ML
10 SOLUTION ORAL DAILY PRN
Qty: 946 ML | Refills: 1 | Status: SHIPPED | OUTPATIENT
Start: 2025-07-09

## 2025-07-09 NOTE — TELEPHONE ENCOUNTER
Pt called asking for 90 day supplies to be send to the mail order pharmacy please revise and send to mail order.

## 2025-07-14 DIAGNOSIS — G89.29 CHRONIC MIDLINE LOW BACK PAIN WITHOUT SCIATICA: ICD-10-CM

## 2025-07-14 DIAGNOSIS — M54.50 CHRONIC MIDLINE LOW BACK PAIN WITHOUT SCIATICA: ICD-10-CM

## 2025-07-16 DIAGNOSIS — N52.9 ERECTILE DYSFUNCTION, UNSPECIFIED ERECTILE DYSFUNCTION TYPE: ICD-10-CM

## 2025-07-16 DIAGNOSIS — F11.20 CONTINUOUS OPIOID DEPENDENCE (HCC): ICD-10-CM

## 2025-07-16 RX ORDER — SILDENAFIL 100 MG/1
100 TABLET, FILM COATED ORAL DAILY PRN
Qty: 30 TABLET | Refills: 2 | Status: SHIPPED | OUTPATIENT
Start: 2025-07-16

## 2025-07-16 NOTE — TELEPHONE ENCOUNTER
Patient called in to check on refill request stated he was at the pharmacy. I spoke to the office, provider is seeing patient will send over as soon as he has some time.

## 2025-07-17 ENCOUNTER — HOSPITAL ENCOUNTER (OUTPATIENT)
Dept: MRI IMAGING | Facility: HOSPITAL | Age: 60
End: 2025-07-17
Attending: FAMILY MEDICINE
Payer: COMMERCIAL

## 2025-07-17 DIAGNOSIS — M54.9 CHRONIC MID BACK PAIN: ICD-10-CM

## 2025-07-17 DIAGNOSIS — M43.06 LUMBAR SPONDYLOLYSIS: ICD-10-CM

## 2025-07-17 DIAGNOSIS — G89.29 CHRONIC MID BACK PAIN: ICD-10-CM

## 2025-07-17 PROCEDURE — 72146 MRI CHEST SPINE W/O DYE: CPT

## 2025-07-17 PROCEDURE — 72148 MRI LUMBAR SPINE W/O DYE: CPT

## 2025-07-18 RX ORDER — TRAMADOL HYDROCHLORIDE 50 MG/1
50 TABLET ORAL 2 TIMES DAILY PRN
Qty: 60 TABLET | Refills: 0 | Status: SHIPPED | OUTPATIENT
Start: 2025-07-18

## 2025-07-21 ENCOUNTER — PROCEDURE VISIT (OUTPATIENT)
Dept: PAIN MEDICINE | Facility: CLINIC | Age: 60
End: 2025-07-21
Payer: COMMERCIAL

## 2025-07-21 ENCOUNTER — HOSPITAL ENCOUNTER (OUTPATIENT)
Dept: MRI IMAGING | Facility: HOSPITAL | Age: 60
Discharge: HOME | End: 2025-07-21
Attending: PHYSICIAN ASSISTANT
Payer: COMMERCIAL

## 2025-07-21 VITALS — HEIGHT: 66 IN | BODY MASS INDEX: 34.55 KG/M2 | WEIGHT: 215 LBS

## 2025-07-21 DIAGNOSIS — M79.18 MYOFASCIAL PAIN SYNDROME: Primary | ICD-10-CM

## 2025-07-21 DIAGNOSIS — B18.2 HEPATIC CIRRHOSIS DUE TO CHRONIC HEPATITIS C INFECTION (HCC): ICD-10-CM

## 2025-07-21 DIAGNOSIS — K74.60 HEPATIC CIRRHOSIS DUE TO CHRONIC HEPATITIS C INFECTION (HCC): ICD-10-CM

## 2025-07-21 PROCEDURE — A9585 GADOBUTROL INJECTION: HCPCS | Performed by: FAMILY MEDICINE

## 2025-07-21 PROCEDURE — 20552 NJX 1/MLT TRIGGER POINT 1/2: CPT | Performed by: STUDENT IN AN ORGANIZED HEALTH CARE EDUCATION/TRAINING PROGRAM

## 2025-07-21 PROCEDURE — 74183 MRI ABD W/O CNTR FLWD CNTR: CPT

## 2025-07-21 RX ORDER — BUPIVACAINE HYDROCHLORIDE 2.5 MG/ML
5 INJECTION, SOLUTION EPIDURAL; INFILTRATION; INTRACAUDAL; PERINEURAL ONCE
Status: COMPLETED | OUTPATIENT
Start: 2025-07-21 | End: 2025-07-21

## 2025-07-21 RX ORDER — METHYLPREDNISOLONE ACETATE 40 MG/ML
40 INJECTION, SUSPENSION INTRA-ARTICULAR; INTRALESIONAL; INTRAMUSCULAR; SOFT TISSUE ONCE
Status: COMPLETED | OUTPATIENT
Start: 2025-07-21 | End: 2025-07-21

## 2025-07-21 RX ORDER — GADOBUTROL 604.72 MG/ML
9 INJECTION INTRAVENOUS
Status: COMPLETED | OUTPATIENT
Start: 2025-07-21 | End: 2025-07-21

## 2025-07-21 RX ADMIN — GADOBUTROL 9 ML: 604.72 INJECTION INTRAVENOUS at 07:25

## 2025-07-21 RX ADMIN — METHYLPREDNISOLONE ACETATE 40 MG: 40 INJECTION, SUSPENSION INTRA-ARTICULAR; INTRALESIONAL; INTRAMUSCULAR; SOFT TISSUE at 14:28

## 2025-07-21 RX ADMIN — BUPIVACAINE HYDROCHLORIDE 5 ML: 2.5 INJECTION, SOLUTION EPIDURAL; INFILTRATION; INTRACAUDAL; PERINEURAL at 14:28

## 2025-07-21 NOTE — PROGRESS NOTES
"   Universal Protocol:  procedure performed by consultantConsent: Verbal consent obtained. Written consent obtained  Risks and benefits: risks, benefits and alternatives were discussed  Consent given by: patient  Time out: Immediately prior to procedure a \"time out\" was called to verify the correct patient, procedure, equipment, support staff and site/side marked as required.  Timeout called at: 7/21/2025 2:00 PM.  Patient understanding: patient states understanding of the procedure being performed  Patient consent: the patient's understanding of the procedure matches consent given  Procedure consent: procedure consent matches procedure scheduled  Relevant documents: relevant documents present and verified  Test results: test results available and properly labeled  Site marked: the operative site was marked  Radiology Images displayed and confirmed. If images not available, report reviewed: imaging studies available  Required items: required blood products, implants, devices, and special equipment available  Patient identity confirmed: verbally with patient  Supporting Documentation  Indications: pain   Procedure Details  Location(s):  Additional procedure details: PROCEDURE NOTE    PATIENT NAME:  Matt Tobias Sr.    MEDICAL RECORD NUMBER:  [id number]    YOB: 1965    DATE OF PROCEDURE:  07/21/25    PROCEDURE: Trigger point injection x6 with local anesthetic and steroid in thebilateral lumbar parsapinal muscle groups.  ATTENDING PHYSICIAN: Hugh Horn M.D.  PREPROCEDURE DIAGNOSIS: Myofascial pain with identifiable trigger points.  POSTPROCEDURE DIAGNOSIS: Myofascial pain with identifiable trigger points.  ANESTHESIA: Local  ESTIMATED BLOOD LOSS: Minimal  COMPLICATIONS: None  CONSENT: Today's procedure, its potential benefits as well as its risks and potential side effects were reviewed. Discussed risks of the procedure include bleeding, infection, nerve irritation or damage, reactions to the " medications, failure of the pain to improve and exacerbation of the pain were explained to the patient, who verbalized understanding and who wished to proceed. Informed consent was signed.  DESCRIPTION OF THE PROCEDURE: After informed consent was obtained, the patient was placed in the seated position. 6 trigger points were identified via palpation and marked with a surgical skin marker. The skin was prepped with antiseptic in the usual sterile fashion. Strict aseptic technique was utilized throughout the procedure.  A 25 gauge, 1 ½ inch needle was then advanced into each identified trigger point. An injectate consisting of 5 ml of 0.25% marcaine with1 mL of 40mg/mL depo-medrol was slowly injected in divided doses after negative aspiration. The needle was removed with tip intact.  The patient tolerated the procedure and hemostasis was maintained. There were no apparent paresthesias or complications. The skin was wiped clean, and a band-aid was placed as appropriate. The patient was monitored for an appropriate period of time following the procedure and remained hemodynamically stable and neurovascularly intact. The patient was ultimately discharged to home with supervision in good condition and instructed to call the office to report the response.

## 2025-07-28 ENCOUNTER — APPOINTMENT (OUTPATIENT)
Dept: LAB | Facility: CLINIC | Age: 60
End: 2025-07-28
Payer: COMMERCIAL

## 2025-07-28 DIAGNOSIS — N17.9 AKI (ACUTE KIDNEY INJURY) (HCC): ICD-10-CM

## 2025-07-28 LAB
ANION GAP SERPL CALCULATED.3IONS-SCNC: 6 MMOL/L (ref 4–13)
BUN SERPL-MCNC: 12 MG/DL (ref 5–25)
CALCIUM SERPL-MCNC: 9 MG/DL (ref 8.4–10.2)
CHLORIDE SERPL-SCNC: 105 MMOL/L (ref 96–108)
CO2 SERPL-SCNC: 28 MMOL/L (ref 21–32)
CREAT SERPL-MCNC: 0.77 MG/DL (ref 0.6–1.3)
GFR SERPL CREATININE-BSD FRML MDRD: 97 ML/MIN/1.73SQ M
GLUCOSE P FAST SERPL-MCNC: 104 MG/DL (ref 65–99)
POTASSIUM SERPL-SCNC: 4.1 MMOL/L (ref 3.5–5.3)
SODIUM SERPL-SCNC: 139 MMOL/L (ref 135–147)

## 2025-07-28 PROCEDURE — 36415 COLL VENOUS BLD VENIPUNCTURE: CPT

## 2025-07-28 PROCEDURE — 80048 BASIC METABOLIC PNL TOTAL CA: CPT

## 2025-07-31 ENCOUNTER — OFFICE VISIT (OUTPATIENT)
Age: 60
End: 2025-07-31
Payer: COMMERCIAL

## 2025-07-31 VITALS
BODY MASS INDEX: 34.04 KG/M2 | DIASTOLIC BLOOD PRESSURE: 64 MMHG | WEIGHT: 211.8 LBS | HEART RATE: 68 BPM | HEIGHT: 66 IN | SYSTOLIC BLOOD PRESSURE: 100 MMHG

## 2025-07-31 DIAGNOSIS — E66.811 CLASS 1 OBESITY DUE TO EXCESS CALORIES WITH SERIOUS COMORBIDITY AND BODY MASS INDEX (BMI) OF 34.0 TO 34.9 IN ADULT: ICD-10-CM

## 2025-07-31 DIAGNOSIS — B18.2 HEPATIC CIRRHOSIS DUE TO CHRONIC HEPATITIS C INFECTION (HCC): Primary | ICD-10-CM

## 2025-07-31 DIAGNOSIS — K74.60 HEPATIC CIRRHOSIS DUE TO CHRONIC HEPATITIS C INFECTION (HCC): Primary | ICD-10-CM

## 2025-07-31 DIAGNOSIS — K76.82 HEPATIC ENCEPHALOPATHY (HCC): ICD-10-CM

## 2025-07-31 DIAGNOSIS — D69.6 THROMBOCYTOPENIA (HCC): ICD-10-CM

## 2025-07-31 DIAGNOSIS — I85.10 SECONDARY ESOPHAGEAL VARICES WITHOUT BLEEDING (HCC): ICD-10-CM

## 2025-07-31 DIAGNOSIS — E80.6 HYPERBILIRUBINEMIA: ICD-10-CM

## 2025-07-31 DIAGNOSIS — E66.09 CLASS 1 OBESITY DUE TO EXCESS CALORIES WITH SERIOUS COMORBIDITY AND BODY MASS INDEX (BMI) OF 34.0 TO 34.9 IN ADULT: ICD-10-CM

## 2025-07-31 PROCEDURE — 99214 OFFICE O/P EST MOD 30 MIN: CPT | Performed by: STUDENT IN AN ORGANIZED HEALTH CARE EDUCATION/TRAINING PROGRAM

## 2025-08-01 ENCOUNTER — OFFICE VISIT (OUTPATIENT)
Dept: PAIN MEDICINE | Facility: CLINIC | Age: 60
End: 2025-08-01
Payer: COMMERCIAL

## 2025-08-01 VITALS — BODY MASS INDEX: 34.19 KG/M2 | HEIGHT: 66 IN | RESPIRATION RATE: 18 BRPM

## 2025-08-01 DIAGNOSIS — M79.18 MYOFASCIAL PAIN SYNDROME: ICD-10-CM

## 2025-08-01 DIAGNOSIS — M43.06 LUMBAR SPONDYLOLYSIS: ICD-10-CM

## 2025-08-01 DIAGNOSIS — G89.4 CHRONIC PAIN SYNDROME: Primary | ICD-10-CM

## 2025-08-01 DIAGNOSIS — M48.02 CERVICAL SPINAL STENOSIS: ICD-10-CM

## 2025-08-01 PROCEDURE — 99213 OFFICE O/P EST LOW 20 MIN: CPT

## 2025-08-04 ENCOUNTER — PATIENT MESSAGE (OUTPATIENT)
Dept: PAIN MEDICINE | Facility: CLINIC | Age: 60
End: 2025-08-04

## 2025-08-06 ENCOUNTER — TELEPHONE (OUTPATIENT)
Dept: NEUROLOGY | Facility: CLINIC | Age: 60
End: 2025-08-06

## 2025-08-12 ENCOUNTER — TELEPHONE (OUTPATIENT)
Dept: NEUROLOGY | Facility: CLINIC | Age: 60
End: 2025-08-12

## (undated) DEVICE — PROXIMATE SKIN STAPLERS (35 WIDE) CONTAINS 35 STAINLESS STEEL STAPLES (FIXED HEAD): Brand: PROXIMATE

## (undated) DEVICE — [HIGH FLOW INSUFFLATOR,  DO NOT USE IF PACKAGE IS DAMAGED,  KEEP DRY,  KEEP AWAY FROM SUNLIGHT,  PROTECT FROM HEAT AND RADIOACTIVE SOURCES.]: Brand: PNEUMOSURE

## (undated) DEVICE — JACKSON-PRATT 100CC BULB RESERVOIR: Brand: CARDINAL HEALTH

## (undated) DEVICE — DISSECTOR ACE BLADE 700 MEGADYNE 25IN STD

## (undated) DEVICE — SUT STRATAFIX SPIRAL MONOCRYL PLUS 4-0 PS-2 45CM SXMP1B118

## (undated) DEVICE — STAPLER INSORB SUBCUTICULAR 30 SINGLE USE

## (undated) DEVICE — PROXIMATE PLUS MD MULTI-DIRECTIONAL RELEASE SKIN STAPLERS CONTAINS 35 STAINLESS STEEL STAPLES APPROXIMATE CLOSED DIMENSIONS: 6.9MM X 3.9MM WIDE: Brand: PROXIMATE

## (undated) DEVICE — TRAY FOLEY 16FR URIMETER SURESTEP

## (undated) DEVICE — TIBURON LAPAROSCOPIC ABDOMINAL DRAPE: Brand: CONVERTORS

## (undated) DEVICE — SUT PDS II 2-0 CT-1 27 IN Z339H

## (undated) DEVICE — GLOVE SRG BIOGEL ECLIPSE 8

## (undated) DEVICE — GLOVE SRG BIOGEL 6.5

## (undated) DEVICE — SUT ETHILON 3-0 PS-1 18 IN 1663H

## (undated) DEVICE — LIGACLIP 10-M/L, 10MM ENDOSCOPIC ROTATING MULTIPLE CLIP APPLIERS: Brand: LIGACLIP

## (undated) DEVICE — 3M™ STERI-STRIP™ REINFORCED ADHESIVE SKIN CLOSURES, R1547, 1/2 IN X 4 IN (12 MM X 100 MM), 6 STRIPS/ENVELOPE: Brand: 3M™ STERI-STRIP™

## (undated) DEVICE — SCD SEQUENTIAL COMPRESSION COMFORT SLEEVE MEDIUM KNEE LENGTH: Brand: KENDALL SCD

## (undated) DEVICE — NEEDLE SPINAL 22G X 3.5IN  QUINCKE

## (undated) DEVICE — SUT MONOCRYL 3-0 PS-2 27 IN Y427H

## (undated) DEVICE — SUT STRATAFIX SPIRAL 4-0 PGA/PCL 30 X 30 CM SXMD2B409

## (undated) DEVICE — ENDOPATH 5MM CURVED SCISSORS WITH MONOPOLAR CAUTERY: Brand: ENDOPATH

## (undated) DEVICE — MEDI-VAC NON-CONDUCTIVE SUCTION TUBING 6MM X 1.8M (6FT.) L: Brand: CARDINAL HEALTH

## (undated) DEVICE — SURGICEL NU-KNIT 3 X 4

## (undated) DEVICE — 3000CC GUARDIAN II: Brand: GUARDIAN

## (undated) DEVICE — ADHESIVE SKIN HIGH VISCOSITY EXOFIN 1ML

## (undated) DEVICE — TUBING ASPIRATION LIPOSUCTION SET 12FT

## (undated) DEVICE — GLOVE SRG BIOGEL ECLIPSE 7.5

## (undated) DEVICE — SUT STRATAFIX SPIRAL MONOCRYL PLUS 3-0 PS-2 45CM SXMP1B107

## (undated) DEVICE — CAUTERY TIP POLISHER: Brand: DEVON

## (undated) DEVICE — BETHLEHEM UNIVERSAL OUTPATIENT: Brand: CARDINAL HEALTH

## (undated) DEVICE — MEDI-VAC YANK SUCT HNDL W/TPRD BULBOUS TIP: Brand: CARDINAL HEALTH

## (undated) DEVICE — Device

## (undated) DEVICE — ARTHROSCOPY FLOOR MAT

## (undated) DEVICE — WEBRIL 6 IN UNSTERILE

## (undated) DEVICE — ENDOPATH XCEL UNIVERSAL TROCAR STABLILITY SLEEVES: Brand: ENDOPATH XCEL

## (undated) DEVICE — SUT PDS II 3-0 SH 27 IN Z316H

## (undated) DEVICE — HARMONIC ACE +7 LAPAROSCOPIC SHEARS ADVANCED HEMOSTASIS 5MM DIAMETER 36CM SHAFT LENGTH  FOR USE WITH GRAY HAND PIECE ONLY: Brand: HARMONIC ACE

## (undated) DEVICE — CHLORAPREP HI-LITE 26ML ORANGE

## (undated) DEVICE — ELECTRODE BLADE MOD E-Z CLEAN  2.75IN 7CM -0012AM

## (undated) DEVICE — PMI DISPOSABLE PUNCTURE CLOSURE DEVICE / SUTURE GRASPER: Brand: PMI

## (undated) DEVICE — ADHESIVE SKIN CLSR DERMABOND NX

## (undated) DEVICE — DRAPE SHEET THREE QUARTER

## (undated) DEVICE — UTILITY MARKER,BLACK WITH LABELS: Brand: DEVON

## (undated) DEVICE — NEEDLE 25G X 1 1/2

## (undated) DEVICE — ENDOPATH XCEL BLADELESS TROCARS WITH STABILITY SLEEVES: Brand: ENDOPATH XCEL

## (undated) DEVICE — SPRAY SET ENDO 360DEG GAS ASSIST FLEX APPLICATOR DUPLOSPRAY

## (undated) DEVICE — BUTTON SWITCH PENCIL HOLSTER: Brand: VALLEYLAB

## (undated) DEVICE — GLOVE INDICATOR PI UNDERGLOVE SZ 7.5 BLUE

## (undated) DEVICE — COVIDIEN GIA BLACK (XTRA THICK) RELOAD

## (undated) DEVICE — ANTI-FOG SOLUTION WITH FOAM PAD: Brand: DEVON

## (undated) DEVICE — SUT PDS II 2-0 CT-1 27 IN Z259H

## (undated) DEVICE — TISSEEL FIBRIN 4ML FROZEN

## (undated) DEVICE — DRESSING MEPILEX AG BORDER 4 X 12 IN

## (undated) DEVICE — TRAVELKIT CONTAINS FIRST STEP KIT (200ML EP-4 KIT) AND SOILED SCOPE BAG - 1 KIT: Brand: TRAVELKIT CONTAINS FIRST STEP KIT AND SOILED SCOPE BAG

## (undated) DEVICE — PACK UNIVERSAL DRAPES SUB-Q ICD

## (undated) DEVICE — COVIDIEN ENDO GIA PURPLE (MED) RELOAD 60MM

## (undated) DEVICE — DRAPE TOWEL: Brand: CONVERTORS

## (undated) DEVICE — IRRIG ENDO FLO TUBING

## (undated) DEVICE — LIGACLIP MCA MULTIPLE CLIP APPLIERS, 20 MEDIUM CLIPS: Brand: LIGACLIP

## (undated) DEVICE — GLOVE SRG BIOGEL 7

## (undated) DEVICE — GLOVE INDICATOR PI UNDERGLOVE SZ 8.5 BLUE

## (undated) DEVICE — TUBING SUCTION 5MM X 12 FT

## (undated) DEVICE — STERILE MUSCLE FLAP PACK: Brand: CARDINAL HEALTH

## (undated) DEVICE — GAUZE SPONGES,16 PLY: Brand: CURITY

## (undated) DEVICE — Device: Brand: DEFENDO AIR/WATER/SUCTION AND BIOPSY VALVE

## (undated) DEVICE — INTENDED FOR TISSUE SEPARATION, AND OTHER PROCEDURES THAT REQUIRE A SHARP SURGICAL BLADE TO PUNCTURE OR CUT.: Brand: BARD-PARKER SAFETY BLADES SIZE 15, STERILE

## (undated) DEVICE — BLUE HEAT SCOPE WARMER

## (undated) DEVICE — SUT ETHILON 3-0 FS-1 18 IN 663G

## (undated) DEVICE — 2000CC GUARDIAN II: Brand: GUARDIAN

## (undated) DEVICE — JP CHAN DRN SIL HUBLESS 15FR W/TRO: Brand: CARDINAL HEALTH

## (undated) DEVICE — SUT MONOCRYL 4-0 PS-2 27 IN Y426H

## (undated) DEVICE — 3M™ TEGADERM™ TRANSPARENT FILM DRESSING FRAME STYLE, 1624W, 2-3/8 IN X 2-3/4 IN (6 CM X 7 CM), 100/CT 4CT/CASE: Brand: 3M™ TEGADERM™

## (undated) DEVICE — VIOLET BRAIDED (POLYGLACTIN 910), SYNTHETIC ABSORBABLE SUTURE: Brand: COATED VICRYL

## (undated) DEVICE — ELECTRODE NEEDLE MOD E-Z CLEAN 2.75IN 7CM -0013M

## (undated) DEVICE — GLOVE INDICATOR PI UNDERGLOVE SZ 7 BLUE

## (undated) DEVICE — SUT STRATAFIX SPIRAL PDS PLUS 1 CTX 18 IN SXPP1A400

## (undated) DEVICE — BINDER ABDOMINAL 46-62 IN

## (undated) DEVICE — SUT VICRYL 2-0 CT-1 27 IN J259H

## (undated) DEVICE — TUBING INFILTRATION SOFTOUCH PUMP

## (undated) DEVICE — SUT ETHILON 2-0 PS 18 IN 585H

## (undated) DEVICE — ENDOPATH PNEUMONEEDLE INSUFFLATION NEEDLES WITH LUER LOCK CONNECTORS 120MM: Brand: ENDOPATH

## (undated) DEVICE — INTENDED FOR TISSUE SEPARATION, AND OTHER PROCEDURES THAT REQUIRE A SHARP SURGICAL BLADE TO PUNCTURE OR CUT.: Brand: BARD-PARKER SAFETY BLADES SIZE 10, STERILE

## (undated) DEVICE — ABSORBABLE WOUND CLOSURE DEVICE: Brand: V-LOC 90

## (undated) DEVICE — DRESSING BIOPATCH ANTIMICROBIAL 1 IN DISC

## (undated) DEVICE — URETERAL CATHETER ADAPTOR TIP

## (undated) DEVICE — SUT MONOCRYL 3-0 PS-2 18 IN Y497G

## (undated) DEVICE — GLOVE SRG BIOGEL ECLIPSE 8.5

## (undated) DEVICE — JP CHAN DRN SIL HUBLESS 19FR W/TRO: Brand: CARDINAL HEALTH

## (undated) DEVICE — SUT MONOCRYL 4-0 PS-2 18 IN Y496G

## (undated) DEVICE — SURGICAL GOWN, XL SMARTSLEEVE: Brand: CONVERTORS

## (undated) DEVICE — SEAMGUARD STPL REINF ENDO GIA ULTRA UNV 60 BLACK

## (undated) DEVICE — SEAMGUARD STPL REINF ENDO GIA ULTRA UNIV 60 PURPLE

## (undated) DEVICE — TUBING SMOKE EVAC W/FILTRATION DEVICE PLUMEPORT ACTIV

## (undated) DEVICE — SUT STRATAFIX SPIRAL 1-0 PDO 36 X 36 CM SXPD2B405

## (undated) DEVICE — ELECTRODE BLADE MOD E-Z CLEAN 2.5IN 6.4CM -0012M

## (undated) DEVICE — BETHLEHEM UNIVERSAL MINOR GEN: Brand: CARDINAL HEALTH

## (undated) DEVICE — GLOVE INDICATOR PI UNDERGLOVE SZ 6.5 BLUE

## (undated) DEVICE — SUT PDS II 1 CT-1 27 IN Z347H

## (undated) DEVICE — PLUMEPEN PRO 10FT

## (undated) DEVICE — MEDI-VAC YANKAUER SUCTION HANDLE W/STRAIGHT TIP & CONTROL VENT: Brand: CARDINAL HEALTH

## (undated) DEVICE — SKIN MARKER DUAL TIP WITH RULER CAP, FLEXIBLE RULER AND LABELS: Brand: DEVON

## (undated) DEVICE — GLOVE INDICATOR PI UNDERGLOVE SZ 8 BLUE

## (undated) DEVICE — PAD GROUNDING ADULT

## (undated) DEVICE — OCCLUSIVE GAUZE STRIP,3% BISMUTH TRIBROMOPHENATE IN PETROLATUM BLEND: Brand: XEROFORM

## (undated) DEVICE — TELFA NON-ADHERENT ABSORBENT DRESSING: Brand: TELFA

## (undated) DEVICE — TOWEL SET X-RAY

## (undated) DEVICE — SYRINGE 30ML LL

## (undated) DEVICE — GLOVE SRG BIOGEL 7.5